# Patient Record
Sex: MALE | Race: BLACK OR AFRICAN AMERICAN | Employment: OTHER | URBAN - METROPOLITAN AREA
[De-identification: names, ages, dates, MRNs, and addresses within clinical notes are randomized per-mention and may not be internally consistent; named-entity substitution may affect disease eponyms.]

---

## 2017-02-08 ENCOUNTER — HOSPITAL ENCOUNTER (OUTPATIENT)
Dept: MRI IMAGING | Age: 72
Discharge: HOME | End: 2017-02-08
Attending: INTERNAL MEDICINE
Payer: MEDICAID

## 2017-02-08 DIAGNOSIS — M25.511 RIGHT SHOULDER PAIN: ICD-10-CM

## 2017-02-08 PROCEDURE — 73221 MRI JOINT UPR EXTREM W/O DYE: CPT

## 2017-02-16 ENCOUNTER — OFFICE VISIT (OUTPATIENT)
Dept: NEUROLOGY | Age: 72
End: 2017-02-16

## 2017-02-16 VITALS
RESPIRATION RATE: 14 BRPM | WEIGHT: 179 LBS | BODY MASS INDEX: 27.13 KG/M2 | TEMPERATURE: 98.5 F | DIASTOLIC BLOOD PRESSURE: 78 MMHG | OXYGEN SATURATION: 96 % | HEIGHT: 68 IN | SYSTOLIC BLOOD PRESSURE: 148 MMHG | HEART RATE: 78 BPM

## 2017-02-16 DIAGNOSIS — M54.16 RADICULOPATHY OF LUMBAR REGION: ICD-10-CM

## 2017-02-16 DIAGNOSIS — R26.81 GAIT INSTABILITY: Primary | ICD-10-CM

## 2017-02-16 RX ORDER — GABAPENTIN 300 MG/1
600 CAPSULE ORAL 4 TIMES DAILY
Qty: 240 CAP | Refills: 6 | Status: SHIPPED | OUTPATIENT
Start: 2017-02-16 | End: 2017-07-05

## 2017-02-16 NOTE — LETTER
2/16/2017 11:50 AM 
 
Patient:  Kari Jj YOB: 1945 Date of Visit: 2/16/2017 Dear Nell Brody MD 
[address] [facility] [address] VIA Facsimile: [phone] 
 : Thank you for referring Mr. Kari Jj to me for evaluation/treatment. Below are the relevant portions of my assessment and plan of care. Re:  Dao Endow up visit     2/16/2017 11:44 AM 
 
SSN: [id number] Subjective:   Kari Jj returns for follow up. He's gotten good relief of pain in the right leg with increased dosing of Neurontin. He has no side effects with the medication. He says he needs to get up slowly to avoid any major pain, but it's tolerable most of the time. His biggest complaint is the burning in the right leg from the hip down to the foot. His primary doctor has increased the Neurontin to 600 mg TID Medications:   
Current Outpatient Prescriptions Medication Sig Dispense Refill  gabapentin (NEURONTIN) 300 mg capsule Take 1 Cap by mouth three (3) times daily. Indications: NEUROPATHIC PAIN 90 Cap 6  
 atorvastatin (LIPITOR) 40 mg tablet Take  by mouth daily.  indapamide (LOZOL) 1.25 mg tablet Take  by mouth daily.  spironolactone (ALDACTONE) 25 mg tablet Take  by mouth daily.  tamsulosin (FLOMAX) 0.4 mg capsule Take 1 Cap by mouth daily. 30 Cap 0  
 pentoxifylline CR (TRENTAL) 400 mg CR tablet Take 400 mg by mouth two (2) times daily (with meals). Vital signs:   
Visit Vitals  /78 (BP 1 Location: Left arm, BP Patient Position: Sitting)  Pulse 78  Temp 98.5 °F (36.9 °C) (Oral)  Resp 14  
 Ht 5' 7.5\" (1.715 m)  Wt 81.2 kg (179 lb)  SpO2 96%  BMI 27.62 kg/m2 Review of Systems: As above otherwise 11 point review of systems negative including;  
Constitutional no fever or chills Skin denies rash or itching HEENT  Denies tinnitus, hearing lose Eyes denies diplopia vision lose Respiratory denies sortness of breath Cardiovascular denies chest pain, dyspnea on exertion Gastrointestinal denies nausea, vomiting, diarrhea, constipation Genitourinary denies incontinence Musculoskeletal denies joint pain or swelling Endocrine denies weight change Hematology denies easy bruising or bleeding Neurological as above in HPI Patient Active Problem List  
Diagnosis Code  Unsteady gait R26.81  
 Gait instability R26.81  Vertigo R42  Radiculopathy of lumbar region M54.16 Objective: The patient is awake, alert, and oriented x 4. Fund of knowledge is adequate. Speech is fluent and memory is intact. Cranial Nerves: II  Visual fields are full to confrontation. III, IV, VI  Extraocular movements are intact. There is no nystagmus. V  Facial sensation is intact to pinprick. VII  Face is symmetrical.  VIII - Hearing is present. IX, X, XII  Palate is symmetrical.   XI - Shoulder shrugging and head turning intact Motor: The patient moves all four limbs fairly well and symmetrically. Tone is normal. Reflexes are 2+ and symmetrical. Plantars are down going. Gait is mildly antalgic, limping off of the right foot slightly. CBC:  
Lab Results Component Value Date/Time WBC 4.9 07/26/2016 10:03 AM  
 RBC 4.51 07/26/2016 10:03 AM  
 HGB 13.9 07/26/2016 10:03 AM  
 HCT 41.1 07/26/2016 10:03 AM  
 PLATELET 589 29/64/9447 10:03 AM  
 
BMP:  
Lab Results Component Value Date/Time Glucose 108 07/26/2016 10:03 AM  
 Sodium 140 07/26/2016 10:03 AM  
 Potassium 4.0 07/26/2016 10:03 AM  
 Chloride 104 07/26/2016 10:03 AM  
 CO2 27 07/26/2016 10:03 AM  
 BUN 14 07/26/2016 10:03 AM  
 Creatinine 1.04 07/26/2016 10:03 AM  
 Calcium 9.3 07/26/2016 10:03 AM  
 
CMP:  
Lab Results Component Value Date/Time  Glucose 108 07/26/2016 10:03 AM  
 Sodium 140 07/26/2016 10:03 AM  
 Potassium 4.0 07/26/2016 10:03 AM  
 Chloride 104 07/26/2016 10:03 AM  
 CO2 27 07/26/2016 10:03 AM  
 BUN 14 07/26/2016 10:03 AM  
 Creatinine 1.04 07/26/2016 10:03 AM  
 Calcium 9.3 07/26/2016 10:03 AM  
 Anion gap 9 07/26/2016 10:03 AM  
 BUN/Creatinine ratio 13 07/26/2016 10:03 AM  
 Alk. phosphatase 76 07/26/2016 10:03 AM  
 Protein, total 7.7 07/26/2016 10:03 AM  
 Albumin 3.5 07/26/2016 10:03 AM  
 Globulin 4.2 07/26/2016 10:03 AM  
 A-G Ratio 0.8 07/26/2016 10:03 AM  
 
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 No evidence for motor dysfunction. Doing well on the increased dose of Neurontin, but still with some neuropathic pain in the right leg. Plan:  Increase the medication to 600 mg QID. Return here in 6 months. Sincerely, 
 
 
 
Julio Cesar Maldonado. Michael Valenzuela M.D. If you have questions, please do not hesitate to call me. I look forward to following Mr. Dayo Eli along with you.  
 
 
 
Sincerely, 
 
 
Selma Lo MD

## 2017-02-16 NOTE — PROGRESS NOTES
Re:  Elias Nunez up visit     2/16/2017 11:44 AM    SSN: [id number]    Subjective:   Carlo Arce returns for follow up. He's gotten good relief of pain in the right leg with increased dosing of Neurontin. He has no side effects with the medication. He says he needs to get up slowly to avoid any major pain, but it's tolerable most of the time. His biggest complaint is the burning in the right leg from the hip down to the foot. His primary doctor has increased the Neurontin to 600 mg TID    Medications:    Current Outpatient Prescriptions   Medication Sig Dispense Refill    gabapentin (NEURONTIN) 300 mg capsule Take 1 Cap by mouth three (3) times daily. Indications: NEUROPATHIC PAIN 90 Cap 6    atorvastatin (LIPITOR) 40 mg tablet Take  by mouth daily.  indapamide (LOZOL) 1.25 mg tablet Take  by mouth daily.  spironolactone (ALDACTONE) 25 mg tablet Take  by mouth daily.  tamsulosin (FLOMAX) 0.4 mg capsule Take 1 Cap by mouth daily. 30 Cap 0    pentoxifylline CR (TRENTAL) 400 mg CR tablet Take 400 mg by mouth two (2) times daily (with meals).          Vital signs:    Visit Vitals    /78 (BP 1 Location: Left arm, BP Patient Position: Sitting)    Pulse 78    Temp 98.5 °F (36.9 °C) (Oral)    Resp 14    Ht 5' 7.5\" (1.715 m)    Wt 81.2 kg (179 lb)    SpO2 96%    BMI 27.62 kg/m2       Review of Systems:   As above otherwise 11 point review of systems negative including;   Constitutional no fever or chills  Skin denies rash or itching  HEENT  Denies tinnitus, hearing lose  Eyes denies diplopia vision lose  Respiratory denies sortness of breath  Cardiovascular denies chest pain, dyspnea on exertion  Gastrointestinal denies nausea, vomiting, diarrhea, constipation  Genitourinary denies incontinence  Musculoskeletal denies joint pain or swelling  Endocrine denies weight change  Hematology denies easy bruising or bleeding   Neurological as above in HPI      Patient Active Problem List   Diagnosis Code    Unsteady gait R26.81    Gait instability R26.81    Vertigo R42    Radiculopathy of lumbar region M54.16         Objective: The patient is awake, alert, and oriented x 4. Fund of knowledge is adequate. Speech is fluent and memory is intact. Cranial Nerves: II  Visual fields are full to confrontation. III, IV, VI  Extraocular movements are intact. There is no nystagmus. V  Facial sensation is intact to pinprick. VII  Face is symmetrical.  VIII - Hearing is present. IX, X, XII  Palate is symmetrical.   XI - Shoulder shrugging and head turning intact  Motor: The patient moves all four limbs fairly well and symmetrically. Tone is normal. Reflexes are 2+ and symmetrical. Plantars are down going. Gait is mildly antalgic, limping off of the right foot slightly. CBC:   Lab Results   Component Value Date/Time    WBC 4.9 07/26/2016 10:03 AM    RBC 4.51 07/26/2016 10:03 AM    HGB 13.9 07/26/2016 10:03 AM    HCT 41.1 07/26/2016 10:03 AM    PLATELET 237 90/63/9260 10:03 AM     BMP:   Lab Results   Component Value Date/Time    Glucose 108 07/26/2016 10:03 AM    Sodium 140 07/26/2016 10:03 AM    Potassium 4.0 07/26/2016 10:03 AM    Chloride 104 07/26/2016 10:03 AM    CO2 27 07/26/2016 10:03 AM    BUN 14 07/26/2016 10:03 AM    Creatinine 1.04 07/26/2016 10:03 AM    Calcium 9.3 07/26/2016 10:03 AM     CMP:   Lab Results   Component Value Date/Time    Glucose 108 07/26/2016 10:03 AM    Sodium 140 07/26/2016 10:03 AM    Potassium 4.0 07/26/2016 10:03 AM    Chloride 104 07/26/2016 10:03 AM    CO2 27 07/26/2016 10:03 AM    BUN 14 07/26/2016 10:03 AM    Creatinine 1.04 07/26/2016 10:03 AM    Calcium 9.3 07/26/2016 10:03 AM    Anion gap 9 07/26/2016 10:03 AM    BUN/Creatinine ratio 13 07/26/2016 10:03 AM    Alk.  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 No evidence for motor dysfunction. Doing well on the increased dose of Neurontin, but still with some neuropathic pain in the right leg. Plan:  Increase the medication to 600 mg QID. Return here in 6 months. Sincerely,        Colt Chacon.  Margot Mckeon M.D.

## 2017-02-16 NOTE — MR AVS SNAPSHOT
Visit Information Date & Time Provider Department Dept. Phone Encounter #  
 2/16/2017 11:20 AM Margie Godinez [facility] [phone] [id number] Follow-up Instructions Return in about 6 months (around 8/16/2017). Follow-up and Disposition History Your Appointments 2/20/2017  1:45 PM  
ESTABLISHED PATIENT with Renita Reynoso MD  
[facility] ([address]) Appt Note: 6 month follow up Sreekanth Alejo [address] [place] [address]  
[address] [facility] [address] [place] [address] 8/16/2017  8:40 AM  
Follow Up with Margie Godinez MD  
[facility] [address]) Appt Note: 6mon f/u;  
 [address] [place] [address]  
[phone]  
  
   
 [facility] [address] Upcoming Health Maintenance Date Due Hepatitis C Screening 1945 FOBT Q 1 YEAR AGE 50-75 12/17/1996 ZOSTER VACCINE AGE 60> 12/17/2006 GLAUCOMA SCREENING Q2Y 12/17/2011 Pneumococcal 65+ Low/Medium Risk (1 of 2 - PCV13) 12/17/2011 MEDICARE YEARLY EXAM 12/17/2011 INFLUENZA AGE 9 TO ADULT 8/1/2016 DTaP/Tdap/Td series (2 - Td) 7/7/2023 Allergies as of 2/16/2017  Review Complete On: 2/16/2017 By: Andrew Blount LPN Severity Noted Reaction Type Reactions Ampicillin High 07/07/2013    Angioedema Asa-acetaminophen-caff-buffers High 05/06/2015    Rash Penicillins High 07/07/2013    Angioedema Current Immunizations  Reviewed on 4/12/2016 Name Date Tdap 7/7/2013 11:11 AM  
  
 Not reviewed this visit You Were Diagnosed With   
  
 Codes Comments Gait instability    -  Primary ICD-10-CM: R26.81 
ICD-9-CM: 781. 2 Radiculopathy of lumbar region     ICD-10-CM: M54.16 
ICD-9-CM: 724.4 Vitals BP Pulse Temp Resp Height(growth percentile) Weight(growth percentile) 148/78 (BP 1 Location: Left arm, BP Patient Position: Sitting) 78 98.5 °F (36.9 °C) (Oral) 14 5' 7.5\" (1.715 m) 179 lb (81.2 kg) SpO2 BMI Smoking Status 96% 27.62 kg/m2 Former Smoker Vitals History BMI and BSA Data Body Mass Index Body Surface Area  
 27.62 kg/m 2 1.97 m 2 Preferred Pharmacy Pharmacy Name Phone [facility] Alisha Sanchez, [address] [phone] Your Updated Medication List  
  
   
This list is accurate as of: 2/16/17 12:01 PM.  Always use your most recent med list.  
  
  
  
  
 atorvastatin 40 mg tablet Commonly known as:  LIPITOR Take  by mouth daily. gabapentin 300 mg capsule Commonly known as:  NEURONTIN Take 2 Caps by mouth four (4) times daily. Indications: NEUROPATHIC PAIN  
  
 indapamide 1.25 mg tablet Commonly known as:  Terrance Amabile Take  by mouth daily. pentoxifylline  mg CR tablet Commonly known as:  TRENTAL Take 400 mg by mouth two (2) times daily (with meals). spironolactone 25 mg tablet Commonly known as:  ALDACTONE Take  by mouth daily. tamsulosin 0.4 mg capsule Commonly known as:  FLOMAX Take 1 Cap by mouth daily. Prescriptions Sent to Pharmacy Refills  
 gabapentin (NEURONTIN) 300 mg capsule 6 Sig: Take 2 Caps by mouth four (4) times daily. Indications: NEUROPATHIC PAIN Class: Normal  
 Pharmacy: [facility] Jose Francisco Nasima Lisa, [address] [facility] Ph #: [phone] Route: Oral  
  
Follow-up Instructions Return in about 6 months (around 8/16/2017). Please provide this summary of care documentation to your next provider. Your primary care clinician is listed as Clarke Farrell. If you have any questions after today's visit, please call [phone].

## 2017-02-16 NOTE — COMMUNICATION BODY
Re:  Anillin Zelaya up visit     2/16/2017 11:44 AM    SSN: [id number]    Subjective:   Jamie Castellon returns for follow up. He's gotten good relief of pain in the right leg with increased dosing of Neurontin. He has no side effects with the medication. He says he needs to get up slowly to avoid any major pain, but it's tolerable most of the time. His biggest complaint is the burning in the right leg from the hip down to the foot. His primary doctor has increased the Neurontin to 600 mg TID    Medications:    Current Outpatient Prescriptions   Medication Sig Dispense Refill    gabapentin (NEURONTIN) 300 mg capsule Take 1 Cap by mouth three (3) times daily. Indications: NEUROPATHIC PAIN 90 Cap 6    atorvastatin (LIPITOR) 40 mg tablet Take  by mouth daily.  indapamide (LOZOL) 1.25 mg tablet Take  by mouth daily.  spironolactone (ALDACTONE) 25 mg tablet Take  by mouth daily.  tamsulosin (FLOMAX) 0.4 mg capsule Take 1 Cap by mouth daily. 30 Cap 0    pentoxifylline CR (TRENTAL) 400 mg CR tablet Take 400 mg by mouth two (2) times daily (with meals).          Vital signs:    Visit Vitals    /78 (BP 1 Location: Left arm, BP Patient Position: Sitting)    Pulse 78    Temp 98.5 °F (36.9 °C) (Oral)    Resp 14    Ht 5' 7.5\" (1.715 m)    Wt 81.2 kg (179 lb)    SpO2 96%    BMI 27.62 kg/m2       Review of Systems:   As above otherwise 11 point review of systems negative including;   Constitutional no fever or chills  Skin denies rash or itching  HEENT  Denies tinnitus, hearing lose  Eyes denies diplopia vision lose  Respiratory denies sortness of breath  Cardiovascular denies chest pain, dyspnea on exertion  Gastrointestinal denies nausea, vomiting, diarrhea, constipation  Genitourinary denies incontinence  Musculoskeletal denies joint pain or swelling  Endocrine denies weight change  Hematology denies easy bruising or bleeding   Neurological as above in HPI      Patient Active Problem List   Diagnosis Code    Unsteady gait R26.81    Gait instability R26.81    Vertigo R42    Radiculopathy of lumbar region M54.16         Objective: The patient is awake, alert, and oriented x 4. Fund of knowledge is adequate. Speech is fluent and memory is intact. Cranial Nerves: II  Visual fields are full to confrontation. III, IV, VI  Extraocular movements are intact. There is no nystagmus. V  Facial sensation is intact to pinprick. VII  Face is symmetrical.  VIII - Hearing is present. IX, X, XII  Palate is symmetrical.   XI - Shoulder shrugging and head turning intact  Motor: The patient moves all four limbs fairly well and symmetrically. Tone is normal. Reflexes are 2+ and symmetrical. Plantars are down going. Gait is mildly antalgic, limping off of the right foot slightly. CBC:   Lab Results   Component Value Date/Time    WBC 4.9 07/26/2016 10:03 AM    RBC 4.51 07/26/2016 10:03 AM    HGB 13.9 07/26/2016 10:03 AM    HCT 41.1 07/26/2016 10:03 AM    PLATELET 478 62/84/9356 10:03 AM     BMP:   Lab Results   Component Value Date/Time    Glucose 108 07/26/2016 10:03 AM    Sodium 140 07/26/2016 10:03 AM    Potassium 4.0 07/26/2016 10:03 AM    Chloride 104 07/26/2016 10:03 AM    CO2 27 07/26/2016 10:03 AM    BUN 14 07/26/2016 10:03 AM    Creatinine 1.04 07/26/2016 10:03 AM    Calcium 9.3 07/26/2016 10:03 AM     CMP:   Lab Results   Component Value Date/Time    Glucose 108 07/26/2016 10:03 AM    Sodium 140 07/26/2016 10:03 AM    Potassium 4.0 07/26/2016 10:03 AM    Chloride 104 07/26/2016 10:03 AM    CO2 27 07/26/2016 10:03 AM    BUN 14 07/26/2016 10:03 AM    Creatinine 1.04 07/26/2016 10:03 AM    Calcium 9.3 07/26/2016 10:03 AM    Anion gap 9 07/26/2016 10:03 AM    BUN/Creatinine ratio 13 07/26/2016 10:03 AM    Alk.  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 No evidence for motor dysfunction. Doing well on the increased dose of Neurontin, but still with some neuropathic pain in the right leg. Plan:  Increase the medication to 600 mg QID. Return here in 6 months. Sincerely,        Felton Luna.  Lauren Stewart M.D.

## 2017-06-21 PROBLEM — N52.9 ED (ERECTILE DYSFUNCTION) OF ORGANIC ORIGIN: Status: ACTIVE | Noted: 2017-06-21

## 2017-07-17 ENCOUNTER — HOSPITAL ENCOUNTER (OUTPATIENT)
Dept: ULTRASOUND IMAGING | Age: 72
Discharge: HOME | End: 2017-07-17
Attending: INTERNAL MEDICINE
Payer: MEDICAID

## 2017-07-17 DIAGNOSIS — R22.1 LUMP ON NECK: ICD-10-CM

## 2017-07-17 PROCEDURE — 76536 US EXAM OF HEAD AND NECK: CPT

## 2017-09-25 ENCOUNTER — APPOINTMENT (OUTPATIENT)
Dept: GENERAL RADIOLOGY | Age: 72
End: 2017-09-25
Attending: EMERGENCY MEDICINE
Payer: MEDICAID

## 2017-09-25 ENCOUNTER — HOSPITAL ENCOUNTER (EMERGENCY)
Age: 72
Discharge: HOME | End: 2017-09-25
Attending: EMERGENCY MEDICINE
Payer: MEDICAID

## 2017-09-25 VITALS
DIASTOLIC BLOOD PRESSURE: 81 MMHG | RESPIRATION RATE: 16 BRPM | TEMPERATURE: 97.9 F | SYSTOLIC BLOOD PRESSURE: 151 MMHG | HEART RATE: 75 BPM | OXYGEN SATURATION: 98 %

## 2017-09-25 DIAGNOSIS — R05.9 COUGH: ICD-10-CM

## 2017-09-25 DIAGNOSIS — J18.9 CAP (COMMUNITY ACQUIRED PNEUMONIA): Primary | ICD-10-CM

## 2017-09-25 DIAGNOSIS — I10 ELEVATED BLOOD PRESSURE READING WITH DIAGNOSIS OF HYPERTENSION: ICD-10-CM

## 2017-09-25 LAB
ANION GAP SERPL CALC-SCNC: 5 MMOL/L (ref 3–18)
ATRIAL RATE: 54 BPM
BASOPHILS # BLD: 0 K/UL (ref 0–0.06)
BASOPHILS NFR BLD: 0 % (ref 0–2)
BUN SERPL-MCNC: 12 MG/DL (ref 7–18)
BUN/CREAT SERPL: 14 (ref 12–20)
CALCIUM SERPL-MCNC: 9.6 MG/DL (ref 8.5–10.1)
CALCULATED P AXIS, ECG09: 46 DEGREES
CALCULATED R AXIS, ECG10: -5 DEGREES
CALCULATED T AXIS, ECG11: 47 DEGREES
CHLORIDE SERPL-SCNC: 106 MMOL/L (ref 100–108)
CK MB CFR SERPL CALC: 1.2 % (ref 0–4)
CK MB SERPL-MCNC: 3.2 NG/ML (ref 5–25)
CK SERPL-CCNC: 265 U/L (ref 39–308)
CO2 SERPL-SCNC: 27 MMOL/L (ref 21–32)
CREAT SERPL-MCNC: 0.87 MG/DL (ref 0.6–1.3)
DIAGNOSIS, 93000: NORMAL
DIFFERENTIAL METHOD BLD: ABNORMAL
EOSINOPHIL # BLD: 0.3 K/UL (ref 0–0.4)
EOSINOPHIL NFR BLD: 5 % (ref 0–5)
ERYTHROCYTE [DISTWIDTH] IN BLOOD BY AUTOMATED COUNT: 14.8 % (ref 11.6–14.5)
GLUCOSE SERPL-MCNC: 93 MG/DL (ref 74–99)
HCT VFR BLD AUTO: 39.7 % (ref 36–48)
HGB BLD-MCNC: 13.6 G/DL (ref 13–16)
LYMPHOCYTES # BLD: 2.3 K/UL (ref 0.9–3.6)
LYMPHOCYTES NFR BLD: 36 % (ref 21–52)
MCH RBC QN AUTO: 31 PG (ref 24–34)
MCHC RBC AUTO-ENTMCNC: 34.3 G/DL (ref 31–37)
MCV RBC AUTO: 90.4 FL (ref 74–97)
MONOCYTES # BLD: 0.6 K/UL (ref 0.05–1.2)
MONOCYTES NFR BLD: 9 % (ref 3–10)
NEUTS SEG # BLD: 3.2 K/UL (ref 1.8–8)
NEUTS SEG NFR BLD: 50 % (ref 40–73)
P-R INTERVAL, ECG05: 168 MS
PLATELET # BLD AUTO: 223 K/UL (ref 135–420)
PMV BLD AUTO: 9.5 FL (ref 9.2–11.8)
POTASSIUM SERPL-SCNC: 3.6 MMOL/L (ref 3.5–5.5)
Q-T INTERVAL, ECG07: 422 MS
QRS DURATION, ECG06: 80 MS
QTC CALCULATION (BEZET), ECG08: 400 MS
RBC # BLD AUTO: 4.39 M/UL (ref 4.7–5.5)
SODIUM SERPL-SCNC: 138 MMOL/L (ref 136–145)
TROPONIN I SERPL-MCNC: <0.02 NG/ML (ref 0–0.04)
VENTRICULAR RATE, ECG03: 54 BPM
WBC # BLD AUTO: 6.4 K/UL (ref 4.6–13.2)

## 2017-09-25 PROCEDURE — 99283 EMERGENCY DEPT VISIT LOW MDM: CPT

## 2017-09-25 PROCEDURE — 74011000250 HC RX REV CODE- 250: Performed by: EMERGENCY MEDICINE

## 2017-09-25 PROCEDURE — 94640 AIRWAY INHALATION TREATMENT: CPT

## 2017-09-25 PROCEDURE — 93005 ELECTROCARDIOGRAM TRACING: CPT

## 2017-09-25 PROCEDURE — 71020 XR CHEST PA LAT: CPT

## 2017-09-25 PROCEDURE — 80048 BASIC METABOLIC PNL TOTAL CA: CPT | Performed by: EMERGENCY MEDICINE

## 2017-09-25 PROCEDURE — 85025 COMPLETE CBC W/AUTO DIFF WBC: CPT | Performed by: EMERGENCY MEDICINE

## 2017-09-25 PROCEDURE — 74011250637 HC RX REV CODE- 250/637: Performed by: EMERGENCY MEDICINE

## 2017-09-25 PROCEDURE — 82550 ASSAY OF CK (CPK): CPT | Performed by: EMERGENCY MEDICINE

## 2017-09-25 PROCEDURE — 77030029684 HC NEB SM VOL KT MONA -A

## 2017-09-25 RX ORDER — BENZONATATE 100 MG/1
200 CAPSULE ORAL
Qty: 30 CAP | Refills: 0 | Status: SHIPPED | OUTPATIENT
Start: 2017-09-25 | End: 2017-09-25

## 2017-09-25 RX ORDER — ALBUTEROL SULFATE 0.83 MG/ML
2.5 SOLUTION RESPIRATORY (INHALATION)
Status: COMPLETED | OUTPATIENT
Start: 2017-09-25 | End: 2017-09-25

## 2017-09-25 RX ORDER — CODEINE PHOSPHATE AND GUAIFENESIN 10; 100 MG/5ML; MG/5ML
10 SOLUTION ORAL
Qty: 118 ML | Refills: 0 | Status: SHIPPED | OUTPATIENT
Start: 2017-09-25 | End: 2017-10-02

## 2017-09-25 RX ORDER — LEVOFLOXACIN 750 MG/1
750 TABLET ORAL
Status: COMPLETED | OUTPATIENT
Start: 2017-09-25 | End: 2017-09-25

## 2017-09-25 RX ORDER — LEVOFLOXACIN 750 MG/1
750 TABLET ORAL DAILY
Qty: 5 TAB | Refills: 0 | Status: SHIPPED | OUTPATIENT
Start: 2017-09-26 | End: 2017-10-01

## 2017-09-25 RX ORDER — ALBUTEROL SULFATE 90 UG/1
2 AEROSOL, METERED RESPIRATORY (INHALATION)
Qty: 1 INHALER | Refills: 0 | Status: SHIPPED | OUTPATIENT
Start: 2017-09-25 | End: 2020-02-21

## 2017-09-25 RX ADMIN — ALBUTEROL SULFATE 2.5 MG: 2.5 SOLUTION RESPIRATORY (INHALATION) at 14:28

## 2017-09-25 RX ADMIN — LEVOFLOXACIN 750 MG: 750 TABLET, FILM COATED ORAL at 14:34

## 2017-09-25 NOTE — ED PROVIDER NOTES
HPI Comments: Prateek Rangel is a 80 y.o. male with hx of HTN, hypercholesterolemia, spinal cord injury and peripheral vascular disease presenting to the ED with c/o constant left rib cage pain that began 1 day ago. Pt states rib cage pain worsens with deep breaths and is having difficulty breathing because of the pain. Pt is wearing a belt around ribs, states \"it helps with breathing, but not so much for the pain\". Denies any injury to the site. Pt denies nausea, vomiting, or hx of pulmonary disease. Notes cough that he has had for a while now. Pt is followed by Dr. Alfred Calderón. The history is provided by the patient. 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 Social History     Social History    Marital status:      Spouse name: N/A    Number of children: N/A    Years of education: N/A     Occupational History    Not on file. Social History Main Topics    Smoking status: Former Smoker     Quit date: 7/13/2015    Smokeless tobacco: Never Used    Alcohol use 0.0 oz/week     0 Standard drinks or equivalent per week      Comment: former stopped 2015    Drug use: No    Sexual activity: Yes     Other Topics Concern    Not on file     Social History Narrative         ALLERGIES: Ampicillin; Asa-acetaminophen-caff-buffers; and Penicillins    Review of Systems   Constitutional: Negative for fever. HENT: Negative for sore throat.     Eyes: Negative for redness and visual disturbance. Respiratory: Positive for cough. Negative for shortness of breath and wheezing. Difficulty breathing    Cardiovascular: Negative for chest pain. Gastrointestinal: Negative for abdominal pain, nausea and vomiting. Endocrine: Negative for polyuria. Genitourinary: Negative for dysuria. Musculoskeletal: Negative for arthralgias and neck stiffness. Left sided rib pain    Skin: Negative for rash. Neurological: Negative for headaches. All other systems reviewed and are negative. Vitals:    09/25/17 1141   BP: 151/81   Pulse: 75   Resp: 16   Temp: 97.9 °F (36.6 °C)   SpO2: 98%            Physical Exam   Constitutional: He is oriented to person, place, and time. He appears well-developed and well-nourished. No distress. HENT:   Head: Normocephalic and atraumatic. Mouth/Throat: Oropharynx is clear and moist.   Eyes: Conjunctivae are normal. Pupils are equal, round, and reactive to light. No scleral icterus. Neck: Normal range of motion. Neck supple. Cardiovascular: Normal rate and intact distal pulses. Capillary refill < 3 seconds   Pulmonary/Chest: Effort normal and breath sounds normal. No respiratory distress. He has no wheezes. He has no rales. Lungs are clear   Equal breath sounds   O2 98% on room air    Abdominal: Soft. Bowel sounds are normal. He exhibits no distension. There is no tenderness. Musculoskeletal: Normal range of motion. He exhibits no edema. No tenderness to palpation   Rib cage no crepitance   Pain exacerbated with deep inspiration    Lymphadenopathy:     He has no cervical adenopathy. Neurological: He is alert and oriented to person, place, and time. No cranial nerve deficit. Skin: Skin is warm and dry. He is not diaphoretic. Nursing note and vitals reviewed.        MDM  Number of Diagnoses or Management Options  CAP (community acquired pneumonia):   Cough:   Elevated blood pressure reading with diagnosis of hypertension:   Diagnosis management comments: ddx infectious, musculoskeletal, ptx, pleurisy, pe, cardiac, metabolic    Ekg, labs, cxr    Wbc wnl    Has pna on cxr    Gave dose levaquin, neb  Will have him call his pcp tomorrow for fu. I have reassessed the patient. I have discussed the workup, results and plan with the patient and patient is in agreement. Patient is feeling better. Patient will be prescribed levaquin, albuteral inhaler, robitussin ac. Patient was discharge in stable condition. Patient was given outpatient follow up. Patient is to return to emergency department if any new or worsening condition. Amount and/or Complexity of Data Reviewed  Clinical lab tests: ordered and reviewed  Tests in the radiology section of CPT®: ordered and reviewed  Tests in the medicine section of CPT®: ordered and reviewed  Review and summarize past medical records: yes  Independent visualization of images, tracings, or specimens: yes    Risk of Complications, Morbidity, and/or Mortality  Presenting problems: moderate  Diagnostic procedures: moderate  Management options: moderate    Patient Progress  Patient progress: improved    ED Course       Procedures      Medications Ordered:  Medications   levoFLOXacin (LEVAQUIN) tablet 750 mg (750 mg Oral Given 9/25/17 1434)   albuterol (PROVENTIL VENTOLIN) nebulizer solution 2.5 mg (2.5 mg Nebulization Given 9/25/17 1428)       Lab Findings:  No results found for this or any previous visit (from the past 12 hour(s)). EKG Interpretation by ED physician:    12:33 PM: Sinus bradycardia. Rate of 54 bpm. Normal QRS duration. Possible LVH. No ST elevations or depressions. X-ray, CT or radiology findings or impressions:  XR CHEST PA LAT   At left lung base there are new mild infiltrates, and/or atelectatic changes. No  definable left pleural effusion or obvious pleural thickening. Minimal streaky atelectasis at right lung base. Findings suggestive of mild COPD.  But currently lungs are mildly hypoventilated  on vertical dimension. Diagnosis:   1. CAP (community acquired pneumonia)    2. Cough    3. Elevated blood pressure reading with diagnosis of hypertension        Disposition: Discharge     Follow-up Information     Follow up With Details [address] [place] [address], [address] [place] [id number] [phone]             Discharge Medication List as of 9/25/2017  1:52 PM      START taking these medications    Details   levoFLOXacin (LEVAQUIN) 750 mg tablet Take 1 Tab by mouth daily for 5 days. Start this medication on Tuesday, 9/26/17. Indications: BACTERIAL PNEUMONIA, Print, Disp-5 Tab, R-0      albuterol (PROVENTIL HFA, VENTOLIN HFA, PROAIR HFA) 90 mcg/actuation inhaler Take 2 Puffs by inhalation every four (4) hours as needed for Wheezing or Shortness of Breath. Indications: BRONCHOSPASM PREVENTION, Print, Disp-1 Inhaler, R-0      guaiFENesin-codeine (ROBITUSSIN AC) 100-10 mg/5 mL solution Take 10 mL by mouth three (3) times daily as needed for Cough or Congestion for up to 7 days. Max Daily Amount: 30 mL., Print, Disp-118 mL, R-0         CONTINUE these medications which have NOT CHANGED    Details   tamsulosin (FLOMAX) 0.4 mg capsule Take 1 Cap by mouth nightly., Normal, Disp-30 Cap, R-5      finasteride (PROSCAR) 5 mg tablet Take 1 Tab by mouth daily. , Normal, Disp-30 Tab, R-5      gabapentin (NEURONTIN) 600 mg tablet Historical Med      amLODIPine (NORVASC) 5 mg tablet Take 5 mg by mouth daily. , Historical Med      atorvastatin (LIPITOR) 40 mg tablet Take  by mouth daily. , Historical Med      spironolactone (ALDACTONE) 25 mg tablet Take  by mouth daily. , Historical Med             Scribe Attestation      Trinity Abdul acting as a scribe for and in the presence of Donte , DO      September 25, 2017 at 12:35 PM       Provider Attestation:      I personally performed the services described in the documentation, reviewed the documentation, as recorded by the scribe in my presence, and it accurately and completely records my words and actions.  September 25, 2017 at 12:35 PM - Maris Beasley DO

## 2017-09-25 NOTE — DISCHARGE INSTRUCTIONS
High Blood Pressure: Care Instructions  Your Care Instructions  If your blood pressure is usually above 140/90, you have high blood pressure, or hypertension. That means the top number is 140 or higher or the bottom number is 90 or higher, or both. Despite what a lot of people think, high blood pressure usually doesn't cause headaches or make you feel dizzy or lightheaded. It usually has no symptoms. But it does increase your risk for heart attack, stroke, and kidney or eye damage. The higher your blood pressure, the more your risk increases. Your doctor will give you a goal for your blood pressure. Your goal will be based on your health and your age. An example of a goal is to keep your blood pressure below 140/90. Lifestyle changes, such as eating healthy and being active, are always important to help lower blood pressure. You might also take medicine to reach your blood pressure goal.  Follow-up care is a key part of your treatment and safety. Be sure to make and go to all appointments, and call your doctor if you are having problems. It's also a good idea to know your test results and keep a list of the medicines you take. How can you care for yourself at home? Medical treatment  · If you stop taking your medicine, your blood pressure will go back up. You may take one or more types of medicine to lower your blood pressure. Be safe with medicines. Take your medicine exactly as prescribed. Call your doctor if you think you are having a problem with your medicine. · Talk to your doctor before you start taking aspirin every day. Aspirin can help certain people lower their risk of a heart attack or stroke. But taking aspirin isn't right for everyone, because it can cause serious bleeding. · See your doctor regularly. You may need to see the doctor more often at first or until your blood pressure comes down.   · If you are taking blood pressure medicine, talk to your doctor before you take decongestants or anti-inflammatory medicine, such as ibuprofen. Some of these medicines can raise blood pressure. · Learn how to check your blood pressure at home. Lifestyle changes  · Stay at a healthy weight. This is especially important if you put on weight around the waist. Losing even 10 pounds can help you lower your blood pressure. · If your doctor recommends it, get more exercise. Walking is a good choice. Bit by bit, increase the amount you walk every day. Try for at least 30 minutes on most days of the week. You also may want to swim, bike, or do other activities. · Avoid or limit alcohol. Talk to your doctor about whether you can drink any alcohol. · Try to limit how much sodium you eat to less than 2,300 milligrams (mg) a day. Your doctor may ask you to try to eat less than 1,500 mg a day. · Eat plenty of fruits (such as bananas and oranges), vegetables, legumes, whole grains, and low-fat dairy products. · Lower the amount of saturated fat in your diet. Saturated fat is found in animal products such as milk, cheese, and meat. Limiting these foods may help you lose weight and also lower your risk for heart disease. · Do not smoke. Smoking increases your risk for heart attack and stroke. If you need help quitting, talk to your doctor about stop-smoking programs and medicines. These can increase your chances of quitting for good. When should you call for help? Call 911 anytime you think you may need emergency care. This may mean having symptoms that suggest that your blood pressure is causing a serious heart or blood vessel problem. Your blood pressure may be over 180/110. For example, call 911 if:  · You have symptoms of a heart attack. These may include:  ¨ Chest pain or pressure, or a strange feeling in the chest.  ¨ Sweating. ¨ Shortness of breath. ¨ Nausea or vomiting. ¨ Pain, pressure, or a strange feeling in the back, neck, jaw, or upper belly or in one or both shoulders or arms.   ¨ Lightheadedness or sudden weakness. ¨ A fast or irregular heartbeat. · You have symptoms of a stroke. These may include:  ¨ Sudden numbness, tingling, weakness, or loss of movement in your face, arm, or leg, especially on only one side of your body. ¨ Sudden vision changes. ¨ Sudden trouble speaking. ¨ Sudden confusion or trouble understanding simple statements. ¨ Sudden problems with walking or balance. ¨ A sudden, severe headache that is different from past headaches. · You have severe back or belly pain. Do not wait until your blood pressure comes down on its own. Get help right away. Call your doctor now or seek immediate care if:  · Your blood pressure is much higher than normal (such as 180/110 or higher), but you don't have symptoms. · You think high blood pressure is causing symptoms, such as:  ¨ Severe headache. ¨ Blurry vision. Watch closely for changes in your health, and be sure to contact your doctor if:  · Your blood pressure measures 140/90 or higher at least 2 times. That means the top number is 140 or higher or the bottom number is 90 or higher, or both. · You think you may be having side effects from your blood pressure medicine. · Your blood pressure is usually normal, but it goes above normal at least 2 times. Where can you learn more? Go to http://ovidio-eliz.info/. Enter S705 in the search box to learn more about \"High Blood Pressure: Care Instructions. \"  Current as of: August 8, 2016  Content Version: 11.3  © 5883-9651 [employer]. Care instructions adapted under license by [employer] (which disclaims liability or warranty for this information). If you have questions about a medical condition or this instruction, always ask your healthcare professional. [place] [address] any warranty or liability for your use of this information.        Cough: Care Instructions  Your Care Instructions  A cough is your body's response to something that bothers your throat or airways. Many things can cause a cough. You might cough because of a cold or the flu, bronchitis, or asthma. Smoking, postnasal drip, allergies, and stomach acid that backs up into your throat also can cause coughs. A cough is a symptom, not a disease. Most coughs stop when the cause, such as a cold, goes away. You can take a few steps at home to cough less and feel better. Follow-up care is a key part of your treatment and safety. Be sure to make and go to all appointments, and call your doctor if you are having problems. It's also a good idea to know your test results and keep a list of the medicines you take. How can you care for yourself at home? · Drink lots of water and other fluids. This helps thin the mucus and soothes a dry or sore throat. Honey or lemon juice in hot water or tea may ease a dry cough. · Take cough medicine as directed by your doctor. · Prop up your head on pillows to help you breathe and ease a dry cough. · Try cough drops to soothe a dry or sore throat. Cough drops don't stop a cough. Medicine-flavored cough drops are no better than candy-flavored drops or hard candy. · Do not smoke. Avoid secondhand smoke. If you need help quitting, talk to your doctor about stop-smoking programs and medicines. These can increase your chances of quitting for good. When should you call for help? Call 911 anytime you think you may need emergency care. For example, call if:  · You have severe trouble breathing. Call your doctor now or seek immediate medical care if:  · You cough up blood. · You have new or worse trouble breathing. · You have a new or higher fever. · You have a new rash. Watch closely for changes in your health, and be sure to contact your doctor if:  · You cough more deeply or more often, especially if you notice more mucus or a change in the color of your mucus. · You have new symptoms, such as a sore throat, an earache, or sinus pain.   · You do not get better as expected. Where can you learn more? Go to http://ovidio-eliz.info/. Enter D279 in the search box to learn more about \"Cough: Care Instructions. \"  Current as of: March 25, 2017  Content Version: 11.3  © 6861-3757 [employer]. Care instructions adapted under license by [employer] (which disclaims liability or warranty for this information). If you have questions about a medical condition or this instruction, always ask your healthcare professional. [place] [address] any warranty or liability for your use of this information. Pneumonia: Care Instructions  Your Care Instructions    Pneumonia is an infection of the lungs. Most cases are caused by infections from bacteria or viruses. Pneumonia may be mild or very severe. If it is caused by bacteria, you will be treated with antibiotics. It may take a few weeks to a few months to recover fully from pneumonia, depending on how sick you were and whether your overall health is good. Follow-up care is a key part of your treatment and safety. Be sure to make and go to all appointments, and call your doctor if you are having problems. Its also a good idea to know your test results and keep a list of the medicines you take. How can you care for yourself at home? · Take your antibiotics exactly as directed. Do not stop taking the medicine just because you are feeling better. You need to take the full course of antibiotics. · Take your medicines exactly as prescribed. Call your doctor if you think you are having a problem with your medicine. · Get plenty of rest and sleep. You may feel weak and tired for a while, but your energy level will improve with time. · To prevent dehydration, drink plenty of fluids, enough so that your urine is light yellow or clear like water. Choose water and other caffeine-free clear liquids until you feel better.  If you have kidney, heart, or liver disease and have to limit fluids, talk with your doctor before you increase the amount of fluids you drink. · Take care of your cough so you can rest. A cough that brings up mucus from your lungs is common with pneumonia. It is one way your body gets rid of the infection. But if coughing keeps you from resting or causes severe fatigue and chest-wall pain, talk to your doctor. He or she may suggest that you take a medicine to reduce the cough. · Use a vaporizer or humidifier to add moisture to your bedroom. Follow the directions for cleaning the machine. · Do not smoke or allow others to smoke around you. Smoke will make your cough last longer. If you need help quitting, talk to your doctor about stop-smoking programs and medicines. These can increase your chances of quitting for good. · Take an over-the-counter pain medicine, such as acetaminophen (Tylenol), ibuprofen (Advil, Motrin), or naproxen (Aleve). Read and follow all instructions on the label. · Do not take two or more pain medicines at the same time unless the doctor told you to. Many pain medicines have acetaminophen, which is Tylenol. Too much acetaminophen (Tylenol) can be harmful. · If you were given a spirometer to measure how well your lungs are working, use it as instructed. This can help your doctor tell how your recovery is going. · To prevent pneumonia in the future, talk to your doctor about getting a flu vaccine (once a year) and a pneumococcal vaccine (one time only for most people). When should you call for help? Call 911 anytime you think you may need emergency care. For example, call if:  · You have severe trouble breathing. Call your doctor now or seek immediate medical care if:  · You cough up dark brown or bloody mucus (sputum). · You have new or worse trouble breathing. · You are dizzy or lightheaded, or you feel like you may faint.   Watch closely for changes in your health, and be sure to contact your doctor if:  · You have a new or higher fever. · You are coughing more deeply or more often. · You are not getting better after 2 days (48 hours). · You do not get better as expected. Where can you learn more? Go to http://ovidio-eliz.info/. Enter [id number] in the search box to learn more about \"Pneumonia: Care Instructions. \"  Current as of: March 25, 2017  Content Version: 11.3  © 7996-3815 [employer]. Care instructions adapted under license by [employer] (which disclaims liability or warranty for this information). If you have questions about a medical condition or this instruction, always ask your healthcare professional. [address] any warranty or liability for your use of this information.

## 2017-09-25 NOTE — ED TRIAGE NOTES
Painful; left rib cage, presents with belt around chest, started yesterday, denies injury. Pt denies pain if not deep breathing.

## 2017-10-12 ENCOUNTER — HOSPITAL ENCOUNTER (OUTPATIENT)
Dept: GENERAL RADIOLOGY | Age: 72
Discharge: HOME | End: 2017-10-12
Payer: MEDICAID

## 2017-10-12 DIAGNOSIS — J18.9 CAP (COMMUNITY ACQUIRED PNEUMONIA): ICD-10-CM

## 2017-10-12 PROCEDURE — 71020 XR CHEST PA LAT: CPT

## 2018-03-28 ENCOUNTER — APPOINTMENT (OUTPATIENT)
Dept: GENERAL RADIOLOGY | Age: 73
End: 2018-03-28
Attending: EMERGENCY MEDICINE
Payer: MEDICAID

## 2018-03-28 ENCOUNTER — HOSPITAL ENCOUNTER (EMERGENCY)
Age: 73
Discharge: HOME | End: 2018-03-28
Attending: EMERGENCY MEDICINE
Payer: MEDICAID

## 2018-03-28 VITALS
TEMPERATURE: 98.2 F | BODY MASS INDEX: 27.78 KG/M2 | RESPIRATION RATE: 20 BRPM | WEIGHT: 180 LBS | HEART RATE: 76 BPM | DIASTOLIC BLOOD PRESSURE: 79 MMHG | SYSTOLIC BLOOD PRESSURE: 140 MMHG | OXYGEN SATURATION: 97 %

## 2018-03-28 DIAGNOSIS — M19.012 ARTHRITIS OF LEFT SHOULDER REGION: ICD-10-CM

## 2018-03-28 DIAGNOSIS — M19.022 ARTHRITIS OF LEFT ELBOW: ICD-10-CM

## 2018-03-28 DIAGNOSIS — M19.032 ARTHRITIS OF LEFT WRIST: ICD-10-CM
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
## 2018-03-28 DIAGNOSIS — S66.912A WRIST STRAIN, LEFT, INITIAL ENCOUNTER: Primary | ICD-10-CM

## 2018-03-28 DIAGNOSIS — S46.912A LEFT SHOULDER STRAIN, INITIAL ENCOUNTER: ICD-10-CM

## 2018-03-28 PROCEDURE — 73130 X-RAY EXAM OF HAND: CPT

## 2018-03-28 PROCEDURE — L3908 WHO COCK-UP NONMOLDE PRE OTS: HCPCS

## 2018-03-28 PROCEDURE — 99283 EMERGENCY DEPT VISIT LOW MDM: CPT

## 2018-03-28 PROCEDURE — 74011250637 HC RX REV CODE- 250/637: Performed by: EMERGENCY MEDICINE

## 2018-03-28 PROCEDURE — 73090 X-RAY EXAM OF FOREARM: CPT

## 2018-03-28 PROCEDURE — 73060 X-RAY EXAM OF HUMERUS: CPT

## 2018-03-28 RX ORDER — METHOCARBAMOL 750 MG/1
750 TABLET, FILM COATED ORAL
Qty: 16 TAB | Refills: 0 | Status: SHIPPED | OUTPATIENT
Start: 2018-03-28 | End: 2019-03-22

## 2018-03-28 RX ORDER — TRAMADOL HYDROCHLORIDE 50 MG/1
50 TABLET ORAL
Status: COMPLETED | OUTPATIENT
Start: 2018-03-28 | End: 2018-03-28

## 2018-03-28 RX ORDER — METHOCARBAMOL 500 MG/1
500 TABLET, FILM COATED ORAL
Status: COMPLETED | OUTPATIENT
Start: 2018-03-28 | End: 2018-03-28

## 2018-03-28 RX ADMIN — TRAMADOL HYDROCHLORIDE 50 MG: 50 TABLET, FILM COATED ORAL at 08:36

## 2018-03-28 RX ADMIN — METHOCARBAMOL 500 MG: 500 TABLET ORAL at 08:36

## 2018-03-28 NOTE — ED PROVIDER NOTES
EMERGENCY DEPARTMENT HISTORY AND PHYSICAL EXAM    7:19 AM      Date: 3/28/2018  Patient Name: Tomas Herzog    History of Presenting Illness     Chief Complaint   Patient presents with    Arm Injury    Fall         History Provided By: Patient    Chief Complaint: Wrist pain  Duration:  Last night  Timing:  Acute  Location: Left wrist  Quality: Sharp  Severity: Severe  Modifying Factors: Exacerbated with movement  Associated Symptoms: left shoulder pain, left hand pain      Additional History (Context):7:35 AM Tomas Herzog is a 71 y.o. male with h/o HTN and Hypercholesteremia, who presents to ED complaining of severe acute sharp left wrist pain associated with left shoulder pain and left hand pain that is exacerbated with movement onset last night. Patient states that he was painting in the house last night when he fell on an out stretched hand to break his fall. States that he fell on to his left hand and hit his shoulder and head on the wall. He denies Nausea, vomiting, and LOC. He denies taking any pain medication for the pain. States that he has had a bleeding ulcer in the past about 30 years ago but is currently following up with his doctor as he has had some burning abd pain. No other concerns or symptoms at this time. PCP: Leatha Vazquez MD      Past History     Past Medical History:  Past Medical History:   Diagnosis Date    Bladder outlet obstruction     Erectile disorder due to medical 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     Frequency of urination     H/O spinal cord injury 1974    lumbar spine injury secondary to fall    HTN (hypertension)     Hypercholesterolemia     Injury of lumbar spine ([address] 1974    Leg pain, right     Nocturia     Overactive bladder     Peripheral vascular disease ([facility] [address]        Past Surgical History:  Past Surgical History:   Procedure Laterality Date    HX ORTHOPAEDIC      finger amputation left hand    HX TONSILLECTOMY         Family History:  History reviewed.  No pertinent family history. Social History:  Social History   Substance Use Topics    Smoking status: Former Smoker     Quit date: 7/13/2015    Smokeless tobacco: Never Used    Alcohol use 0.0 oz/week     0 Standard drinks or equivalent per week      Comment: former stopped 2015       Allergies: Allergies   Allergen Reactions    Ampicillin Angioedema    Asa-Acetaminophen-Caff-Buffers Rash    Penicillins Angioedema         Review of Systems       Review of Systems   Constitutional: Negative for fever. HENT: Negative for sore throat. Eyes: Negative for redness and visual disturbance. Respiratory: Negative for shortness of breath and wheezing. Cardiovascular: Negative for chest pain. Gastrointestinal: Negative for abdominal pain, nausea and vomiting. Endocrine: Negative for polyuria. Genitourinary: Negative for dysuria. Musculoskeletal: Negative for arthralgias and neck stiffness. Left wrist pain  Left Shoulder pain  Left arm and hand pain     Skin: Negative for rash. Neurological: Negative for headaches. All other systems reviewed and are negative. Physical Exam     Visit Vitals    /79 (BP 1 Location: Right arm, BP Patient Position: Sitting)    Pulse 76    Temp 98.2 °F (36.8 °C)    Resp 20    Wt 81.6 kg (180 lb)    SpO2 97%    BMI 27.78 kg/m2         Physical Exam   Constitutional: He is oriented to person, place, and time. He appears well-developed and well-nourished. No distress. HENT:   Head: Normocephalic and atraumatic. Head is without raccoon's eyes, without Chong's sign and without abrasion. Mouth/Throat: Oropharynx is clear and moist.   No facial bruising  No raccoon eyes     Eyes: Conjunctivae and EOM are normal. Pupils are equal, round, and reactive to light. No scleral icterus. Neck: Normal range of motion. Neck supple. Cardiovascular: Intact distal pulses.     Capillary refill < 3 seconds   Pulmonary/Chest: Effort normal and breath sounds normal. No respiratory distress. He has no wheezes. Abdominal: Soft. Bowel sounds are normal. He exhibits no distension. There is no tenderness. Musculoskeletal: Normal range of motion. He exhibits tenderness. He exhibits no edema. Posterior left lateral shoulder tenderness. No clavicular tenderness  Full ROM in neck and head  Tenderness from mid forearm to wrist  Hand tenderness. Snuff box tenderness. No table to make a full fist due to pain. Cap refill less than 3 seconds. Sensation intact. Medial, ulnar, radial pulse intact. Left elbow tenderness. Decreased movement of left shoulder secondary to pain. Full AB/AD duction  No midline spinal tenderness. No hip tenderness. Pelvis stable  LE strength 5/5. Lymphadenopathy:     He has no cervical adenopathy. Neurological: He is alert and oriented to person, place, and time. No cranial nerve deficit. No slurred speech  No facial droop  AOx4   Skin: Skin is warm and dry. He is not diaphoretic. Nursing note and vitals reviewed. Diagnostic Study Results     Labs -  No results found for this or any previous visit (from the past 12 hour(s)). Radiologic Studies -   XR HUMERUS LT   Final Result   IMPRESSION  IMPRESSION:     No evidence of fracture in left humerus.     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     Mild osteoarthritis at the left elbow joint.     Dorsal olecranon spur noted.            XR FOREARM LT AP/LAT   Final Result   IMPRESSION  IMPRESSION:     No evidence of fracture in left radius and ulna.     Mild osteoarthritis at left wrist and elbow.            XR HAND LT MIN 3 V   Final Result   IMPRESSION  IMPRESSION:     No evidence of fracture or dislocation in left hand.     Osteoarthritis, as described. Medical Decision Making   I am the first provider for this patient.     I reviewed the vital signs, available nursing notes, past medical history, past surgical history, family history and social history. Vital Signs-Reviewed the patient's vital signs. Pulse Oximetry Analysis -  97% on room air     Records Reviewed: Nursing Notes and Old Medical Records (Time of Review: 7:19 AM)    Provider Notes (Medical Decision Making):  MDM  Number of Diagnoses or Management Options  Diagnosis management comments: DDX: Fracture, Strain, contusion, internal derangement. Get xray and give pain medication. Medications   methocarbamol (ROBAXIN) tablet 500 mg (500 mg Oral Given 3/28/18 0836)   traMADol (ULTRAM) tablet 50 mg (50 mg Oral Given 3/28/18 0836)       Procedures:   Splint, Other  Date/Time: 3/28/2018 9:35 AM  Performed by: Luci Nicolas  Authorized by: Luci Nicolas     Consent:     Consent obtained:  Verbal    Consent given by:  Patient    Risks discussed:  Discoloration, numbness, pain and swelling    Alternatives discussed:  Observation and referral  Universal protocol:     Procedure explained and questions answered to patient or proxy's satisfaction: yes      Relevant documents present and verified: yes      Imaging studies available: yes      Immediately prior to procedure a time out was called: yes      Patient identity confirmed:  Verbally with patient, arm band and hospital-assigned identification number  Pre-procedure details:     Sensation:  Normal    Skin color:  Brown, good perfusion  Procedure details:     Laterality:  Left    Location:  Arm    Splint type: velcro splint. Supplies:  Sling  Post-procedure details:     Pain:  Improved    Sensation:  Normal    Patient tolerance of procedure: Tolerated well, no immediate complications  Comments:      Pre and post splint and sling: nv intact      I have reassessed the patient. I have discussed the workup, results and plan with the patient and patient is in agreement. Patient is feeling better. Patient will be prescribed robaxin. Patient was discharge in stable condition. Patient was given outpatient follow up.   Patient is to return to emergency department if any new or worsening condition. Diagnosis     Clinical Impression:   1. Wrist strain, left, initial encounter    2. Fall, initial encounter    3. Arthritis of left wrist    4. Arthritis of left elbow    5. Arthritis of left shoulder region    6. Left shoulder strain, initial encounter        Disposition: DC    Follow-up Information     Follow up With Details Comments Contact Info    [facility] EMERGENCY DEPT Go to If symptoms worsen, As needed [address] MD Ghazal Schedule an appointment as soon as possible for a visit in 2 days  [phone]  Robert Lopez MD Schedule an appointment as soon as possible for a visit in 2 days  [facility]               Attestations:  Candace Morales acting as a scribe for and in the presence of Richard Ross DO      March 28, 2018 at 7:34 AM       Provider Attestation:      I personally performed the services described in the documentation, reviewed the documentation, as recorded by the scribe in my presence, and it accurately and completely records my words and actions.  March 28, 2018 at 7:34 AM - Richard Ross DO    _______________________________

## 2018-03-28 NOTE — DISCHARGE INSTRUCTIONS
Arthritis: Care Instructions  Your Care Instructions  Arthritis, also called osteoarthritis, is a breakdown of the cartilage that cushions your joints. When the cartilage wears down, your bones rub against each other. This causes pain and stiffness. Many people have some arthritis as they age. Arthritis most often affects the joints of the spine, hands, hips, knees, or feet. You can take simple measures to protect your joints, ease your pain, and help you stay active. Follow-up care is a key part of your treatment and safety. Be sure to make and go to all appointments, and call your doctor if you are having problems. It's also a good idea to know your test results and keep a list of the medicines you take. How can you care for yourself at home? · Stay at a healthy weight. Being overweight puts extra strain on your joints. · Talk to your doctor or physical therapist about exercises that will help ease joint pain. ¨ Stretch. You may enjoy gentle forms of yoga to help keep your joints and muscles flexible. ¨ Walk instead of jog. Other types of exercise that are less stressful on the joints include riding a bicycle, swimming, donna chi, or water exercise. ¨ Lift weights. Strong muscles help reduce stress on your joints. Stronger thigh muscles, for example, take some of the stress off of the knees and hips. Learn the right way to lift weights so you do not make joint pain worse. · Take your medicines exactly as prescribed. Call your doctor if you think you are having a problem with your medicine. · Take pain medicines exactly as directed. ¨ If the doctor gave you a prescription medicine for pain, take it as prescribed. ¨ If you are not taking a prescription pain medicine, ask your doctor if you can take an over-the-counter medicine. · Use a cane, crutch, walker, or another device if you need help to get around. These can help rest your joints.  You also can use other things to make life easier, such as a higher toilet seat and padded handles on kitchen utensils. · Do not sit in low chairs, which can make it hard to get up. · Put heat or cold on your sore joints as needed. Use whichever helps you most. You also can take turns with hot and cold packs. ¨ Apply heat 2 or 3 times a day for 20 to 30 minutes-using a heating pad, hot shower, or hot pack-to relieve pain and stiffness. ¨ Put ice or a cold pack on your sore joint for 10 to 20 minutes at a time. Put a thin cloth between the ice and your skin. When should you call for help? Call your doctor now or seek immediate medical care if:  ? · You have sudden swelling, warmth, or pain in any joint. ? · You have joint pain and a fever or rash. ? · You have such bad pain that you cannot use a joint. ? Watch closely for changes in your health, and be sure to contact your doctor if:  ? · You have mild joint symptoms that continue even with more than 6 weeks of care at home. ? · You have stomach pain or other problems with your medicine. Where can you learn more? Go to http://ovidio-eliz.info/. Enter O354 in the search box to learn more about \"Arthritis: Care Instructions. \"  Current as of: October 31, 2016  Content Version: 11.4  © 2445-0861 [employer]. Care instructions adapted under license by [employer] (which disclaims liability or warranty for this information). If you have questions about a medical condition or this instruction, always ask your healthcare professional. [place] [address] any warranty or liability for your use of this information. Preventing Falls: Care Instructions  Your Care Instructions    Getting around your home safely can be a challenge if you have injuries or health problems that make it easy for you to fall. Loose rugs and furniture in walkways are among the dangers for many older people who have problems walking or who have poor eyesight.  People who have conditions such as arthritis, osteoporosis, or dementia also have to be careful not to fall. You can make your home safer with a few simple measures. Follow-up care is a key part of your treatment and safety. Be sure to make and go to all appointments, and call your doctor if you are having problems. It's also a good idea to know your test results and keep a list of the medicines you take. How can you care for yourself at home? Taking care of yourself  · You may get dizzy if you do not drink enough water. To prevent dehydration, drink plenty of fluids, enough so that your urine is light yellow or clear like water. Choose water and other caffeine-free clear liquids. If you have kidney, heart, or liver disease and have to limit fluids, talk with your doctor before you increase the amount of fluids you drink. · Exercise regularly to improve your strength, muscle tone, and balance. Walk if you can. Swimming may be a good choice if you cannot walk easily. · Have your vision and hearing checked each year or any time you notice a change. If you have trouble seeing and hearing, you might not be able to avoid objects and could lose your balance. · Know the side effects of the medicines you take. Ask your doctor or pharmacist whether the medicines you take can affect your balance. Sleeping pills or sedatives can affect your balance. · Limit the amount of alcohol you drink. Alcohol can impair your balance and other senses. · Ask your doctor whether calluses or corns on your feet need to be removed. If you wear loose-fitting shoes because of calluses or corns, you can lose your balance and fall. · Talk to your doctor if you have numbness in your feet. Preventing falls at home  · Remove raised doorway thresholds, throw rugs, and clutter. Repair loose carpet or raised areas in the floor. · Move furniture and electrical cords to keep them out of walking paths.   · Use nonskid floor wax, and wipe up spills right away, especially on ceramic tile floors. · If you use a walker or cane, put rubber tips on it. If you use crutches, clean the bottoms of them regularly with an abrasive pad, such as steel wool. · Keep your house well lit, especially [facility], and outside walkways. Use night-lights in areas such as hallways and bathrooms. Add extra light switches or use remote switches (such as switches that go on or off when you clap your hands) to make it easier to turn lights on if you have to get up during the night. · Install sturdy handrails on stairways. · Move items in your cabinets so that the things you use a lot are on the lower shelves (about waist level). · Keep a cordless phone and a flashlight with new batteries by your bed. If possible, put a phone in each of the main rooms of your house, or carry a cell phone in case you fall and cannot reach a phone. Or, you can wear a device around your neck or wrist. You push a button that sends a signal for help. · Wear low-heeled shoes that fit well and give your feet good support. Use footwear with nonskid soles. Check the heels and soles of your shoes for wear. Repair or replace worn heels or soles. · Do not wear socks without shoes on wood floors. · Walk on the grass when the sidewalks are slippery. If you live in an area that gets snow and ice in the winter, sprinkle salt on slippery steps and sidewalks. Preventing falls in the bath  · Install grab bars and nonskid mats inside and outside your shower or tub and near the toilet and sinks. · Use shower chairs and bath benches. · Use a hand-held shower head that will allow you to sit while showering. · Get into a tub or shower by putting the weaker leg in first. Get out of a tub or shower with your strong side first.  · Repair loose toilet seats and consider installing a raised toilet seat to make getting on and off the toilet easier. · Keep your bathroom door unlocked while you are in the shower.   Where can you learn more? Go to http://ovidio-eliz.info/. Enter 0476 [id number] in the search box to learn more about \"Preventing Falls: Care Instructions. \"  Current as of: May 12, 2017  Content Version: 11.4  © 6932-8800 [employer]. Care instructions adapted under license by [employer] (which disclaims liability or warranty for this information). If you have questions about a medical condition or this instruction, always ask your healthcare professional. [address] any warranty or liability for your use of this information. Osteoarthritis: Care Instructions  Your Care Instructions    Arthritis is a common health problem in which the joints are inflamed. There are several kinds of arthritis. Osteoarthritis is caused by a breakdown of cartilage, the hard, thick tissue that cushions the joints. It causes pain, stiffness, and swelling, often in the spine, fingers, hips, and knees. Osteoarthritis can happen at any age, but it is most common in older people. Osteoarthritis never goes away completely, but it can be controlled. Medicine and home treatment can reduce the pain and prevent the arthritis from getting worse. Follow-up care is a key part of your treatment and safety. Be sure to make and go to all appointments, and call your doctor if you are having problems. It's also a good idea to know your test results and keep a list of the medicines you take. How can you care for yourself at home? · Take a warm shower or bath in the morning to relieve stiffness. Avoid sitting still afterwards. · If the joint is not swollen, use moist heat, like a warm, damp towel, for 20 to 30 minutes, 2 or 3 times a day. Do not use heat on a swollen joint. · If the joint is swollen, use ice or cold packs for 10 to 20 minutes, once an hour. Cold will help relieve pain and reduce inflammation. Put a thin cloth between the ice and your skin.   · To prevent stiffness, gently move the joint through its full range of motion several times a day. · If the joint hurts, avoid activities that put a strain on it for a few days. Take rest breaks throughout the day. · Get regular exercise. Walking, swimming, yoga, biking, donna chi, and water aerobics are good exercises that are gentle on the joints. · Reach and stay at a healthy weight. If you need to lose or maintain weight, regular exercise and a healthy diet will help. Extra weight can strain the joints, especially the knees and hips, and make the pain worse. Losing even a few pounds may help. · Take pain medicines exactly as directed. ¨ If the doctor gave you a prescription medicine for pain, take it as prescribed. ¨ If you are not taking a prescription pain medicine, ask your doctor if you can take an over-the-counter medicine. When should you call for help? Call your doctor now or seek immediate medical care if:  ? · The pain is so bad that you cannot use the joint. ? · You have sudden back pain with weakness in your legs or loss of bowel or bladder control. ? · Your stools are black and tarlike or have streaks of blood. ? · You have severe pain and swelling in more than one joint. ? Watch closely for changes in your health, and be sure to contact your doctor if:  ? · You have side effects from the medicines, like belly pain, ongoing heartburn, or nausea. ? · Joint pain continues for more than 6 weeks, and home treatment is not helping. Where can you learn more? Go to http://ovidio-eliz.info/. Enter U997 in the search box to learn more about \"Osteoarthritis: Care Instructions. \"  Current as of: October 31, 2016  Content Version: 11.4  © 7421-4382 [employer]. Care instructions adapted under license by [employer] (which disclaims liability or warranty for this information).  If you have questions about a medical condition or this instruction, always ask your healthcare professional. [employer] disclaims any warranty or liability for your use of this information.

## 2018-06-06 ENCOUNTER — OFFICE VISIT (OUTPATIENT)
Dept: ORTHOPEDIC SURGERY | Facility: CLINIC | Age: 73
End: 2018-06-06

## 2018-06-06 VITALS
HEIGHT: 68 IN | HEART RATE: 65 BPM | RESPIRATION RATE: 18 BRPM | WEIGHT: 182.2 LBS | DIASTOLIC BLOOD PRESSURE: 63 MMHG | OXYGEN SATURATION: 95 % | TEMPERATURE: 97.3 F | SYSTOLIC BLOOD PRESSURE: 140 MMHG | BODY MASS INDEX: 27.61 KG/M2

## 2018-06-06 DIAGNOSIS — M25.561 RIGHT KNEE PAIN, UNSPECIFIED CHRONICITY: Primary | ICD-10-CM

## 2018-06-06 DIAGNOSIS — M17.11 PRIMARY OSTEOARTHRITIS OF RIGHT KNEE: ICD-10-CM

## 2018-06-06 DIAGNOSIS — M48.062 SPINAL STENOSIS OF LUMBAR REGION WITH NEUROGENIC CLAUDICATION: ICD-10-CM

## 2018-06-06 RX ORDER — BETAMETHASONE SODIUM PHOSPHATE AND BETAMETHASONE ACETATE 3; 3 MG/ML; MG/ML
6 INJECTION, SUSPENSION INTRA-ARTICULAR; INTRALESIONAL; INTRAMUSCULAR; SOFT TISSUE ONCE
Qty: 1 ML | Refills: 0
Start: 2018-06-06 | End: 2018-06-06

## 2018-06-06 NOTE — PROGRESS NOTES
Patient: Cristina Sandoval                MRN: [id number]       SSN: [id number]  YOB: 1945        AGE: 71 y.o. SEX: male  Body mass index is 28.12 kg/(m^2). PCP: Lorelei Garcia MD  06/06/18    Dear Dr. Ana Laura Marie:    I had the pleasure of interviewing Mr. Michael Ribeiro today. I appreciate the opportunity to share in his care and provide my opinion in regards to his management. 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 He twisted his knee about six weeks ago and the knee swelled and it came down and he is presenting with some persistent medial pain. He has been able to go back to work part time, a little difficulty with stairs and kneeling and denies fevers or chills. No shortness of breath or chest pain. Also complaining of radiculopathy and numbness and tingling going all the way down the right leg with some back pain as well. Denies bowel or bladder problems and denies fevers, chills, night sweats or weight loss. PHYSICAL EXAMINATION:  Slim gentleman, is alert and oriented, remembers his birth date. No scleral icterus or JVD. The hips rotate nicely. 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 EHL 5/5, tib bands 5/5. Straight leg raise just equivocal and he gives further history if he stands at the kitchen sink for any period of time, radiculopathy becomes worse and he has to sit down. With regards to the right knee, the pain has improved, it is mild currently and the examination shows medial joint line tenderness, minimal swelling, good motion, quads are intact, calf non tender, mild varus malalignment, although fairly neutral alignment overall. Both feet warm and well perfused. Calf non tender. Homans sign negative. RADIOGRAPHS:  Review of the xrays confirm moderate arthritis involving the right knee. I don't see an obvious fracture.     IMPRESSION:  My overall impression is moderate arthritis, patellofemoral, versus meniscal tear. PLAN:  I have explained the treatment options, including MRI, injection and more medications. He'd like to try with an injection. I think that is a very reasonable approach and therefore under aseptic conditions and after informed, written consent with a time out, the right knee was injected with 1 cc of the Celestone preparation, i.e. 6 mg, which was well tolerated. Will obtain MRI of the lumbar spine. His low back was fairly tender about 4-5 and positive neurological exam as well. C:     Marcie Croft MD        REVIEW OF SYSTEMS:      CON: negative for weight loss, fever  EYE: negative for double vision  ENT: negative for hoarseness  RS:   negative for Tb  GI:    negative for blood in stool  :  negative for blood in urine  Other systems reviewed and noted below. Past Medical History:   Diagnosis Date    Bladder outlet obstruction     Erectile disorder due to medical 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     Frequency of urination     H/O spinal cord injury 1974    lumbar spine injury secondary to fall    HTN (hypertension)     Hypercholesterolemia     Injury of lumbar spine ([facility] [address] 1974    Leg pain, right     Nocturia     Overactive bladder     Peripheral vascular disease ([facility] [address]        History reviewed. No pertinent family history. Current Outpatient Prescriptions   Medication Sig Dispense Refill    albuterol (PROVENTIL HFA, VENTOLIN HFA, PROAIR HFA) 90 mcg/actuation inhaler Take 2 Puffs by inhalation every four (4) hours as needed for Wheezing or Shortness of Breath. Indications: BRONCHOSPASM PREVENTION 1 Inhaler 0    finasteride (PROSCAR) 5 mg tablet Take 1 Tab by mouth daily. 30 Tab 5    amLODIPine (NORVASC) 5 mg tablet Take 5 mg by mouth daily.  atorvastatin (LIPITOR) 40 mg tablet Take  by mouth daily.  spironolactone (ALDACTONE) 25 mg tablet Take  by mouth daily.       methocarbamol (ROBAXIN-750) 750 mg tablet Take 1 Tab by mouth three (3) times daily as needed. Indications: pain, muscle spasms 16 Tab 0    tamsulosin (FLOMAX) 0.4 mg capsule Take 1 Cap by mouth nightly. 30 Cap 5    gabapentin (NEURONTIN) 600 mg tablet          Allergies   Allergen Reactions    Ampicillin Angioedema    Asa-Acetaminophen-Caff-Buffers Rash    Penicillins Angioedema       Past Surgical History:   Procedure Laterality Date    HX ORTHOPAEDIC      finger amputation left hand    HX TONSILLECTOMY         Social History     Social History    Marital status: SINGLE     Spouse name: N/A    Number of children: N/A    Years of education: N/A     Occupational History    Not on file. Social History Main Topics    Smoking status: Former Smoker     Quit date: 7/13/2015    Smokeless tobacco: Never Used    Alcohol use 0.0 oz/week     0 Standard drinks or equivalent per week      Comment: former stopped 2015    Drug use: No    Sexual activity: Yes     Other Topics Concern    Not on file     Social History Narrative       Visit Vitals    /63    Pulse 65    Temp 97.3 °F (36.3 °C) (Oral)    Resp 18    Ht 5' 7.5\" (1.715 m)    Wt 182 lb 3.2 oz (82.6 kg)    SpO2 95%    BMI 28.12 kg/m2         PHYSICAL EXAMINATION:  GENERAL: Alert and oriented x3, in no acute distress, well-developed, well-nourished, afebrile. HEART: No JVD. EYES: No scleral icterus   NECK: No significant lymphadenopathy   LUNGS: No respiratory compromise or indrawing  ABDOMEN: Soft, non-tender, non-distended. Electronically signed by:  Alida Erickson MD

## 2018-07-11 ENCOUNTER — OFFICE VISIT (OUTPATIENT)
Dept: ORTHOPEDIC SURGERY | Facility: CLINIC | Age: 73
End: 2018-07-11

## 2018-07-11 VITALS
TEMPERATURE: 97 F | SYSTOLIC BLOOD PRESSURE: 135 MMHG | HEART RATE: 87 BPM | OXYGEN SATURATION: 95 % | WEIGHT: 177 LBS | DIASTOLIC BLOOD PRESSURE: 71 MMHG | HEIGHT: 68 IN | RESPIRATION RATE: 16 BRPM | BODY MASS INDEX: 26.83 KG/M2

## 2018-07-11 DIAGNOSIS — M54.41 CHRONIC BILATERAL LOW BACK PAIN WITH RIGHT-SIDED SCIATICA: ICD-10-CM

## 2018-07-11 DIAGNOSIS — M48.062 SPINAL STENOSIS OF LUMBAR REGION WITH NEUROGENIC CLAUDICATION: Primary | ICD-10-CM

## 2018-07-11 DIAGNOSIS — G89.29 CHRONIC PAIN OF RIGHT KNEE: ICD-10-CM

## 2018-07-11 DIAGNOSIS — G89.29 CHRONIC BILATERAL LOW BACK PAIN WITH RIGHT-SIDED SCIATICA: ICD-10-CM

## 2018-07-11 DIAGNOSIS — M17.11 PRIMARY OSTEOARTHRITIS OF RIGHT KNEE: ICD-10-CM

## 2018-07-11 DIAGNOSIS — M25.561 CHRONIC PAIN OF RIGHT KNEE: ICD-10-CM

## 2018-07-11 NOTE — PROGRESS NOTES
Patient: Eric Louise                MRN: [id number]       SSN: [id number] YOB: 1945        AGE: 71 y.o. SEX: male Body mass index is 27.31 kg/(m^2). PCP: German Galvin MD 
07/11/18 HISTORY: Mr. Dunia Smith is here today complaining of right knee pain and low back pain with radiculopathy. He had a fall off of a scaffold years ago and injured his spine. He is having some burning and radiculopathy going all the way down the right leg sometimes when he is standing and other times when he is sitting. He is also complaining of right knee pain. We gave him an injection at his last visit. He points to the medial aspect of the knee and states that it is still moderately painful all of the time. It is worse with walking and twisting. He does notice some clicking on the inside of his knee, and night pain is not much of a feature for the knee itself. He denies fevers or chills. He is otherwise feeling well. 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 He has some chronic changes anteriorly on the skin from this bilaterally. PHYSICAL EXAMINATION:  On examination today, he is a very nice gentleman. He moves the head and neck adequately. There is no respiratory compromise or indrawing. The hips rotate adequately. There is decreased sensation at L4-5 on the right side. Tib/ant is -5/5. EHL is 5/5. Straight leg raise is just equivocal.  With regards to the knee, there is just a minimal effusion. There is good motion. The quadriceps are intact. There is medial joint line tenderness and a tender Milady's test as well. There is a 1+ ACL. The calf is nontender. Shay's sign is negative. The low back is mildly tender around L4-5 and, again, there is decreased sensation as above-mentioned. RADIOGRAPHS:  Review of his x-rays reveals moderate patellofemoral arthritis.   
 
IMPRESSION:  My overall impression is likely a medial meniscus tear associated with mild to moderate arthritis involving the knee, as well as post-traumatic arthritis and also with radicular syndrome on the right side and documented numbness on the right at L4-5. PLAN:  At this point, I would recommend MRIs for both the right knee and the lumbar spine. There was, apparently, a communication problem, and he was not scheduled for the MRI of his spine at the last visit. We will get this arranged for him and see him back afterwards. REVIEW OF SYSTEMS:   
 
CON: negative for weight loss, fever EYE: negative for double vision ENT: negative for hoarseness RS:   negative for Tb 
GI:    negative for blood in stool :  negative for blood in urine Other systems reviewed and noted below. Past Medical History:  
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  
 lumbar spine injury secondary to fall  
 HTN (hypertension)  Hypercholesterolemia  Injury of lumbar spine ([facility] [address] 1974  Leg pain, right  Nocturia  Overactive bladder  Peripheral vascular disease ([facility] [address] No family history on file. Current Outpatient Prescriptions Medication Sig Dispense Refill  albuterol (PROVENTIL HFA, VENTOLIN HFA, PROAIR HFA) 90 mcg/actuation inhaler Take 2 Puffs by inhalation every four (4) hours as needed for Wheezing or Shortness of Breath. Indications: BRONCHOSPASM PREVENTION 1 Inhaler 0  
 finasteride (PROSCAR) 5 mg tablet Take 1 Tab by mouth daily. 30 Tab 5  
 amLODIPine (NORVASC) 5 mg tablet Take 5 mg by mouth daily.  atorvastatin (LIPITOR) 40 mg tablet Take  by mouth daily.  spironolactone (ALDACTONE) 25 mg tablet Take  by mouth daily.  methocarbamol (ROBAXIN-750) 750 mg tablet Take 1 Tab by mouth three (3) times daily as needed. Indications: pain, muscle spasms 16 Tab 0  tamsulosin (FLOMAX) 0.4 mg capsule Take 1 Cap by mouth nightly. 30 Cap 5  
 gabapentin (NEURONTIN) 600 mg tablet Allergies Allergen Reactions  Ampicillin Angioedema  Asa-Acetaminophen-Caff-Buffers Rash  Penicillins Angioedema Past Surgical History:  
Procedure Laterality Date  HX ORTHOPAEDIC    
 finger amputation left hand  HX TONSILLECTOMY Social History Social History  Marital status: SINGLE Spouse name: N/A  
 Number of children: N/A  
 Years of education: N/A Occupational History  Not on file. Social History Main Topics  Smoking status: Former Smoker Quit date: 7/13/2015  Smokeless tobacco: Never Used  Alcohol use 0.0 oz/week  
  0 Standard drinks or equivalent per week Comment: former stopped 2015  Drug use: No  
 Sexual activity: Yes Other Topics Concern  Not on file Social History Narrative Visit Vitals  /71  Pulse 87  Temp 97 °F (36.1 °C) (Oral)  Resp 16  
 Ht 5' 7.5\" (1.715 m)  Wt 177 lb (80.3 kg)  SpO2 95%  BMI 27.31 kg/m2 PHYSICAL EXAMINATION: 
GENERAL: Alert and oriented x3, in no acute distress, well-developed, well-nourished, afebrile. HEART: No JVD. EYES: No scleral icterus NECK: No significant lymphadenopathy LUNGS: No respiratory compromise or indrawing ABDOMEN: Soft, non-tender, non-distended. Electronically signed by:  Griselda Handing, MD

## 2018-07-11 NOTE — MR AVS SNAPSHOT
[address] [place] [address], [address] [facility] [address] 
[phone] Patient: Arthur Maurice MRN: [id number] :1945 Visit Information Date & Time Provider Department Dept. Phone Encounter #  
 2018  7:45 AM Cydney Brown MD [facility] [phone] Your Appointments 2018  9:40 AM  
Follow Up with Elva Price MD  
[facility] Appt Note: f/u; pain in right knees. Candance Huger .. Candance Huger ywp; pt r/s to this date & time. ..ywp  
 [address] [facility] [id number] [phone]  
  
   
 [facility] [id number] Upcoming Health Maintenance Date Due Hepatitis C Screening 1945 FOBT Q 1 YEAR AGE 50-75 1996 ZOSTER VACCINE AGE 60> 10/17/2006 GLAUCOMA SCREENING Q2Y 2011 Pneumococcal 65+ Low/Medium Risk (1 of 2 - PCV13) 2011 Influenza Age 5 to Adult 2018 DTaP/Tdap/Td series (2 - Td) 2023 Allergies as of 2018  Review Complete On: 2018 By: Cydney Brown MD  
  
 Severity Noted Reaction Type Reactions Ampicillin High 2013    Angioedema Asa-acetaminophen-caff-buffers High 2015    Rash Penicillins High 2013    Angioedema Current Immunizations  Reviewed on 2016 Name Date Tdap 2013 11:11 AM  
  
 Not reviewed this visit You Were Diagnosed With   
  
 Codes Comments Spinal stenosis of lumbar region with neurogenic claudication    -  Primary ICD-10-CM: F69.338 
ICD-9-CM: 724.03 Primary osteoarthritis of right knee     ICD-10-CM: M17.11 ICD-9-CM: 715.16 Chronic pain of right knee     ICD-10-CM: M25.561, G89.29 ICD-9-CM: 719.46, 338.29 Chronic bilateral low back pain with right-sided sciatica     ICD-10-CM: M54.41, G89.29 ICD-9-CM: 724.2, 724.3, 338.29 Vitals BP Pulse Temp Resp Height(growth percentile) Weight(growth percentile) 135/71 87 97 °F (36.1 °C) (Oral) 16 5' 7.5\" (1.715 m) 177 lb (80.3 kg) SpO2 BMI Smoking Status 95% 27.31 kg/m2 Former Smoker BMI and BSA Data Body Mass Index Body Surface Area  
 27.31 kg/m 2 1.96 m 2 Preferred Pharmacy Pharmacy Name Phone [facility]audio [address], [address] [phone] Your Updated Medication List  
  
   
This list is accurate as of 7/11/18  8:37 AM.  Always use your most recent med list.  
  
  
  
  
 albuterol 90 mcg/actuation inhaler Commonly known as:  PROVENTIL HFA, VENTOLIN HFA, PROAIR HFA Take 2 Puffs by inhalation every four (4) hours as needed for Wheezing or Shortness of Breath. Indications: BRONCHOSPASM PREVENTION  
  
 amLODIPine 5 mg tablet Commonly known as:  [place] Citron Take 5 mg by mouth daily. atorvastatin 40 mg tablet Commonly known as:  LIPITOR Take  by mouth daily. finasteride 5 mg tablet Commonly known as:  PROSCAR Take 1 Tab by mouth daily. gabapentin 600 mg tablet Commonly known as:  NEURONTIN  
  
 methocarbamol 750 mg tablet Commonly known as:  TASXNHK-460 Take 1 Tab by mouth three (3) times daily as needed. Indications: pain, muscle spasms  
  
 spironolactone 25 mg tablet Commonly known as:  ALDACTONE Take  by mouth daily. tamsulosin 0.4 mg capsule Commonly known as:  FLOMAX Take 1 Cap by mouth nightly. To-Do List   
 07/18/2018 Imaging:  MRI KNEE RT WO CONT   
  
 07/18/2018 Imaging:  MRI LUMB SPINE WO CONT Introducing [facility]! [employer] introduces [facility] patient portal. Now you can access parts of your medical record, email your doctor's office, and request medication refills online. 1. In your internet browser, go to [email or web address] 2. Click on the First Time User? Click Here link in the Sign In box. You will see the New Member Sign Up page. 3. Enter your [facility] Access Code exactly as it appears below. You will not need to use this code after youve completed the sign-up process. If you do not sign up before the expiration date, you must request a new code. · [facility] Access Code: [email or web address] Expires: 10/9/2018  8:37 AM 
 
4. Enter the last four digits of your Social Security Number (xxxx) and Date of Birth (mm/dd/yyyy) as indicated and click Submit. You will be taken to the next sign-up page. 5. Create a [facility] ID. This will be your [facility] login ID and cannot be changed, so think of one that is secure and easy to remember. 6. Create a [facility] password. You can change your password at any time. 7. Enter your Password Reset Question and Answer. This can be used at a later time if you forget your password. 8. Enter your e-mail address. You will receive e-mail notification when new information is available in [address] 9. Click Sign Up. You can now view and download portions of your medical record. 10. Click the Download Summary menu link to download a portable copy of your medical information. If you have questions, please visit the Frequently Asked Questions section of the [facility] website. Remember, [facility] is NOT to be used for urgent needs. For medical emergencies, dial 911. Now available from your iPhone and Android! Please provide this summary of care documentation to your next provider. Your primary care clinician is listed as Jan Areas. If you have any questions after today's visit, please call [phone].

## 2018-07-30 ENCOUNTER — HOSPITAL ENCOUNTER (OUTPATIENT)
Dept: MRI IMAGING | Age: 73
Discharge: HOME | End: 2018-07-30
Attending: ORTHOPAEDIC SURGERY
Payer: MEDICAID

## 2018-07-30 DIAGNOSIS — G89.29 CHRONIC PAIN OF RIGHT KNEE: ICD-10-CM

## 2018-07-30 DIAGNOSIS — M17.11 PRIMARY OSTEOARTHRITIS OF RIGHT KNEE: ICD-10-CM

## 2018-07-30 DIAGNOSIS — M25.561 CHRONIC PAIN OF RIGHT KNEE: ICD-10-CM

## 2018-07-30 DIAGNOSIS — G89.29 CHRONIC BILATERAL LOW BACK PAIN WITH RIGHT-SIDED SCIATICA: ICD-10-CM

## 2018-07-30 DIAGNOSIS — M54.41 CHRONIC BILATERAL LOW BACK PAIN WITH RIGHT-SIDED SCIATICA: ICD-10-CM

## 2018-07-30 DIAGNOSIS — M48.062 SPINAL STENOSIS OF LUMBAR REGION WITH NEUROGENIC CLAUDICATION: ICD-10-CM

## 2018-07-30 PROCEDURE — 73721 MRI JNT OF LWR EXTRE W/O DYE: CPT

## 2018-07-30 PROCEDURE — 72148 MRI LUMBAR SPINE W/O DYE: CPT

## 2018-08-20 ENCOUNTER — HOSPITAL ENCOUNTER (OUTPATIENT)
Dept: ULTRASOUND IMAGING | Age: 73
Discharge: HOME | End: 2018-08-20
Attending: NURSE PRACTITIONER
Payer: MEDICAID

## 2018-08-20 DIAGNOSIS — R10.9 ABDOMINAL CRAMPING: ICD-10-CM

## 2018-08-20 PROCEDURE — 76700 US EXAM ABDOM COMPLETE: CPT

## 2018-08-30 ENCOUNTER — OFFICE VISIT (OUTPATIENT)
Dept: ORTHOPEDIC SURGERY | Facility: CLINIC | Age: 73
End: 2018-08-30

## 2018-08-30 VITALS
WEIGHT: 176 LBS | HEIGHT: 68 IN | RESPIRATION RATE: 18 BRPM | DIASTOLIC BLOOD PRESSURE: 67 MMHG | TEMPERATURE: 97.1 F | SYSTOLIC BLOOD PRESSURE: 136 MMHG | BODY MASS INDEX: 26.67 KG/M2 | OXYGEN SATURATION: 97 % | HEART RATE: 64 BPM

## 2018-08-30 DIAGNOSIS — R93.7 BONE MARROW EDEMA: ICD-10-CM

## 2018-08-30 DIAGNOSIS — M54.16 LUMBAR RADICULOPATHY: ICD-10-CM

## 2018-08-30 DIAGNOSIS — M1A.9XX0 CHRONIC GOUT WITHOUT TOPHUS, UNSPECIFIED CAUSE, UNSPECIFIED SITE: Primary | ICD-10-CM

## 2018-08-30 DIAGNOSIS — M17.11 PRIMARY OSTEOARTHRITIS OF RIGHT KNEE: ICD-10-CM

## 2018-08-30 DIAGNOSIS — M48.061 SPINAL STENOSIS OF LUMBAR REGION, UNSPECIFIED WHETHER NEUROGENIC CLAUDICATION PRESENT: ICD-10-CM

## 2018-08-30 DIAGNOSIS — M25.561 CHRONIC PAIN OF RIGHT KNEE: ICD-10-CM

## 2018-08-30 DIAGNOSIS — G89.29 CHRONIC PAIN OF RIGHT KNEE: ICD-10-CM

## 2018-08-30 RX ORDER — GABAPENTIN 300 MG/1
300 CAPSULE ORAL DAILY
Qty: 14 CAP | Refills: 0 | Status: SHIPPED | OUTPATIENT
Start: 2018-08-30 | End: 2018-10-16 | Stop reason: ALTCHOICE

## 2018-08-30 RX ORDER — GABAPENTIN 100 MG/1
100 CAPSULE ORAL 2 TIMES DAILY
Qty: 60 CAP | Refills: 0 | Status: CANCELLED | OUTPATIENT
Start: 2018-08-30

## 2018-08-30 RX ORDER — BETAMETHASONE SODIUM PHOSPHATE AND BETAMETHASONE ACETATE 3; 3 MG/ML; MG/ML
6 INJECTION, SUSPENSION INTRA-ARTICULAR; INTRALESIONAL; INTRAMUSCULAR; SOFT TISSUE ONCE
Qty: 1 ML | Refills: 0
Start: 2018-08-30 | End: 2018-08-30

## 2018-08-30 NOTE — PROGRESS NOTES
Patient: Ray Wheeler                MRN: [id number]       SSN: [id number] YOB: 1945        AGE: 71 y.o. SEX: male Body mass index is 27.16 kg/(m^2). PCP: Jose Church MD 
08/30/18 Dear Dr. Kira Shin: 
 
I had the pleasure of viewing Sander Chavez today. He's complaining of some low back pain with radiculopathy all the way done the right leg. No fevers, chills, night sweats or weight loss. The back can be bothersome for him. He gets some weakness in the right leg. Also the right knee, some right knee pain, occasional catching, locking, giving way as well. No major swelling. He denies fevers or chills. He's actually gained a couple pounds. He remains a slim gentleman with BMI of 27. PHYSICAL EXAMINATION:  Moves his head and neck adequately. He has quite a bit of decreased sensation to L4-5 on the right side compared to the left. EHL is 5-/5. SLR is just equivocal.  The knee itself - good motion in the knees, slight effusion, calf non tender. Mild joint line tenderness in all three compartments. RADIOGRAPHS:  Review of his xrays reveal mild to moderate arthritis. The MRI of the knee confirms arthritis and a small degenerative tear, but nothing displaced. With regards to his lumbar spine, he has multiple level degenerative disc disease. 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 The radiologist on the MRI indicated some marrow changes most likely due to anemia, but did recommend a technetium three phase bone scan and also get a serum protein electrophoresis as well. PROCEDURE:  Under aseptic conditions and after informed, written consent with a time out, the right knee was injected with 1 cc of the Celestone preparation, i.e. 6 mg, which was well tolerated. PLAN:  I'll get an opinion from the [address] [place] [address]. Will start him on just a light dose of Neurontin with usual precautions. I'll see him back in about three weeks' time. Also refer to the [address] [place] [address] as well. Olivia Gomez MD 
 
 
 
REVIEW OF SYSTEMS:   
 
CON: negative for weight loss, fever EYE: negative for double vision ENT: negative for hoarseness RS:   negative for Tb 
GI:    negative for blood in stool :  negative for blood in urine Other systems reviewed and noted below. Past Medical History:  
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  
 lumbar spine injury secondary to fall  
 HTN (hypertension)  Hypercholesterolemia  Injury of lumbar spine ([facility] [address] 1974  Leg pain, right  Nocturia  Overactive bladder  Peripheral vascular disease ([facility] History reviewed. No pertinent family history. Current Outpatient Prescriptions Medication Sig Dispense Refill  albuterol (PROVENTIL HFA, VENTOLIN HFA, PROAIR HFA) 90 mcg/actuation inhaler Take 2 Puffs by inhalation every four (4) hours as needed for Wheezing or Shortness of Breath. Indications: BRONCHOSPASM PREVENTION 1 Inhaler 0  
 finasteride (PROSCAR) 5 mg tablet Take 1 Tab by mouth daily. 30 Tab 5  
 amLODIPine (NORVASC) 5 mg tablet Take 5 mg by mouth daily.  spironolactone (ALDACTONE) 25 mg tablet Take  by mouth daily.  methocarbamol (ROBAXIN-750) 750 mg tablet Take 1 Tab by mouth three (3) times daily as needed. Indications: pain, muscle spasms 16 Tab 0  
 tamsulosin (FLOMAX) 0.4 mg capsule Take 1 Cap by mouth nightly. 30 Cap 5  
 gabapentin (NEURONTIN) 600 mg tablet  atorvastatin (LIPITOR) 40 mg tablet Take  by mouth daily. Allergies Allergen Reactions  Ampicillin Angioedema  Asa-Acetaminophen-Caff-Buffers Rash  Penicillins Angioedema Past Surgical History:  
Procedure Laterality Date  HX ORTHOPAEDIC    
 finger amputation left hand  HX TONSILLECTOMY Social History Social History  Marital status: SINGLE Spouse name: N/A  
 Number of children: N/A  
 Years of education: N/A Occupational History  Not on file. Social History Main Topics  Smoking status: Former Smoker Quit date: 7/13/2015  Smokeless tobacco: Never Used  Alcohol use 0.0 oz/week  
  0 Standard drinks or equivalent per week Comment: former stopped 2015  Drug use: No  
 Sexual activity: Yes Other Topics Concern  Not on file Social History Narrative Visit Vitals  /67  Pulse 64  Temp 97.1 °F (36.2 °C) (Oral)  Resp 18  Ht 5' 7.5\" (1.715 m)  Wt 176 lb (79.8 kg)  SpO2 97%  BMI 27.16 kg/m2 PHYSICAL EXAMINATION: 
GENERAL: Alert and oriented x3, in no acute distress, well-developed, well-nourished, afebrile. HEART: No JVD. EYES: No scleral icterus NECK: No significant lymphadenopathy LUNGS: No respiratory compromise or indrawing ABDOMEN: Soft, non-tender, non-distended. Electronically signed by:  Margot Mcmillan MD

## 2018-09-07 ENCOUNTER — HOSPITAL ENCOUNTER (OUTPATIENT)
Dept: NUCLEAR MEDICINE | Age: 73
Discharge: HOME | End: 2018-09-07
Attending: ORTHOPAEDIC SURGERY
Payer: MEDICAID

## 2018-09-07 ENCOUNTER — HOSPITAL ENCOUNTER (OUTPATIENT)
Dept: LAB | Age: 73
Discharge: HOME | End: 2018-09-07
Attending: ORTHOPAEDIC SURGERY
Payer: MEDICAID

## 2018-09-07 ENCOUNTER — HOSPITAL ENCOUNTER (OUTPATIENT)
Dept: GENERAL RADIOLOGY | Age: 73
Discharge: HOME | End: 2018-09-07
Attending: ORTHOPAEDIC SURGERY
Payer: MEDICAID

## 2018-09-07 DIAGNOSIS — M1A.9XX0 CHRONIC GOUT WITHOUT TOPHUS, UNSPECIFIED CAUSE, UNSPECIFIED SITE: ICD-10-CM

## 2018-09-07 DIAGNOSIS — G89.29 CHRONIC PAIN OF RIGHT KNEE: ICD-10-CM

## 2018-09-07 DIAGNOSIS — R93.7 BONE MARROW EDEMA: ICD-10-CM

## 2018-09-07 DIAGNOSIS — M25.561 CHRONIC PAIN OF RIGHT KNEE: ICD-10-CM

## 2018-09-07 DIAGNOSIS — M17.11 PRIMARY OSTEOARTHRITIS OF RIGHT KNEE: ICD-10-CM

## 2018-09-07 LAB — URATE SERPL-MCNC: 7.6 MG/DL (ref 2.6–7.2)

## 2018-09-07 PROCEDURE — 36415 COLL VENOUS BLD VENIPUNCTURE: CPT | Performed by: ORTHOPAEDIC SURGERY

## 2018-09-07 PROCEDURE — 73564 X-RAY EXAM KNEE 4 OR MORE: CPT

## 2018-09-07 PROCEDURE — 82784 ASSAY IGA/IGD/IGG/IGM EACH: CPT | Performed by: ORTHOPAEDIC SURGERY

## 2018-09-07 PROCEDURE — 84550 ASSAY OF BLOOD/URIC ACID: CPT | Performed by: ORTHOPAEDIC SURGERY

## 2018-09-07 PROCEDURE — 78315 BONE IMAGING 3 PHASE: CPT

## 2018-09-12 LAB
ALBUMIN SERPL ELPH-MCNC: 3.7 G/DL (ref 2.9–4.4)
ALBUMIN/GLOB SERPL: 1.2 {RATIO} (ref 0.7–1.7)
ALPHA1 GLOB SERPL ELPH-MCNC: 0.2 G/DL (ref 0–0.4)
ALPHA2 GLOB SERPL ELPH-MCNC: 0.7 G/DL (ref 0.4–1)
B-GLOBULIN SERPL ELPH-MCNC: 1.3 G/DL (ref 0.7–1.3)
GAMMA GLOB SERPL ELPH-MCNC: 1.1 G/DL (ref 0.4–1.8)
GLOBULIN SER-MCNC: 3.3 G/DL (ref 2.2–3.9)
IGA SERPL-MCNC: 221 MG/DL (ref 61–437)
IGG SERPL-MCNC: 1188 MG/DL (ref 700–1600)
IGM SERPL-MCNC: 54 MG/DL (ref 15–143)
INTERPRETATION SERPL IEP-IMP: ABNORMAL
M PROTEIN SERPL ELPH-MCNC: 0.5 G/DL
PROT SERPL-MCNC: 7 G/DL (ref 6–8.5)

## 2018-09-20 ENCOUNTER — OFFICE VISIT (OUTPATIENT)
Dept: ORTHOPEDIC SURGERY | Facility: CLINIC | Age: 73
End: 2018-09-20

## 2018-09-20 VITALS
RESPIRATION RATE: 18 BRPM | WEIGHT: 175.4 LBS | BODY MASS INDEX: 26.58 KG/M2 | SYSTOLIC BLOOD PRESSURE: 134 MMHG | HEART RATE: 75 BPM | HEIGHT: 68 IN | DIASTOLIC BLOOD PRESSURE: 80 MMHG | TEMPERATURE: 96.8 F | OXYGEN SATURATION: 96 %

## 2018-09-20 DIAGNOSIS — M79.642 LEFT HAND PAIN: Primary | ICD-10-CM

## 2018-09-20 DIAGNOSIS — R89.9 ABNORMAL LABORATORY TEST RESULT: ICD-10-CM

## 2018-09-20 NOTE — PROGRESS NOTES
Patient: Ray Wheeler                MRN: [id number]       SSN: [id number] YOB: 1945        AGE: 71 y.o. SEX: male Body mass index is 27.07 kg/(m^2). PCP: Jose Church MD 
09/20/18 HISTORY: I saw Mr. Eboni Stafford in followup. Actually, we were planning on seeing him back after his three-phase bone scan, as well as after his serum protein electrophoresis, but he is also here today as he hit himself with a hammer. He was cleaning up a mortar joint line and hit himself over the second metacarpal.  He is also complaining of some numbness in that region as well. He denies fevers or chills. He has had a previous hand injury, including a broken thumb and broken foot. He is due to see The [address] [place] [address]. On the MRI, they were questioning some marrow changes. We did a three-phase, whole-body, technetium bone scan. It is negative for anything significant. 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 With regards to his M spike, I will have him see Hematology, Dr. Mat Griffin. PHYSICAL EXAMINATION:  The examination of the hand reveals his thumb is actually nontender. There is a little bit of stiffness at the base of the thumb as well. He is mostly tender over the proximal third of the second metacarpal.  The wrist is not particularly tender. The skin is normal.  There is, perhaps, just slight bruising in the area and slight swelling. He has a mild decrease of sensation to the dorsal cutaneous branch of the radial nerve and more proximally is intact. The elbow and shoulder are noncontributory. He is alert and oriented. He looks well. RADIOGRAPHS:  On x-rays I do not see an obvious fracture. He has had a previous thumb fracture. PLAN:  I placed him in a splint today. We will see him back in one week. We will re-x-ray the area.  I look forward to getting Dr. Adrian Valenzuela input on his protein serum electrophoresis. REVIEW OF SYSTEMS:   
 
CON: negative for weight loss, fever EYE: negative for double vision ENT: negative for hoarseness RS:   negative for Tb 
GI:    negative for blood in stool :  negative for blood in urine Other systems reviewed and noted below. Past Medical History:  
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  
 lumbar spine injury secondary to fall  
 HTN (hypertension)  Hypercholesterolemia  Injury of lumbar spine ([facility] [address] 1974  Leg pain, right  Nocturia  Overactive bladder  Peripheral vascular disease ([facility] History reviewed. No pertinent family history. Current Outpatient Prescriptions Medication Sig Dispense Refill  albuterol (PROVENTIL HFA, VENTOLIN HFA, PROAIR HFA) 90 mcg/actuation inhaler Take 2 Puffs by inhalation every four (4) hours as needed for Wheezing or Shortness of Breath. Indications: BRONCHOSPASM PREVENTION 1 Inhaler 0  
 finasteride (PROSCAR) 5 mg tablet Take 1 Tab by mouth daily. 30 Tab 5  
 amLODIPine (NORVASC) 5 mg tablet Take 5 mg by mouth daily.  spironolactone (ALDACTONE) 25 mg tablet Take  by mouth daily.  gabapentin (NEURONTIN) 300 mg capsule Take 1 Cap by mouth daily. 14 Cap 0  
 methocarbamol (ROBAXIN-750) 750 mg tablet Take 1 Tab by mouth three (3) times daily as needed. Indications: pain, muscle spasms 16 Tab 0  
 tamsulosin (FLOMAX) 0.4 mg capsule Take 1 Cap by mouth nightly. 30 Cap 5  
 gabapentin (NEURONTIN) 600 mg tablet  atorvastatin (LIPITOR) 40 mg tablet Take  by mouth daily. Allergies Allergen Reactions  Ampicillin Angioedema  Asa-Acetaminophen-Caff-Buffers Rash  Penicillins Angioedema Past Surgical History:  
Procedure Laterality Date  HX ORTHOPAEDIC    
 finger amputation left hand  HX TONSILLECTOMY Social History Social History  Marital status: SINGLE Spouse name: N/A  
 Number of children: N/A  
 Years of education: N/A Occupational History  Not on file. Social History Main Topics  Smoking status: Former Smoker Quit date: 7/13/2015  Smokeless tobacco: Never Used  Alcohol use 0.0 oz/week  
  0 Standard drinks or equivalent per week Comment: former stopped 2015  Drug use: No  
 Sexual activity: Yes Other Topics Concern  Not on file Social History Narrative Visit Vitals  /80  Pulse 75  Temp 96.8 °F (36 °C) (Oral)  Resp 18  Ht 5' 7.5\" (1.715 m)  Wt 175 lb 6.4 oz (79.6 kg)  SpO2 96%  BMI 27.07 kg/m2 PHYSICAL EXAMINATION: 
GENERAL: Alert and oriented x3, in no acute distress, well-developed, well-nourished, afebrile. HEART: No JVD. EYES: No scleral icterus NECK: No significant lymphadenopathy LUNGS: No respiratory compromise or indrawing ABDOMEN: Soft, non-tender, non-distended. Electronically signed by:  Kim Forrest MD

## 2018-10-01 ENCOUNTER — APPOINTMENT (OUTPATIENT)
Dept: GENERAL RADIOLOGY | Age: 73
End: 2018-10-01
Attending: STUDENT IN AN ORGANIZED HEALTH CARE EDUCATION/TRAINING PROGRAM
Payer: MEDICAID

## 2018-10-01 ENCOUNTER — HOSPITAL ENCOUNTER (EMERGENCY)
Age: 73
Discharge: HOME | End: 2018-10-01
Attending: EMERGENCY MEDICINE
Payer: MEDICAID

## 2018-10-01 VITALS
RESPIRATION RATE: 16 BRPM | DIASTOLIC BLOOD PRESSURE: 75 MMHG | HEART RATE: 61 BPM | TEMPERATURE: 98 F | OXYGEN SATURATION: 100 % | SYSTOLIC BLOOD PRESSURE: 137 MMHG

## 2018-10-01 DIAGNOSIS — R07.9 CHEST PAIN, UNSPECIFIED TYPE: Primary | ICD-10-CM

## 2018-10-01 LAB
ALBUMIN SERPL-MCNC: 3.3 G/DL (ref 3.4–5)
ALBUMIN/GLOB SERPL: 0.8 {RATIO} (ref 0.8–1.7)
ALP SERPL-CCNC: 54 U/L (ref 45–117)
ALT SERPL-CCNC: 23 U/L (ref 16–61)
ANION GAP SERPL CALC-SCNC: 7 MMOL/L (ref 3–18)
AST SERPL-CCNC: 22 U/L (ref 15–37)
BASOPHILS # BLD: 0 K/UL (ref 0–0.1)
BASOPHILS NFR BLD: 1 % (ref 0–2)
BILIRUB DIRECT SERPL-MCNC: <0.1 MG/DL (ref 0–0.2)
BILIRUB SERPL-MCNC: 0.2 MG/DL (ref 0.2–1)
BUN SERPL-MCNC: 16 MG/DL (ref 7–18)
BUN/CREAT SERPL: 16 (ref 12–20)
CALCIUM SERPL-MCNC: 8.7 MG/DL (ref 8.5–10.1)
CHLORIDE SERPL-SCNC: 106 MMOL/L (ref 100–108)
CK MB CFR SERPL CALC: 1.8 % (ref 0–4)
CK MB SERPL-MCNC: 6.4 NG/ML (ref 5–25)
CK SERPL-CCNC: 358 U/L (ref 39–308)
CO2 SERPL-SCNC: 24 MMOL/L (ref 21–32)
CREAT SERPL-MCNC: 1.03 MG/DL (ref 0.6–1.3)
DIFFERENTIAL METHOD BLD: ABNORMAL
EOSINOPHIL # BLD: 0.2 K/UL (ref 0–0.4)
EOSINOPHIL NFR BLD: 4 % (ref 0–5)
ERYTHROCYTE [DISTWIDTH] IN BLOOD BY AUTOMATED COUNT: 14.2 % (ref 11.6–14.5)
GLOBULIN SER CALC-MCNC: 4 G/DL (ref 2–4)
GLUCOSE SERPL-MCNC: 97 MG/DL (ref 74–99)
HCT VFR BLD AUTO: 38.7 % (ref 36–48)
HGB BLD-MCNC: 13.6 G/DL (ref 13–16)
INR PPP: 1 (ref 0.8–1.2)
LIPASE SERPL-CCNC: 195 U/L (ref 73–393)
LYMPHOCYTES # BLD: 2.5 K/UL (ref 0.9–3.6)
LYMPHOCYTES NFR BLD: 52 % (ref 21–52)
MCH RBC QN AUTO: 30.9 PG (ref 24–34)
MCHC RBC AUTO-ENTMCNC: 35.1 G/DL (ref 31–37)
MCV RBC AUTO: 88 FL (ref 74–97)
MONOCYTES # BLD: 0.4 K/UL (ref 0.05–1.2)
MONOCYTES NFR BLD: 8 % (ref 3–10)
NEUTS SEG # BLD: 1.7 K/UL (ref 1.8–8)
NEUTS SEG NFR BLD: 35 % (ref 40–73)
PLATELET # BLD AUTO: 231 K/UL (ref 135–420)
PMV BLD AUTO: 9.2 FL (ref 9.2–11.8)
POTASSIUM SERPL-SCNC: 4 MMOL/L (ref 3.5–5.5)
PROT SERPL-MCNC: 7.3 G/DL (ref 6.4–8.2)
PROTHROMBIN TIME: 13.3 SEC (ref 11.5–15.2)
RBC # BLD AUTO: 4.4 M/UL (ref 4.7–5.5)
SODIUM SERPL-SCNC: 137 MMOL/L (ref 136–145)
TROPONIN I SERPL-MCNC: <0.02 NG/ML (ref 0–0.04)
TROPONIN I SERPL-MCNC: <0.02 NG/ML (ref 0–0.04)
WBC # BLD AUTO: 4.7 K/UL (ref 4.6–13.2)

## 2018-10-01 PROCEDURE — 80048 BASIC METABOLIC PNL TOTAL CA: CPT

## 2018-10-01 PROCEDURE — 71045 X-RAY EXAM CHEST 1 VIEW: CPT

## 2018-10-01 PROCEDURE — 99285 EMERGENCY DEPT VISIT HI MDM: CPT

## 2018-10-01 PROCEDURE — 84484 ASSAY OF TROPONIN QUANT: CPT

## 2018-10-01 PROCEDURE — 85025 COMPLETE CBC W/AUTO DIFF WBC: CPT

## 2018-10-01 PROCEDURE — 93005 ELECTROCARDIOGRAM TRACING: CPT

## 2018-10-01 PROCEDURE — 83690 ASSAY OF LIPASE: CPT

## 2018-10-01 PROCEDURE — 85610 PROTHROMBIN TIME: CPT

## 2018-10-01 PROCEDURE — 80076 HEPATIC FUNCTION PANEL: CPT

## 2018-10-01 RX ORDER — LIDOCAINE HYDROCHLORIDE 20 MG/ML
10 SOLUTION OROPHARYNGEAL
Status: DISCONTINUED | OUTPATIENT
Start: 2018-10-01 | End: 2018-10-01

## 2018-10-01 NOTE — DISCHARGE INSTRUCTIONS
Return for worsening pain, fever, shortness of breath, or other medical concerns. Chest Pain: Care Instructions  Your Care Instructions    There are many things that can cause chest pain. Some are not serious and will get better on their own in a few days. But some kinds of chest pain need more testing and treatment. Your doctor may have recommended a follow-up visit in the next 8 to 12 hours. If you are not getting better, you may need more tests or treatment. Even though your doctor has released you, you still need to watch for any problems. The doctor carefully checked you, but sometimes problems can develop later. If you have new symptoms or if your symptoms do not get better, get medical care right away. If you have worse or different chest pain or pressure that lasts more than 5 minutes or you passed out (lost consciousness), call 911 or seek other emergency help right away. A medical visit is only one step in your treatment. Even if you feel better, you still need to do what your doctor recommends, such as going to all suggested follow-up appointments and taking medicines exactly as directed. This will help you recover and help prevent future problems. How can you care for yourself at home? · Rest until you feel better. · Take your medicine exactly as prescribed. Call your doctor if you think you are having a problem with your medicine. · Do not drive after taking a prescription pain medicine. When should you call for help? Call 911 if:    · You passed out (lost consciousness).     · You have severe difficulty breathing.     · You have symptoms of a heart attack. These may include:  ¨ Chest pain or pressure, or a strange feeling in your chest.  ¨ Sweating. ¨ Shortness of breath. ¨ Nausea or vomiting. ¨ Pain, pressure, or a strange feeling in your back, neck, jaw, or upper belly or in one or both shoulders or arms. ¨ Lightheadedness or sudden weakness. ¨ A fast or irregular heartbeat.   After you call 911, the  may tell you to chew 1 adult-strength or 2 to 4 low-dose aspirin. Wait for an ambulance. Do not try to drive yourself.    Call your doctor today if:    · You have any trouble breathing.     · Your chest pain gets worse.     · You are dizzy or lightheaded, or you feel like you may faint.     · You are not getting better as expected.     · You are having new or different chest pain. Where can you learn more? Go to http://ovidio-eliz.info/. Enter A120 in the search box to learn more about \"Chest Pain: Care Instructions. \"  Current as of: November 20, 2017  Content Version: 11.7  © 8033-9037 [employer]. Care instructions adapted under license by [employer] (which disclaims liability or warranty for this information). If you have questions about a medical condition or this instruction, always ask your healthcare professional. [address] any warranty or liability for your use of this information.

## 2018-10-01 NOTE — ED PROVIDER NOTES
EMERGENCY DEPARTMENT HISTORY AND PHYSICAL EXAM 
 
5:15 AM 
 
 
Date: 10/1/2018 Patient Name: Brennan Payan History of Presenting Illness Chief Complaint Patient presents with  Chest Pain 70 y/o M with left chest and epigastric pain radiating to upper back. Onset 2 hrs ago while watching football. Unchanged with position, breathing, or exertion. No SOB. No N/V/D, urinary symptoms, or fever. PCP: Margarito Kennedy MD 
 
Current Outpatient Prescriptions Medication Sig Dispense Refill  gabapentin (NEURONTIN) 300 mg capsule Take 1 Cap by mouth daily. 14 Cap 0  
 methocarbamol (ROBAXIN-750) 750 mg tablet Take 1 Tab by mouth three (3) times daily as needed. Indications: pain, muscle spasms 16 Tab 0  
 albuterol (PROVENTIL HFA, VENTOLIN HFA, PROAIR HFA) 90 mcg/actuation inhaler Take 2 Puffs by inhalation every four (4) hours as needed for Wheezing or Shortness of Breath. Indications: BRONCHOSPASM PREVENTION 1 Inhaler 0  
 tamsulosin (FLOMAX) 0.4 mg capsule Take 1 Cap by mouth nightly. 30 Cap 5  
 finasteride (PROSCAR) 5 mg tablet Take 1 Tab by mouth daily. 30 Tab 5  
 gabapentin (NEURONTIN) 600 mg tablet  amLODIPine (NORVASC) 5 mg tablet Take 5 mg by mouth daily.  atorvastatin (LIPITOR) 40 mg tablet Take  by mouth daily.  spironolactone (ALDACTONE) 25 mg tablet Take  by mouth daily. Past History Past Medical History: 
Past Medical History:  
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  
 lumbar spine injury secondary to fall  
 HTN (hypertension)  Hypercholesterolemia  Injury of lumbar spine ([facility] [address] 1974  Leg pain, right  Nocturia  Overactive bladder  Peripheral vascular disease ([facility] [address] Past Surgical History: 
Past Surgical History:  
Procedure Laterality Date  HX ORTHOPAEDIC    
 finger amputation left hand  HX TONSILLECTOMY Family History: 
History reviewed. No pertinent family history. Social History: 
Social History Substance Use Topics  Smoking status: Former Smoker Quit date: 7/13/2015  Smokeless tobacco: Never Used  Alcohol use 0.0 oz/week  
  0 Standard drinks or equivalent per week Comment: former stopped 2015 Allergies: Allergies Allergen Reactions  Ampicillin Angioedema  Asa-Acetaminophen-Caff-Buffers Rash  Penicillins Angioedema Review of Systems Review of Systems Constitutional: Negative for diaphoresis and fever. HENT: Negative for congestion. Respiratory: Negative for cough and shortness of breath. Cardiovascular: Positive for chest pain. Genitourinary: Negative for dysuria and flank pain. Physical Exam  
 
Visit Vitals  /79 (BP 1 Location: Right arm, BP Patient Position: At rest)  Pulse (!) 57  Temp 98 °F (36.7 °C)  Resp 13  SpO2 99% Physical Exam  
Constitutional: He is oriented to person, place, and time. He appears well-developed and well-nourished. No distress. HENT:  
Head: Normocephalic and atraumatic. Right Ear: External ear normal.  
Left Ear: External ear normal.  
Nose: Nose normal.  
Mouth/Throat: Oropharynx is clear and moist.  
Eyes: Conjunctivae and EOM are normal. Pupils are equal, round, and reactive to light. Neck: Normal range of motion. Neck supple. Cardiovascular: Normal rate, regular rhythm, normal heart sounds and intact distal pulses. No murmur heard. Pulmonary/Chest: Effort normal and breath sounds normal. No respiratory distress. He has no wheezes. Abdominal: Soft. He exhibits no distension. There is tenderness. Mild TTP in LUQ and epigastrum without guarding or rebound Musculoskeletal: Normal range of motion. He exhibits no edema. Neurological: He is alert and oriented to person, place, and time. Skin: Skin is warm and dry. He is not diaphoretic. Psychiatric: He has a normal mood and affect. Nursing note and vitals reviewed. Diagnostic Study Results Labs - Recent Results (from the past 12 hour(s)) CARDIAC PANEL,(CK, CKMB & TROPONIN) Collection Time: 10/01/18  3:00 AM  
Result Value Ref Range  (H) 39 - 308 U/L  
 CK - MB 6.4 (H) <3.6 ng/ml CK-MB Index 1.8 0.0 - 4.0 % Troponin-I, Qt. <0.02 0.0 - 1.891 NG/ML  
METABOLIC PANEL, BASIC Collection Time: 10/01/18  3:00 AM  
Result Value Ref Range Sodium 137 136 - 145 mmol/L Potassium 4.0 3.5 - 5.5 mmol/L Chloride 106 100 - 108 mmol/L  
 CO2 24 21 - 32 mmol/L Anion gap 7 3.0 - 18 mmol/L Glucose 97 74 - 99 mg/dL BUN 16 7.0 - 18 MG/DL Creatinine 1.03 0.6 - 1.3 MG/DL  
 BUN/Creatinine ratio 16 12 - 20 GFR est AA >60 >60 ml/min/1.73m2 GFR est non-AA >60 >60 ml/min/1.73m2 Calcium 8.7 8.5 - 10.1 MG/DL PROTHROMBIN TIME + INR Collection Time: 10/01/18  3:00 AM  
Result Value Ref Range Prothrombin time 13.3 11.5 - 15.2 sec INR 1.0 0.8 - 1.2    
CBC WITH AUTOMATED DIFF Collection Time: 10/01/18  3:00 AM  
Result Value Ref Range WBC 4.7 4.6 - 13.2 K/uL  
 RBC 4.40 (L) 4.70 - 5.50 M/uL  
 HGB 13.6 13.0 - 16.0 g/dL HCT 38.7 36.0 - 48.0 % MCV 88.0 74.0 - 97.0 FL  
 MCH 30.9 24.0 - 34.0 PG  
 MCHC 35.1 31.0 - 37.0 g/dL  
 RDW 14.2 11.6 - 14.5 % PLATELET 568 761 - 948 K/uL MPV 9.2 9.2 - 11.8 FL  
 NEUTROPHILS 35 (L) 40 - 73 % LYMPHOCYTES 52 21 - 52 % MONOCYTES 8 3 - 10 % EOSINOPHILS 4 0 - 5 % BASOPHILS 1 0 - 2 %  
 ABS. NEUTROPHILS 1.7 (L) 1.8 - 8.0 K/UL  
 ABS. LYMPHOCYTES 2.5 0.9 - 3.6 K/UL  
 ABS. MONOCYTES 0.4 0.05 - 1.2 K/UL  
 ABS. EOSINOPHILS 0.2 0.0 - 0.4 K/UL  
 ABS. BASOPHILS 0.0 0.0 - 0.1 K/UL  
 DF AUTOMATED HEPATIC FUNCTION PANEL Collection Time: 10/01/18  3:00 AM  
Result Value Ref Range Protein, total 7.3 6.4 - 8.2 g/dL Albumin 3.3 (L) 3.4 - 5.0 g/dL Globulin 4.0 2.0 - 4.0 g/dL A-G Ratio 0.8 0.8 - 1.7 Bilirubin, total 0.2 0.2 - 1.0 MG/DL Bilirubin, direct <0.1 0.0 - 0.2 MG/DL Alk. phosphatase 54 45 - 117 U/L  
 AST (SGOT) 22 15 - 37 U/L  
 ALT (SGPT) 23 16 - 61 U/L  
LIPASE Collection Time: 10/01/18  3:00 AM  
Result Value Ref Range Lipase 195 73 - 393 U/L Radiologic Studies -  
XR CHEST PORT    (Results Pending) Medical Decision Making I am the first provider for this patient. I reviewed the vital signs, available nursing notes, past medical history, past surgical history, family history and social history. ED Course: Progress Notes, Reevaluation, and Consults: 
 
 
Provider Notes (Medical Decision Making): 70 y/o M with chest pain. Patient well-appearing, afebrile, hemodynamically stable. Lungs CTAB, CXR unremarkable. Troponin negative x 2. Abdomen with mild epigastric TTP without peritoneal signs. CBC, CMP, lipase wnl. Low concern for acute infectious or surgical process. Patient's pain improved after sleeping. Will discharge with return precautions. Patient understands and agrees with plan. Diagnosis Clinical Impression: 1. Chest pain, unspecified type Disposition: Discharged Follow-up Information None Patient's Medications Start Taking No medications on file Continue Taking ALBUTEROL (PROVENTIL HFA, VENTOLIN HFA, PROAIR HFA) 90 MCG/ACTUATION INHALER    Take 2 Puffs by inhalation every four (4) hours as needed for Wheezing or Shortness of Breath. Indications: BRONCHOSPASM PREVENTION  
 AMLODIPINE (NORVASC) 5 MG TABLET    Take 5 mg by mouth daily. ATORVASTATIN (LIPITOR) 40 MG TABLET    Take  by mouth daily. FINASTERIDE (PROSCAR) 5 MG TABLET    Take 1 Tab by mouth daily. GABAPENTIN (NEURONTIN) 300 MG CAPSULE    Take 1 Cap by mouth daily. GABAPENTIN (NEURONTIN) 600 MG TABLET METHOCARBAMOL (ROBAXIN-750) 750 MG TABLET    Take 1 Tab by mouth three (3) times daily as needed. Indications: pain, muscle spasms SPIRONOLACTONE (ALDACTONE) 25 MG TABLET    Take  by mouth daily. TAMSULOSIN (FLOMAX) 0.4 MG CAPSULE    Take 1 Cap by mouth nightly. These Medications have changed No medications on file Stop Taking No medications on file

## 2018-10-02 LAB
ATRIAL RATE: 54 BPM
CALCULATED P AXIS, ECG09: 47 DEGREES
CALCULATED R AXIS, ECG10: -27 DEGREES
CALCULATED T AXIS, ECG11: 21 DEGREES
DIAGNOSIS, 93000: NORMAL
P-R INTERVAL, ECG05: 184 MS
Q-T INTERVAL, ECG07: 428 MS
QRS DURATION, ECG06: 86 MS
QTC CALCULATION (BEZET), ECG08: 405 MS
VENTRICULAR RATE, ECG03: 54 BPM

## 2018-10-04 ENCOUNTER — OFFICE VISIT (OUTPATIENT)
Dept: ORTHOPEDIC SURGERY | Facility: CLINIC | Age: 73
End: 2018-10-04

## 2018-10-04 VITALS
DIASTOLIC BLOOD PRESSURE: 71 MMHG | HEART RATE: 65 BPM | RESPIRATION RATE: 16 BRPM | SYSTOLIC BLOOD PRESSURE: 133 MMHG | TEMPERATURE: 97.9 F | WEIGHT: 178.8 LBS | OXYGEN SATURATION: 96 % | BODY MASS INDEX: 27.1 KG/M2 | HEIGHT: 68 IN

## 2018-10-04 DIAGNOSIS — M79.642 LEFT HAND PAIN: Primary | ICD-10-CM

## 2018-10-04 NOTE — PROGRESS NOTES
Patient: Marcellus Lee                MRN: [id number]       SSN: [id number] YOB: 1945        AGE: 71 y.o. SEX: male Body mass index is 27.59 kg/(m^2). PCP: Gabi Bain MD 
10/04/18 HISTORY: I had the pleasure of reviewing Jean Carlos Vizcarra. 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 He is going to [employer], and we worked him up with a technetium bone scan and serum protein electrophoresis with a small M spike and we would like him to see Dr. Neena Cantu for this for his opinion. He is also somewhat hyperuricemic. He had hit his hand with a hammer a couple of weeks ago and was complaining of pain at the base of the second metacarpal.  The pain is improved. It is still somewhat sore for him. It is mild to moderate, aching. He took his brace off and has been feeling a bit better. PHYSICAL EXAMINATION:  On examination today, he does walk with a mildly antalgic gait owing to the right side. Both hips actually rotate fairly well. He has a slight leg length discrepancy. The calf is nontender. Shay's sign is negative. With regards to the hand, he has some tenderness at the base of the thumb but chronically, and he has broken his distal phalanx of his thumb before on x-ray. Palpation over the digits reveals only mild tenderness over the metacarpal on the second base. He has a positive grind test for the thumb but not severely so. The wrist is moving well. There is no effusion infection or DVT. There is negligible effusion in the wrist and good capillary refill. There is mild evidence of neuropathy. RADIOGRAPHS:  On review of his x-rays today, I do not see an obvious fracture at the second metacarpal, although he has broken his thumb before. PLAN:  I am going to refer him to Dr. Neena Cantu for his M spike.   He is due to see The [id number] in the not too distant future, and we will see him back in a few weeks time. We will get an AP of the pelvis and AP of each femur as well. REVIEW OF SYSTEMS:   
 
CON: negative for weight loss, fever EYE: negative for double vision ENT: negative for hoarseness RS:   negative for Tb 
GI:    negative for blood in stool :  negative for blood in urine Other systems reviewed and noted below. Past Medical History:  
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  
 lumbar spine injury secondary to fall  
 HTN (hypertension)  Hypercholesterolemia  Injury of lumbar spine ([facility] [address] 1974  Leg pain, right  Nocturia  Overactive bladder  Peripheral vascular disease ([facility] [address] No family history on file. Current Outpatient Prescriptions Medication Sig Dispense Refill  gabapentin (NEURONTIN) 300 mg capsule Take 1 Cap by mouth daily. 14 Cap 0  
 albuterol (PROVENTIL HFA, VENTOLIN HFA, PROAIR HFA) 90 mcg/actuation inhaler Take 2 Puffs by inhalation every four (4) hours as needed for Wheezing or Shortness of Breath. Indications: BRONCHOSPASM PREVENTION 1 Inhaler 0  
 tamsulosin (FLOMAX) 0.4 mg capsule Take 1 Cap by mouth nightly. 30 Cap 5  
 finasteride (PROSCAR) 5 mg tablet Take 1 Tab by mouth daily. 30 Tab 5  
 amLODIPine (NORVASC) 5 mg tablet Take 5 mg by mouth daily.  atorvastatin (LIPITOR) 40 mg tablet Take  by mouth daily.  spironolactone (ALDACTONE) 25 mg tablet Take  by mouth daily.  methocarbamol (ROBAXIN-750) 750 mg tablet Take 1 Tab by mouth three (3) times daily as needed. Indications: pain, muscle spasms 16 Tab 0 Allergies Allergen Reactions  Ampicillin Angioedema  Asa-Acetaminophen-Caff-Buffers Rash  Penicillins Angioedema Past Surgical History:  
Procedure Laterality Date  HX ORTHOPAEDIC    
 finger amputation left hand  HX TONSILLECTOMY Social History Social History  Marital status: SINGLE Spouse name: N/A  
 Number of children: N/A  
 Years of education: N/A Occupational History  Not on file. Social History Main Topics  Smoking status: Former Smoker Quit date: 7/13/2015  Smokeless tobacco: Never Used  Alcohol use 0.0 oz/week  
  0 Standard drinks or equivalent per week Comment: former stopped 2015  Drug use: No  
 Sexual activity: Yes Other Topics Concern  Not on file Social History Narrative Visit Vitals  /71  Pulse 65  Temp 97.9 °F (36.6 °C) (Oral)  Resp 16  
 Ht 5' 7.5\" (1.715 m)  Wt 178 lb 12.8 oz (81.1 kg)  SpO2 96%  BMI 27.59 kg/m2 PHYSICAL EXAMINATION: 
GENERAL: Alert and oriented x3, in no acute distress, well-developed, well-nourished, afebrile. HEART: No JVD. EYES: No scleral icterus NECK: No significant lymphadenopathy LUNGS: No respiratory compromise or indrawing ABDOMEN: Soft, non-tender, non-distended. Electronically signed by:  Boone Holter, MD

## 2018-10-05 ENCOUNTER — OFFICE VISIT (OUTPATIENT)
Dept: ONCOLOGY | Age: 73
End: 2018-10-05

## 2018-10-05 ENCOUNTER — HOSPITAL ENCOUNTER (OUTPATIENT)
Dept: ONCOLOGY | Age: 73
Discharge: HOME | End: 2018-10-05

## 2018-10-05 ENCOUNTER — HOSPITAL ENCOUNTER (OUTPATIENT)
Dept: LAB | Age: 73
Discharge: HOME | End: 2018-10-05
Payer: MEDICAID

## 2018-10-05 VITALS
TEMPERATURE: 98 F | DIASTOLIC BLOOD PRESSURE: 60 MMHG | WEIGHT: 175.8 LBS | HEART RATE: 68 BPM | SYSTOLIC BLOOD PRESSURE: 125 MMHG | HEIGHT: 68 IN | BODY MASS INDEX: 26.64 KG/M2

## 2018-10-05 DIAGNOSIS — E88.09 PLASMA CELL DYSCRASIA: ICD-10-CM

## 2018-10-05 DIAGNOSIS — E88.09 PLASMA CELL DYSCRASIA: Primary | ICD-10-CM

## 2018-10-05 LAB
BASO+EOS+MONOS # BLD AUTO: 0.4 K/UL (ref 0–2.3)
BASO+EOS+MONOS # BLD AUTO: 8 % (ref 0.1–17)
DIFFERENTIAL METHOD BLD: ABNORMAL
ERYTHROCYTE [DISTWIDTH] IN BLOOD BY AUTOMATED COUNT: 13.8 % (ref 11.5–14.5)
HCT VFR BLD AUTO: 41.4 % (ref 36–48)
HGB BLD-MCNC: 13.5 G/DL (ref 12–16)
LYMPHOCYTES # BLD: 2.4 K/UL (ref 1.1–5.9)
LYMPHOCYTES NFR BLD: 49 % (ref 14–44)
MCH RBC QN AUTO: 29.7 PG (ref 25–35)
MCHC RBC AUTO-ENTMCNC: 32.6 G/DL (ref 31–37)
MCV RBC AUTO: 91 FL (ref 78–102)
NEUTS SEG # BLD: 2.1 K/UL (ref 1.8–9.5)
NEUTS SEG NFR BLD: 44 % (ref 40–70)
PLATELET # BLD AUTO: 243 K/UL (ref 140–440)
RBC # BLD AUTO: 4.55 M/UL (ref 4.1–5.1)
WBC # BLD AUTO: 4.9 K/UL (ref 4.5–13)

## 2018-10-05 PROCEDURE — 82232 ASSAY OF BETA-2 PROTEIN: CPT | Performed by: INTERNAL MEDICINE

## 2018-10-05 PROCEDURE — 82784 ASSAY IGA/IGD/IGG/IGM EACH: CPT | Performed by: INTERNAL MEDICINE

## 2018-10-05 PROCEDURE — 80053 COMPREHEN METABOLIC PANEL: CPT | Performed by: INTERNAL MEDICINE

## 2018-10-05 PROCEDURE — 36415 COLL VENOUS BLD VENIPUNCTURE: CPT | Performed by: INTERNAL MEDICINE

## 2018-10-05 PROCEDURE — 84165 PROTEIN E-PHORESIS SERUM: CPT | Performed by: INTERNAL MEDICINE

## 2018-10-05 NOTE — MR AVS SNAPSHOT
Darnell Kaiser 
 
 
 [facility] [address] [facility] [address] 
[phone] Patient: Jasmin Quijano MRN: [id number] :1945 Visit Information Date & Time Provider Department Dept. Phone Encounter #  
 10/5/2018  2:00 PM Juliette Rojas MD [address] [facility] [id number] Follow-up Instructions Return in about 2 weeks (around 10/19/2018). Your Appointments 10/16/2018 11:00 AM  
New Patient with Darlene Green MD  
[address] [facility], [address] and [facility] ([address] [place] [address]) Appt Note: Spinal stenosis of lumbar region/ ref by Carlo/ MRI in CC/ *Advised the patient to come 30 minutes prior to their appointment with their picture I.D, Insurance cards and a list of their medications & dosage to the [facility] location [address] [facility] [address]  
[phone]  
  
   
 [address]  
  
    
 10/18/2018  8:00 AM  
Follow Up with Khushi Kennedy MD  
[address] [facility], [address] and [facility] - Rajiv Barnes [address]) Appt Note: 2 wk f/u  
 [address] [facility], [address] [facility] [id number] [phone]  
  
   
 [address] [facility], [address] [address]  
  
    
 10/19/2018  2:30 PM  
Office Visit with Juliette Rojas MD  
[address] [facility]) Appt Note: [address] [facility] [address]  
[phone]  
  
   
 [facility] [address] Upcoming Health Maintenance Date Due Hepatitis C Screening 1945 Shingrix Vaccine Age 50> (1 of 2) 1996 FOBT Q 1 YEAR AGE 50-75 1996 GLAUCOMA SCREENING Q2Y 2011 Pneumococcal 65+ Low/Medium Risk (1 of 2 - PCV13) 2011 Influenza Age 5 to Adult 2018 DTaP/Tdap/Td series (2 - Td) 2023 Allergies as of 10/5/2018  Review Complete On: 10/4/2018 By: Blaire Jaffe MD  
  
 Severity Noted Reaction Type Reactions Ampicillin High 07/07/2013    Angioedema Asa-acetaminophen-caff-buffers High 05/06/2015    Rash Penicillins High 07/07/2013    Angioedema Current Immunizations  Reviewed on 4/12/2016 Name Date Tdap 7/7/2013 11:11 AM  
  
 Not reviewed this visit You Were Diagnosed With   
  
 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 
ICD-9-CM: 273.9 Vitals BP Pulse Temp Height(growth percentile) Weight(growth percentile) BMI  
 125/60 68 98 °F (36.7 °C) 5' 7.5\" (1.715 m) 175 lb 12.8 oz (79.7 kg) 27.13 kg/m2 Smoking Status Former Smoker BMI and BSA Data Body Mass Index Body Surface Area  
 27.13 kg/m 2 1.95 m 2 Preferred Pharmacy Pharmacy Name [facility] [address] [phone] Your Updated Medication List  
  
   
This list is accurate as of 10/5/18  3:06 PM.  Always use your most recent med list.  
  
  
  
  
 albuterol 90 mcg/actuation inhaler Commonly known as:  PROVENTIL HFA, VENTOLIN HFA, PROAIR HFA Take 2 Puffs by inhalation every four (4) hours as needed for Wheezing or Shortness of Breath. Indications: BRONCHOSPASM PREVENTION  
  
 amLODIPine 5 mg tablet Commonly known as:  Herschell Saras Take 5 mg by mouth daily. atorvastatin 40 mg tablet Commonly known as:  LIPITOR Take  by mouth daily. finasteride 5 mg tablet Commonly known as:  PROSCAR Take 1 Tab by mouth daily. gabapentin 300 mg capsule Commonly known as:  NEURONTIN Take 1 Cap by mouth daily. methocarbamol 750 mg tablet Commonly known as:  [id number] Take 1 Tab by mouth three (3) times daily as needed. Indications: pain, muscle spasms  
  
 spironolactone 25 mg tablet Commonly known as:  ALDACTONE Take  by mouth daily. tamsulosin 0.4 mg capsule Commonly known as:  FLOMAX Take 1 Cap by mouth nightly. We Performed the Following COMPLETE CBC & AUTO DIFF WBC [43029 CPT(R)] Follow-up Instructions Return in about 2 weeks (around 10/19/2018). To-Do List   
 10/05/2018 Lab:  CBC WITH 3 PART DIFF Please provide this summary of care documentation to your next provider. Your primary care clinician is listed as Lizett Rebolledo. If you have any questions after today's visit, please call [phone].

## 2018-10-05 NOTE — PROGRESS NOTES
Hematology/Oncology Consultation Note Name: Harpreet Hussein Date: 10/5/2018 : 1945 PCP: Kira Castrejon MD  
 
 
Mr. Rosamaria Fenton  is a 71 y.o. -American male who was referred for an evaluation to determine whether or not he has multiple myeloma. Subjective: Chief complaint: Possible plasma cell dyscrasia History of present illness: 
Mr. Rosamaria Fenton is a 80-year-old -American man who has problems with chronic low back pain, right knee pain, and right leg radiculopathy. He also has significant osteoarthritis of the right knee but the MRI showed that his bone marrow is abnormal particularly in the lumbar spine secondary to increased red marrow. Based on these findings the patient was referred here for an evaluation to determine whether or not he has an underlying plasma cell dyscrasia/multiple myeloma. He denies having any antecedent hematologic disorders. Past Medical History:  
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   
 lumbar spine injury secondary to fall  
 HTN (hypertension)  Hypercholesterolemia  Injury of lumbar spine ([address]   Leg pain, right  Nocturia  Overactive bladder  Peripheral vascular disease ([address] Allergies Allergen Reactions  Ampicillin Angioedema  Asa-Acetaminophen-Caff-Buffers Rash  Penicillins Angioedema Past Surgical History:  
Procedure Laterality Date  HX ORTHOPAEDIC    
 finger amputation left hand  HX TONSILLECTOMY Social History Social History  Marital status: SINGLE Spouse name: N/A  
 Number of children: N/A  
 Years of education: N/A Occupational History  Not on file. Social History Main Topics  Smoking status: Former Smoker Quit date: 2015  Smokeless tobacco: Never Used  Alcohol use 0.0 oz/week 0 Standard drinks or equivalent per week Comment: former stopped 2015  Drug use: No  
 Sexual activity: Yes Other Topics Concern  Not on file Social History Narrative Family History Problem Relation Age of Onset  Diabetes Mother  Hypertension Mother  Diabetes Maternal Grandmother  Hypertension Maternal Grandmother Current Outpatient Prescriptions Medication Sig Dispense Refill  gabapentin (NEURONTIN) 300 mg capsule Take 1 Cap by mouth daily. 14 Cap 0  
 methocarbamol (ROBAXIN-750) 750 mg tablet Take 1 Tab by mouth three (3) times daily as needed. Indications: pain, muscle spasms 16 Tab 0  
 albuterol (PROVENTIL HFA, VENTOLIN HFA, PROAIR HFA) 90 mcg/actuation inhaler Take 2 Puffs by inhalation every four (4) hours as needed for Wheezing or Shortness of Breath. Indications: BRONCHOSPASM PREVENTION 1 Inhaler 0  
 tamsulosin (FLOMAX) 0.4 mg capsule Take 1 Cap by mouth nightly. 30 Cap 5  
 finasteride (PROSCAR) 5 mg tablet Take 1 Tab by mouth daily. 30 Tab 5  
 amLODIPine (NORVASC) 5 mg tablet Take 5 mg by mouth daily.  atorvastatin (LIPITOR) 40 mg tablet Take  by mouth daily.  spironolactone (ALDACTONE) 25 mg tablet Take  by mouth daily. Review of Systems General ROS:The patient has no complaints and there is no physical distress evident. Psychological ROS: patient denies having any psychological symptoms such as hallucinations, depression or anxiety. Ophthalmic ROS:the patient denies having any visual impairment or eye discomfort. ENT ROS: there are no abnormalities reported. Allergy and Immunology ROS:the patient denies having any seasonal allergies or allergies to medications other than those already outlined above. Hematological and Lymphatic ROS: the patient denies having any bruising, bleeding or lymphadenopathy. Endocrine ROS: the patient denies having any heat or cold intolerance. There is no history of diabetes or thyroid disorders. Breast ROS: the patient denies having any history of breast mass, nipple discharge, or lumps. Respiratory ROS:the patient denies having any cough, shortness of breath, or dyspnea on exertion. Cardiovascular ROS: there are no complaints of chest pain, palpitations, chest pounding, or dyspnea on exertion. Gastrointestinal ROS: the patient denies having nausea, emesis, diarrhea, constipation, or blood in the stool. Genito-Urinary ROS: the patient denies having urinary urgency, frequency, or dysuria. Musculoskeletal ROS: Musculoskeletal pain with back pain, knee pain, and generalized bone pain. Neurological ROS: the patient denies having any numbness, tingling, or neurologic deficits. Dermatological ROS:patient denies having any unexplained rash, skin ulcerations, or hives. Objective:  
 
Visit Vitals  /60  Pulse 68  Temp 98 °F (36.7 °C)  Ht 5' 7.5\" (1.715 m)  Wt 79.7 kg (175 lb 12.8 oz)  BMI 27.13 kg/m2 ECOGPS=0; pain score =2/10 Physical Exam:  
Gen. Appearance: the patient is in no acute distress. Skin: There is no evidence of bruise or rash. HEENT: The head is normocephalic and atraumatic. The conjunctiva and sclera are clear. Pupils are equal, round, reactive to light, and accommodation. The extraocular movements are intact. ENT reveals no oral mucosal lesions or ulcerations. Neck: Supple without lymphadenopathy or thyromegaly. Lungs: Clear to auscultation and percussion; there are no wheezes or rhonchi. Heart: Regular rate and rhythm; there are no murmurs, gallops, or rubs. Abdomen: Bowel sounds are present and normal.  There is no guarding, tenderness, or hepatosplenomegaly. Extremities: There is no clubbing, cyanosis, or edema. Neurologic: There are no focal neurologic deficits. Lymphatics: There is no palpable peripheral lymphadenopathy. Lab data: Three-phase bone scan dated 9/7/2018 showed mildly increased activity in the medial compartment of the right femoral-tibial joint. Increase activity in the feet and toes are consistent with arthritis. MRI from 7/30/2018 showed abnormal marrow pattern particularly in the lumbar spine concerning for an infiltrative bone marrow process such as plasma cell dyscrasia. Assessment:  
Possible plasma cell dyscrasia: The patient had an MRI in July 2018 which raised some concerns that there may be an infiltrative malignant process involving the bone marrow. I have explained to the patient that we do not make diagnosis of multiple myeloma based on MRI criteria. We will need to do a generalized evaluation for plasma cell dyscrasia at this point. Plan:  
Possible plasma cell dyscrasia/rule out multiple myeloma: At this time we will obtain a comprehensive metabolic panel, CBC, SPEP, quantitative immunoglobulin microglobulin level, and immunoglobulins including IgG, IgA, and IgM. If the serum protein electrophoresis is abnormal then he will need to undergo a bone marrow biopsy for further assessment to determine whether or not he has MGUS versus myeloma. The patient will return in 2 weeks to review lab data and to discuss options of management. Follow-up in 2 weeks. Orders Placed This Encounter  METABOLIC PANEL, COMPREHENSIVE Standing Status:   Future Standing Expiration Date:   10/6/2019  IMMUNOGLOBULINS, G/A/M, QT. Standing Status:   Future Standing Expiration Date:   10/6/2019  BETA-2 MICROGLOBULIN Standing Status:   Future Standing Expiration Date:   10/6/2019  SPEP Standing Status:   Future Standing Expiration Date:   10/6/2019 Ariadna Henry MD 
10/5/2018 Please note: This document has been produced using voice recognition software. Unrecognized errors in transcription may be present.

## 2018-10-06 LAB
ALBUMIN SERPL-MCNC: 3.9 G/DL (ref 3.4–5)
ALBUMIN/GLOB SERPL: 1 {RATIO} (ref 0.8–1.7)
ALP SERPL-CCNC: 65 U/L (ref 45–117)
ALT SERPL-CCNC: 32 U/L (ref 16–61)
ANION GAP SERPL CALC-SCNC: 14 MMOL/L (ref 3–18)
AST SERPL-CCNC: 35 U/L (ref 15–37)
BILIRUB SERPL-MCNC: 0.5 MG/DL (ref 0.2–1)
BUN SERPL-MCNC: 11 MG/DL (ref 7–18)
BUN/CREAT SERPL: 10 (ref 12–20)
CALCIUM SERPL-MCNC: 9.1 MG/DL (ref 8.5–10.1)
CHLORIDE SERPL-SCNC: 108 MMOL/L (ref 100–108)
CO2 SERPL-SCNC: 21 MMOL/L (ref 21–32)
CREAT SERPL-MCNC: 1.14 MG/DL (ref 0.6–1.3)
GLOBULIN SER CALC-MCNC: 3.8 G/DL (ref 2–4)
GLUCOSE SERPL-MCNC: 78 MG/DL (ref 74–99)
IGA SERPL-MCNC: 214 MG/DL (ref 61–437)
IGG SERPL-MCNC: 1237 MG/DL (ref 700–1600)
IGM SERPL-MCNC: 52 MG/DL (ref 15–143)
POTASSIUM SERPL-SCNC: 4.5 MMOL/L (ref 3.5–5.5)
PROT SERPL-MCNC: 7.7 G/DL (ref 6.4–8.2)
SODIUM SERPL-SCNC: 143 MMOL/L (ref 136–145)

## 2018-10-08 LAB
ALBUMIN SERPL ELPH-MCNC: 3.9 G/DL (ref 2.9–4.4)
ALBUMIN/GLOB SERPL: 1.1 {RATIO} (ref 0.7–1.7)
ALPHA1 GLOB SERPL ELPH-MCNC: 0.2 G/DL (ref 0–0.4)
ALPHA2 GLOB SERPL ELPH-MCNC: 0.7 G/DL (ref 0.4–1)
B-GLOBULIN SERPL ELPH-MCNC: 1.3 G/DL (ref 0.7–1.3)
B2 MICROGLOB SERPL-MCNC: 1.8 MG/L (ref 0.6–2.4)
GAMMA GLOB SERPL ELPH-MCNC: 1.2 G/DL (ref 0.4–1.8)
GLOBULIN SER CALC-MCNC: 3.4 G/DL (ref 2.2–3.9)
M PROTEIN SERPL ELPH-MCNC: 0.7 G/DL
PROT SERPL-MCNC: 7.3 G/DL (ref 6–8.5)

## 2018-10-16 ENCOUNTER — OFFICE VISIT (OUTPATIENT)
Dept: ORTHOPEDIC SURGERY | Age: 73
End: 2018-10-16

## 2018-10-16 VITALS
HEART RATE: 73 BPM | HEIGHT: 68 IN | WEIGHT: 176.2 LBS | RESPIRATION RATE: 16 BRPM | DIASTOLIC BLOOD PRESSURE: 88 MMHG | OXYGEN SATURATION: 96 % | SYSTOLIC BLOOD PRESSURE: 142 MMHG | TEMPERATURE: 98.1 F | BODY MASS INDEX: 26.7 KG/M2

## 2018-10-16 DIAGNOSIS — M67.911 DISORDER OF ROTATOR CUFF OF BOTH SHOULDERS: ICD-10-CM

## 2018-10-16 DIAGNOSIS — G56.32: ICD-10-CM

## 2018-10-16 DIAGNOSIS — M67.912 DISORDER OF ROTATOR CUFF OF BOTH SHOULDERS: ICD-10-CM

## 2018-10-16 DIAGNOSIS — M79.18 MYOFASCIAL PAIN: ICD-10-CM

## 2018-10-16 DIAGNOSIS — M54.41 CHRONIC BILATERAL LOW BACK PAIN WITH RIGHT-SIDED SCIATICA: ICD-10-CM

## 2018-10-16 DIAGNOSIS — M54.2 NECK PAIN: ICD-10-CM

## 2018-10-16 DIAGNOSIS — G89.29 CHRONIC BILATERAL LOW BACK PAIN WITH RIGHT-SIDED SCIATICA: ICD-10-CM

## 2018-10-16 DIAGNOSIS — G62.9 POLYNEUROPATHY: Primary | ICD-10-CM

## 2018-10-16 DIAGNOSIS — M43.06 PARS DEFECT OF LUMBAR SPINE: ICD-10-CM

## 2018-10-16 DIAGNOSIS — M54.12 CERVICAL RADICULOPATHY: ICD-10-CM

## 2018-10-16 DIAGNOSIS — M79.604 RIGHT LEG PAIN: ICD-10-CM

## 2018-10-16 DIAGNOSIS — I73.9 VASCULAR CLAUDICATION (HCC): ICD-10-CM

## 2018-10-16 RX ORDER — GABAPENTIN 300 MG/1
300 CAPSULE ORAL 3 TIMES DAILY
Qty: 90 CAP | Refills: 2 | Status: SHIPPED | OUTPATIENT
Start: 2018-10-16 | End: 2019-05-06 | Stop reason: SDUPTHER

## 2018-10-16 NOTE — MR AVS SNAPSHOT
[address] [facility] [address] [facility] [address] 
[phone] Patient: Horace Galdamez MRN: [id number] :1945 Visit Information Date & Time Provider Department Dept. Phone Encounter #  
 10/16/2018 11:00 AM Oscar Guerin MD [facility] [phone] [id number] Follow-up Instructions Return in about 6 weeks (around 2018). Your Appointments 10/18/2018  8:00 AM  
Follow Up with Kaitlyn Park MD  
[facility] [place] [address] [facility] Appt Note: 2 wk f/u  
 [address] [facility], [address] [facility] [address]  
[phone]  
  
   
 [address] [facility], [address] [facility] [address]  
  
    
 10/19/2018  2:30 PM  
Office Visit with Reyna Yao MD  
[facility]  [facility]) Appt Note: [address] [facility] [address]  
[phone]  
  
   
 [facility] [address] Upcoming Health Maintenance Date Due Hepatitis C Screening 1945 Shingrix Vaccine Age 50> (1 of 2) 1996 FOBT Q 1 YEAR AGE 50-75 1996 GLAUCOMA SCREENING Q2Y 2011 Pneumococcal 65+ Low/Medium Risk (1 of 2 - PCV13) 2011 Influenza Age 5 to Adult 2018 DTaP/Tdap/Td series (2 - Td) 2023 Allergies as of 10/16/2018  Review Complete On: 10/16/2018 By: Harleen Franco Severity Noted Reaction Type Reactions Ampicillin High 2013    Angioedema Asa-acetaminophen-caff-buffers High 2015    Rash Penicillins High 2013    Angioedema Current Immunizations  Reviewed on 2016 Name Date Tdap 2013 11:11 AM  
  
 Not reviewed this visit You Were Diagnosed With   
  
 Codes Comments Polyneuropathy    -  Primary ICD-10-CM: G62.9 ICD-9-CM: 356.9 Vascular claudication ([facility] [address]     ICD-10-CM: I73.9 ICD-9-CM: 443.9 Right leg pain     ICD-10-CM: M79.604 ICD-9-CM: 729.5 Pars defect of lumbar spine     ICD-10-CM: M43.06 
ICD-9-CM: 738.4 Cervical radiculopathy     ICD-10-CM: M54.12 
ICD-9-CM: 723.4 Mononeuropathy of left radial nerve     ICD-10-CM: G56.32 
ICD-9-CM: 354. 3 Myofascial pain     ICD-10-CM: M79.18 ICD-9-CM: 729.1 Disorder of rotator cuff of both shoulders     ICD-10-CM: M67.911, Z78.223 ICD-9-CM: 726.10 Neck pain     ICD-10-CM: M54.2 ICD-9-CM: 723.1 Chronic bilateral low back pain with right-sided sciatica     ICD-10-CM: M54.41, G89.29 ICD-9-CM: 724.2, 724.3, 338.29 Vitals BP Pulse Temp Resp Height(growth percentile) Weight(growth percentile) 142/88 73 98.1 °F (36.7 °C) (Oral) 16 5' 7.5\" (1.715 m) 176 lb 3.2 oz (79.9 kg) SpO2 BMI Smoking Status 96% 27.19 kg/m2 Former Smoker BMI and BSA Data Body Mass Index Body Surface Area  
 27.19 kg/m 2 1.95 m 2 Preferred Pharmacy Pharmacy Name [facility] [address] [phone] Your Updated Medication List  
  
   
This list is accurate as of 10/16/18 12:17 PM.  Always use your most recent med list.  
  
  
  
  
 albuterol 90 mcg/actuation inhaler Commonly known as:  PROVENTIL HFA, VENTOLIN HFA, PROAIR HFA Take 2 Puffs by inhalation every four (4) hours as needed for Wheezing or Shortness of Breath. Indications: BRONCHOSPASM PREVENTION  
  
 amLODIPine 5 mg tablet Commonly known as:  Gage Rout Take 5 mg by mouth daily. atorvastatin 40 mg tablet Commonly known as:  LIPITOR Take  by mouth daily. finasteride 5 mg tablet Commonly known as:  PROSCAR Take 1 Tab by mouth daily. gabapentin 300 mg capsule Commonly known as:  NEURONTIN Take 1 Cap by mouth three (3) times daily. methocarbamol 750 mg tablet Commonly known as:  [id number] Take 1 Tab by mouth three (3) times daily as needed. Indications: pain, muscle spasms  
  
 spironolactone 25 mg tablet Commonly known as:  ALDACTONE Take  by mouth daily. tamsulosin 0.4 mg capsule Commonly known as:  FLOMAX Take 1 Cap by mouth nightly. Prescriptions Sent to Pharmacy Refills  
 gabapentin (NEURONTIN) 300 mg capsule 2 Sig: Take 1 Cap by mouth three (3) times daily. Class: Normal  
 Pharmacy: [facility] - [address] [facility], [address] [facility] #: [phone] Route: Oral  
  
We Performed the Following REFERRAL TO PHYSICAL THERAPY [IIJ62 Custom] Comments:  
 eval and treat Neck, bilateral shoulder, and low back pain Modalities as appropriate Dx: rotator cuff pathology, myofascial pain, cervical radiculopathy REFERRAL TO VASCULAR SURGERY [UMD660 Custom] Comments:  
 ascending circumferential pain in his RLE>LLE Follow-up Instructions Return in about 6 weeks (around 11/27/2018). Referral Information Referral ID Referred By Referred To  
  
 [id number] NITHYA Leiva Not Available Visits Status Start Date End Date 1 New Request 10/16/18 10/16/19 If your referral has a status of pending review or denied, additional information will be sent to support the outcome of this decision. Referral ID Referred By Referred To  
 [id number] [facility] [address] [facility]  
   [address] [facility]  
   [address] [place] [address],[address] Phone: [phone] Fax: [phone] Visits Status Start Date End Date 1 New Request 10/16/18 10/16/19 If your referral has a status of pending review or denied, additional information will be sent to support the outcome of this decision. Patient Instructions Gabapentin (Neurontin) 300 mg three times a day (TID) daily instructions: 
 
 
 Morning Afternoon Night Week 1 - - 1 pill Week 2 1 pill - 1 pill Week 3 and onwards 1 pill 1 pill 1 pill Week 1: Take one pill each night. Week 2: Take one pill in the morning and one pill at night. Week 3 and onwards: Take one pill in the morning, one pill in the afternoon, and one pill at night. Continue taking this dose each day. Please provide this summary of care documentation to your next provider. Your primary care clinician is listed as Marin Rodriguez. If you have any questions after today's visit, please call [phone].

## 2018-10-16 NOTE — PROGRESS NOTES
[address]  [address], [address]  [place], [address] [place] [address] Street  Phone: [phone]  Fax: [phone]        Heath Perez  : 1945  PCP: Willi Bazzi MD  10/16/2018    NEW PATIENT      ASSESSMENT AND PLAN     Evan Mckeon comes in to the office today c/o right-sided low back/buttock with a burning sensation radiating into his RLE circumferentially that begins from the bottom of his foot. He notes he has similar LLE symptoms as well. He also c/o bilateral parascapular pain. He has not found relief from taking Gabapentin 300m daily. He states he has two torn rotator cuffs. He states he hit his left hand with a hammer, and he has had numbness in that left hand since. He rates his pain as an 8/10 today. His lumbar MRI reveals bilateral pars defects at L5, possibly healed on the L, with moderate right foraminal narrowing. There is moderate spinal stenosis at L3/4 and mild at L2/3. There are multiple annular fissures. On examination, he has pain reproduced with transitional movements. He has numbness in a C7/radial distribution in his left hand and weakness with wrist extension, triceps, and wrist rotation. He had a negative Spurling's sign bilaterally. He maintains strength in his BLE, but has a stocking distribution numbness to light touch to his ankles bilaterally. He had tenderness to palpation of his QL on the R, and no directional preference. His left hand pain and numbness may be due to a chronic C7 radiculopathy vs traumatic radial mononeuropathy. His neck and low back pain are likely myofascial in nature given his history of trauma. His RLE pain may be due to peripheral polyneuropathy. There may be some vascular claudication given his lack of spinal stenosis on MRI. His bilateral shoulder pain is likely rotator cuff pathology.  While he does have potential pain generators of the lumbar spine, his history is not concordant with these findings. I increased his Gabapentin to 300mg TID. I referred to vascular surgery to r/o vascular claudication for his ascending circumferential pain in his RLE>LLE. I also referred to PT. I advised he f/u with orthopedics for his bilateral rotator cuff tears. We may consider an EMG for his left arm and legs or a cervical MRI in the future. Pt will f/u in 6 weeks or sooner if needed. Diagnoses and all orders for this visit:    1. Polyneuropathy  -     gabapentin (NEURONTIN) 300 mg capsule; Take 1 Cap by mouth three (3) times daily. 2. Vascular claudication (HCC)  -     REFERRAL TO VASCULAR SURGERY    3. Right leg pain  -     REFERRAL TO VASCULAR SURGERY  -     REFERRAL TO PHYSICAL THERAPY    4. Pars defect of lumbar spine    5. Cervical radiculopathy  -     gabapentin (NEURONTIN) 300 mg capsule; Take 1 Cap by mouth three (3) times daily.  -     REFERRAL TO PHYSICAL THERAPY    6. Mononeuropathy of left radial nerve  -     gabapentin (NEURONTIN) 300 mg capsule; Take 1 Cap by mouth three (3) times daily. 7. Myofascial pain  -     REFERRAL TO PHYSICAL THERAPY    8. Disorder of rotator cuff of both shoulders  -     REFERRAL TO PHYSICAL THERAPY    9. Neck pain  -     REFERRAL TO PHYSICAL THERAPY    10. Chronic bilateral low back pain with right-sided sciatica  -     REFERRAL TO PHYSICAL THERAPY      Follow-up Disposition: Not on File    CHIEF COMPLAINT  Morgan Matos is seen today in consultation at the request of Angel Singleton MD for complaints of low back pain radiating into his RLE. HISTORY OF PRESENT ILLNESS  Morgan Matos is a 71 y.o. male c/o right-sided low back and buttock pain with a burning sensation radiating into his RLE circumferentially beginning in the bottom of his foot. He reports that the burning sensation in his RLE will begin in the bottom of his foot and radiate up at varying distances. He reports occasional similar symptoms in his LLE.  His pain is worse when he walks, but he feels the pain constantly. He also c/o bilateral parascapular pain. He does not have a vascular doctor, but notes that he previously had a physician tell him he had poor circulation in his RLE. He states he previously had an EMG in the 70's, but he does not remember the results. He reports a back injury in 1974, for which he was hospitalized. He has a dx of pars defect. He has hx of multiple traumas - hitting his hand with a hammer, falling off a roof, falling off a ladder, falling off scaffolding, etc.     He is accompanied by his daughter. Pt denies any fevers, chills, nausea, vomiting. Pt denies any chest pain and shortness of breath. Pt denies any ear, nose, and throat problems. Pt denies any fecal or urinary incontinence. PAST MEDICAL HISTORY   Past Medical History:   Diagnosis Date    Bladder outlet obstruction     Erectile disorder due to medical 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     Frequency of urination     H/O spinal cord injury 1974    lumbar spine injury secondary to fall    HTN (hypertension)     Hypercholesterolemia     Injury of lumbar spine ([address] 1974    Leg pain, right     Nocturia     Overactive bladder     Peripheral vascular disease (HCC)        Past Surgical History:   Procedure Laterality Date    HX ORTHOPAEDIC      finger amputation left hand    HX TONSILLECTOMY         MEDICATIONS    Current Outpatient Prescriptions   Medication Sig Dispense Refill    gabapentin (NEURONTIN) 300 mg capsule Take 1 Cap by mouth daily. 14 Cap 0    methocarbamol (ROBAXIN-750) 750 mg tablet Take 1 Tab by mouth three (3) times daily as needed. Indications: pain, muscle spasms 16 Tab 0    albuterol (PROVENTIL HFA, VENTOLIN HFA, PROAIR HFA) 90 mcg/actuation inhaler Take 2 Puffs by inhalation every four (4) hours as needed for Wheezing or Shortness of Breath. Indications: BRONCHOSPASM PREVENTION 1 Inhaler 0    tamsulosin (FLOMAX) 0.4 mg capsule Take 1 Cap by mouth nightly.  27 Cap 5    finasteride (PROSCAR) 5 mg tablet Take 1 Tab by mouth daily. 30 Tab 5    amLODIPine (NORVASC) 5 mg tablet Take 5 mg by mouth daily.  atorvastatin (LIPITOR) 40 mg tablet Take  by mouth daily.  spironolactone (ALDACTONE) 25 mg tablet Take  by mouth daily. ALLERGIES  Allergies   Allergen Reactions    Ampicillin Angioedema    Asa-Acetaminophen-Caff-Buffers Rash    Penicillins Angioedema          SOCIAL HISTORY    Social History     Social History    Marital status: SINGLE     Spouse name: N/A    Number of children: N/A    Years of education: N/A     Social History Main Topics    Smoking status: Former Smoker     Quit date: 7/13/2015    Smokeless tobacco: Never Used    Alcohol use 0.0 oz/week     0 Standard drinks or equivalent per week      Comment: former stopped 2015    Drug use: No    Sexual activity: Yes     Other Topics Concern    Not on file     Social History Narrative       FAMILY HISTORY  Family History   Problem Relation Age of Onset    Diabetes Mother     Hypertension Mother     Diabetes Maternal Grandmother     Hypertension Maternal Grandmother          REVIEW OF SYSTEMS  Review of Systems   Musculoskeletal: Positive for back pain. RLE burning pain  episodic LLE burning pain         PHYSICAL EXAMINATION  There were no vitals taken for this visit. Pain Assessment  10/4/2018   Location of Pain Hand   Location Modifiers Left   Severity of Pain 0   Quality of Pain Other (Comment);Dull;Aching   Quality of Pain Comment TINGLING    Duration of Pain Persistent   Frequency of Pain Constant   Aggravating Factors Other (Comment)   Aggravating Factors Comment PAIN TO TOUCH   Limiting Behavior No   Relieving Factors Other (Comment)   Relieving Factors Comment GREEM ALCOHOL RUB   Result of Injury Yes   Work-Related Injury No   How long out of work?  -   Type of Injury Other (Comment)   Type of Injury Comment HIT HAND WITH HAMMER          Constitutional:  Well developed, well nourished, in no acute distress. Psychiatric: Affect and mood are appropriate. HEENT: Normocephalic, atraumatic. Extraocular movements intact. Integumentary: No rashes or abrasions noted on exposed areas. Cardiovascular: Regular rate and rhythm. Pulmonary: Clear to auscultation bilaterally. SPINE/MUSCULOSKELETAL EXAM    Cervical spine:  Neck is midline. Normal muscle tone. No focal atrophy is noted. ROM pain free. Shoulder ROM limited and painful bilaterally. No tenderness to palpation. Negative Spurling's sign. Negative Tinel's sign. Negative Stevens's sign. Sensation in the bilateral arms grossly intact to light touch. Lumbar spine:  No rash, ecchymosis, or gross obliquity. No fasciculations. No focal atrophy is noted. No pain with hip ROM. Full range of motion. Tenderness to palpation of QL on the R. No tenderness to palpation at the sciatic notch. SI joints non-tender. Trochanters non tender. Sensation in the bilateral legs grossly intact to light touch. MOTOR:      Biceps  Triceps Deltoids Wrist Ext Wrist Flex Hand Intrin   Right 5/5 5/5 5/5 5/5 5/5 5/5   Left 5/5 4/5 5/5 4/5 5/5 4/5             Hip Flex  Quads Hamstrings Ankle DF EHL Ankle PF   Right 5/5 5/5 5/5 5/5 5/5 5/5   Left 5/5 5/5 5/5 5/5 5/5 5/5     DTRs are 1+ biceps, triceps, brachioradialis, patella, and Achilles. Negative Straight Leg raise. Squat not tested. No difficulty with tandem gait. Ambulation without assistive device. FWB. RADIOGRAPHS  Lumbar MRI images taken on 7/30/18 personally reviewed with patient:  FINDINGS:   Bone marrow is unusually heterogeneous, most strikingly on T1 scans where there is significant partial replacement of fatty marrow (which is usually near  universal at this age) with decreased signal tissue. No discrete masses and the  bone marrow abnormality is not \"geographic\".  With T2 inversion recovery scan  there is no pathologic increased T2 signal of marrow (except for some benign  edema secondary to DDD). -This pattern of partial marrow replacement is not typical for myeloma or most  metastatic disease. Prostate cancer could have low signal on both T1 and T2  scans but generally is more geographic and masslike. Additionally, radiographs  of left upper extremity 3/28/2018 and of the chest 10/12/2017 do not demonstrate any blastic metastatic disease  -This pattern of marrow replacement is most likely secondary to abnormally  increased amounts of red marrow. Is the patient anemic? (Not anemic on blood  count 9/25/2017).    Minimal grade I retrolisthesis L2 on L3 and mild grade Ianterior listhesis L5 on  S1. Disc space height moderately to markedly decreased L5/S1, mildly to  moderately decreased L3/L4. There is mild anterior wedging of the T12 vertebra  which looks chronic, suggesting an old fracture. There is a small-to-moderate  amount of bone marrow edema adjoining L4/L5 and L5/S1 discs, a response to DDD. There is an edematous Schmorl's node inferior T11, indicating some degree of  acuity. There is mildly prominent facet joint fluid at several levels. Increased  T2 signal annular fissures at posterior disc margins L2/L3 through L5/S1. Contents of thecal sac normal     T11/T12: Prominent posterior epidural fat. No central spinal stenosis. Foramen  are patent. T12/L1: Canal and foramen patent. L1/L2: Disc bulge. Facet and ligament hypertrophy. Foramen patent. L2/L3. Mild central spinal stenosis. Foraminal disc material mildly impresses  left and right foraminal roots  L3/L4: Disc bulge. Facet and ligament hypertrophy and mildly prominent posterior  epidural fat. Moderate central spinal stenosis. Superior articular facet  hypertrophy and foraminal disc material mildly narrow left foramen; foraminal  disc material mildly narrows the right foramen  L4/L5: Facet and ligament hypertrophy and disc bulge.  Mildly prominent posterior  epidural fat. There is no central spinal stenosis left foramen patent. Right  foramen adequate. L5/S1: Likely chronic bilateral pars defects L5 although potentially healed on  the left. . Disc bulge mildly impresses ventral thecal sac and S1 root sleeves. Facet and ligament hypertrophy. There is no central spinal stenosis. Foraminal  disc material, listhesis, and decreased disc space height cause mild-to-moderate  narrowing of the left foramen with mild root deformation. The same factors cause  probably moderate narrowing of the right-sided foramen, with root deformation.     IMPRESSION  IMPRESSION:     1. Bilateral very probable pars defects L5, possibly healed on the left. Grade I  anterior listhesis L5 on S1. This is a level of active disease with moderate  bone marrow edema on either side of the significantly narrowed disc  -Canal nonstenotic here. -Mild/moderate left and moderate right foraminal narrowing with some root  deformation  -Foraminal compromise could worsen if there is dynamic listhesis on flexion  -Could investigate further with noncontrast CT scan (confirm pars defects) and  flexion-extension radiographs (check for dynamic listhesis)  2. Degenerative central spinal stenosis, moderate L3/L4, mild L2/L3  -Mild foraminal narrowing above L5/S1 level  3. Annular fissures at multiple posterior disc margins which can be a source of  back pain   4. Abnormal bone marrow; fat is significantly partially replaced. This is most  likely secondary to increased red marrow. Please check for anemia  -As discussed above, this is very unlikely bone marrow tumor. If there is strong  concern for metastatic prostate cancer, could check bone scan     reviewed    Mr. Kasey Cushing has a reminder for a \"due or due soon\" health maintenance. I have asked that he contact his primary care provider for follow-up on this health maintenance.      43 minutes of face-to-face contact were spent with the patient during today's visit extensively discussing symptoms and treatment plan. All questions were answered. More than half of this visit today was spent on counseling. Written by Marie Gruber, as dictated by Dr. Marah Riley. I, Dr. Marah Riley, confirm that all documentation is accurate.

## 2018-10-17 ENCOUNTER — TELEPHONE (OUTPATIENT)
Dept: ONCOLOGY | Age: 73
End: 2018-10-17

## 2018-10-17 NOTE — TELEPHONE ENCOUNTER
According to Dr. Papo Munoz note, the patient will come back to review lab test and if needed he will schedule a BMB at that time.

## 2018-10-17 NOTE — TELEPHONE ENCOUNTER
Pt said he needs to know when/where to get tests done, he said no one has called him to set an appointment for a bone marrow biopsy, and he wants to know where to go and what to do before he comes back to see Dr. Linh Lewis. His initial appt was 10/5/18 and he has a 2 week appointment 10/19/18 and he said he might have to change that so he can get all the tests done. Pt wants someone to call him back at [phone].

## 2018-10-18 ENCOUNTER — OFFICE VISIT (OUTPATIENT)
Dept: ORTHOPEDIC SURGERY | Facility: CLINIC | Age: 73
End: 2018-10-18

## 2018-10-18 VITALS
TEMPERATURE: 96.7 F | HEART RATE: 72 BPM | WEIGHT: 176 LBS | BODY MASS INDEX: 27.62 KG/M2 | SYSTOLIC BLOOD PRESSURE: 142 MMHG | DIASTOLIC BLOOD PRESSURE: 67 MMHG | HEIGHT: 67 IN | OXYGEN SATURATION: 96 % | RESPIRATION RATE: 16 BRPM

## 2018-10-18 DIAGNOSIS — M25.552 BILATERAL HIP PAIN: Primary | ICD-10-CM

## 2018-10-18 DIAGNOSIS — R29.898 WEAKNESS OF BOTH LOWER EXTREMITIES: ICD-10-CM

## 2018-10-18 DIAGNOSIS — R89.8 ABNORMAL BONE MARROW EXAMINATION: ICD-10-CM

## 2018-10-18 DIAGNOSIS — R20.0 LEFT ARM NUMBNESS: ICD-10-CM

## 2018-10-18 DIAGNOSIS — I73.9 VASCULAR CLAUDICATION (HCC): ICD-10-CM

## 2018-10-18 DIAGNOSIS — R20.0 RIGHT LEG NUMBNESS: ICD-10-CM

## 2018-10-18 DIAGNOSIS — M25.551 BILATERAL HIP PAIN: Primary | ICD-10-CM

## 2018-10-18 DIAGNOSIS — I73.9 PVD (PERIPHERAL VASCULAR DISEASE) (HCC): ICD-10-CM

## 2018-10-18 NOTE — PROGRESS NOTES
Patient: Harpreet Hussein                MRN: [id number]       SSN: [id number] YOB: 1945        AGE: 71 y.o. SEX: male Body mass index is 27.57 kg/m². PCP: Hernán Blunt MD 
10/18/18 HISTORY: Mr. Rosamaria Fenton is having radicular pain starting from his mid-femur going to his mid-tibia. He is having some back problems. He has recently seen Dr. Larance Fothergill and had an MRI confirming a pars intraarticular defect, arthritis, and some nerve impingement. He also describes diffuse, achy pain that is right in the distal thigh. It is worse on the left than on the right. If he stands for a period of time it bothers him as well. There is not too much in the way of groin discomfort. He has been referred to Vascular as well, which I would agree with. The pain can be moderate and aching and somewhat activity-limiting. PHYSICAL EXAMINATION:  On examination today, he stands somewhat kyphotic. His low back is mildly tender. The hips rotate adequately. I palpated his entire thigh and tibia, and I do not feel any masses. The calf is nontender. Shay's sign is negative. The foot is warm and well perfused, although I cannot feel the dorsalis pedis pulse. He does have some patchy changes regarding numbness in the lower extremities, decreased to L4 on the left and L5 on the right. There is no foot drop. However, tib/ant is -5/5. Straight leg raise is just equivocal.   
 
RADIOGRAPHS:  On review of his x-rays, I do not see anything acute on his femurs. PLAN:  I would recommend a technetium bone scan for him with his sort of achy, diffuse bone pain. I agree with Dr. Larance Fothergill, and I think an EMG nerve conduction study would be helpful. The referral to Vascular I agree with as well. We will see him back afterwards. I do suspect a lot of this is radicular and coming from his back but not presenting classically. REVIEW OF SYSTEMS:   
 
CON: negative for weight loss, fever EYE: negative for double vision ENT: negative for hoarseness RS:   negative for Tb 
GI:    negative for blood in stool :  negative for blood in urine Other systems reviewed and noted below. Past Medical History:  
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  
 lumbar spine injury secondary to fall  
 HTN (hypertension)  Hypercholesterolemia  Injury of lumbar spine ([facility] [address] 1974  Leg pain, right  Nocturia  Overactive bladder  Peripheral vascular disease ([facility] [address] Family History Problem Relation Age of Onset  Diabetes Mother  Hypertension Mother  Diabetes Maternal Grandmother  Hypertension Maternal Grandmother Current Outpatient Medications Medication Sig Dispense Refill  gabapentin (NEURONTIN) 300 mg capsule Take 1 Cap by mouth three (3) times daily. 90 Cap 2  
 albuterol (PROVENTIL HFA, VENTOLIN HFA, PROAIR HFA) 90 mcg/actuation inhaler Take 2 Puffs by inhalation every four (4) hours as needed for Wheezing or Shortness of Breath. Indications: BRONCHOSPASM PREVENTION 1 Inhaler 0  
 tamsulosin (FLOMAX) 0.4 mg capsule Take 1 Cap by mouth nightly. 30 Cap 5  
 finasteride (PROSCAR) 5 mg tablet Take 1 Tab by mouth daily. 30 Tab 5  
 amLODIPine (NORVASC) 5 mg tablet Take 5 mg by mouth daily.  spironolactone (ALDACTONE) 25 mg tablet Take  by mouth daily.  methocarbamol (ROBAXIN-750) 750 mg tablet Take 1 Tab by mouth three (3) times daily as needed. Indications: pain, muscle spasms 16 Tab 0 Allergies Allergen Reactions  Ampicillin Angioedema  Asa-Acetaminophen-Caff-Buffers Rash  Penicillins Angioedema  Atorvastatin Other (comments) Muscle cramps Past Surgical History:  
Procedure Laterality Date  HX ORTHOPAEDIC    
 finger amputation left hand  HX TONSILLECTOMY Social History Socioeconomic History  Marital status: SINGLE Spouse name: Not on file  Number of children: Not on file  Years of education: Not on file  Highest education level: Not on file Social Needs  Financial resource strain: Not on file  Food insecurity - worry: Not on file  Food insecurity - inability: Not on file  Transportation needs - medical: Not on file  Transportation needs - non-medical: Not on file Occupational History  Not on file Tobacco Use  Smoking status: Former Smoker Last attempt to quit: 7/13/2015 Years since quitting: 3.2  Smokeless tobacco: Never Used Substance and Sexual Activity  Alcohol use: Yes Alcohol/week: 0.0 oz  
  Comment: former stopped 2015  Drug use: No  
 Sexual activity: Yes Other Topics Concern  Not on file Social History Narrative  Not on file Visit Vitals /67 (BP 1 Location: Left arm, BP Patient Position: Sitting) Pulse 72 Temp 96.7 °F (35.9 °C) (Oral) Resp 16 Ht 5' 7\" (1.702 m) Wt 176 lb (79.8 kg) SpO2 96% BMI 27.57 kg/m² PHYSICAL EXAMINATION: 
GENERAL: Alert and oriented x3, in no acute distress, well-developed, well-nourished, afebrile. HEART: No JVD. EYES: No scleral icterus NECK: No significant lymphadenopathy LUNGS: No respiratory compromise or indrawing ABDOMEN: Soft, non-tender, non-distended. Electronically signed by:  Christen Charles MD

## 2018-10-22 ENCOUNTER — HOSPITAL ENCOUNTER (OUTPATIENT)
Dept: PHYSICAL THERAPY | Age: 73
Discharge: HOME | End: 2018-10-22
Payer: MEDICAID

## 2018-10-22 PROCEDURE — 97162 PT EVAL MOD COMPLEX 30 MIN: CPT

## 2018-10-22 PROCEDURE — 97110 THERAPEUTIC EXERCISES: CPT

## 2018-10-22 NOTE — PROGRESS NOTES
[facility]  [facility]  [address]  [place], [address] [phone] fax Plan of Care/ Statement of Necessity for Physical Therapy Services Patient name: Heath Sims Start of Care: 10/22/2018 Referral source: Vivienne Diamond MD : 1945 Medical Diagnosis: Pain in left leg [M79.605] Low back pain [M54.5] Pain in right shoulder [M25.511] Pain in left shoulder [M25.512] Onset Date:10/16/18 Treatment Diagnosis: neck , back , bilateral shoulder, right leg pain Prior Hospitalization: see medical history Provider#: [id number] Medications: Verified on Patient summary List  
 Comorbidities: OA, HTN, B RTC tear Prior Level of Function: retired. Lives independently in d [facility] living apartment with elevator access The Plan of Care and following information is based on the information from the initial evaluation. Assessment/ key information: Patient is a 71 y.o.male presenting with Pain in left leg [M79.605] Low back pain [M54.5] Pain in right shoulder [M25.511] Pain in left shoulder [M25.512]. Mr. Florina Meng arrives to initial PT evaluation with c/o worsening neck, back, B shoulder pain, and right leg pain. He reports chronic back, shoulder and leg pain since falling off a scaffold in , however reports his pain has worsened to the point he is limited with mobility. He has been referred to vascular & oncology to rule out other contributing factors. He arrives to visit today with generalized pain & weakness, referred pain into the right LE, and numbness in B hands/feet in stocking/glove pattern. Lumbar screen showed (-) SLR & (-) slump testing. Flexion bias present in lumbar spine. Spurlings (-) bilaterally. Shoulder ROM limited into ER & end range flexion/abduction. We will work to address functional limitations with progression of activity tolerance and pain control as able .  Patient will benefit from skilled PT services to address deficits and facilitate return to premorbid activity level and promote improved quality of life. Evaluation Complexity History HIGH Complexity :3+ comorbidities / personal factors will impact the outcome/ POC ; Examination MEDIUM Complexity : 3 Standardized tests and measures addressing body structure, function, activity limitation and / or participation in recreation  ;Presentation MEDIUM Complexity : Evolving with changing characteristics  ; Clinical Decision Making Other outcome measures pain on VAS: 710  HIGH Overall Complexity Rating: MEDIUM Problem List: pain affecting function, decrease ROM, decrease strength, edema affecting function, impaired gait/ balance, decrease ADL/ functional abilitiies, decrease activity tolerance, decrease flexibility/ joint mobility and decrease transfer abilities Treatment Plan may include any combination of the following: Therapeutic exercise, Therapeutic activities, Neuromuscular re-education, Physical agent/modality, Gait/balance training, Manual therapy, Aquatic therapy, Patient education, Self Care training, Functional mobility training, Home safety training and Stair training Patient / Family readiness to learn indicated by: asking questions, trying to perform skills and interest 
Persons(s) to be included in education: patient (P) Barriers to Learning/Limitations: None Patient Goal (s): help ease & learn to cope with pain.  Patient Self Reported Health Status: fair Rehabilitation Potential: fair Short Term Goals: To be accomplished in 1 weeks: 1. Establish HEP for ROM & Strengthening. Long Term Goals: To be accomplished in 4 weeks: 1. Patient will be independent with HEP for ROM & Strengthening. Eval Status: na 
2. Pt will increase B Shoulder AROM flexion to 140 degrees to increase ease with ADLs. Eval Status:right : 120 deg, left: 130 deg 3.  Pt will increase FOTO score to set d/c points to demonstrate improved functional lift & carry. Eval Status:FOTO: time constraint at eval, assess nv 4. Pt will increase B hip flexion strength to 5/5 strength with MMT to normalize gait pattern. Eval Status: right: 4+/5, left: 4/5 Frequency / Duration: Patient to be seen 2 times per week for 4 weeks. Patient/ Caregiver education and instruction: Diagnosis, prognosis, self care, activity modification and exercises 
 [x]  Plan of care has been reviewed with YURIY Miller PT 10/22/2018 7:55 AM 
 
________________________________________________________________________ I certify that the above Therapy Services are being furnished while the patient is under my care. I agree with the treatment plan and certify that this therapy is necessary. [de-identified] Signature:____________Date:_________TIME:________ 
 
[employer], Date and Time must be completed for valid certification ** Please sign and return to [facility]  [address]  [place], [address] 
[phone] fax

## 2018-10-22 NOTE — PROGRESS NOTES
PT DAILY TREATMENT NOTE - [facility]  Patient Name: Horace Galdamez Date:10/22/2018 : 1945 [x]  Patient  Verified Payor: [facility] MEDICAID / Plan: [place] 46 / Product Type: Managed Care Medicaid / In time:805  Out time:858 Total Treatment Time (min): 53 Total Timed Codes (min): 25 
1:1 Treatment Time ( only): 53 Visit #: 1 of 8 Treatment Area: Pain in left leg [M79.605] Low back pain [M54.5] Pain in right shoulder [M25.511] Pain in left shoulder [M25.512] SUBJECTIVE Pain Level (0-10 scale): 7 Any medication changes, allergies to medications, adverse drug reactions, diagnosis change, or new procedure performed?: [x] No    [] Yes (see summary sheet for update) Subjective functional status:   [x] See Eval form in paper chart OBJECTIVE 28 min [x]Eval                  []Re-Eval  
 
25 min Therapeutic Exercise:  [x] See flow sheet :  
Rationale: increase ROM, increase strength and improve coordination to improve the patients ability to perform ADLs. With 
 [] TE 
 [] TA 
 [] neuro 
 [] other: Patient Education: [x] Review HEP [] Progressed/Changed HEP based on:  
[] positioning   [] body mechanics   [] transfers   [] heat/ice application   
[] other:   
      
 
  
 
Pain Level (0-10 scale) post treatment: 7 ASSESSMENT:  
[x]  See Evaluation Goals: 
Short Term Goals: To be accomplished in 1 weeks: 1. Establish HEP for ROM & Strengthening. Long Term Goals: To be accomplished in 4 weeks: 1. Patient will be independent with HEP for ROM & Strengthening. Eval Status: na 
2. Pt will increase B Shoulder AROM flexion to 140 degrees to increase ease with ADLs. Eval Status:right : 120 deg, left: 130 deg 3. Pt will increase FOTO score to set d/c points to demonstrate improved functional lift & carry. Eval Status:FOTO: time constraint at eval, assess nv 4.  Pt will increase B hip flexion strength to 5/5 strength with MMT to normalize gait pattern. Eval Status: right: 4+/5, left: 4/5 PLAN     [x]  Continue plan of care 
 
[]  Other:_   
 
Rin Higgins, PT 10/22/2018  7:57 AM

## 2018-10-23 ENCOUNTER — HOSPITAL ENCOUNTER (OUTPATIENT)
Dept: PHYSICAL THERAPY | Age: 73
Discharge: HOME | End: 2018-10-23
Payer: MEDICAID

## 2018-10-23 ENCOUNTER — TELEPHONE (OUTPATIENT)
Dept: ORTHOPEDIC SURGERY | Facility: CLINIC | Age: 73
End: 2018-10-23

## 2018-10-23 PROCEDURE — 97110 THERAPEUTIC EXERCISES: CPT

## 2018-10-23 NOTE — TELEPHONE ENCOUNTER
Torri from [facility] called in requesting to know if  wanted the bone scan or if it could be cancelled as the patient just had one done on 09/07/18. Please advise her on her cell at [phone]. Torri is aware that Dr. Griselda Davidson is not in office today, pt scan is for tomorrow 10/24/18.

## 2018-10-23 NOTE — PROGRESS NOTES
PT DAILY TREATMENT NOTE 10-18 Patient Name: Kasey Gaspar Date:10/23/2018 : 1945 [x]  Patient  Verified Payor: [facility] MEDICAID / Plan: [place] 46 / Product Type: Managed Care Medicaid / In time:220  Out time:310 Total Treatment Time (min): 50 Visit #: 2 of 8 Medicare/BCBS Only Total Timed Codes (min):  9 1:1 Treatment Time:  5 Treatment Area: Pain in right leg [M79.604] Low back pain [M54.5] SUBJECTIVE Pain Level (0-10 scale): 9 Any medication changes, allergies to medications, adverse drug reactions, diagnosis change, or new procedure performed?: [x] No    [] Yes (see summary sheet for update) Subjective functional status/changes:   [] No changes reported \"I'm hurting like always. \" OBJECTIVE Modality rationale: decrease pain and increase tissue extensibility to improve the patients ability to improve ease with mobility. Min Type Additional Details  
 [] Estim:  []Unatt       []IFC  []Premod []Other:  []w/ice   []w/heat Position: Location:  
 [] Estim: []Att    []TENS instruct  []NMES []Other:  []w/US   []w/ice   []w/heat Position: Location:  
 []  Traction: [] Cervical       []Lumbar 
                     [] Prone          []Supine []Intermittent   []Continuous Lbs: 
[] before manual 
[] after manual  
 []  Ultrasound: []Continuous   [] Pulsed []1MHz   []3MHz W/cm2: 
Location:  
 []  Iontophoresis with dexamethasone Location: [] Take home patch  
[] In clinic  
10 []  Ice     [x]  heat 
[]  Ice massage 
[]  Laser  
[]  Anodyne Position:seated Location:low back  
 []  Laser with stim 
[]  Other:  Position: Location:  
 []  Vasopneumatic Device Pressure:       [] lo [] med [] hi  
Temperature: [] lo [] med [] hi  
[x] Skin assessment post-treatment:  [x]intact []redness- no adverse reaction 
  []redness  adverse reaction: 40 min Therapeutic Exercise:  [x] See flow sheet :  
Rationale: increase ROM, increase strength, improve coordination, improve balance and increase proprioception to improve the patients ability to perform ADLs. With 
 [] TE 
 [] TA 
 [] neuro 
 [] other: Patient Education: [x] Review HEP [] Progressed/Changed HEP based on:  
[] positioning   [] body mechanics   [] transfers   [] heat/ice application   
[] other:   
 
Other Objective/Functional Measures:   
 
Pain Level (0-10 scale) post treatment: 5 
 
ASSESSMENT/Changes in Function: Mr. Justin Hart did well with initiation of exercises today. Fatigues easily but reported decrease in pain with exercises and heat at end of session. Patient will continue to benefit from skilled PT services to modify and progress therapeutic interventions, address functional mobility deficits, address ROM deficits, address strength deficits, analyze and address soft tissue restrictions, analyze and cue movement patterns, analyze and modify body mechanics/ergonomics, assess and modify postural abnormalities, address imbalance/dizziness and instruct in home and community integration to attain remaining goals. []  See Plan of Care 
[]  See progress note/recertification 
[]  See Discharge Summary Progress towards goals / Updated goals: 
Short Term Goals: To be accomplished in 1 weeks: 1. Establish HEP for ROM & Strengthening.  
  
Long Term Goals: To be accomplished in 4 weeks: 1. Patient will be independent with HEP for ROM & Strengthening. Eval Status: na 
2. Pt will increase B Shoulder AROM flexion to 140 degrees to increase ease with ADLs. Eval Status:right : 120 deg, left: 130 deg 3. Pt will increase FOTO score to set d/c points to demonstrate improved functional lift & carry. Eval Status:FOTO: time constraint at eval, assess nv 4.  Pt will increase B hip flexion strength to 5/5 strength with MMT to normalize gait pattern. Eval Status: right: 4+/5, left: 4/5 
  
 
 
 
PLAN 
[]  Upgrade activities as tolerated     [x]  Continue plan of care 
[]  Update interventions per flow sheet      
[]  Discharge due to:_ 
[]  Other:_   
 
Nicholas Iraheta, PT 10/23/2018  4:26 PM 
 
Future Appointments Date Time Provider Raúl Clayton 10/24/2018 11:30 AM [facility] NM RM 1 [facility]  
10/24/2018  2:30 PM [facility] NM RM 2 [facility]  
10/25/2018  9:30 AM [address]  
10/26/2018 10:00 AM [facility] DX RM 2 [facility]  
10/29/2018  5:00 PM Daisy Vidal [facility]  
10/31/2018  3:30 PM [facility] PT [facility] [address] [facility]  
11/2/2018  2:45 PM Christine Lizarraga MD [id number]  
12/3/2018 12:50 PM Wood Sanchez  [address] [place] [address]

## 2018-10-25 ENCOUNTER — OFFICE VISIT (OUTPATIENT)
Dept: VASCULAR SURGERY | Age: 73
End: 2018-10-25

## 2018-10-25 VITALS
WEIGHT: 176 LBS | DIASTOLIC BLOOD PRESSURE: 70 MMHG | HEIGHT: 67 IN | BODY MASS INDEX: 27.62 KG/M2 | HEART RATE: 76 BPM | SYSTOLIC BLOOD PRESSURE: 130 MMHG | RESPIRATION RATE: 17 BRPM

## 2018-10-25 DIAGNOSIS — G62.9 POLYNEUROPATHY: ICD-10-CM

## 2018-10-25 DIAGNOSIS — G89.29 CHRONIC BILATERAL LOW BACK PAIN WITH RIGHT-SIDED SCIATICA: ICD-10-CM

## 2018-10-25 DIAGNOSIS — M54.41 CHRONIC BILATERAL LOW BACK PAIN WITH RIGHT-SIDED SCIATICA: ICD-10-CM

## 2018-10-25 DIAGNOSIS — M79.604 PAIN IN BOTH LOWER EXTREMITIES: Primary | ICD-10-CM

## 2018-10-25 DIAGNOSIS — M79.605 PAIN IN BOTH LOWER EXTREMITIES: Primary | ICD-10-CM

## 2018-10-25 DIAGNOSIS — S46.009S ROTATOR CUFF INJURY, UNSPECIFIED LATERALITY, SEQUELA: ICD-10-CM

## 2018-10-25 DIAGNOSIS — I73.9 PAD (PERIPHERAL ARTERY DISEASE) (HCC): ICD-10-CM

## 2018-10-25 NOTE — PROGRESS NOTES
1. Have you been to an emergency room or urgent care clinic since your last visit? [facility] for hand injury Hospitalized since your last visit? If yes, where, when, and reason for visit? no 
2. Have you seen or consulted any other health care providers outside of the [facility] since your last visit including any procedures, health maintenance items.  If yes, where, when and reason for visit? no

## 2018-10-25 NOTE — PROGRESS NOTES
Kasey Gaspar Chief Complaint Patient presents with  New Patient  Leg Pain HPI Kasey Gaspar is a 71 y.o. male who presents to the office today at the request of Dr Olga Lidia Hawkins for complaint of bilateral lower extremity pain. He states that for about the past 3-4 years he has been suffering from leg pain. He states that his right leg is much more severe than the left. He states that he has right leg pain which goes all the way from his hip down the leg. On the left he complains of lower leg and foot pain. He states that the pain is constant and he never gets relief. He does state that he is able to walk about 1 flight of stairs before he has to stop and rest.  He states that the pain is hot and burning pain that shoots down his legs. He does not describe any symptoms of classic claudication or true rest pain. He has no skin changes. He denies any history of nonhealing wounds. He states that he was told he was borderline diabetic but has never been started on any medications. He states that he quit smoking and drinking 3 years ago. He has multiple orthopedic issues and complaints including bilateral shoulder pain and chronic back pain. He does not complain of any leg swelling thankfully. He denies any fevers or chills. He was also recently seen by Oncology, Dr Jose Castillo, for possible multiple myeloma workup due to MRI from 7/30/2018 showed abnormal marrow pattern particularly in the lumbar spine concerning for an infiltrative bone marrow process such as plasma cell dyscrasia. Workup ongoing. Past Medical History:  
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  
 lumbar spine injury secondary to fall  
 HTN (hypertension)  Hypercholesterolemia  Injury of lumbar spine ([address] 1974  Leg pain, right  Nocturia  Overactive bladder  Peripheral vascular disease ([facility] Patient Active Problem List  
Diagnosis Code  Unsteady gait R26.81  
 Gait instability R26.81  Vertigo R42  Radiculopathy of lumbar region M54.16  
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  
 Nocturia R35.1  Leg pain, right M79.604  Hypercholesterolemia E78.00  
 HTN (hypertension) I10  
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  
Procedure Laterality Date  HX ORTHOPAEDIC    
 finger amputation left hand  HX TONSILLECTOMY Current Outpatient Medications Medication Sig Dispense Refill  gabapentin (NEURONTIN) 300 mg capsule Take 1 Cap by mouth three (3) times daily. 90 Cap 2  
 methocarbamol (ROBAXIN-750) 750 mg tablet Take 1 Tab by mouth three (3) times daily as needed. Indications: pain, muscle spasms 16 Tab 0  
 albuterol (PROVENTIL HFA, VENTOLIN HFA, PROAIR HFA) 90 mcg/actuation inhaler Take 2 Puffs by inhalation every four (4) hours as needed for Wheezing or Shortness of Breath. Indications: BRONCHOSPASM PREVENTION 1 Inhaler 0  
 tamsulosin (FLOMAX) 0.4 mg capsule Take 1 Cap by mouth nightly. 30 Cap 5  
 finasteride (PROSCAR) 5 mg tablet Take 1 Tab by mouth daily. 30 Tab 5  
 amLODIPine (NORVASC) 5 mg tablet Take 5 mg by mouth daily.  spironolactone (ALDACTONE) 25 mg tablet Take  by mouth daily. Allergies Allergen Reactions  Ampicillin Angioedema  Asa-Acetaminophen-Caff-Buffers Rash  Penicillins Angioedema  Atorvastatin Other (comments) Muscle cramps Social History Socioeconomic History  Marital status: SINGLE Spouse name: Not on file  Number of children: Not on file  Years of education: Not on file  Highest education level: Not on file Social Needs  Financial resource strain: Not on file  Food insecurity - worry: Not on file  Food insecurity - inability: Not on file  Transportation needs - medical: Not on file  Transportation needs - non-medical: Not on file Occupational History  Not on file Tobacco Use  Smoking status: Former Smoker Last attempt to quit: 7/13/2015 Years since quitting: 3.2  Smokeless tobacco: Never Used Substance and Sexual Activity  Alcohol use: Yes Alcohol/week: 0.0 oz  
  Comment: former stopped 2015  Drug use: No  
 Sexual activity: Yes Other Topics Concern  Not on file Social History Narrative  Not on file Family History Problem Relation Age of Onset  Diabetes Mother  Hypertension Mother  Diabetes Maternal Grandmother  Hypertension Maternal Grandmother Review of Systems Constitutional: negative HEENT: negative Respiratory: negative Cardiovascular: negative Gastrointestinal: negative Genitourinary:negative Hematologic/lymphatic: negative Musculoskeletal:positive for BLE pain, R>L, chronic back pain, bilateral rotator cuff injuries, left hand injury Neurological: negative Behavioral/Psych: negative Endocrine: negative Allergic/Immunologic: negative Physical Exam:   
Visit Vitals /70 (BP 1 Location: Left arm, BP Patient Position: Sitting) Pulse 76 Resp 17 Ht 5' 7\" (1.702 m) Wt 176 lb (79.8 kg) BMI 27.57 kg/m² General: Well-appearing elderly male in no acute distress HEENT: EOMI, no scleral icterus is noted. No carotid bruits are heard bilaterally Cardiovascular: Regular rhythm normal S1-S2 no rubs murmurs or gallops Pulmonary: No increased work or breathing is noted. Clear to auscultation bilaterally. No wheeze, rales or rhonchi. Abdomen: nondistended. Extremities: Warm and well perfused bilaterally. No BLE edema. Unable to palpate DP artery pulses. He has at least biphasic dopplers signals throughout with the exception of left DP artery which sounds more monophasic. No ulcers. Neuro: Cranial nerves II through XII are grossly intact Integument: No ulcerations are identified visibly Impression and Plan: 
Kasey Gaspar is a 71 y.o. male with bilateral lower extremity pain, R>>L. He has multiple orthopedic issues including chronic back pain. He does follow with Dr. Olga Lidia Hawkins and Dr. Augustin Chowdhury for these issues. We did perform arterial studies in our office today which showed no evidence of significant peripheral arterial disease at rest in the right leg. Isolated moderate dorsalis pedis artery disease by waveforms. Mild to moderate peripheral arterial disease at rest in the left leg by waveform analysis with infrapopliteal disease. The right KAVITA is 1.15 and the left KAVITA is 1.07. Discussed with him that I do not feel his symptoms are secondary to arterial insufficiency and are much more consistent with neuropathic type pain. I did recommend that he follow up with his spine/ orthopedic doctors and Oncologist as scheduled. We can follow him yearly for routine surveillance as far as his PAD is concerned. Plan was discussed. Patient expresses understanding and agrees. [address] [place] [address] 
 
 
 
PLEASE NOTE: 
This document has been produced using voice recognition software. Unrecognized errors in transcription may be present.

## 2018-10-29 ENCOUNTER — HOSPITAL ENCOUNTER (OUTPATIENT)
Dept: PHYSICAL THERAPY | Age: 73
Discharge: HOME | End: 2018-10-29
Payer: MEDICAID

## 2018-10-29 ENCOUNTER — HOSPITAL ENCOUNTER (OUTPATIENT)
Dept: GENERAL RADIOLOGY | Age: 73
Discharge: HOME | End: 2018-10-29
Attending: INTERNAL MEDICINE
Payer: MEDICAID

## 2018-10-29 DIAGNOSIS — E88.09 PLASMA CELL DYSCRASIA: ICD-10-CM

## 2018-10-29 PROCEDURE — 77075 RADEX OSSEOUS SURVEY COMPL: CPT

## 2018-10-29 PROCEDURE — 97110 THERAPEUTIC EXERCISES: CPT

## 2018-10-29 NOTE — PROGRESS NOTES
PT DAILY TREATMENT NOTE 10-18 Patient Name: Princess James Date:10/29/2018 : 1945 [x]  Patient  Verified Payor: [facility] MEDICAID / Plan: Marely 46 / Product Type: Managed Care Medicaid / In time:417  Out time:506 Total Treatment Time (min): 49 Visit #: 3 of 8 Treatment Area: Pain in right leg [M79.604] Low back pain [M54.5] SUBJECTIVE Pain Level (0-10 scale): 9 Any medication changes, allergies to medications, adverse drug reactions, diagnosis change, or new procedure performed?: [x] No    [] Yes (see summary sheet for update) Subjective functional status/changes:   [] No changes reported \"I'm hurting all over. \" OBJECTIVE Modality rationale: decrease pain to improve the patients ability to improve mobility and positional tolerance Min Type Additional Details  
 [] Estim:  []Unatt       []IFC  []Premod []Other:  []w/ice   []w/heat Position: Location:  
 [] Estim: []Att    []TENS instruct  []NMES []Other:  []w/US   []w/ice   []w/heat Position: Location:  
 []  Traction: [] Cervical       []Lumbar 
                     [] Prone          []Supine []Intermittent   []Continuous Lbs: 
[] before manual 
[] after manual  
 []  Ultrasound: []Continuous   [] Pulsed []1MHz   []3MHz W/cm2: 
Location:  
 []  Iontophoresis with dexamethasone Location: [] Take home patch  
[] In clinic  
10 []  Ice     [x]  heat 
[]  Ice massage 
[]  Laser  
[]  Anodyne Position: semi-reclined Location: lower back   
 []  Laser with stim 
[]  Other:  Position: Location:  
 []  Vasopneumatic Device Pressure:       [] lo [] med [] hi  
Temperature: [] lo [] med [] hi  
[x] Skin assessment post-treatment:  [x]intact []redness- no adverse reaction 
  []redness  adverse reaction:  
 
39 min Therapeutic Exercise:  [x] See flow sheet :  
 Rationale: increase ROM and increase strength to improve the patients ability to perform ADLs With 
 [x] TE 
 [] TA [x] neuro 
 [] other: Patient Education: [x] Review HEP [] Progressed/Changed HEP based on:  
[x] positioning   [x] body mechanics   [] transfers   [] heat/ice application   
[] other:   
 
Other Objective/Functional Measures:   
 
Pain Level (0-10 scale) post treatment: 5 
 
ASSESSMENT/Changes in Function: Pt responds well to treatments, but has poor carryover between appointments. He was able to complete all exercises without a rest breaks. Poor scapular mechanics with right shoulder. Patient will continue to benefit from skilled PT services to modify and progress therapeutic interventions, address functional mobility deficits, address ROM deficits, address strength deficits, analyze and address soft tissue restrictions, analyze and cue movement patterns, analyze and modify body mechanics/ergonomics, assess and modify postural abnormalities, address imbalance/dizziness and instruct in home and community integration to attain remaining goals. [x]  See Plan of Care 
[]  See progress note/recertification 
[]  See Discharge Summary Progress towards goals / Updated goals: 
Short Term Goals: To be accomplished in 1 weeks: 1. Establish HEP for ROM & Strengthening.  
  MET Long Term Goals: To be accomplished in 4 weeks: 1. Patient will be independent with HEP for ROM & Strengthening. 
            Eval Status: na 
 Reports compliance 2.   Pt will increase B Shoulder AROM flexion to 140 degrees to increase ease with ADLs.             Eval Status:right : 120 deg, left: 130 deg Measure at later visit 3. Pt will increase FOTO score to set d/c points to demonstrate improved functional lift & carry.  
            Eval Status:FOTO: time constraint at eval, assess nv Assess NV 4. Pt will increase B hip flexion strength to 5/5 strength with MMT to normalize gait pattern.             Eval Status: right: 4+/5, left: 4/5 Making progress PLAN 
[]  Upgrade activities as tolerated     [x]  Continue plan of care 
[]  Update interventions per flow sheet      
[]  Discharge due to:_ 
[]  Other:_   
 
Katerina Sutton PTA, [facility] 10/29/2018  5:12 PM 
 
Future Appointments Date Time Provider Raúl Matilde 10/29/2018  4:30 PM Nelida Das PTA [facility]  
10/31/2018  3:30 PM [facility] PT [address] [facility]  
11/2/2018  2:45 PM Christiano Ying MD [address] [facility]  
12/3/2018 12:50 PM Mayo Gonzalez  [address]   
10/30/2019 10:00 AM [facility]  
[facility]2019  [facility]

## 2018-10-31 ENCOUNTER — HOSPITAL ENCOUNTER (OUTPATIENT)
Dept: PHYSICAL THERAPY | Age: 73
Discharge: HOME | End: 2018-10-31
Payer: MEDICAID

## 2018-10-31 PROCEDURE — 97110 THERAPEUTIC EXERCISES: CPT

## 2018-10-31 NOTE — PROGRESS NOTES
PT DAILY TREATMENT NOTE 10-18 Patient Name: Micky Luu Date:10/31/2018 : 1945 [x]  Patient  Verified Payor: [facility] MEDICAID / Plan: Marely Coles / Product Type: Managed Care Medicaid / In time:331  Out time:420 Total Treatment Time (min):49 Visit #: 4 of 8 Treatment Area: Pain in right leg [M79.604] Low back pain [M54.5] SUBJECTIVE Pain Level (0-10 scale): 7 Any medication changes, allergies to medications, adverse drug reactions, diagnosis change, or new procedure performed?: [x] No    [] Yes (see summary sheet for update) Subjective functional status/changes:   [] No changes reported \"not as bad but still hurting. \" OBJECTIVE Modality rationale: decrease pain and increase tissue extensibility to improve the patients ability to perform ADLs. Min Type Additional Details  
 [] Estim:  []Unatt       []IFC  []Premod []Other:  []w/ice   []w/heat Position: Location:  
 [] Estim: []Att    []TENS instruct  []NMES []Other:  []w/US   []w/ice   []w/heat Position: Location:  
 []  Traction: [] Cervical       []Lumbar 
                     [] Prone          []Supine []Intermittent   []Continuous Lbs: 
[] before manual 
[] after manual  
 []  Ultrasound: []Continuous   [] Pulsed []1MHz   []3MHz W/cm2: 
Location:  
 []  Iontophoresis with dexamethasone Location: [] Take home patch  
[] In clinic  
10 []  Ice     [x]  heat 
[]  Ice massage 
[]  Laser  
[]  Anodyne Position:semi-reclined on table Location:low back  
 []  Laser with stim 
[]  Other:  Position: Location:  
 []  Vasopneumatic Device Pressure:       [] lo [] med [] hi  
Temperature: [] lo [] med [] hi  
[x] Skin assessment post-treatment:  [x]intact []redness- no adverse reaction 
  []redness  adverse reaction:  
 
 
39 min Therapeutic Exercise:  [x] See flow sheet :  
 Rationale: increase ROM, increase strength, improve coordination, improve balance and increase proprioception to improve the patients ability to perform ADLs. With 
 [] TE 
 [] TA 
 [] neuro 
 [] other: Patient Education: [x] Review HEP [] Progressed/Changed HEP based on:  
[] positioning   [] body mechanics   [] transfers   [] heat/ice application   
[] other:   
 
Other Objective/Functional Measures:   
 
Pain Level (0-10 scale) post treatment: 5 
 
ASSESSMENT/Changes in Function: Mr. Xuan Zambrano remains motivated but fatigues with standing activities. Saw better pelvic tilt and TA activation today. Patient will continue to benefit from skilled PT services to modify and progress therapeutic interventions, address functional mobility deficits, address ROM deficits, address strength deficits, analyze and address soft tissue restrictions, analyze and cue movement patterns, analyze and modify body mechanics/ergonomics, assess and modify postural abnormalities, address imbalance/dizziness and instruct in home and community integration to attain remaining goals. []  See Plan of Care 
[]  See progress note/recertification 
[]  See Discharge Summary Progress towards goals / Updated goals: 
Short Term Goals: To be accomplished in 1 weeks: 1. Establish HEP for ROM & Strengthening.  
            MET Long Term Goals: To be accomplished in 4 weeks: 1. Patient will be independent with HEP for ROM & Strengthening. 
            Eval Status: na 
            Reports compliance 2.   Pt will increase B Shoulder AROM flexion to 140 degrees to increase ease with ADLs.             Eval Status:right : 120 deg, left: 130 deg Measure at later visit 3. Pt will increase FOTO score to set d/c points to demonstrate improved functional lift & carry.  
            Eval Status:FOTO: time constraint at eval, assess nv             Assess NV 
 4. Pt will increase B hip flexion strength to 5/5 strength with MMT to normalize gait pattern.  
            Eval Status: right: 4+/5, left: 4/5 Making progress PLAN 
[]  Upgrade activities as tolerated     [x]  Continue plan of care 
[]  Update interventions per flow sheet      
[]  Discharge due to:_ 
[]  Other:_   
 
Moo Sanabria, PT 10/31/2018  3:49 PM 
 
Future Appointments Date Time Provider [facility] 11/2/2018  2:45 PM Marie Cleaning MD [address] [facility]  
12/3/2018 12:50 PM Olivia Chris  [address]   
10/30/2019 10:00 AM [facility] SCHED  
10/30/2019  1:15 PM [facility]

## 2018-11-02 ENCOUNTER — OFFICE VISIT (OUTPATIENT)
Dept: ONCOLOGY | Age: 73
End: 2018-11-02

## 2018-11-02 VITALS
WEIGHT: 176 LBS | TEMPERATURE: 98.2 F | DIASTOLIC BLOOD PRESSURE: 69 MMHG | HEIGHT: 67 IN | BODY MASS INDEX: 27.62 KG/M2 | HEART RATE: 80 BPM | RESPIRATION RATE: 16 BRPM | SYSTOLIC BLOOD PRESSURE: 123 MMHG | OXYGEN SATURATION: 99 %

## 2018-11-02 DIAGNOSIS — D47.2 MONOCLONAL GAMMOPATHY OF UNDETERMINED SIGNIFICANCE: Primary | ICD-10-CM

## 2018-11-02 NOTE — PROGRESS NOTES
Hematology/medical oncology progress note 11/2/2018 Edgard Laurent YOB: 1945 PCP: Regi Gomez MD 
 
Diagnosis: Monoclonal gammopathy of undetermined significance. Mr. Niels Oleary is a 81-year-old male has been diagnosed with monoclonal gammopathy. I informed the patient that his SPEP from 10/18/2018 showed an abnormal monoclonal paraprotein level of 0.7 g/dL. The patient had a normal beta-2 microglobulin level of 1.8 mg/L. His IgG level was normal at 1237 mg/dL, IgA was normal at 214 mg/dL, and IgM was normal at 52 mg/dL. The comprehensive metabolic panel showed normal renal and liver functions. A bone survey showed multiple areas of degenerative changes in his thoracic and lumbar spine but no evidence of lytic lesions in the skeleton was identified. He had a normal calcium level of 9.1 mg/dL. I have explained to the patient that these findings are supportive of a monoclonal gammopathy of undetermined significance. No additional procedures are warranted at this time. We will monitor him at 3-month intervals. A 24-hour urine specimen for urine protein electrophoresis and urine immunofixation did confirm an IgG G kappa light chain measuring 0.5 g/dL in the urine as well. I will have the patient return in 3 months for further assessment. The patient had his questions answered to his satisfaction. Total time 30 minutes, greater than 50% of the time was in counseling and coordination of care. Lloyd A. Lonny Phalen, MD, [address]

## 2018-11-02 NOTE — PATIENT INSTRUCTIONS
Learning About Monoclonal Gammopathy of Unknown Significance (MGUS) What is MGUS? Monoclonal gammopathy of unknown significance (MGUS) is a blood condition. The blood is made of many kinds of cells, including red blood cells, platelets, and white blood cells. With MGUS, a type of white blood cell called a plasma cell makes too much of the \"M\" (for \"monoclonal\") protein in the blood. Most people with MGUS are fine for many years. MGUS seldom causes symptoms or major health problems. In rare cases, MGUS may turn into multiple myeloma. This is a cancer of plasma cells in the blood that can cause bone weakness. Some people with MGUS may also have a higher risk for osteoporosis, in which bones become thin and weak. MGUS is sometimes seen in younger people, but is more common in people as they get older. It's seen most often in those over the age of 79. How is MGUS diagnosed? MGUS is often found by chance when blood tests are done for other reasons. If you have high levels of a certain protein in your blood, your doctor may order more tests. Blood tests can help identify the protein. The protein is sometimes found in the urine, so you may get a urine test too. Other tests may be done if your doctor thinks you might have a medical problem. In some cases, a bone marrow biopsy may be done to rule out a problem like multiple myeloma. How is it treated? Most people with MGUS don't need any treatment. But you may need regular physical exams, blood tests, and urine tests to make sure that MGUS isn't progressing to a medical problem. Follow-up care is a key part of your treatment and safety. Be sure to make and go to all appointments, and call your doctor if you are having problems. It's also a good idea to know your test results and keep a list of the medicines you take. Where can you learn more? Go to http://ovidio-eliz.info/. Enter A917 in the search box to learn more about \"Learning About Monoclonal Gammopathy of Unknown Significance (MGUS). \" 
Current as of: May 7, 2018 Content Version: 11.8 © 4786-0655 [employer]. Care instructions adapted under license by [employer] (which disclaims liability or warranty for this information). If you have questions about a medical condition or this instruction, always ask your healthcare professional. [place] [address] any warranty or liability for your use of this information.

## 2018-11-07 ENCOUNTER — HOSPITAL ENCOUNTER (OUTPATIENT)
Dept: PHYSICAL THERAPY | Age: 73
Discharge: HOME | End: 2018-11-07
Payer: MEDICAID

## 2018-11-07 PROCEDURE — 97110 THERAPEUTIC EXERCISES: CPT

## 2018-11-07 NOTE — PROGRESS NOTES
PT DAILY TREATMENT NOTE 10-18 Patient Name: Geovanna Silva Date:2018 : 1945 [x]  Patient  Verified Payor: [facility] MEDICAID / Plan: Marely Coles / Product Type: Managed Care Medicaid / In time:730  Out time:825 Total Treatment Time (min): 55 Visit #: 5 of 8 Treatment Area: Low back pain [M54.5] SUBJECTIVE Pain Level (0-10 scale): 6-7 Any medication changes, allergies to medications, adverse drug reactions, diagnosis change, or new procedure performed?: [x] No    [] Yes (see summary sheet for update) Subjective functional status/changes:   [] No changes reported \"Its always hurting. Since I hurt my self 30 years ago. \" OBJECTIVE Modality rationale: decrease pain and increase tissue extensibility to improve the patients ability to perform ADLs. Min Type Additional Details   
  [] Estim:  []Unatt       []IFC  []Premod []Other:  []w/ice   []w/heat Position: Location:   
  [] Estim: []Att    []TENS instruct  []NMES []Other:  []w/US   []w/ice   []w/heat Position: Location:   
  []  Traction: [] Cervical       []Lumbar 
                     [] Prone          []Supine []Intermittent   []Continuous Lbs: 
[] before manual 
[] after manual   
  []  Ultrasound: []Continuous   [] Pulsed []1MHz   []3MHz W/cm2: 
Location:   
  []  Iontophoresis with dexamethasone Location: [] Take home patch  
[] In clinic   
10 []  Ice     [x]  heat 
[]  Ice massage 
[]  Laser  
[]  Anodyne Position:semi-reclined on table Location:low back   
  []  Laser with stim 
[]  Other:  Position: Location:   
  []  Vasopneumatic Device Pressure:       [] lo [] med [] hi  
Temperature: [] lo [] med [] hi   
[x] Skin assessment post-treatment:  [x]intact []redness- no adverse reaction 
  []redness  adverse reaction:  
  
  
45 min Therapeutic Exercise:  [x] See flow sheet :  
 Rationale: increase ROM, increase strength, improve coordination, improve balance and increase proprioception to improve the patients ability to perform ADLs. With 
 [] TE 
 [] TA 
 [] neuro 
 [] other: Patient Education: [x] Review HEP [] Progressed/Changed HEP based on:  
[] positioning   [] body mechanics   [] transfers   [] heat/ice application   
[] other:   
 
Other Objective/Functional Measures:  153 deg right shoulder flexion. 143 deg left shoulder flexion Pain Level (0-10 scale) post treatment: 5 
 
ASSESSMENT/Changes in Function: Mr. Teetee Mcclure was better able to perform pelvic mobility exercises today. Progressed to partial range bridges without increase in pain. Patient will continue to benefit from skilled PT services to modify and progress therapeutic interventions, address functional mobility deficits, address ROM deficits, address strength deficits, analyze and address soft tissue restrictions, analyze and cue movement patterns, analyze and modify body mechanics/ergonomics, assess and modify postural abnormalities, address imbalance/dizziness and instruct in home and community integration to attain remaining goals. []  See Plan of Care 
[]  See progress note/recertification 
[]  See Discharge Summary Progress towards goals / Updated goals: 
Short Term Goals: To be accomplished in 1 weeks: 1. Establish HEP for ROM & Strengthening.  
            MET Long Term Goals: To be accomplished in 4 weeks: 1. Patient will be independent with HEP for ROM & Strengthening. 
            Eval Status: na 
            Reports compliance 2.   Pt will increase B Shoulder AROM flexion to 140 degrees to increase ease with ADLs.             Eval Status:right : 120 deg, left: 130 deg 
            MET: 153 deg right shoulder flexion. 143 deg left shoulder flexion 3. Pt will increase FOTO score to set d/c points to demonstrate improved functional lift & carry.             Eval Status:FOTO: time constraint at eval, assess nv 4. Pt will increase B hip flexion strength to 5/5 strength with MMT to normalize gait pattern.  
            Eval Status: right: 4+/5, left: 4/5 
            Making progress PLAN 
[]  Upgrade activities as tolerated     [x]  Continue plan of care 
[]  Update interventions per flow sheet      
[]  Discharge due to:_ 
[]  Other:_   
 
Ravin Melvin, PT 11/7/2018  7:58 AM 
 
Future Appointments Date Time Provider Raúl Clayton 11/9/2018  7:30 AM Griffin Minaya PTA [facility]  
11/14/2018  7:30 AM Griffin Minaya PTA [facility]  
11/16/2018  7:30 AM Griffin Minaya PTA [facility]  
12/3/2018 12:50 PM Barbara Carlisle  [address]  
2/5/2019  1:00 PM Armen Lazo MD [address] [facility]  
10/30/2019 10:00 AM [facility] SCHED  
10/30/2019  1:15 PM Kevin Sevilla

## 2018-11-09 ENCOUNTER — HOSPITAL ENCOUNTER (OUTPATIENT)
Dept: PHYSICAL THERAPY | Age: 73
Discharge: HOME | End: 2018-11-09
Payer: MEDICAID

## 2018-11-09 PROCEDURE — 97110 THERAPEUTIC EXERCISES: CPT

## 2018-11-09 PROCEDURE — 97112 NEUROMUSCULAR REEDUCATION: CPT

## 2018-11-09 NOTE — PROGRESS NOTES
PT DAILY TREATMENT NOTE 10-18 Patient Name: Kasey Gaspar Date:2018 : 1945 [x]  Patient  Verified Payor: [facility] MEDICAID / Plan: Marely 46 / Product Type: Managed Care Medicaid / In time:730  Out time:827 Total Treatment Time (min): 57 Visit #: 6 of 8 Treatment Area: Low back pain [M54.5] SUBJECTIVE Pain Level (0-10 scale): 5 Any medication changes, allergies to medications, adverse drug reactions, diagnosis change, or new procedure performed?: [x] No    [] Yes (see summary sheet for update) Subjective functional status/changes:   [] No changes reported \"I always have some pain. \" OBJECTIVE Modality rationale: decrease pain to improve the patients ability to improve mobility and positional tolerance Min Type Additional Details  
 [] Estim:  []Unatt       []IFC  []Premod []Other:  []w/ice   []w/heat Position: Location:  
 [] Estim: []Att    []TENS instruct  []NMES []Other:  []w/US   []w/ice   []w/heat Position: Location:  
 []  Traction: [] Cervical       []Lumbar 
                     [] Prone          []Supine []Intermittent   []Continuous Lbs: 
[] before manual 
[] after manual  
 []  Ultrasound: []Continuous   [] Pulsed []1MHz   []3MHz W/cm2: 
Location:  
 []  Iontophoresis with dexamethasone Location: [] Take home patch  
[] In clinic  
10 []  Ice     [x]  heat 
[]  Ice massage 
[]  Laser  
[]  Anodyne Position: supine with wedge Location: lower back  
 []  Laser with stim 
[]  Other:  Position: Location:  
 []  Vasopneumatic Device Pressure:       [] lo [] med [] hi  
Temperature: [] lo [] med [] hi  
[x] Skin assessment post-treatment:  [x]intact []redness- no adverse reaction 
  []redness  adverse reaction:  
 
23 min Therapeutic Exercise:  [x] See flow sheet :  
Rationale: increase ROM and increase strength to improve the patients ability to perform ADLs 24 min Neuromuscular Re-education:  [x]  See flow sheet : balance training Rationale: increase ROM, increase strength, improve coordination, improve balance and increase proprioception  to improve the patients ability to improve mobility and decrease fall risk With 
 [x] TE 
 [] TA [x] neuro 
 [] other: Patient Education: [x] Review HEP [] Progressed/Changed HEP based on:  
[x] positioning   [x] body mechanics   [] transfers   [] heat/ice application   
[] other:   
 
Other Objective/Functional Measures:   
 
Pain Level (0-10 scale) post treatment: 6 
 
ASSESSMENT/Changes in Function: Pt did well with balance exercises. Needs cuing for excessive UE reliance during TRX Squats. Pt has a hard time coordinating extending at the knees and hips at the same time coming out of the squat. Patient will continue to benefit from skilled PT services to modify and progress therapeutic interventions, address functional mobility deficits, address ROM deficits, address strength deficits, analyze and address soft tissue restrictions, analyze and cue movement patterns, analyze and modify body mechanics/ergonomics, assess and modify postural abnormalities, address imbalance/dizziness and instruct in home and community integration to attain remaining goals. [x]  See Plan of Care 
[]  See progress note/recertification 
[]  See Discharge Summary Progress towards goals / Updated goals: 
Short Term Goals: To be accomplished in 1 weeks: 1. Establish HEP for ROM & Strengthening.  
            MET Long Term Goals: To be accomplished in 4 weeks: 1. Patient will be independent with HEP for ROM & Strengthening. 
            Eval Status: na 
            Reports continued compliance 2.   Pt will increase B Shoulder AROM flexion to 140 degrees to increase ease with ADLs.             Eval Status:right : 120 deg, left: 130 deg             MET: 153 deg right shoulder flexion. 143 deg left shoulder flexion 
  
3. Pt will increase FOTO score to set d/c points to demonstrate improved functional lift & carry.  
            Eval Status:FOTO: time constraint at eval, assess nv NOT ATTEMPTED 4. Pt will increase B hip flexion strength to 5/5 strength with MMT to normalize gait pattern.  
            Eval Status: right: 4+/5, left: 4/5 
            Making progress PLAN 
[]  Upgrade activities as tolerated     [x]  Continue plan of care 
[]  Update interventions per flow sheet      
[]  Discharge due to:_ 
[]  Other:_   
 
Jazmine Gongora PTA, [facility] 11/9/2018  8:28 AM 
 
Future Appointments Date Time Provider Raúl Matilde 11/14/2018  7:30 AM Eli Byrd PTA [facility]  
11/16/2018  7:30 AM Eli Byrd PTA [facility]  
12/3/2018 12:50 PM Nanci Jauregui  [address]  
2/5/2019  1:00 PM Franklin Cruz MD [address] [facility]  
10/30/2019 10:00 AM [facility] SCHED  
[facility]2019  [facility]

## 2018-11-14 ENCOUNTER — HOSPITAL ENCOUNTER (OUTPATIENT)
Dept: PHYSICAL THERAPY | Age: 73
Discharge: HOME | End: 2018-11-14
Payer: MEDICAID

## 2018-11-14 PROCEDURE — 97112 NEUROMUSCULAR REEDUCATION: CPT

## 2018-11-14 PROCEDURE — 97110 THERAPEUTIC EXERCISES: CPT

## 2018-11-14 NOTE — PROGRESS NOTES
PT DAILY TREATMENT NOTE 10-18 Patient Name: Princess James Date:2018 : 1945 [x]  Patient  Verified Payor: [facility] MEDICAID / Plan: Marely Coles / Product Type: Managed Care Medicaid / In time:740  Out time:837 Total Treatment Time (min): 57 Visit #: 7 of 8 Treatment Area: Low back pain [M54.5] SUBJECTIVE Pain Level (0-10 scale): 5 Any medication changes, allergies to medications, adverse drug reactions, diagnosis change, or new procedure performed?: [x] No    [] Yes (see summary sheet for update) Subjective functional status/changes:   [] No changes reported \"I have some pain across my lower back. That never really goes away. \" OBJECTIVE Modality rationale: decrease pain to improve the patients ability to improve mobility and positional tolerance Min Type Additional Details  
 [] Estim:  []Unatt       []IFC  []Premod []Other:  []w/ice   []w/heat Position: Location:  
 [] Estim: []Att    []TENS instruct  []NMES []Other:  []w/US   []w/ice   []w/heat Position: Location:  
 []  Traction: [] Cervical       []Lumbar 
                     [] Prone          []Supine []Intermittent   []Continuous Lbs: 
[] before manual 
[] after manual  
 []  Ultrasound: []Continuous   [] Pulsed []1MHz   []3MHz W/cm2: 
Location:  
 []  Iontophoresis with dexamethasone Location: [] Take home patch  
[] In clinic  
10 []  Ice     [x]  heat 
[]  Ice massage 
[]  Laser  
[]  Anodyne Position: semi-reclined Location: lower back  
 []  Laser with stim 
[]  Other:  Position: Location:  
 []  Vasopneumatic Device Pressure:       [] lo [] med [] hi  
Temperature: [] lo [] med [] hi  
[x] Skin assessment post-treatment:  [x]intact []redness- no adverse reaction 
  []redness  adverse reaction:  
 
23 min Therapeutic Exercise:  [x] See flow sheet :  
 Rationale: increase ROM and increase strength to improve the patients ability to perform ADLs 24 min Neuromuscular Re-education:  [x]  See flow sheet : balance training Rationale: increase ROM, increase strength, improve coordination, improve balance and increase proprioception  to improve the patients ability to improve mobility and decrease fall risk With 
 [x] TE 
 [] TA [x] neuro 
 [] other: Patient Education: [x] Review HEP [] Progressed/Changed HEP based on:  
[x] positioning   [x] body mechanics   [] transfers   [] heat/ice application   
[] other:   
 
Other Objective/Functional Measures:   
 
Pain Level (0-10 scale) post treatment: 5 
 
ASSESSMENT/Changes in Function: Pt is making good progress with his balance. Much better balance on the foam today. Needs tactile cuing to improve scapular mechanics with overhead reaching. Pt to be reassessed at his NV. Patient will continue to benefit from skilled PT services to modify and progress therapeutic interventions, address functional mobility deficits, address ROM deficits, address strength deficits, analyze and address soft tissue restrictions, analyze and cue movement patterns, analyze and modify body mechanics/ergonomics, assess and modify postural abnormalities, address imbalance/dizziness and instruct in home and community integration to attain remaining goals. [x]  See Plan of Care 
[]  See progress note/recertification 
[]  See Discharge Summary Progress towards goals / Updated goals: 
Short Term Goals: To be accomplished in 1 weeks: 1. Establish HEP for ROM & Strengthening.  
            MET Long Term Goals: To be accomplished in 4 weeks: 1. Patient will be independent with HEP for ROM & Strengthening. 
            Eval Status: na 
            Reports continued compliance 2.   Pt will increase B Shoulder AROM flexion to 140 degrees to increase ease with ADLs.             Eval Status:right : 120 deg, left: 130 deg             MET: 153 deg right shoulder flexion. 143 deg left shoulder flexion 
  
3. Pt will increase FOTO score to set d/c points to demonstrate improved functional lift & carry.  
            Eval Status:FOTO: time constraint at eval, assess nv NOT ATTEMPTED 4. Pt will increase B hip flexion strength to 5/5 strength with MMT to normalize gait pattern.  
            Eval Status: right: 4+/5, left: 4/5 
            Making progress, assess at [address] 
[]  Upgrade activities as tolerated     [x]  Continue plan of care 
[]  Update interventions per flow sheet      
[]  Discharge due to:_ 
[]  Other:_   
 
Katerina Sutton PTA, CSCS 11/14/2018  8:39 AM 
 
Future Appointments Date Time Provider Raúl Matilde 11/16/2018  7:30 AM [facility]  
12/3/2018 12:50 PM Mayo Gonzalez  [address]   
2/5/2019  1:00 PM Christiano Ying MD [address] [facility]  
10/30/2019 10:00 AM [facility] [address]  
10/30/2019  1:15 PM [facility]

## 2018-11-16 ENCOUNTER — HOSPITAL ENCOUNTER (OUTPATIENT)
Dept: PHYSICAL THERAPY | Age: 73
Discharge: HOME | End: 2018-11-16
Payer: MEDICAID

## 2018-11-16 PROCEDURE — 97110 THERAPEUTIC EXERCISES: CPT

## 2018-11-16 PROCEDURE — 97112 NEUROMUSCULAR REEDUCATION: CPT

## 2018-11-20 NOTE — PROGRESS NOTES
[facility]  [facility]  [address] [place], [address] 
[phone] fax Discharge Summary Patient name: Siobhan Looney Start of Care: 10/22/2018 Referral source: Dean Lyn MD : 1945 Medical Diagnosis: Pain in left leg [M79.605] Low back pain [M54.5] Pain in right shoulder [M25.511] Pain in left shoulder [M25.512] 
  Onset Date:10/16/18 Treatment Diagnosis: neck , back , bilateral shoulder, right leg pain Prior Hospitalization: see medical history Provider#: [id number] Medications: Verified on Patient summary List  
 Comorbidities: OA, HTN, B RTC tear Prior Level of Function: retired. Lives independently in [address] [place] [address] apartment with elevator access Visits from Start of Care: 8    Missed Visits: 0 Reporting Period : 10/22/18 to 18 Short Term Goals: To be accomplished in 1 weeks: 1. Establish HEP for ROM & Strengthening.  
            MET Long Term Goals: To be accomplished in 4 weeks: 1. Patient will be independent with HEP for ROM & Strengthening. 
            Eval Status: na 
            MET 2.   Pt will increase B Shoulder AROM flexion to 140 degrees to increase ease with ADLs.             Eval Status:right : 120 deg, left: 130 deg 
            MET: 145 deg right shoulder flexion. 147 deg left shoulder flexion 3. Pt will increase FOTO score to set d/c points to demonstrate improved functional lift & carry.  
            Eval Status:FOTO: time constraint at eval, assess nv 
            NA 4. Pt will increase B hip flexion strength to 5/5 strength with MMT to normalize gait pattern.  
            Eval Status: right: 4+/5, left: 4/5 
            MET; 5/5 
  
Functional Gains: reaching, pain Functional Deficits: walking/standing tolerance, lifting heavier objects 
% improvement: 45% Pain   Average: 5-6/10 Best: 3/10 Worst: 8/10 Assessment/Summary of care: Mr. Bree Coleman has been a pleasure to treat and reports 45% improvement since beginning therapy. Pt reports and demonstrates improvements with reaching and pain relief. He reports still having difficulty with walking/standing long durations and lifting heavier objects. We are discharging to an updated HEP at this time as patient feels he can manage pain with his HEP. RECOMMENDATIONS: 
[x]Discontinue therapy: [x]Patient has reached or is progressing toward set goals []Patient is non-compliant or has abdicated 
    []Due to lack of appreciable progress towards set goals Moo Sanabria, PT 11/20/2018 1:21 PM

## 2019-03-22 ENCOUNTER — OFFICE VISIT (OUTPATIENT)
Dept: CARDIOLOGY CLINIC | Age: 74
End: 2019-03-22

## 2019-03-22 VITALS
BODY MASS INDEX: 29.13 KG/M2 | HEIGHT: 67 IN | OXYGEN SATURATION: 95 % | WEIGHT: 185.6 LBS | DIASTOLIC BLOOD PRESSURE: 62 MMHG | SYSTOLIC BLOOD PRESSURE: 119 MMHG | HEART RATE: 66 BPM

## 2019-03-22 DIAGNOSIS — I73.9 PERIPHERAL VASCULAR DISEASE (HCC): ICD-10-CM

## 2019-03-22 DIAGNOSIS — Z86.79 HISTORY OF RHEUMATIC FEVER: ICD-10-CM

## 2019-03-22 DIAGNOSIS — R07.9 CHEST PAIN, UNSPECIFIED TYPE: Primary | ICD-10-CM

## 2019-03-22 DIAGNOSIS — I10 ESSENTIAL HYPERTENSION: ICD-10-CM

## 2019-03-22 DIAGNOSIS — R06.02 SHORTNESS OF BREATH: ICD-10-CM

## 2019-03-22 DIAGNOSIS — E78.00 HYPERCHOLESTEROLEMIA: ICD-10-CM

## 2019-03-22 RX ORDER — PREDNISOLONE ACETATE 10 MG/ML
1 SUSPENSION/ DROPS OPHTHALMIC 4 TIMES DAILY
COMMUNITY
End: 2019-10-03

## 2019-03-22 NOTE — PROGRESS NOTES
HISTORY OF PRESENT ILLNESS  Domenica Argueta is a 68 y.o. male. New Patient   The history is provided by the patient. This is a new problem. Associated symptoms include shortness of breath. Pertinent negatives include no chest pain, no abdominal pain and no headaches. Chest Pain (Angina)    The history is provided by the patient. This is a new problem. The current episode started more than 1 week ago. The problem has been gradually worsening. The problem occurs daily. The pain is associated with exertion and rest. The pain is present in the substernal region, right side and left side. The pain is mild. The quality of the pain is described as pressure-like and heavy. The pain does not radiate. Associated symptoms include shortness of breath. Pertinent negatives include no abdominal pain, no claudication, no cough, no dizziness, no fever, no headaches, no hemoptysis, no nausea, no orthopnea, no palpitations, no PND, no sputum production, no vomiting and no weakness. Shortness of Breath   The history is provided by the patient. This is a new problem. The problem occurs frequently. The current episode started more than 1 week ago. The problem has been gradually worsening. Pertinent negatives include no fever, no headaches, no cough, no sputum production, no hemoptysis, no wheezing, no PND, no orthopnea, no chest pain, no vomiting, no abdominal pain, no rash, no leg swelling and no claudication. Precipitated by: walking,exertion. Review of Systems   Constitutional: Negative for chills and fever. HENT: Negative for nosebleeds. Eyes: Negative for blurred vision and double vision. Respiratory: Positive for shortness of breath. Negative for cough, hemoptysis, sputum production and wheezing. Cardiovascular: Negative for chest pain, palpitations, orthopnea, claudication, leg swelling and PND. Gastrointestinal: Negative for abdominal pain, heartburn, nausea and vomiting.    Musculoskeletal: Negative for myalgias. Skin: Negative for rash. Neurological: Negative for dizziness, weakness and headaches. Endo/Heme/Allergies: Does not bruise/bleed easily. Family History   Problem Relation Age of Onset    Diabetes Mother     Hypertension Mother     Diabetes Maternal Grandmother     Hypertension Maternal Grandmother        Past Medical History:   Diagnosis Date    Bladder outlet obstruction     Erectile disorder due to medical 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     Frequency of urination     H/O spinal cord injury 1974    lumbar spine injury secondary to fall    HTN (hypertension)     Hypercholesterolemia     Injury of lumbar spine ([address] 1974    Leg pain, right     Nocturia     Overactive bladder     Peripheral vascular disease (HCC)        Past Surgical History:   Procedure Laterality Date    HX ORTHOPAEDIC      finger amputation left hand    HX TONSILLECTOMY         Social History     Tobacco Use    Smoking status: Former Smoker     Last attempt to quit: 7/13/2015     Years since quitting: 3.6    Smokeless tobacco: Never Used   Substance Use Topics    Alcohol use: Not Currently     Alcohol/week: 0.0 oz     Comment: former stopped 2015       Allergies   Allergen Reactions    Ampicillin Angioedema    Asa-Acetaminophen-Caff-Buffers Rash    Penicillins Angioedema    Atorvastatin Other (comments)     Muscle cramps       Prior to Admission medications    Medication Sig Start Date End Date Taking? Authorizing Provider   prednisoLONE acetate (PRED FORTE) 1 % ophthalmic suspension Administer 1 Drop to both eyes four (4) times daily. Yes Provider, Historical   gabapentin (NEURONTIN) 300 mg capsule Take 1 Cap by mouth three (3) times daily. 10/16/18  Yes Juan Murillo MD   albuterol (PROVENTIL HFA, VENTOLIN HFA, PROAIR HFA) 90 mcg/actuation inhaler Take 2 Puffs by inhalation every four (4) hours as needed for Wheezing or Shortness of Breath.  Indications: BRONCHOSPASM PREVENTION 9/25/17  Yes Bishop Feliciano Foster DO   finasteride (PROSCAR) 5 mg tablet Take 1 Tab by mouth daily. 7/5/17  Yes Gavino Robison MD   amLODIPine (NORVASC) 5 mg tablet Take 5 mg by mouth daily. Yes Provider, Historical   spironolactone (ALDACTONE) 25 mg tablet Take  by mouth daily. Yes Provider, Historical         Visit Vitals  /62   Pulse 66   Ht 5' 7\" (1.702 m)   Wt 84.2 kg (185 lb 9.6 oz)   SpO2 95%   BMI 29.07 kg/m²       Physical Exam   Constitutional: He is oriented to person, place, and time. He appears well-developed and well-nourished. HENT:   Head: Normocephalic and atraumatic. Eyes: Conjunctivae are normal.   Neck: Neck supple. No JVD present. No tracheal deviation present. No thyromegaly present. Cardiovascular: Normal rate, regular rhythm and normal heart sounds. Exam reveals no gallop and no friction rub. No murmur heard. Pulmonary/Chest: Breath sounds normal. No respiratory distress. He has no wheezes. He has no rales. He exhibits no tenderness. Abdominal: Soft. There is no tenderness. Musculoskeletal: He exhibits no edema. Neurological: He is alert and oriented to person, place, and time. Skin: Skin is warm and dry. Psychiatric: He has a normal mood and affect. Interpretation Summary 10/2018    Normal right lower extremity arterial findings. Moderate PAD left lower extremity. Disease noted at tibial level. 10/2018 EKG  DiagnosisFinal Sinus bradycardia   Moderate voltage criteria for LVH, may be normal variant   Possible Acute pericarditis   Abnormal ECG  2015-stress echo  Impressions: Normal study after maximal exercise without reproduction of  symptoms   ECG conclusions: The stress ECG was normal. Based on [employer]  Scoring, this patient was at low risk for cardiac events. Mr. Joanna Juarez has a reminder for a \"due or due soon\" health maintenance. I have asked that he contact his primary care provider for follow-up on this health maintenance.     I have personally reviewed patient's records available from hospital and other providers and incorporated findings in patient care. I have personally reviewed patients ekg done at other facility. I Have personally reviewed recent relevant labs available and discussed with patient      Assessment         ICD-10-CM ICD-9-CM    1. Chest pain, unspecified type R07.9 786.50 AMB POC EKG ROUTINE W/ 12 LEADS, INTER & REP      ECHO ADULT COMPLETE      NUCLEAR CARDIAC STRESS TEST      LIPID PANEL      HEPATIC FUNCTION PANEL   2. Essential hypertension I10 401.9     Stable continue treatment   3. Hypercholesterolemia E78.00 272.0 ECHO ADULT COMPLETE      NUCLEAR CARDIAC STRESS TEST      LIPID PANEL      HEPATIC FUNCTION PANEL    Patient was on atorvastatin and had muscle spasm. Decrease in muscle spasm after stopping that. 4. Peripheral vascular disease (HCC) I73.9 443.9     Moderate PAD left leg   5. Shortness of breath R06.02 786.05 ECHO ADULT COMPLETE      NUCLEAR CARDIAC STRESS TEST      LIPID PANEL      HEPATIC FUNCTION PANEL    Possible CHF, hypertensive heart disease, LVH, ischemia   6. History of rheumatic fever Z86.79 V12.09     At age 4. Rule out valvular heart disease     3/2019  New patient with increased chest pain and shortness of breath. Possible angina. Rule out CHF cardiomyopathy. Patient was intolerant to atorvastatin in past due to muscle spasm. Recheck lipids and decide on alternate statin. Patient had peptic ulcer many years ago and was told not to take aspirin as it upsets his stomach.   Consider Plavix if needed  Medications Discontinued During This Encounter   Medication Reason    methocarbamol (ROBAXIN-750) 750 mg tablet Not A Current Medication    tamsulosin (FLOMAX) 0.4 mg capsule Not A Current Medication       Orders Placed This Encounter    LIPID PANEL     Standing Status:   Future     Standing Expiration Date:   9/20/2019    HEPATIC FUNCTION PANEL     Standing Status:   Future     Standing Expiration Date:   9/20/2019  AMB POC EKG ROUTINE W/ 12 LEADS, INTER & REP     Order Specific Question:   Reason for Exam:     Answer:   Chest Pain       Follow-up and Dispositions    · Return for F/u after tests.

## 2019-03-26 ENCOUNTER — HOSPITAL ENCOUNTER (OUTPATIENT)
Age: 74
Setting detail: OBSERVATION
Discharge: HOME | DRG: 192 | End: 2019-03-28
Attending: EMERGENCY MEDICINE | Admitting: HOSPITALIST
Payer: MEDICAID

## 2019-03-26 ENCOUNTER — APPOINTMENT (OUTPATIENT)
Dept: GENERAL RADIOLOGY | Age: 74
DRG: 192 | End: 2019-03-26
Attending: STUDENT IN AN ORGANIZED HEALTH CARE EDUCATION/TRAINING PROGRAM
Payer: MEDICAID

## 2019-03-26 DIAGNOSIS — R07.9 CHEST PAIN, UNSPECIFIED TYPE: Primary | ICD-10-CM

## 2019-03-26 DIAGNOSIS — Z98.890 S/P CARDIAC CATH: ICD-10-CM

## 2019-03-26 LAB
ALBUMIN SERPL-MCNC: 3.6 G/DL (ref 3.4–5)
ALBUMIN/GLOB SERPL: 1 {RATIO} (ref 0.8–1.7)
ALP SERPL-CCNC: 57 U/L (ref 45–117)
ALT SERPL-CCNC: 23 U/L (ref 16–61)
ANION GAP SERPL CALC-SCNC: 7 MMOL/L (ref 3–18)
AST SERPL-CCNC: 18 U/L (ref 15–37)
BASOPHILS # BLD: 0 K/UL (ref 0–0.1)
BASOPHILS NFR BLD: 1 % (ref 0–2)
BILIRUB SERPL-MCNC: 0.2 MG/DL (ref 0.2–1)
BNP SERPL-MCNC: 15 PG/ML (ref 0–900)
BUN SERPL-MCNC: 20 MG/DL (ref 7–18)
BUN/CREAT SERPL: 18 (ref 12–20)
CALCIUM SERPL-MCNC: 9.2 MG/DL (ref 8.5–10.1)
CHLORIDE SERPL-SCNC: 108 MMOL/L (ref 100–108)
CK MB CFR SERPL CALC: 1.1 % (ref 0–4)
CK MB SERPL-MCNC: 2.6 NG/ML (ref 5–25)
CK SERPL-CCNC: 242 U/L (ref 39–308)
CO2 SERPL-SCNC: 24 MMOL/L (ref 21–32)
CREAT SERPL-MCNC: 1.14 MG/DL (ref 0.6–1.3)
DIFFERENTIAL METHOD BLD: ABNORMAL
EOSINOPHIL # BLD: 0.2 K/UL (ref 0–0.4)
EOSINOPHIL NFR BLD: 4 % (ref 0–5)
ERYTHROCYTE [DISTWIDTH] IN BLOOD BY AUTOMATED COUNT: 14.4 % (ref 11.6–14.5)
GLOBULIN SER CALC-MCNC: 3.7 G/DL (ref 2–4)
GLUCOSE SERPL-MCNC: 89 MG/DL (ref 74–99)
HCT VFR BLD AUTO: 39.9 % (ref 36–48)
HGB BLD-MCNC: 13.7 G/DL (ref 13–16)
LYMPHOCYTES # BLD: 2.5 K/UL (ref 0.9–3.6)
LYMPHOCYTES NFR BLD: 43 % (ref 21–52)
MCH RBC QN AUTO: 30.7 PG (ref 24–34)
MCHC RBC AUTO-ENTMCNC: 34.3 G/DL (ref 31–37)
MCV RBC AUTO: 89.5 FL (ref 74–97)
MONOCYTES # BLD: 0.6 K/UL (ref 0.05–1.2)
MONOCYTES NFR BLD: 9 % (ref 3–10)
NEUTS SEG # BLD: 2.5 K/UL (ref 1.8–8)
NEUTS SEG NFR BLD: 43 % (ref 40–73)
PLATELET # BLD AUTO: 229 K/UL (ref 135–420)
PMV BLD AUTO: 9.2 FL (ref 9.2–11.8)
POTASSIUM SERPL-SCNC: 3.9 MMOL/L (ref 3.5–5.5)
PROT SERPL-MCNC: 7.3 G/DL (ref 6.4–8.2)
RBC # BLD AUTO: 4.46 M/UL (ref 4.7–5.5)
SODIUM SERPL-SCNC: 139 MMOL/L (ref 136–145)
TROPONIN I SERPL-MCNC: <0.02 NG/ML (ref 0–0.04)
WBC # BLD AUTO: 5.9 K/UL (ref 4.6–13.2)

## 2019-03-26 PROCEDURE — 80053 COMPREHEN METABOLIC PANEL: CPT

## 2019-03-26 PROCEDURE — 83880 ASSAY OF NATRIURETIC PEPTIDE: CPT

## 2019-03-26 PROCEDURE — 99285 EMERGENCY DEPT VISIT HI MDM: CPT

## 2019-03-26 PROCEDURE — 93005 ELECTROCARDIOGRAM TRACING: CPT

## 2019-03-26 PROCEDURE — 85730 THROMBOPLASTIN TIME PARTIAL: CPT

## 2019-03-26 PROCEDURE — 74011250636 HC RX REV CODE- 250/636: Performed by: EMERGENCY MEDICINE

## 2019-03-26 PROCEDURE — 96375 TX/PRO/DX INJ NEW DRUG ADDON: CPT

## 2019-03-26 PROCEDURE — 74011250637 HC RX REV CODE- 250/637: Performed by: STUDENT IN AN ORGANIZED HEALTH CARE EDUCATION/TRAINING PROGRAM

## 2019-03-26 PROCEDURE — 85025 COMPLETE CBC W/AUTO DIFF WBC: CPT

## 2019-03-26 PROCEDURE — 71046 X-RAY EXAM CHEST 2 VIEWS: CPT

## 2019-03-26 PROCEDURE — 82550 ASSAY OF CK (CPK): CPT

## 2019-03-26 RX ORDER — MORPHINE SULFATE 2 MG/ML
2 INJECTION, SOLUTION INTRAMUSCULAR; INTRAVENOUS
Status: COMPLETED | OUTPATIENT
Start: 2019-03-26 | End: 2019-03-26

## 2019-03-26 RX ORDER — GUAIFENESIN 100 MG/5ML
324 LIQUID (ML) ORAL
Status: COMPLETED | OUTPATIENT
Start: 2019-03-26 | End: 2019-03-26

## 2019-03-26 RX ADMIN — ASPIRIN 81 MG 324 MG: 81 TABLET ORAL at 22:15

## 2019-03-26 RX ADMIN — MORPHINE SULFATE 2 MG: 2 INJECTION, SOLUTION INTRAMUSCULAR; INTRAVENOUS at 23:58

## 2019-03-26 NOTE — Clinical Note
TRANSFER - IN REPORT:  
 
Verbal report received from: [address] Report consisted of patient's Situation, Background, Assessment and  
Recommendations(SBAR). Opportunity for questions and clarification was provided. Assessment completed upon patient's arrival to unit and care assumed. Patient transported with a Registered Nurse.

## 2019-03-26 NOTE — Clinical Note
TRANSFER - OUT REPORT:  
 
Verbal report given to: Teresa Patricia RN [facility]. Report consisted of patient's Situation, Background, Assessment and  
Recommendations(SBAR). Opportunity for questions and clarification was provided. Patient transported with a Registered Nurse. Patient transported to: [address]

## 2019-03-26 NOTE — Clinical Note
Contrast Dose Calculator:  
Patient's age: 68.  
Patient's sex: Male. Patient weight (kg) = 82.2. Creatinine level (mg/dL) = 1.19. Creatinine clearance (mL/min): 65.  
Contrast concentration (mg/mL) = 300. Max Contrast dose per Creatinine Cl calculator = 146.25 mL.

## 2019-03-27 ENCOUNTER — APPOINTMENT (OUTPATIENT)
Dept: NON INVASIVE DIAGNOSTICS | Age: 74
DRG: 192 | End: 2019-03-27
Attending: HOSPITALIST
Payer: MEDICAID

## 2019-03-27 PROBLEM — R07.9 CHEST PAIN: Status: ACTIVE | Noted: 2019-03-27

## 2019-03-27 PROBLEM — R07.9 CHEST PAIN, MODERATE CORONARY ARTERY RISK: Status: ACTIVE | Noted: 2019-03-27

## 2019-03-27 LAB
APTT PPP: 28.2 SEC (ref 23–36.4)
APTT PPP: 96.4 SEC (ref 23–36.4)
ATRIAL RATE: 62 BPM
CALCULATED P AXIS, ECG09: 46 DEGREES
CALCULATED R AXIS, ECG10: -28 DEGREES
CALCULATED T AXIS, ECG11: 23 DEGREES
DIAGNOSIS, 93000: NORMAL
P-R INTERVAL, ECG05: 198 MS
Q-T INTERVAL, ECG07: 406 MS
QRS DURATION, ECG06: 82 MS
QTC CALCULATION (BEZET), ECG08: 412 MS
STRESS BASELINE HR: 59 BPM
STRESS BASELINE SYS BP: NORMAL MMHG
STRESS ESTIMATED WORKLOAD: 1 METS
STRESS EXERCISE DUR MIN: NORMAL
STRESS PEAK DIAS BP: 77 MMHG
STRESS PEAK SYS BP: 173 MMHG
STRESS PERCENT HR ACHIEVED: 90 %
STRESS POST PEAK HR: 133 BPM
STRESS RATE PRESSURE PRODUCT: NORMAL BPM*MMHG
STRESS ST DEPRESSION: 0 MM
STRESS ST ELEVATION: 0 MM
STRESS TARGET HR: 148 BPM
TROPONIN I SERPL-MCNC: <0.02 NG/ML (ref 0–0.04)
VENTRICULAR RATE, ECG03: 62 BPM

## 2019-03-27 PROCEDURE — 36415 COLL VENOUS BLD VENIPUNCTURE: CPT

## 2019-03-27 PROCEDURE — 74011250636 HC RX REV CODE- 250/636: Performed by: EMERGENCY MEDICINE

## 2019-03-27 PROCEDURE — 4A023N7 MEASUREMENT OF CARDIAC SAMPLING AND PRESSURE, LEFT HEART, PERCUTANEOUS APPROACH: ICD-10-PCS | Performed by: INTERNAL MEDICINE

## 2019-03-27 PROCEDURE — 96376 TX/PRO/DX INJ SAME DRUG ADON: CPT

## 2019-03-27 PROCEDURE — 84484 ASSAY OF TROPONIN QUANT: CPT

## 2019-03-27 PROCEDURE — B2111ZZ FLUOROSCOPY OF MULTIPLE CORONARY ARTERIES USING LOW OSMOLAR CONTRAST: ICD-10-PCS | Performed by: INTERNAL MEDICINE

## 2019-03-27 PROCEDURE — 94761 N-INVAS EAR/PLS OXIMETRY MLT: CPT

## 2019-03-27 PROCEDURE — 85730 THROMBOPLASTIN TIME PARTIAL: CPT

## 2019-03-27 PROCEDURE — 99218 HC RM OBSERVATION: CPT

## 2019-03-27 PROCEDURE — 74011250636 HC RX REV CODE- 250/636: Performed by: INTERNAL MEDICINE

## 2019-03-27 PROCEDURE — 74011250636 HC RX REV CODE- 250/636: Performed by: HOSPITALIST

## 2019-03-27 PROCEDURE — 94640 AIRWAY INHALATION TREATMENT: CPT

## 2019-03-27 PROCEDURE — 74011250637 HC RX REV CODE- 250/637: Performed by: HOSPITALIST

## 2019-03-27 PROCEDURE — 96365 THER/PROPH/DIAG IV INF INIT: CPT

## 2019-03-27 PROCEDURE — 74011000250 HC RX REV CODE- 250: Performed by: HOSPITALIST

## 2019-03-27 PROCEDURE — A9500 TC99M SESTAMIBI: HCPCS

## 2019-03-27 PROCEDURE — 96366 THER/PROPH/DIAG IV INF ADDON: CPT

## 2019-03-27 PROCEDURE — B2151ZZ FLUOROSCOPY OF LEFT HEART USING LOW OSMOLAR CONTRAST: ICD-10-PCS | Performed by: INTERNAL MEDICINE

## 2019-03-27 RX ORDER — SIMETHICONE 80 MG
80 TABLET,CHEWABLE ORAL
Status: DISCONTINUED | OUTPATIENT
Start: 2019-03-27 | End: 2019-03-28 | Stop reason: HOSPADM

## 2019-03-27 RX ORDER — SPIRONOLACTONE 25 MG/1
25 TABLET ORAL DAILY
Status: DISCONTINUED | OUTPATIENT
Start: 2019-03-27 | End: 2019-03-28 | Stop reason: HOSPADM

## 2019-03-27 RX ORDER — SODIUM CHLORIDE 9 MG/ML
250 INJECTION, SOLUTION INTRAVENOUS ONCE
Status: COMPLETED | OUTPATIENT
Start: 2019-03-27 | End: 2019-03-27

## 2019-03-27 RX ORDER — GABAPENTIN 300 MG/1
300 CAPSULE ORAL 3 TIMES DAILY
Status: DISCONTINUED | OUTPATIENT
Start: 2019-03-27 | End: 2019-03-28 | Stop reason: HOSPADM

## 2019-03-27 RX ORDER — IPRATROPIUM BROMIDE AND ALBUTEROL SULFATE 2.5; .5 MG/3ML; MG/3ML
3 SOLUTION RESPIRATORY (INHALATION)
Status: DISCONTINUED | OUTPATIENT
Start: 2019-03-27 | End: 2019-03-28 | Stop reason: HOSPADM

## 2019-03-27 RX ORDER — ALBUTEROL SULFATE 90 UG/1
2 AEROSOL, METERED RESPIRATORY (INHALATION)
Status: DISCONTINUED | OUTPATIENT
Start: 2019-03-27 | End: 2019-03-27 | Stop reason: CLARIF

## 2019-03-27 RX ORDER — MORPHINE SULFATE 2 MG/ML
1 INJECTION, SOLUTION INTRAMUSCULAR; INTRAVENOUS
Status: DISCONTINUED | OUTPATIENT
Start: 2019-03-27 | End: 2019-03-28 | Stop reason: HOSPADM

## 2019-03-27 RX ORDER — HEPARIN SODIUM 1000 [USP'U]/ML
4000 INJECTION, SOLUTION INTRAVENOUS; SUBCUTANEOUS ONCE
Status: COMPLETED | OUTPATIENT
Start: 2019-03-27 | End: 2019-03-27

## 2019-03-27 RX ORDER — TEMAZEPAM 15 MG/1
15 CAPSULE ORAL
Status: DISCONTINUED | OUTPATIENT
Start: 2019-03-27 | End: 2019-03-28 | Stop reason: HOSPADM

## 2019-03-27 RX ORDER — FINASTERIDE 5 MG/1
5 TABLET, FILM COATED ORAL DAILY
Status: DISCONTINUED | OUTPATIENT
Start: 2019-03-27 | End: 2019-03-28 | Stop reason: HOSPADM

## 2019-03-27 RX ORDER — AMLODIPINE BESYLATE 5 MG/1
5 TABLET ORAL DAILY
Status: DISCONTINUED | OUTPATIENT
Start: 2019-03-27 | End: 2019-03-28 | Stop reason: HOSPADM

## 2019-03-27 RX ORDER — ALBUTEROL SULFATE 0.83 MG/ML
2.5 SOLUTION RESPIRATORY (INHALATION)
Status: DISCONTINUED | OUTPATIENT
Start: 2019-03-27 | End: 2019-03-28 | Stop reason: HOSPADM

## 2019-03-27 RX ORDER — ENOXAPARIN SODIUM 100 MG/ML
40 INJECTION SUBCUTANEOUS EVERY 24 HOURS
Status: DISCONTINUED | OUTPATIENT
Start: 2019-03-27 | End: 2019-03-27

## 2019-03-27 RX ORDER — HEPARIN SODIUM 10000 [USP'U]/100ML
11-25 INJECTION, SOLUTION INTRAVENOUS
Status: DISCONTINUED | OUTPATIENT
Start: 2019-03-27 | End: 2019-03-28

## 2019-03-27 RX ADMIN — IPRATROPIUM BROMIDE AND ALBUTEROL SULFATE 3 ML: .5; 3 SOLUTION RESPIRATORY (INHALATION) at 15:27

## 2019-03-27 RX ADMIN — IPRATROPIUM BROMIDE AND ALBUTEROL SULFATE 3 ML: .5; 3 SOLUTION RESPIRATORY (INHALATION) at 20:42

## 2019-03-27 RX ADMIN — MORPHINE SULFATE 1 MG: 2 INJECTION, SOLUTION INTRAMUSCULAR; INTRAVENOUS at 05:26

## 2019-03-27 RX ADMIN — FINASTERIDE 5 MG: 5 TABLET, FILM COATED ORAL at 17:44

## 2019-03-27 RX ADMIN — NITROGLYCERIN 0.5 INCH: 20 OINTMENT TOPICAL at 05:25

## 2019-03-27 RX ADMIN — HEPARIN SODIUM 4000 UNITS: 1000 INJECTION INTRAVENOUS; SUBCUTANEOUS at 02:04

## 2019-03-27 RX ADMIN — SIMETHICONE CHEW TAB 80 MG 80 MG: 80 TABLET ORAL at 17:16

## 2019-03-27 RX ADMIN — HEPARIN SODIUM AND DEXTROSE 11 UNITS/KG/HR: 10000; 5 INJECTION INTRAVENOUS at 02:15

## 2019-03-27 RX ADMIN — IPRATROPIUM BROMIDE AND ALBUTEROL SULFATE 3 ML: .5; 3 SOLUTION RESPIRATORY (INHALATION) at 07:20

## 2019-03-27 RX ADMIN — NITROGLYCERIN 0.5 INCH: 20 OINTMENT TOPICAL at 11:14

## 2019-03-27 RX ADMIN — NITROGLYCERIN 0.5 INCH: 20 OINTMENT TOPICAL at 21:46

## 2019-03-27 RX ADMIN — MORPHINE SULFATE 1 MG: 2 INJECTION, SOLUTION INTRAMUSCULAR; INTRAVENOUS at 19:58

## 2019-03-27 RX ADMIN — NITROGLYCERIN 0.5 INCH: 20 OINTMENT TOPICAL at 17:17

## 2019-03-27 RX ADMIN — REGADENOSON 0.4 MG: 0.08 INJECTION, SOLUTION INTRAVENOUS at 10:00

## 2019-03-27 RX ADMIN — SODIUM CHLORIDE 250 ML: 900 INJECTION, SOLUTION INTRAVENOUS at 10:00

## 2019-03-27 RX ADMIN — SPIRONOLACTONE 25 MG: 25 TABLET ORAL at 12:04

## 2019-03-27 RX ADMIN — AMLODIPINE BESYLATE 5 MG: 5 TABLET ORAL at 12:03

## 2019-03-27 NOTE — CONSULTS
[facility] - Consult Note    Date of  Admission: 3/26/2019  9:40 PM     Primary Care Physician:  Jenn Escobar MD     Plan:       1. Chest pain with typical and atypical features-  Patient had peptic ulcer many years ago and was told not to take aspirin as it upsets his stomach. Will consider Plavix if needed. Patient cardiac enzymes are negative we do not suspect and ACS. 2. Hypertension- stable on Norvasc and aldactone . will monitor   3. Shortness of breat-Possible CHF, hypertensive heart disease, LVH, ischemia. follow up NUC stress test and Echo   4. Hypercholesterolemia- Patient was on atorvastatin. Patient was intolerant n past due to muscle spasm. 5. Hx of Peripheral vascular disease-Moderate PAD left leg  6. History of rheumatic fever- At age 4. Rule out valvular heart disease follow up to assess lvf or any VHD   7. Hx of tobacco and alcohol abuse- per patient quit 4 years ago. Chest pain has mixed connectors. MI is ruled out and will review the stress test when done. Considering epigastric tenderness and history of ulcer, patient may need GI workup. Will follow-up. Thank you for the kind referral.      Assessment:     Hospital Problems  Date Reviewed: 3/22/2019          Codes Class Noted POA    Chest pain, moderate coronary artery risk ICD-10-CM: R07.9  ICD-9-CM: 786.50  3/27/2019 Unknown        Chest pain ICD-10-CM: R07.9  ICD-9-CM: 786.50  3/27/2019 Unknown                   History of Present Illness: This is a 68 y.o. male admitted for Chest pain, moderate coronary artery risk [R07. 9]Chest pain [R07.9]. Patient with PMHx of hypertension, hyperlipidemia and PAD. The patient presented to the ED c/o substernal chest pain. Describes as sharp pain level 6/10 radiates to left shoulder and hand. He denies any diaphoresis or nausea associates with his chest pain. The patient was seen by Dr. Jimmy Hurtado in 3/22/19  and schedule for NUC stress and echo in the office.  The patient quit smoking  and alcohol 4 years ago. He  Refused ASA by EMS. Reports Hx of peptic ulcer . Denies chest pain  now. No pressure. No palpitations. No dizziness or lightheadedness. No fever or chills. No diaphoresis. No leg swelling. No recent syncopal . No recent CVA. Past Medical History:     Past Medical History:   Diagnosis Date    Bladder outlet obstruction     Erectile disorder due to medical 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     Frequency of urination     H/O spinal cord injury 1974    lumbar spine injury secondary to fall    HTN (hypertension)     Hypercholesterolemia     Injury of lumbar spine ([address] 1974    Leg pain, right     Nocturia     Overactive bladder     Peripheral vascular disease (HCC)          Social History:     Social History     Socioeconomic History    Marital status: SINGLE     Spouse name: Not on file    Number of children: Not on file    Years of education: Not on file    Highest education level: Not on file   Tobacco Use    Smoking status: Former Smoker     Last attempt to quit: 7/13/2015     Years since quitting: 3.7    Smokeless tobacco: Never Used   Substance and Sexual Activity    Alcohol use: Not Currently     Alcohol/week: 0.0 oz     Comment: former stopped 2015    Drug use: No    Sexual activity: Yes        Family History:     Family History   Problem Relation Age of Onset    Diabetes Mother     Hypertension Mother     Diabetes Maternal Grandmother     Hypertension Maternal Grandmother         Medications:      Allergies   Allergen Reactions    Ampicillin Angioedema    Asa-Acetaminophen-Caff-Buffers Rash    Penicillins Angioedema    Atorvastatin Other (comments)     Muscle cramps        Current Facility-Administered Medications   Medication Dose Route Frequency    heparin 25,000 units in D5W 250 ml infusion  11-25 Units/kg/hr IntraVENous TITRATE    amLODIPine (NORVASC) tablet 5 mg  5 mg Oral DAILY    finasteride (PROSCAR) tablet 5 mg  5 mg Oral DAILY    gabapentin (NEURONTIN) capsule 300 mg  300 mg Oral TID    spironolactone (ALDACTONE) tablet 25 mg  25 mg Oral DAILY    albuterol-ipratropium (DUO-NEB) 2.5 MG-0.5 MG/3 ML  3 mL Nebulization Q4H RT    morphine injection 1 mg  1 mg IntraVENous Q4H PRN    nitroglycerin (NITROBID) 2 % ointment 0.5 Inch  0.5 Inch Topical TID    albuterol (PROVENTIL VENTOLIN) nebulizer solution 2.5 mg  2.5 mg Nebulization Q4H PRN    regadenoson (LEXISCAN) injection 0.4 mg  0.4 mg IntraVENous CARD ONCE    0.9% sodium chloride infusion 250 mL  250 mL IntraVENous ONCE    technetium sestamibi (CARDIOLITE) injection 40.59 millicurie  09.39 millicurie IntraVENous ONCE    technetium sestamibi (CARDIOLITE) injection 33 millicurie  33 millicurie IntraVENous ONCE        Review Of Systems:         Constitutional: No fever, no chills, no weight loss, no night sweats   HEENT: No epistaxis, no nasal drainage, no difficulty in swallowing, no redness in eyes  Respiratory:  dyspnea on exertion  Cardiovascular:  chest pain,  chest pressure  Gastrointestinal: no abd pain, no vomiting, no diarrhea, no bleeding symptoms  Genitourinary: No urinary symptoms or hematuria  Integument/breast: No ulcers or rashes  Musculoskeletal: no muscle pain, no weakness  Neurological: No focal weakness, no seizures, no headaches  Behvioral/Psych: No anxiety, no depression       Physical Exam:     Visit Vitals  /66 (BP 1 Location: Left arm, BP Patient Position: At rest)   Pulse 61   Temp 97.6 °F (36.4 °C)   Resp 20   Ht 5' 7\" (1.702 m)   Wt 83.3 kg (183 lb 9.6 oz)   SpO2 98%   BMI 28.76 kg/m²     BP Readings from Last 3 Encounters:   03/27/19 125/66   03/22/19 119/62   11/02/18 123/69     Pulse Readings from Last 3 Encounters:   03/27/19 61   03/22/19 66   11/02/18 80     Wt Readings from Last 3 Encounters:   03/27/19 83.3 kg (183 lb 9.6 oz)   03/22/19 84.2 kg (185 lb 9.6 oz)   11/02/18 79.8 kg (176 lb)       General:  alert, cooperative, no distress, appears stated age  Skin: Warm and dry, acyanotic, normal color. Head: Normocephalic, atraumatic. Eyes: Sclerae anicteric, conjunctivae without injection. Neck:  nontender, no nuchal rigidity, no masses, no stridor, no carotid bruit, no JVD  Lungs: [place] crackles at the bases of  both the left and right lungs. diminished breath sounds b/l. Heart:  regular rate and rhythm, S1, S2 normal, no S3 or S4, no click, no rub  Abdomen:  abdomen is soft with mild epigastric tenderness (not reproducible), Lower abd quadrant no masses, organomegaly or guarding  Extremities:  extremities normal, atraumatic, no cyanosis or edema. Peripheral pulses + b/l. Neurological: grossly intact. No focal abnormalities, moves all extremities well. Psychiatric Affect: The patient is awake, alert and oriented x3. Lisa Coffee is interactive and appropriate. Data Review:     Recent Results (from the past 48 hour(s))   CBC WITH AUTOMATED DIFF    Collection Time: 03/26/19  9:50 PM   Result Value Ref Range    WBC 5.9 4.6 - 13.2 K/uL    RBC 4.46 (L) 4.70 - 5.50 M/uL    HGB 13.7 13.0 - 16.0 g/dL    HCT 39.9 36.0 - 48.0 %    MCV 89.5 74.0 - 97.0 FL    MCH 30.7 24.0 - 34.0 PG    MCHC 34.3 31.0 - 37.0 g/dL    RDW 14.4 11.6 - 14.5 %    PLATELET 921 338 - 206 K/uL    MPV 9.2 9.2 - 11.8 FL    NEUTROPHILS 43 40 - 73 %    LYMPHOCYTES 43 21 - 52 %    MONOCYTES 9 3 - 10 %    EOSINOPHILS 4 0 - 5 %    BASOPHILS 1 0 - 2 %    ABS. NEUTROPHILS 2.5 1.8 - 8.0 K/UL    ABS. LYMPHOCYTES 2.5 0.9 - 3.6 K/UL    ABS. MONOCYTES 0.6 0.05 - 1.2 K/UL    ABS. EOSINOPHILS 0.2 0.0 - 0.4 K/UL    ABS.  BASOPHILS 0.0 0.0 - 0.1 K/UL    DF AUTOMATED     METABOLIC PANEL, COMPREHENSIVE    Collection Time: 03/26/19  9:50 PM   Result Value Ref Range    Sodium 139 136 - 145 mmol/L    Potassium 3.9 3.5 - 5.5 mmol/L    Chloride 108 100 - 108 mmol/L    CO2 24 21 - 32 mmol/L    Anion gap 7 3.0 - 18 mmol/L    Glucose 89 74 - 99 mg/dL    BUN 20 (H) 7.0 - 18 MG/DL    Creatinine 1.14 0.6 - 1.3 MG/DL    BUN/Creatinine ratio 18 12 - 20      GFR est AA >60 >60 ml/min/1.73m2    GFR est non-AA >60 >60 ml/min/1.73m2    Calcium 9.2 8.5 - 10.1 MG/DL    Bilirubin, total 0.2 0.2 - 1.0 MG/DL    ALT (SGPT) 23 16 - 61 U/L    AST (SGOT) 18 15 - 37 U/L    Alk.  phosphatase 57 45 - 117 U/L    Protein, total 7.3 6.4 - 8.2 g/dL    Albumin 3.6 3.4 - 5.0 g/dL    Globulin 3.7 2.0 - 4.0 g/dL    A-G Ratio 1.0 0.8 - 1.7     CARDIAC PANEL,(CK, CKMB & TROPONIN)    Collection Time: 03/26/19  9:50 PM   Result Value Ref Range     39 - 308 U/L    CK - MB 2.6 <3.6 ng/ml    CK-MB Index 1.1 0.0 - 4.0 %    Troponin-I, QT <0.02 0.0 - 0.045 NG/ML   NT-PRO BNP    Collection Time: 03/26/19  9:50 PM   Result Value Ref Range    NT pro-BNP 15 0 - 900 PG/ML   PTT    Collection Time: 03/26/19  9:50 PM   Result Value Ref Range    aPTT 28.2 23.0 - 36.4 SEC   EKG, 12 LEAD, INITIAL    Collection Time: 03/26/19  9:59 PM   Result Value Ref Range    Ventricular Rate 62 BPM    Atrial Rate 62 BPM    P-R Interval 198 ms    QRS Duration 82 ms    Q-T Interval 406 ms    QTC Calculation (Bezet) 412 ms    Calculated P Axis 46 degrees    Calculated R Axis -28 degrees    Calculated T Axis 23 degrees    Diagnosis       Normal sinus rhythm  Moderate voltage criteria for LVH, may be normal variant  Borderline ECG  When compared with ECG of 01-OCT-2018 02:52,  No significant change was found     TROPONIN I    Collection Time: 03/27/19 12:39 AM   Result Value Ref Range    Troponin-I, QT <0.02 0.0 - 0.045 NG/ML   NUCLEAR CARDIAC STRESS TEST    Collection Time: 03/27/19  8:24 AM   Result Value Ref Range    Target  bpm         Intake/Output Summary (Last 24 hours) at 3/27/2019 7935  Last data filed at 3/27/2019 0630  Gross per 24 hour   Intake 39.1 ml   Output 200 ml   Net -160.9 ml       Cardiographics:     ECG: NSR with LVH, early repolarization in precordial leads      Signed By: Giselle GUNDERSON Phone [phone]    March 27, 2019      I have independently evaluated and examined the patient. All relevant labs and testing data's are reviewed. Care plan discussed and updated after review.   Carly Mendiola MD

## 2019-03-27 NOTE — ED TRIAGE NOTES
Patient presents to the ED via medic for evaluation of chest pain. Patient states, \"I was watching the basketball game when the center of my chest started hurting. The pain went to my left shoulder and ran down my arm to my hand. \"

## 2019-03-27 NOTE — ED PROVIDER NOTES
EMERGENCY DEPARTMENT HISTORY AND PHYSICAL EXAM 
 
10:07 PM 
Date: 3/26/2019 Patient Name: Boaz Argueta History of Presenting Illness Chief Complaint Patient presents with  Chest Pain History Provided By: Patient and EMS 
 
HPI: Boaz Argueta is a 68 y.o. male with HTN, DM, HLD, MGUS presenting with chest pain. Started at 8 PM. Was laying in bed and had sudden onset substernal chest pain radiation to left shoulder/arm with arm paraesthesias . No nausea, diaphoresis or dyspnea. Pain lasted <1 min then spontaneously resolved. Occurred 1-2 more times. Refused ASA by EMS. Denies chest pain in the ED. PCP: Nishi Pang MD 
No current facility-administered medications on file prior to encounter. Current Outpatient Medications on File Prior to Encounter Medication Sig Dispense Refill  prednisoLONE acetate (PRED FORTE) 1 % ophthalmic suspension Administer 1 Drop to both eyes four (4) times daily.  gabapentin (NEURONTIN) 300 mg capsule Take 1 Cap by mouth three (3) times daily. 90 Cap 2  
 albuterol (PROVENTIL HFA, VENTOLIN HFA, PROAIR HFA) 90 mcg/actuation inhaler Take 2 Puffs by inhalation every four (4) hours as needed for Wheezing or Shortness of Breath. Indications: BRONCHOSPASM PREVENTION 1 Inhaler 0  
 finasteride (PROSCAR) 5 mg tablet Take 1 Tab by mouth daily. 30 Tab 5  
 amLODIPine (NORVASC) 5 mg tablet Take 5 mg by mouth daily.  spironolactone (ALDACTONE) 25 mg tablet Take  by mouth daily. Past History Past Medical History: 
Past Medical History:  
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  
 lumbar spine injury secondary to fall  
 HTN (hypertension)  Hypercholesterolemia  Injury of lumbar spine ([facility] [address] 1974  Leg pain, right  Nocturia  Overactive bladder  Peripheral vascular disease ([facility] [address] Past Surgical History: 
Past Surgical History:  
Procedure Laterality Date  HX ORTHOPAEDIC    
 finger amputation left hand  HX TONSILLECTOMY Family History: 
Family History Problem Relation Age of Onset  Diabetes Mother  Hypertension Mother  Diabetes Maternal Grandmother  Hypertension Maternal Grandmother Social History: 
Social History Tobacco Use  Smoking status: Former Smoker Last attempt to quit: 7/13/2015 Years since quitting: 3.7  Smokeless tobacco: Never Used Substance Use Topics  Alcohol use: Not Currently Alcohol/week: 0.0 oz  
  Comment: former stopped 2015  Drug use: No  
 
 
Allergies: Allergies Allergen Reactions  Ampicillin Angioedema  Asa-Acetaminophen-Caff-Buffers Rash  Penicillins Angioedema  Atorvastatin Other (comments) Muscle cramps Review of Systems Review of Systems Constitutional: Negative for chills, diaphoresis and fever. HENT: Negative for congestion and sore throat. Eyes: Negative for pain and redness. Respiratory: Negative for chest tightness, shortness of breath and wheezing. Cardiovascular: Positive for chest pain. Negative for palpitations and leg swelling. Gastrointestinal: Negative for abdominal pain, nausea and vomiting. Musculoskeletal: Positive for back pain (chronic). Negative for neck pain and neck stiffness. Skin: Negative for rash. Neurological: Negative for syncope, light-headedness and headaches. Physical Exam  
 
Patient Vitals for the past 12 hrs: 
 Temp Pulse Resp BP SpO2  
03/27/19 0030  (!) 57 19 138/58 93 % 03/27/19 0015  61 16 (!) 119/95 93 % 03/26/19 2215  60 18 136/54   
03/26/19 2200  62 14 141/66   
03/26/19 2156 98.5 °F (36.9 °C) 62 14 133/68 100 % 03/26/19 2145  61 21 133/68  Physical Exam  
Constitutional: He is oriented to person, place, and time. He appears well-developed and well-nourished. No distress.   
HENT:  
 Head: Normocephalic and atraumatic. Mouth/Throat: Oropharynx is clear and moist.  
Eyes: Pupils are equal, round, and reactive to light. Conjunctivae and EOM are normal.  
Neck: Normal range of motion. No JVD present. Cardiovascular: Normal rate, regular rhythm, normal heart sounds and intact distal pulses. No murmur heard. Pulmonary/Chest: Effort normal and breath sounds normal. No respiratory distress. He has no wheezes. He has no rales. He exhibits no tenderness. Abdominal: Soft. Bowel sounds are normal. He exhibits no distension. There is no tenderness. There is no rebound. Musculoskeletal: Normal range of motion. He exhibits no edema or tenderness. Neurological: He is alert and oriented to person, place, and time. No cranial nerve deficit. He exhibits normal muscle tone. Coordination normal.  
Skin: Skin is warm and dry. He is not diaphoretic. Psychiatric: He has a normal mood and affect. Nursing note and vitals reviewed. Diagnostic Study Results Labs - Recent Results (from the past 12 hour(s)) CBC WITH AUTOMATED DIFF Collection Time: 03/26/19  9:50 PM  
Result Value Ref Range WBC 5.9 4.6 - 13.2 K/uL  
 RBC 4.46 (L) 4.70 - 5.50 M/uL  
 HGB 13.7 13.0 - 16.0 g/dL HCT 39.9 36.0 - 48.0 % MCV 89.5 74.0 - 97.0 FL  
 MCH 30.7 24.0 - 34.0 PG  
 MCHC 34.3 31.0 - 37.0 g/dL  
 RDW 14.4 11.6 - 14.5 % PLATELET 218 732 - 370 K/uL MPV 9.2 9.2 - 11.8 FL  
 NEUTROPHILS 43 40 - 73 % LYMPHOCYTES 43 21 - 52 % MONOCYTES 9 3 - 10 % EOSINOPHILS 4 0 - 5 % BASOPHILS 1 0 - 2 %  
 ABS. NEUTROPHILS 2.5 1.8 - 8.0 K/UL  
 ABS. LYMPHOCYTES 2.5 0.9 - 3.6 K/UL  
 ABS. MONOCYTES 0.6 0.05 - 1.2 K/UL  
 ABS. EOSINOPHILS 0.2 0.0 - 0.4 K/UL  
 ABS. BASOPHILS 0.0 0.0 - 0.1 K/UL  
 DF AUTOMATED METABOLIC PANEL, COMPREHENSIVE Collection Time: 03/26/19  9:50 PM  
Result Value Ref Range Sodium 139 136 - 145 mmol/L Potassium 3.9 3.5 - 5.5 mmol/L  Chloride 108 100 - 108 mmol/L  
 CO2 24 21 - 32 mmol/L Anion gap 7 3.0 - 18 mmol/L Glucose 89 74 - 99 mg/dL BUN 20 (H) 7.0 - 18 MG/DL Creatinine 1.14 0.6 - 1.3 MG/DL  
 BUN/Creatinine ratio 18 12 - 20 GFR est AA >60 >60 ml/min/1.73m2 GFR est non-AA >60 >60 ml/min/1.73m2 Calcium 9.2 8.5 - 10.1 MG/DL Bilirubin, total 0.2 0.2 - 1.0 MG/DL  
 ALT (SGPT) 23 16 - 61 U/L  
 AST (SGOT) 18 15 - 37 U/L Alk. phosphatase 57 45 - 117 U/L Protein, total 7.3 6.4 - 8.2 g/dL Albumin 3.6 3.4 - 5.0 g/dL Globulin 3.7 2.0 - 4.0 g/dL A-G Ratio 1.0 0.8 - 1.7 CARDIAC PANEL,(CK, CKMB & TROPONIN) Collection Time: 03/26/19  9:50 PM  
Result Value Ref Range  39 - 308 U/L  
 CK - MB 2.6 <3.6 ng/ml CK-MB Index 1.1 0.0 - 4.0 % Troponin-I, QT <0.02 0.0 - 0.045 NG/ML  
NT-PRO BNP Collection Time: 03/26/19  9:50 PM  
Result Value Ref Range NT pro-BNP 15 0 - 900 PG/ML  
EKG, 12 LEAD, INITIAL Collection Time: 03/26/19  9:59 PM  
Result Value Ref Range Ventricular Rate 62 BPM  
 Atrial Rate 62 BPM  
 P-R Interval 198 ms QRS Duration 82 ms Q-T Interval 406 ms QTC Calculation (Bezet) 412 ms Calculated P Axis 46 degrees Calculated R Axis -28 degrees Calculated T Axis 23 degrees Diagnosis Normal sinus rhythm Moderate voltage criteria for LVH, may be normal variant Borderline ECG When compared with ECG of 01-OCT-2018 02:52, No significant change was found TROPONIN I Collection Time: 03/27/19 12:39 AM  
Result Value Ref Range Troponin-I, QT <0.02 0.0 - 0.045 NG/ML Radiologic Studies -  
XR CHEST PA LAT    (Results Pending) CT Results  (Last 48 hours) None CXR Results  (Last 48 hours) None Medical Decision Making ED Course: Progress Notes, Reevaluation, and Consults:  
The patient presents with chest pain with a differential diagnosis of  ACS, acute MI, pulmonary edema/CHF, angina, aortic dissection, bronchitis, costochondritis, GERD, pericarditis, pnuemothorax, COPD exacerbation and pnuemonia. Provider Notes (Medical Decision Making):  
Mandy Dailey is a 68 y.o. male with HTN, DM, HLD, MGUS presenting with chest pain. Pt was seen by cards 3 days ago with chest pain concerning for angina. Is currently scheduled for ECHO and stress test, but has not yet had these completed. Denies CP in ED. ASA given. EKG nonischemic. Initial trop neg. BNP not elevated, unlikely undiagnosed CHF. Lungs clear, no wheezing. Doubt PE, [place] LR (new malginancy). CXR no acute process and mediastinum not widened on my personal read. Final dispo per Dr. Mi Saba. Anticipate delta trop and admit for stress test given pt's age and risk factors. Procedures: N/a Critical Care Time: N/a Current Facility-Administered Medications Medication Dose Route Frequency Provider Last Rate Last Dose  heparin (porcine) 1,000 unit/mL injection 4,000 Units  4,000 Units IntraVENous ONCE Janie Arce MD      
 heparin 25,000 units in D5W 250 ml infusion  12-25 Units/kg/hr IntraVENous TITRATE Janie Arce MD      
 
Current Outpatient Medications Medication Sig Dispense Refill  prednisoLONE acetate (PRED FORTE) 1 % ophthalmic suspension Administer 1 Drop to both eyes four (4) times daily.  gabapentin (NEURONTIN) 300 mg capsule Take 1 Cap by mouth three (3) times daily. 90 Cap 2  
 albuterol (PROVENTIL HFA, VENTOLIN HFA, PROAIR HFA) 90 mcg/actuation inhaler Take 2 Puffs by inhalation every four (4) hours as needed for Wheezing or Shortness of Breath. Indications: BRONCHOSPASM PREVENTION 1 Inhaler 0  
 finasteride (PROSCAR) 5 mg tablet Take 1 Tab by mouth daily. 30 Tab 5  
 amLODIPine (NORVASC) 5 mg tablet Take 5 mg by mouth daily.  spironolactone (ALDACTONE) 25 mg tablet Take  by mouth daily. Vital Signs-Reviewed the patient's vital signs. Pulse Oximetry Analysis -  95% on RA 
 
EKG: Interpreted by the EP. Time Interpreted: 2159 Rate: 62 Rhythm: Normal Sinus Rhythm Interpretation: LAD, LVH, diffuse <1mm ST elevation in V2-V6 seen on previous EKG Comparison: [id number] Records Reviewed: Nursing Notes, Old Medical Records, Previous electrocardiograms, Previous Radiology Studies and Previous Laboratory Studies (Time of Review: 10:07 PM) 
-I am the first provider for this patient. 
-I reviewed the vital signs, available nursing notes, past medical history, past surgical history, family history and social history. Diagnosis Clinical Impression: 1. Chest pain, unspecified type Disposition: Pending 1:15 AM 
I personally saw and examined the patient. I have reviewed and agree with the residents findings, including all diagnostic interpretations, and plans as written. I was present during the key portions of separately billed procedures. I assume full care of the patient at the end of the resident physicians sift. Initial workup was unremarkable, though he continued to have pain. He received a dose of IV morphine, and is now pain-free. Given the continuation of his pain, heparin was started and the patient will be admitted for further workup. Discussed with hospitalist for admission. Eileen Verdugo MD 
 
 
 
Current Discharge Medication List  
  
 
Patient's Medications Start Taking No medications on file Continue Taking ALBUTEROL (PROVENTIL HFA, VENTOLIN HFA, PROAIR HFA) 90 MCG/ACTUATION INHALER    Take 2 Puffs by inhalation every four (4) hours as needed for Wheezing or Shortness of Breath. Indications: BRONCHOSPASM PREVENTION  
 AMLODIPINE (NORVASC) 5 MG TABLET    Take 5 mg by mouth daily. FINASTERIDE (PROSCAR) 5 MG TABLET    Take 1 Tab by mouth daily. GABAPENTIN (NEURONTIN) 300 MG CAPSULE    Take 1 Cap by mouth three (3) times daily. PREDNISOLONE ACETATE (PRED FORTE) 1 % OPHTHALMIC SUSPENSION    Administer 1 Drop to both eyes four (4) times daily. SPIRONOLACTONE (ALDACTONE) 25 MG TABLET    Take  by mouth daily. These Medications have changed No medications on file Stop Taking No medications on file 2. Follow-up Information None 3. Follow-up Information None 
  
  
_______________________________

## 2019-03-27 NOTE — PROGRESS NOTES
Pt admitted earlier this morning for atypical CP  
H&P reviewed Seen by cardiology - Echo & Nuc stress today Will follow post test  
 
Update - Per Cardiology Abnormal Stress test , will proceed with cath in AM  
Will follow Continue current medical management

## 2019-03-27 NOTE — PROGRESS NOTES
Problem: Unstable angina/NSTEMI: Day of Admission/Day 1 Goal: Off Pathway (Use only if patient is Off Pathway) Outcome: Progressing Towards Goal 
Goal: Activity/Safety Outcome: Progressing Towards Goal 
Goal: Consults, if ordered Outcome: Progressing Towards Goal 
Goal: Diagnostic Test/Procedures Outcome: Progressing Towards Goal 
Goal: Nutrition/Diet Outcome: Progressing Towards Goal 
Goal: Discharge Planning Outcome: Progressing Towards Goal 
Goal: Medications Outcome: Progressing Towards Goal 
Goal: Respiratory Outcome: Progressing Towards Goal 
Goal: Treatments/Interventions/Procedures Outcome: Progressing Towards Goal 
Goal: Psychosocial 
Outcome: Progressing Towards Goal 
Goal: *Hemodynamically stable Outcome: Progressing Towards Goal 
Goal: *Optimal pain control at patient's stated goal 
Outcome: Progressing Towards Goal 
Goal: *Lungs clear or at baseline Outcome: Progressing Towards Goal 
  
Problem: Pain Goal: *Control of Pain Outcome: Progressing Towards Goal 
  
Problem: Injury - Risk of, Adverse Drug Event Goal: *Absence of adverse drug events Outcome: Progressing Towards Goal 
Goal: *Absence of medication errors Outcome: Progressing Towards Goal 
Goal: *Knowledge of prescribed medications Outcome: Progressing Towards Goal

## 2019-03-27 NOTE — PROGRESS NOTES
Patient was given 10.49 milliCuries of 99mTc-Sestamibi for the resting images. Patient was also given 33.0 milliCuries of 99mTc-Sestamibi for the stress images. Injected with 0.4mg Lexiscan. Patient's armband was left on and sent back to room.

## 2019-03-27 NOTE — PROGRESS NOTES
TRANSFER - IN REPORT: 
 
Verbal report received from Sergey(name) on Davida Wood  being received from ED(unit) for routine progression of care Report consisted of patients Situation, Background, Assessment and  
Recommendations(SBAR). Information from the following report(s) SBAR, Intake/Output, MAR and Recent Results was reviewed with the receiving nurse. Opportunity for questions and clarification was provided. Assessment completed upon patients arrival to unit and care assumed.

## 2019-03-27 NOTE — ROUTINE PROCESS
TRANSFER - OUT REPORT: 
 
Verbal report given to Ravinder Ricketts RN on Vicenta Market  being transferred to [facility] for routine progression of care Report consisted of patients Situation, Background, Assessment and  
Recommendations(SBAR). Information from the following report(s) SBAR, ED Summary, Intake/Output, MAR and Cardiac Rhythm sinus kody was reviewed with the receiving nurse. Lines:  
Peripheral IV 03/26/19 Right Hand (Active) Site Assessment Clean, dry, & intact 3/26/2019  9:50 PM  
Phlebitis Assessment 0 3/26/2019  9:50 PM  
Infiltration Assessment 0 3/26/2019  9:50 PM  
Dressing Status Clean, dry, & intact 3/26/2019  9:50 PM  
Dressing Type 4 X 4;Tape;Transparent 3/26/2019  9:50 PM  
Hub Color/Line Status Pink;Flushed;Patent 3/26/2019  9:50 PM  
Action Taken Blood drawn 3/26/2019  9:50 PM  
Alcohol Cap Used No 3/26/2019  9:50 PM  
  
 
Opportunity for questions and clarification was provided. Patient transported with: 
 [facility]

## 2019-03-27 NOTE — PROGRESS NOTES
conducted an initial consultation and Spiritual Assessment for Arnulfo Mccullough, who is a 68 y. o.,male. Patients Primary Language is: [place]. According to the patients EMR Restorationism Affiliation is: [place]. The reason the Patient came to the hospital is:  
Patient Active Problem List  
 Diagnosis Date Noted  Chest pain, moderate coronary artery risk 03/27/2019  Chest pain 03/27/2019  
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   
 HTN (hypertension)  Erectile disorder due to medical 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  Bladder outlet obstruction  Frequency of urination  ED (erectile dysfunction) of organic origin 06/21/2017  Vertigo 05/17/2016  Radiculopathy of lumbar region 05/17/2016  Unsteady gait 04/11/2016  Gait instability 04/11/2016  H/O spinal cord injury 01/01/1974  Injury of lumbar spine ([facility] 01/01/1974 The  provided the following Interventions: 
Initiated a relationship of care and support. Provided information about Spiritual Care Services. Chart reviewed. The following outcomes where achieved: 
Patient expressed gratitude for 's visit. Assessment: 
Patient does not have any Sikh/cultural needs that will affect patients preferences in health care. There are no spiritual or Sikh issues which require intervention at this time. Plan: 
Chaplains will continue to follow and will provide pastoral care on an as needed/requested basis.  recommends bedside caregivers page  on duty if patient shows signs of acute spiritual or emotional distress. Donis Pham [facility] 
([phone])

## 2019-03-27 NOTE — H&P
PCP 
Nataliya Aj MD 
 
DOA: 3/27/2019 DOS: 3/27/2019 Chief complaint(s): Chest pain HPI: 
68 y.o. AA male with HTN, HLD, MGUS presenting with chest pain. Started at 8 PM on 03/26/219. Was laying in bed and had sudden onset substernal chest pain radiation to left shoulder/arm with arm paraesthesias . No nausea, diaphoresis or dyspnea. Pain lasted <1 min then spontaneously resolved. Occurred 1-2 more times. Refused ASA by EMS. Denies chest pain now. According to the patient he was supposed to have stress test on 04/05/19 but the chest pain made him come to the ED. No dizziness. No SOB. Has not seen any cardiology before. No recent history of travel or surgery. Allergies Allergen Reactions  Ampicillin Angioedema  Asa-Acetaminophen-Caff-Buffers Rash  Penicillins Angioedema  Atorvastatin Other (comments) Muscle cramps Past Medical History:  
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  
 lumbar spine injury secondary to fall  
 HTN (hypertension)  Hypercholesterolemia  Injury of lumbar spine ([address] 1974  Leg pain, right  Nocturia  Overactive bladder  Peripheral vascular disease ([facility] [address] Past Surgical History:  
Procedure Laterality Date  HX ORTHOPAEDIC    
 finger amputation left hand  HX TONSILLECTOMY Family History Problem Relation Age of Onset  Diabetes Mother  Hypertension Mother  Diabetes Maternal Grandmother  Hypertension Maternal Grandmother Social History Socioeconomic History  Marital status: SINGLE Spouse name: Not on file  Number of children: Not on file  Years of education: Not on file  Highest education level: Not on file Occupational History  Not on file Social Needs  Financial resource strain: Not on file  Food insecurity: Worry: Not on file Inability: Not on file  Transportation needs:  
  Medical: Not on file Non-medical: Not on file Tobacco Use  Smoking status: Former Smoker Last attempt to quit: 7/13/2015 Years since quitting: 3.7  Smokeless tobacco: Never Used Substance and Sexual Activity  Alcohol use: Not Currently Alcohol/week: 0.0 oz  
  Comment: former stopped 2015  Drug use: No  
 Sexual activity: Yes Lifestyle  Physical activity:  
  Days per week: Not on file Minutes per session: Not on file  Stress: Not on file Relationships  Social connections:  
  Talks on phone: Not on file Gets together: Not on file Attends Muslim service: Not on file Active member of club or organization: Not on file Attends meetings of clubs or organizations: Not on file Relationship status: Not on file  Intimate partner violence:  
  Fear of current or ex partner: Not on file Emotionally abused: Not on file Physically abused: Not on file Forced sexual activity: Not on file Other Topics Concern  Not on file Social History Narrative  Not on file Review of Systems:   
Refer to HPI for positive findings. All other systems reviewed and are negative. Physical Exam:  
 
Visit Vitals /74 (BP 1 Location: Left arm, BP Patient Position: At rest) Pulse 62 Temp 97.5 °F (36.4 °C) Resp 19 Ht 5' 7\" (1.702 m) Wt 83.3 kg (183 lb 9.6 oz) SpO2 96% BMI 28.76 kg/m² General Appearance:  Alert, cooperative, no distress, appears stated age Head:  Normocephalic, without obvious abnormality, atraumatic Eyes:  PERRL, conjunctiva/corneas clear, EOM's intact, anicteric sclerae Ears:  Normal  external ear canals, both ears Throat: Lips, mucosa, and tongue normal; teeth and gums normal  
Neck:  No carotid bruit or JVD, no thyromegaly Lungs:   Clear to auscultation bilaterally,symmetrical expansion Heart:  Regular rate and rhythm, S1 and S2 normal, no murmur, rub, or gallop, POMI nondisplaced Abdomen:   Soft, non-tender non-distended, bowel sounds active all four quadrants,  no masses, no organomegaly Extremities: Extremities normal, atraumatic, no cyanosis, clubbing or edema Pulses: 2+ and symmetric Skin: Skin color, texture, turgor normal, no rashes or lesions Neurologic: Normal sensation, cranial nerves grossly intact Labs Reviewed CBC WITH AUTOMATED DIFF - Abnormal; Notable for the following components:  
    Result Value RBC 4.46 (*) All other components within normal limits METABOLIC PANEL, COMPREHENSIVE - Abnormal; Notable for the following components: BUN 20 (*) All other components within normal limits CARDIAC PANEL,(CK, CKMB & TROPONIN) NT-PRO BNP  
TROPONIN I  
PTT  
PTT I have reviewed labs and imaging studies  and discussed pertinent findings  with patient. Assessment/Plan : 
 
 
--- Chest pain with multiple risk factors: 
Place patient on observation. Monitor cardiac enzymes. Cardiology consult. Nuclear stress test today. --- Essential HTN: 
Continue his home medications ( Amlodipine and Aldactone) --- Hyperlipidemia: 
Continue Statin.  
 
---Neuropathy: 
Continue Neurontin. --- BPH: 
Continue finasteride. The rest of the treatment will be per am Hospitalist team, Consultants recommendation, test results and Patient's Clinical Course. Plan of the treatment was discussed with the patient and he is in   agreement. PATIENT IS FULL CODE. Bibi Townsend MD 
 
03/27/19

## 2019-03-27 NOTE — PROGRESS NOTES
Bedside shift report given to Grace(oncoming nurse)  including SBAR, MAR, and Recent Results, and plan for day. Heparin gtt verified. Opportunity for questions provided. Patient comfortable in bed, denies pain, call light in reach, side rails up x2, denies any needs at this time.

## 2019-03-27 NOTE — PROGRESS NOTES
Reason for Admission:  Chest pain, moderate coronary artery risk [R07.9] Chest pain [R07.9] RRAT Score:   7 Plan for utilizing home health:   Patient has no home health orders in place. He stated that he feels like he may need it. This writer will continue to closely monitor for potential home health orders and needs. Likelihood of Readmission:   LOW Transition of Care Plan:   Patient will return home with help from his family. Family will transport home at the time of discharge. Initial assessment completed with patient. Cognitive status of patient: Alert and oriented. Face sheet information confirmed:  yes. The patient designates his daughters (Gloria Walker) to participate in his discharge plan and to receive any needed information. This patient lives alone in his apartment. Patient is able to navigate steps as needed. Prior to hospitalization, patient was considered to be independent with ADLs/IADLS : yes . Patient has a current ACP document on file: no  
 
Patient's friend Orysia Smoker) or one of his daughters will be available to transport patient home upon discharge. The patient has no medical equipment available in the home. Patient is not currently active with home health. Patient has not stayed in a skilled nursing facility or rehab. Currently, the discharge plan is to return home with help from her family. Family will transport home at the time of discharge. Patient's daughter is (Court Taylor, #[phone]). Patient's other daughter is (Woordow Althea, #[phone]). Patient's PCP is Dr. Edwina Carrizales. Patient is insured through [address] Medicaid. The patient states that he can obtain his medications from the pharmacy, and take his medications as directed. This writer will continue to closely monitor for discharge planning to ensure a safe discharge home from [place]. Care Management Interventions PCP Verified by CM: Olivia Dhillon) Mode of Transport at Discharge: Other (see comment)(Family will transport) Transition of Care Consult (CM Consult): Discharge Planning Current Support Network: Lives Alone, Family [place] Confirm Follow Up Transport: Family Plan discussed with Pt/Family/Caregiver: Yes Discharge Location Discharge Placement: Home with family assistance Stanislav Kim. MSW Care Manager Pager#: [phone]

## 2019-03-28 ENCOUNTER — HOSPITAL ENCOUNTER (EMERGENCY)
Age: 74
Discharge: HOME | End: 2019-03-29
Attending: EMERGENCY MEDICINE
Payer: MEDICAID

## 2019-03-28 VITALS
WEIGHT: 181.2 LBS | HEIGHT: 67 IN | DIASTOLIC BLOOD PRESSURE: 68 MMHG | OXYGEN SATURATION: 93 % | HEART RATE: 91 BPM | TEMPERATURE: 98.3 F | SYSTOLIC BLOOD PRESSURE: 117 MMHG | BODY MASS INDEX: 28.44 KG/M2 | RESPIRATION RATE: 18 BRPM

## 2019-03-28 VITALS
TEMPERATURE: 98.6 F | DIASTOLIC BLOOD PRESSURE: 75 MMHG | RESPIRATION RATE: 16 BRPM | SYSTOLIC BLOOD PRESSURE: 124 MMHG | BODY MASS INDEX: 28.41 KG/M2 | WEIGHT: 181 LBS | HEART RATE: 89 BPM | HEIGHT: 67 IN | OXYGEN SATURATION: 96 %

## 2019-03-28 DIAGNOSIS — M79.89 SWELLING OF RIGHT HAND: Primary | ICD-10-CM

## 2019-03-28 PROBLEM — I25.10 CAD (CORONARY ARTERY DISEASE): Status: ACTIVE | Noted: 2019-03-26

## 2019-03-28 LAB
ANION GAP SERPL CALC-SCNC: 5 MMOL/L (ref 3–18)
APTT PPP: 82.1 SEC (ref 23–36.4)
BUN SERPL-MCNC: 15 MG/DL (ref 7–18)
BUN/CREAT SERPL: 13 (ref 12–20)
CALCIUM SERPL-MCNC: 9.1 MG/DL (ref 8.5–10.1)
CHLORIDE SERPL-SCNC: 104 MMOL/L (ref 100–108)
CO2 SERPL-SCNC: 26 MMOL/L (ref 21–32)
CREAT SERPL-MCNC: 1.19 MG/DL (ref 0.6–1.3)
END DIASTOLIC PRESSURE: 8
ERYTHROCYTE [DISTWIDTH] IN BLOOD BY AUTOMATED COUNT: 14.4 % (ref 11.6–14.5)
GLUCOSE SERPL-MCNC: 95 MG/DL (ref 74–99)
HCT VFR BLD AUTO: 43.7 % (ref 36–48)
HGB BLD-MCNC: 14.4 G/DL (ref 13–16)
MCH RBC QN AUTO: 29.8 PG (ref 24–34)
MCHC RBC AUTO-ENTMCNC: 33 G/DL (ref 31–37)
MCV RBC AUTO: 90.5 FL (ref 74–97)
PLATELET # BLD AUTO: 245 K/UL (ref 135–420)
PMV BLD AUTO: 9.2 FL (ref 9.2–11.8)
POTASSIUM SERPL-SCNC: 3.9 MMOL/L (ref 3.5–5.5)
RBC # BLD AUTO: 4.83 M/UL (ref 4.7–5.5)
SODIUM SERPL-SCNC: 135 MMOL/L (ref 136–145)
WBC # BLD AUTO: 6 K/UL (ref 4.6–13.2)

## 2019-03-28 PROCEDURE — 93458 L HRT ARTERY/VENTRICLE ANGIO: CPT | Performed by: INTERNAL MEDICINE

## 2019-03-28 PROCEDURE — 99218 HC RM OBSERVATION: CPT

## 2019-03-28 PROCEDURE — 74011250636 HC RX REV CODE- 250/636: Performed by: INTERNAL MEDICINE

## 2019-03-28 PROCEDURE — 94640 AIRWAY INHALATION TREATMENT: CPT

## 2019-03-28 PROCEDURE — C1894 INTRO/SHEATH, NON-LASER: HCPCS | Performed by: INTERNAL MEDICINE

## 2019-03-28 PROCEDURE — 99152 MOD SED SAME PHYS/QHP 5/>YRS: CPT | Performed by: INTERNAL MEDICINE

## 2019-03-28 PROCEDURE — 74011000250 HC RX REV CODE- 250: Performed by: INTERNAL MEDICINE

## 2019-03-28 PROCEDURE — 36415 COLL VENOUS BLD VENIPUNCTURE: CPT

## 2019-03-28 PROCEDURE — 74011000250 HC RX REV CODE- 250: Performed by: HOSPITALIST

## 2019-03-28 PROCEDURE — 74011250636 HC RX REV CODE- 250/636

## 2019-03-28 PROCEDURE — 74011250636 HC RX REV CODE- 250/636: Performed by: HOSPITALIST

## 2019-03-28 PROCEDURE — 74011636320 HC RX REV CODE- 636/320: Performed by: INTERNAL MEDICINE

## 2019-03-28 PROCEDURE — 94761 N-INVAS EAR/PLS OXIMETRY MLT: CPT

## 2019-03-28 PROCEDURE — 96366 THER/PROPH/DIAG IV INF ADDON: CPT

## 2019-03-28 PROCEDURE — 65660000000 HC RM CCU STEPDOWN

## 2019-03-28 PROCEDURE — 85027 COMPLETE CBC AUTOMATED: CPT

## 2019-03-28 PROCEDURE — 99282 EMERGENCY DEPT VISIT SF MDM: CPT

## 2019-03-28 PROCEDURE — 80048 BASIC METABOLIC PNL TOTAL CA: CPT

## 2019-03-28 PROCEDURE — 77030029997 HC DEV COM RDL R BND TELE -B: Performed by: INTERNAL MEDICINE

## 2019-03-28 PROCEDURE — 85730 THROMBOPLASTIN TIME PARTIAL: CPT

## 2019-03-28 PROCEDURE — 74011250637 HC RX REV CODE- 250/637: Performed by: HOSPITALIST

## 2019-03-28 PROCEDURE — 77030015766: Performed by: INTERNAL MEDICINE

## 2019-03-28 RX ORDER — PANTOPRAZOLE SODIUM 40 MG/1
40 TABLET, DELAYED RELEASE ORAL DAILY
Qty: 30 TAB | Refills: 0 | Status: SHIPPED | OUTPATIENT
Start: 2019-03-28

## 2019-03-28 RX ORDER — MIDAZOLAM HYDROCHLORIDE 1 MG/ML
INJECTION, SOLUTION INTRAMUSCULAR; INTRAVENOUS AS NEEDED
Status: DISCONTINUED | OUTPATIENT
Start: 2019-03-28 | End: 2019-03-28 | Stop reason: HOSPADM

## 2019-03-28 RX ORDER — AMLODIPINE BESYLATE 10 MG/1
10 TABLET ORAL DAILY
Qty: 30 TAB | Refills: 0 | Status: SHIPPED | OUTPATIENT
Start: 2019-03-28

## 2019-03-28 RX ORDER — POLYETHYLENE GLYCOL 3350 17 G/17G
17 POWDER, FOR SOLUTION ORAL DAILY
Qty: 30 PACKET | Refills: 0 | Status: SHIPPED | OUTPATIENT
Start: 2019-03-28 | End: 2021-06-05

## 2019-03-28 RX ORDER — SODIUM CHLORIDE 9 MG/ML
INJECTION, SOLUTION INTRAVENOUS
Status: COMPLETED | OUTPATIENT
Start: 2019-03-28 | End: 2019-03-28

## 2019-03-28 RX ORDER — AMLODIPINE BESYLATE 5 MG/1
10 TABLET ORAL DAILY
Qty: 30 TAB | Refills: 0 | Status: SHIPPED | OUTPATIENT
Start: 2019-03-28 | End: 2019-03-28

## 2019-03-28 RX ORDER — LIDOCAINE HYDROCHLORIDE 10 MG/ML
INJECTION, SOLUTION EPIDURAL; INFILTRATION; INTRACAUDAL; PERINEURAL AS NEEDED
Status: DISCONTINUED | OUTPATIENT
Start: 2019-03-28 | End: 2019-03-28 | Stop reason: HOSPADM

## 2019-03-28 RX ORDER — VERAPAMIL HYDROCHLORIDE 2.5 MG/ML
INJECTION, SOLUTION INTRAVENOUS AS NEEDED
Status: DISCONTINUED | OUTPATIENT
Start: 2019-03-28 | End: 2019-03-28 | Stop reason: HOSPADM

## 2019-03-28 RX ORDER — FENTANYL CITRATE 50 UG/ML
INJECTION, SOLUTION INTRAMUSCULAR; INTRAVENOUS AS NEEDED
Status: DISCONTINUED | OUTPATIENT
Start: 2019-03-28 | End: 2019-03-28 | Stop reason: HOSPADM

## 2019-03-28 RX ADMIN — HEPARIN SODIUM AND DEXTROSE 920 UNITS/HR: 10000; 5 INJECTION INTRAVENOUS at 05:29

## 2019-03-28 RX ADMIN — IPRATROPIUM BROMIDE AND ALBUTEROL SULFATE 3 ML: .5; 3 SOLUTION RESPIRATORY (INHALATION) at 00:28

## 2019-03-28 RX ADMIN — SPIRONOLACTONE 25 MG: 25 TABLET ORAL at 13:38

## 2019-03-28 RX ADMIN — MORPHINE SULFATE 1 MG: 2 INJECTION, SOLUTION INTRAMUSCULAR; INTRAVENOUS at 05:44

## 2019-03-28 RX ADMIN — AMLODIPINE BESYLATE 5 MG: 5 TABLET ORAL at 13:38

## 2019-03-28 RX ADMIN — IPRATROPIUM BROMIDE AND ALBUTEROL SULFATE 3 ML: .5; 3 SOLUTION RESPIRATORY (INHALATION) at 08:48

## 2019-03-28 RX ADMIN — NITROGLYCERIN 0.5 INCH: 20 OINTMENT TOPICAL at 09:41

## 2019-03-28 NOTE — PROGRESS NOTES
[facility] 
 
 
CARDIOLOGY PROGRESS NOTE 
RECS: 
1. Chest pain with typical and atypical features-  stress test revealed moderate reversible inferior wall ischemia. Has multiple cardiac risk factors 2. Hypertension- stable on Norvasc and aldactone . will monitor 3. Shortness of breat-Possible CHF, hypertensive heart disease, LVH, ischemia. follow up NUC stress test and Echo 4. Hypercholesterolemia- Patient was on atorvastatin. Patient was intolerant n past due to muscle spasm. 5. Hx of Peripheral vascular disease-Moderate PAD left leg 6. History of rheumatic fever- At age 4. Rule out valvular heart disease follow up to assess lvf or any VHD 7. Hx of tobacco and alcohol abuse- per patient quit 4 years ago. Discussed with patient-- continues to have intermittent chest pain while on ntg paste- concerning for unstable angina. Will proceed with [facility]. ASSESSMENT: 
Hospital Problems  Date Reviewed: 3/22/2019 Codes Class Noted POA Chest pain, moderate coronary artery risk ICD-10-CM: R07.9 ICD-9-CM: 786.50  3/27/2019 Unknown Chest pain ICD-10-CM: R07.9 ICD-9-CM: 786.50  3/27/2019 Unknown * (Principal) CAD (coronary artery disease) ICD-10-CM: I25.10 ICD-9-CM: 414.00  3/26/2019 Overview Signed 3/28/2019  8:17 AM by Volodymyr Layton RN Added automatically from request for surgery [id number] SUBJECTIVE: 
No CP or SOB OBJECTIVE: 
 
VS:  
Visit Vitals /75 (BP 1 Location: Left arm, BP Patient Position: At rest) Pulse 63 Temp 97.8 °F (36.6 °C) Resp 18 Ht 5' 7\" (1.702 m) Wt 82.2 kg (181 lb 3.2 oz) SpO2 93% BMI 28.38 kg/m² Intake/Output Summary (Last 24 hours) at 3/28/2019 1049 Last data filed at 3/28/2019 9079 Gross per 24 hour Intake 830.4 ml Output 1000 ml Net -169.6 ml  
 
TELE: normal sinus rhythm General: in no apparent distress HENT: Normocephalic, atraumatic. Normal external eye. Neck :  negative Cardiac:  regular rate and rhythm, S1, S2 normal, no murmur, click, rub or gallop Lungs: clear to auscultation bilaterally Abdomen: Soft, nontender, no masses Extremities:  No edema Labs: Results:  
   
Chemistry Recent Labs  
  03/28/19 
5299 03/26/19 2150 GLU 95 89 * 139  
K 3.9 3.9  108 CO2 26 24 BUN 15 20* CREA 1.19 1.14  
CA 9.1 9.2 AGAP 5 7 BUCR 13 18 AP  --  57  
TP  --  7.3 ALB  --  3.6 GLOB  --  3.7 AGRAT  --  1.0  
  
CBC w/Diff Recent Labs  
  03/28/19 
0516 03/26/19 2150 WBC 6.0 5.9  
RBC 4.83 4.46* HGB 14.4 13.7 HCT 43.7 39.9  229 GRANS  --  43  
LYMPH  --  43 EOS  --  4 Cardiac Enzymes Recent Labs  
  03/26/19 2150  CKND1 1.1 Coagulation Recent Labs  
  03/28/19 
0516 03/27/19 
9245 APTT 82.1* 96.4* Lipid Panel No results found for: CHOL, CHOLPOCT, CHOLX, CHLST, CHOLV, 689745, HDL, LDL, LDLC, DLDLP, 882020, VLDLC, VLDL, TGLX, TRIGL, TRIGP, TGLPOCT, CHHD, CHHDX  
BNP No results for input(s): BNPP in the last 72 hours. Liver Enzymes Recent Labs  
  03/26/19 
2150 TP 7.3 ALB 3.6 AP 57 SGOT 18 Thyroid Studies Lab Results Component Value Date/Time  TSH 0.65 04/12/2016 01:47 AM  
    
 
 
 
Chinedu Winters MD

## 2019-03-28 NOTE — PROGRESS NOTES
Bedside and Verbal shift change report given to this RN (oncoming nurse) by Errol Hagan RN (offgoing nurse). Report included the following information SBAR, Kardex and Cardiac Rhythm NSR  
 
PT went down to cath holding after 10am.  Report given. Received report from cath holding-Courtney at [phone] and pt had no stents placed and no blockages found. Pt to be back on his diet. .  
 
Once pt back on unit, retrieved vitals, and assessed entry site which was intact, no bleeding. Reviewed need to refrain from lifting heavy objects etc. 
 
Reviewed discharge paperwork with patient. Answered all questions. Pt stable and in no distress. Removed all lines and armbands. 1600- Pt accompanied downstairs for discharge by staff.

## 2019-03-28 NOTE — PROGRESS NOTES
TRANSFER - OUT REPORT: 
 
Verbal report given to  on Jarrett Plant  being transferred to [address] [place] [address] for routine progression of care Report consisted of patients Situation, Background, Assessment and  
Recommendations(SBAR). Information from the following report(s) SBAR, Procedure Summary and MAR was reviewed with the receiving nurse. Lines:  
Peripheral IV 03/26/19 Right Hand (Active) Site Assessment Clean, dry, & intact 3/27/2019  7:11 PM  
Phlebitis Assessment 0 3/27/2019  7:11 PM  
Infiltration Assessment 0 3/27/2019  7:11 PM  
Dressing Status Clean, dry, & intact 3/27/2019  7:11 PM  
Dressing Type Transparent 3/27/2019  7:11 PM  
Hub Color/Line Status Pink 3/27/2019  7:11 PM  
Action Taken Blood drawn 3/26/2019  9:50 PM  
Alcohol Cap Used No 3/26/2019  9:50 PM  
  
 
Opportunity for questions and clarification was provided. Patient transported with: 
 Registered Nurse

## 2019-03-28 NOTE — PHYSICIAN ADVISORY
Letter of Admission Status Determination: Upgrade to Inpatient Rah Tavera was originally hospitalized as Outpatient Observation status on 3/26/2019. Ongoing hospitalization is warranted for this patient with anginal pain and abnormal stress test requiring anticoagulation, cardiac cath and close clinical monitoring. Based on the documented clinical condition and care plan, we recommend upgrading patient's hospitalization status to INPATIENT.  
  
The final decision regarding the patient's hospitalization status depends on the attending physician's judgment. Stephanie Larson MD, ALISA, [facility], [facility] Physician Advisor [address] [place] [address],[facility] Floor [phone]

## 2019-03-28 NOTE — PROGRESS NOTES
Discharge: 
 
Patient is going to discharge home today (3/28/2019). Patient has no home health orders in place. Patient's family will transport home at the time of discharge. There are no other care manager concerns regarding this discharge. This writer will continue to closely monitor for discharge planning to ensure a safe discharge home from [place]. Stanislav Laguna [place]. MS Care Manager Pager#: [phone]

## 2019-03-28 NOTE — DISCHARGE INSTRUCTIONS
Patient Education        Angina: Care Instructions  Your Care Instructions    You have a problem called angina. Angina happens when there is not enough blood flow to your heart muscle. Angina is a sign of coronary artery disease (CAD). CAD occurs when blood vessels that supply the heart become narrowed. Having CAD increases your risk of a heart attack. Chest pain or pressure is the most common symptom of angina. But some people have other symptoms, like:  · Pain, pressure, or a strange feeling in the back, neck, jaw, or upper belly, or in one or both shoulders or arms. · Shortness of breath. · Nausea or vomiting. · Lightheadedness or sudden weakness. · Fast or irregular heartbeat. Women are somewhat more likely than men to have angina symptoms like shortness of breath, nausea, and back or jaw pain. Angina can be dangerous. That's why it is important to pay attention to your symptoms. Know what is typical for you, learn how to control your symptoms, and understand when you need to get treatment. A change in your usual pattern of symptoms is an emergency. It may mean that you are having a heart attack. The doctor has checked you carefully, but problems can develop later. If you notice any problems or new symptoms, get medical treatment right away. Follow-up care is a key part of your treatment and safety. Be sure to make and go to all appointments, and call your doctor if you are having problems. It's also a good idea to know your test results and keep a list of the medicines you take. How can you care for yourself at home? Medicines    · If your doctor has given you nitroglycerin for angina symptoms, keep it with you at all times. If you have symptoms, sit down and rest, and take the first dose of nitroglycerin as directed. If your symptoms get worse or are not getting better within 5 minutes, call 911 right away. Stay on the phone.  The emergency  will give you further instructions.     · If your doctor advises it, take 1 low-dose aspirin a day to prevent heart attack.     · Be safe with medicines. Take your medicines exactly as prescribed. Call your doctor if you think you are having a problem with your medicine. You will get more details on the specific medicines your doctor prescribes.    Lifestyle changes    · Do not smoke. If you need help quitting, talk to your doctor about stop-smoking programs and medicines. These can increase your chances of quitting for good.     · Eat a heart-healthy diet that is low in saturated fat and salt, and is high in fiber. Talk to your doctor or a dietitian about healthy eating.     · Stay at a healthy weight. Or lose weight if you need to. Activity    · Talk to your doctor about a level of activity that is safe for you.     · If an activity causes angina symptoms, stop and rest.   When should you call for help? Call 911 anytime you think you may need emergency care. For example, call if:    · You passed out (lost consciousness).     · You have symptoms of a heart attack. These may include:  ? Chest pain or pressure, or a strange feeling in the chest.  ? Sweating. ? Shortness of breath. ? Nausea or vomiting. ? Pain, pressure, or a strange feeling in the back, neck, jaw, or upper belly or in one or both shoulders or arms. ? Lightheadedness or sudden weakness. ? A fast or irregular heartbeat. After you call 911, the  may tell you to chew 1 adult-strength or 2 to 4 low-dose aspirin. Wait for an ambulance.  Do not try to drive yourself.     · You have angina symptoms that do not go away with rest or are not getting better within 5 minutes after you take a dose of nitroglycerin.    Call your doctor now or seek immediate medical care if:    · You are having angina symptoms more often than usual, or they are different or worse than usual.     · You feel dizzy or lightheaded, or you feel like you may faint.    Watch closely for changes in your health, and be sure to contact your doctor if you have any problems. Where can you learn more? Go to http://ovidio-eliz.info/. Enter H129 in the search box to learn more about \"Angina: Care Instructions. \"  Current as of: July 22, 2018  Content Version: 11.9  © 2867-1128 [employer]. Care instructions adapted under license by [employer] (which disclaims liability or warranty for this information). If you have questions about a medical condition or this instruction, always ask your healthcare professional. [place] [address] any warranty or liability for your use of this information. Patient Education        Chest Pain: Care Instructions  Your Care Instructions    There are many things that can cause chest pain. Some are not serious and will get better on their own in a few days. But some kinds of chest pain need more testing and treatment. Your doctor may have recommended a follow-up visit in the next 8 to 12 hours. If you are not getting better, you may need more tests or treatment. Even though your doctor has released you, you still need to watch for any problems. The doctor carefully checked you, but sometimes problems can develop later. If you have new symptoms or if your symptoms do not get better, get medical care right away. If you have worse or different chest pain or pressure that lasts more than 5 minutes or you passed out (lost consciousness), call 911 or seek other emergency help right away. A medical visit is only one step in your treatment. Even if you feel better, you still need to do what your doctor recommends, such as going to all suggested follow-up appointments and taking medicines exactly as directed. This will help you recover and help prevent future problems. How can you care for yourself at home? · Rest until you feel better. · Take your medicine exactly as prescribed.  Call your doctor if you think you are having a problem with your medicine. · Do not drive after taking a prescription pain medicine. When should you call for help? Call 911 if:    · You passed out (lost consciousness).     · You have severe difficulty breathing.     · You have symptoms of a heart attack. These may include:  ? Chest pain or pressure, or a strange feeling in your chest.  ? Sweating. ? Shortness of breath. ? Nausea or vomiting. ? Pain, pressure, or a strange feeling in your back, neck, jaw, or upper belly or in one or both shoulders or arms. ? Lightheadedness or sudden weakness. ? A fast or irregular heartbeat. After you call 911, the  may tell you to chew 1 adult-strength or 2 to 4 low-dose aspirin. Wait for an ambulance. Do not try to drive yourself.    Call your doctor today if:    · You have any trouble breathing.     · Your chest pain gets worse.     · You are dizzy or lightheaded, or you feel like you may faint.     · You are not getting better as expected.     · You are having new or different chest pain. Where can you learn more? Go to http://ovidio-eliz.info/. Enter A120 in the search box to learn more about \"Chest Pain: Care Instructions. \"  Current as of: September 23, 2018  Content Version: 11.9  © 8313-6648 [employer]. Care instructions adapted under license by [employer] (which disclaims liability or warranty for this information). If you have questions about a medical condition or this instruction, always ask your healthcare professional. [place] [address] any warranty or liability for your use of this information. Patient Education        Coronary Artery Disease: Care Instructions  Your Care Instructions    The heart is a muscle, and like any muscle, it needs blood to work well. Coronary artery disease occurs when the arteries that bring oxygen-rich blood to your heart have a buildup of plaque--deposits of fats and other substances.  Plaque can reduce blood flow to the heart muscle. This can cause angina symptoms such as chest pain or pressure. A heart attack can happen if blood flow is completely blocked. You can do a lot to improve your health and prevent a heart attack. Eating healthy food, not smoking, getting regular exercise, and taking your medicine are the main things you can do every day to stay healthy. Follow-up care is a key part of your treatment and safety. Be sure to make and go to all appointments, and call your doctor if you are having problems. It's also a good idea to know your test results and keep a list of the medicines you take. How can you care for yourself at home? Medicines    · Be safe with medicines. Take your medicines exactly as prescribed. Call your doctor if you think you are having a problem with your medicine. You will get more details on the specific medicines your doctor prescribes. You may need several medicines. ? Angiotensin-converting enzyme (ACE) inhibitors, angiotensin II receptor blockers (ARBs), beta-blockers, and statins can help prevent a heart attack. ACE inhibitors, ARBs, and beta-blockers help lower your blood pressure. Statins help lower cholesterol, which is a type of fat that can clog your arteries. ? Nitrates can help make chest pain happen less often. ? Aspirin and other blood thinners help prevent heart attacks and strokes.     · If your doctor has given you nitroglycerin for angina symptoms (such as chest pain or pressure) keep it with you at all times. If you have symptoms, sit down and rest, and take the first dose of nitroglycerin as directed. If your symptoms get worse or are not getting better within 5 minutes, call 911 right away. Stay on the phone.  The emergency  will give you further instructions.     · Be sure to tell your doctor about any angina symptoms that you have had, even if they went away.     · Do not take any over-the-counter medicines, vitamins, or herbal products without talking to your doctor first.   [facility] your doctor if a cardiac rehab program is right for you. Cardiac rehab can help you make lifestyle changes. In cardiac rehab, a team of health professionals provides education and support to help you make new, healthy habits.    · Do not smoke. Avoid secondhand smoke too. Smoking can increase your risk of a heart attack or stroke. If you need help quitting, talk to your doctor about stop-smoking programs and medicines. These can increase your chances of quitting for good.     · Eat a heart-healthy diet that is high in fiber and low in saturated fat and sodium. ? Learn what a serving is. A \"serving\" and a \"portion\" are not always the same thing. Make sure that you are not eating larger portions than recommended. For example, a serving of pasta is ½ cup. A serving size of meat is 2 to 3 ounces; a 3-ounce serving is about the size of a deck of cards. ? Eat a variety of grain products every day. Include whole-grain foods such as oats, whole wheat bread, and brown rice. ? Eat fish, skinless poultry, lean meats, and soy products such as tofu instead of high-fat meats. Cut out all visible fat when you prepare meat. Eat at least 2 servings of fish a week. ? Eat a variety of fruit and vegetables every day. They have lots of nutrients that help protect against heart disease, and they have little--if any--fat. Keep carrots, celery, and other veggies handy for snacks. Buy fruit that is in season and store it where you can see it so that you will be tempted to eat it. Cook dishes that have a lot of veggies in them, such as stir-fried dishes and soups. ? Read food labels and try to avoid saturated fat and trans fat. They increase your risk of heart disease. Bake, broil, grill, or steam foods instead of frying them. Use olive or canola oil when you cook. Try cholesterol-lowering spreads, such as Benecol or Take Control. ? Limit sodium.  Your doctor may recommend that you limit sodium to less than 1,500 mg a day. ? Limit processed foods, including cookies and crackers. ? Limit drinks and foods with added sugar. ? Choose low-fat or fat-free milk and dairy products. ? Limit alcohol to 2 drinks a day for men and 1 drink a day for women. Too much alcohol can cause health problems.     · If your doctor recommends it, get more exercise. Walking is a good choice. Bit by bit, increase the amount you walk every day. Try for at least 30 minutes on most days of the week. You also may want to swim, bike, or do other activities.     · Stay at a healthy weight. Lose weight if you need to.     · Talk to your family, friends, or a therapist about your feelings. It is normal to feel upset about having this disease and to feel afraid of having a heart attack. Talking openly about your feelings can help you cope. If you think you have symptoms of depression, talk to your doctor.     · Avoid colds and flu. Get a pneumococcal vaccine shot. If you have had one before, ask your doctor whether you need another dose. Get a flu vaccine every year. If you must be around people with colds or flu, wash your hands often. When should you call for help? Call 911 anytime you think you may need emergency care. For example, call if:    · You have symptoms of a heart attack. These may include:  ? Chest pain or pressure, or a strange feeling in the chest.  ? Sweating. ? Shortness of breath. ? Nausea or vomiting. ? Pain, pressure, or a strange feeling in the back, neck, jaw, or upper belly or in one or both shoulders or arms. ? Lightheadedness or sudden weakness. ? A fast or irregular heartbeat. After you call 911, the  may tell you to chew 1 adult-strength or 2 to 4 low-dose aspirin. Wait for an ambulance.  Do not try to drive yourself.     · You have angina symptoms (such as chest pain or pressure) that do not go away with rest or are not getting better within 5 minutes after you take a dose of nitroglycerin.     · You passed out (lost consciousness).    Call your doctor now or seek immediate medical care if:    · You are having angina symptoms, such as chest pain or pressure, more often than usual, or they are different or worse than usual.     · You have new or increased shortness of breath.     · You are dizzy or lightheaded, or you feel like you may faint.    Watch closely for changes in your health, and be sure to contact your doctor if you have any problems. Where can you learn more? Go to http://ovidio-eliz.info/. Enter Z682 in the search box to learn more about \"Coronary Artery Disease: Care Instructions. \"  Current as of: July 22, 2018  Content Version: 11.9  © 9333-5215 [employer] Care instructions adapted under license by [employer] (which disclaims liability or warranty for this information). If you have questions about a medical condition or this instruction, always ask your healthcare professional. [place] [address] any warranty or liability for your use of this information. Patient Education        Percutaneous Coronary Intervention: What to Expect at Home  Your Recovery    Percutaneous coronary intervention (PCI) is the name for procedures that are used to open a narrowed or blocked coronary artery. The two most common PCI procedures are coronary angioplasty and coronary stent placement. Your groin or arm may have a bruise and feel sore for a day or two after a percutaneous coronary intervention (PCI). You can do light activities around the house, but nothing strenuous for several days. This care sheet gives you a general idea about how long it will take for you to recover. But each person recovers at a different pace. Follow the steps below to get better as quickly as possible. How can you care for yourself at home? Activity    · If the doctor gave you a sedative:  ?  For 24 hours, don't do anything that requires attention to detail. It takes time for the medicine's effects to completely wear off.  ? For your safety, do not drive or operate any machinery that could be dangerous. Wait until the medicine wears off and you can think clearly and react easily.     · Do not do strenuous exercise and do not lift, pull, or push anything heavy until your doctor says it is okay. This may be for a day or two. You can walk around the house and do light activity, such as cooking.     · If the catheter was placed in your groin, try not to walk up stairs for the first couple of days.     · If the catheter was placed in your arm near your wrist, do not bend your wrist deeply for the first couple of days. Be careful using your hand to get into and out of a chair or bed.     · Carry your stent identification card with you at all times.     · If your doctor recommends it, get more exercise. Walking is a good choice. Bit by bit, increase the amount you walk every day. Try for at least 30 minutes on most days of the week. Diet    · Drink plenty of fluids to help your body flush out the dye. If you have kidney, heart, or liver disease and have to limit fluids, talk with your doctor before you increase the amount of fluids you drink.     · Keep eating a heart-healthy diet that has lots of fruits, vegetables, and whole grains. If you have not been eating this way, talk to your doctor. You also may want to talk to a dietitian. This expert can help you to learn about healthy foods and plan meals. Medicines    · Your doctor will tell you if and when you can restart your medicines. He or she will also give you instructions about taking any new medicines.     · If you take blood thinners, such as warfarin (Coumadin), clopidogrel (Plavix), or aspirin, be sure to talk to your doctor. He or she will tell you if and when to start taking those medicines again.  Make sure that you understand exactly what your doctor wants you to do.     · Your doctor will prescribe blood-thinning medicines. You will likely take aspirin plus another antiplatelet, such as clopidogrel (Plavix). It is very important that you take these medicines exactly as directed. These medicines help keep the coronary artery open and reduce your risk of a heart attack.     · Call your doctor if you think you are having a problem with your medicine.    Care of the catheter site    · For 1 or 2 days, keep a bandage over the spot where the catheter was inserted. The bandage probably will fall off in this time.     · Put ice or a cold pack on the area for 10 to 20 minutes at a time to help with soreness or swelling. Put a thin cloth between the ice and your skin.     · You may shower 24 to 48 hours after the procedure, if your doctor okays it. Pat the incision dry.     · Do not soak the catheter site until it is healed. Don't take a bath for 1 week, or until your doctor tells you it is okay.     · If you are bleeding, lie down and press on the area for 15 minutes to try to make it stop. If the bleeding does not stop, call your doctor or seek immediate medical care. Follow-up care is a key part of your treatment and safety. Be sure to make and go to all appointments, and call your doctor if you are having problems. It's also a good idea to know your test results and keep a list of the medicines you take. When should you call for help? Call 911 anytime you think you may need emergency care. For example, call if:    · You passed out (lost consciousness).     · You have severe trouble breathing.     · You have sudden chest pain and shortness of breath, or you cough up blood.     · You have symptoms of a heart attack, such as:  ? Chest pain or pressure. ? Sweating. ? Shortness of breath. ? Nausea or vomiting. ? Pain that spreads from the chest to the neck, jaw, or one or both shoulders or arms. ? Dizziness or lightheadedness. ? A fast or uneven pulse. After calling 911, chew 1 adult-strength aspirin.  Wait for an ambulance. Do not try to drive yourself.     · You have been diagnosed with angina, and you have angina symptoms that do not go away with rest or are not getting better within 5 minutes after you take one dose of nitroglycerin.    Call your doctor now or seek immediate medical care if:    · You are bleeding from the area where the catheter was put in your artery.     · You have a fast-growing, painful lump at the catheter site.     · You have signs of infection, such as:  ? Increased pain, swelling, warmth, or redness. ? Red streaks leading from the catheter site. ? Pus draining from the catheter site. ? A fever.     · Your leg or arm looks blue or feels cold, numb, or tingly.    Watch closely for changes in your health, and be sure to contact your doctor if you have any problems. Where can you learn more? Go to http://ovidio-eliz.info/. Enter U292 in the search box to learn more about \"Percutaneous Coronary Intervention: What to Expect at Home. \"  Current as of: July 22, 2018  Content Version: 11.9  © 0170-0561 [employer]. Care instructions adapted under license by [employer] (which disclaims liability or warranty for this information). If you have questions about a medical condition or this instruction, always ask your healthcare professional. [address] any warranty or liability for your use of this information. Patient armband removed and shredded  MyChart Activation    Thank you for requesting access to [facility]. Please follow the instructions below to securely access and download your online medical record. [facility] allows you to send messages to your doctor, view your test results, renew your prescriptions, schedule appointments, and more. How Do I Sign Up? 1. In your internet browser, go to [email or web address]  2. Click on the First Time User? Click Here link in the Sign In box.  You will be redirect to the New Member Sign Up page.  3. Enter your [facility] Access Code exactly as it appears below. You will not need to use this code after youve completed the sign-up process. If you do not sign up before the expiration date, you must request a new code. MyChart Access Code: Activation code not generated  Current [facility] Status: Patient Declined (This is the date your MyChart access code will )    4. Enter the last four digits of your Social Security Number (xxxx) and Date of Birth (mm/dd/yyyy) as indicated and click Submit. You will be taken to the next sign-up page. 5. Create a [facility] Mediat ID. This will be your [facility] login ID and cannot be changed, so think of one that is secure and easy to remember. 6. Create a [employer] password. You can change your password at any time. 7. Enter your Password Reset Question and Answer. This can be used at a later time if you forget your password. 8. Enter your e-mail address. You will receive e-mail notification when new information is available in [address]. 9. Click Sign Up. You can now view and download portions of your medical record. 10. Click the Download Summary menu link to download a portable copy of your medical information. Additional Information    If you have questions, please visit the Frequently Asked Questions section of the Z[facility] website at [email or web address] Remember, [facility] is NOT to be used for urgent needs. For medical emergencies, dial 911. DISCHARGE SUMMARY from Nurse    PATIENT INSTRUCTIONS:    After general anesthesia or intravenous sedation, for 24 hours or while taking prescription Narcotics:  · Limit your activities  · Do not drive and operate hazardous machinery  · Do not make important personal or business decisions  · Do  not drink alcoholic beverages  · If you have not urinated within 8 hours after discharge, please contact your surgeon on call.     Report the following to your surgeon:  · Excessive pain, swelling, redness or odor of or around the surgical area  · Temperature over 100.5  · Nausea and vomiting lasting longer than 4 hours or if unable to take medications  · Any signs of decreased circulation or nerve impairment to extremity: change in color, persistent  numbness, tingling, coldness or increase pain  · Any questions    What to do at Home:  Recommended activity: Activity as tolerated,     If you experience any of the following symptoms shortness of breath, chest pain, unresolved pain and/or bleeding, and fever unresolved above 100.6, please follow up with nearest ER and/or PCP. *  Please give a list of your current medications to your Primary Care Provider. *  Please update this list whenever your medications are discontinued, doses are      changed, or new medications (including over-the-counter products) are added. *  Please carry medication information at all times in case of emergency situations. These are general instructions for a healthy lifestyle:    No smoking/ No tobacco products/ Avoid exposure to second hand smoke  Surgeon General's Warning:  Quitting smoking now greatly reduces serious risk to your health. Obesity, smoking, and sedentary lifestyle greatly increases your risk for illness    A healthy diet, regular physical exercise & weight monitoring are important for maintaining a healthy lifestyle    You may be retaining fluid if you have a history of heart failure or if you experience any of the following symptoms:  Weight gain of 3 pounds or more overnight or 5 pounds in a week, increased swelling in our hands or feet or shortness of breath while lying flat in bed. Please call your doctor as soon as you notice any of these symptoms; do not wait until your next office visit.     Recognize signs and symptoms of STROKE:    F-face looks uneven    A-arms unable to move or move unevenly    S-speech slurred or non-existent    T-time-call 911 as soon as signs and symptoms begin-DO NOT go       Back to bed or wait to see if you get better-TIME IS BRAIN. Warning Signs of HEART ATTACK     Call 911 if you have these symptoms:   Chest discomfort. Most heart attacks involve discomfort in the center of the chest that lasts more than a few minutes, or that goes away and comes back. It can feel like uncomfortable pressure, squeezing, fullness, or pain.  Discomfort in other areas of the upper body. Symptoms can include pain or discomfort in one or both arms, the back, neck, jaw, or stomach.  Shortness of breath with or without chest discomfort.  Other signs may include breaking out in a cold sweat, nausea, or lightheadedness. Don't wait more than five minutes to call 911 - MINUTES MATTER! Fast action can save your life. Calling 911 is almost always the fastest way to get lifesaving treatment. Emergency Medical Services staff can begin treatment when they arrive -- up to an hour sooner than if someone gets to the hospital by car. The discharge information has been reviewed with the patient. The patient verbalized understanding. Discharge medications reviewed with the patient and appropriate educational materials and side effects teaching were provided.   ___________________________________________________________________________________________________________________________________

## 2019-03-28 NOTE — PROGRESS NOTES
Problem: Falls - Risk of 
Goal: *Absence of Falls Description Document Tony Fells Fall Risk and appropriate interventions in the flowsheet.  
Outcome: Progressing Towards Goal

## 2019-03-28 NOTE — ROUTINE PROCESS
Received bedside report from [address], patient was resting in bed, watching television, HOB elevated, bed in lowest position and oriented to call button. Gave bedside report to [address], using SBAR, MAR, and Kardex.

## 2019-03-28 NOTE — PROGRESS NOTES
TRANSFER - IN REPORT: 
 
Verbal report received from [address] [facility] on [address]  being received from [facility] for ordered procedure Report consisted of patients Situation, Background, Assessment and  
Recommendations(SBAR). Information from the following report(s) SBAR, Procedure Summary and MAR was reviewed with the receiving nurse. Opportunity for questions and clarification was provided. Assessment completed upon patients arrival to unit and care assumed.

## 2019-03-28 NOTE — DISCHARGE SUMMARY
Hospitalist Discharge Summary    Patient: Rosey Duarte MRN: [id number]  CSN: [id number]    YOB: 1945  Age: 68 y.o. Sex: male    DOA: 3/26/2019 LOS:  LOS: 0 days   Discharge Date:      Admission Diagnoses: Chest pain, moderate coronary artery risk [R07.9]  Chest pain [R07.9]  Chest pain [R07.9]    Discharge Diagnoses:    1. Atypical chest pain   2. H/o HTN   3. H/o GERD   4. H/o Neuropathy   5. H/o HLPD - says he doesn't tolerate statins   6. H/o MGUS     Discharge Condition: Fair    Discharge To: Home    Hospital Course:   68 y.o. AA male with HTN, HLD, MGUS presenting with chest pain. Started at 8 PM on 03/26/219. Was laying in bed and had sudden onset substernal chest pain radiation to left shoulder/arm with arm paraesthesias . No nausea, diaphoresis or dyspnea. Pain lasted <1 min then spontaneously resolved. Occurred 1-2 more times. Refused ASA by EMS. Denies chest pain now. According to the patient he was supposed to have stress test on 04/05/19 but the chest pain made him come to the ED. No dizziness. No SOB. Has not seen any cardiology before. No recent history of travel or surgery. Pt admitted to the hosp for atypical chest pain with negative cardiac enyzmes X 2   Pt seen by cardiology & underwent a Nuc stress test which was abnormal -- pt was referred for a LHC   Pt underwent a LHC which did not show any evidence of CAD. Per Cardiology pt can be discharged home with meds  Discussed with pt & family at bedside , they are ok with him going home. Consults: Cardiology    Significant Diagnostic Studies:   Nuc Stress Test     Abnormal myocardial perfusion imaging. Reversible defect consistent with myocardial ischemia. Myocardial perfusion imaging supports an intermediate risk stress test.   There is no prior study available for comparison. Aicha Melendez   Discharge Medications:     Current Discharge Medication List      START taking these medications    Details   pantoprazole (PROTONIX) 40 mg tablet Take 1 Tab by mouth daily. Qty: 30 Tab, Refills: 0      polyethylene glycol (MIRALAX) 17 gram packet Take 1 Packet by mouth daily. Qty: 30 Packet, Refills: 0         CONTINUE these medications which have CHANGED    Details   amLODIPine (NORVASC) 5 mg tablet Take 2 Tabs by mouth daily. Qty: 30 Tab, Refills: 0         CONTINUE these medications which have NOT CHANGED    Details   prednisoLONE acetate (PRED FORTE) 1 % ophthalmic suspension Administer 1 Drop to both eyes four (4) times daily. gabapentin (NEURONTIN) 300 mg capsule Take 1 Cap by mouth three (3) times daily. Qty: 90 Cap, Refills: 2    Associated Diagnoses: Polyneuropathy; Cervical radiculopathy; Mononeuropathy of left radial nerve      albuterol (PROVENTIL HFA, VENTOLIN HFA, PROAIR HFA) 90 mcg/actuation inhaler Take 2 Puffs by inhalation every four (4) hours as needed for Wheezing or Shortness of Breath. Indications: BRONCHOSPASM PREVENTION  Qty: 1 Inhaler, Refills: 0      finasteride (PROSCAR) 5 mg tablet Take 1 Tab by mouth daily. Qty: 30 Tab, Refills: 5      spironolactone (ALDACTONE) 25 mg tablet Take  by mouth daily.              Activity: Activity as tolerated    Diet: Cardiac Diet    Wound Care: None needed    Follow-up: with PCP & GI  in 2 weeks     Fay Solomon MD  3/28/2019, 2:04 PM

## 2019-03-29 NOTE — ED TRIAGE NOTES
Pt arrived to ED for c/o right hand swelling. Pt states that he was discharged today after having a cardiac catheterization, where they got access through his right radial artery. Pt states that his right hand is now swollen.

## 2019-03-29 NOTE — ADT AUTH CERT NOTES
Utilization Reviews  
 
   
LOC:Acute Adult-Acute Coronary Syndrome (ACS) by Saran Hummel  
 
   
Review Status Review Entered In Primary 3/28/2019 12:52  
   
Criteria Review REVIEW SUMMARY 
  
Patient: Leila Queen Review Number: [id number] Review Status: In Primary 
  
Condition Specific: Yes 
  
  
OUTCOMES Outcome Type: Primary 
  
  
  
REVIEW DETAILS 
  
Product: Clay Ohm Adult Subset: Acute Coronary Syndrome (ACS) (Symptom or finding within 24h) 
  (Excludes PO medications unless noted) [X] Select Day, One: 
            [X] Episode Day 1, One: 
                [X] INTERMEDIATE, One: 
                ~--Admin, IQ Admin Admin on 03- 12:52 PM--~ 
                CARDIOLOGY PROGRESS NOTE Discussed with patient-- continues to have intermittent chest pain while on ntg paste- concerning for unstable angina. Will proceed with [facility]. T 97.8, /75, P 63, R 18 PTT 82.1, , RECS: 
                1.       Chest pain with typical and atypical features-  stress test revealed moderate reversible inferior wall ischemia. Has multiple cardiac risk factors 2.       Hypertension- stable on Norvasc and aldactone . will monitor  
                3.       Shortness of breat-Possible CHF, hypertensive heart disease, LVH, ischemia. follow up NUC stress test and Echo  
                4.       Hypercholesterolemia- Patient was on atorvastatin. Patient was intolerant n past due to muscle spasm. 5.       Hx of Peripheral vascular disease-Moderate PAD left leg 6.       History of rheumatic fever- At age 4.  Rule out valvular heart disease follow up to assess lvf or any VHD                 7.       Hx of tobacco and alcohol abuse- per patient quit 4 years ago.  
                  
 NPO EXCEPT MEDS AFTER MN., DUO NEBS Q 4 HRS, NORVASC 5 MG PO QD, HEPARIN DRIP TITRATED, MORPHINE 1 MG IV Q 4 HRS PRN X1, NITROBID 2% 1/2\" TOP TID, ALDACTONE 25 MG PO QD, [X] Positive diagnostic testing, All: 
                        [X] Imaging, >= One: 
                            [X] Ischemia on stress test 
                        [X] Post Observation level of care, Both: 
                            [X] Post Observation level of care [X] Finding, >= One: 
                                [X] Chest pain 
                        [X] Medication, All: 
                            [X] Beta blocker, One: 
                                [X] Beta blocker contraindicated [X] Aspirin, One: 
                                [X] Aspirin contraindicated [X] Antiplatelet, One: 
                                [X] Antiplatelet contraindicated [X] Anticoagulant, One: 
                                [X] Anticoagulant administered ~--Admin, IQ Admin Admin on 03- 12:44 PM--~ 
                                heparin drip titrated [X] Angiotensin medication, One: 
                                [X] Angiotensin-converting enzyme inhibitor (ACE), One: 
                                    [X] Angiotensin-converting enzyme (ACE) inhibitor contraindicated [X] Oxygenation, One: 
                                [X] Not requiring oxygen, One: 
                                    [X] O2 sat >= 90%(0.90) 
                                    ~--Admin, IQ Admin Admin on 03- 12:46 PM--~ 
                                    02 sat 97% ra [X] Continuous cardiac monitoring (excludes Holter) 
                            ~--Admin, IQ Admin Admin on 03- 12:46 PM--~ 
                            TELE 
                             
                             
                             
                    [ ] Unstable angina (UA), All: 
                        [X] Pain, One: 
                            [X] Pain controlled with medication 
  
Version: [employer]® 2018.1 [place]  © 2018 [employer] and/or one of its [place]. All Rights Reserved. CPT only © 2017 American Medical Association. All Rights Reserved.

## 2019-03-29 NOTE — ED PROVIDER NOTES
EMERGENCY DEPARTMENT HISTORY AND PHYSICAL EXAM 
 
6:26 AM 
 
 
Date: 3/28/2019 Patient Name: Kamilah Mendoza History of Presenting Illness Chief Complaint Patient presents with  
 Hand Swelling History Provided By: Patient Additional History (Context): Kamilah Mendoza is a 68 y.o. male with hypertension and hyperlipidemia and cardiac cath in the right arm on yesterday morning by Dr. Christine Navarro who presents with complaint of some mild swelling of the right hand since the procedure. Denies any pain, fever, shortness of breath or chest pain. He states that he is not sure when he is to remove the pressure dressing on his right wrist.  No other complaints PCP: Luis Bernal MD 
 
 
 
Past History Past Medical History: 
Past Medical History:  
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  
 lumbar spine injury secondary to fall  
 HTN (hypertension)  Hypercholesterolemia  Injury of lumbar spine ([facility] [address] 1974  Leg pain, right  Nocturia  Overactive bladder  Peripheral vascular disease ([facility] [address] Past Surgical History: 
Past Surgical History:  
Procedure Laterality Date  HX ORTHOPAEDIC    
 finger amputation left hand  HX TONSILLECTOMY Family History: 
Family History Problem Relation Age of Onset  Diabetes Mother  Hypertension Mother  Diabetes Maternal Grandmother  Hypertension Maternal Grandmother Social History: 
Social History Tobacco Use  Smoking status: Former Smoker Last attempt to quit: 7/13/2015 Years since quitting: 3.7  Smokeless tobacco: Never Used Substance Use Topics  Alcohol use: Not Currently Alcohol/week: 0.0 oz  
  Comment: former stopped 2015  Drug use: No  
 
 
Allergies: Allergies Allergen Reactions  Ampicillin Angioedema  Asa-Acetaminophen-Caff-Buffers Rash  Penicillins Angioedema  Atorvastatin Other (comments) Muscle cramps Review of Systems Review of Systems Constitutional: Negative for chills and fever. HENT: Negative for congestion, rhinorrhea, sore throat and trouble swallowing. Eyes: Negative for visual disturbance. Respiratory: Negative for cough, shortness of breath and wheezing. Cardiovascular: Negative for chest pain and leg swelling. Gastrointestinal: Negative for abdominal pain, nausea and vomiting. Endocrine: Negative for polyuria. Genitourinary: Negative for difficulty urinating and dysuria. Musculoskeletal: Negative for arthralgias and neck stiffness. Skin: Negative for rash and wound. Right hand swelling Neurological: Negative for dizziness, weakness, numbness and headaches. Hematological: Does not bruise/bleed easily. Psychiatric/Behavioral: Negative for confusion and dysphoric mood. All other systems reviewed and are negative. Physical Exam  
 
Visit Vitals /75 (BP 1 Location: Left arm, BP Patient Position: At rest;Sitting) Pulse 89 Temp 98.6 °F (37 °C) Resp 16 Ht 5' 7.01\" (1.702 m) Wt 82.1 kg (181 lb) SpO2 96% BMI 28.34 kg/m² Physical Exam  
Constitutional: He is oriented to person, place, and time. He appears well-developed and well-nourished. No distress. HENT:  
Head: Normocephalic and atraumatic. Eyes: Conjunctivae are normal. No scleral icterus. Neck: Normal range of motion. Neck supple. Cardiovascular: Intact distal pulses. Pulmonary/Chest: Effort normal and breath sounds normal. No respiratory distress. Abdominal: Soft. Bowel sounds are normal. He exhibits no distension. There is no tenderness. Musculoskeletal: Normal range of motion. He exhibits no edema. No right hand tenderness no wrist tenderness Neurological: He is alert and oriented to person, place, and time.  No cranial nerve deficit. He exhibits normal muscle tone. Coordination normal.  
Right hand: 
Sensation intact Median ulnar radial nerves intact right hand Hand strength 5 out of 5 Skin: Skin is warm and dry. No rash noted. He is not diaphoretic. Minimal right hand swelling. No erythema no tenderness of the right hand. Has on Kerlix dressing over the right wrist at the site of the recent cardiac cath. Good radial pulse. Cap refill less than 2 seconds in all of his fingers on right hand I removed wrap and dressing from right wrist removed gauze on side of the catheterization, no bleeding. Pinpoint area of blood on the gauze which has been on the site for about 20 hours hours according to patient Psychiatric: He has a normal mood and affect. His behavior is normal.  
Nursing note and vitals reviewed. Diagnostic Study Results Labs - No results found for this or any previous visit (from the past 12 hour(s)). Radiologic Studies - No orders to display Medical Decision Making I am the first provider for this patient. I reviewed the vital signs, available nursing notes, past medical history, past surgical history, family history and social history. Vital Signs-Reviewed the patient's vital signs. Records Reviewed: Nursing Notes and Old Medical Records (Time of Review: 6:26 AM) Provider Notes (Medical Decision Making): MDM Medications - No data to display ED Course: Progress Notes, Reevaluation, and Consults: 
Very minimal swelling of the right hand, no signs of infection no redness no tenderness. After removing the wrap pressure gauze no swelling of the arm. Minimal swelling likely associated from the gauze wrapping in addition to recent procedure. No signs or symptoms of any thrombosis I have reassessed the patient. I have discussed the workup, results and plan with the patient and patient is in agreement.   Patient is feeling better after the wrap was removed. Patient was discharge in stable condition. Patient was given outpatient follow up. Patient is to return to emergency department if any new or worsening condition. Diagnosis Clinical Impression: 1. Swelling of right hand Disposition: Discharged Follow-up Information Follow up With Specialties Details Why Contact Info Edel Nj MD Cardiology, Internal Medicine Schedule an appointment as soon as possible for a visit in 3 days  [address] [place] [address] [facility] [address] 
[phone] Darien Hernández MD Family Practice Schedule an appointment as soon as possible for a visit  [address] [id number] 
[phone] [facility] EMERGENCY DEPT Emergency Medicine  As needed, If symptoms worsen [address] [id number] 
[phone] Discharge Medication List as of 3/29/2019  7:31 AM  
  
CONTINUE these medications which have NOT CHANGED Details  
pantoprazole (PROTONIX) 40 mg tablet Take 1 Tab by mouth daily. , Print, Disp-30 Tab, R-0  
  
polyethylene glycol (MIRALAX) 17 gram packet Take 1 Packet by mouth daily. , Print, Disp-30 Packet, R-0  
  
amLODIPine (NORVASC) 10 mg tablet Take 1 Tab by mouth daily. , Print, Disp-30 Tab, R-0  
  
prednisoLONE acetate (PRED FORTE) 1 % ophthalmic suspension Administer 1 Drop to both eyes four (4) times daily. , Historical Med  
  
gabapentin (NEURONTIN) 300 mg capsule Take 1 Cap by mouth three (3) times daily. , Normal, Disp-90 Cap, R-2  
  
albuterol (PROVENTIL HFA, VENTOLIN HFA, PROAIR HFA) 90 mcg/actuation inhaler Take 2 Puffs by inhalation every four (4) hours as needed for Wheezing or Shortness of Breath. Indications: BRONCHOSPASM PREVENTION, Print, Disp-1 Inhaler, R-0  
  
finasteride (PROSCAR) 5 mg tablet Take 1 Tab by mouth daily. , Normal, Disp-30 Tab, R-5  
  
spironolactone (ALDACTONE) 25 mg tablet Take  by mouth daily. , Historical Med  
  
  
 DO Ponce Purvis medical dictation software was used for portions of this report. Unintended transcription errors may occur. My signature above authenticates this document and my orders, the final   
diagnosis (es), discharge prescription (s), and instructions in the Epic   
record. If you have any questions please contact [phone].

## 2019-04-05 ENCOUNTER — OFFICE VISIT (OUTPATIENT)
Dept: CARDIOLOGY CLINIC | Age: 74
End: 2019-04-05

## 2019-04-05 VITALS
HEIGHT: 67 IN | SYSTOLIC BLOOD PRESSURE: 111 MMHG | WEIGHT: 183.4 LBS | DIASTOLIC BLOOD PRESSURE: 57 MMHG | BODY MASS INDEX: 28.79 KG/M2 | HEART RATE: 79 BPM

## 2019-04-05 DIAGNOSIS — E78.00 HYPERCHOLESTEROLEMIA: ICD-10-CM

## 2019-04-05 DIAGNOSIS — I10 ESSENTIAL HYPERTENSION: ICD-10-CM

## 2019-04-05 DIAGNOSIS — R07.9 CHEST PAIN, UNSPECIFIED TYPE: Primary | ICD-10-CM

## 2019-04-05 NOTE — PROGRESS NOTES
1. Have you been to the ER, urgent care clinic since your last visit? Hospitalized since your last visit? Yes When: 03/2019 Where:  ED Reason for visit: Hand Swelling    2. Have you seen or consulted any other health care providers outside of the [address] [place] [address] since your last visit? Include any pap smears or colon screening.  Yes Where: PCP Reason for visit: Follow Up Visit

## 2019-04-05 NOTE — PROGRESS NOTES
HISTORY OF PRESENT ILLNESS  Tatiana Escobar is a 68 y.o. male. Patient is here for follow up of diagnostic tests. Results will be discussed. Chest Pain (Angina)    The history is provided by the patient. This is a new problem. The current episode started more than 1 week ago. The problem has been gradually worsening. The problem occurs daily. The pain is associated with exertion and rest. The pain is present in the substernal region, right side and left side. The pain is mild. The quality of the pain is described as pressure-like and heavy. The pain does not radiate. Associated symptoms include abdominal pain. Pertinent negatives include no claudication, no cough, no dizziness, no fever, no headaches, no hemoptysis, no nausea, no orthopnea, no palpitations, no PND, no shortness of breath, no sputum production, no vomiting and no weakness. Shortness of Breath   The history is provided by the patient. This is a new problem. The problem occurs frequently. The current episode started more than 1 week ago. The problem has been gradually worsening. Associated symptoms include abdominal pain. Pertinent negatives include no fever, no headaches, no cough, no sputum production, no hemoptysis, no wheezing, no PND, no orthopnea, no chest pain, no vomiting, no rash, no leg swelling and no claudication. Precipitated by: walking,exertion. Review of Systems   Constitutional: Negative for chills and fever. HENT: Negative for nosebleeds. Eyes: Negative for blurred vision and double vision. Respiratory: Negative for cough, hemoptysis, sputum production, shortness of breath and wheezing. Cardiovascular: Negative for chest pain, palpitations, orthopnea, claudication, leg swelling and PND. Gastrointestinal: Positive for abdominal pain. Negative for heartburn, nausea and vomiting. Musculoskeletal: Negative for myalgias. Skin: Negative for rash. Neurological: Negative for dizziness, weakness and headaches. Endo/Heme/Allergies: Does not bruise/bleed easily. Family History   Problem Relation Age of Onset    Diabetes Mother     Hypertension Mother     Diabetes Maternal Grandmother     Hypertension Maternal Grandmother        Past Medical History:   Diagnosis Date    Bladder outlet obstruction     Erectile disorder due to medical 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     Frequency of urination     H/O spinal cord injury 1974    lumbar spine injury secondary to fall    HTN (hypertension)     Hypercholesterolemia     Injury of lumbar spine ([address] 1974    Leg pain, right     Nocturia     Overactive bladder     Peripheral vascular disease (HCC)        Past Surgical History:   Procedure Laterality Date    HX ORTHOPAEDIC      finger amputation left hand    HX TONSILLECTOMY         Social History     Tobacco Use    Smoking status: Former Smoker     Last attempt to quit: 7/13/2015     Years since quitting: 3.7    Smokeless tobacco: Never Used   Substance Use Topics    Alcohol use: Not Currently     Alcohol/week: 0.0 oz     Comment: former stopped 2015       Allergies   Allergen Reactions    Ampicillin Angioedema    Asa-Acetaminophen-Caff-Buffers Rash    Penicillins Angioedema    Atorvastatin Other (comments)     Muscle cramps       Prior to Admission medications    Medication Sig Start Date End Date Taking? Authorizing Provider   pantoprazole (PROTONIX) 40 mg tablet Take 1 Tab by mouth daily. 3/28/19  Yes Cyndi Cano MD   polyethylene glycol [facility]) 17 gram packet Take 1 Packet by mouth daily. 3/28/19  Yes Cyndi Cano MD   amLODIPine (NORVASC) 10 mg tablet Take 1 Tab by mouth daily. 3/28/19  Yes Cyndi Cano MD   prednisoLONE acetate (PRED FORTE) 1 % ophthalmic suspension Administer 1 Drop to both eyes four (4) times daily. Yes Provider, Historical   gabapentin (NEURONTIN) 300 mg capsule Take 1 Cap by mouth three (3) times daily.  10/16/18  Yes Renea Dexter MD   albuterol (PROVENTIL HFA, VENTOLIN HFA, PROAIR HFA) 90 mcg/actuation inhaler Take 2 Puffs by inhalation every four (4) hours as needed for Wheezing or Shortness of Breath. Indications: BRONCHOSPASM PREVENTION 9/25/17  Yes Sammie Alas,    finasteride (PROSCAR) 5 mg tablet Take 1 Tab by mouth daily. 7/5/17  Yes Angeline Medrano MD   spironolactone (ALDACTONE) 25 mg tablet Take  by mouth daily. Yes Provider, Historical         Visit Vitals  /57   Pulse 79   Ht 5' 7\" (1.702 m)   Wt 83.2 kg (183 lb 6.4 oz)   BMI 28.72 kg/m²       Physical Exam   Constitutional: He is oriented to person, place, and time. He appears well-developed and well-nourished. HENT:   Head: Normocephalic and atraumatic. Eyes: Conjunctivae are normal.   Neck: Neck supple. No JVD present. No tracheal deviation present. No thyromegaly present. Cardiovascular: Normal rate, regular rhythm and normal heart sounds. Exam reveals no gallop and no friction rub. No murmur heard. Pulmonary/Chest: Breath sounds normal. No respiratory distress. He has no wheezes. He has no rales. He exhibits no tenderness. Abdominal: Soft. There is no tenderness. Musculoskeletal: He exhibits no edema. Neurological: He is alert and oriented to person, place, and time. Skin: Skin is warm and dry. Psychiatric: He has a normal mood and affect. Interpretation Summary 10/2018    Normal right lower extremity arterial findings. Moderate PAD left lower extremity. Disease noted at tibial level. 10/2018 EKG  DiagnosisFinal Sinus bradycardia   Moderate voltage criteria for LVH, may be normal variant   Possible Acute pericarditis   Abnormal ECG  2015-stress echo  Impressions: Normal study after maximal exercise without reproduction of  symptoms   ECG conclusions: The stress ECG was normal. Based on [employer]  Scoring, this patient was at low risk for cardiac events. Mr. Olena Danielson has a reminder for a \"due or due soon\" health maintenance.  I have asked that he contact his primary care provider for follow-up on this health maintenance. I have personally reviewed patient's records available from hospital and other providers and incorporated findings in patient care. I have personally reviewed patients ekg done at other facility. I Have personally reviewed recent relevant labs available and discussed with patient    Conclusion cardiac cath 3/2019    Non obstructive epicardial arteries with moderate mid LAD myocardial bridging noted. Will optimize CCB dose. Continue risk factor modification. Complications                      Coronary Findings     Diagnostic   Dominance: Right   Left Main   The vessel was visualized by angiography. The vessel is angiographically normal.   Left Anterior Descending   The vessel was visualized by angiography. The vessel is angiographically normal. Moderate myocardial bridging noted in mid LAD   Left Circumflex   The vessel was visualized by angiography. The vessel is angiographically normal.   Right Coronary Artery   The vessel was visualized by angiography. The vessel is angiographically normal.   Intervention     No interventions have been documented. Procedure Conclusion     Nuclear Stress Test 3/2019    Abnormal myocardial perfusion imaging. Reversible defect consistent with myocardial ischemia. Myocardial perfusion imaging supports an intermediate risk stress test.   There is no prior study available for comparison. .   Interpretation Summary     · Baseline ECG: Sinus bradycardia, ST elevation, consider early repolarization, pericarditis or injury Minimal voltage criteria for LVH maybe normal variant. · Gated SPECT: Left ventricular function post-stress was normal. Calculated ejection fraction is 71%. There is no evidence of transient ischemic dilation (TID). The TID ratio is 0.95.   · Negative stress test.  · Left ventricular perfusion is probably abnormal.  · Myocardial perfusion imaging defect 1: There is a defect that is small to moderate in size with a mild reduction in uptake present in the mid to basal inferior location(s) that is partially reversible. There is normal wall motion in the defect area. Viability in the area is good. The possibility of ischemia cannot be excluded. Perfusion defect was visually present without quantitative evidence. · Abnormal myocardial perfusion imaging. Reversible defect consistent with myocardial ischemia. Myocardial perfusion imaging supports an intermediate risk stress test.          Assessment         ICD-10-CM ICD-9-CM    1. Chest pain, unspecified type R07.9 786.50     Atypical stable noncardiac. No significant CAD continue medical management. Possible GI etiology   2. Essential hypertension I10 401.9     Stable   3. Hypercholesterolemia E78.00 272.0     Continue treatment lab with PCP     3/2019  New patient with increased chest pain and shortness of breath. Possible angina. Rule out CHF cardiomyopathy. Patient was intolerant to atorvastatin in past due to muscle spasm. Recheck lipids and decide on alternate statin. Patient had peptic ulcer many years ago and was told not to take aspirin as it upsets his stomach. Consider Plavix if needed    4/2019  Continues to have abdominal pain atypical chest pain. No significant CAD. Currently on Protonix. Will use Mylanta plus for gas. He has GI appointment next week. Cardiac status stable  There are no discontinued medications. No orders of the defined types were placed in this encounter. Follow-up and Dispositions    · Return in about 6 months (around 10/5/2019).

## 2019-04-30 ENCOUNTER — HOSPITAL ENCOUNTER (OUTPATIENT)
Dept: ONCOLOGY | Age: 74
Discharge: HOME | End: 2019-04-30

## 2019-04-30 ENCOUNTER — HOSPITAL ENCOUNTER (OUTPATIENT)
Dept: LAB | Age: 74
Discharge: HOME | End: 2019-04-30
Payer: MEDICAID

## 2019-04-30 ENCOUNTER — OFFICE VISIT (OUTPATIENT)
Dept: ONCOLOGY | Age: 74
End: 2019-04-30

## 2019-04-30 VITALS
TEMPERATURE: 98.4 F | OXYGEN SATURATION: 97 % | WEIGHT: 183.4 LBS | HEIGHT: 67 IN | HEART RATE: 83 BPM | SYSTOLIC BLOOD PRESSURE: 121 MMHG | BODY MASS INDEX: 28.79 KG/M2 | DIASTOLIC BLOOD PRESSURE: 71 MMHG

## 2019-04-30 DIAGNOSIS — D47.2 MONOCLONAL GAMMOPATHY OF UNDETERMINED SIGNIFICANCE: ICD-10-CM

## 2019-04-30 DIAGNOSIS — D47.2 MONOCLONAL GAMMOPATHY OF UNDETERMINED SIGNIFICANCE: Primary | ICD-10-CM

## 2019-04-30 DIAGNOSIS — G89.29 CHRONIC MIDLINE LOW BACK PAIN WITHOUT SCIATICA: ICD-10-CM

## 2019-04-30 DIAGNOSIS — M54.50 CHRONIC MIDLINE LOW BACK PAIN WITHOUT SCIATICA: ICD-10-CM

## 2019-04-30 LAB
ALBUMIN SERPL-MCNC: 3.5 G/DL (ref 3.4–5)
ALBUMIN/GLOB SERPL: 1 {RATIO} (ref 0.8–1.7)
ALP SERPL-CCNC: 70 U/L (ref 45–117)
ALT SERPL-CCNC: 20 U/L (ref 16–61)
ANION GAP SERPL CALC-SCNC: 7 MMOL/L (ref 3–18)
AST SERPL-CCNC: 18 U/L (ref 15–37)
BASO+EOS+MONOS # BLD AUTO: 0.5 K/UL (ref 0–2.3)
BASO+EOS+MONOS NFR BLD AUTO: 11 % (ref 0.1–17)
BILIRUB SERPL-MCNC: 0.2 MG/DL (ref 0.2–1)
BUN SERPL-MCNC: 14 MG/DL (ref 7–18)
BUN/CREAT SERPL: 11 (ref 12–20)
CALCIUM SERPL-MCNC: 9.2 MG/DL (ref 8.5–10.1)
CHLORIDE SERPL-SCNC: 105 MMOL/L (ref 100–108)
CO2 SERPL-SCNC: 26 MMOL/L (ref 21–32)
CREAT SERPL-MCNC: 1.29 MG/DL (ref 0.6–1.3)
DIFFERENTIAL METHOD BLD: NORMAL
ERYTHROCYTE [DISTWIDTH] IN BLOOD BY AUTOMATED COUNT: 14 % (ref 11.5–14.5)
GLOBULIN SER CALC-MCNC: 3.4 G/DL (ref 2–4)
GLUCOSE SERPL-MCNC: 108 MG/DL (ref 74–99)
HCT VFR BLD AUTO: 39.5 % (ref 36–48)
HGB BLD-MCNC: 12.9 G/DL (ref 12–16)
LYMPHOCYTES # BLD: 1.9 K/UL (ref 1.1–5.9)
LYMPHOCYTES NFR BLD: 39 % (ref 14–44)
MCH RBC QN AUTO: 30.3 PG (ref 25–35)
MCHC RBC AUTO-ENTMCNC: 32.7 G/DL (ref 31–37)
MCV RBC AUTO: 92.7 FL (ref 78–102)
NEUTS SEG # BLD: 2.6 K/UL (ref 1.8–9.5)
NEUTS SEG NFR BLD: 51 % (ref 40–70)
PLATELET # BLD AUTO: 259 K/UL (ref 140–440)
POTASSIUM SERPL-SCNC: 4.1 MMOL/L (ref 3.5–5.5)
PROT SERPL-MCNC: 6.9 G/DL (ref 6.4–8.2)
RBC # BLD AUTO: 4.26 M/UL (ref 4.1–5.1)
SODIUM SERPL-SCNC: 138 MMOL/L (ref 136–145)
WBC # BLD AUTO: 5 K/UL (ref 4.5–13)

## 2019-04-30 PROCEDURE — 84165 PROTEIN E-PHORESIS SERUM: CPT

## 2019-04-30 PROCEDURE — 80053 COMPREHEN METABOLIC PANEL: CPT

## 2019-04-30 PROCEDURE — 36415 COLL VENOUS BLD VENIPUNCTURE: CPT

## 2019-04-30 NOTE — PROGRESS NOTES
Hematology/Oncology  Progress Note Name: Kari Jj Date: 2019 : 1945 Monica Moe MD  
 
Mr. Karley Nair is a 68y.o. year old male who was seen for Monoclonal gammopathy of undetermined significance. Current Therapy- Active Surveillance Subjective:  
 
 Mr. Karley Nair is a 71-year-old male with a past medical history of CAD, HTN, PVD, radiculopathy of the lumbar region, chronic low back pain, osteoarthritis, and newly diagnosed with monoclonal gammopathy of unknown significance. The SPEP from 10/18/2018 showed an abnormal monoclonal paraprotein level of 0.7 g/dL, a normal beta-2 microglobulin level of 1.8 mg/L. His IgG level was normal at 1237 mg/dL, IgA was normal at 214 mg/dL, and IgM was normal at 52 mg/dL. The bone survey on 10/18/2018 showed no evidence of lytic lesions in the skeleton. Other than arthritic pain,  the patient reports that he is doing well. He currently uses OTC Tylenol and lidocain patch for pain control. Past medical history, family history, and social history: these were reviewed and remains unchanged. Past Medical History:  
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   
 lumbar spine injury secondary to fall  
 HTN (hypertension)  Hypercholesterolemia  Injury of lumbar spine ([facility] [address]   Leg pain, right  Nocturia  Overactive bladder  Peripheral vascular disease ([facility] [address] Past Surgical History:  
Procedure Laterality Date  HX ORTHOPAEDIC    
 finger amputation left hand  HX TONSILLECTOMY Social History Socioeconomic History  Marital status: SINGLE Spouse name: Not on file  Number of children: Not on file  Years of education: Not on file  Highest education level: Not on file Occupational History  Not on file Social Needs  Financial resource strain: Not on file  Food insecurity:  
  Worry: Not on file Inability: Not on file  Transportation needs:  
  Medical: Not on file Non-medical: Not on file Tobacco Use  Smoking status: Former Smoker Last attempt to quit: 7/13/2015 Years since quitting: 3.8  Smokeless tobacco: Never Used Substance and Sexual Activity  Alcohol use: Not Currently Alcohol/week: 0.0 oz  
  Comment: former stopped 2015  Drug use: No  
 Sexual activity: Yes Lifestyle  Physical activity:  
  Days per week: Not on file Minutes per session: Not on file  Stress: Not on file Relationships  Social connections:  
  Talks on phone: Not on file Gets together: Not on file Attends [employer] service: Not on file Active member of club or organization: Not on file Attends meetings of clubs or organizations: Not on file Relationship status: Not on file  Intimate partner violence:  
  Fear of current or ex partner: Not on file Emotionally abused: Not on file Physically abused: Not on file Forced sexual activity: Not on file Other Topics Concern  Not on file Social History Narrative  Not on file Family History Problem Relation Age of Onset  Diabetes Mother  Hypertension Mother  Diabetes Maternal Grandmother  Hypertension Maternal Grandmother Current Outpatient Medications Medication Sig Dispense Refill  pantoprazole (PROTONIX) 40 mg tablet Take 1 Tab by mouth daily. 30 Tab 0  
 polyethylene glycol (MIRALAX) 17 gram packet Take 1 Packet by mouth daily. 30 Packet 0  
 amLODIPine (NORVASC) 10 mg tablet Take 1 Tab by mouth daily. 30 Tab 0  prednisoLONE acetate (PRED FORTE) 1 % ophthalmic suspension Administer 1 Drop to both eyes four (4) times daily.  gabapentin (NEURONTIN) 300 mg capsule Take 1 Cap by mouth three (3) times daily.  90 Cap 2  
 albuterol (PROVENTIL HFA, VENTOLIN HFA, PROAIR HFA) 90 mcg/actuation inhaler Take 2 Puffs by inhalation every four (4) hours as needed for Wheezing or Shortness of Breath. Indications: BRONCHOSPASM PREVENTION 1 Inhaler 0  
 finasteride (PROSCAR) 5 mg tablet Take 1 Tab by mouth daily. 30 Tab 5  
 spironolactone (ALDACTONE) 25 mg tablet Take  by mouth daily. Review of Systems Constitutional: The patient has no acute distress or discomfort. HEENT: The patient denies recent head trauma, eye pain, blurred vision,  hearing deficit, oropharyngeal mucosal pain or lesions, and the patient denies throat pain or discomfort. Lymphatics: The patient denies palpable peripheral lymphadenopathy. Hematologic: The patient denies having bruising, bleeding, or progressive fatigue. Respiratory: Patient denies having shortness of breath, cough, sputum production, fever, or dyspnea on exertion. Cardiovascular: The patient denies having leg pain, leg swelling, heart palpitations, chest permit, chest pain, or lightheadedness. The patient denies having dyspnea on exertion. Gastrointestinal: The patient denies having nausea, emesis, or diarrhea. The patient denies having any hematemesis or blood in the stool. Genitourinary: Patient denies having urinary urgency, frequency, or dysuria. The patient denies having blood in the urine. Psychological: The patient denies having symptoms of nervousness, anxiety, depression, or thoughts of harming self. Skin: Patient denies having skin rashes, skin, ulcerations, or unexplained itching or pruritus. Musculoskeletal: The patient denies having pain in the joints or bones. Objective:  
 
Visit Vitals Blood Pressure 121/71 Pulse 83 Temperature 98.4 °F (36.9 °C) Height 5' 7\" (1.702 m) Weight 83.2 kg (183 lb 6.4 oz) Oxygen Saturation 97% Body Mass Index 28.72 kg/m² ECOG PS0 pain 4/10 Physical Exam:  
Gen. Appearance: The patient is in no acute distress.   Skin: There is no bruise or rash. HEENT: The exam is unremarkable. Neck: Supple without lymphadenopathy or thyromegaly. Lungs: Clear to auscultation and percussion; there are no wheezes or rhonchi. Heart: Regular rate and rhythm; there are no murmurs, gallops, or rubs. Abdomen: Bowel sounds are present and normal.  There is no guarding, tenderness, or hepatosplenomegaly. Extremities: There is no clubbing, cyanosis, or edema. Neurologic: There are no focal neurologic deficits. Lymphatics: There is no palpable peripheral lymphadenopathy. Musculoskeletal: The patient has full range of motion at all joints. There is no evidence of joint deformity or effusions. There is no focal joint tenderness. Psychological/psychiatric: There is no clinical evidence of anxiety, depression, or melancholy. Lab data: 
   
Results for orders placed or performed during the hospital encounter of 04/30/19 CBC WITH 3 PART DIFF     Status: None Result Value Ref Range Status WBC 5.0 4.5 - 13.0 K/uL Final  
 RBC 4.26 4.10 - 5.10 M/uL Final  
 HGB 12.9 12.0 - 16.0 g/dL Final  
 HCT 39.5 36 - 48 % Final  
 MCV 92.7 78 - 102 FL Final  
 MCH 30.3 25.0 - 35.0 PG Final  
 MCHC 32.7 31 - 37 g/dL Final  
 RDW 14.0 11.5 - 14.5 % Final  
 PLATELET 144 559 - 545 K/uL Final  
 NEUTROPHILS 51 40 - 70 % Final  
 MIXED CELLS 11 0.1 - 17 % Final  
 LYMPHOCYTES 39 14 - 44 % Final  
 ABS. NEUTROPHILS 2.6 1.8 - 9.5 K/UL Final  
 ABS. MIXED CELLS 0.5 0.0 - 2.3 K/uL Final  
 ABS. LYMPHOCYTES 1.9 1.1 - 5.9 K/UL Final  
  Comment: Test performed at [address] [place] [address] or [facility] Location. Reviewed by Medical Director. DF AUTOMATED   Final  
 
 
   
Assessment:  
 
1. Monoclonal gammopathy of undetermined significance 2. Chronic midline low back pain Plan:  
I have explained to the patient that the recent M-spike on 10/5/2018 was 0.7.  At this time I will recheck the SPEP, quantitative immunoglobulins, and beta-2 microglobulin levels. We will continue to monitor this every 3 months. Chronic low back pain:  Patient will continue using Lidoacain patch as prescribed by PCP and Tylenol arthritis. Orders Placed This Encounter  COMPLETE CBC & AUTO DIFF WBC  InHouse CBC ([employer]) Standing Status:   Future Number of Occurrences:   1 Standing Expiration Date:   5/7/2019  METABOLIC PANEL, COMPREHENSIVE Standing Status:   Future Standing Expiration Date:   4/30/2020  
 PROTEIN ELECTROPHORESIS Standing Status:   Future Standing Expiration Date:   4/30/2020 [place] Smoker, NP 
4/30/2019 Please note: This document has been produced using voice recognition software. Unrecognized errors in transcription may be present.

## 2019-05-01 LAB
ALBUMIN SERPL ELPH-MCNC: 3.5 G/DL (ref 2.9–4.4)
ALBUMIN/GLOB SERPL: 1.1 {RATIO} (ref 0.7–1.7)
ALPHA1 GLOB SERPL ELPH-MCNC: 0.2 G/DL (ref 0–0.4)
ALPHA2 GLOB SERPL ELPH-MCNC: 0.7 G/DL (ref 0.4–1)
B-GLOBULIN SERPL ELPH-MCNC: 1.1 G/DL (ref 0.7–1.3)
GAMMA GLOB SERPL ELPH-MCNC: 1 G/DL (ref 0.4–1.8)
GLOBULIN SER CALC-MCNC: 3.1 G/DL (ref 2.2–3.9)
M PROTEIN SERPL ELPH-MCNC: 0.6 G/DL
PROT SERPL-MCNC: 6.6 G/DL (ref 6–8.5)

## 2019-05-06 ENCOUNTER — OFFICE VISIT (OUTPATIENT)
Dept: NEUROLOGY | Age: 74
End: 2019-05-06

## 2019-05-06 VITALS
WEIGHT: 180 LBS | BODY MASS INDEX: 28.25 KG/M2 | HEIGHT: 67 IN | DIASTOLIC BLOOD PRESSURE: 70 MMHG | RESPIRATION RATE: 16 BRPM | HEART RATE: 94 BPM | TEMPERATURE: 98 F | OXYGEN SATURATION: 94 % | SYSTOLIC BLOOD PRESSURE: 120 MMHG

## 2019-05-06 DIAGNOSIS — G56.32: ICD-10-CM

## 2019-05-06 DIAGNOSIS — G62.9 POLYNEUROPATHY: ICD-10-CM

## 2019-05-06 DIAGNOSIS — M54.12 CERVICAL RADICULOPATHY: ICD-10-CM

## 2019-05-06 RX ORDER — ALFUZOSIN HYDROCHLORIDE 10 MG/1
TABLET, EXTENDED RELEASE ORAL
Refills: 1 | COMMUNITY
Start: 2019-05-02 | End: 2019-10-03

## 2019-05-06 RX ORDER — GABAPENTIN 300 MG/1
600 CAPSULE ORAL 4 TIMES DAILY
Qty: 240 CAP | Refills: 4 | Status: SHIPPED | OUTPATIENT
Start: 2019-05-06 | End: 2020-03-09 | Stop reason: SDUPTHER

## 2019-05-06 NOTE — PROGRESS NOTES
Re:  [place] up visit     5/6/2019 2:59 PM    SSN: [id number]    Subjective:   Thereasa Leisure returns for follow up. He's gotten good relief of pain in the right leg with increased dosing of Neurontin. He has no side effects with the medication. He says he needs to get up slowly to avoid any major pain, but it's tolerable most of the time. His biggest complaint is the burning in the right leg from the hip down to the foot. His primary doctor has increased the Neurontin to 600 mg TID. He's not taking the gabapentin but is having trouble because he's been having trouble taking large pills. He's not tried to open the capsules up and using apple sauce, which I suggest using. Medications:    Current Outpatient Medications   Medication Sig Dispense Refill    alfuzosin SR (UROXATRAL) 10 mg SR tablet   1    Lactobacillus acidophilus (ACIDOPHILUS) cap Take 2 Caps by mouth two (2) times a day.  pantoprazole (PROTONIX) 40 mg tablet Take 1 Tab by mouth daily. 30 Tab 0    amLODIPine (NORVASC) 10 mg tablet Take 1 Tab by mouth daily. 30 Tab 0    gabapentin (NEURONTIN) 300 mg capsule Take 1 Cap by mouth three (3) times daily. 90 Cap 2    albuterol (PROVENTIL HFA, VENTOLIN HFA, PROAIR HFA) 90 mcg/actuation inhaler Take 2 Puffs by inhalation every four (4) hours as needed for Wheezing or Shortness of Breath. Indications: BRONCHOSPASM PREVENTION 1 Inhaler 0    finasteride (PROSCAR) 5 mg tablet Take 1 Tab by mouth daily. 30 Tab 5    spironolactone (ALDACTONE) 25 mg tablet Take  by mouth daily.  polyethylene glycol (MIRALAX) 17 gram packet Take 1 Packet by mouth daily. 30 Packet 0    prednisoLONE acetate (PRED FORTE) 1 % ophthalmic suspension Administer 1 Drop to both eyes four (4) times daily.          Vital signs:    Visit Vitals  /70 (BP 1 Location: Left arm, BP Patient Position: Sitting)   Pulse 94   Temp 98 °F (36.7 °C) (Oral)   Resp 16   Ht 5' 7\" (1.702 m)   Wt 81.6 kg (180 lb)   SpO2 94%   BMI 28.19 kg/m²       Review of Systems:   As above otherwise 11 point review of systems negative including;   Constitutional no fever or chills  Skin denies rash or itching  HEENT  Denies tinnitus, hearing lose  Eyes denies diplopia vision lose  Respiratory denies sortness of breath  Cardiovascular denies chest pain, dyspnea on exertion  Gastrointestinal denies nausea, vomiting, diarrhea, constipation  Genitourinary denies incontinence  Musculoskeletal denies joint pain or swelling  Endocrine denies weight change  Hematology denies easy bruising or bleeding   Neurological as above in HPI      Patient Active Problem List   Diagnosis Code    Unsteady gait R26.81    Gait instability R26.81    Vertigo R42    Radiculopathy of lumbar region M54.16    ED (erectile dysfunction) of organic origin N52.9    Peripheral vascular disease (HCC) I73.9    Overactive bladder N32.81    Nocturia R35.1    Leg pain, right M79.604    Hypercholesterolemia E78.00    HTN (hypertension) I10    H/O spinal cord injury Z87.828    Erectile disorder due to medical 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 N52.1    Bladder outlet obstruction N32.0    Injury of lumbar spine ([address] S34.109A    Frequency of urination R35.0    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 Fund of knowledge is adequate. Speech is fluent and memory is intact. Cranial Nerves: II - Visual fields are full to confrontation. III, IV, VI - Extraocular movements are intact. There is no nystagmus. V - Facial sensation is intact to pinprick. VII - Face is symmetrical.  VIII - Hearing is present. IX, X, XII - Palate is symmetrical.   XI - Shoulder shrugging and head turning intact  Motor:   The patient moves all four limbs fairly well and symmetrically. Tone is normal. Reflexes are 2+ and symmetrical. Plantars are down going. Gait is mildly antalgic, limping off of the right foot slightly. CBC:   Lab Results   Component Value Date/Time    WBC 5.0 04/30/2019 02:01 PM    RBC 4.26 04/30/2019 02:01 PM    HGB 12.9 04/30/2019 02:01 PM    HCT 39.5 04/30/2019 02:01 PM    PLATELET 940 65/39/7625 02:01 PM     BMP:   Lab Results   Component Value Date/Time    Glucose 108 (H) 04/30/2019 02:01 PM    Sodium 138 04/30/2019 02:01 PM    Potassium 4.1 04/30/2019 02:01 PM    Chloride 105 04/30/2019 02:01 PM    CO2 26 04/30/2019 02:01 PM    BUN 14 04/30/2019 02:01 PM    Creatinine 1.29 04/30/2019 02:01 PM    Calcium 9.2 04/30/2019 02:01 PM     CMP:   Lab Results   Component Value Date/Time    Glucose 108 (H) 04/30/2019 02:01 PM    Sodium 138 04/30/2019 02:01 PM    Potassium 4.1 04/30/2019 02:01 PM    Chloride 105 04/30/2019 02:01 PM    CO2 26 04/30/2019 02:01 PM    BUN 14 04/30/2019 02:01 PM    Creatinine 1.29 04/30/2019 02:01 PM    Calcium 9.2 04/30/2019 02:01 PM    Anion gap 7 04/30/2019 02:01 PM    BUN/Creatinine ratio 11 (L) 04/30/2019 02:01 PM    Alk. phosphatase 70 04/30/2019 02:01 PM    Protein, total 6.9 04/30/2019 02:01 PM    Protein, total 6.6 04/30/2019 02:01 PM    Albumin 3.5 04/30/2019 02:01 PM    Globulin 3.4 04/30/2019 02:01 PM    A-G Ratio 1.0 04/30/2019 02:01 PM     Coagulation:   Lab Results   Component Value Date/Time    Prothrombin time 13.3 10/01/2018 03:00 AM    INR 1.0 10/01/2018 03:00 AM    aPTT 82.1 (H) 03/28/2019 05:16 AM       Assessment:  Severe long lived neuropathic pain. No evidence for motor dysfunction. Doing well on the increased dose of Neurontin, but having trouble taking the capsules because of dysphagia. .    Plan:  Lets get him back on 2 of the 300 mg gabapentin capsules 4 times a day. RTC 2 weeks. .        Sincerely,        Annika Felt.  Shae Fraire M.D.

## 2019-05-06 NOTE — LETTER
5/6/19 Patient: Rita Washington YOB: 1945 Date of Visit: 5/6/2019 Dorian Nath MD 
[facility] [id number] VIA Facsimile: [phone] Dear Dorian Nath MD, Thank you for referring Mr. Rita Washington to [address] [facility] for evaluation. My notes for this consultation are attached. If you have questions, please do not hesitate to call me. I look forward to following your patient along with you.  
 
 
Sincerely, 
 
Beatrice Alex MD

## 2019-05-06 NOTE — PROGRESS NOTES
ROOM # 1    Tatiana Escobar presents today for   Chief Complaint   Patient presents with    Follow-up    Leg Pain       Tatiana Escobar preferred language for health care discussion is english/other. Depression Screening:  3 most recent [facility] Screens 4/30/2019 11/2/2018 10/25/2018 10/18/2018 10/5/2018 10/4/2018 9/20/2018   PHQ Not Done - - - - - - Patient Decline   Little interest or pleasure in doing things Not at all Not at all Not at all Not at all Not at all Not at all -   Feeling down, depressed, irritable, or hopeless Not at all Not at all Not at all Not at all Not at all Not at all -   Total Score PHQ 2 0 0 0 0 0 0 -       Learning Assessment:  Learning Assessment 4/30/2019 10/25/2018   PRIMARY LEARNER Patient Patient   PRIMARY LANGUAGE ENGLISH ENGLISH   LEARNER PREFERENCE PRIMARY DEMONSTRATION LISTENING     VIDEOS -   ANSWERED BY patient patient   RELATIONSHIP SELF SELF       Abuse Screening:  Abuse Screening Questionnaire 4/30/2019 11/2/2018   Do you ever feel afraid of your partner? N N   Are you in a relationship with someone who physically or mentally threatens you? N N   Is it safe for you to go home? Y Y       Fall Risk  Fall Risk Assessment, last 12 mths 4/30/2019 11/2/2018 10/25/2018 10/18/2018 10/5/2018 10/4/2018 9/20/2018   Able to walk? Yes Yes Yes Yes Yes Yes Yes   Fall in past 12 months? No No Yes Yes No Yes Yes   Fall with injury? - - No No - No No   Number of falls in past 12 months - - 3 4 - 3 1   Fall Risk Score - - 3 4 - 3 1       Visit Vitals  /70 (BP 1 Location: Left arm, BP Patient Position: Sitting)   Pulse 94   Temp 98 °F (36.7 °C) (Oral)   Resp 16   Ht 5' 7\" (1.702 m)   Wt 180 lb (81.6 kg)   SpO2 94%   BMI 28.19 kg/m²       Health Maintenance reviewed and discussed per provider.  Yes    Tatiana Escobar is due for   Health Maintenance Due   Topic Date Due    Hepatitis C Screening  1945    Shingrix Vaccine Age 50> (1 of 2) 12/17/1996    FOBT Q 1 YEAR AGE 50-75  12/17/1996  GLAUCOMA SCREENING Q2Y  12/17/2011    Pneumococcal 65+ years (1 of 2 - PCV13) 12/17/2011     Please order/place referral if appropriate. Advance Directive:  1. Do you have an advance directive in place? Patient Reply: No    2. If not, would you like material regarding how to put one in place? Patient Reply: No    Coordination of Care:  1. Have you been to the ER, urgent care clinic since your last visit? Hospitalized since your last visit?  No

## 2019-08-06 ENCOUNTER — HOSPITAL ENCOUNTER (OUTPATIENT)
Dept: LAB | Age: 74
Discharge: HOME | End: 2019-08-06
Payer: MEDICAID

## 2019-08-06 ENCOUNTER — HOSPITAL ENCOUNTER (OUTPATIENT)
Dept: ONCOLOGY | Age: 74
Discharge: HOME | End: 2019-08-06

## 2019-08-06 ENCOUNTER — OFFICE VISIT (OUTPATIENT)
Dept: ONCOLOGY | Age: 74
End: 2019-08-06

## 2019-08-06 VITALS
HEART RATE: 64 BPM | TEMPERATURE: 98 F | RESPIRATION RATE: 18 BRPM | OXYGEN SATURATION: 98 % | HEIGHT: 67 IN | WEIGHT: 170 LBS | DIASTOLIC BLOOD PRESSURE: 70 MMHG | BODY MASS INDEX: 26.68 KG/M2 | SYSTOLIC BLOOD PRESSURE: 115 MMHG

## 2019-08-06 DIAGNOSIS — D47.2 MONOCLONAL GAMMOPATHY OF UNDETERMINED SIGNIFICANCE: ICD-10-CM

## 2019-08-06 DIAGNOSIS — E88.09 PLASMA CELL DYSCRASIA: ICD-10-CM

## 2019-08-06 DIAGNOSIS — M54.50 CHRONIC MIDLINE LOW BACK PAIN WITHOUT SCIATICA: ICD-10-CM

## 2019-08-06 DIAGNOSIS — D47.2 MONOCLONAL GAMMOPATHY OF UNDETERMINED SIGNIFICANCE: Primary | ICD-10-CM

## 2019-08-06 DIAGNOSIS — G89.29 CHRONIC MIDLINE LOW BACK PAIN WITHOUT SCIATICA: ICD-10-CM

## 2019-08-06 LAB
ALBUMIN SERPL-MCNC: 3.8 G/DL (ref 3.4–5)
ALBUMIN/GLOB SERPL: 1.1 {RATIO} (ref 0.8–1.7)
ALP SERPL-CCNC: 74 U/L (ref 45–117)
ALT SERPL-CCNC: 19 U/L (ref 16–61)
ANION GAP SERPL CALC-SCNC: 6 MMOL/L (ref 3–18)
AST SERPL-CCNC: 19 U/L (ref 10–38)
BASO+EOS+MONOS # BLD AUTO: 0.6 K/UL (ref 0–2.3)
BASO+EOS+MONOS NFR BLD AUTO: 12 % (ref 0.1–17)
BILIRUB SERPL-MCNC: 0.4 MG/DL (ref 0.2–1)
BUN SERPL-MCNC: 17 MG/DL (ref 7–18)
BUN/CREAT SERPL: 14 (ref 12–20)
CALCIUM SERPL-MCNC: 9.2 MG/DL (ref 8.5–10.1)
CHLORIDE SERPL-SCNC: 104 MMOL/L (ref 100–111)
CO2 SERPL-SCNC: 28 MMOL/L (ref 21–32)
CREAT SERPL-MCNC: 1.23 MG/DL (ref 0.6–1.3)
DIFFERENTIAL METHOD BLD: NORMAL
ERYTHROCYTE [DISTWIDTH] IN BLOOD BY AUTOMATED COUNT: 13.7 % (ref 11.5–14.5)
GLOBULIN SER CALC-MCNC: 3.4 G/DL (ref 2–4)
GLUCOSE SERPL-MCNC: 81 MG/DL (ref 74–99)
HCT VFR BLD AUTO: 42.1 % (ref 36–48)
HGB BLD-MCNC: 13.9 G/DL (ref 12–16)
LYMPHOCYTES # BLD: 2 K/UL (ref 1.1–5.9)
LYMPHOCYTES NFR BLD: 38 % (ref 14–44)
MCH RBC QN AUTO: 30.5 PG (ref 25–35)
MCHC RBC AUTO-ENTMCNC: 33 G/DL (ref 31–37)
MCV RBC AUTO: 92.3 FL (ref 78–102)
NEUTS SEG # BLD: 2.8 K/UL (ref 1.8–9.5)
NEUTS SEG NFR BLD: 51 % (ref 40–70)
PLATELET # BLD AUTO: 264 K/UL (ref 140–440)
POTASSIUM SERPL-SCNC: 4.6 MMOL/L (ref 3.5–5.5)
PROT SERPL-MCNC: 7.2 G/DL (ref 6.4–8.2)
RBC # BLD AUTO: 4.56 M/UL (ref 4.1–5.1)
SODIUM SERPL-SCNC: 138 MMOL/L (ref 136–145)
WBC # BLD AUTO: 5.4 K/UL (ref 4.5–13)

## 2019-08-06 PROCEDURE — 36415 COLL VENOUS BLD VENIPUNCTURE: CPT

## 2019-08-06 PROCEDURE — 84165 PROTEIN E-PHORESIS SERUM: CPT

## 2019-08-06 PROCEDURE — 80053 COMPREHEN METABOLIC PANEL: CPT

## 2019-08-06 NOTE — PROGRESS NOTES
Hematology/Oncology  Progress Note    Name: Shira Cole  Date: 2019  : 1945    Manan Gutierrez MD     Mr. Tony Angeles is a 68y.o. year old male who was seen for Monoclonal gammopathy of undetermined significance. Current Therapy- Active Surveillance       Subjective:      Mr. Tony Angeles is a 71-year-old male with a past medical history of CAD, HTN, PVD, radiculopathy of the lumbar region, chronic low back pain, osteoarthritis, and newly diagnosed with monoclonal gammopathy of unknown significance. The SPEP from 10/18/2018 showed an abnormal monoclonal paraprotein level of 0.7 g/dL, a normal beta-2 microglobulin level of 1.8 mg/L. His IgG level was normal at 1237 mg/dL, IgA was normal at 214 mg/dL, and IgM was normal at 52 mg/dL. The bone survey on 10/18/2018 showed no evidence of lytic lesions in the skeleton. Other than arthritic pain,  the patient reports that he is doing well. He currently uses OTC Tylenol and lidocain patch for pain control. Past medical history, family history, and social history: these were reviewed and remains unchanged.     Past Medical History:   Diagnosis Date    Bladder outlet obstruction     Erectile disorder due to medical 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     Frequency of urination     H/O spinal cord injury     lumbar spine injury secondary to fall    HTN (hypertension)     Hypercholesterolemia     Injury of lumbar spine ([address]     Leg pain, right     Nocturia     Overactive bladder     Peripheral vascular disease (HCC)      Past Surgical History:   Procedure Laterality Date    HX ORTHOPAEDIC      finger amputation left hand    HX TONSILLECTOMY       Social History     Socioeconomic History    Marital status: SINGLE     Spouse name: Not on file    Number of children: Not on file    Years of education: Not on file    Highest education level: Not on file   Occupational History    Not on file   Social Needs    Financial resource strain: Not on file  Food insecurity:     Worry: Not on file     Inability: Not on file    Transportation needs:     Medical: Not on file     Non-medical: Not on file   Tobacco Use    Smoking status: Former Smoker     Last attempt to quit: 2015     Years since quittin.0    Smokeless tobacco: Never Used   Substance and Sexual Activity    Alcohol use: Not Currently     Alcohol/week: 0.0 standard drinks     Comment: former stopped     Drug use: No    Sexual activity: Yes   Lifestyle    Physical activity:     Days per week: Not on file     Minutes per session: Not on file    Stress: Not on file   Relationships    Social connections:     Talks on phone: Not on file     Gets together: Not on file     Attends Congregation service: Not on file     Active member of club or organization: Not on file     Attends meetings of clubs or organizations: Not on file     Relationship status: Not on file    Intimate partner violence:     Fear of current or ex partner: Not on file     Emotionally abused: Not on file     Physically abused: Not on file     Forced sexual activity: Not on file   Other Topics Concern    Not on file   Social History Narrative    Not on file     Family History   Problem Relation Age of Onset    Diabetes Mother     Hypertension Mother     Diabetes Maternal Grandmother     Hypertension Maternal Grandmother      Current Outpatient Medications   Medication Sig Dispense Refill    alfuzosin SR (UROXATRAL) 10 mg SR tablet   1    Lactobacillus acidophilus (ACIDOPHILUS) cap Take 2 Caps by mouth two (2) times a day.  gabapentin (NEURONTIN) 300 mg capsule Take 2 Caps by mouth four (4) times daily. 240 Cap 4    pantoprazole (PROTONIX) 40 mg tablet Take 1 Tab by mouth daily. 30 Tab 0    polyethylene glycol (MIRALAX) 17 gram packet Take 1 Packet by mouth daily. 30 Packet 0    amLODIPine (NORVASC) 10 mg tablet Take 1 Tab by mouth daily.  30 Tab 0    prednisoLONE acetate (PRED FORTE) 1 % ophthalmic suspension Administer 1 Drop to both eyes four (4) times daily.  albuterol (PROVENTIL HFA, VENTOLIN HFA, PROAIR HFA) 90 mcg/actuation inhaler Take 2 Puffs by inhalation every four (4) hours as needed for Wheezing or Shortness of Breath. Indications: BRONCHOSPASM PREVENTION 1 Inhaler 0    finasteride (PROSCAR) 5 mg tablet Take 1 Tab by mouth daily. 30 Tab 5    spironolactone (ALDACTONE) 25 mg tablet Take  by mouth daily. Review of Systems  Constitutional: The patient has no acute distress or discomfort. HEENT: The patient denies recent head trauma, eye pain, blurred vision,  hearing deficit, oropharyngeal mucosal pain or lesions, and the patient denies throat pain or discomfort. Lymphatics: The patient denies palpable peripheral lymphadenopathy. Hematologic: The patient denies having bruising, bleeding, or progressive fatigue. Respiratory: Patient denies having shortness of breath, cough, sputum production, fever, or dyspnea on exertion. Cardiovascular: The patient denies having leg pain, leg swelling, heart palpitations, chest permit, chest pain, or lightheadedness. The patient denies having dyspnea on exertion. Gastrointestinal: The patient denies having nausea, emesis, or diarrhea. The patient denies having any hematemesis or blood in the stool. Genitourinary: Patient denies having urinary urgency, frequency, or dysuria. The patient denies having blood in the urine. Psychological: The patient denies having symptoms of nervousness, anxiety, depression, or thoughts of harming self. Skin: Patient denies having skin rashes, skin, ulcerations, or unexplained itching or pruritus. Musculoskeletal: The patient denies having pain in the joints or bones. Objective:     Visit Vitals  /70   Pulse 64   Temp 98 °F (36.7 °C) (Oral)   Resp 18   Ht 5' 7\" (1.702 m)   Wt 77.1 kg (170 lb)   SpO2 98%   BMI 26.63 kg/m²     ECOG PS0 pain 4/10  Physical Exam:   Gen. Appearance:  The patient is in no acute distress. Skin: There is no bruise or rash. HEENT: The exam is unremarkable. Neck: Supple without lymphadenopathy or thyromegaly. Lungs: Clear to auscultation and percussion; there are no wheezes or rhonchi. Heart: Regular rate and rhythm; there are no murmurs, gallops, or rubs. Abdomen: Bowel sounds are present and normal.  There is no guarding, tenderness, or hepatosplenomegaly. Extremities: There is no clubbing, cyanosis, or edema. Neurologic: There are no focal neurologic deficits. Lymphatics: There is no palpable peripheral lymphadenopathy. Musculoskeletal: The patient has full range of motion at all joints. There is no evidence of joint deformity or effusions. There is no focal joint tenderness. Psychological/psychiatric: There is no clinical evidence of anxiety, depression, or melancholy. Lab data:      Results for orders placed or performed during the hospital encounter of 08/06/19   CBC WITH 3 PART DIFF     Status: None   Result Value Ref Range Status    WBC 5.4 4.5 - 13.0 K/uL Final    RBC 4.56 4.10 - 5.10 M/uL Final    HGB 13.9 12.0 - 16.0 g/dL Final    HCT 42.1 36 - 48 % Final    MCV 92.3 78 - 102 FL Final    MCH 30.5 25.0 - 35.0 PG Final    MCHC 33.0 31 - 37 g/dL Final    RDW 13.7 11.5 - 14.5 % Final    PLATELET 176 371 - 177 K/uL Final    NEUTROPHILS 51 40 - 70 % Final    MIXED CELLS 12 0.1 - 17 % Final    LYMPHOCYTES 38 14 - 44 % Final    ABS. NEUTROPHILS 2.8 1.8 - 9.5 K/UL Final    ABS. MIXED CELLS 0.6 0.0 - 2.3 K/uL Final    ABS. LYMPHOCYTES 2.0 1.1 - 5.9 K/UL Final     Comment: Test performed at [address] [place] [address] or Outpatient Infusion Center Location. Reviewed by Medical Director. DF AUTOMATED   Final           Assessment:     1. Monoclonal gammopathy of undetermined significance    2. Plasma cell dyscrasia    3.  Chronic midline low back pain without sciatica          Plan:   Monoclonal gammopathy of undetermined significance I have explained to the patient that the recent M-spike on 10/5/2018 was 0.7. The most recent M spike from 5/1/2019 had declined to 0.6 g/dL. I will recheck his SPEP at this time. No therapeutic intervention is warranted. Chronic low back pain:  Patient will continue using Lidoacain patch as prescribed by PCP and Tylenol arthritis. Orders Placed This Encounter    COMPLETE CBC & AUTO DIFF WBC    InHouse CBC ([employer])     Standing Status:   Future     Number of Occurrences:   1     Standing Expiration Date:   2/78/6848    METABOLIC PANEL, COMPREHENSIVE     Standing Status:   Future     Standing Expiration Date:   8/6/2020    PROTEIN ELECTROPHORESIS     Standing Status:   Future     Standing Expiration Date:   8/6/2020       Alexandre Rider MD  8/6/2019      Please note: This document has been produced using voice recognition software. Unrecognized errors in transcription may be present.

## 2019-08-06 NOTE — PATIENT INSTRUCTIONS
Learning About Monoclonal Gammopathy of Unknown Significance (MGUS)  What is MGUS? Monoclonal gammopathy of unknown significance (MGUS) is a blood condition. The blood is made of many kinds of cells, including red blood cells, platelets, and white blood cells. With MGUS, a type of white blood cell called a plasma cell makes too much of the \"M\" (for \"monoclonal\") protein in the blood. Most people with MGUS are fine for many years. MGUS seldom causes symptoms or major health problems. In rare cases, MGUS may turn into multiple myeloma. This is a cancer of plasma cells in the blood that can cause bone weakness. Some people with MGUS may also have a higher risk for osteoporosis, in which bones become thin and weak. MGUS is sometimes seen in younger people, but is more common in people as they get older. It's seen most often in those over the age of 79. How is MGUS diagnosed? MGUS is often found by chance when blood tests are done for other reasons. If you have high levels of a certain protein in your blood, your doctor may order more tests. Blood tests can help identify the protein. The protein is sometimes found in the urine, so you may get a urine test too. Other tests may be done if your doctor thinks you might have a medical problem. In some cases, a bone marrow biopsy may be done to rule out a problem like multiple myeloma. How is it treated? Most people with MGUS don't need any treatment. But you may need regular physical exams, blood tests, and urine tests to make sure that MGUS isn't progressing to a medical problem. Follow-up care is a key part of your treatment and safety. Be sure to make and go to all appointments, and call your doctor if you are having problems. It's also a good idea to know your test results and keep a list of the medicines you take. Where can you learn more? Go to http://ovidio-eliz.info/.   Enter A917 in the search box to learn more about \"Learning About Monoclonal Gammopathy of Unknown Significance (MGUS). \"  Current as of: March 28, 2019  Content Version: 12.1  © 4142-1570 [employer]. Care instructions adapted under license by [employer] (which disclaims liability or warranty for this information). If you have questions about a medical condition or this instruction, always ask your healthcare professional. [address] any warranty or liability for your use of this information.

## 2019-08-07 LAB
ALBUMIN SERPL ELPH-MCNC: 3.4 G/DL (ref 2.9–4.4)
ALBUMIN/GLOB SERPL: 1 {RATIO} (ref 0.7–1.7)
ALPHA1 GLOB SERPL ELPH-MCNC: 0.2 G/DL (ref 0–0.4)
ALPHA2 GLOB SERPL ELPH-MCNC: 0.7 G/DL (ref 0.4–1)
B-GLOBULIN SERPL ELPH-MCNC: 1.2 G/DL (ref 0.7–1.3)
GAMMA GLOB SERPL ELPH-MCNC: 1.3 G/DL (ref 0.4–1.8)
GLOBULIN SER CALC-MCNC: 3.4 G/DL (ref 2.2–3.9)
M PROTEIN SERPL ELPH-MCNC: 0.6 G/DL
PROT SERPL-MCNC: 6.8 G/DL (ref 6–8.5)

## 2019-08-13 ENCOUNTER — HOSPITAL ENCOUNTER (EMERGENCY)
Age: 74
Discharge: HOME | End: 2019-08-13
Attending: EMERGENCY MEDICINE
Payer: MEDICAID

## 2019-08-13 ENCOUNTER — APPOINTMENT (OUTPATIENT)
Dept: GENERAL RADIOLOGY | Age: 74
End: 2019-08-13
Attending: PHYSICIAN ASSISTANT
Payer: MEDICAID

## 2019-08-13 VITALS
SYSTOLIC BLOOD PRESSURE: 126 MMHG | WEIGHT: 170 LBS | OXYGEN SATURATION: 95 % | RESPIRATION RATE: 14 BRPM | TEMPERATURE: 97.5 F | HEART RATE: 78 BPM | BODY MASS INDEX: 26.68 KG/M2 | HEIGHT: 67 IN | DIASTOLIC BLOOD PRESSURE: 65 MMHG

## 2019-08-13 DIAGNOSIS — R07.89 BURNING CHEST PAIN: Primary | ICD-10-CM

## 2019-08-13 LAB
ALBUMIN SERPL-MCNC: 3.6 G/DL (ref 3.4–5)
ALBUMIN/GLOB SERPL: 0.9 {RATIO} (ref 0.8–1.7)
ALP SERPL-CCNC: 79 U/L (ref 45–117)
ALT SERPL-CCNC: 19 U/L (ref 16–61)
ANION GAP SERPL CALC-SCNC: 4 MMOL/L (ref 3–18)
AST SERPL-CCNC: 18 U/L (ref 10–38)
ATRIAL RATE: 63 BPM
BASOPHILS # BLD: 0 K/UL (ref 0–0.1)
BASOPHILS NFR BLD: 0 % (ref 0–2)
BILIRUB SERPL-MCNC: 0.2 MG/DL (ref 0.2–1)
BNP SERPL-MCNC: 32 PG/ML (ref 0–900)
BUN SERPL-MCNC: 15 MG/DL (ref 7–18)
BUN/CREAT SERPL: 15 (ref 12–20)
CALCIUM SERPL-MCNC: 9.3 MG/DL (ref 8.5–10.1)
CALCULATED P AXIS, ECG09: 57 DEGREES
CALCULATED R AXIS, ECG10: 4 DEGREES
CALCULATED T AXIS, ECG11: 54 DEGREES
CHLORIDE SERPL-SCNC: 106 MMOL/L (ref 100–111)
CK MB CFR SERPL CALC: 1.4 % (ref 0–4)
CK MB SERPL-MCNC: 3.2 NG/ML (ref 5–25)
CK SERPL-CCNC: 235 U/L (ref 39–308)
CO2 SERPL-SCNC: 27 MMOL/L (ref 21–32)
CREAT SERPL-MCNC: 0.98 MG/DL (ref 0.6–1.3)
DIAGNOSIS, 93000: NORMAL
DIFFERENTIAL METHOD BLD: ABNORMAL
EOSINOPHIL # BLD: 0.2 K/UL (ref 0–0.4)
EOSINOPHIL NFR BLD: 4 % (ref 0–5)
ERYTHROCYTE [DISTWIDTH] IN BLOOD BY AUTOMATED COUNT: 14.3 % (ref 11.6–14.5)
GLOBULIN SER CALC-MCNC: 4.1 G/DL (ref 2–4)
GLUCOSE SERPL-MCNC: 84 MG/DL (ref 74–99)
HCT VFR BLD AUTO: 40.9 % (ref 36–48)
HGB BLD-MCNC: 13.8 G/DL (ref 13–16)
LYMPHOCYTES # BLD: 2 K/UL (ref 0.9–3.6)
LYMPHOCYTES NFR BLD: 41 % (ref 21–52)
MAGNESIUM SERPL-MCNC: 2.1 MG/DL (ref 1.6–2.6)
MCH RBC QN AUTO: 30 PG (ref 24–34)
MCHC RBC AUTO-ENTMCNC: 33.7 G/DL (ref 31–37)
MCV RBC AUTO: 88.9 FL (ref 74–97)
MONOCYTES # BLD: 0.4 K/UL (ref 0.05–1.2)
MONOCYTES NFR BLD: 8 % (ref 3–10)
NEUTS SEG # BLD: 2.3 K/UL (ref 1.8–8)
NEUTS SEG NFR BLD: 47 % (ref 40–73)
P-R INTERVAL, ECG05: 170 MS
PLATELET # BLD AUTO: 249 K/UL (ref 135–420)
PMV BLD AUTO: 9 FL (ref 9.2–11.8)
POTASSIUM SERPL-SCNC: 3.9 MMOL/L (ref 3.5–5.5)
PROT SERPL-MCNC: 7.7 G/DL (ref 6.4–8.2)
Q-T INTERVAL, ECG07: 406 MS
QRS DURATION, ECG06: 90 MS
QTC CALCULATION (BEZET), ECG08: 415 MS
RBC # BLD AUTO: 4.6 M/UL (ref 4.7–5.5)
SODIUM SERPL-SCNC: 137 MMOL/L (ref 136–145)
TROPONIN I SERPL-MCNC: <0.02 NG/ML (ref 0–0.04)
TROPONIN I SERPL-MCNC: <0.02 NG/ML (ref 0–0.04)
VENTRICULAR RATE, ECG03: 63 BPM
WBC # BLD AUTO: 5 K/UL (ref 4.6–13.2)

## 2019-08-13 PROCEDURE — C9113 INJ PANTOPRAZOLE SODIUM, VIA: HCPCS | Performed by: EMERGENCY MEDICINE

## 2019-08-13 PROCEDURE — 83735 ASSAY OF MAGNESIUM: CPT

## 2019-08-13 PROCEDURE — 99284 EMERGENCY DEPT VISIT MOD MDM: CPT

## 2019-08-13 PROCEDURE — 74011250637 HC RX REV CODE- 250/637: Performed by: EMERGENCY MEDICINE

## 2019-08-13 PROCEDURE — 80053 COMPREHEN METABOLIC PANEL: CPT

## 2019-08-13 PROCEDURE — 96374 THER/PROPH/DIAG INJ IV PUSH: CPT

## 2019-08-13 PROCEDURE — 74011250636 HC RX REV CODE- 250/636: Performed by: EMERGENCY MEDICINE

## 2019-08-13 PROCEDURE — 71045 X-RAY EXAM CHEST 1 VIEW: CPT

## 2019-08-13 PROCEDURE — 82550 ASSAY OF CK (CPK): CPT

## 2019-08-13 PROCEDURE — 93005 ELECTROCARDIOGRAM TRACING: CPT

## 2019-08-13 PROCEDURE — 84484 ASSAY OF TROPONIN QUANT: CPT

## 2019-08-13 PROCEDURE — 83880 ASSAY OF NATRIURETIC PEPTIDE: CPT

## 2019-08-13 PROCEDURE — 85025 COMPLETE CBC W/AUTO DIFF WBC: CPT

## 2019-08-13 RX ORDER — OMEPRAZOLE 10 MG/1
10 CAPSULE, DELAYED RELEASE ORAL DAILY
Qty: 20 CAP | Refills: 0 | Status: SHIPPED | OUTPATIENT
Start: 2019-08-13 | End: 2019-10-03

## 2019-08-13 RX ORDER — PANTOPRAZOLE SODIUM 40 MG/10ML
40 INJECTION, POWDER, LYOPHILIZED, FOR SOLUTION INTRAVENOUS
Status: COMPLETED | OUTPATIENT
Start: 2019-08-13 | End: 2019-08-13

## 2019-08-13 RX ORDER — IBUPROFEN 400 MG/1
400 TABLET ORAL
Status: DISCONTINUED | OUTPATIENT
Start: 2019-08-13 | End: 2019-08-13 | Stop reason: HOSPADM

## 2019-08-13 RX ADMIN — PANTOPRAZOLE SODIUM 40 MG: 40 INJECTION, POWDER, FOR SOLUTION INTRAVENOUS at 18:24

## 2019-08-13 NOTE — DISCHARGE INSTRUCTIONS

## 2019-08-13 NOTE — ED NOTES
Patient has had CP at rest for the past 2 hours. He has an ASA allergy so it was not ordered on the CP order set. Hx of Cardiac Cath in March of this year. I performed a brief evaluation, including history and physical, of the patient here in triage and I have determined that pt will need further treatment and evaluation from the main side ER physician. I have placed initial orders to help in expediting patients care.      August 13, 2019 at 12:33 PM - Yamila Logan PA-C        Visit Vitals  /65 (BP 1 Location: Left arm, BP Patient Position: At rest)   Pulse 78   Temp 97.5 °F (36.4 °C)   Resp 14   Ht 5' 7\" (1.702 m)   Wt 77.1 kg (170 lb)   SpO2 95%   BMI 26.63 kg/m²

## 2019-08-13 NOTE — ED PROVIDER NOTES
EMERGENCY DEPARTMENT HISTORY AND PHYSICAL EXAM  This was created with voice recognition software and transcription errors may be present. 1:28 PM  Date: 2019  Patient Name: Alex Tejada    History of Presenting Illness     Chief Complaint:    History Provided By:     HPI: Alex Tejada is a 68 y.o. male with a past medical history of bladder outlet obstruction, erectile dysfunction, spinal cord injury, hypertension, high cholesterol peripheral vascular disease who presents with chest pain patient notes a right-sided chest burning this been going on for about 2 hours. Nonexertional no radiation. No diaphoresis nausea or vomiting. No history of similar. No relationship to eating. PCP: Julia Doll MD      Past History     Past Medical History:  Past Medical History:   Diagnosis Date    Bladder outlet obstruction     Erectile disorder due to medical 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     Frequency of urination     H/O spinal cord injury     lumbar spine injury secondary to fall    HTN (hypertension)     Hypercholesterolemia     Injury of lumbar spine ([address] 1974    Leg pain, right     Nocturia     Overactive bladder     Peripheral vascular disease (HCC)        Past Surgical History:  Past Surgical History:   Procedure Laterality Date    HX ORTHOPAEDIC      finger amputation left hand    HX TONSILLECTOMY         Family History:  Family History   Problem Relation Age of Onset    Diabetes Mother     Hypertension Mother     Diabetes Maternal Grandmother     Hypertension Maternal Grandmother        Social History:  Social History     Tobacco Use    Smoking status: Former Smoker     Last attempt to quit: 2015     Years since quittin.0    Smokeless tobacco: Never Used   Substance Use Topics    Alcohol use: Not Currently     Alcohol/week: 0.0 standard drinks     Comment: former stopped     Drug use: No       Allergies:   Allergies   Allergen Reactions    Ampicillin Angioedema  Asa-Acetaminophen-Caff-Buffers Rash    Penicillins Angioedema    Atorvastatin Other (comments)     Muscle cramps       Review of Systems     Review of Systems   Constitutional: Negative for chills. Respiratory: Negative for shortness of breath. Cardiovascular: Positive for chest pain. All other systems reviewed and are negative. 10 point review of systems otherwise negative unless noted in HPI. Physical Exam       Physical Exam   Constitutional: He is oriented to person, place, and time. He appears well-developed. HENT:   Head: Normocephalic and atraumatic. Eyes: Pupils are equal, round, and reactive to light. EOM are normal.   Neck: Normal range of motion. Neck supple. Cardiovascular: Normal rate, regular rhythm and normal heart sounds. Exam reveals no friction rub. No murmur heard. Pulmonary/Chest: Effort normal and breath sounds normal. No respiratory distress. He has no wheezes. Abdominal: Soft. He exhibits no distension. There is no tenderness. There is no rebound and no guarding. Musculoskeletal: Normal range of motion. Neurological: He is alert and oriented to person, place, and time. Skin: Skin is warm and dry. Psychiatric: He has a normal mood and affect. His behavior is normal. Thought content normal.       Diagnostic Study Results     Vital Signs  EKG: EKG shows sinus at 63 with a normal axis and normal intervals there is no ST elevation or depression no hypertrophy  Labs: trop neg x2   Imaging: IMPRESSION:     No acute finding. Medical Decision Making     ED Course: Progress Notes, Reevaluation, and Consults:      Provider Notes (Medical Decision Making): This is a 78-year-old gentleman presents with epigastric burning. No history of similar. No radiation no nausea no diaphoresis nonexertional low suspicion of ACS.      ekg unremarkable. cxr neg  Trop neg x 2; burnign sensation to chest.  ? Reflux given protonix.        Pt feelign better, will d/c    Diagnosis Clinical Impression: No diagnosis found. Disposition:    Patient's Medications   Start Taking    No medications on file   Continue Taking    ALBUTEROL (PROVENTIL HFA, VENTOLIN HFA, PROAIR HFA) 90 MCG/ACTUATION INHALER    Take 2 Puffs by inhalation every four (4) hours as needed for Wheezing or Shortness of Breath. Indications: BRONCHOSPASM PREVENTION    ALFUZOSIN SR (UROXATRAL) 10 MG SR TABLET        AMLODIPINE (NORVASC) 10 MG TABLET    Take 1 Tab by mouth daily. FINASTERIDE (PROSCAR) 5 MG TABLET    Take 1 Tab by mouth daily. GABAPENTIN (NEURONTIN) 300 MG CAPSULE    Take 2 Caps by mouth four (4) times daily. LACTOBACILLUS ACIDOPHILUS (ACIDOPHILUS) CAP    Take 2 Caps by mouth two (2) times a day. PANTOPRAZOLE (PROTONIX) 40 MG TABLET    Take 1 Tab by mouth daily. POLYETHYLENE GLYCOL (MIRALAX) 17 GRAM PACKET    Take 1 Packet by mouth daily. PREDNISOLONE ACETATE (PRED FORTE) 1 % OPHTHALMIC SUSPENSION    Administer 1 Drop to both eyes four (4) times daily. SPIRONOLACTONE (ALDACTONE) 25 MG TABLET    Take  by mouth daily.    These Medications have changed    No medications on file   Stop Taking    No medications on file

## 2019-08-13 NOTE — ED NOTES
While discharging the patient, he advised this nurse that his chest pain had not subsided and that it was also in his back.  This nurse notified MD.

## 2019-08-22 ENCOUNTER — APPOINTMENT (OUTPATIENT)
Dept: GENERAL RADIOLOGY | Age: 74
End: 2019-08-22
Attending: PHYSICIAN ASSISTANT
Payer: MEDICAID

## 2019-08-22 ENCOUNTER — HOSPITAL ENCOUNTER (EMERGENCY)
Age: 74
Discharge: HOME | End: 2019-08-22
Attending: EMERGENCY MEDICINE
Payer: MEDICAID

## 2019-08-22 VITALS
TEMPERATURE: 97.8 F | WEIGHT: 170 LBS | OXYGEN SATURATION: 97 % | SYSTOLIC BLOOD PRESSURE: 132 MMHG | HEART RATE: 83 BPM | BODY MASS INDEX: 26.63 KG/M2 | DIASTOLIC BLOOD PRESSURE: 74 MMHG | RESPIRATION RATE: 18 BRPM

## 2019-08-22 DIAGNOSIS — M79.641 PAIN OF RIGHT HAND: Primary | ICD-10-CM

## 2019-08-22 PROCEDURE — 99283 EMERGENCY DEPT VISIT LOW MDM: CPT

## 2019-08-22 PROCEDURE — 73130 X-RAY EXAM OF HAND: CPT

## 2019-08-22 NOTE — DISCHARGE INSTRUCTIONS
Patient Education        Hand Pain: Care Instructions  Your Care Instructions    Common causes of hand pain are overuse and injuries, such as might happen during sports or home repair projects. Everyday wear and tear, especially as you get older, also can cause hand pain. Most minor hand injuries will heal on their own, and home treatment is usually all you need to do. If you have sudden and severe pain, you may need tests and treatment. Follow-up care is a key part of your treatment and safety. Be sure to make and go to all appointments, and call your doctor if you are having problems. It's also a good idea to know your test results and keep a list of the medicines you take. How can you care for yourself at home? · Take pain medicines exactly as directed. ? If the doctor gave you a prescription medicine for pain, take it as prescribed. ? If you are not taking a prescription pain medicine, ask your doctor if you can take an over-the-counter medicine. · Rest and protect your hand. Take a break from any activity that may cause pain. · Put ice or a cold pack on your hand for 10 to 20 minutes at a time. Put a thin cloth between the ice and your skin. · Prop up the sore hand on a pillow when you ice it or anytime you sit or lie down during the next 3 days. Try to keep it above the level of your heart. This will help reduce swelling. · If your doctor recommends a sling, splint, or elastic bandage to support your hand, wear it as directed. When should you call for help? Call 911 anytime you think you may need emergency care. For example, call if:    · Your hand turns cool or pale or changes color.    Call your doctor now or seek immediate medical care if:    · You cannot move your hand.     · Your hand pops, moves out of its normal position, and then returns to its normal position.     · You have signs of infection, such as:  ? Increased pain, swelling, warmth, or redness.   ? Red streaks leading from the sore area.  ? Pus draining from a place on your hand. ? A fever.     · Your hand feels numb or tingly.    Watch closely for changes in your health, and be sure to contact your doctor if:    · Your hand feels unstable when you try to use it.     · You do not get better as expected.     · You have any new symptoms, such as swelling.     · Bruises from an injury to your hand last longer than 2 weeks. Where can you learn more? Go to http://ovidio-eliz.info/. Enter R273 in the search box to learn more about \"Hand Pain: Care Instructions. \"  Current as of: September 23, 2018  Content Version: 12.1  © 5420-9006 [employer]. Care instructions adapted under license by [employer] (which disclaims liability or warranty for this information). If you have questions about a medical condition or this instruction, always ask your healthcare professional. [address] any warranty or liability for your use of this information.

## 2019-08-23 NOTE — ED PROVIDER NOTES
EMERGENCY DEPARTMENT HISTORY AND PHYSICAL EXAM    Date: 8/22/2019  Patient Name: Arnulfo Mccullough    History of Presenting Illness     Chief Complaint   Patient presents with    Hand Swelling     right         History Provided By: patient      Additional History (Context): Arnulfo Mccullough is a 31-year-old male presenting to the emergency department with right hand pain. Patient states he was did not injure this hand and denies trauma but states he has had swelling and pain with range of motion for the past few days. States has had this pain in the past and was given anti-inflammatory medication which helped. Denies numbness or tingling, swelling, shortness of breath or any other complaints at this time. PCP: Arabella Evans MD    Current Outpatient Medications   Medication Sig Dispense Refill    omeprazole (PRILOSEC) 10 mg capsule Take 1 Cap by mouth daily. 20 Cap 0    alfuzosin SR (UROXATRAL) 10 mg SR tablet   1    Lactobacillus acidophilus (ACIDOPHILUS) cap Take 2 Caps by mouth two (2) times a day.  gabapentin (NEURONTIN) 300 mg capsule Take 2 Caps by mouth four (4) times daily. 240 Cap 4    pantoprazole (PROTONIX) 40 mg tablet Take 1 Tab by mouth daily. 30 Tab 0    polyethylene glycol (MIRALAX) 17 gram packet Take 1 Packet by mouth daily. 30 Packet 0    amLODIPine (NORVASC) 10 mg tablet Take 1 Tab by mouth daily. 30 Tab 0    prednisoLONE acetate (PRED FORTE) 1 % ophthalmic suspension Administer 1 Drop to both eyes four (4) times daily.  albuterol (PROVENTIL HFA, VENTOLIN HFA, PROAIR HFA) 90 mcg/actuation inhaler Take 2 Puffs by inhalation every four (4) hours as needed for Wheezing or Shortness of Breath. Indications: BRONCHOSPASM PREVENTION 1 Inhaler 0    finasteride (PROSCAR) 5 mg tablet Take 1 Tab by mouth daily. 30 Tab 5    spironolactone (ALDACTONE) 25 mg tablet Take  by mouth daily.          Past History     Past Medical History:  Past Medical History:   Diagnosis Date    Bladder outlet obstruction     Erectile disorder due to medical 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     Frequency of urination     H/O spinal cord injury     lumbar spine injury secondary to fall    HTN (hypertension)     Hypercholesterolemia     Injury of lumbar spine ([address]     Leg pain, right     Nocturia     Overactive bladder     Peripheral vascular disease (HCC)        Past Surgical History:  Past Surgical History:   Procedure Laterality Date    HX ORTHOPAEDIC      finger amputation left hand    HX TONSILLECTOMY         Family History:  Family History   Problem Relation Age of Onset    Diabetes Mother     Hypertension Mother     Diabetes Maternal Grandmother     Hypertension Maternal Grandmother        Social History:  Social History     Tobacco Use    Smoking status: Former Smoker     Last attempt to quit: 2015     Years since quittin.1    Smokeless tobacco: Never Used   Substance Use Topics    Alcohol use: Not Currently     Alcohol/week: 0.0 standard drinks     Comment: former stopped     Drug use: No       Allergies: Allergies   Allergen Reactions    Ampicillin Angioedema    Asa-Acetaminophen-Caff-Buffers Rash    Penicillins Angioedema    Atorvastatin Other (comments)     Muscle cramps         Review of Systems       Review of Systems   Constitutional: Negative for chills and fever. HENT: Negative for nasal congestion, sore throat, rhinorrhea  Eyes: Negative. Respiratory: pos cough and negative for shortness of breath. Cardiovascular: Negative for chest pain and palpitations. Gastrointestinal: Negative for abdominal pain, constipation, diarrhea, nausea and vomiting. Genitourinary: Negative. Negative for difficulty urinating and flank pain. Musculoskeletal: Negative for back pain. Negative for gait problem and neck pain. Pos for wrist pain  Skin: Negative. Allergic/Immunologic: Negative. Neurological: Negative for dizziness, weakness, numbness and headaches. Psychiatric/Behavioral: Negative. All other systems reviewed and are negative. All Other Systems Negative  Physical Exam     Vitals:    08/22/19 1355   BP: 132/74   Pulse: 83   Resp: 18   Temp: 97.8 °F (36.6 °C)   SpO2: 97%   Weight: 77.1 kg (170 lb)     Physical Exam   Constitutional: He is oriented to person, place, and time. He appears well-developed and well-nourished. No distress. HENT:   Head: Normocephalic and atraumatic. Nose: Nose normal.   Eyes: Pupils are equal, round, and reactive to light. Conjunctivae and EOM are normal.   Neck: Normal range of motion. Neck supple. Cardiovascular: Normal rate and regular rhythm. Pulmonary/Chest: Effort normal and breath sounds normal. No respiratory distress. Abdominal: Soft. Musculoskeletal:        Left hand: He exhibits decreased range of motion, tenderness and bony tenderness. He exhibits normal two-point discrimination, normal capillary refill, no deformity, no laceration and no swelling. Normal sensation noted. Normal strength noted. Neurological: He is alert and oriented to person, place, and time. No cranial nerve deficit. Coordination normal.   Skin: Skin is warm. No rash noted. He is not diaphoretic. Psychiatric: He has a normal mood and affect. His behavior is normal.   Nursing note and vitals reviewed. Diagnostic Study Results     Labs -   No results found for this or any previous visit (from the past 12 hour(s)). Radiologic Studies -   XR HAND RT MIN 3 V   Final Result   IMPRESSION:      No bony abnormality seen. CT Results  (Last 48 hours)    None        CXR Results  (Last 48 hours)    None            Medical Decision Making   I am the first provider for this patient. I reviewed the vital signs, available nursing notes, past medical history, past surgical history, family history and social history. Vital Signs-Reviewed the patient's vital signs.         Records Reviewed: Nursing notes, old medical records and any previous labs, imaging, visits, consultations pertinent to patient care    Procedures:  Procedures    Provider Notes (Medical Decision Making):     Xray negative for acute process. Appropriate for out pt management. Will discuss supportive treatment, NSAIDS, RICE and ortho f/u. Discussed proper way to take medications. Discussed treatment plan, return precautions, symptomatic relief, and expected time to improvement. All questions answered. Patient is stable for discharge and outpatient management. MED RECONCILIATION:  No current facility-administered medications for this encounter. Current Outpatient Medications   Medication Sig    omeprazole (PRILOSEC) 10 mg capsule Take 1 Cap by mouth daily.  alfuzosin SR (UROXATRAL) 10 mg SR tablet     Lactobacillus acidophilus (ACIDOPHILUS) cap Take 2 Caps by mouth two (2) times a day.  gabapentin (NEURONTIN) 300 mg capsule Take 2 Caps by mouth four (4) times daily.  pantoprazole (PROTONIX) 40 mg tablet Take 1 Tab by mouth daily.  polyethylene glycol (MIRALAX) 17 gram packet Take 1 Packet by mouth daily.  amLODIPine (NORVASC) 10 mg tablet Take 1 Tab by mouth daily.  prednisoLONE acetate (PRED FORTE) 1 % ophthalmic suspension Administer 1 Drop to both eyes four (4) times daily.  albuterol (PROVENTIL HFA, VENTOLIN HFA, PROAIR HFA) 90 mcg/actuation inhaler Take 2 Puffs by inhalation every four (4) hours as needed for Wheezing or Shortness of Breath. Indications: BRONCHOSPASM PREVENTION    finasteride (PROSCAR) 5 mg tablet Take 1 Tab by mouth daily.  spironolactone (ALDACTONE) 25 mg tablet Take  by mouth daily. Disposition:  home    DISCHARGE NOTE:     Pt has been reexamined. Patient has no new complaints, changes, or physical findings. Care plan outlined and precautions discussed. Discussed proper way to take medications. Discussed treatment plan, return precautions, symptomatic relief, and expected time to improvement.  All questions answered. Patient is stable for discharge and outpatient management. Patient is ready to go home. Follow-up Information     Follow up With Specialties Details Why Contact Info    [facility] EMERGENCY DEPT Emergency Medicine   [id number] [facility] [address]  [address] [place] [id number]  [phone]            Discharge Medication List as of 8/22/2019  3:28 PM                Diagnosis     Clinical Impression:   1. Pain of right hand          Dictation disclaimer:  Please note that this dictation was completed with [employer], the computer voice recognition software. Quite often unanticipated grammatical, syntax, homophones, and other interpretive errors are inadvertently transcribed by the computer software. Please disregard these errors. Please excuse any errors that have escaped final proofreading.

## 2019-09-03 ENCOUNTER — OFFICE VISIT (OUTPATIENT)
Dept: SURGERY | Age: 74
End: 2019-09-03

## 2019-09-03 VITALS
DIASTOLIC BLOOD PRESSURE: 74 MMHG | TEMPERATURE: 97.4 F | BODY MASS INDEX: 27 KG/M2 | HEART RATE: 60 BPM | WEIGHT: 172 LBS | OXYGEN SATURATION: 96 % | HEIGHT: 67 IN | SYSTOLIC BLOOD PRESSURE: 135 MMHG | RESPIRATION RATE: 16 BRPM

## 2019-09-03 DIAGNOSIS — Z12.11 COLON CANCER SCREENING: Primary | ICD-10-CM

## 2019-09-03 NOTE — PROGRESS NOTES
Review of Systems   Constitutional: Positive for chills and weight loss. Negative for diaphoresis, fever and malaise/fatigue. HENT: Positive for hearing loss and nosebleeds. Negative for congestion, ear discharge, ear pain, sinus pain, sore throat and tinnitus. Left ear    Eyes: Positive for blurred vision. Negative for double vision, photophobia, pain, discharge and redness. Respiratory: Positive for shortness of breath. Negative for cough, hemoptysis, sputum production, wheezing and stridor. Cardiovascular: Positive for leg swelling. Negative for chest pain, palpitations, orthopnea, claudication and PND. Gastrointestinal: Positive for abdominal pain, constipation, heartburn and vomiting. Negative for blood in stool, diarrhea, melena and nausea. Genitourinary: Positive for dysuria and frequency. Negative for flank pain, hematuria and urgency. Musculoskeletal: Positive for back pain, joint pain and neck pain. Negative for falls and myalgias. Skin: Negative. Neurological: Positive for tingling. Negative for dizziness, tremors, sensory change, speech change, focal weakness, seizures, loss of consciousness, weakness and headaches. Both arms   Endo/Heme/Allergies: Negative for environmental allergies and polydipsia. Bruises/bleeds easily. Psychiatric/Behavioral: Positive for memory loss. Negative for depression, hallucinations, substance abuse and suicidal ideas. The patient is not nervous/anxious and does not have insomnia. Colon Screen    Patient: Jose Nelson MRN: [id number]  SSN: [id number]    YOB: 1945  Age: 68 y.o. Sex: male        Subjective:   Jose Nelson was referred by his PCP, Claire Carrera MD.  Patient referred for colonoscopy for   Screening colonoscopy. Patient denies rectal pain or bleeding. Abdominal surgeries as described below, specifically none. Family history as described below, specifically none.  Patient has never had a colonoscopy. Patient also states he needs an upper endoscopy (trouble swallowing) done and has asked his PCP multiple times for this. I explained we only do colonoscopies here and not upper endoscopies. He understands and will mention this to his pcp at his next appt. Allergies   Allergen Reactions    Ampicillin Angioedema    Asa-Acetaminophen-Caff-Buffers Rash    Penicillins Angioedema    Aspirin Other (comments)     Stomach upset    Atorvastatin Other (comments)     Muscle cramps       Past Medical History:   Diagnosis Date    Bladder outlet obstruction     Erectile disorder due to medical 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     Frequency of urination     H/O spinal cord injury     lumbar spine injury secondary to fall    HTN (hypertension)     Hypercholesterolemia     Illiterate     Injury of lumbar spine ([address]     Leg pain, right     Nocturia     Overactive bladder     Peripheral vascular disease (HCC)      Past Surgical History:   Procedure Laterality Date    HX HEENT Left     lens implant    HX ORTHOPAEDIC      finger amputation left hand    HX TONSILLECTOMY        Family History   Problem Relation Age of Onset    Diabetes Mother     Hypertension Mother     Diabetes Maternal Grandmother     Hypertension Maternal Grandmother     Cancer Sister         brain    Cancer Sister         brain     Social History     Tobacco Use    Smoking status: Former Smoker     Last attempt to quit: 2015     Years since quittin.1    Smokeless tobacco: Never Used   Substance Use Topics    Alcohol use: Not Currently     Alcohol/week: 0.0 standard drinks     Comment: former stopped       Prior to Admission medications    Medication Sig Start Date End Date Taking? Authorizing Provider   gabapentin (NEURONTIN) 300 mg capsule Take 2 Caps by mouth four (4) times daily. 19  Yes Roger Pinto MD   pantoprazole (PROTONIX) 40 mg tablet Take 1 Tab by mouth daily.  3/28/19  Yes Carlita Vázquez MD Issa   amLODIPine (NORVASC) 10 mg tablet Take 1 Tab by mouth daily. 3/28/19  Yes Magdi Quiros MD   albuterol (PROVENTIL HFA, VENTOLIN HFA, PROAIR HFA) 90 mcg/actuation inhaler Take 2 Puffs by inhalation every four (4) hours as needed for Wheezing or Shortness of Breath. Indications: BRONCHOSPASM PREVENTION 9/25/17  Yes Carlos Alas DO   finasteride (PROSCAR) 5 mg tablet Take 1 Tab by mouth daily. 7/5/17  Yes Miguel A Street MD   spironolactone (ALDACTONE) 25 mg tablet Take  by mouth daily. Yes Provider, Historical   omeprazole (PRILOSEC) 10 mg capsule Take 1 Cap by mouth daily. 8/13/19   Sinai Porter MD   alfuzosin SR (UROXATRAL) 10 mg SR tablet  5/2/19   Provider, Historical   Lactobacillus acidophilus (ACIDOPHILUS) cap Take 2 Caps by mouth two (2) times a day. Provider, Historical   polyethylene glycol (MIRALAX) 17 gram packet Take 1 Packet by mouth daily. 3/28/19   Magdi Quiros MD   prednisoLONE acetate (PRED FORTE) 1 % ophthalmic suspension Administer 1 Drop to both eyes four (4) times daily. Provider, Historical          Review of Systems:      Risks colonoscopy described- colon injury, missed lesion, anesthesia problems, bleeding       Yessenia Segura, LUDMILAN  September 3, 1905  9:10 AM

## 2019-09-04 ENCOUNTER — HOSPITAL ENCOUNTER (OUTPATIENT)
Dept: MRI IMAGING | Age: 74
Discharge: HOME | End: 2019-09-04
Attending: FAMILY MEDICINE
Payer: MEDICAID

## 2019-09-04 DIAGNOSIS — R51.9 LEFT-SIDED HEADACHE: ICD-10-CM

## 2019-09-04 PROCEDURE — 70551 MRI BRAIN STEM W/O DYE: CPT

## 2019-09-26 ENCOUNTER — OFFICE VISIT (OUTPATIENT)
Dept: ORTHOPEDIC SURGERY | Facility: CLINIC | Age: 74
End: 2019-09-26

## 2019-09-26 VITALS
OXYGEN SATURATION: 93 % | WEIGHT: 174 LBS | HEIGHT: 67 IN | SYSTOLIC BLOOD PRESSURE: 130 MMHG | RESPIRATION RATE: 16 BRPM | TEMPERATURE: 97.1 F | BODY MASS INDEX: 27.31 KG/M2 | DIASTOLIC BLOOD PRESSURE: 62 MMHG | HEART RATE: 46 BPM

## 2019-09-26 DIAGNOSIS — R20.0 NUMBNESS AND TINGLING IN LEFT HAND: ICD-10-CM

## 2019-09-26 DIAGNOSIS — M79.642 LEFT HAND PAIN: ICD-10-CM

## 2019-09-26 DIAGNOSIS — M79.672 LEFT FOOT PAIN: Primary | ICD-10-CM

## 2019-09-26 DIAGNOSIS — R20.2 NUMBNESS AND TINGLING IN LEFT HAND: ICD-10-CM

## 2019-09-26 NOTE — PROGRESS NOTES
1. Have you been to the ER, urgent care clinic since your last visit? Hospitalized since your last visit? Yes, [facility]    2. Have you seen or consulted any other health care providers outside of the [address] [place] [address] since your last visit? Include any pap smears or colon screening.  NO

## 2019-09-26 NOTE — PROGRESS NOTES
Patient: Filomena Steele                MRN: [id number]       SSN: [id number]  YOB: 1945        AGE: 68 y.o. SEX: male  Body mass index is 27.25 kg/m². PCP: Jeff Ramos MD  09/26/19    Mr. Gloria Melara is seen with complaints of left foot and left hand pain. He twisted his foot about 5 days ago. He has been having a lot of pain. He has been walking with an antalgic gait. He complains of pain in the fifth metatarsal.  Also he tends to sleep a fair bit in his recliner chair and he notices his baby finger will go numb but he has also been complaining of some numbness in the index finger as well. No wrist injury per se. The knees are doing okay at this point. He is a gentleman who does not like the operating room too much, especially with regards to his knees in the past.    REVIEW OF SYSTEMS:  The 12-point review of systems; he denies heart attack. He denies diabetes. No recent chest pain. In fact, 12-point review of systems performed today, pertinent positives noted. All other systems were reviewed and are negative. PHYSICAL EXAMINATION:  Mildly antalgic gait. He is quite tender over the base of the fifth metatarsal.  A little bit of tenderness at the midfoot but not particularly bad. The toes are noncontributory. Both feet are warm although there is evidence of neuropathy. The hand itself, he has weakness of his abductor brevis and also opponens of the thumb with some mild thenar wasting, mild decrease in sensation but he also has that on the ulnar side as well with a little bit of numbness involving the ulnar nerve distribution. I am going to try him with a cockup splint. I will get a nerve conduction study for him. 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 RADIOGRAPHS:  On the x-ray of the foot on the one oblique view there appears to be a nondisplaced fracture but I am not sure if it is a nutrient vessel versus fracture; therefore, MRI is indicated. I will put him in a boot for the time being. We will see him back to review the results of the nerve conduction study and also of the MRI for the fifth metatarsal.    cc:           REVIEW OF SYSTEMS:      CON: negative for weight loss, fever  EYE: negative for double vision  ENT: negative for hoarseness  RS:   negative for Tb  GI:    negative for blood in stool  :  negative for blood in urine  Other systems reviewed and noted below. Past Medical History:   Diagnosis Date    Bladder outlet obstruction     Erectile disorder due to medical 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     Frequency of urination     H/O spinal cord injury 1974    lumbar spine injury secondary to fall    HTN (hypertension)     Hypercholesterolemia     Illiterate     Injury of lumbar spine ([facility] [facility]    Leg pain, right     Nocturia     Overactive bladder     Peripheral vascular disease ([facility]        Family History   Problem Relation Age of Onset    Diabetes Mother     Hypertension Mother     Diabetes Maternal Grandmother     Hypertension Maternal Grandmother     Cancer Sister         brain    Cancer Sister         brain       Current Outpatient Medications   Medication Sig Dispense Refill    gabapentin (NEURONTIN) 300 mg capsule Take 2 Caps by mouth four (4) times daily. 240 Cap 4    pantoprazole (PROTONIX) 40 mg tablet Take 1 Tab by mouth daily. 30 Tab 0    polyethylene glycol (MIRALAX) 17 gram packet Take 1 Packet by mouth daily. 30 Packet 0    amLODIPine (NORVASC) 10 mg tablet Take 1 Tab by mouth daily. 30 Tab 0    finasteride (PROSCAR) 5 mg tablet Take 1 Tab by mouth daily. 30 Tab 5    spironolactone (ALDACTONE) 25 mg tablet Take  by mouth daily.  omeprazole (PRILOSEC) 10 mg capsule Take 1 Cap by mouth daily. 20 Cap 0    alfuzosin SR (UROXATRAL) 10 mg SR tablet   1    Lactobacillus acidophilus (ACIDOPHILUS) cap Take 2 Caps by mouth two (2) times a day.       prednisoLONE acetate (PRED FORTE) 1 % ophthalmic suspension Administer 1 Drop to both eyes four (4) times daily.  albuterol (PROVENTIL HFA, VENTOLIN HFA, PROAIR HFA) 90 mcg/actuation inhaler Take 2 Puffs by inhalation every four (4) hours as needed for Wheezing or Shortness of Breath.  Indications: BRONCHOSPASM PREVENTION 1 Inhaler 0       Allergies   Allergen Reactions    Ampicillin Angioedema    Asa-Acetaminophen-Caff-Buffers Rash    Penicillins Angioedema    Aspirin Other (comments)     Stomach upset    Atorvastatin Other (comments)     Muscle cramps       Past Surgical History:   Procedure Laterality Date    HX HEENT Left     lens implant    HX ORTHOPAEDIC      finger amputation left hand    HX TONSILLECTOMY         Social History     Socioeconomic History    Marital status: SINGLE     Spouse name: Not on file    Number of children: Not on file    Years of education: Not on file    Highest education level: Not on file   Occupational History    Not on file   Social Needs    Financial resource strain: Not on file    Food insecurity:     Worry: Not on file     Inability: Not on file    Transportation needs:     Medical: Not on file     Non-medical: Not on file   Tobacco Use    Smoking status: Former Smoker     Last attempt to quit: 2015     Years since quittin.2    Smokeless tobacco: Never Used   Substance and Sexual Activity    Alcohol use: Not Currently     Alcohol/week: 0.0 standard drinks     Comment: former stopped     Drug use: No    Sexual activity: Yes   Lifestyle    Physical activity:     Days per week: Not on file     Minutes per session: Not on file    Stress: Not on file   Relationships    Social connections:     Talks on phone: Not on file     Gets together: Not on file     Attends [employer] service: Not on file     Active member of club or organization: Not on file     Attends meetings of clubs or organizations: Not on file     Relationship status: Not on file    Intimate partner violence:     Fear of current or ex partner: Not on file     Emotionally abused: Not on file     Physically abused: Not on file     Forced sexual activity: Not on file   Other Topics Concern    Not on file   Social History Narrative    Not on file       Visit Vitals  /62 (BP 1 Location: Left arm, BP Patient Position: Sitting)   Pulse (!) 46   Temp 97.1 °F (36.2 °C) (Oral)   Resp 16   Ht 5' 7\" (1.702 m)   Wt 174 lb (78.9 kg)   SpO2 93%   BMI 27.25 kg/m²         PHYSICAL EXAMINATION:  GENERAL: Alert and oriented x3, in no acute distress, well-developed, well-nourished, afebrile. HEART: No JVD. EYES: No scleral icterus   NECK: No significant lymphadenopathy   LUNGS: No respiratory compromise or indrawing  ABDOMEN: Soft, non-tender, non-distended. Electronically signed by:  Rosey Arita MD

## 2019-10-03 ENCOUNTER — OFFICE VISIT (OUTPATIENT)
Dept: CARDIOLOGY CLINIC | Age: 74
End: 2019-10-03

## 2019-10-03 VITALS
SYSTOLIC BLOOD PRESSURE: 122 MMHG | HEART RATE: 81 BPM | HEIGHT: 67 IN | DIASTOLIC BLOOD PRESSURE: 59 MMHG | WEIGHT: 172.6 LBS | BODY MASS INDEX: 27.09 KG/M2

## 2019-10-03 DIAGNOSIS — Q24.5 CORONARY-MYOCARDIAL BRIDGE: Primary | ICD-10-CM

## 2019-10-03 DIAGNOSIS — I10 ESSENTIAL HYPERTENSION: ICD-10-CM

## 2019-10-03 DIAGNOSIS — E78.00 HYPERCHOLESTEROLEMIA: ICD-10-CM

## 2019-10-03 NOTE — PROGRESS NOTES
1. Have you been to the ER, urgent care clinic since your last visit? Hospitalized since your last visit? Yes When: 08/24/19 Where: [facility] Reason for visit: chest pain    2. Have you seen or consulted any other health care providers outside of the [address] [place] [address] since your last visit? Include any pap smears or colon screening.       No

## 2019-10-03 NOTE — PROGRESS NOTES
HISTORY OF PRESENT ILLNESS  Raphael Calderon is a 68 y.o. male. Patient is here for follow up of diagnostic tests. Results will be discussed. Chest Pain (Angina)    The history is provided by the patient. This is a new problem. The current episode started more than 1 week ago. The problem has been gradually worsening. The problem occurs daily. The pain is associated with exertion and rest. The pain is present in the substernal region, right side and left side. The pain is mild. The quality of the pain is described as pressure-like and heavy. The pain does not radiate. Pertinent negatives include no abdominal pain, no claudication, no cough, no dizziness, no fever, no headaches, no hemoptysis, no nausea, no orthopnea, no palpitations, no PND, no shortness of breath, no sputum production, no vomiting and no weakness. Shortness of Breath   The history is provided by the patient. This is a new problem. The problem occurs frequently. The current episode started more than 1 week ago. The problem has been gradually worsening. Pertinent negatives include no fever, no headaches, no cough, no sputum production, no hemoptysis, no wheezing, no PND, no orthopnea, no chest pain, no vomiting, no abdominal pain, no rash, no leg swelling and no claudication. Precipitated by: walking,exertion. Review of Systems   Constitutional: Negative for chills and fever. HENT: Negative for nosebleeds. Eyes: Negative for blurred vision and double vision. Respiratory: Negative for cough, hemoptysis, sputum production, shortness of breath and wheezing. Cardiovascular: Negative for chest pain, palpitations, orthopnea, claudication, leg swelling and PND. Gastrointestinal: Negative for abdominal pain, heartburn, nausea and vomiting. Musculoskeletal: Negative for myalgias. Skin: Negative for rash. Neurological: Negative for dizziness, weakness and headaches. Endo/Heme/Allergies: Does not bruise/bleed easily.      Family History   Problem Relation Age of Onset    Diabetes Mother     Hypertension Mother     Diabetes Maternal Grandmother     Hypertension Maternal Grandmother     Cancer Sister         brain    Cancer Sister         brain       Past Medical History:   Diagnosis Date    Bladder outlet obstruction     Erectile disorder due to medical 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     Frequency of urination     H/O spinal cord injury     lumbar spine injury secondary to fall    HTN (hypertension)     Hypercholesterolemia     Illiterate     Injury of lumbar spine ([facility] [address]    Leg pain, right     Nocturia     Overactive bladder     Peripheral vascular disease ([facility] [address]        Past Surgical History:   Procedure Laterality Date    HX HEENT Left     lens implant    HX ORTHOPAEDIC      finger amputation left hand    HX TONSILLECTOMY         Social History     Tobacco Use    Smoking status: Former Smoker     Last attempt to quit: 2015     Years since quittin.2    Smokeless tobacco: Never Used   Substance Use Topics    Alcohol use: Not Currently     Alcohol/week: 0.0 standard drinks     Comment: former stopped        Allergies   Allergen Reactions    Ampicillin Angioedema    Asa-Acetaminophen-Caff-Buffers Rash    Penicillins Angioedema    Aspirin Other (comments)     Stomach upset    Atorvastatin Other (comments)     Muscle cramps       Prior to Admission medications    Medication Sig Start Date End Date Taking? Authorizing Provider   gabapentin (NEURONTIN) 300 mg capsule Take 2 Caps by mouth four (4) times daily. 19  Yes Hayden Farnsworth MD   pantoprazole (PROTONIX) 40 mg tablet Take 1 Tab by mouth daily. 3/28/19  Yes Abeba Epstein MD   polyethylene glycol [facility]) 17 gram packet Take 1 Packet by mouth daily. 3/28/19  Yes Abeba Epstein MD   amLODIPine (NORVASC) 10 mg tablet Take 1 Tab by mouth daily.  3/28/19  Yes Abeba Epstein MD   albuterol (PROVENTIL HFA, VENTOLIN HFA, PROAIR HFA) 90 mcg/actuation inhaler Take 2 Puffs by inhalation every four (4) hours as needed for Wheezing or Shortness of Breath. Indications: BRONCHOSPASM PREVENTION 9/25/17  Yes Vickie Alas,    finasteride (PROSCAR) 5 mg tablet Take 1 Tab by mouth daily. 7/5/17  Yes Bayron Moody MD   spironolactone (ALDACTONE) 25 mg tablet Take  by mouth daily. Yes Provider, Historical         Visit Vitals  /59   Pulse 81   Ht 5' 7\" (1.702 m)   Wt 78.3 kg (172 lb 9.6 oz)   BMI 27.03 kg/m²       Physical Exam   Constitutional: He is oriented to person, place, and time. He appears well-developed and well-nourished. HENT:   Head: Normocephalic and atraumatic. Eyes: Conjunctivae are normal.   Neck: Neck supple. No JVD present. No tracheal deviation present. No thyromegaly present. Cardiovascular: Normal rate, regular rhythm and normal heart sounds. Exam reveals no gallop and no friction rub. No murmur heard. Pulmonary/Chest: Breath sounds normal. No respiratory distress. He has no wheezes. He has no rales. He exhibits no tenderness. Abdominal: Soft. There is no tenderness. Musculoskeletal: He exhibits no edema. Neurological: He is alert and oriented to person, place, and time. Skin: Skin is warm and dry. Psychiatric: He has a normal mood and affect. Interpretation Summary 10/2018    Normal right lower extremity arterial findings. Moderate PAD left lower extremity. Disease noted at tibial level. 10/2018 EKG  DiagnosisFinal Sinus bradycardia   Moderate voltage criteria for LVH, may be normal variant   Possible Acute pericarditis   Abnormal ECG  2015-stress echo  Impressions: Normal study after maximal exercise without reproduction of  symptoms   ECG conclusions: The stress ECG was normal. Based on [employer]  Scoring, this patient was at low risk for cardiac events. Mr. Eric Winters has a reminder for a \"due or due soon\" health maintenance.  I have asked that he contact his primary care provider for follow-up on this health maintenance. I have personally reviewed patient's records available from hospital and other providers and incorporated findings in patient care. I have personally reviewed patients ekg done at other facility. I Have personally reviewed recent relevant labs available and discussed with patient    Conclusion cardiac cath 3/2019    Non obstructive epicardial arteries with moderate mid LAD myocardial bridging noted. Will optimize CCB dose. Continue risk factor modification. Complications                      Coronary Findings     Diagnostic   Dominance: Right   Left Main   The vessel was visualized by angiography. The vessel is angiographically normal.   Left Anterior Descending   The vessel was visualized by angiography. The vessel is angiographically normal. Moderate myocardial bridging noted in mid LAD   Left Circumflex   The vessel was visualized by angiography. The vessel is angiographically normal.   Right Coronary Artery   The vessel was visualized by angiography. The vessel is angiographically normal.   Intervention     No interventions have been documented. Procedure Conclusion     Nuclear Stress Test 3/2019    Abnormal myocardial perfusion imaging. Reversible defect consistent with myocardial ischemia. Myocardial perfusion imaging supports an intermediate risk stress test.   There is no prior study available for comparison. .   Interpretation Summary     · Baseline ECG: Sinus bradycardia, ST elevation, consider early repolarization, pericarditis or injury Minimal voltage criteria for LVH maybe normal variant. · Gated SPECT: Left ventricular function post-stress was normal. Calculated ejection fraction is 71%. There is no evidence of transient ischemic dilation (TID). The TID ratio is 0.95.   · Negative stress test.  · Left ventricular perfusion is probably abnormal.  · Myocardial perfusion imaging defect 1: There is a defect that is small to moderate in size with a mild reduction in uptake present in the mid to basal inferior location(s) that is partially reversible. There is normal wall motion in the defect area. Viability in the area is good. The possibility of ischemia cannot be excluded. Perfusion defect was visually present without quantitative evidence. · Abnormal myocardial perfusion imaging. Reversible defect consistent with myocardial ischemia. Myocardial perfusion imaging supports an intermediate risk stress test.          Assessment         ICD-10-CM ICD-9-CM    1. 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 Monitor. Occasional chest pains   2. Essential hypertension I10 401.9     Stable continue current therapy   3. Hypercholesterolemia E78.00 272.0     Continue diet. Unable to tolerate statin. 3/2019  New patient with increased chest pain and shortness of breath. Possible angina. Rule out CHF cardiomyopathy. Patient was intolerant to atorvastatin in past due to muscle spasm. Recheck lipids and decide on alternate statin. Patient had peptic ulcer many years ago and was told not to take aspirin as it upsets his stomach. Consider Plavix if needed    4/2019  Continues to have abdominal pain atypical chest pain. No significant CAD. Currently on Protonix. Will use Mylanta plus for gas. He has GI appointment next week. Cardiac status stable  Medications Discontinued During This Encounter   Medication Reason    omeprazole (PRILOSEC) 10 mg capsule Not A Current Medication    alfuzosin SR (UROXATRAL) 10 mg SR tablet Not A Current Medication    Lactobacillus acidophilus (ACIDOPHILUS) cap Not A Current Medication    prednisoLONE acetate (PRED FORTE) 1 % ophthalmic suspension Not A Current Medication       No orders of the defined types were placed in this encounter. Follow-up and Dispositions    · Return in about 6 months (around 4/3/2020).

## 2019-10-30 DIAGNOSIS — I73.9 PAD (PERIPHERAL ARTERY DISEASE) (HCC): Primary | ICD-10-CM

## 2019-10-30 NOTE — PROGRESS NOTES
Patient has requested yearly arterial ultrasound of bilateral lower extremities be completed at the  Hospital vs in clinic so order was change for this per verbal order [place], [place].

## 2019-11-05 ENCOUNTER — HOSPITAL ENCOUNTER (OUTPATIENT)
Dept: MRI IMAGING | Age: 74
Discharge: HOME | End: 2019-11-05
Attending: ORTHOPAEDIC SURGERY
Payer: MEDICAID

## 2019-11-05 DIAGNOSIS — M79.672 LEFT FOOT PAIN: ICD-10-CM

## 2019-11-05 PROCEDURE — 73718 MRI LOWER EXTREMITY W/O DYE: CPT

## 2019-11-08 ENCOUNTER — HOSPITAL ENCOUNTER (OUTPATIENT)
Dept: VASCULAR SURGERY | Age: 74
Discharge: HOME | End: 2019-11-08
Attending: PHYSICIAN ASSISTANT
Payer: MEDICAID

## 2019-11-08 ENCOUNTER — TELEPHONE (OUTPATIENT)
Dept: VASCULAR SURGERY | Age: 74
End: 2019-11-08

## 2019-11-08 DIAGNOSIS — I73.9 PAD (PERIPHERAL ARTERY DISEASE) (HCC): ICD-10-CM

## 2019-11-08 LAB
LEFT ABI: 1.03
LEFT ANTERIOR TIBIAL: 132 MMHG
LEFT ARM BP: 124 MMHG
LEFT CALF PRESSURE: 138 MMHG
LEFT POSTERIOR TIBIAL: 122 MMHG
RIGHT ABI: 0.99
RIGHT ANTERIOR TIBIAL: 119 MMHG
RIGHT ARM BP: 128 MMHG
RIGHT CALF PRESSURE: 140 MMHG
RIGHT POSTERIOR TIBIAL: 127 MMHG

## 2019-11-08 PROCEDURE — 93923 UPR/LXTR ART STDY 3+ LVLS: CPT

## 2019-11-08 NOTE — TELEPHONE ENCOUNTER
Patient called and stated that he was at [address] [facility] for his studies at 10am. He thought he had a follow up at 115pm at [address] [facility] to see the provider. Informed patient that the providers office is at [facility]. He stated that he is not able to come to [place] for his appointment due to no transportation and no family member to take him. He wanted to know what he can do.

## 2019-11-11 ENCOUNTER — HOSPITAL ENCOUNTER (OUTPATIENT)
Dept: LAB | Age: 74
Discharge: HOME | End: 2019-11-11
Payer: MEDICAID

## 2019-11-11 LAB — BNP SERPL-MCNC: 27 PG/ML (ref 0–900)

## 2019-11-11 PROCEDURE — 83880 ASSAY OF NATRIURETIC PEPTIDE: CPT

## 2019-11-11 PROCEDURE — 36415 COLL VENOUS BLD VENIPUNCTURE: CPT

## 2019-11-12 ENCOUNTER — HOSPITAL ENCOUNTER (OUTPATIENT)
Dept: LAB | Age: 74
Discharge: HOME | End: 2019-11-12
Payer: MEDICAID

## 2019-11-12 ENCOUNTER — OFFICE VISIT (OUTPATIENT)
Dept: ONCOLOGY | Age: 74
End: 2019-11-12

## 2019-11-12 ENCOUNTER — HOSPITAL ENCOUNTER (OUTPATIENT)
Dept: ONCOLOGY | Age: 74
Discharge: HOME | End: 2019-11-12

## 2019-11-12 VITALS
TEMPERATURE: 98.5 F | SYSTOLIC BLOOD PRESSURE: 133 MMHG | OXYGEN SATURATION: 99 % | RESPIRATION RATE: 16 BRPM | WEIGHT: 174 LBS | HEIGHT: 68 IN | HEART RATE: 71 BPM | BODY MASS INDEX: 26.37 KG/M2 | DIASTOLIC BLOOD PRESSURE: 64 MMHG

## 2019-11-12 DIAGNOSIS — D50.8 IRON DEFICIENCY ANEMIA SECONDARY TO INADEQUATE DIETARY IRON INTAKE: ICD-10-CM

## 2019-11-12 DIAGNOSIS — G89.29 CHRONIC LOW BACK PAIN, UNSPECIFIED BACK PAIN LATERALITY, UNSPECIFIED WHETHER SCIATICA PRESENT: ICD-10-CM

## 2019-11-12 DIAGNOSIS — D47.2 MGUS (MONOCLONAL GAMMOPATHY OF UNKNOWN SIGNIFICANCE): ICD-10-CM

## 2019-11-12 DIAGNOSIS — D64.9 CHRONIC ANEMIA: ICD-10-CM

## 2019-11-12 DIAGNOSIS — M54.50 CHRONIC LOW BACK PAIN, UNSPECIFIED BACK PAIN LATERALITY, UNSPECIFIED WHETHER SCIATICA PRESENT: ICD-10-CM

## 2019-11-12 DIAGNOSIS — D47.2 MGUS (MONOCLONAL GAMMOPATHY OF UNKNOWN SIGNIFICANCE): Primary | ICD-10-CM

## 2019-11-12 LAB
ALBUMIN SERPL-MCNC: 3.6 G/DL (ref 3.4–5)
ALBUMIN/GLOB SERPL: 1 {RATIO} (ref 0.8–1.7)
ALP SERPL-CCNC: 69 U/L (ref 45–117)
ALT SERPL-CCNC: 22 U/L (ref 16–61)
ANION GAP SERPL CALC-SCNC: 5 MMOL/L (ref 3–18)
AST SERPL-CCNC: 15 U/L (ref 10–38)
BASO+EOS+MONOS # BLD AUTO: 0.6 K/UL (ref 0–2.3)
BASO+EOS+MONOS NFR BLD AUTO: 14 % (ref 0.1–17)
BILIRUB SERPL-MCNC: 0.3 MG/DL (ref 0.2–1)
BUN SERPL-MCNC: 13 MG/DL (ref 7–18)
BUN/CREAT SERPL: 10 (ref 12–20)
CALCIUM SERPL-MCNC: 9.4 MG/DL (ref 8.5–10.1)
CHLORIDE SERPL-SCNC: 108 MMOL/L (ref 100–111)
CO2 SERPL-SCNC: 26 MMOL/L (ref 21–32)
CREAT SERPL-MCNC: 1.3 MG/DL (ref 0.6–1.3)
DIFFERENTIAL METHOD BLD: ABNORMAL
ERYTHROCYTE [DISTWIDTH] IN BLOOD BY AUTOMATED COUNT: 13.8 % (ref 11.5–14.5)
GLOBULIN SER CALC-MCNC: 3.5 G/DL (ref 2–4)
GLUCOSE SERPL-MCNC: 92 MG/DL (ref 74–99)
HCT VFR BLD AUTO: 41.7 % (ref 36–48)
HGB BLD-MCNC: 13.3 G/DL (ref 12–16)
LYMPHOCYTES # BLD: 1.8 K/UL (ref 1.1–5.9)
LYMPHOCYTES NFR BLD: 40 % (ref 14–44)
MCH RBC QN AUTO: 29.5 PG (ref 25–35)
MCHC RBC AUTO-ENTMCNC: 31.9 G/DL (ref 31–37)
MCV RBC AUTO: 92.5 FL (ref 78–102)
NEUTS SEG # BLD: 2 K/UL (ref 1.8–9.5)
NEUTS SEG NFR BLD: 46 % (ref 40–70)
PLATELET # BLD AUTO: 268 K/UL (ref 140–440)
POTASSIUM SERPL-SCNC: 4.1 MMOL/L (ref 3.5–5.5)
PROT SERPL-MCNC: 7.1 G/DL (ref 6.4–8.2)
RBC # BLD AUTO: 4.51 M/UL (ref 4.1–5.1)
SODIUM SERPL-SCNC: 139 MMOL/L (ref 136–145)
WBC # BLD AUTO: 4.4 K/UL (ref 4.5–13)

## 2019-11-12 PROCEDURE — 80053 COMPREHEN METABOLIC PANEL: CPT

## 2019-11-12 PROCEDURE — 84165 PROTEIN E-PHORESIS SERUM: CPT

## 2019-11-12 PROCEDURE — 36415 COLL VENOUS BLD VENIPUNCTURE: CPT

## 2019-11-12 NOTE — PROGRESS NOTES
Hematology/Oncology  Progress Note    Name: Jassi Galvez  Date: 2019  : 1945    Mason Kessler MD     Mr. Bev Patterson is a 68y.o. year old male who was seen for Monoclonal gammopathy of undetermined significance. Current Therapy- Active Surveillance       Subjective:      Mr. Bev Patterson is a 71-year-old male with a past medical history of CAD, HTN, PVD, radiculopathy of the lumbar region, chronic low back pain, osteoarthritis, and newly diagnosed with monoclonal gammopathy of unknown significance. He was diagnosed on 2018. The bone survey on 10/18/2018 showed no evidence of lytic lesions in the skeleton. Other than arthritic pain,  the patient reports that he is doing well. He currently uses OTC Tylenol and lidocaine patch for pain control. Past medical history, family history, and social history: these were reviewed and remains unchanged.     Past Medical History:   Diagnosis Date    Bladder outlet obstruction     Erectile disorder due to medical 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     Frequency of urination     H/O spinal cord injury     lumbar spine injury secondary to fall    HTN (hypertension)     Hypercholesterolemia     Illiterate     Injury of lumbar spine ([address]     Leg pain, right     Nocturia     Overactive bladder     Peripheral vascular disease (HCC)      Past Surgical History:   Procedure Laterality Date    HX HEENT Left     lens implant    HX ORTHOPAEDIC      finger amputation left hand    HX TONSILLECTOMY       Social History     Socioeconomic History    Marital status: SINGLE     Spouse name: Not on file    Number of children: Not on file    Years of education: Not on file    Highest education level: Not on file   Occupational History    Not on file   Social Needs    Financial resource strain: Not on file    Food insecurity:     Worry: Not on file     Inability: Not on file    Transportation needs:     Medical: Not on file     Non-medical: Not on file Tobacco Use    Smoking status: Former Smoker     Last attempt to quit: 2015     Years since quittin.3    Smokeless tobacco: Never Used   Substance and Sexual Activity    Alcohol use: Not Currently     Alcohol/week: 0.0 standard drinks     Comment: former stopped     Drug use: No    Sexual activity: Yes   Lifestyle    Physical activity:     Days per week: Not on file     Minutes per session: Not on file    Stress: Not on file   Relationships    Social connections:     Talks on phone: Not on file     Gets together: Not on file     Attends Mandaeism service: Not on file     Active member of club or organization: Not on file     Attends meetings of clubs or organizations: Not on file     Relationship status: Not on file    Intimate partner violence:     Fear of current or ex partner: Not on file     Emotionally abused: Not on file     Physically abused: Not on file     Forced sexual activity: Not on file   Other Topics Concern    Not on file   Social History Narrative    Not on file     Family History   Problem Relation Age of Onset    Diabetes Mother     Hypertension Mother     Diabetes Maternal Grandmother     Hypertension Maternal Grandmother     Cancer Sister         brain    Cancer Sister         brain     Current Outpatient Medications   Medication Sig Dispense Refill    gabapentin (NEURONTIN) 300 mg capsule Take 2 Caps by mouth four (4) times daily. 240 Cap 4    pantoprazole (PROTONIX) 40 mg tablet Take 1 Tab by mouth daily. 30 Tab 0    polyethylene glycol (MIRALAX) 17 gram packet Take 1 Packet by mouth daily. 30 Packet 0    amLODIPine (NORVASC) 10 mg tablet Take 1 Tab by mouth daily. 30 Tab 0    albuterol (PROVENTIL HFA, VENTOLIN HFA, PROAIR HFA) 90 mcg/actuation inhaler Take 2 Puffs by inhalation every four (4) hours as needed for Wheezing or Shortness of Breath. Indications: BRONCHOSPASM PREVENTION 1 Inhaler 0    finasteride (PROSCAR) 5 mg tablet Take 1 Tab by mouth daily. 30 Tab 5    spironolactone (ALDACTONE) 25 mg tablet Take  by mouth daily. Review of Systems  Constitutional: The patient has no acute distress or discomfort. HEENT: The patient denies recent head trauma, eye pain, blurred vision,  hearing deficit, oropharyngeal mucosal pain or lesions, and the patient denies throat pain or discomfort. Lymphatics: The patient denies palpable peripheral lymphadenopathy. Hematologic: The patient denies having bruising, bleeding, or progressive fatigue. Respiratory: Patient denies having shortness of breath, cough, sputum production, fever, or dyspnea on exertion. Cardiovascular: The patient denies having leg pain, leg swelling, heart palpitations, chest permit, chest pain, or lightheadedness. The patient denies having dyspnea on exertion. Gastrointestinal: The patient denies having nausea, emesis, or diarrhea. The patient denies having any hematemesis or blood in the stool. Genitourinary: Patient denies having urinary urgency, frequency, or dysuria. The patient denies having blood in the urine. Psychological: The patient denies having symptoms of nervousness, anxiety, depression, or thoughts of harming self. Skin: Patient denies having skin rashes, skin, ulcerations, or unexplained itching or pruritus. Musculoskeletal: The patient denies having pain in the joints or bones. Objective:     Visit Vitals  /64   Pulse 71   Temp 98.5 °F (36.9 °C) (Oral)   Resp 16   Ht 5' 7.5\" (1.715 m)   Wt 78.9 kg (174 lb)   SpO2 99%   BMI 26.85 kg/m²     ECOG PS0 pain 4/10  Physical Exam:   Gen. Appearance: The patient is in no acute distress. Skin: There is no bruise or rash. HEENT: The exam is unremarkable. Neck: Supple without lymphadenopathy or thyromegaly. Lungs: Clear to auscultation and percussion; there are no wheezes or rhonchi. Heart: Regular rate and rhythm; there are no murmurs, gallops, or rubs. Abdomen:  Bowel sounds are present and normal.  There is no guarding, tenderness, or hepatosplenomegaly. Extremities: There is no clubbing, cyanosis, or edema. Neurologic: There are no focal neurologic deficits. Lymphatics: There is no palpable peripheral lymphadenopathy. Musculoskeletal: The patient has full range of motion at all joints. There is no evidence of joint deformity or effusions. There is no focal joint tenderness. Psychological/psychiatric: There is no clinical evidence of anxiety, depression, or melancholy. Lab data:      Results for orders placed or performed during the hospital encounter of 11/12/19   CBC WITH 3 PART DIFF     Status: Abnormal   Result Value Ref Range Status    WBC 4.4 (L) 4.5 - 13.0 K/uL Final    RBC 4.51 4.10 - 5.10 M/uL Final    HGB 13.3 12.0 - 16.0 g/dL Final    HCT 41.7 36 - 48 % Final    MCV 92.5 78 - 102 FL Final    MCH 29.5 25.0 - 35.0 PG Final    MCHC 31.9 31 - 37 g/dL Final    RDW 13.8 11.5 - 14.5 % Final    PLATELET 277 936 - 774 K/uL Final    NEUTROPHILS 46 40 - 70 % Final    MIXED CELLS 14 0.1 - 17 % Final    LYMPHOCYTES 40 14 - 44 % Final    ABS. NEUTROPHILS 2.0 1.8 - 9.5 K/UL Final    ABS. MIXED CELLS 0.6 0.0 - 2.3 K/uL Final    ABS. LYMPHOCYTES 1.8 1.1 - 5.9 K/UL Final     Comment: Test performed at [address] [place] [address] or Outpatient Infusion Center Location. Reviewed by Medical Director. DF AUTOMATED   Final           Assessment:     1. MGUS (monoclonal gammopathy of unknown significance)    2. Chronic low back pain, unspecified back pain laterality, unspecified whether sciatica present        Plan:   Monoclonal gammopathy of undetermined significance I have explained to the patient that the recent M-spike in Aug 2019 was 0.6. I will recheck his SPEP at this time. No therapeutic intervention is warranted. Chronic low back pain:  Patient will continue using Lidoacaine patch and Tylenol arthritis as prescribed by his PCP.     Follow up in 4 months or sooner if indicated. Orders Placed This Encounter    COMPLETE CBC & AUTO DIFF WBC    InHouse CBC ([facility])     Standing Status:   Future     Number of Occurrences:   1     Standing Expiration Date:   11/19/2019    SPEP     Standing Status:   Future     Standing Expiration Date:   36/70/7924    METABOLIC PANEL, COMPREHENSIVE     Standing Status:   Future     Standing Expiration Date:   11/12/2020       Deirdre Ponce NP  11/12/2019     I have assessed the patient independently and  agree with the full assessment as outlined.   Gabriela Estrella MD, [address]

## 2019-11-13 ENCOUNTER — OFFICE VISIT (OUTPATIENT)
Dept: NEUROLOGY | Age: 74
End: 2019-11-13

## 2019-11-13 VITALS
HEART RATE: 65 BPM | BODY MASS INDEX: 26.83 KG/M2 | SYSTOLIC BLOOD PRESSURE: 136 MMHG | TEMPERATURE: 98.5 F | WEIGHT: 177 LBS | DIASTOLIC BLOOD PRESSURE: 72 MMHG | HEIGHT: 68 IN | OXYGEN SATURATION: 95 % | RESPIRATION RATE: 20 BRPM

## 2019-11-13 DIAGNOSIS — M54.12 CERVICAL RADICULOPATHY: ICD-10-CM

## 2019-11-13 DIAGNOSIS — G62.9 POLYNEUROPATHY: Primary | ICD-10-CM

## 2019-11-13 DIAGNOSIS — G56.32: ICD-10-CM

## 2019-11-13 LAB
ALBUMIN SERPL ELPH-MCNC: 3.6 G/DL (ref 2.9–4.4)
ALBUMIN/GLOB SERPL: 1.2 {RATIO} (ref 0.7–1.7)
ALPHA1 GLOB SERPL ELPH-MCNC: 0.2 G/DL (ref 0–0.4)
ALPHA2 GLOB SERPL ELPH-MCNC: 0.7 G/DL (ref 0.4–1)
B-GLOBULIN SERPL ELPH-MCNC: 1.1 G/DL (ref 0.7–1.3)
GAMMA GLOB SERPL ELPH-MCNC: 1.2 G/DL (ref 0.4–1.8)
GLOBULIN SER CALC-MCNC: 3.1 G/DL (ref 2.2–3.9)
M PROTEIN SERPL ELPH-MCNC: 0.5 G/DL
PROT SERPL-MCNC: 6.7 G/DL (ref 6–8.5)

## 2019-11-13 RX ORDER — DULOXETIN HYDROCHLORIDE 30 MG/1
30 CAPSULE, DELAYED RELEASE ORAL DAILY
Qty: 30 CAP | Refills: 5 | Status: SHIPPED | OUTPATIENT
Start: 2019-11-13 | End: 2019-12-11

## 2019-11-13 RX ORDER — FUROSEMIDE 20 MG/1
20 TABLET ORAL DAILY
Refills: 1 | COMMUNITY
Start: 2019-11-11 | End: 2020-09-17

## 2019-11-13 NOTE — PROGRESS NOTES
Re:  Wilbur Enrique up visit     11/13/2019 9:28 AM    SSN: [id number]    Subjective:   Lakeisha Falcon returns for follow up. He's gotten good relief of pain in the right leg with increased dosing of Neurontin. He has no side effects with the medication. He says he needs to get up slowly to avoid any major pain, but it's tolerable most of the time. His biggest complaint is the burning in the right leg from the hip down to the foot. His primary doctor has increased the Neurontin to 600 mg TID. He's not taking the gabapentin but is having trouble because he's been having trouble taking large pills. He's not tried to open the capsules up and using apple sauce, which I suggest using. Medications:    Current Outpatient Medications   Medication Sig Dispense Refill    furosemide (LASIX) 20 mg tablet   1    gabapentin (NEURONTIN) 300 mg capsule Take 2 Caps by mouth four (4) times daily. 240 Cap 4    pantoprazole (PROTONIX) 40 mg tablet Take 1 Tab by mouth daily. 30 Tab 0    amLODIPine (NORVASC) 10 mg tablet Take 1 Tab by mouth daily. 30 Tab 0    albuterol (PROVENTIL HFA, VENTOLIN HFA, PROAIR HFA) 90 mcg/actuation inhaler Take 2 Puffs by inhalation every four (4) hours as needed for Wheezing or Shortness of Breath. Indications: BRONCHOSPASM PREVENTION 1 Inhaler 0    finasteride (PROSCAR) 5 mg tablet Take 1 Tab by mouth daily. 30 Tab 5    spironolactone (ALDACTONE) 25 mg tablet Take  by mouth daily.  polyethylene glycol (MIRALAX) 17 gram packet Take 1 Packet by mouth daily. 30 Packet 0       Vital signs:    Visit Vitals  /72 (BP 1 Location: Left arm, BP Patient Position: Sitting)   Pulse 65   Temp 98.5 °F (36.9 °C) (Oral)   Resp 20   Ht 5' 7.5\" (1.715 m)   Wt 80.3 kg (177 lb)   SpO2 95%   BMI 27.31 kg/m²       Review of Systems: he complains of pain in all his joints. He has significant tingling of digits 4 and 5 on the left.   As above otherwise 11 point review of systems negative including;   Constitutional no fever or chills  Skin denies rash or itching  HEENT  Denies tinnitus, hearing lose  Eyes denies diplopia vision lose  Respiratory denies sortness of breath  Cardiovascular denies chest pain, dyspnea on exertion  Gastrointestinal denies nausea, vomiting, diarrhea, constipation  Genitourinary denies incontinence  Musculoskeletal denies joint pain or swelling  Endocrine denies weight change  Hematology denies easy bruising or bleeding   Neurological as above in HPI      Patient Active Problem List   Diagnosis Code    Unsteady gait R26.81    Gait instability R26.81    Vertigo R42    Radiculopathy of lumbar region M54.16    ED (erectile dysfunction) of organic origin N52.9    Peripheral vascular disease (HCC) I73.9    Overactive bladder N32.81    Nocturia R35.1    Leg pain, right M79.604    Hypercholesterolemia E78.00    HTN (hypertension) I10    H/O spinal cord injury Z87.828    Erectile disorder due to medical 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 N52.1    Bladder outlet obstruction N32.0    Injury of lumbar spine ([address] S34.109A    Frequency of urination R35.0    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 Fund of knowledge is adequate. Speech is fluent and memory is intact. Cranial Nerves: II - Visual fields are full to confrontation. III, IV, VI - Extraocular movements are intact. There is no nystagmus. V - Facial sensation is intact to pinprick. VII - Face is symmetrical.  VIII - Hearing is present. IX, X, XII - Palate is symmetrical.   XI - Shoulder shrugging and head turning intact  Motor: The patient moves all four limbs fairly well and symmetrically.  Tone is normal. Reflexes are 2+ and symmetrical. Plantars are down going. Gait is mildly antalgic, limping off of the right foot slightly. CBC:   Lab Results   Component Value Date/Time    WBC 4.4 (L) 11/12/2019 01:55 PM    RBC 4.51 11/12/2019 01:55 PM    HGB 13.3 11/12/2019 01:55 PM    HCT 41.7 11/12/2019 01:55 PM    PLATELET 853 96/33/0459 01:55 PM     BMP:   Lab Results   Component Value Date/Time    Glucose 92 11/12/2019 01:55 PM    Sodium 139 11/12/2019 01:55 PM    Potassium 4.1 11/12/2019 01:55 PM    Chloride 108 11/12/2019 01:55 PM    CO2 26 11/12/2019 01:55 PM    BUN 13 11/12/2019 01:55 PM    Creatinine 1.30 11/12/2019 01:55 PM    Calcium 9.4 11/12/2019 01:55 PM     CMP:   Lab Results   Component Value Date/Time    Glucose 92 11/12/2019 01:55 PM    Sodium 139 11/12/2019 01:55 PM    Potassium 4.1 11/12/2019 01:55 PM    Chloride 108 11/12/2019 01:55 PM    CO2 26 11/12/2019 01:55 PM    BUN 13 11/12/2019 01:55 PM    Creatinine 1.30 11/12/2019 01:55 PM    Calcium 9.4 11/12/2019 01:55 PM    Anion gap 5 11/12/2019 01:55 PM    BUN/Creatinine ratio 10 (L) 11/12/2019 01:55 PM    Alk. phosphatase 69 11/12/2019 01:55 PM    Protein, total 7.1 11/12/2019 01:55 PM    Albumin 3.6 11/12/2019 01:55 PM    Globulin 3.5 11/12/2019 01:55 PM    A-G Ratio 1.0 11/12/2019 01:55 PM     Coagulation:   Lab Results   Component Value Date/Time    Prothrombin time 13.3 10/01/2018 03:00 AM    INR 1.0 10/01/2018 03:00 AM    aPTT 82.1 (H) 03/28/2019 05:16 AM       Assessment:  Severe long lived neuropathic pain. No evidence for motor dysfunction. Doing well OK on the  dose of Neurontin, but having trouble taking the capsules because of dysphagia. Plan:  Lets continue him back on 2 of the 300 mg gabapentin capsules 4 times a day. Will try low dose Cymbalta 30 mg. RTC 4 weeks. Sincerely,        Manju Abbott.  Thanh Torres M.D.

## 2019-11-21 ENCOUNTER — HOSPITAL ENCOUNTER (OUTPATIENT)
Dept: NON INVASIVE DIAGNOSTICS | Age: 74
Discharge: HOME | End: 2019-11-21
Attending: FAMILY MEDICINE
Payer: MEDICAID

## 2019-11-21 VITALS
WEIGHT: 177 LBS | BODY MASS INDEX: 27.78 KG/M2 | DIASTOLIC BLOOD PRESSURE: 72 MMHG | SYSTOLIC BLOOD PRESSURE: 136 MMHG | HEIGHT: 67 IN

## 2019-11-21 DIAGNOSIS — I10 HYPERTENSION: ICD-10-CM

## 2019-11-21 DIAGNOSIS — R63.5 WEIGHT GAIN: ICD-10-CM

## 2019-11-21 LAB
ECHO AO ROOT DIAM: 3.36 CM
ECHO LA AREA 4C: 14.7 CM2
ECHO LA VOL 2C: 59.03 ML (ref 18–58)
ECHO LA VOL 4C: 35.84 ML (ref 18–58)
ECHO LA VOL BP: 53.87 ML (ref 18–58)
ECHO LA VOL/BSA BIPLANE: 28.06 ML/M2 (ref 16–28)
ECHO LA VOLUME INDEX A2C: 30.75 ML/M2 (ref 16–28)
ECHO LA VOLUME INDEX A4C: 18.67 ML/M2 (ref 16–28)
ECHO LV EDV TEICHHOLZ: 0.39 ML
ECHO LV ESV TEICHHOLZ: 0.1 ML
ECHO LV INTERNAL DIMENSION DIASTOLIC: 3.91 CM (ref 4.2–5.9)
ECHO LV INTERNAL DIMENSION SYSTOLIC: 2.22 CM
ECHO LV IVSD: 1.22 CM (ref 0.6–1)
ECHO LV MASS 2D: 191.2 G (ref 88–224)
ECHO LV MASS INDEX 2D: 99.6 G/M2 (ref 49–115)
ECHO LV POSTERIOR WALL DIASTOLIC: 1.23 CM (ref 0.6–1)
ECHO LVOT DIAM: 2.06 CM
ECHO LVOT PEAK GRADIENT: 4 MMHG
ECHO LVOT PEAK VELOCITY: 100.56 CM/S
ECHO LVOT VTI: 21.01 CM
ECHO MV A VELOCITY: 103.59 CM/S
ECHO MV E DECELERATION TIME (DT): 293.1 MS
ECHO MV E VELOCITY: 77.71 CM/S
ECHO MV E/A RATIO: 0.75
ECHO PVEIN A DURATION: 108.4 MS
ECHO PVEIN A VELOCITY: 20.02 CM/S
ECHO RV INTERNAL DIMENSION: 3.7 CM
LVFS 2D: 43.08 %
LVOT MG: 2.38 MMHG
LVOT MV: 0.73 CM/S
LVSV (TEICH): 25.41 ML
MV DEC SLOPE: 2.65

## 2019-11-21 PROCEDURE — 93306 TTE W/DOPPLER COMPLETE: CPT

## 2019-12-03 ENCOUNTER — ANESTHESIA EVENT (OUTPATIENT)
Dept: ENDOSCOPY | Age: 74
End: 2019-12-03
Payer: MEDICAID

## 2019-12-04 ENCOUNTER — ANESTHESIA (OUTPATIENT)
Dept: ENDOSCOPY | Age: 74
End: 2019-12-04
Payer: MEDICAID

## 2019-12-04 ENCOUNTER — HOSPITAL ENCOUNTER (OUTPATIENT)
Age: 74
Setting detail: OUTPATIENT SURGERY
Discharge: HOME | End: 2019-12-04
Attending: COLON & RECTAL SURGERY | Admitting: COLON & RECTAL SURGERY
Payer: MEDICAID

## 2019-12-04 VITALS
BODY MASS INDEX: 26.37 KG/M2 | HEIGHT: 68 IN | OXYGEN SATURATION: 100 % | DIASTOLIC BLOOD PRESSURE: 83 MMHG | RESPIRATION RATE: 18 BRPM | SYSTOLIC BLOOD PRESSURE: 128 MMHG | HEART RATE: 70 BPM | TEMPERATURE: 98.3 F | WEIGHT: 174 LBS

## 2019-12-04 PROCEDURE — 74011250636 HC RX REV CODE- 250/636: Performed by: ANESTHESIOLOGY

## 2019-12-04 PROCEDURE — 76040000019: Performed by: COLON & RECTAL SURGERY

## 2019-12-04 PROCEDURE — 76060000032 HC ANESTHESIA 0.5 TO 1 HR: Performed by: COLON & RECTAL SURGERY

## 2019-12-04 PROCEDURE — 74011250636 HC RX REV CODE- 250/636: Performed by: NURSE ANESTHETIST, CERTIFIED REGISTERED

## 2019-12-04 PROCEDURE — 74011000250 HC RX REV CODE- 250: Performed by: ANESTHESIOLOGY

## 2019-12-04 RX ORDER — SODIUM CHLORIDE 0.9 % (FLUSH) 0.9 %
5-40 SYRINGE (ML) INJECTION EVERY 8 HOURS
Status: DISCONTINUED | OUTPATIENT
Start: 2019-12-04 | End: 2019-12-04 | Stop reason: HOSPADM

## 2019-12-04 RX ORDER — SODIUM CHLORIDE 0.9 % (FLUSH) 0.9 %
5-40 SYRINGE (ML) INJECTION AS NEEDED
Status: DISCONTINUED | OUTPATIENT
Start: 2019-12-04 | End: 2019-12-04 | Stop reason: HOSPADM

## 2019-12-04 RX ORDER — SODIUM CHLORIDE, SODIUM LACTATE, POTASSIUM CHLORIDE, CALCIUM CHLORIDE 600; 310; 30; 20 MG/100ML; MG/100ML; MG/100ML; MG/100ML
75 INJECTION, SOLUTION INTRAVENOUS CONTINUOUS
Status: DISCONTINUED | OUTPATIENT
Start: 2019-12-04 | End: 2019-12-04 | Stop reason: HOSPADM

## 2019-12-04 RX ORDER — LIDOCAINE HYDROCHLORIDE 10 MG/ML
0.1 INJECTION, SOLUTION EPIDURAL; INFILTRATION; INTRACAUDAL; PERINEURAL AS NEEDED
Status: DISCONTINUED | OUTPATIENT
Start: 2019-12-04 | End: 2019-12-04 | Stop reason: HOSPADM

## 2019-12-04 RX ORDER — INSULIN LISPRO 100 [IU]/ML
INJECTION, SOLUTION INTRAVENOUS; SUBCUTANEOUS ONCE
Status: DISCONTINUED | OUTPATIENT
Start: 2019-12-04 | End: 2019-12-04 | Stop reason: HOSPADM

## 2019-12-04 RX ORDER — LIDOCAINE HYDROCHLORIDE 20 MG/ML
INJECTION, SOLUTION EPIDURAL; INFILTRATION; INTRACAUDAL; PERINEURAL AS NEEDED
Status: DISCONTINUED | OUTPATIENT
Start: 2019-12-04 | End: 2019-12-04 | Stop reason: HOSPADM

## 2019-12-04 RX ORDER — PROPOFOL 10 MG/ML
INJECTION, EMULSION INTRAVENOUS AS NEEDED
Status: DISCONTINUED | OUTPATIENT
Start: 2019-12-04 | End: 2019-12-04 | Stop reason: HOSPADM

## 2019-12-04 RX ADMIN — LIDOCAINE HYDROCHLORIDE 80 MG: 20 INJECTION, SOLUTION EPIDURAL; INFILTRATION; INTRACAUDAL; PERINEURAL at 11:53

## 2019-12-04 RX ADMIN — PROPOFOL 30 MG: 10 INJECTION, EMULSION INTRAVENOUS at 11:54

## 2019-12-04 RX ADMIN — PROPOFOL 30 MG: 10 INJECTION, EMULSION INTRAVENOUS at 12:02

## 2019-12-04 RX ADMIN — FAMOTIDINE 20 MG: 10 INJECTION INTRAVENOUS at 11:40

## 2019-12-04 RX ADMIN — PROPOFOL 40 MG: 10 INJECTION, EMULSION INTRAVENOUS at 11:55

## 2019-12-04 RX ADMIN — SODIUM CHLORIDE, SODIUM LACTATE, POTASSIUM CHLORIDE, AND CALCIUM CHLORIDE 75 ML/HR: 600; 310; 30; 20 INJECTION, SOLUTION INTRAVENOUS at 11:40

## 2019-12-04 RX ADMIN — SODIUM CHLORIDE, SODIUM LACTATE, POTASSIUM CHLORIDE, AND CALCIUM CHLORIDE: 600; 310; 30; 20 INJECTION, SOLUTION INTRAVENOUS at 11:51

## 2019-12-04 RX ADMIN — PROPOFOL 30 MG: 10 INJECTION, EMULSION INTRAVENOUS at 11:58

## 2019-12-04 NOTE — H&P
HPI: Oneil Chauhan is a 68 y.o. male presenting with chief complain of need for crc screening    Past Medical History:   Diagnosis Date    Bladder outlet obstruction     Erectile disorder due to medical 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     Frequency of urination     H/O spinal cord injury     lumbar spine injury secondary to fall    HTN (hypertension)     Hypercholesterolemia     Illiterate     Injury [facility] lumbar spine [facility]     Leg pain, right     Nocturia     Overactive bladder     Peripheral vascular disease ([facility]        Past Surgical History:   Procedure Laterality Date    HX HEENT Left     lens implant    HX ORTHOPAEDIC      finger amputation left hand    HX TONSILLECTOMY         Family History   Problem Relation Age of Onset    Diabetes Mother     Hypertension Mother     Diabetes Maternal Grandmother     Hypertension Maternal Grandmother     Cancer Sister         brain    Cancer Sister         brain       Social History     Socioeconomic History    Marital status:      Spouse name: Not on file    Number of children: Not on file    Years of education: Not on file    Highest education level: Not on file   Tobacco Use    Smoking status: Former Smoker     Last attempt to quit: 2015     Years since quittin.3    Smokeless tobacco: Never Used   Substance and Sexual Activity    Alcohol use: Not Currently     Alcohol/week: 0.0 standard drinks     Comment: former stopped     Drug use: No    Sexual activity: Yes       Review of Systems - neg    Outpatient Medications Marked as Taking for the 19 encounter [facility] Encounter)   Medication Sig Dispense Refill    furosemide (LASIX) 20 mg tablet   1    gabapentin (NEURONTIN) 300 mg capsule Take 2 Caps by mouth four (4) times daily. 240 Cap 4    pantoprazole (PROTONIX) 40 mg tablet Take 1 Tab by mouth daily. 30 Tab 0    polyethylene glycol (MIRALAX) 17 gram packet Take 1 Packet by mouth daily.  [facility] amLODIPine (NORVASC) 10 mg tablet Take 1 Tab by mouth daily. 30 Tab 0    albuterol (PROVENTIL HFA, VENTOLIN HFA, PROAIR HFA) 90 mcg/actuation inhaler Take 2 Puffs by inhalation every four (4) hours as needed for Wheezing or Shortness of Breath. Indications: BRONCHOSPASM PREVENTION 1 Inhaler 0    finasteride (PROSCAR) 5 mg tablet Take 1 Tab by mouth daily. 30 Tab 5    spironolactone (ALDACTONE) 25 mg tablet Take  by mouth daily. Allergies   Allergen Reactions    Ampicillin Angioedema    Asa-Acetaminophen-Caff-Buffers Rash    Penicillins Angioedema    Aspirin Other (comments)     Stomach upset    Atorvastatin Other (comments)     Muscle cramps       Vitals:    11/04/19 1352 12/04/19 1132   BP:  136/75   Pulse:  88   Resp:  15   Temp:  98.3 °F (36.8 °C)   SpO2:  95%   Weight: 78.9 kg (174 lb)    Height: 5' 7.5\" (1.715 m)        Physical Exam  Constitutional:       Appearance: He is well-developed. HENT:      Head: Normocephalic and atraumatic. Eyes:      Conjunctiva/sclera: Conjunctivae normal.   Abdominal:      General: There is no distension. Palpations: Abdomen is soft. There is no mass. Tenderness: There is no tenderness. Musculoskeletal: Normal range of motion. Lymphadenopathy:      Cervical: No cervical adenopathy. Skin:     General: Skin is warm and dry. Neurological:      Sensory: No sensory deficit. Psychiatric:         Speech: Speech normal.         Assessment / Plan    colonoscopy    The diagnoses and plan were discussed with the patient. All questions answered. Plan of care agreed to by all concerned.

## 2019-12-04 NOTE — PROCEDURES
[facility]  [address], [address]   [place], [address]  [phone]                    Colonoscopy Procedure Note      Jono Silva  1945  [id number]                Date of Procedure: 12/4/2019    Preoperative diagnosis: Z12.11,   Colon cancer Screening    Postoperative diagnosis:  Normal    :  Madelaine Hunter MD    Assistant(s): Endoscopy RN-1: Cesilia Abebe RN  Endoscopy RN-2: Lashaun Zhou RN    Sedation: MAC    Complications: None    Implants: None    Procedure Details:  Prior to the procedure, a history and physical were performed. The patients medications, allergies and sensitivities were reviewed and all questions were answered. After informed consent was obtained for the procedure, with all risks and benefits of procedure explained. The patient was taken to the endoscopy suite and placed in the left lateral decubitus position. Patient identification and proposed procedure were verified prior to the procedure by the nurse and I. After sequential anesthesia administered by anesthesiologist, a digital rectal exam was performed and was normal.  The Olympus video colonoscope was introduced through the anus and advanced to cecum, which was identified by the ileocecal valve and appendiceal orifice. The quality of preparation was good. The colonoscope was slowly withdrawn and the mucosa examined for any abnormalities. Cecal withdrawal time was greater than 6 minutes. The patient tolerated the procedure well. There were no complications. Findings/Interventions:   Polyps - none    EBL: none    Recommendations: -For colon cancer screening in this average-risk patient, colonoscopy may be repeated in 10 years. Resume normal medication(s).      Discharge Disposition:  Lexx Burrows MD  12/4/2019  12:13 PM

## 2019-12-04 NOTE — DISCHARGE INSTRUCTIONS
From Dr. Evans Quivers: FOLLOW UP VISIT Appointment in: Other (Specify) Repeat colonoscopy in 10 years. Colonoscopy: What to Expect at [address] [facility]  After you have a colonoscopy, you will stay at the clinic for 1 to 2 hours until the medicines wear off. Then you can go home. But you will need to arrange for a ride. Your doctor will tell you when you can eat and do your other usual activities. Your doctor will talk to you about when you will need your next colonoscopy. Your doctor can help you decide how often you need to be checked. This will depend on the results of your test and your risk for colorectal cancer. After the test, you may be bloated or have gas pains. You may need to pass gas. If a biopsy was done or a polyp was removed, you may have streaks of blood in your stool (feces) for a few days. This care sheet gives you a general idea about how long it will take for you to recover. But each person recovers at a different pace. Follow the steps below to get better as quickly as possible. How can you care for yourself at home? Activity  · Rest when you feel tired. · You can do your normal activities when it feels okay to do so. Diet  · Follow your doctor's directions for eating. · Unless your doctor has told you not to, drink plenty of fluids. This helps to replace the fluids that were lost during the colon prep. · Do not drink alcohol. Medicines  · If polyps were removed or a biopsy was done during the test, your doctor may tell you not to take aspirin or other anti-inflammatory medicines for a few days. These include ibuprofen (Advil, Motrin) and naproxen (Aleve). Other instructions  · For your safety, do not drive or operate machinery until the medicine wears off and you can think clearly. Your doctor may tell you not to drive or operate machinery until the day after your test.  · Do not sign legal documents or make major decisions until the medicine wears off and you can think clearly.  The anesthesia can make it hard for you to fully understand what you are agreeing to. Follow-up care is a key part of your treatment and safety. Be sure to make and go to all appointments, and call your doctor if you are having problems. It's also a good idea to know your test results and keep a list of the medicines you take. When should you call for help? Call 911 anytime you think you may need emergency care. For example, call if:  · You passed out (lost consciousness). · You pass maroon or bloody stools. · You have severe belly pain. Call your doctor now or seek immediate medical care if:  · Your stools are black and tarlike. · Your stools have streaks of blood, but you did not have a biopsy or any polyps removed. · You have belly pain, or your belly is swollen and firm. · You vomit. · You have a fever. · You are very dizzy. Watch closely for changes in your health, and be sure to contact your doctor if you have any problems. Where can you learn more? Go to [email or web address]  Enter E264 in the search box to learn more about \"Colonoscopy: What to Expect at Home. \"   © 0038-6096 Healthwise, Incorporated. Care instructions adapted under license by [facility] (which disclaims liability or warranty for this information). This care instruction is for use with your licensed healthcare professional. If you have questions about a medical condition or this instruction, always ask your healthcare professional. [place] [address] any warranty or liability for your use of this information. Content Version: 49.4.992480; Current as of: November 14, 2014      DISCHARGE SUMMARY from Nurse     POST-PROCEDURE INSTRUCTIONS:    Call your Physician if you:  ? Observe any excess bleeding. ? Develop a temperature over 100.5o F.  ? Experience abdominal, shoulder or chest pain. ?  Notice any signs of decreased circulation or nerve impairment to an extremity such as a change in color, persistent numbness, tingling, coldness or increase in pain. ? Vomit blood or you have nausea and vomiting lasting longer than 4 hours. ? Are unable to take medications. ? Are unable to urinate within 8 hours after discharge following general anesthesia or intravenous sedation. For the next 24 hours after receiving general anesthesia or intravenous sedation, or while taking prescription Narcotics, limit your activities:  ? Do NOT drive a motor vehicle, operate hazard machinery or power tools, or perform tasks that require coordination. The medication you received during your procedure may have some effect on your mental awareness. ? Do NOT make important personal or business decisions. The medication you received during your procedure may have some effect on your mental awareness. ? Do NOT drink alcoholic beverages. These drinks do not mix well with the medications that have been given to you. ? Upon discharge from the hospital, you must be accompanied by a responsible adult. ? Resume your diet as directed by your physician. ? Resume medications as your physician has prescribed. ? Please give a list of your current medications to your Primary Care Provider. ? Please update this list whenever your medications are discontinued, doses are changed, or new medications (including over-the-counter products) are added. ? Please carry medication information at all times in case of emergency situations. These are general instructions for a healthy lifestyle:    No smoking/ No tobacco products/ Avoid exposure to second hand smoke.  Surgeon General's Warning:  Quitting smoking now greatly reduces serious risk to your health. Obesity, smoking, and a sedentary lifestyle greatly increase your risk for illness.    A healthy diet, regular physical exercise & weight monitoring are important for maintaining a healthy lifestyle   You may be retaining fluid if you have a history of heart failure or if you experience any of the following symptoms:  Weight gain of 3 pounds or more overnight or 5 pounds in a week, increased swelling in our hands or feet or shortness of breath while lying flat in bed. Please call your doctor as soon as you notice any of these symptoms; do not wait until your next office visit. Recognize signs and symptoms of STROKE:  F  -  Face looks uneven  A  -  Arms unable to move or move unevenly  S  -  Speech slurred or non-existent  T  -  Time to call 911 - as soon as signs and symptoms begin - DO NOT go back to bed or wait to see If you get better - TIME IS BRAIN. Colorectal Screening   Colorectal cancer almost always develops from precancerous polyps (abnormal growths) in the colon or rectum. Screening tests can find precancerous polyps, so that they can be removed before they turn into cancer. Screening tests can also find colorectal cancer early, when treatment works best.  Vance Mooney Speak with your physician about when you should begin screening and how often you should be tested. Additional Information    If you have questions, please call [phone]. Remember, [employer] is NOT to be used for urgent needs. For medical emergencies, dial 911. Educational references and/or instructions provided during this visit included:    See attached. APPOINTMENTS:    Please make a follow-up appointment with your physician. Discharge information has been reviewed with the patient. The patient verbalized understanding.

## 2019-12-04 NOTE — ANESTHESIA POSTPROCEDURE EVALUATION
Procedure(s):  COLONOSCOPY. MAC    Anesthesia Post Evaluation      Multimodal analgesia: multimodal analgesia used between 6 hours prior to anesthesia start to PACU discharge  Patient location during evaluation: bedside  Patient participation: complete - patient participated  Level of consciousness: awake and alert  Pain score: 0  Airway patency: patent  Anesthetic complications: no  Cardiovascular status: acceptable  Respiratory status: acceptable  Hydration status: acceptable  Post anesthesia nausea and vomiting:  none      Vitals Value Taken Time   /51 12/4/2019 12:20 PM   Temp     Pulse 81 12/4/2019 12:29 PM   Resp 17 12/4/2019 12:29 PM   SpO2 100 % 12/4/2019 12:29 PM   Vitals shown include unvalidated device data.

## 2019-12-04 NOTE — ANESTHESIA PREPROCEDURE EVALUATION
Relevant Problems   No relevant active problems       Anesthetic History   No history of anesthetic complications            Review of Systems / Medical History  Patient summary reviewed, nursing notes reviewed and pertinent labs reviewed    Pulmonary  Within defined limits                 Neuro/Psych   Within defined limits           Cardiovascular    Hypertension: well controlled          CAD    Exercise tolerance: >4 METS     GI/Hepatic/Renal  Within defined limits              Endo/Other  Within defined limits           Other Findings              Physical Exam    Airway  Mallampati: II  TM Distance: 4 - 6 cm  Neck ROM: normal range of motion   Mouth opening: Normal     Cardiovascular  Regular rate and rhythm,  S1 and S2 normal,  no murmur, click, rub, or gallop  Rhythm: regular  Rate: normal         Dental    Dentition: Edentulous     Pulmonary  Breath sounds clear to auscultation               Abdominal  GI exam deferred       Other Findings            Anesthetic Plan    ASA: 3  Anesthesia type: MAC    Monitoring Plan: TRISTEN      Induction: Intravenous  Anesthetic plan and risks discussed with: Patient

## 2019-12-11 ENCOUNTER — OFFICE VISIT (OUTPATIENT)
Dept: NEUROLOGY | Age: 74
End: 2019-12-11

## 2019-12-11 VITALS
WEIGHT: 169.4 LBS | SYSTOLIC BLOOD PRESSURE: 138 MMHG | DIASTOLIC BLOOD PRESSURE: 74 MMHG | BODY MASS INDEX: 26.59 KG/M2 | TEMPERATURE: 97.9 F | HEIGHT: 67 IN | HEART RATE: 75 BPM | OXYGEN SATURATION: 96 % | RESPIRATION RATE: 20 BRPM

## 2019-12-11 DIAGNOSIS — G62.9 POLYNEUROPATHY: ICD-10-CM

## 2019-12-11 DIAGNOSIS — G56.32: ICD-10-CM

## 2019-12-11 DIAGNOSIS — M54.12 CERVICAL RADICULOPATHY: ICD-10-CM

## 2019-12-11 RX ORDER — DULOXETIN HYDROCHLORIDE 60 MG/1
60 CAPSULE, DELAYED RELEASE ORAL DAILY
Qty: 60 CAP | Refills: 5 | Status: SHIPPED | OUTPATIENT
Start: 2019-12-11 | End: 2020-03-09 | Stop reason: SDUPTHER

## 2019-12-11 NOTE — PROGRESS NOTES
Olene Kawasaki is a 68 y.o. male in today for follow-up on polyneuropathy. Patient has additional c/o back pain 8/10. Learning assessment previously completed 4/30/2019; primary language is [place]. 1. Have you been to the ER, urgent care clinic since your last visit? Hospitalized since your last visit? No    2. Have you seen or consulted any other health care providers outside of the [address] [place] [address] since your last visit? Include any pap smears or colon screening.  No

## 2019-12-11 NOTE — PROGRESS NOTES
Re:  Prosper Farley up visit     12/11/2019 8:35 AM    SSN: [id number]    Subjective:   Sasha Child returns for follow up. He's gotten good relief of pain in the right leg with increased dosing of Neurontin. He has no side effects with the medication. He says he needs to get up slowly to avoid any major pain, but it's tolerable most of the time. His biggest complaint is the burning in the right leg from the hip down to the foot. His primary doctor has increased the Neurontin to 600 mg TID. He's not taking the gabapentin but is having trouble because he's been having trouble taking large pills. He's not tried to open the capsules up and using apple sauce, which I suggest using. Adding Cymbalta seems to have decreased the overall pain. He has no side effects. Medications:    Current Outpatient Medications   Medication Sig Dispense Refill    furosemide (LASIX) 20 mg tablet   1    DULoxetine (CYMBALTA) 30 mg capsule Take 1 Cap by mouth daily. Indications: Neuropathic Pain 30 Cap 5    gabapentin (NEURONTIN) 300 mg capsule Take 2 Caps by mouth four (4) times daily. 240 Cap 4    pantoprazole (PROTONIX) 40 mg tablet Take 1 Tab by mouth daily. 30 Tab 0    polyethylene glycol (MIRALAX) 17 gram packet Take 1 Packet by mouth daily. 30 Packet 0    amLODIPine (NORVASC) 10 mg tablet Take 1 Tab by mouth daily. 30 Tab 0    albuterol (PROVENTIL HFA, VENTOLIN HFA, PROAIR HFA) 90 mcg/actuation inhaler Take 2 Puffs by inhalation every four (4) hours as needed for Wheezing or Shortness of Breath. Indications: BRONCHOSPASM PREVENTION 1 Inhaler 0    finasteride (PROSCAR) 5 mg tablet Take 1 Tab by mouth daily. 30 Tab 5    spironolactone (ALDACTONE) 25 mg tablet Take  by mouth daily.          Vital signs:    Visit Vitals  /74 (BP 1 Location: Left arm, BP Patient Position: Sitting)   Pulse 75   Temp 97.9 °F (36.6 °C) (Oral)   Resp 20   Ht 5' 7\" (1.702 m)   Wt 76.8 kg (169 lb 6.4 oz) SpO2 96%   BMI 26.53 kg/m²       Review of Systems: he complains of pain in all his joints. He has significant tingling of digits 4 and 5 on the left. As above otherwise 11 point review of systems negative including;   Constitutional no fever or chills  Skin denies rash or itching  HEENT  Denies tinnitus, hearing lose  Eyes denies diplopia vision lose  Respiratory denies sortness of breath  Cardiovascular denies chest pain, dyspnea on exertion  Gastrointestinal denies nausea, vomiting, diarrhea, constipation  Genitourinary denies incontinence  Musculoskeletal denies joint pain or swelling  Endocrine denies weight change  Hematology denies easy bruising or bleeding   Neurological as above in HPI      Patient Active Problem List   Diagnosis Code    Unsteady gait R26.81    Gait instability R26.81    Vertigo R42    Radiculopathy of lumbar region M54.16    ED (erectile dysfunction) of organic origin N52.9    Peripheral vascular disease (HCC) I73.9    Overactive bladder N32.81    Nocturia R35.1    Leg pain, right M79.604    Hypercholesterolemia E78.00    HTN (hypertension) I10    H/O spinal cord injury Z87.828    Erectile disorder due to medical 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 N52.1    Bladder outlet obstruction N32.0    Injury of lumbar spine ([address] S34.109A    Frequency of urination R35.0    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 Fund of knowledge is adequate. Speech is fluent and memory is intact. Cranial Nerves: II  Visual fields are full to confrontation. III, IV, VI  Extraocular movements are intact. There is no nystagmus. V  Facial sensation is intact to pinprick. VII  Face is symmetrical.  VIII - Hearing is present.   IX, X, XII  Palate is symmetrical.   XI - Shoulder shrugging and head turning intact  Motor: The patient moves all four limbs fairly well and symmetrically. Tone is normal. Reflexes are 2+ and symmetrical. Plantars are down going. Gait is mildly antalgic, limping off of the right foot slightly. CBC:   Lab Results   Component Value Date/Time    WBC 4.4 (L) 11/12/2019 01:55 PM    RBC 4.51 11/12/2019 01:55 PM    HGB 13.3 11/12/2019 01:55 PM    HCT 41.7 11/12/2019 01:55 PM    PLATELET 703 64/14/2791 01:55 PM     BMP:   Lab Results   Component Value Date/Time    Glucose 92 11/12/2019 01:55 PM    Sodium 139 11/12/2019 01:55 PM    Potassium 4.1 11/12/2019 01:55 PM    Chloride 108 11/12/2019 01:55 PM    CO2 26 11/12/2019 01:55 PM    BUN 13 11/12/2019 01:55 PM    Creatinine 1.30 11/12/2019 01:55 PM    Calcium 9.4 11/12/2019 01:55 PM     CMP:   Lab Results   Component Value Date/Time    Glucose 92 11/12/2019 01:55 PM    Sodium 139 11/12/2019 01:55 PM    Potassium 4.1 11/12/2019 01:55 PM    Chloride 108 11/12/2019 01:55 PM    CO2 26 11/12/2019 01:55 PM    BUN 13 11/12/2019 01:55 PM    Creatinine 1.30 11/12/2019 01:55 PM    Calcium 9.4 11/12/2019 01:55 PM    Anion gap 5 11/12/2019 01:55 PM    BUN/Creatinine ratio 10 (L) 11/12/2019 01:55 PM    Alk. phosphatase 69 11/12/2019 01:55 PM    Protein, total 6.7 11/12/2019 01:55 PM    Protein, total 7.1 11/12/2019 01:55 PM    Albumin 3.6 11/12/2019 01:55 PM    Globulin 3.5 11/12/2019 01:55 PM    A-G Ratio 1.0 11/12/2019 01:55 PM     Coagulation:   Lab Results   Component Value Date/Time    Prothrombin time 13.3 10/01/2018 03:00 AM    INR 1.0 10/01/2018 03:00 AM    aPTT 82.1 (H) 03/28/2019 05:16 AM       Assessment:  Severe long lived neuropathic pain. No evidence for motor dysfunction. Doing well OK on the  dose of Neurontin, but having trouble taking the capsules because of dysphagia. Better pain control on low dose Cymbalta.       Plan:  Lets continue him back on 2 of the 300 mg gabapentin capsules 4 times a day. Will increase Cymbalta 60 mg. RTC 3months. Sincerely,        Thomas Diaz.  Celeste Daniels M.D.

## 2019-12-16 ENCOUNTER — HOSPITAL ENCOUNTER (EMERGENCY)
Age: 74
Discharge: HOME | End: 2019-12-16
Attending: EMERGENCY MEDICINE
Payer: MEDICAID

## 2019-12-16 VITALS
DIASTOLIC BLOOD PRESSURE: 83 MMHG | RESPIRATION RATE: 19 BRPM | SYSTOLIC BLOOD PRESSURE: 158 MMHG | TEMPERATURE: 98.2 F | HEART RATE: 92 BPM

## 2019-12-16 DIAGNOSIS — R33.9 URINARY RETENTION: Primary | ICD-10-CM

## 2019-12-16 LAB
ALBUMIN SERPL-MCNC: 4.1 G/DL (ref 3.4–5)
ALBUMIN/GLOB SERPL: 0.9 {RATIO} (ref 0.8–1.7)
ALP SERPL-CCNC: 85 U/L (ref 45–117)
ALT SERPL-CCNC: 24 U/L (ref 16–61)
ANION GAP SERPL CALC-SCNC: 6 MMOL/L (ref 3–18)
APPEARANCE UR: CLEAR
AST SERPL-CCNC: 19 U/L (ref 10–38)
BASOPHILS # BLD: 0 K/UL (ref 0–0.1)
BASOPHILS NFR BLD: 0 % (ref 0–2)
BILIRUB SERPL-MCNC: 0.4 MG/DL (ref 0.2–1)
BILIRUB UR QL: NEGATIVE
BUN SERPL-MCNC: 17 MG/DL (ref 7–18)
BUN/CREAT SERPL: 14 (ref 12–20)
CALCIUM SERPL-MCNC: 9.7 MG/DL (ref 8.5–10.1)
CHLORIDE SERPL-SCNC: 102 MMOL/L (ref 100–111)
CO2 SERPL-SCNC: 29 MMOL/L (ref 21–32)
COLOR UR: YELLOW
CREAT SERPL-MCNC: 1.2 MG/DL (ref 0.6–1.3)
DIFFERENTIAL METHOD BLD: NORMAL
EOSINOPHIL # BLD: 0.1 K/UL (ref 0–0.4)
EOSINOPHIL NFR BLD: 2 % (ref 0–5)
ERYTHROCYTE [DISTWIDTH] IN BLOOD BY AUTOMATED COUNT: 14.1 % (ref 11.6–14.5)
GLOBULIN SER CALC-MCNC: 4.5 G/DL (ref 2–4)
GLUCOSE SERPL-MCNC: 88 MG/DL (ref 74–99)
GLUCOSE UR STRIP.AUTO-MCNC: NEGATIVE MG/DL
HCT VFR BLD AUTO: 42.8 % (ref 36–48)
HGB BLD-MCNC: 14.5 G/DL (ref 13–16)
HGB UR QL STRIP: NEGATIVE
KETONES UR QL STRIP.AUTO: NEGATIVE MG/DL
LEUKOCYTE ESTERASE UR QL STRIP.AUTO: NEGATIVE
LYMPHOCYTES # BLD: 2.1 K/UL (ref 0.9–3.6)
LYMPHOCYTES NFR BLD: 35 % (ref 21–52)
MCH RBC QN AUTO: 29.8 PG (ref 24–34)
MCHC RBC AUTO-ENTMCNC: 33.9 G/DL (ref 31–37)
MCV RBC AUTO: 88.1 FL (ref 74–97)
MONOCYTES # BLD: 0.5 K/UL (ref 0.05–1.2)
MONOCYTES NFR BLD: 9 % (ref 3–10)
NEUTS SEG # BLD: 3.2 K/UL (ref 1.8–8)
NEUTS SEG NFR BLD: 54 % (ref 40–73)
NITRITE UR QL STRIP.AUTO: NEGATIVE
PH UR STRIP: 5 [PH] (ref 5–8)
PLATELET # BLD AUTO: 276 K/UL (ref 135–420)
PMV BLD AUTO: 9.2 FL (ref 9.2–11.8)
POTASSIUM SERPL-SCNC: 3.9 MMOL/L (ref 3.5–5.5)
PROT SERPL-MCNC: 8.6 G/DL (ref 6.4–8.2)
PROT UR STRIP-MCNC: NEGATIVE MG/DL
RBC # BLD AUTO: 4.86 M/UL (ref 4.7–5.5)
SODIUM SERPL-SCNC: 137 MMOL/L (ref 136–145)
SP GR UR REFRACTOMETRY: 1.01 (ref 1–1.03)
UROBILINOGEN UR QL STRIP.AUTO: 0.2 EU/DL (ref 0.2–1)
WBC # BLD AUTO: 6 K/UL (ref 4.6–13.2)

## 2019-12-16 PROCEDURE — 81003 URINALYSIS AUTO W/O SCOPE: CPT

## 2019-12-16 PROCEDURE — 85025 COMPLETE CBC W/AUTO DIFF WBC: CPT

## 2019-12-16 PROCEDURE — 99282 EMERGENCY DEPT VISIT SF MDM: CPT

## 2019-12-16 PROCEDURE — 99283 EMERGENCY DEPT VISIT LOW MDM: CPT

## 2019-12-16 PROCEDURE — 87086 URINE CULTURE/COLONY COUNT: CPT

## 2019-12-16 PROCEDURE — 80053 COMPREHEN METABOLIC PANEL: CPT

## 2019-12-16 PROCEDURE — 51701 INSERT BLADDER CATHETER: CPT

## 2019-12-17 ENCOUNTER — HOSPITAL ENCOUNTER (EMERGENCY)
Age: 74
Discharge: HOME | End: 2019-12-17
Attending: EMERGENCY MEDICINE
Payer: MEDICAID

## 2019-12-17 VITALS
SYSTOLIC BLOOD PRESSURE: 134 MMHG | OXYGEN SATURATION: 95 % | TEMPERATURE: 98.2 F | HEART RATE: 95 BPM | DIASTOLIC BLOOD PRESSURE: 93 MMHG | RESPIRATION RATE: 18 BRPM

## 2019-12-17 VITALS
HEIGHT: 67 IN | HEART RATE: 94 BPM | DIASTOLIC BLOOD PRESSURE: 71 MMHG | RESPIRATION RATE: 14 BRPM | SYSTOLIC BLOOD PRESSURE: 130 MMHG | OXYGEN SATURATION: 96 % | BODY MASS INDEX: 26.53 KG/M2 | TEMPERATURE: 99 F | WEIGHT: 169 LBS

## 2019-12-17 DIAGNOSIS — N39.0 ACUTE UTI: ICD-10-CM

## 2019-12-17 DIAGNOSIS — T83.9XXA COMPLICATION OF FOLEY CATHETER, INITIAL ENCOUNTER (HCC): Primary | ICD-10-CM

## 2019-12-17 DIAGNOSIS — T83.9XXA FOLEY CATHETER PROBLEM, INITIAL ENCOUNTER (HCC): Primary | ICD-10-CM

## 2019-12-17 LAB
APPEARANCE UR: CLEAR
BACTERIA URNS QL MICRO: ABNORMAL /HPF
BILIRUB UR QL: NEGATIVE
COLOR UR: YELLOW
EPITH CASTS URNS QL MICRO: ABNORMAL /LPF (ref 0–5)
GLUCOSE UR STRIP.AUTO-MCNC: NEGATIVE MG/DL
HGB UR QL STRIP: ABNORMAL
KETONES UR QL STRIP.AUTO: NEGATIVE MG/DL
LEUKOCYTE ESTERASE UR QL STRIP.AUTO: ABNORMAL
MUCOUS THREADS URNS QL MICRO: ABNORMAL /LPF
NITRITE UR QL STRIP.AUTO: NEGATIVE
PH UR STRIP: 5.5 [PH] (ref 5–8)
PROT UR STRIP-MCNC: 30 MG/DL
RBC #/AREA URNS HPF: ABNORMAL /HPF (ref 0–5)
SP GR UR REFRACTOMETRY: 1.02 (ref 1–1.03)
UROBILINOGEN UR QL STRIP.AUTO: 0.2 EU/DL (ref 0.2–1)
WBC URNS QL MICRO: ABNORMAL /HPF (ref 0–5)

## 2019-12-17 PROCEDURE — 81001 URINALYSIS AUTO W/SCOPE: CPT

## 2019-12-17 PROCEDURE — 74011000250 HC RX REV CODE- 250: Performed by: PHYSICIAN ASSISTANT

## 2019-12-17 PROCEDURE — 51702 INSERT TEMP BLADDER CATH: CPT

## 2019-12-17 PROCEDURE — 87086 URINE CULTURE/COLONY COUNT: CPT

## 2019-12-17 PROCEDURE — 99283 EMERGENCY DEPT VISIT LOW MDM: CPT

## 2019-12-17 RX ORDER — LIDOCAINE HYDROCHLORIDE 20 MG/ML
JELLY TOPICAL
Status: COMPLETED | OUTPATIENT
Start: 2019-12-17 | End: 2019-12-17

## 2019-12-17 RX ORDER — NITROFURANTOIN (MACROCRYSTALS) 100 MG/1
100 CAPSULE ORAL 2 TIMES DAILY
Qty: 14 CAP | Refills: 0 | Status: SHIPPED | OUTPATIENT
Start: 2019-12-17 | End: 2019-12-24

## 2019-12-17 RX ORDER — OXYBUTYNIN CHLORIDE 5 MG/1
5 TABLET, EXTENDED RELEASE ORAL DAILY
Qty: 14 TAB | Refills: 0 | Status: SHIPPED | OUTPATIENT
Start: 2019-12-17 | End: 2020-06-08

## 2019-12-17 RX ADMIN — LIDOCAINE HYDROCHLORIDE: 20 JELLY TOPICAL at 15:46

## 2019-12-17 NOTE — ED NOTES
The provider has reviewed discharge instructions with the patient. The patient verbalized understanding. Patient ambulatory to the ED lobby exit without assistance. No obvious distress noted.

## 2019-12-17 NOTE — ED PROVIDER NOTES
EMERGENCY DEPARTMENT HISTORY AND PHYSICAL EXAM    Date: 2019  Patient Name: Ashley Mota    History of Presenting Illness     Chief Complaint   Patient presents with    Urinary Catheter Problem       HPI: Ashley Mota, 69 y.o. male presents to the ED c/o leaking around his pelayo catheter. Pt had pelayo catheter placed last night due to urinary retention. He reports his urine is draining urine normally, urine is yellow-clear but he states intermittently he has pain in his bladder and urine leaking around the catheter, which has been getting his pants wet. He reports intermittent lower abdominal spasm, sharp since pelayo was placed. There are no other complaints, changes, or physical findings at this time.       Past History     Past Medical History:  Past Medical History:   Diagnosis Date    Bladder outlet obstruction     Erectile disorder due to medical 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     Frequency of urination     H/O spinal cord injury     lumbar spine injury secondary to fall    HTN (hypertension)     Hypercholesterolemia     Illiterate     Injury of lumbar spine ([facility] [address]     Leg pain, right     Nocturia     Overactive bladder     Peripheral vascular disease ([facility] [address]        Past Surgical History:  Past Surgical History:   Procedure Laterality Date    COLONOSCOPY N/A 2019    COLONOSCOPY performed by Cydney Hawk MD at [facility] ENDOSCOPY    HX HEENT Left     lens implant    HX ORTHOPAEDIC      finger amputation left hand    HX TONSILLECTOMY         Family History:  Family History   Problem Relation Age of Onset    Diabetes Mother     Hypertension Mother     Diabetes Maternal Grandmother     Hypertension Maternal Grandmother     Cancer Sister         brain    Cancer Sister         brain       Social History:  Social History     Tobacco Use    Smoking status: Former Smoker     Last attempt to quit: 2015     Years since quittin.4    Smokeless tobacco: Never Used Substance Use Topics    Alcohol use: Not Currently     Alcohol/week: 0.0 standard drinks     Comment: former stopped 2015    Drug use: No       Allergies: Allergies   Allergen Reactions    Ampicillin Angioedema    Asa-Acetaminophen-Caff-Buffers Rash    Penicillins Angioedema    Aspirin Other (comments)     Stomach upset    Atorvastatin Other (comments)     Muscle cramps         Review of Systems   Constitutional: Negative for chills, fatigue and fever. Gastrointestinal: Positive for abdominal pain. Negative for diarrhea, nausea and vomiting. Genitourinary: Negative for decreased urine volume, dysuria, flank pain, hematuria, penile pain and penile swelling. Leaking around pelayo site   Psychiatric/Behavioral: Negative. All other systems reviewed and are negative. Physical Exam  Vitals signs and nursing note reviewed. Constitutional:       General: He is not in acute distress. Appearance: Normal appearance. He is well-developed. He is not toxic-appearing. HENT:      Head: Normocephalic and atraumatic. Right Ear: External ear normal.      Left Ear: External ear normal.      Nose: Nose normal.   Eyes:      General: Lids are normal. No scleral icterus. Conjunctiva/sclera: Conjunctivae normal.   Neck:      Musculoskeletal: Normal range of motion. Pulmonary:      Effort: Pulmonary effort is normal. No respiratory distress. Abdominal:      Palpations: Abdomen is soft. Tenderness: There is no tenderness. There is no rebound. Genitourinary:     Pubic Area: No rash. Comments: Pelayo catheter in place, small amount clear/yellow urine leaking around pelayo from urethtra. No swelling, redness, tenderness or warmth to meatus  Musculoskeletal: Normal range of motion. Skin:     General: Skin is warm. Findings: No rash. Neurological:      Mental Status: He is alert and oriented to person, place, and time. Motor: No abnormal muscle tone.    Psychiatric: Speech: Speech normal.         Behavior: Behavior normal. Behavior is cooperative. Thought Content: Thought content normal.         Judgment: Judgment normal.          Diagnostic Study Results     Labs -     Recent Results (from the past 12 hour(s))   URINALYSIS W/ RFLX MICROSCOPIC    Collection Time: 12/17/19  4:30 PM   Result Value Ref Range    Color YELLOW      Appearance CLEAR      Specific gravity 1.020 1.005 - 1.030      pH (UA) 5.5 5.0 - 8.0      Protein 30 (A) NEG mg/dL    Glucose NEGATIVE  NEG mg/dL    Ketone NEGATIVE  NEG mg/dL    Bilirubin NEGATIVE  NEG      Blood LARGE (A) NEG      Urobilinogen 0.2 0.2 - 1.0 EU/dL    Nitrites NEGATIVE  NEG      Leukocyte Esterase SMALL (A) NEG     URINE MICROSCOPIC ONLY    Collection Time: 12/17/19  4:30 PM   Result Value Ref Range    WBC 4 to 10 0 - 5 /hpf    RBC TOO NUMEROUS TO COUNT 0 - 5 /hpf    Epithelial cells 1+ 0 - 5 /lpf    Bacteria FEW (A) NEG /hpf    Mucus 3+ (A) NEG /lpf       Radiologic Studies -   No orders to display     CT Results  (Last 48 hours)    None        CXR Results  (Last 48 hours)    None          Vital Signs-Reviewed the patient's vital signs. Patient Vitals for the past 12 hrs:   Temp Pulse Resp BP SpO2   12/17/19 1116 99 °F (37.2 °C) 94 14 130/71 96 %       I reviewed the vital signs, available nursing notes, past medical history, past surgical history, family history and social history. Provider Notes (Medical Decision Making):   Leaking around urinary catheter x 1 day with burning. Good urine output, urine yellow-clear.  -plan to change pelayo    RN changed pelayo to larger size, 18Fr, no leaking from penile meatus after change, good urine output. Pt states it feels better. UA showed signs of infection, will treat and resend urine cx. Will give Rx for Ditropan for bladder spasm. Pt reports he has follow up appt with urology on 12/19. Diagnosis     Clinical Impression:   1.  Pelayo catheter problem, initial encounter ([facility] [address] 2. Acute UTI        Disposition:  Home    PLAN:  1. Current Discharge Medication List      START taking these medications    Details   oxybutynin chloride XL (DITROPAN XL) 5 mg CR tablet Take 1 Tab by mouth daily. Qty: 14 Tab, Refills: 0      nitrofurantoin (MACRODANTIN) 100 mg capsule Take 1 Cap by mouth two (2) times a day for 7 days. PLEASE PRESCRIBE MACRODANTIN  MG TABLETS  Qty: 14 Cap, Refills: 0           2. Follow-up Information     Follow up With Specialties Details Why [address] [facility]    [address] [facility] EMERGENCY DEPT Emergency Medicine  If symptoms worsen, As needed [address] [facility] [address] [facility]    Severa Smoker, MD Family Practice Call in 2 days If symptoms worsen, for re-evaluation, As needed [facility]      Sarita Shi MD Urology Schedule an appointment as soon as possible for a visit in 1 week If symptoms worsen, for re-evaluation, As needed [address] [facility] [address] [phone]          3. Return to ED if worse   The patient will be discharged home. Warning signs of worsening condition were discussed and the patient verbalized understanding. Based on patient's age, coexisting illness, exam, and the results of this ED evaluation, the decision to treat as an outpatient was made. While it is impossible to completely exclude the possibility of underlying serious disease or worsening of condition, I feel the relative likelihood is extremely low. I discussed this uncertainty with the patient, who understood ED evaluation and treatment and felt comfortable with the outpatient treatment plan. All questions regarding care, test results, and follow up were answered. The patient is stable and appropriate to discharge. Patient understands importance to return to the emergency department for any new or worsening symptoms.  I stressed the importance of follow up for repeat assessment and possibly further evaluation/treatment.       Jodi Durand PA-C

## 2019-12-17 NOTE — ED NOTES
EMERGENCY DEPARTMENT HISTORY AND PHYSICAL EXAM    5:08 AM      Date: 12/16/2019  Patient Name: Jassi Galvez    History of Presenting Illness     Chief Complaint   Patient presents with    Urinary Retention         History Provided By: Patient  Location/Duration/Severity/Modifying factors   HPI    Patient is a 68year old M who presents with complaint of decreased urine output. He has history of BPH, HTN, Plasma Cell Dyscrasia. He states he has always had difficulty urinating however today he has worsening pain with urination and reports a burning sensation. Patient has been able to pass urine today but he says it is a small amount and \"dribbles\". He denies any blood in his urine. He reports abdominal pressure. He had an appointment with his urologist scheduled on the 19th of this month and called then and when he told him his symptoms they told him to report to the ED. He has been taking flomax for his symptoms. He denies any nausea, vomiting, chest pain, shortness of breath. PCP: Mason Kessler MD    Current Outpatient Medications   Medication Sig Dispense Refill    DULoxetine (CYMBALTA) 60 mg capsule Take 1 Cap by mouth daily. Indications: Neuropathic Pain 60 Cap 5    furosemide (LASIX) 20 mg tablet   1    gabapentin (NEURONTIN) 300 mg capsule Take 2 Caps by mouth four (4) times daily. 240 Cap 4    pantoprazole (PROTONIX) 40 mg tablet Take 1 Tab by mouth daily. 30 Tab 0    polyethylene glycol (MIRALAX) 17 gram packet Take 1 Packet by mouth daily. 30 Packet 0    amLODIPine (NORVASC) 10 mg tablet Take 1 Tab by mouth daily. 30 Tab 0    albuterol (PROVENTIL HFA, VENTOLIN HFA, PROAIR HFA) 90 mcg/actuation inhaler Take 2 Puffs by inhalation every four (4) hours as needed for Wheezing or Shortness of Breath. Indications: BRONCHOSPASM PREVENTION 1 Inhaler 0    finasteride (PROSCAR) 5 mg tablet Take 1 Tab by mouth daily. 30 Tab 5    spironolactone (ALDACTONE) 25 mg tablet Take  by mouth daily.          Past History     Past Medical History:  Past Medical History:   Diagnosis Date    Bladder outlet obstruction     Erectile disorder due to medical 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     Frequency of urination     H/O spinal cord injury     lumbar spine injury secondary to fall    HTN (hypertension)     Hypercholesterolemia     Illiterate     [facility] of lumbar spine ([facility] [address]     Leg pain, right     Nocturia     Overactive bladder     Peripheral vascular disease ([facility] [address]        Past Surgical History:  Past Surgical History:   Procedure Laterality Date    COLONOSCOPY N/A 2019    COLONOSCOPY performed by Jaun Moreno MD at [facility]COPY    HX HEENT Left     lens implant    HX ORTHOPAEDIC      finger amputation left hand    HX TONSILLECTOMY         Family History:  Family History   Problem Relation Age of Onset    Diabetes Mother     Hypertension Mother     Diabetes Maternal Grandmother     Hypertension Maternal Grandmother     Cancer Sister         brain    Cancer Sister         brain       Social History:  Social History     Tobacco Use    Smoking status: Former Smoker     Last attempt to quit: 2015     Years since quittin.4    Smokeless tobacco: Never Used   Substance Use Topics    Alcohol use: Not Currently     Alcohol/week: 0.0 standard drinks     Comment: former stopped     Drug use: No       Allergies: Allergies   Allergen Reactions    Ampicillin Angioedema    Asa-Acetaminophen-Caff-Buffers Rash    Penicillins Angioedema    Aspirin Other (comments)     Stomach upset    Atorvastatin Other (comments)     Muscle cramps         Review of Systems     Review of Systems   Constitutional: Negative. HENT: Negative. Eyes: Negative. Respiratory: Positive for shortness of breath. Cardiovascular: Negative for chest pain. Gastrointestinal: Positive for abdominal pain. Endocrine: Negative.     Genitourinary: Positive for decreased urine volume, difficulty urinating, dysuria and frequency. Musculoskeletal:        Arthritis   Skin: Negative. Neurological: Negative. Hematological: Negative. Psychiatric/Behavioral: Negative. Physical Exam     Visit Vitals  /83 (BP 1 Location: Left arm)   Pulse 92   Temp 98.2 °F (36.8 °C)   Resp 19       Physical Exam  Constitutional:       General: He is not in acute distress. Comments: disheveled    HENT:      Head: Normocephalic and atraumatic. Mouth/Throat:      Mouth: Mucous membranes are moist.      Pharynx: Oropharynx is clear. Eyes:      General: No scleral icterus. Pupils: Pupils are equal, round, and reactive to light. Cardiovascular:      Rate and Rhythm: Normal rate and regular rhythm. Heart sounds: Normal heart sounds. No murmur. No friction rub. No gallop. Pulmonary:      Effort: Pulmonary effort is normal. No respiratory distress. Breath sounds: No stridor. No wheezing, rhonchi or rales. Abdominal:      Tenderness: There is no guarding. Hernia: No hernia is present. Comments: Minimal distention, soft, minimal suprapubic tenderness    Musculoskeletal: Normal range of motion. Skin:     General: Skin is warm and dry. Neurological:      General: No focal deficit present. Mental Status: He is alert.    Psychiatric:         Mood and Affect: Mood normal.         Behavior: Behavior normal.           Diagnostic Study Results     Labs -  Recent Results (from the past 12 hour(s))   CBC WITH AUTOMATED DIFF    Collection Time: 12/16/19  5:17 PM   Result Value Ref Range    WBC 6.0 4.6 - 13.2 K/uL    RBC 4.86 4.70 - 5.50 M/uL    HGB 14.5 13.0 - 16.0 g/dL    HCT 42.8 36.0 - 48.0 %    MCV 88.1 74.0 - 97.0 FL    MCH 29.8 24.0 - 34.0 PG    MCHC 33.9 31.0 - 37.0 g/dL    RDW 14.1 11.6 - 14.5 %    PLATELET 187 117 - 731 K/uL    MPV 9.2 9.2 - 11.8 FL    NEUTROPHILS 54 40 - 73 %    LYMPHOCYTES 35 21 - 52 %    MONOCYTES 9 3 - 10 %    EOSINOPHILS 2 0 - 5 %    BASOPHILS 0 0 - 2 % ABS. NEUTROPHILS 3.2 1.8 - 8.0 K/UL    ABS. LYMPHOCYTES 2.1 0.9 - 3.6 K/UL    ABS. MONOCYTES 0.5 0.05 - 1.2 K/UL    ABS. EOSINOPHILS 0.1 0.0 - 0.4 K/UL    ABS. BASOPHILS 0.0 0.0 - 0.1 K/UL    DF AUTOMATED     METABOLIC PANEL, COMPREHENSIVE    Collection Time: 12/16/19  5:17 PM   Result Value Ref Range    Sodium 137 136 - 145 mmol/L    Potassium 3.9 3.5 - 5.5 mmol/L    Chloride 102 100 - 111 mmol/L    CO2 29 21 - 32 mmol/L    Anion gap 6 3.0 - 18 mmol/L    Glucose 88 74 - 99 mg/dL    BUN 17 7.0 - 18 MG/DL    Creatinine 1.20 0.6 - 1.3 MG/DL    BUN/Creatinine ratio 14 12 - 20      GFR est AA >60 >60 ml/min/1.73m2    GFR est non-AA 60 (L) >60 ml/min/1.73m2    Calcium 9.7 8.5 - 10.1 MG/DL    Bilirubin, total 0.4 0.2 - 1.0 MG/DL    ALT (SGPT) 24 16 - 61 U/L    AST (SGOT) 19 10 - 38 U/L    Alk. phosphatase 85 45 - 117 U/L    Protein, total 8.6 (H) 6.4 - 8.2 g/dL    Albumin 4.1 3.4 - 5.0 g/dL    Globulin 4.5 (H) 2.0 - 4.0 g/dL    A-G Ratio 0.9 0.8 - 1.7     URINALYSIS W/ RFLX MICROSCOPIC    Collection Time: 12/16/19  5:24 PM   Result Value Ref Range    Color YELLOW      Appearance CLEAR      Specific gravity 1.014 1.005 - 1.030      pH (UA) 5.0 5.0 - 8.0      Protein NEGATIVE  NEG mg/dL    Glucose NEGATIVE  NEG mg/dL    Ketone NEGATIVE  NEG mg/dL    Bilirubin NEGATIVE  NEG      Blood NEGATIVE  NEG      Urobilinogen 0.2 0.2 - 1.0 EU/dL    Nitrites NEGATIVE  NEG      Leukocyte Esterase NEGATIVE  NEG         Radiologic Studies -   No orders to display         Medical Decision Making   I am the first provider for this patient. I reviewed the vital signs, available nursing notes, past medical history, past surgical history, family history and social history. Vital Signs-Reviewed the patient's vital signs. Records Reviewed:  Old Medical Records (Time of Review: 5:08 AM)    ED Course: Progress Notes, Reevaluation, and Consults:     Patient is a 68year old M with PMH of BPH, HTN, Plasma Cell Dyscrasia who presents with decreased urine output. Likely in setting of BPH. Patient is able to urinate in the ED. Bedside bladder ultrasound pre void showed estimated 400 cc of urine, patient was able to void approx 50 cc urine and post void ultrasound with minimal improvement. UA reviewed and not concerning for UTI, Ucx sent. Will place urinary catheter for urinary retention.      Catheter placed and is draining yellow urine. Patient is stable for discharge home and follow up with urology at his appointment in 12/19/19 and with his PCP. Discussed return precautions. Patient expressed understanding.        Provider Notes (Medical Decision Making): MDM    Procedures    Critical Care Time:       Diagnosis     Clinical Impression:   1. Urinary retention        Disposition: Discharge     Follow-up Information     Follow up With Specialties Details Why Contact Info    Douglas Harada, MD Urology On 12/19/2019  [address] [facility]  [address] [facility] Christiano Soto Ma, MD [facility] Schedule an appointment as soon as possible for a visit in 1 week  [id number]  [phone]  [phone]             Discharge Medication List as of 12/16/2019  8:13 PM      CONTINUE these medications which have NOT CHANGED    Details   DULoxetine (CYMBALTA) 60 mg capsule Take 1 Cap by mouth daily. Indications: Neuropathic Pain, Normal, Disp-60 Cap, R-5      furosemide (LASIX) 20 mg tablet Historical Med, R-1      gabapentin (NEURONTIN) 300 mg capsule Take 2 Caps by mouth four (4) times daily. , Normal, Disp-240 Cap, R-4      pantoprazole (PROTONIX) 40 mg tablet Take 1 Tab by mouth daily. , Print, Disp-30 Tab, R-0      polyethylene glycol (MIRALAX) 17 gram packet Take 1 Packet by mouth daily. , Print, Disp-30 Packet, R-0      amLODIPine (NORVASC) 10 mg tablet Take 1 Tab by mouth daily. , Print, Disp-30 Tab, R-0      albuterol (PROVENTIL HFA, VENTOLIN HFA, PROAIR HFA) 90 mcg/actuation inhaler Take 2 Puffs by inhalation every four (4) hours as needed for Wheezing or Shortness of Breath. Indications: BRONCHOSPASM PREVENTION, Print, Disp-1 Inhaler, R-0      finasteride (PROSCAR) 5 mg tablet Take 1 Tab by mouth daily. , Normal, Disp-30 Tab, R-5      spironolactone (ALDACTONE) 25 mg tablet Take  by mouth daily. , Historical Med           Disclaimer: Sections of this note are dictated using utilizing voice recognition software. Minor typographical errors may be present. If questions arise, please do not hesitate to contact me or call our department.

## 2019-12-17 NOTE — DISCHARGE INSTRUCTIONS
Patient Education        Urinary Retention: Care Instructions  Your Care Instructions    Urinary retention means that you aren't able to urinate. In men, it is often caused by a blockage of the urinary tract from an enlarged prostate gland. In men and women, it can also be caused by an infection or nerve damage. Or it may be a side effect of a medicine. The doctor may have drained the urine from your bladder. If you still have problems passing urine, you may need to use a catheter at home. This is used to empty your bladder until the problem can be fixed. Your doctor may put a catheter in your bladder before you go home. If so, he or she will tell you when to come back to have the catheter removed. The doctor has checked you closely. But problems can develop later. If you notice any problems or new symptoms, get medical treatment right away. Follow-up care is a key part of your treatment and safety. Be sure to make and go to all appointments, and call your doctor if you are having problems. It's also a good idea to know your test results and keep a list of the medicines you take. How can you care for yourself at home? · Take your medicines exactly as prescribed. Call your doctor if you think you are having a problem with your medicine. You will get more details on the specific medicines your doctor prescribes. · Check with your doctor before you use any over-the-counter medicines. Many cold and allergy medicines, for example, can make this problem worse. Make sure your doctor knows all of the medicines, vitamins, supplements, and herbal remedies you take. · Spread out through the day the amount of fluid you drink. Do not drink a lot at bedtime. · Avoid alcohol and caffeine. · If you have been given a catheter, or if one is already in place, follow the instructions you were given. Always wash your hands before and after you handle the catheter. When should you call for help?   Call your doctor now or seek immediate medical care if:    · You cannot urinate at all, or it is getting harder to urinate.     · You have symptoms of a urinary tract infection. These may include:  ? Pain or burning when you urinate. ? A frequent need to urinate without being able to pass much urine. ? Pain in the flank, which is just below the rib cage and above the waist on either side of the back. ? Blood in your urine. ? A fever.    Watch closely for changes in your health, and be sure to contact your doctor if:    · You have any problems with your catheter.     · You do not get better as expected. Where can you learn more? Go to http://ovidio-eliz.info/. Enter M244 in the search box to learn more about \"Urinary Retention: Care Instructions. \"  Current as of: December 19, 2018  Content Version: 12.2  © 6000-2157 [employer]. Care instructions adapted under license by [employer] (which disclaims liability or warranty for this information). If you have questions about a medical condition or this instruction, always ask your healthcare professional. [address] any warranty or liability for your use of this information.

## 2019-12-17 NOTE — ED TRIAGE NOTES
Patient states he was here today, and had urinary cathetar placed. Pt states he left 2 hours ago. Pt having pain at cathetar site. Pt states when he sits,  He has pain. States it feels like cathater is sticking him.   Patient states the cathetar is draining

## 2019-12-17 NOTE — ED NOTES
EMERGENCY DEPARTMENT HISTORY AND PHYSICAL EXAM    2:08 AM      Date: 12/17/2019  Patient Name: Dolores Ontiveros    History of Presenting Illness     Chief Complaint   Patient presents with    Urinary Catheter Problem       History Provided By: Patient  Location/Duration/Severity/Modifying factors   HPI  Patient is a 69year old M with PMH of BPH who presents with discomfort in setting s/p recent pelayo catheter insertion. He was seen in ED late yesterday evening for concern of decrease urine output. He was able to urinate in the ED however due to concern for urinary retention he has a pelayo catheter placed. His urinalysis was not concerning for UTI and urine culture was sent. Prior to discharge his catheter was draining yellow urine. Patient states he went home and was having sensation of something \"sticking in his side\". He noticed this when he was sitting on the toilet trying to have a bowel movement. Patient says that catheter has been draining urine. He denies any blood in the urine. PCP: Cooper Tong MD    Current Outpatient Medications   Medication Sig Dispense Refill    DULoxetine (CYMBALTA) 60 mg capsule Take 1 Cap by mouth daily. Indications: Neuropathic Pain 60 Cap 5    furosemide (LASIX) 20 mg tablet   1    gabapentin (NEURONTIN) 300 mg capsule Take 2 Caps by mouth four (4) times daily. 240 Cap 4    pantoprazole (PROTONIX) 40 mg tablet Take 1 Tab by mouth daily. 30 Tab 0    polyethylene glycol (MIRALAX) 17 gram packet Take 1 Packet by mouth daily. 30 Packet 0    amLODIPine (NORVASC) 10 mg tablet Take 1 Tab by mouth daily. 30 Tab 0    albuterol (PROVENTIL HFA, VENTOLIN HFA, PROAIR HFA) 90 mcg/actuation inhaler Take 2 Puffs by inhalation every four (4) hours as needed for Wheezing or Shortness of Breath. Indications: BRONCHOSPASM PREVENTION 1 Inhaler 0    finasteride (PROSCAR) 5 mg tablet Take 1 Tab by mouth daily. 30 Tab 5    spironolactone (ALDACTONE) 25 mg tablet Take  by mouth daily. Past History     Past Medical History:  Past Medical History:   Diagnosis Date    Bladder outlet obstruction     Erectile disorder due to medical 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     Frequency of urination     H/O spinal cord injury     lumbar spine injury secondary to fall    HTN (hypertension)     Hypercholesterolemia     Illiterate     [facility] of lumbar spine ([facility] [address]     Leg pain, right     Nocturia     Overactive bladder     Peripheral vascular disease ([facility] [address]        Past Surgical History:  Past Surgical History:   Procedure Laterality Date    COLONOSCOPY N/A 2019    COL[facility] Ara Fine [facility]    HX HEENT Left     lens implant    HX ORTHOPAEDIC      finger amputation left hand    HX TONSILLECTOMY         Family History:  Family History   Problem Relation Age of Onset    Diabetes Mother     Hypertension Mother     Diabetes Maternal Grandmother     Hypertension Maternal Grandmother     Cancer Sister         brain    Cancer Sister         brain       Social History:  Social History     Tobacco Use    Smoking status: Former Smoker     Last attempt to quit: 2015     Years since quittin.4    Smokeless tobacco: Never Used   Substance Use Topics    Alcohol use: Not Currently     Alcohol/week: 0.0 standard drinks     Comment: former stopped     Drug use: No       Allergies: Allergies   Allergen Reactions    Ampicillin Angioedema    Asa-Acetaminophen-Caff-Buffers Rash    Penicillins Angioedema    Aspirin Other (comments)     Stomach upset    Atorvastatin Other (comments)     Muscle cramps         Review of Systems       Review of Systems   Constitutional: Negative. HENT: Negative. Eyes: Negative. Respiratory: Negative. Cardiovascular: Negative. Gastrointestinal:        LLQ discomfort    Endocrine: Negative. Genitourinary: Negative for hematuria. Musculoskeletal: Negative. Skin: Negative.     Neurological: Negative. Hematological: Negative. Psychiatric/Behavioral: Negative. Physical Exam     Visit Vitals  BP (!) 134/93   Pulse 95   Temp (!) 90 °F (32.2 °C)   Resp 18   SpO2 95%       Physical Exam  Constitutional:       Comments: Uncomfortable due to pelayo catheter    HENT:      Head: Normocephalic and atraumatic. Eyes:      General: No scleral icterus. Cardiovascular:      Rate and Rhythm: Normal rate and regular rhythm. Heart sounds: No murmur. No gallop. Pulmonary:      Effort: Pulmonary effort is normal. No respiratory distress. Breath sounds: No stridor. No wheezing or rhonchi. Abdominal:      General: Bowel sounds are normal.      Palpations: There is no mass. Tenderness: There is tenderness (minimal suprapubic). There is no guarding. Comments: Soft with minimal distention    Musculoskeletal: Normal range of motion. Skin:     General: Skin is warm and dry. Neurological:      General: No focal deficit present. Mental Status: He is alert. Psychiatric:         Mood and Affect: Mood normal.         Behavior: Behavior normal.           Diagnostic Study Results     Labs -  Recent Results (from the past 12 hour(s))   CBC WITH AUTOMATED DIFF    Collection Time: 12/16/19  5:17 PM   Result Value Ref Range    WBC 6.0 4.6 - 13.2 K/uL    RBC 4.86 4.70 - 5.50 M/uL    HGB 14.5 13.0 - 16.0 g/dL    HCT 42.8 36.0 - 48.0 %    MCV 88.1 74.0 - 97.0 FL    MCH 29.8 24.0 - 34.0 PG    MCHC 33.9 31.0 - 37.0 g/dL    RDW 14.1 11.6 - 14.5 %    PLATELET 062 752 - 339 K/uL    MPV 9.2 9.2 - 11.8 FL    NEUTROPHILS 54 40 - 73 %    LYMPHOCYTES 35 21 - 52 %    MONOCYTES 9 3 - 10 %    EOSINOPHILS 2 0 - 5 %    BASOPHILS 0 0 - 2 %    ABS. NEUTROPHILS 3.2 1.8 - 8.0 K/UL    ABS. LYMPHOCYTES 2.1 0.9 - 3.6 K/UL    ABS. MONOCYTES 0.5 0.05 - 1.2 K/UL    ABS. EOSINOPHILS 0.1 0.0 - 0.4 K/UL    ABS.  BASOPHILS 0.0 0.0 - 0.1 K/UL    DF AUTOMATED     METABOLIC PANEL, COMPREHENSIVE    Collection Time: 12/16/19 5:17 PM   Result Value Ref Range    Sodium 137 136 - 145 mmol/L    Potassium 3.9 3.5 - 5.5 mmol/L    Chloride 102 100 - 111 mmol/L    CO2 29 21 - 32 mmol/L    Anion gap 6 3.0 - 18 mmol/L    Glucose 88 74 - 99 mg/dL    BUN 17 7.0 - 18 MG/DL    Creatinine 1.20 0.6 - 1.3 MG/DL    BUN/Creatinine ratio 14 12 - 20      GFR est AA >60 >60 ml/min/1.73m2    GFR est non-AA 60 (L) >60 ml/min/1.73m2    Calcium 9.7 8.5 - 10.1 MG/DL    Bilirubin, total 0.4 0.2 - 1.0 MG/DL    ALT (SGPT) 24 16 - 61 U/L    AST (SGOT) 19 10 - 38 U/L    Alk. phosphatase 85 45 - 117 U/L    Protein, total 8.6 (H) 6.4 - 8.2 g/dL    Albumin 4.1 3.4 - 5.0 g/dL    Globulin 4.5 (H) 2.0 - 4.0 g/dL    A-G Ratio 0.9 0.8 - 1.7     URINALYSIS W/ RFLX MICROSCOPIC    Collection Time: 12/16/19  5:24 PM   Result Value Ref Range    Color YELLOW      Appearance CLEAR      Specific gravity 1.014 1.005 - 1.030      pH (UA) 5.0 5.0 - 8.0      Protein NEGATIVE  NEG mg/dL    Glucose NEGATIVE  NEG mg/dL    Ketone NEGATIVE  NEG mg/dL    Bilirubin NEGATIVE  NEG      Blood NEGATIVE  NEG      Urobilinogen 0.2 0.2 - 1.0 EU/dL    Nitrites NEGATIVE  NEG      Leukocyte Esterase NEGATIVE  NEG         Radiologic Studies -   No orders to display         Medical Decision Making   I am the first provider for this patient. I reviewed the vital signs, available nursing notes, past medical history, past surgical history, family history and social history. Vital Signs-Reviewed the patient's vital signs. Records Reviewed: Old Medical Records (Time of Review: 2:08 AM)    ED Course: Progress Notes, Reevaluation, and Consults:     Patient is a 69year old M with PMH of BPH who presents with discomfort due to pelayo catheter. Patient was seen in [facility] ED earlier this evening for decreased urine output. Upon return to ED patient expressing discomfort likely related to his pelayo catheter. His catheter is intact and is draining yellow urine.  Catheter was flushed at bedside with 60 cc of sterile water and his leg bag was replaced. Discussed with patient that his discomfort is likely related to a normally functioning pelayo catheter. Patient is stable for discharge home with follow up with urology on 12/19/19. Gave return precautions if he should have worsening abdominal distention and abdominal pain. Patient expresses understanding and questions answered prior to discharge. Provider Notes (Medical Decision Making): MDM    Procedures    Critical Care Time:       Diagnosis     Clinical Impression: No diagnosis found. Disposition: Discharge     Follow-up Information    None          Patient's Medications   Start Taking    No medications on file   Continue Taking    ALBUTEROL (PROVENTIL HFA, VENTOLIN HFA, PROAIR HFA) 90 MCG/ACTUATION INHALER    Take 2 Puffs by inhalation every four (4) hours as needed for Wheezing or Shortness of Breath. Indications: BRONCHOSPASM PREVENTION    AMLODIPINE (NORVASC) 10 MG TABLET    Take 1 Tab by mouth daily. DULOXETINE (CYMBALTA) 60 MG CAPSULE    Take 1 Cap by mouth daily. Indications: Neuropathic Pain    FINASTERIDE (PROSCAR) 5 MG TABLET    Take 1 Tab by mouth daily. FUROSEMIDE (LASIX) 20 MG TABLET        GABAPENTIN (NEURONTIN) 300 MG CAPSULE    Take 2 Caps by mouth four (4) times daily. PANTOPRAZOLE (PROTONIX) 40 MG TABLET    Take 1 Tab by mouth daily. POLYETHYLENE GLYCOL (MIRALAX) 17 GRAM PACKET    Take 1 Packet by mouth daily. SPIRONOLACTONE (ALDACTONE) 25 MG TABLET    Take  by mouth daily. These Medications have changed    No medications on file   Stop Taking    No medications on file     Disclaimer: Sections of this note are dictated using utilizing voice recognition software. Minor typographical errors may be present. If questions arise, please do not hesitate to contact me or call our department.

## 2019-12-17 NOTE — DISCHARGE INSTRUCTIONS
Patient Education   Take the medication as prescribed. Follow up with the urologist for your scheduled appointment. Keep the catheter site at penis clean. Return as needed or if symptoms worsen. Urinary Tract Infections in Men: Care Instructions  Your Care Instructions    A urinary tract infection, or UTI, is a general term for an infection anywhere between the kidneys and the tip of the penis. UTIs can also be a result of a prostate problem. Most cause pain or burning when you urinate. Most UTIs are caused by bacteria and can be cured with antibiotics. It is important to complete your treatment so that the infection does not get worse. Follow-up care is a key part of your treatment and safety. Be sure to make and go to all appointments, and call your doctor if you are having problems. It's also a good idea to know your test results and keep a list of the medicines you take. How can you care for yourself at home? · Take your antibiotics as prescribed. Do not stop taking them just because you feel better. You need to take the full course of antibiotics. · Take your medicines exactly as prescribed. Your doctor may have prescribed a medicine, such as phenazopyridine (Pyridium), to help relieve pain when you urinate. This turns your urine orange. You may stop taking it when your symptoms get better. But be sure to take all of your antibiotics, which treat the infection. · Drink extra water for the next day or two. This will help make the urine less concentrated and help wash out the bacteria causing the infection. (If you have kidney, heart, or liver disease and have to limit your fluids, talk with your doctor before you increase your fluid intake.)  · Avoid drinks that are carbonated or have caffeine. They can irritate the bladder. · Urinate often. Try to empty your bladder each time. · To relieve pain, take a hot bath or lay a heating pad (set on low) over your lower belly or genital area.  Never go to sleep with a heating pad in place. To help prevent UTIs  · Drink plenty of fluids, enough so that your urine is light yellow or clear like water. If you have kidney, heart, or liver disease and have to limit fluids, talk with your doctor before you increase the amount of fluids you drink. · Urinate when you have the urge. Do not hold your urine for a long time. Urinate before you go to sleep. · Keep your penis clean. Catheter care  If you have a drainage tube (catheter) in place, the following steps will help you care for it. · Always wash your hands before and after touching your catheter. · Check the area around the urethra for inflammation or signs of infection. Signs of infection include irritated, swollen, red, or tender skin, or pus around the catheter. · Clean the area around the catheter with soap and water two times a day. Dry with a clean towel afterward. · Do not apply powder or lotion to the skin around the catheter. To empty the urine collection bag  · Wash your hands with soap and water. · Without touching the drain spout, remove the spout from its sleeve at the bottom of the collection bag. Open the valve on the spout. · Let the urine flow out of the bag and into the toilet or a container. Do not let the tubing or drain spout touch anything. · After you empty the bag, clean the end of the drain spout with tissue and water. Close the valve and put the drain spout back into its sleeve at the bottom of the collection bag. · Wash your hands with soap and water. When should you call for help? Call your doctor now or seek immediate medical care if:    · Symptoms such as a fever, chills, nausea, or vomiting get worse or happen for the first time.     · You have new pain in your back just below your rib cage.  This is called flank pain.     · There is new blood or pus in your urine.     · You are not able to take or keep down your antibiotics.    Watch closely for changes in your health, and be sure to contact your doctor if:    · You are not getting better after taking an antibiotic for 2 days.     · Your symptoms go away but then come back. Where can you learn more? Go to http://ovidio-eliz.info/. Enter S833 in the search box to learn more about \"Urinary Tract Infections in Men: Care Instructions. \"  Current as of: December 19, 2018  Content Version: 12.2  © 9064-7958 [employer]. Care instructions adapted under license by [employer] (which disclaims liability or warranty for this information). If you have questions about a medical condition or this instruction, always ask your healthcare professional. [address] any warranty or liability for your use of this information.

## 2019-12-18 LAB
BACTERIA SPEC CULT: NORMAL
SERVICE CMNT-IMP: NORMAL

## 2019-12-19 LAB
BACTERIA SPEC CULT: NORMAL
SERVICE CMNT-IMP: NORMAL

## 2020-02-06 ENCOUNTER — OFFICE VISIT (OUTPATIENT)
Dept: ORTHOPEDIC SURGERY | Facility: CLINIC | Age: 75
End: 2020-02-06

## 2020-02-06 VITALS
TEMPERATURE: 97.2 F | DIASTOLIC BLOOD PRESSURE: 70 MMHG | BODY MASS INDEX: 28 KG/M2 | SYSTOLIC BLOOD PRESSURE: 137 MMHG | OXYGEN SATURATION: 98 % | HEART RATE: 72 BPM | RESPIRATION RATE: 18 BRPM | WEIGHT: 174.2 LBS | HEIGHT: 66 IN

## 2020-02-06 DIAGNOSIS — M79.672 LEFT FOOT PAIN: Primary | ICD-10-CM

## 2020-02-06 DIAGNOSIS — N32.81 OVERACTIVE BLADDER: ICD-10-CM

## 2020-02-06 NOTE — PROGRESS NOTES
Patient: Robert Lazo                MRN: [id number]       SSN: [id number]  YOB: 1945        AGE: 69 y.o. SEX: male  Body mass index is 28.12 kg/m². PCP: Sharon Knapp MD  02/06/20    HISTORY OF PRESENT ILLNESS:  I had the pleasure of reviewing Mr. Yennifer Diane. He is still having some urinary retention issues and apparently there was some issues with the urologist.  We will make a referral to another urologist.  He also complains of having a metal fragment in his right ring finger, which is visible but not infected. I will have my partner Dr. Hank Kim have a look at him, and he can electively fish this out so to speak. He is still having ongoing left foot pain, which is moderate, usually nonradicular. He does have known chronic neuropathy and _______________ see him for this. The foot pain is moderate and aching. He points to the talonavicular joint. He also points to the sesamoid and also the metatarsals as being most painful. We had him in a boot for a while and he was lost to followup for a couple months, but he presents again today. REVIEW OF SYSTEMS:  Review of systems again performed. Pertinent neuropathy is significant urinary retention. He is on some urinary meds that my resident notes as well. PHYSICAL EXAMINATION:  On examination of his foot, he has a fair bit of midfoot arthritis, especially talonavicular, sesmoid laterally are a touch tender. He does have a bit of a corn on the lateral aspect of his metatarsophalangeal joint. Lisfranc joint seem to be stable. Calf is nontender. Shay sign is negative. His foot is warm and well perfused although I could not feel the dorsalis pedis pulse. He has evidence of neuropathy although no footdrop. RADIOGRAPHS:  Review of the MRI confirms metatarsalgia and also midfoot arthritis, sesamoiditis to a degree.       IMPRESSION:  He did have a Lisfranc sprain back at that time, but we had him in a boot, and he is no longer tender in that region. At this point I am going to get Dr. Ishaan Burks to see him. I think he might be a good candidate for an orthotic. I would like for him to take the liberty of referring him to Urology as he had to leave the emergency department last time with a Ayala in place. We will get this arranged for him. CC:  Kristen Falcon M.D. REVIEW OF SYSTEMS:      CON: negative for weight loss, fever  EYE: negative for double vision  ENT: negative for hoarseness  RS:   negative for Tb  GI:    negative for blood in stool  :  negative for blood in urine  Other systems reviewed and noted below. Past Medical History:   Diagnosis Date    Bladder outlet obstruction     Erectile disorder due to medical 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     Frequency of urination     H/O spinal cord injury 1974    lumbar spine injury secondary to fall    HTN (hypertension)     Hypercholesterolemia     Illiterate     Injury of lumbar spine ([facility] [address] 1974    Leg pain, right     Nocturia     Overactive bladder     Peripheral vascular disease ([facility] [address]        Family History   Problem Relation Age of Onset    Diabetes Mother     Hypertension Mother     Diabetes Maternal Grandmother     Hypertension Maternal Grandmother     Cancer Sister         brain    Cancer Sister         brain       Current Outpatient Medications   Medication Sig Dispense Refill    tamsulosin (FLOMAX) 0.4 mg capsule Take 1 Cap by mouth daily (after dinner). 90 Cap 3    furosemide (LASIX) 20 mg tablet   1    pantoprazole (PROTONIX) 40 mg tablet Take 1 Tab by mouth daily. 30 Tab 0    amLODIPine (NORVASC) 10 mg tablet Take 1 Tab by mouth daily. 30 Tab 0    albuterol (PROVENTIL HFA, VENTOLIN HFA, PROAIR HFA) 90 mcg/actuation inhaler Take 2 Puffs by inhalation every four (4) hours as needed for Wheezing or Shortness of Breath.  Indications: BRONCHOSPASM PREVENTION 1 Inhaler 0    finasteride (PROSCAR) 5 mg tablet Take 1 Tab by mouth daily. 30 Tab 5    spironolactone (ALDACTONE) 25 mg tablet Take  by mouth daily.  ibuprofen (MOTRIN) 800 mg tablet 1 tablet as needed      simethicone 125 mg chewable tablet 1 tablet after meals and at bedtime as needed      oxybutynin chloride XL (DITROPAN XL) 5 mg CR tablet Take 1 Tab by mouth daily. 14 Tab 0    DULoxetine (CYMBALTA) 60 mg capsule Take 1 Cap by mouth daily. Indications: Neuropathic Pain 60 Cap 5    gabapentin (NEURONTIN) 300 mg capsule Take 2 Caps by mouth four (4) times daily. 240 Cap 4    polyethylene glycol (MIRALAX) 17 gram packet Take 1 Packet by mouth daily.  30 Packet 0       Allergies   Allergen Reactions    Ampicillin Angioedema    Asa-Acetaminophen-Caff-Buffers Rash    Penicillins Angioedema    Aspirin Other (comments)     Stomach upset    Atorvastatin Other (comments)     Muscle cramps       Past Surgical History:   Procedure Laterality Date    COLONOSCOPY N/A 2019    COL[facility]on [facility] at [facility]    HX HEENT Left     lens implant    HX ORTHOPAEDIC      finger amputation left hand    HX TONSILLECTOMY         Social History     Socioeconomic History    Marital status: SINGLE     Spouse name: Not on file    Number of children: Not on file    Years of education: Not on file    Highest education level: Not on file   Occupational History    Not on file   Social Needs    Financial resource strain: Not on file    Food insecurity:     Worry: Not on file     Inability: Not on file    Transportation needs:     Medical: Not on file     Non-medical: Not on file   Tobacco Use    Smoking status: Former Smoker     Last attempt to quit: 2015     Years since quittin.5    Smokeless tobacco: Never Used   Substance and Sexual Activity    Alcohol use: Not Currently     Alcohol/week: 0.0 standard drinks     Comment: former stopped     Drug use: No    Sexual activity: Yes   Lifestyle    Physical activity:     Days per week: Not on file     Minutes per session: Not on file    Stress: Not on file   Relationships    Social connections:     Talks on phone: Not on file     Gets together: Not on file     Attends [employer] service: Not on file     Active member of club or organization: Not on file     Attends meetings of clubs or organizations: Not on file     Relationship status: Not on file    Intimate partner violence:     Fear of current or ex partner: Not on file     Emotionally abused: Not on file     Physically abused: Not on file     Forced sexual activity: Not on file   Other Topics Concern    Not on file   Social History Narrative    Not on file       Visit Vitals  /70   Pulse 72   Temp 97.2 °F (36.2 °C) (Oral)   Resp 18   Ht 5' 6\" (1.676 m)   Wt 174 lb 3.2 oz (79 kg)   SpO2 98%   BMI 28.12 kg/m²         PHYSICAL EXAMINATION:  GENERAL: Alert and oriented x3, in no acute distress, well-developed, well-nourished, afebrile. HEART: No JVD. EYES: No scleral icterus   NECK: No significant lymphadenopathy   LUNGS: No respiratory compromise or indrawing  ABDOMEN: Soft, non-tender, non-distended. Electronically signed by:  Richrd Schirmer, MD

## 2020-02-10 ENCOUNTER — HOSPITAL ENCOUNTER (EMERGENCY)
Age: 75
Discharge: HOME | End: 2020-02-11
Attending: EMERGENCY MEDICINE
Payer: MEDICAID

## 2020-02-10 VITALS
WEIGHT: 174 LBS | TEMPERATURE: 98.4 F | HEIGHT: 66 IN | SYSTOLIC BLOOD PRESSURE: 147 MMHG | DIASTOLIC BLOOD PRESSURE: 81 MMHG | BODY MASS INDEX: 27.97 KG/M2 | HEART RATE: 95 BPM | OXYGEN SATURATION: 96 % | RESPIRATION RATE: 12 BRPM

## 2020-02-10 DIAGNOSIS — S61.431A PUNCTURE WOUND OF RIGHT HAND, FOREIGN BODY PRESENCE UNSPECIFIED, INITIAL ENCOUNTER: Primary | ICD-10-CM

## 2020-02-10 PROCEDURE — 90471 IMMUNIZATION ADMIN: CPT

## 2020-02-10 PROCEDURE — 90715 TDAP VACCINE 7 YRS/> IM: CPT | Performed by: PHYSICIAN ASSISTANT

## 2020-02-10 PROCEDURE — 99281 EMR DPT VST MAYX REQ PHY/QHP: CPT

## 2020-02-10 PROCEDURE — 74011250636 HC RX REV CODE- 250/636: Performed by: PHYSICIAN ASSISTANT

## 2020-02-10 RX ADMIN — TETANUS TOXOID, REDUCED DIPHTHERIA TOXOID AND ACELLULAR PERTUSSIS VACCINE, ADSORBED 0.5 ML: 5; 2.5; 8; 8; 2.5 SUSPENSION INTRAMUSCULAR at 20:07

## 2020-02-10 NOTE — ED PROVIDER NOTES
EMERGENCY DEPARTMENT HISTORY AND PHYSICAL EXAM    6:21 PM      Date: 2/10/2020  Patient Name: Devin Nunes    History of Presenting Illness     Chief Complaint   Patient presents with    Hand Injury       History Provided By: Patient    Chief Complaint: left hand punctured by staple gun  Duration:  Minutes  Timing:  Acute  Location:   Quality: Aching  Severity: 8/10  Modifying Factors:   Associated Symptoms: denies any other associated signs or symptoms      Additional History (Context):Deejay Eric is a 69 y.o. male with a pertinent history of bladder outlet obstruction, illiteracy, hypertension, hyperlipidemia, peripheral vascular disease, spinal cord injury who presents to the emergency department for evaluation of puncture wound to left hand from staple gun which occurred just prior to arrival.  Patient states he is unsure of his last tetanus. He removed the staple on his own. Bleeding is controlled. Patient is not a diabetic. No other concerns this time no recent illnesses. No other injuries. PCP:  Rudy Christiansen MD        Current Outpatient Medications   Medication Sig Dispense Refill    ibuprofen (MOTRIN) 800 mg tablet 1 tablet as needed      simethicone 125 mg chewable tablet 1 tablet after meals and at bedtime as needed      tamsulosin (FLOMAX) 0.4 mg capsule Take 1 Cap by mouth daily (after dinner). 90 Cap 3    oxybutynin chloride XL (DITROPAN XL) 5 mg CR tablet Take 1 Tab by mouth daily. 14 Tab 0    DULoxetine (CYMBALTA) 60 mg capsule Take 1 Cap by mouth daily. Indications: Neuropathic Pain 60 Cap 5    furosemide (LASIX) 20 mg tablet   1    gabapentin (NEURONTIN) 300 mg capsule Take 2 Caps by mouth four (4) times daily. 240 Cap 4    pantoprazole (PROTONIX) 40 mg tablet Take 1 Tab by mouth daily. 30 Tab 0    polyethylene glycol (MIRALAX) 17 gram packet Take 1 Packet by mouth daily. 30 Packet 0    amLODIPine (NORVASC) 10 mg tablet Take 1 Tab by mouth daily.  30 Tab 0    albuterol (PROVENTIL HFA, VENTOLIN HFA, PROAIR HFA) 90 mcg/actuation inhaler Take 2 Puffs by inhalation every four (4) hours as needed for Wheezing or Shortness of Breath. Indications: BRONCHOSPASM PREVENTION 1 Inhaler 0    finasteride (PROSCAR) 5 mg tablet Take 1 Tab by mouth daily. 30 Tab 5    spironolactone (ALDACTONE) 25 mg tablet Take  by mouth daily. Past History     Past Medical History:  Past Medical History:   Diagnosis Date    Bladder outlet obstruction     Erectile disorder due to medical 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     Frequency of urination     H/O spinal cord injury     lumbar spine injury secondary to fall    HTN (hypertension)     Hypercholesterolemia     Illiterate     [facility] ([facility] [address]     Leg pain, right     Nocturia     Overactive bladder     Peripheral vascular disease ([facility] [address]        Past Surgical History:  Past Surgical History:   Procedure Laterality Date    COLONOSCOPY N/A 2019    COLON[facility]COPY performed [facility] Mo [facility] at [facility]    HX HEENT Left     lens implant    HX ORTHOPAEDIC      finger amputation left hand    HX TONSILLECTOMY         Family History:  Family History   Problem Relation Age of Onset    Diabetes Mother     Hypertension Mother     Diabetes Maternal Grandmother     Hypertension Maternal Grandmother     Cancer Sister         brain    Cancer Sister         brain       Social History:  Social History     Tobacco Use    Smoking status: Former Smoker     Last attempt to quit: 2015     Years since quittin.5    Smokeless tobacco: Never Used   Substance Use Topics    Alcohol use: Not Currently     Alcohol/week: 0.0 standard drinks     Comment: former stopped     Drug use: No       Allergies:   Allergies   Allergen Reactions    Ampicillin Angioedema    Asa-Acetaminophen-Caff-Buffers Rash    Penicillins Angioedema    Aspirin Other (comments)     Stomach upset    Atorvastatin Other (comments)     Muscle cramps Review of Systems     Review of Systems   Constitutional: Negative for chills and fever. HENT: Negative for congestion, rhinorrhea and sore throat. Respiratory: Negative for cough and shortness of breath. Cardiovascular: Negative for chest pain. Gastrointestinal: Negative for abdominal pain, blood in stool, constipation, diarrhea, nausea and vomiting. Genitourinary: Negative for dysuria, frequency and hematuria. Musculoskeletal: Negative for back pain and myalgias. Skin: Positive for wound. Negative for rash. Neurological: Negative for dizziness and headaches. All other systems reviewed and are negative. Physical Exam     Visit Vitals  /81 (BP 1 Location: Left arm, BP Patient Position: At rest)   Pulse 95   Temp 98.4 °F (36.9 °C)   Resp 12   Ht 5' 6\" (1.676 m)   Wt 78.9 kg (174 lb)   SpO2 96%   BMI 28.08 kg/m²       Physical Exam  Vitals signs and nursing note reviewed. Constitutional:       General: He is not in acute distress. Appearance: He is well-developed. He is not diaphoretic. HENT:      Head: Normocephalic and atraumatic. Eyes:      Conjunctiva/sclera: Conjunctivae normal.   Neck:      Musculoskeletal: Normal range of motion and neck supple. Cardiovascular:      Rate and Rhythm: Normal rate and regular rhythm. Heart sounds: Normal heart sounds. Pulmonary:      Effort: Pulmonary effort is normal. No respiratory distress. Breath sounds: Normal breath sounds. Chest:      Chest wall: No tenderness. Musculoskeletal: Normal range of motion. General: Tenderness present. No deformity. Comments: 2 tiny puncture wounds noted to the palm of the left hand. Full strength and flexion noted to all affected digits. Intact distal sensation. Cap refill less than 2 seconds. Skin:     General: Skin is warm and dry. Neurological:      Mental Status: He is alert and oriented to person, place, and time.        Diagnostic Study Results     Labs -  No results found for this or any previous visit (from the past 12 hour(s)). Radiologic Studies -   No results found. Medical Decision Making   I am the first provider for this patient. I reviewed the vital signs, available nursing notes, past medical history, past surgical history, family history and social history. Vital Signs-Reviewed the patient's vital signs. Pulse Oximetry Analysis -  96% on room air (Interpretation)    Records Reviewed: Nursing Notes and Old Medical Records (Time of Review: 6:21 PM)    ED Course: Progress Notes, Reevaluation, and Consults:    Provider Notes (Medical Decision Making):   differential diagnosis: Puncture wound, abrasion, contusion, sprain    Plan: Patient presents ambulatory in no significant distress with normal vitals. Tetanus updated. Wound cleaned and dressed with a Band-Aid. Will discharge home at this time. At this time, patient is stable and appropriate for discharge home. Patient demonstrates understanding of current diagnoses and is in agreement with the treatment plan. They are advised that while the likelihood of serious underlying condition is low at this point given the evaluation performed today, we cannot fully rule it out. They are advised to immediately return with any new symptoms or worsening of current condition. All questions have been answered. Patient is given educational material regarding their diagnoses, including danger symptoms and when to return to the ED. Diagnosis     Clinical Impression:   1.  Puncture wound of right hand, foreign body presence unspecified, initial encounter        Disposition: DC home    Follow-up Information     Follow up With Specialties Details Why Contact Info    Megan Haley MD [facility] Practice Call in 2 days  [address] [place] [facility] EMERGENCY DEPT Emergency Medicine Go to As needed, If symptoms worsen [address] [address]  [phone]           Patient's Medications   Start Taking    No medications on file   Continue Taking    ALBUTEROL (PROVENTIL HFA, VENTOLIN HFA, PROAIR HFA) 90 MCG/ACTUATION INHALER    Take 2 Puffs by inhalation every four (4) hours as needed for Wheezing or Shortness of Breath. Indications: BRONCHOSPASM PREVENTION    AMLODIPINE (NORVASC) 10 MG TABLET    Take 1 Tab by mouth daily. DULOXETINE (CYMBALTA) 60 MG CAPSULE    Take 1 Cap by mouth daily. Indications: Neuropathic Pain    FINASTERIDE (PROSCAR) 5 MG TABLET    Take 1 Tab by mouth daily. FUROSEMIDE (LASIX) 20 MG TABLET        GABAPENTIN (NEURONTIN) 300 MG CAPSULE    Take 2 Caps by mouth four (4) times daily. IBUPROFEN (MOTRIN) 800 MG TABLET    1 tablet as needed    OXYBUTYNIN CHLORIDE XL (DITROPAN XL) 5 MG CR TABLET    Take 1 Tab by mouth daily. PANTOPRAZOLE (PROTONIX) 40 MG TABLET    Take 1 Tab by mouth daily. POLYETHYLENE GLYCOL (MIRALAX) 17 GRAM PACKET    Take 1 Packet by mouth daily. SIMETHICONE 125 MG CHEWABLE TABLET    1 tablet after meals and at bedtime as needed    SPIRONOLACTONE (ALDACTONE) 25 MG TABLET    Take  by mouth daily. TAMSULOSIN (FLOMAX) 0.4 MG CAPSULE    Take 1 Cap by mouth daily (after dinner). These Medications have changed    No medications on file   Stop Taking    No medications on file     _______________________________    This note was dictated utilizing voice recognition software which may lead to typographical errors. I apologize in advance if the situation occurs. If questions arise please do not hesitate to contact me or call our department.   Elba Moreno PA-C

## 2020-02-10 NOTE — ED TRIAGE NOTES
\"I shot a staple from a staple gun in my hand. \"  C/O left hand pain. Staple has been removed from his hand. Last Td \"many years ago. \"

## 2020-02-11 NOTE — DISCHARGE INSTRUCTIONS
Patient Education     Please return immediately to the Emergency Room for re-evaluation if you are not improving, develop any new symptoms, or develop worsening of current symptoms! If you have been prescribed a medication and are unable to take this medication for any reason, please return to the Emergency Department for further evaluation! If you have been referred for follow-up to a specialist, but are unable to follow-up and your symptoms are either not improving or are worsening, please return to the Emergency Department for further evaluation! Puncture Wounds: Care Instructions  Your Care Instructions    A puncture wound can happen anywhere on your body. These wounds tend to be narrower and deeper than cuts. A puncture wound is usually left open instead of being closed. This is because a puncture wound can be easily infected, and closing it can make infection even more likely. You will probably have a bandage over the wound. The doctor has checked you carefully, but problems can develop later. If you notice any problems or new symptoms, get medical treatment right away. Follow-up care is a key part of your treatment and safety. Be sure to make and go to all appointments, and call your doctor if you are having problems. It's also a good idea to know your test results and keep a list of the medicines you take. How can you care for yourself at home? · Keep the wound dry for the first 24 to 48 hours. After this, you can shower if your doctor okays it. Pat the wound dry. · Don't soak the wound, such as in a bathtub. Your doctor will tell you when it's safe to get the wound wet. · If your doctor told you how to care for your wound, follow your doctor's instructions. If you did not get instructions, follow this general advice:  ? After the first 24 to 48 hours, wash the wound with clean water 2 times a day. Don't use hydrogen peroxide or alcohol, which can slow healing.   ? You may cover the wound with a thin layer of petroleum jelly, such as Vaseline, and a nonstick bandage. ? Apply more petroleum jelly and replace the bandage as needed. · Prop up the sore area on pillows anytime you sit or lie down during the next 3 days. Try to keep it above the level of your heart. This helps reduce swelling. · Avoid any activity that could cause your wound to get worse. · Be safe with medicines. Read and follow all instructions on the label. ? If the doctor gave you a prescription medicine for pain, take it as prescribed. ? If you are not taking a prescription pain medicine, ask your doctor if you can take an over-the-counter medicine. · If your doctor prescribed antibiotics, take them as directed. Do not stop taking them just because you feel better. You need to take the full course of antibiotics. When should you call for help? Call your doctor now or seek immediate medical care if:    · You have new pain, or your pain gets worse.     · The wound starts to bleed, and blood soaks through the bandage. Oozing small amounts of blood is normal.     · The skin near the wound is cold or pale or changes color.     · You have tingling, weakness, or numbness near the wound.     · You have trouble moving the area near the wound.     · You have symptoms of infection, such as:  ? Increased pain, swelling, warmth, or redness around the wound. ? Red streaks leading from the wound. ? Pus draining from the wound. ? A fever.    Watch closely for changes in your health, and be sure to contact your doctor if:    · The wound is not closing (getting smaller).     · You do not get better as expected. Where can you learn more? Go to http://ovidio-eliz.info/. Enter M990 in the search box to learn more about \"Puncture Wounds: Care Instructions. \"  Current as of: June 26, 2019  Content Version: 12.2  © 0147-6792 [employer].  Care instructions adapted under license by [employer] (which disclaims liability or warranty for this information). If you have questions about a medical condition or this instruction, always ask your healthcare professional. [address] any warranty or liability for your use of this information.

## 2020-02-17 ENCOUNTER — OFFICE VISIT (OUTPATIENT)
Dept: ORTHOPEDIC SURGERY | Age: 75
End: 2020-02-17

## 2020-02-17 VITALS
OXYGEN SATURATION: 94 % | HEIGHT: 68 IN | HEART RATE: 97 BPM | TEMPERATURE: 97 F | DIASTOLIC BLOOD PRESSURE: 62 MMHG | BODY MASS INDEX: 26.67 KG/M2 | WEIGHT: 176 LBS | SYSTOLIC BLOOD PRESSURE: 131 MMHG

## 2020-02-17 DIAGNOSIS — M19.072 PRIMARY OSTEOARTHRITIS OF LEFT FOOT: Primary | ICD-10-CM

## 2020-02-17 NOTE — PROGRESS NOTES
AMBULATORY PROGRESS NOTE      Patient: Ray Wheeler             MRN: [id number]     SSN: [id number] Body mass index is 27.16 kg/m². YOB: 1945     AGE: 69 y.o. SEX: male    PCP: Al Moore MD     IMPRESSION/DIAGNOSIS AND TREATMENT PLAN     DIAGNOSES  1. Primary osteoarthritis of left foot        Orders Placed This Encounter    Generic Supply Order    Generic Supply Order    ammonium lactate (LAC-HYDRIN FIVE) 5 % lotion      Ray Wheeler understands his diagnoses and the proposed plan. Plan:    1) DME order: [employer] firm medial arch support (LMS)  2) DME order: medially posted firm UVCL orthotic with goal to offload midfoot ([employer])  3) Lac-Hydrin 12% cream: BID; 1 bottle, 0 refills. RTO - 1 month     HPI AND EXAMINATION     Ray Wheeler IS A 69 y.o. male who presents to my outpatient office complaining of left foot pain. He is referred by Dr. Yo Call. I personally reviewed the x-rays with the patient and it demonstrated some midfoot arthritis and arthritis in the great toe. The pt reports having significant start up pain in the morning and admits to having some difficulty with WB. He notes feeling immediate when he starts walk that is constant throughout the day. The pt admits to being borderline diabetic but is not for certain; he can't recall the last time his A1C was checked. Lab Results   Component Value Date/Time    Hemoglobin A1c 5.8 (A) 10/09/2019        Visit Vitals  /62   Pulse 97   Temp 97 °F (36.1 °C) (Oral)   Ht 5' 7.5\" (1.715 m)   Wt 176 lb (79.8 kg)   SpO2 94%   BMI 27.16 kg/m²       Appearance: Alert, well appearing and pleasant patient who is in no distress, oriented to person, place/time, and who follows commands. This patient is accompanied in the examination room by his self. Dementia: no dementia  Psychiatric: Affect and mood are appropriate. Patient arrives to office via: without assistive device:   HEENT: Head normocephalic & atraumatic.  Both pupils are round, non icteric sclera   Eye: EOM are intact    Neck: ROM WNL      Hearings Intact   Respiratory: Breathing is unlabored without accessory chest muscle use  Cardiovascular/Peripheral Vascular: Normal Pulses to each foot    ANKLE/FOOT left    Gait: Normal  Tenderness: No tenderness to first MTP upon palpation. Cutaneous:. Swelling to dorsal midfoot and #2 TMT    Thickening of skin and 5th met base  Joint Motion: Limited flexion    Functional ankle and hindfoot motion  Joint / Tendon Stability: No Ankle or Subtalar instability or joint laxity. No peroneal sublux ability or dislocation  Alignment: Forefoot, Midfoot, Hindfoot WNL. Neuro Motor/Sensory: NL/NL. Vascular: NL foot/ankle pulses. Lymphatics: No extremity lymphedema, No calf swelling, no tenderness to calf muscles. CHART REVIEW     Past Medical History:   Diagnosis Date    Bladder outlet obstruction     Erectile disorder due to medical 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     Frequency of urination     H/O spinal cord injury 1974    lumbar spine injury secondary to fall    HTN (hypertension)     Hypercholesterolemia     Illiterate     Injury of lumbar spine ([facility] [address] 1974    Leg pain, right     Nocturia     Overactive bladder     Peripheral vascular disease (HCC)      Current Outpatient Medications   Medication Sig    ammonium lactate (LAC-HYDRIN FIVE) 5 % lotion Apply  to affected area two (2) times a day.  ibuprofen (MOTRIN) 800 mg tablet 1 tablet as needed    simethicone 125 mg chewable tablet 1 tablet after meals and at bedtime as needed    tamsulosin (FLOMAX) 0.4 mg capsule Take 1 Cap by mouth daily (after dinner).  oxybutynin chloride XL (DITROPAN XL) 5 mg CR tablet Take 1 Tab by mouth daily.  DULoxetine (CYMBALTA) 60 mg capsule Take 1 Cap by mouth daily.  Indications: Neuropathic Pain    furosemide (LASIX) 20 mg tablet     gabapentin (NEURONTIN) 300 mg capsule Take 2 Caps by mouth four (4) times daily.  pantoprazole (PROTONIX) 40 mg tablet Take 1 Tab by mouth daily.  polyethylene glycol (MIRALAX) 17 gram packet Take 1 Packet by mouth daily.  amLODIPine (NORVASC) 10 mg tablet Take 1 Tab by mouth daily.  albuterol (PROVENTIL HFA, VENTOLIN HFA, PROAIR HFA) 90 mcg/actuation inhaler Take 2 Puffs by inhalation every four (4) hours as needed for Wheezing or Shortness of Breath. Indications: BRONCHOSPASM PREVENTION    finasteride (PROSCAR) 5 mg tablet Take 1 Tab by mouth daily.  spironolactone (ALDACTONE) 25 mg tablet Take  by mouth daily. No current facility-administered medications for this visit. Allergies   Allergen Reactions    Ampicillin Angioedema    Asa-Acetaminophen-Caff-Buffers Rash    Penicillins Angioedema    Aspirin Other (comments)     Stomach upset    Atorvastatin Other (comments)     Muscle cramps     Past Surgical History:   Procedure Laterality Date    COLONOSCOPY N/A 2019    COL[facility]ker Anton [facility] at [facility] ENDOSCOPY    HX HEENT Left     lens implant    HX ORTHOPAEDIC      finger amputation left hand    HX TONSILLECTOMY       Social History     Occupational History    Not on file   Tobacco Use    Smoking status: Former Smoker     Last attempt to quit: 2015     Years since quittin.6    Smokeless tobacco: Never Used   Substance and Sexual Activity    Alcohol use: Not Currently     Alcohol/week: 0.0 standard drinks     Comment: former stopped     Drug use: No    Sexual activity: Yes     Family History   Problem Relation Age of Onset    Diabetes Mother     Hypertension Mother     Diabetes Maternal Grandmother     Hypertension Maternal Grandmother     Cancer Sister         brain    Cancer Sister         brain        REVIEW OF SYSTEMS : 2020  ALL BELOW ARE Negative except : SEE HPI     Constitutional: Negative for fever, chills and weight loss.  Neg Weight Loss  Cardiovascular: Negative for chest pain, claudication and leg swelling. SOB, DESOUZA   Gastrointestinal/Urological: Negative for  pain, N/V/D/C, Blood in stool or urine,dysuria                         Hematuria, Incontinence, pelvic pain  Musculoskeletal: see HPI. Neurological: Negative for dizziness and weakness, headaches,Visual Changes             Confusion,  Or Seizures,   Psychiatric/Behavioral: Negative for depression, memory loss and substance abuse. Extremities:  Negative for hair changes, rash or skin lesion changes. Hematologic: Negative for Bleeding problems, bruising, pallor or swollen lymph nodes. Peripheral Vascular: No calf pain, vascular vein tenderness to calf pain              No calf throbbing, posterior knee throbbing pain     DIAGNOSTIC IMAGING       MRI Results (most recent):  Results from Hospital Encounter encounter on 11/05/19   MRI FOOT LT WO CONT    Narrative Multisequence multiplanar MR images of the left foot were obtained. HISTORY: Pain in the fifth metatarsal    No fracture in the toes. Specifically, no fracture in the fifth metatarsal.  Peroneus brevis attachment remains intact. Mild edema in the intact Lisfranc  ligament. Mild subchondral cystic changes at the middle cuneiform and second  metatarsal articulation. Advanced degenerative joint disease at first MTP joint with bilateral facet  hypertrophy. Osteophytes. Edema in the sesamoids. No joint effusion or erosion. Tendons remain intact. Collateral ligaments are intact. No significant hallux  valgus  deformity. No significant soft tissue inflammation or edema. No plantar mass. Impression IMPRESSION:     No foot fracture. Advanced degenerative first MTP joint. Sesamoiditis? FOOT X RAYS 3 VIEWS Left   9/28/2019  NON WEIGHT BEARING    X RAYS AT [address] [place] [address]  2/18/2020      Bones: No fractures or dislocations.  No focal osteolytic or osteoblastic process     Bone Spurs: No significant bone spurs// OA at 1st MTP joint/valgus alignment  Foot Alignment: Mild Pes Planus  Joint Condition: No Significant OA  Soft Tissues: Normal, No radiopaque foreign body and No abnormal calcific densities to soft tissues   No ankle joint effusion in lateral projection. Mineralization: Suggests  no Osteopenia    I have personally reviewed the results of the above study. The interpretation of this study is my professional opinion   Please see above section of this report. I have personally reviewed the results of the above study. The interpretation of this study is my professional opinion. Written by Liliana Shin, as dictated by Dr. Barbara Joseph. I, Dr. Barbara Joseph, confirm that all documentation is accurate.

## 2020-02-17 NOTE — PATIENT INSTRUCTIONS
You have been provided with an order for durable medical equipment that you may  at an outside facility as our office does not carry the equipment you need. You may pick it up at any medical supply company you like. Listed below are a few different locations for your convenience: [facility] [address] [facility], [address] [place] [address] Street Phone: [phone] [employer] medial arch support [facility] [place], [address] [address] Phone: [phone] 
 
medially posted right firm UVCL orthotic with goal to offload midfoot

## 2020-02-18 ENCOUNTER — APPOINTMENT (OUTPATIENT)
Dept: GENERAL RADIOLOGY | Age: 75
End: 2020-02-18
Attending: PHYSICIAN ASSISTANT
Payer: MEDICAID

## 2020-02-18 ENCOUNTER — HOSPITAL ENCOUNTER (EMERGENCY)
Age: 75
Discharge: HOME | End: 2020-02-18
Attending: EMERGENCY MEDICINE
Payer: MEDICAID

## 2020-02-18 VITALS
DIASTOLIC BLOOD PRESSURE: 74 MMHG | HEART RATE: 77 BPM | OXYGEN SATURATION: 96 % | TEMPERATURE: 98.3 F | RESPIRATION RATE: 16 BRPM | SYSTOLIC BLOOD PRESSURE: 125 MMHG

## 2020-02-18 DIAGNOSIS — R07.9 CHEST PAIN, UNSPECIFIED TYPE: Primary | ICD-10-CM

## 2020-02-18 DIAGNOSIS — S29.011A CHEST WALL MUSCLE STRAIN, INITIAL ENCOUNTER: ICD-10-CM

## 2020-02-18 DIAGNOSIS — R91.1 PULMONARY NODULE: ICD-10-CM

## 2020-02-18 LAB
ALBUMIN SERPL-MCNC: 3.8 G/DL (ref 3.4–5)
ALBUMIN/GLOB SERPL: 0.8 {RATIO} (ref 0.8–1.7)
ALP SERPL-CCNC: 80 U/L (ref 45–117)
ALT SERPL-CCNC: 24 U/L (ref 16–61)
ANION GAP SERPL CALC-SCNC: 5 MMOL/L (ref 3–18)
AST SERPL-CCNC: 22 U/L (ref 10–38)
ATRIAL RATE: 80 BPM
BASOPHILS # BLD: 0 K/UL (ref 0–0.1)
BASOPHILS NFR BLD: 1 % (ref 0–2)
BILIRUB SERPL-MCNC: 0.3 MG/DL (ref 0.2–1)
BNP SERPL-MCNC: 21 PG/ML (ref 0–900)
BUN SERPL-MCNC: 10 MG/DL (ref 7–18)
BUN/CREAT SERPL: 10 (ref 12–20)
CALCIUM SERPL-MCNC: 9.4 MG/DL (ref 8.5–10.1)
CALCULATED P AXIS, ECG09: 37 DEGREES
CALCULATED R AXIS, ECG10: -15 DEGREES
CALCULATED T AXIS, ECG11: 54 DEGREES
CHLORIDE SERPL-SCNC: 106 MMOL/L (ref 100–111)
CO2 SERPL-SCNC: 27 MMOL/L (ref 21–32)
CREAT SERPL-MCNC: 0.96 MG/DL (ref 0.6–1.3)
DIAGNOSIS, 93000: NORMAL
DIFFERENTIAL METHOD BLD: ABNORMAL
EOSINOPHIL # BLD: 0.2 K/UL (ref 0–0.4)
EOSINOPHIL NFR BLD: 4 % (ref 0–5)
ERYTHROCYTE [DISTWIDTH] IN BLOOD BY AUTOMATED COUNT: 14.7 % (ref 11.6–14.5)
GLOBULIN SER CALC-MCNC: 4.5 G/DL (ref 2–4)
GLUCOSE SERPL-MCNC: 104 MG/DL (ref 74–99)
HCT VFR BLD AUTO: 40.8 % (ref 36–48)
HGB BLD-MCNC: 13.7 G/DL (ref 13–16)
LYMPHOCYTES # BLD: 1.8 K/UL (ref 0.9–3.6)
LYMPHOCYTES NFR BLD: 41 % (ref 21–52)
MAGNESIUM SERPL-MCNC: 2.2 MG/DL (ref 1.6–2.6)
MCH RBC QN AUTO: 29.7 PG (ref 24–34)
MCHC RBC AUTO-ENTMCNC: 33.6 G/DL (ref 31–37)
MCV RBC AUTO: 88.3 FL (ref 74–97)
MONOCYTES # BLD: 0.4 K/UL (ref 0.05–1.2)
MONOCYTES NFR BLD: 9 % (ref 3–10)
NEUTS SEG # BLD: 2 K/UL (ref 1.8–8)
NEUTS SEG NFR BLD: 45 % (ref 40–73)
P-R INTERVAL, ECG05: 162 MS
PLATELET # BLD AUTO: 265 K/UL (ref 135–420)
PMV BLD AUTO: 9 FL (ref 9.2–11.8)
POTASSIUM SERPL-SCNC: 3.7 MMOL/L (ref 3.5–5.5)
PROT SERPL-MCNC: 8.3 G/DL (ref 6.4–8.2)
Q-T INTERVAL, ECG07: 386 MS
QRS DURATION, ECG06: 78 MS
QTC CALCULATION (BEZET), ECG08: 445 MS
RBC # BLD AUTO: 4.62 M/UL (ref 4.7–5.5)
SODIUM SERPL-SCNC: 138 MMOL/L (ref 136–145)
TROPONIN I SERPL-MCNC: <0.02 NG/ML (ref 0–0.04)
TROPONIN I SERPL-MCNC: <0.02 NG/ML (ref 0–0.04)
VENTRICULAR RATE, ECG03: 80 BPM
WBC # BLD AUTO: 4.3 K/UL (ref 4.6–13.2)

## 2020-02-18 PROCEDURE — 93005 ELECTROCARDIOGRAM TRACING: CPT

## 2020-02-18 PROCEDURE — 80053 COMPREHEN METABOLIC PANEL: CPT

## 2020-02-18 PROCEDURE — 71046 X-RAY EXAM CHEST 2 VIEWS: CPT

## 2020-02-18 PROCEDURE — 84484 ASSAY OF TROPONIN QUANT: CPT

## 2020-02-18 PROCEDURE — 83880 ASSAY OF NATRIURETIC PEPTIDE: CPT

## 2020-02-18 PROCEDURE — 85025 COMPLETE CBC W/AUTO DIFF WBC: CPT

## 2020-02-18 PROCEDURE — 99283 EMERGENCY DEPT VISIT LOW MDM: CPT

## 2020-02-18 PROCEDURE — 83735 ASSAY OF MAGNESIUM: CPT

## 2020-02-18 NOTE — ED NOTES
I performed a brief evaluation, including history and physical, of the patient here in triage and I have determined that the patient will need further treatment and evaluation from the main side ER provider. I have placed initial orders to help in expediting patients care.      February 18, 2020 at 11:42 AM - CHARO Andino        Visit Vitals  /74 (BP 1 Location: Left arm, BP Patient Position: Sitting)   Pulse 77   Temp 98.3 °F (36.8 °C)   Resp 16   SpO2 96%

## 2020-02-18 NOTE — DISCHARGE INSTRUCTIONS

## 2020-02-19 ENCOUNTER — OFFICE VISIT (OUTPATIENT)
Dept: ORTHOPEDIC SURGERY | Facility: CLINIC | Age: 75
End: 2020-02-19

## 2020-02-19 VITALS
HEART RATE: 77 BPM | BODY MASS INDEX: 26.01 KG/M2 | SYSTOLIC BLOOD PRESSURE: 129 MMHG | OXYGEN SATURATION: 99 % | WEIGHT: 171.6 LBS | HEIGHT: 68 IN | RESPIRATION RATE: 18 BRPM | DIASTOLIC BLOOD PRESSURE: 72 MMHG | TEMPERATURE: 97.2 F

## 2020-02-19 DIAGNOSIS — R22.32 SUBCUTANEOUS MASS OF LEFT THUMB: ICD-10-CM

## 2020-02-19 DIAGNOSIS — R22.31 MASS OF FINGER, RIGHT: Primary | ICD-10-CM

## 2020-02-19 NOTE — PROGRESS NOTES
Kari Jj is a 69 y.o. male right handed retiree. Worker's Compensation and legal considerations: none filed. Vitals:    02/19/20 1038   BP: 129/72   Pulse: 77   Resp: 18   Temp: 97.2 °F (36.2 °C)   TempSrc: Oral   SpO2: 99%   Weight: 171 lb 9.6 oz (77.8 kg)   Height: 5' 7.5\" (1.715 m)   PainSc:   4   PainLoc: Hand           Chief Complaint   Patient presents with    Hand Pain     Right 3rd Finger         HPI: Patient comes in today regarding concerns of having a metal foreign body in his on something metallic right middle finger tip in his left thumb tip. He says that he scratched both areas last year and has since had issues with it. He thinks he may have gotten something metallic out of the right middle finger and then it bled after that. He reports some continued tenderness to palpation.     Date of onset:  9/2019    Injury: Yes: Comment: cut left thumb and right middle finger    Prior Treatment:  No    Numbness/ Tingling: No    ROS: Review of Systems - General ROS: negative  Respiratory ROS: no cough, shortness of breath, or wheezing  Cardiovascular ROS: no chest pain or dyspnea on exertion  Musculoskeletal ROS: positive for - pain in finger - right and thumb - left  Neurological ROS: negative  Dermatological ROS: negative    Past Medical History:   Diagnosis Date    Bladder outlet obstruction     Erectile disorder due to medical 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     Frequency of urination     H/O spinal cord injury 1974    lumbar spine injury secondary to fall    HTN (hypertension)     Hypercholesterolemia     Illiterate     Injury of lumbar spine ([facility] [address] 1974    Leg pain, right     Nocturia     Overactive bladder     Peripheral vascular disease ([facility] [address]        Past Surgical History:   Procedure Laterality Date    COLONOSCOPY N/A 12/4/2019    COLON[facility] performed [facility] Dave [facility] MD at [facility] ENDOSCOPY    HX HEENT Left     lens implant    HX ORTHOPAEDIC      finger amputation left hand    HX TONSILLECTOMY         Current Outpatient Medications   Medication Sig Dispense Refill    ammonium lactate (LAC-HYDRIN FIVE) 5 % lotion Apply  to affected area two (2) times a day. 1 Bottle 0    tamsulosin (FLOMAX) 0.4 mg capsule Take 1 Cap by mouth daily (after dinner). 90 Cap 3    furosemide (LASIX) 20 mg tablet   1    amLODIPine (NORVASC) 10 mg tablet Take 1 Tab by mouth daily. 30 Tab 0    albuterol (PROVENTIL HFA, VENTOLIN HFA, PROAIR HFA) 90 mcg/actuation inhaler Take 2 Puffs by inhalation every four (4) hours as needed for Wheezing or Shortness of Breath. Indications: BRONCHOSPASM PREVENTION 1 Inhaler 0    finasteride (PROSCAR) 5 mg tablet Take 1 Tab by mouth daily. 30 Tab 5    spironolactone (ALDACTONE) 25 mg tablet Take  by mouth daily.  ibuprofen (MOTRIN) 800 mg tablet 1 tablet as needed      simethicone 125 mg chewable tablet 1 tablet after meals and at bedtime as needed      oxybutynin chloride XL (DITROPAN XL) 5 mg CR tablet Take 1 Tab by mouth daily. 14 Tab 0    DULoxetine (CYMBALTA) 60 mg capsule Take 1 Cap by mouth daily. Indications: Neuropathic Pain 60 Cap 5    gabapentin (NEURONTIN) 300 mg capsule Take 2 Caps by mouth four (4) times daily. 240 Cap 4    pantoprazole (PROTONIX) 40 mg tablet Take 1 Tab by mouth daily. 30 Tab 0    polyethylene glycol (MIRALAX) 17 gram packet Take 1 Packet by mouth daily. 30 Packet 0       Allergies   Allergen Reactions    Ampicillin Angioedema    Asa-Acetaminophen-Caff-Buffers Rash    Penicillins Angioedema    Aspirin Other (comments)     Stomach upset    Atorvastatin Other (comments)     Muscle cramps         PE:     Right Middle Finger: There is an area of healing scab over the tip however there is no evidence of infection erythema or drainage. Left Thumb: There is a small scabbed area over the pulp of the thumb however this is minimally tender and there is no evidence of infection.     Imagin2020 plain films of the left thumb as well as the right middle finger does not show any evidence of radiopaque foreign body any fracture dislocation or other osseous abnormality. Of note on the left thumb view a index finger metallic foreign body is noted on the radial aspect of the P1. ICD-10-CM ICD-9-CM    1. Mass of finger, right R22.31 782.2 AMB POC XRAY, FINGER(S), 2+ VIEWS      AMB POC XRAY, FINGER(S), 2+ VIEWS   2. Subcutaneous mass of left thumb R22.32 782.2        Plan: At this point I had a discussion with the patient that there is likely no radiopaque foreign bodies or metallic foreign bodies in either the left thumb or the right middle finger. I told him that if it is bothering him enough in the future he would like me to explore we can do this. However I told him that it would be likely something that would resolve on its own and he should stop picking at his CMP indicated he has been doing.     Follow-up PRN    Plan was reviewed with patient, who verbalized agreement and understanding of the plan

## 2020-02-20 ENCOUNTER — TELEPHONE (OUTPATIENT)
Dept: ORTHOPEDIC SURGERY | Age: 75
End: 2020-02-20

## 2020-02-20 RX ORDER — AMMONIUM LACTATE 12 G/100G
CREAM TOPICAL 2 TIMES DAILY
Qty: 280 G | Refills: 0 | Status: SHIPPED | OUTPATIENT
Start: 2020-02-20 | End: 2021-07-22

## 2020-02-20 NOTE — TELEPHONE ENCOUNTER
Prescription for the following medication e-prescribed to the patients pharmacy:    Orders Placed This Encounter    ammonium lactate (AMLACTIN) 12 % topical cream     Sig: Apply  to affected area two (2) times a day.  rub in to affected area well     Dispense:  280 g     Refill:  0           Adrian Benoit PA-C  2/20/2020  3:15 PM

## 2020-02-20 NOTE — TELEPHONE ENCOUNTER
Lac-Hydrin 5% lotion is unavailable. Pharmacy is requesting an alternative. Please advise and pend new Rx if appropriate.

## 2020-02-21 ENCOUNTER — OFFICE VISIT (OUTPATIENT)
Dept: CARDIOLOGY CLINIC | Age: 75
End: 2020-02-21

## 2020-02-21 VITALS
SYSTOLIC BLOOD PRESSURE: 132 MMHG | TEMPERATURE: 96.6 F | WEIGHT: 180.4 LBS | DIASTOLIC BLOOD PRESSURE: 68 MMHG | HEART RATE: 82 BPM | HEIGHT: 68 IN | OXYGEN SATURATION: 97 % | BODY MASS INDEX: 27.34 KG/M2

## 2020-02-21 DIAGNOSIS — E78.00 HYPERCHOLESTEROLEMIA: ICD-10-CM

## 2020-02-21 DIAGNOSIS — I10 ESSENTIAL HYPERTENSION: ICD-10-CM

## 2020-02-21 DIAGNOSIS — R07.9 CHEST PAIN, UNSPECIFIED TYPE: ICD-10-CM

## 2020-02-21 DIAGNOSIS — Q24.5 CORONARY-MYOCARDIAL BRIDGE: Primary | ICD-10-CM

## 2020-02-21 NOTE — PROGRESS NOTES
HISTORY OF PRESENT ILLNESS  Ramon Weaevr is a 69 y.o. male. 2//2020  Patient is here for follow-up after recent evaluation in emergency room for chest pain. Has he was painting all day and subsequently started having pain under her armpit on both side. Pain worse on movement. Worse on sitting and moving around radiates to the back. He has short of breath on exertion which does not appear changed. Chest Pain (Angina)    The history is provided by the patient. This is a new problem. The current episode started more than 1 week ago. The problem has been gradually worsening. The problem occurs daily. The pain is associated with exertion and rest. The pain is present in the substernal region, right side and left side. The pain is mild. The quality of the pain is described as pressure-like and heavy. The pain does not radiate. Pertinent negatives include no abdominal pain, no claudication, no cough, no dizziness, no fever, no headaches, no hemoptysis, no nausea, no orthopnea, no palpitations, no PND, no shortness of breath, no sputum production, no vomiting and no weakness. Shortness of Breath   The history is provided by the patient. This is a new problem. The problem occurs frequently. The current episode started more than 1 week ago. The problem has been gradually worsening. Pertinent negatives include no fever, no headaches, no cough, no sputum production, no hemoptysis, no wheezing, no PND, no orthopnea, no chest pain, no vomiting, no abdominal pain, no rash, no leg swelling and no claudication. Precipitated by: walking,exertion. Review of Systems   Constitutional: Negative for chills and fever. HENT: Negative for nosebleeds. Eyes: Negative for blurred vision and double vision. Respiratory: Negative for cough, hemoptysis, sputum production, shortness of breath and wheezing. Cardiovascular: Negative for chest pain, palpitations, orthopnea, claudication, leg swelling and PND. Gastrointestinal: Negative for abdominal pain, heartburn, nausea and vomiting. Musculoskeletal: Negative for myalgias. Skin: Negative for rash. Neurological: Negative for dizziness, weakness and headaches. Endo/Heme/Allergies: Does not bruise/bleed easily. Family History   Problem Relation Age of Onset    Diabetes Mother     Hypertension Mother     Diabetes Maternal Grandmother     Hypertension Maternal Grandmother     Cancer Sister         brain    Cancer Sister         brain       Past Medical History:   Diagnosis Date    Bladder outlet obstruction     Erectile disorder due to medical 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     Frequency of urination     H/O spinal cord injury     lumbar spine injury secondary to fall    HTN (hypertension)     Hypercholesterolemia     Illiterate     Injury of lumbar spine ([facility] [address]     Leg pain, right     Nocturia     Overactive bladder     Peripheral vascular disease ([facility] [address]        Past Surgical History:   Procedure Laterality Date    COLONOSCOPY N/A 2019    COLONOSCOPY performed by Marietta To MD at [facility]    HX HEENT Left     lens implant    HX ORTHOPAEDIC      finger amputation left hand    HX TONSILLECTOMY         Social History     Tobacco Use    Smoking status: Former Smoker     Last attempt to quit: 2015     Years since quittin.6    Smokeless tobacco: Never Used   Substance Use Topics    Alcohol use: Not Currently     Alcohol/week: 0.0 standard drinks     Comment: former stopped        Allergies   Allergen Reactions    Ampicillin Angioedema    Asa-Acetaminophen-Caff-Buffers Rash    Penicillins Angioedema    Aspirin Other (comments)     Stomach upset    Atorvastatin Other (comments)     Muscle cramps       Prior to Admission medications    Medication Sig Start Date End Date Taking? Authorizing Provider   ammonium lactate (AMLACTIN) 12 % topical cream Apply  to affected area two (2) times a day.  rub in to affected area well 2/20/20  Yes Janette Dang PA-C   tamsulosin (FLOMAX) 0.4 mg capsule Take 1 Cap by mouth daily (after dinner). 12/19/19  Yes Angelito Sims MD   DULoxetine (CYMBALTA) 60 mg capsule Take 1 Cap by mouth daily. Indications: Neuropathic Pain 12/11/19  Yes Stephan Vidal MD   furosemide (LASIX) 20 mg tablet  11/11/19  Yes Provider, Historical   gabapentin (NEURONTIN) 300 mg capsule Take 2 Caps by mouth four (4) times daily. 5/6/19  Yes Stephan Vidal MD   pantoprazole (PROTONIX) 40 mg tablet Take 1 Tab by mouth daily. 3/28/19  Yes Sara Montiel MD   polyethylene glycol [facility]) 17 gram packet Take 1 Packet by mouth daily. 3/28/19  Yes Sara Montiel MD   amLODIPine (NORVASC) 10 mg tablet Take 1 Tab by mouth daily. 3/28/19  Yes Sara Montiel MD   spironolactone (ALDACTONE) 25 mg tablet Take  by mouth daily. Yes Provider, Historical   oxybutynin chloride XL (DITROPAN XL) 5 mg CR tablet Take 1 Tab by mouth daily. 12/17/19   Jodi Desai PA-C         Visit Vitals  /68 (BP 1 Location: Left arm, BP Patient Position: Sitting)   Pulse 82   Temp 96.6 °F (35.9 °C) (Oral)   Ht 5' 7.5\" (1.715 m)   Wt 81.8 kg (180 lb 6.4 oz)   SpO2 97%   BMI 27.84 kg/m²     Physical Exam  Constitutional:       Appearance: He is well-developed and well-nourished. HENT:      Head: Normocephalic and atraumatic. Eyes:      Conjunctiva/sclera: Conjunctivae normal.   Neck:      Musculoskeletal: Neck supple. Thyroid: No thyromegaly. Vascular: No JVD. Trachea: No tracheal deviation. Cardiovascular:      Rate and Rhythm: Normal rate and regular rhythm. Heart sounds: Normal heart sounds. No murmur. No friction rub. No gallop. Pulmonary:      Effort: No respiratory distress. Breath sounds: Normal breath sounds. No wheezing or rales. Chest:      Chest wall: No tenderness. Abdominal:      Palpations: Abdomen is soft. Tenderness:  There is no abdominal tenderness. Musculoskeletal:         General: No edema. Skin:     General: Skin is warm and dry. Neurological:      Mental Status: He is alert and oriented to person, place, and time. Psychiatric:         Mood and Affect: Mood and affect normal.   Interpretation Summary 10/2018    Normal right lower extremity arterial findings. Moderate PAD left lower extremity. Disease noted at tibial level. 10/2018 EKG  DiagnosisFinal Sinus bradycardia   Moderate voltage criteria for LVH, may be normal variant   Possible Acute pericarditis   Abnormal ECG  2015-stress echo  Impressions: Normal study after maximal exercise without reproduction of  symptoms   ECG conclusions: The stress ECG was normal. Based on [employer]  Scoring, this patient was at low risk for cardiac events. Mr. Winford Phoenix has a reminder for a \"due or due soon\" health maintenance. I have asked that he contact his primary care provider for follow-up on this health maintenance. I have personally reviewed patient's records available from hospital and other providers and incorporated findings in patient care. I have personally reviewed patients ekg done at other facility. I Have personally reviewed recent relevant labs available and discussed with patient    Conclusion cardiac cath 3/2019    Non obstructive epicardial arteries with moderate mid LAD myocardial bridging noted. Will optimize CCB dose. Continue risk factor modification. Complications                      Coronary Findings     Diagnostic   Dominance: Right   Left Main   The vessel was visualized by angiography. The vessel is angiographically normal.   Left Anterior Descending   The vessel was visualized by angiography. The vessel is angiographically normal. Moderate myocardial bridging noted in mid LAD   Left Circumflex   The vessel was visualized by angiography. The vessel is angiographically normal.   Right Coronary Artery   The vessel was visualized by angiography.  The vessel is angiographically normal.   Intervention     No interventions have been documented. Procedure Conclusion     Nuclear Stress Test 3/2019    Abnormal myocardial perfusion imaging. Reversible defect consistent with myocardial ischemia. Myocardial perfusion imaging supports an intermediate risk stress test.   There is no prior study available for comparison. .   Interpretation Summary     · Baseline ECG: Sinus bradycardia, ST elevation, consider early repolarization, pericarditis or injury Minimal voltage criteria for LVH maybe normal variant. · Gated SPECT: Left ventricular function post-stress was normal. Calculated ejection fraction is 71%. There is no evidence of transient ischemic dilation (TID). The TID ratio is 0.95. · Negative stress test.  · Left ventricular perfusion is probably abnormal.  · Myocardial perfusion imaging defect 1: There is a defect that is small to moderate in size with a mild reduction in uptake present in the mid to basal inferior location(s) that is partially reversible. There is normal wall motion in the defect area. Viability in the area is good. The possibility of ischemia cannot be excluded. Perfusion defect was visually present without quantitative evidence. · Abnormal myocardial perfusion imaging. Reversible defect consistent with myocardial ischemia. Myocardial perfusion imaging supports an intermediate risk stress test.        Interpretation Summary 11/2019    · Left Ventricle: Normal cavity size, systolic function (ejection fraction normal) and diastolic function. Mildly increased wall thickness. Estimated left ventricular ejection fraction is 56 - 60%. Visually measured ejection fraction. No regional wall motion abnormality noted. Assessment         ICD-10-CM ICD-9-CM    1. 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 No anginal quality chest pain monitor   2. Essential hypertension I10 401.9     Stable continue treatment   3.  Hypercholesterolemia E78.00 272.0 Continue treatment lab with PCP   4. Chest pain, unspecified type R07.9 786.50     Clinically musculoskeletal.  Use nonsteroidal.     3/2019  New patient with increased chest pain and shortness of breath. Possible angina. Rule out CHF cardiomyopathy. Patient was intolerant to atorvastatin in past due to muscle spasm. Recheck lipids and decide on alternate statin. Patient had peptic ulcer many years ago and was told not to take aspirin as it upsets his stomach. Consider Plavix if needed    4/2019  Continues to have pain atypical chest pain. No significant CAD. Currently on Protonix. Will use Mylanta plus for gas. He has GI appointment next week. Cardiac status stable  2/2020  Seen for atypical chest pain clinically musculoskeletal on 2 sides and back. Started after painting. Use Aleve 1 tablet twice a day for 1 week          Medications Discontinued During This Encounter   Medication Reason    simethicone 125 mg chewable tablet Not A Current Medication    albuterol (PROVENTIL HFA, VENTOLIN HFA, PROAIR HFA) 90 mcg/actuation inhaler Not A Current Medication    finasteride (PROSCAR) 5 mg tablet Not A Current Medication    ibuprofen (MOTRIN) 800 mg tablet Not A Current Medication       No orders of the defined types were placed in this encounter. Follow-up and Dispositions    · Return in about 6 weeks (around 4/3/2020).

## 2020-02-21 NOTE — PROGRESS NOTES
1. Have you been to the ER, urgent care clinic since your last visit? Hospitalized since your last visit? Yes When: 2/18 Where:  ER Reason for visit: CP    2. Have you seen or consulted any other health care providers outside of the [address] [place] [address] since your last visit? Include any pap smears or colon screening.       No

## 2020-03-09 ENCOUNTER — OFFICE VISIT (OUTPATIENT)
Dept: NEUROLOGY | Age: 75
End: 2020-03-09

## 2020-03-09 VITALS
BODY MASS INDEX: 26.76 KG/M2 | TEMPERATURE: 98.2 F | DIASTOLIC BLOOD PRESSURE: 64 MMHG | HEART RATE: 83 BPM | SYSTOLIC BLOOD PRESSURE: 118 MMHG | RESPIRATION RATE: 20 BRPM | HEIGHT: 68 IN | OXYGEN SATURATION: 97 % | WEIGHT: 176.6 LBS

## 2020-03-09 DIAGNOSIS — G62.9 POLYNEUROPATHY: ICD-10-CM

## 2020-03-09 DIAGNOSIS — M54.12 CERVICAL RADICULOPATHY: ICD-10-CM

## 2020-03-09 DIAGNOSIS — G56.32: ICD-10-CM

## 2020-03-09 RX ORDER — DULOXETIN HYDROCHLORIDE 60 MG/1
60 CAPSULE, DELAYED RELEASE ORAL DAILY
Qty: 60 CAP | Refills: 5 | Status: SHIPPED | OUTPATIENT
Start: 2020-03-09 | End: 2020-09-11 | Stop reason: SDUPTHER

## 2020-03-09 RX ORDER — GABAPENTIN 300 MG/1
600 CAPSULE ORAL 4 TIMES DAILY
Qty: 240 CAP | Refills: 4 | Status: SHIPPED | OUTPATIENT
Start: 2020-03-09 | End: 2021-01-21

## 2020-03-09 NOTE — PROGRESS NOTES
Re:  Venu Felton up visit     3/9/2020 8:35 AM    SSN: [id number]    Subjective:   Cinthya Diaz returns for follow up. He's gotten good relief of pain in the right leg with increased dosing of Neurontin. He has no side effects with the medication. He says he needs to get up slowly to avoid any major pain, but it's tolerable most of the time. His biggest complaint is the burning in the right leg from the hip down to the foot. His primary doctor has increased the Neurontin to 600 mg TID. He's not taking the gabapentin but is having trouble because he's been having trouble taking large pills. He's not tried to open the capsules up and using apple sauce, which I suggest using. Adding Cymbalta seems to have decreased the overall pain. He has no side effects. Medications:    Current Outpatient Medications   Medication Sig Dispense Refill    ammonium lactate (AMLACTIN) 12 % topical cream Apply  to affected area two (2) times a day. rub in to affected area well 280 g 0    tamsulosin (FLOMAX) 0.4 mg capsule Take 1 Cap by mouth daily (after dinner). 90 Cap 3    oxybutynin chloride XL (DITROPAN XL) 5 mg CR tablet Take 1 Tab by mouth daily. 14 Tab 0    DULoxetine (CYMBALTA) 60 mg capsule Take 1 Cap by mouth daily. Indications: Neuropathic Pain 60 Cap 5    furosemide (LASIX) 20 mg tablet   1    gabapentin (NEURONTIN) 300 mg capsule Take 2 Caps by mouth four (4) times daily. 240 Cap 4    pantoprazole (PROTONIX) 40 mg tablet Take 1 Tab by mouth daily. 30 Tab 0    polyethylene glycol (MIRALAX) 17 gram packet Take 1 Packet by mouth daily. 30 Packet 0    amLODIPine (NORVASC) 10 mg tablet Take 1 Tab by mouth daily. 30 Tab 0    spironolactone (ALDACTONE) 25 mg tablet Take  by mouth daily.          Vital signs:    Visit Vitals  /64 (BP 1 Location: Left arm, BP Patient Position: Sitting)   Pulse 83   Temp 98.2 °F (36.8 °C) (Oral)   Resp 20   Ht 5' 7.5\" (1.715 m)   Wt 80.1 kg (176 lb 9.6 oz)   SpO2 97%   BMI 27.25 kg/m²       Review of Systems: he complains of pain in all his joints. He has significant tingling of digits 4 and 5 on the left. As above otherwise 11 point review of systems negative including;   Constitutional no fever or chills  Skin denies rash or itching  HEENT  Denies tinnitus, hearing lose  Eyes denies diplopia vision lose  Respiratory denies sortness of breath  Cardiovascular denies chest pain, dyspnea on exertion  Gastrointestinal denies nausea, vomiting, diarrhea, constipation  Genitourinary denies incontinence  Musculoskeletal denies joint pain or swelling  Endocrine denies weight change  Hematology denies easy bruising or bleeding   Neurological as above in HPI      Patient Active Problem List   Diagnosis Code    Unsteady gait R26.81    Gait instability R26.81    Vertigo R42    Radiculopathy of lumbar region M54.16    ED (erectile dysfunction) of organic origin N52.9    Peripheral vascular disease (HCC) I73.9    Overactive bladder N32.81    Nocturia R35.1    Leg pain, right M79.604    Hypercholesterolemia E78.00    HTN (hypertension) I10    H/O spinal cord injury Z87.828    Erectile disorder due to medical 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 N52.1    Bladder outlet obstruction N32.0    Injury of lumbar spine ([facility] [address] S34.109A    Frequency of urination R35.0    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 Fund of knowledge is adequate. Speech is fluent and memory is intact. Cranial Nerves: II  Visual fields are full to confrontation. III, IV, VI  Extraocular movements are intact. There is no nystagmus. V  Facial sensation is intact to pinprick. VII  Face is symmetrical.  VIII - Hearing is present.   IX, X, XII  Palate is symmetrical.   XI - Shoulder shrugging and head turning intact  Motor: The patient moves all four limbs fairly well and symmetrically. Tone is normal. Reflexes are 2+ and symmetrical. Plantars are down going. Gait is mildly antalgic, limping off of the right foot slightly. CBC:   Lab Results   Component Value Date/Time    WBC 4.3 (L) 02/18/2020 11:49 AM    RBC 4.62 (L) 02/18/2020 11:49 AM    HGB 13.7 02/18/2020 11:49 AM    HCT 40.8 02/18/2020 11:49 AM    PLATELET 467 55/23/3586 11:49 AM     BMP:   Lab Results   Component Value Date/Time    Glucose 104 (H) 02/18/2020 11:49 AM    Sodium 138 02/18/2020 11:49 AM    Potassium 3.7 02/18/2020 11:49 AM    Chloride 106 02/18/2020 11:49 AM    CO2 27 02/18/2020 11:49 AM    BUN 10 02/18/2020 11:49 AM    Creatinine 0.96 02/18/2020 11:49 AM    Calcium 9.4 02/18/2020 11:49 AM     CMP:   Lab Results   Component Value Date/Time    Glucose 104 (H) 02/18/2020 11:49 AM    Sodium 138 02/18/2020 11:49 AM    Potassium 3.7 02/18/2020 11:49 AM    Chloride 106 02/18/2020 11:49 AM    CO2 27 02/18/2020 11:49 AM    BUN 10 02/18/2020 11:49 AM    Creatinine 0.96 02/18/2020 11:49 AM    Calcium 9.4 02/18/2020 11:49 AM    Anion gap 5 02/18/2020 11:49 AM    BUN/Creatinine ratio 10 (L) 02/18/2020 11:49 AM    Alk. phosphatase 80 02/18/2020 11:49 AM    Protein, total 8.3 (H) 02/18/2020 11:49 AM    Albumin 3.8 02/18/2020 11:49 AM    Globulin 4.5 (H) 02/18/2020 11:49 AM    A-G Ratio 0.8 02/18/2020 11:49 AM     Coagulation:   Lab Results   Component Value Date/Time    Prothrombin time 13.3 10/01/2018 03:00 AM    INR 1.0 10/01/2018 03:00 AM    aPTT 82.1 (H) 03/28/2019 05:16 AM       Assessment:  Severe long lived neuropathic pain. No evidence for motor dysfunction. Doing well OK on the  dose of Neurontin, but having trouble taking the capsules because of dysphagia. Better pain control on low dose Cymbalta.       Plan:  Lets continue him back on 2 of the 300 mg gabapentin capsules 4 times a day.   Will increase Cymbalta 60 mg. RTC 6 months. Sincerely,        Brian Walker.  Bronwyn Richardosn M.D.

## 2020-03-10 ENCOUNTER — TELEPHONE (OUTPATIENT)
Dept: NEUROLOGY | Age: 75
End: 2020-03-10

## 2020-03-10 ENCOUNTER — HOSPITAL ENCOUNTER (OUTPATIENT)
Dept: CT IMAGING | Age: 75
Discharge: HOME | End: 2020-03-10
Payer: MEDICAID

## 2020-03-10 DIAGNOSIS — R91.1 PULMONARY NODULE: ICD-10-CM

## 2020-03-10 LAB — CREAT UR-MCNC: 1 MG/DL (ref 0.6–1.3)

## 2020-03-10 PROCEDURE — 82565 ASSAY OF CREATININE: CPT

## 2020-03-10 PROCEDURE — 71260 CT THORAX DX C+: CPT

## 2020-03-10 PROCEDURE — 74011636320 HC RX REV CODE- 636/320

## 2020-03-10 RX ADMIN — IOPAMIDOL 75 ML: 612 INJECTION, SOLUTION INTRAVENOUS at 10:00

## 2020-03-10 NOTE — TELEPHONE ENCOUNTER
Request for prescription change or information rec'd from [facility] and placed in provider's folder @ [facility] for review.

## 2020-03-13 ENCOUNTER — DOCUMENTATION ONLY (OUTPATIENT)
Dept: NEUROLOGY | Age: 75
End: 2020-03-13

## 2020-03-13 NOTE — PROGRESS NOTES
Request for prescription change or information from [facility] sent to [facility] [employer].

## 2020-03-16 ENCOUNTER — OFFICE VISIT (OUTPATIENT)
Dept: ORTHOPEDIC SURGERY | Age: 75
End: 2020-03-16

## 2020-03-16 VITALS
WEIGHT: 176 LBS | SYSTOLIC BLOOD PRESSURE: 149 MMHG | BODY MASS INDEX: 26.67 KG/M2 | HEART RATE: 67 BPM | DIASTOLIC BLOOD PRESSURE: 68 MMHG | OXYGEN SATURATION: 97 % | TEMPERATURE: 96.8 F | HEIGHT: 68 IN

## 2020-03-16 DIAGNOSIS — M19.071 PRIMARY OSTEOARTHRITIS OF BOTH FEET: ICD-10-CM

## 2020-03-16 DIAGNOSIS — M19.072 OSTEOARTHRITIS OF LEFT MIDFOOT: Primary | ICD-10-CM

## 2020-03-16 DIAGNOSIS — M19.072 PRIMARY OSTEOARTHRITIS OF BOTH FEET: ICD-10-CM

## 2020-03-16 RX ORDER — DICLOFENAC SODIUM 10 MG/G
4 GEL TOPICAL 2 TIMES DAILY
Qty: 100 G | Refills: 0 | Status: SHIPPED | OUTPATIENT
Start: 2020-03-16 | End: 2021-05-25

## 2020-03-16 NOTE — PATIENT INSTRUCTIONS
[facility] Address: [address], [place], [address] Phone: [phone] Look into [employer] [address] [place] [address]

## 2020-03-16 NOTE — PROGRESS NOTES
AMBULATORY PROGRESS NOTE      Patient: Felice Lobato             MRN: [id number]     SSN: [id number] Body mass index is 27.16 kg/m². YOB: 1945     AGE: 69 y.o. SEX: male    PCP: Rizwana Girard MD     IMPRESSION/DIAGNOSIS AND TREATMENT PLAN     DIAGNOSES  1. Osteoarthritis of left midfoot    2. Primary osteoarthritis of both feet        Orders Placed This Encounter    diclofenac (Voltaren) 1 % gel      Felice Lobato understands his diagnoses and the proposed plan. Plan:    1) Voltaren 1% gel: 4 g BID; 100 g, 1 refill. 2) Encourage pt to wear tennis shoes with the orthotics  3) Look into New Balance 928 shoes ([place])    RTO - 4 weeks     HPI AND EXAMINATION     Felice Lobato IS A 69 y.o. male who presents to my outpatient office for follow up of left foot pain. At the last visit, I provided an order for [employer]en[employer] firm medial arch supports, medially posted form UVCL orthotic with goal to offload midfoot, and Lac-hydrin 12% cream.     Since the last OV, Felice Lobato states that he is still experiencing sharp pains and constant throbbing in his left foot, which he describes as \"toothache\". He reports that the orthotic has not helped to alleviate his pain, citing that they have been hurting his feet. The pt admits that he has not worn tennis shoes with the orthotic, only dress shoes which he wore to the office today. Lab Results   Component Value Date/Time    Hemoglobin A1c 5.8 (A) 10/09/2019        Visit Vitals  /68   Pulse 67   Temp 96.8 °F (36 °C) (Oral)   Ht 5' 7.5\" (1.715 m)   Wt 176 lb (79.8 kg)   SpO2 97%   BMI 27.16 kg/m²       Appearance: Alert, well appearing and pleasant patient who is in no distress, oriented to person, place/time, and who follows commands. This patient is accompanied in the examination room by his self. Dementia: no dementia  Psychiatric: Affect and mood are appropriate.  Patient arrives to office via: without assistive device:   HEENT: Head normocephalic & atraumatic. Both pupils are round, non icteric sclera   Eye: EOM are intact    Neck: ROM WNL      Hearings Intact   Respiratory: Breathing is unlabored without accessory chest muscle use  Cardiovascular/Peripheral Vascular: Normal Pulses to each foot    ANKLE/FOOT left    Gait: Normal  Tenderness: No tenderness to first MTP upon palpation. Cutaneous:. Swelling to dorsal midfoot and #2 TMT    Thickening of skin and 5th met base  Joint Motion: Limited flexion    Functional ankle and hindfoot motion  Joint / Tendon Stability: No Ankle or Subtalar instability or joint laxity. No peroneal sublux ability or dislocation  Alignment: Forefoot, Midfoot, Hindfoot WNL. Neuro Motor/Sensory: NL/NL. Vascular: NL foot/ankle pulses. Lymphatics: No extremity lymphedema, No calf swelling, no tenderness to calf muscles. CHART REVIEW     Past Medical History:   Diagnosis Date    Bladder outlet obstruction     Erectile disorder due to medical 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     Frequency of urination     H/O spinal cord injury 1974    lumbar spine injury secondary to fall    HTN (hypertension)     Hypercholesterolemia     Illiterate     Injury of lumbar spine ([address] 1974    Leg pain, right     Nocturia     Overactive bladder     Peripheral vascular disease (HCC)      Current Outpatient Medications   Medication Sig    diclofenac (Voltaren) 1 % gel Apply 4 g to affected area two (2) times a day.  gabapentin (NEURONTIN) 300 mg capsule Take 2 Caps by mouth four (4) times daily.  DULoxetine (CYMBALTA) 60 mg capsule Take 1 Cap by mouth daily. Indications: neuropathic pain    ammonium lactate (AMLACTIN) 12 % topical cream Apply  to affected area two (2) times a day. rub in to affected area well    tamsulosin (FLOMAX) 0.4 mg capsule Take 1 Cap by mouth daily (after dinner).  oxybutynin chloride XL (DITROPAN XL) 5 mg CR tablet Take 1 Tab by mouth daily.     furosemide (LASIX) 20 mg tablet     pantoprazole (PROTONIX) 40 mg tablet Take 1 Tab by mouth daily.  polyethylene glycol (MIRALAX) 17 gram packet Take 1 Packet by mouth daily.  amLODIPine (NORVASC) 10 mg tablet Take 1 Tab by mouth daily.  spironolactone (ALDACTONE) 25 mg tablet Take  by mouth daily. No current facility-administered medications for this visit. Allergies   Allergen Reactions    Ampicillin Angioedema    Asa-Acetaminophen-Caff-Buffers Rash    Penicillins Angioedema    Aspirin Other (comments)     Stomach upset    Atorvastatin Other (comments)     Muscle cramps     Past Surgical History:   Procedure Laterality Date    COLONOSCOPY N/A 2019    COLONOSCOPY performed by Carol Ingram MD at [facility] ENDOSCOPY    HX HEENT Left     lens implant    HX ORTHOPAEDIC      finger amputation left hand    HX TONSILLECTOMY       Social History     Occupational History    Not on file   Tobacco Use    Smoking status: Former Smoker     Last attempt to quit: 2015     Years since quittin.6    Smokeless tobacco: Never Used   Substance and Sexual Activity    Alcohol use: Not Currently     Alcohol/week: 0.0 standard drinks     Comment: former stopped     Drug use: No    Sexual activity: Yes     Family History   Problem Relation Age of Onset    Diabetes Mother     Hypertension Mother     Diabetes Maternal Grandmother     Hypertension Maternal Grandmother     Cancer Sister         brain    Cancer Sister         brain        REVIEW OF SYSTEMS : 3/16/2020  ALL BELOW ARE Negative except : SEE HPI     Constitutional: Negative for fever, chills and weight loss. Neg Weight Loss  Cardiovascular: Negative for chest pain, claudication and leg swelling. SOB, DESOUZA   Gastrointestinal/Urological: Negative for  pain, N/V/D/C, Blood in stool or urine,dysuria                         Hematuria, Incontinence, pelvic pain  Musculoskeletal: see HPI.   Neurological: Negative for dizziness and weakness, headaches,Visual Changes             Confusion,  Or Seizures,   Psychiatric/Behavioral: Negative for depression, memory loss and substance abuse. Extremities:  Negative for hair changes, rash or skin lesion changes. Hematologic: Negative for Bleeding problems, bruising, pallor or swollen lymph nodes. Peripheral Vascular: No calf pain, vascular vein tenderness to calf pain              No calf throbbing, posterior knee throbbing pain     DIAGNOSTIC IMAGING      No notes on file    Please see above section of this report. I have personally reviewed the results of the above study. The interpretation of this study is my professional opinion. Written by Samuel Bejarano, as dictated by Dr. Leigh Ann Quinones. I, Dr. Leigh Ann Quinones, confirm that all documentation is accurate.

## 2020-03-17 ENCOUNTER — HOSPITAL ENCOUNTER (OUTPATIENT)
Dept: LAB | Age: 75
Discharge: HOME | End: 2020-03-17
Payer: MEDICAID

## 2020-03-17 ENCOUNTER — OFFICE VISIT (OUTPATIENT)
Dept: ONCOLOGY | Age: 75
End: 2020-03-17

## 2020-03-17 VITALS
RESPIRATION RATE: 16 BRPM | OXYGEN SATURATION: 96 % | TEMPERATURE: 98.8 F | HEIGHT: 68 IN | HEART RATE: 88 BPM | BODY MASS INDEX: 25.31 KG/M2 | WEIGHT: 167 LBS | SYSTOLIC BLOOD PRESSURE: 123 MMHG | DIASTOLIC BLOOD PRESSURE: 73 MMHG

## 2020-03-17 DIAGNOSIS — M54.50 CHRONIC LOW BACK PAIN, UNSPECIFIED BACK PAIN LATERALITY, UNSPECIFIED WHETHER SCIATICA PRESENT: ICD-10-CM

## 2020-03-17 DIAGNOSIS — D47.2 MGUS (MONOCLONAL GAMMOPATHY OF UNKNOWN SIGNIFICANCE): ICD-10-CM

## 2020-03-17 DIAGNOSIS — G89.29 CHRONIC LOW BACK PAIN, UNSPECIFIED BACK PAIN LATERALITY, UNSPECIFIED WHETHER SCIATICA PRESENT: ICD-10-CM

## 2020-03-17 DIAGNOSIS — D47.2 MGUS (MONOCLONAL GAMMOPATHY OF UNKNOWN SIGNIFICANCE): Primary | ICD-10-CM

## 2020-03-17 LAB
ALBUMIN SERPL-MCNC: 3.7 G/DL (ref 3.4–5)
ALBUMIN/GLOB SERPL: 1 {RATIO} (ref 0.8–1.7)
ALP SERPL-CCNC: 77 U/L (ref 45–117)
ALT SERPL-CCNC: 23 U/L (ref 16–61)
ANION GAP SERPL CALC-SCNC: 1 MMOL/L (ref 3–18)
AST SERPL-CCNC: 24 U/L (ref 10–38)
BASOPHILS # BLD: 0 K/UL (ref 0–0.1)
BASOPHILS NFR BLD: 0 % (ref 0–2)
BILIRUB SERPL-MCNC: 0.3 MG/DL (ref 0.2–1)
BUN SERPL-MCNC: 15 MG/DL (ref 7–18)
BUN/CREAT SERPL: 15 (ref 12–20)
CALCIUM SERPL-MCNC: 9 MG/DL (ref 8.5–10.1)
CHLORIDE SERPL-SCNC: 109 MMOL/L (ref 100–111)
CO2 SERPL-SCNC: 26 MMOL/L (ref 21–32)
CREAT SERPL-MCNC: 1.01 MG/DL (ref 0.6–1.3)
DIFFERENTIAL METHOD BLD: ABNORMAL
EOSINOPHIL # BLD: 0.1 K/UL (ref 0–0.4)
EOSINOPHIL NFR BLD: 2 % (ref 0–5)
ERYTHROCYTE [DISTWIDTH] IN BLOOD BY AUTOMATED COUNT: 14.6 % (ref 11.6–14.5)
GLOBULIN SER CALC-MCNC: 3.8 G/DL (ref 2–4)
GLUCOSE SERPL-MCNC: 94 MG/DL (ref 74–99)
HCT VFR BLD AUTO: 40.9 % (ref 36–48)
HGB BLD-MCNC: 13.5 G/DL (ref 13–16)
LYMPHOCYTES # BLD: 1.9 K/UL (ref 0.9–3.6)
LYMPHOCYTES NFR BLD: 44 % (ref 21–52)
MCH RBC QN AUTO: 29.7 PG (ref 24–34)
MCHC RBC AUTO-ENTMCNC: 33 G/DL (ref 31–37)
MCV RBC AUTO: 89.9 FL (ref 74–97)
MONOCYTES # BLD: 0.3 K/UL (ref 0.05–1.2)
MONOCYTES NFR BLD: 6 % (ref 3–10)
NEUTS SEG # BLD: 2 K/UL (ref 1.8–8)
NEUTS SEG NFR BLD: 48 % (ref 40–73)
PLATELET # BLD AUTO: 299 K/UL (ref 135–420)
PMV BLD AUTO: 9.6 FL (ref 9.2–11.8)
POTASSIUM SERPL-SCNC: 4.2 MMOL/L (ref 3.5–5.5)
PROT SERPL-MCNC: 7.5 G/DL (ref 6.4–8.2)
RBC # BLD AUTO: 4.55 M/UL (ref 4.7–5.5)
SODIUM SERPL-SCNC: 136 MMOL/L (ref 136–145)
WBC # BLD AUTO: 4.3 K/UL (ref 4.6–13.2)

## 2020-03-17 PROCEDURE — 84165 PROTEIN E-PHORESIS SERUM: CPT

## 2020-03-17 PROCEDURE — 36415 COLL VENOUS BLD VENIPUNCTURE: CPT

## 2020-03-17 PROCEDURE — 80053 COMPREHEN METABOLIC PANEL: CPT

## 2020-03-17 PROCEDURE — 85025 COMPLETE CBC W/AUTO DIFF WBC: CPT

## 2020-03-17 NOTE — PROGRESS NOTES
Identified pt with two pt identifiers(name and ). Reviewed record in preparation for visit and have obtained necessary documentation. Chief Complaint   Patient presents with    Follow-up     4 month        Health Maintenance Due   Topic    Hepatitis C Screening     Lipid Screen     Shingrix Vaccine Age 50> (1 of 2)    FOBT Q1Y Age 54-65     GLAUCOMA SCREENING Q2Y     Pneumococcal 65+ years (1 of 1 - PPSV23)    Influenza Age 5 to Adult        Coordination of Care Questionnaire:  :   1) Have you been to an emergency room, urgent care, or hospitalized since your last visit? If yes, where when, and reason for visit? no       2. Have seen or consulted any other health care provider since your last visit? If yes, where when, and reason for visit? NO      3) Do you have an Advanced Directive/ Living Will in place? NO  If yes, do we have a copy on file NO  If no, would you like information NO      Learning Assessment 2019   PRIMARY LEARNER Patient   PRIMARY LANGUAGE ENGLISH   LEARNER PREFERENCE PRIMARY DEMONSTRATION     VIDEOS   ANSWERED BY patient   RELATIONSHIP SELF        3 most recent PHQ Screens 3/17/2020   PHQ Not Done -   Little interest or pleasure in doing things Not at all   Feeling down, depressed, irritable, or hopeless Not at all   Total Score PHQ 2 0        Abuse Screening Questionnaire 2019   Do you ever feel afraid of your partner? N   Are you in a relationship with someone who physically or mentally threatens you? N   Is it safe for you to go home? Y        Fall Risk Assessment, last 12 mths 3/17/2020   Able to walk? Yes   Fall in past 12 months?  No   Fall with injury? -   Number of falls in past 12 months -   Fall Risk Score -

## 2020-03-17 NOTE — PROGRESS NOTES
Hematology/Oncology  Progress Note    Name: Bethany Reas  Date: 3/17/2020  : 1945    Jimbo Ambrose MD     Mr. Samuel Martinez is a 69y.o. year old male who was seen for Monoclonal gammopathy of undetermined significance. Current Therapy- Active Surveillance       Subjective:      Mr. Samuel Martinez is a 78-year-old male with a past medical history of CAD, HTN, PVD, radiculopathy of the lumbar region, chronic low back pain, osteoarthritis, and newly diagnosed with monoclonal gammopathy of unknown significance. He was diagnosed on 2018. The bone survey on 10/18/2018 showed no evidence of lytic lesions in the skeleton. Other than arthritic pain, the patient reports that he is doing well. He currently uses OTC Tylenol and lidocaine patch for pain control. He denies shortness of breath, dizziness, and chest pain. He denies fevers or recent infections. He does not have any concerns or complaints to report at this time. Past medical history, family history, and social history: these were reviewed and remains unchanged.     Past Medical History:   Diagnosis Date    Bladder outlet obstruction     Erectile disorder due to medical 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     Frequency of urination     H/O spinal cord injury     lumbar spine injury secondary to fall    HTN (hypertension)     Hypercholesterolemia     Illiterate     Injury of lumbar spine ([address]     Leg pain, right     Nocturia     Overactive bladder     Peripheral vascular disease [address] [address] [facility]      Past Surgical History:   Procedure Laterality Date    COLONOSCOPY N/A 2019    COL[facility]ld[facility] at [facility]COPY    HX HEENT Left     lens implant    HX ORTHOPAEDIC      finger amputation left hand    HX TONSILLECTOMY       Social History     Socioeconomic History    Marital status: SINGLE     Spouse name: Not on file    Number of children: Not on file    Years of education: Not on file    Highest education level: Not on file   Occupational History    Not on file   Social Needs    Financial resource strain: Not on file    Food insecurity     Worry: Not on file     Inability: Not on file    Transportation needs     Medical: Not on file     Non-medical: Not on file   Tobacco Use    Smoking status: Former Smoker     Last attempt to quit: 2015     Years since quittin.6    Smokeless tobacco: Never Used   Substance and Sexual Activity    Alcohol use: Not Currently     Alcohol/week: 0.0 standard drinks     Comment: former stopped     Drug use: No    Sexual activity: Yes   Lifestyle    Physical activity     Days per week: Not on file     Minutes per session: Not on file    Stress: Not on file   Relationships    Social connections     Talks on phone: Not on file     Gets together: Not on file     Attends [employer]'s witness service: Not on file     Active member of club or organization: Not on file     Attends meetings of clubs or organizations: Not on file     Relationship status: Not on file    Intimate partner violence     Fear of current or ex partner: Not on file     Emotionally abused: Not on file     Physically abused: Not on file     Forced sexual activity: Not on file   Other Topics Concern    Not on file   Social History Narrative    Not on file     Family History   Problem Relation Age of Onset    Diabetes Mother     Hypertension Mother     Diabetes Maternal Grandmother     Hypertension Maternal Grandmother     Cancer Sister         brain    Cancer Sister         brain     Current Outpatient Medications   Medication Sig Dispense Refill    diclofenac (Voltaren) 1 % gel Apply 4 g to affected area two (2) times a day. 100 g 0    gabapentin (NEURONTIN) 300 mg capsule Take 2 Caps by mouth four (4) times daily. 240 Cap 4    DULoxetine (CYMBALTA) 60 mg capsule Take 1 Cap by mouth daily.  Indications: neuropathic pain 60 Cap 5    ammonium lactate (AMLACTIN) 12 % topical cream Apply  to affected area two (2) times a day. rub in to affected area well 280 g 0    tamsulosin (FLOMAX) 0.4 mg capsule Take 1 Cap by mouth daily (after dinner). 90 Cap 3    oxybutynin chloride XL (DITROPAN XL) 5 mg CR tablet Take 1 Tab by mouth daily. 14 Tab 0    furosemide (LASIX) 20 mg tablet   1    pantoprazole (PROTONIX) 40 mg tablet Take 1 Tab by mouth daily. 30 Tab 0    polyethylene glycol (MIRALAX) 17 gram packet Take 1 Packet by mouth daily. 30 Packet 0    amLODIPine (NORVASC) 10 mg tablet Take 1 Tab by mouth daily. 30 Tab 0    spironolactone (ALDACTONE) 25 mg tablet Take  by mouth daily. Review of Systems  Constitutional: The patient has no acute distress or discomfort. HEENT: The patient denies recent head trauma, eye pain, blurred vision,  hearing deficit, oropharyngeal mucosal pain or lesions, and the patient denies throat pain or discomfort. Lymphatics: The patient denies palpable peripheral lymphadenopathy. Hematologic: The patient denies having bruising, bleeding, or progressive fatigue. Respiratory: Patient denies having shortness of breath, cough, sputum production, fever, or dyspnea on exertion. Cardiovascular: The patient denies having leg pain, leg swelling, heart palpitations, chest permit, chest pain, or lightheadedness. The patient denies having dyspnea on exertion. Gastrointestinal: The patient denies having nausea, emesis, or diarrhea. The patient denies having any hematemesis or blood in the stool. Genitourinary: Patient denies having urinary urgency, frequency, or dysuria. The patient denies having blood in the urine. Psychological: The patient denies having symptoms of nervousness, anxiety, depression, or thoughts of harming self. Skin: Patient denies having skin rashes, skin, ulcerations, or unexplained itching or pruritus. Musculoskeletal: The patient denies having pain in the joints or bones.       Objective:     Visit Vitals  Resp 16   Ht 5' 7.5\" (1.715 m)   Wt 75.8 kg (167 lb)   BMI 25.77 kg/m²     ECOG PS0 pain 4/10  Physical Exam:   Gen. Appearance: The patient is in no acute distress. Skin: There is no bruise or rash. HEENT: The exam is unremarkable. Neck: Supple without lymphadenopathy or thyromegaly. Lungs: Clear to auscultation and percussion; there are no wheezes or rhonchi. Heart: Regular rate and rhythm; there are no murmurs, gallops, or rubs. Abdomen: Bowel sounds are present and normal.  There is no guarding, tenderness, or hepatosplenomegaly. Extremities: There is no clubbing, cyanosis, or edema. Neurologic: There are no focal neurologic deficits. Lymphatics: There is no palpable peripheral lymphadenopathy. Musculoskeletal: The patient has full range of motion at all joints. There is no evidence of joint deformity or effusions. There is no focal joint tenderness. Psychological/psychiatric: There is no clinical evidence of anxiety, depression, or melancholy. Lab data:      Results for orders placed or performed during the hospital encounter of 11/12/19   CBC WITH 3 PART DIFF     Status: Abnormal   Result Value Ref Range Status    WBC 4.4 (L) 4.5 - 13.0 K/uL Final    RBC 4.51 4.10 - 5.10 M/uL Final    HGB 13.3 12.0 - 16.0 g/dL Final    HCT 41.7 36 - 48 % Final    MCV 92.5 78 - 102 FL Final    MCH 29.5 25.0 - 35.0 PG Final    MCHC 31.9 31 - 37 g/dL Final    RDW 13.8 11.5 - 14.5 % Final    PLATELET 234 640 - 684 K/uL Final    NEUTROPHILS 46 40 - 70 % Final    MIXED CELLS 14 0.1 - 17 % Final    LYMPHOCYTES 40 14 - 44 % Final    ABS. NEUTROPHILS 2.0 1.8 - 9.5 K/UL Final    ABS. MIXED CELLS 0.6 0.0 - 2.3 K/uL Final    ABS. LYMPHOCYTES 1.8 1.1 - 5.9 K/UL Final     Comment: Test performed at [address] [place] [address] or Outpatient Infusion Center Location. Reviewed by Medical Director. DF AUTOMATED   Final         Assessment:     1. MGUS (monoclonal gammopathy of unknown significance)    2.  Chronic low back pain, unspecified back pain laterality, unspecified whether sciatica present        Plan:   Monoclonal gammopathy of undetermined significance: I have explained to the patient that his most recent M-spike in November 2019 has decreased to 0.5g/dL from 0.6g/dL in 08/2019. I will recheck his SPEP at this time. No therapeutic intervention is warranted. Chronic low back pain:  Patient will continue to use his Lidoacaine patch and Tylenol arthritis as prescribed by his PCP. Follow up in 4 months or sooner if indicated. Orders Placed This Encounter    CBC WITH AUTOMATED DIFF     Standing Status:   Future     Standing Expiration Date:   3/18/2021    SPEP     Standing Status:   Future     Standing Expiration Date:   6/73/0462    METABOLIC PANEL, COMPREHENSIVE     Standing Status:   Future     Standing Expiration Date:   3/18/2021         [facility]  3/17/2020     I have assessed the patient independently and  agree with the full assessment as outlined.   Michelle Gar MD, [place]

## 2020-03-17 NOTE — PATIENT INSTRUCTIONS
Complete Blood Count (CBC): About This Test 
What is it? A complete blood count (CBC) is a blood test that gives important information about your blood cells, especially red blood cells, white blood cells, and platelets. Why is this test done? A CBC may be done as part of a regular physical exam. There are many other reasons that a doctor may want this blood test, including to: · Find the cause of symptoms such as fatigue, weakness, fever, bruising, or weight loss. · Find anemia or an infection. · See how much blood has been lost if there is bleeding. · Diagnose diseases of the blood, such as leukemia or polycythemia. How can you prepare for the test? 
You do not need to do anything before having this test. 
What happens during the test? 
The health professional taking a sample of your blood will: · Wrap an elastic band around your upper arm. This makes the veins below the band larger so it is easier to put a needle into the vein. · Clean the needle site with alcohol. · Put the needle into the vein. · Attach a tube to the needle to fill it with blood. · Remove the band from your arm when enough blood is collected. · Put a gauze pad or cotton ball over the needle site as the needle is removed. · Put pressure on the site and then put on a bandage. If this blood test is done on a baby, a heel stick may be done instead of a blood draw from a vein. What happens after the test? 
· You will probably be able to go home right away. · You can go back to your usual activities right away. Follow-up care is a key part of your treatment and safety. Be sure to make and go to all appointments, and call your doctor if you are having problems. It's also a good idea to keep a list of the medicines you take. Ask your doctor when you can expect to have your test results. Where can you learn more? Go to http://ovidio-eliz.info/ Enter R300 in the search box to learn more about \"Complete Blood Count (CBC): About This Test.\" Current as of: December 8, 2019Content Version: 12.4 © 4950-9128 [employer]. Care instructions adapted under license by [employer] (which disclaims liability or warranty for this information). If you have questions about a medical condition or this instruction, always ask your healthcare professional. [place] [address] any warranty or liability for your use of this information.

## 2020-03-19 LAB
ALBUMIN SERPL ELPH-MCNC: 3.7 G/DL (ref 2.9–4.4)
ALBUMIN/GLOB SERPL: 1.1 {RATIO} (ref 0.7–1.7)
ALPHA1 GLOB SERPL ELPH-MCNC: 0.2 G/DL (ref 0–0.4)
ALPHA2 GLOB SERPL ELPH-MCNC: 0.7 G/DL (ref 0.4–1)
B-GLOBULIN SERPL ELPH-MCNC: 1.2 G/DL (ref 0.7–1.3)
GAMMA GLOB SERPL ELPH-MCNC: 1.2 G/DL (ref 0.4–1.8)
GLOBULIN SER CALC-MCNC: 3.4 G/DL (ref 2.2–3.9)
M PROTEIN SERPL ELPH-MCNC: 0.6 G/DL
PROT SERPL-MCNC: 7.1 G/DL (ref 6–8.5)

## 2020-04-13 ENCOUNTER — VIRTUAL VISIT (OUTPATIENT)
Dept: ORTHOPEDIC SURGERY | Age: 75
End: 2020-04-13

## 2020-04-13 DIAGNOSIS — M19.071 PRIMARY OSTEOARTHRITIS OF BOTH FEET: Primary | ICD-10-CM

## 2020-04-13 DIAGNOSIS — M19.072 PRIMARY OSTEOARTHRITIS OF BOTH FEET: Primary | ICD-10-CM

## 2020-04-13 DIAGNOSIS — M19.072 OSTEOARTHRITIS OF LEFT MIDFOOT: ICD-10-CM

## 2020-04-13 NOTE — PROGRESS NOTES
Bradley Celaya is a 69 y.o. male evaluated via telephone on 4/13/2020. Consent:  He and/or health care decision maker is aware that that he may receive a bill for this telephone service, depending on his insurance coverage, and has provided verbal consent to proceed: Yes      Documentation:  I communicated with the patient and/or health care decision maker about left foot. Details of this discussion including any medical advice provided:       I affirm this is a Patient Initiated Episode with an Established Patient who has not had a related appointment within my department in the past 7 days or scheduled within the next 24 hours. Note: not billable if this call serves to triage the patient into an appointment for the relevant concern    Since your last office visit have you had any    1. New medical diagnoses No  2. Changes in your medications  No  3. Surgical procedures No  4. Changes in your Allergies to medication No  5.  What is your pain level today 0/10     Bruce Marie

## 2020-04-13 NOTE — PROGRESS NOTES
Patient: Brandon Gunderson             MRN: [id number]     SSN: [id number] There is no height or weight on file to calculate BMI. YOB: 1945     AGE: 69 y.o. EX: male    PCP: Marcelo Paez MD          Brandon Gunderson is a 69 y.o. male who was seen by VIRTUAL synchronous (real-time) audio-video technology on 4/13/2020. (DOXI.ME). The location of this virtual encounter was performed at : [address] [place] [address],[address]. [facility] OFFICE, Patient Location is at their home    CPT Codes 26269-80482 for Established Patients may apply to this Telehealth Visit  Time-based coding, delete if not needed: I spent at least 10 minutes with this established patient, and >50% of the time was spent counseling and/or coordinating care regarding      Virtual time was[de-identified]  8:20AM to 8:30 AM.            ASSESSMENT AND PLAN       ICD-10-CM ICD-9-CM    1. Primary osteoarthritis of both feet M19.071 715.17     M19.072     2. Osteoarthritis of left midfoot M19.072 715.97         Orders placed this encounter: No orders of the defined types were placed in this encounter. Plan    1. Continue activities as tolerated        SUBJECTIVE      Brandon Gunderson was seen for Foot Pain (left foot pain)       Since my last OV with Brandno Gunderson, he states that he is doing better overall, is less pain/discomfort. Brandon Gunderson has been seen for : above Dx is listed above    He still having some discomfort in his feet. He was unable to get the extra-depth new balance shoes at [facility]. He has been smart and limiting the time duration of the standing. At times he does use alcohol rub to his feet, this is soothing for him. He is allergic to aspirin can take Aspercreme. Not taking any medications at this current time for his osteoarthritis of his bilateral feet. Otherwise no new changes with him which is maintaining, he is maintaining, and he is conducting activities as tolerated.   We will see him in 2020, for any worsening with baseline per his request we will see him sooner. Lilliam Mota is not taking medications for pain relief[de-identified]    None at this time    Denies: CP, SOB, ABD PAIN, Calf pain       CHART REVIEW        Current Outpatient Medications   Medication Sig    diclofenac (Voltaren) 1 % gel Apply 4 g to affected area two (2) times a day.  gabapentin (NEURONTIN) 300 mg capsule Take 2 Caps by mouth four (4) times daily.  DULoxetine (CYMBALTA) 60 mg capsule Take 1 Cap by mouth daily. Indications: neuropathic pain    ammonium lactate (AMLACTIN) 12 % topical cream Apply  to affected area two (2) times a day. rub in to affected area well    tamsulosin (FLOMAX) 0.4 mg capsule Take 1 Cap by mouth daily (after dinner).  oxybutynin chloride XL (DITROPAN XL) 5 mg CR tablet Take 1 Tab by mouth daily.  furosemide (LASIX) 20 mg tablet     pantoprazole (PROTONIX) 40 mg tablet Take 1 Tab by mouth daily.  polyethylene glycol (MIRALAX) 17 gram packet Take 1 Packet by mouth daily.  amLODIPine (NORVASC) 10 mg tablet Take 1 Tab by mouth daily.  spironolactone (ALDACTONE) 25 mg tablet Take  by mouth daily. No current facility-administered medications for this visit. THE  FOR Omar Reid MD 2020 . Social History     Occupational History    Not on file   Tobacco Use    Smoking status: Former Smoker     Last attempt to quit: 2015     Years since quittin.7    Smokeless tobacco: Never Used   Substance and Sexual Activity    Alcohol use: Not Currently     Alcohol/week: 0.0 standard drinks     Comment: former stopped     Drug use: No    Sexual activity: Yes        Prior to Admission medications    Medication Sig Start Date End Date Taking? Authorizing Provider   diclofenac (Voltaren) 1 % gel Apply 4 g to affected area two (2) times a day.  3/16/20  Yes Zachary Ortiz MD   gabapentin (NEURONTIN) 300 mg capsule Take 2 Caps by mouth four (4) times daily. 3/9/20  Yes Staci Segura MD   DULoxetine (CYMBALTA) 60 mg capsule Take 1 Cap by mouth daily. Indications: neuropathic pain 3/9/20  Yes Staci Segura MD   ammonium lactate (AMLACTIN) 12 % topical cream Apply  to affected area two (2) times a day. rub in to affected area well 2/20/20  Yes Chip Fabian PA-C   tamsulosin (FLOMAX) 0.4 mg capsule Take 1 Cap by mouth daily (after dinner). 12/19/19  Yes Angelito Sims MD   oxybutynin chloride XL (DITROPAN XL) 5 mg CR tablet Take 1 Tab by mouth daily. 12/17/19  Yes Jodi Desai PA-C   furosemide (LASIX) 20 mg tablet  11/11/19  Yes Provider, Historical   pantoprazole (PROTONIX) 40 mg tablet Take 1 Tab by mouth daily. 3/28/19  Yes Sofia Hdz MD   polyethylene glycol [facility]) 17 gram packet Take 1 Packet by mouth daily. 3/28/19  Yes Sofia Hdz MD   amLODIPine (NORVASC) 10 mg tablet Take 1 Tab by mouth daily. 3/28/19  Yes Sofia Hdz MD   spironolactone (ALDACTONE) 25 mg tablet Take  by mouth daily.    Yes Provider, Historical     Allergies   Allergen Reactions    Ampicillin Angioedema    Asa-Acetaminophen-Caff-Buffers Rash    Penicillins Angioedema    Aspirin Other (comments)     Stomach upset    Atorvastatin Other (comments)     Muscle cramps        ROS    Past Medical History:   Diagnosis Date    Bladder outlet obstruction     Erectile disorder due to medical 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     Frequency of urination     H/O spinal cord injury 1974    lumbar spine injury secondary to fall    HTN (hypertension)     Hypercholesterolemia     Illiterate     Injury of lumbar spine ([address] 1974    Leg pain, right     Nocturia     Overactive bladder     Peripheral vascular disease ([facility] [address]      Past Surgical History:   Procedure Laterality Date    COLONOSCOPY N/A 12/4/2019    COL[facility]COPY performed by [facility]rey [facility] at [facility]OSCOPY    HX HEENT Left     lens implant    HX ORTHOPAEDIC      finger amputation left hand    HX TONSILLECTOMY       Social History     Socioeconomic History    Marital status: SINGLE     Spouse name: Not on file    Number of children: Not on file    Years of education: Not on file    Highest education level: Not on file   Occupational History    Not on file   Social Needs    Financial resource strain: Not on file    Food insecurity     Worry: Not on file     Inability: Not on file    Transportation needs     Medical: Not on file     Non-medical: Not on file   Tobacco Use    Smoking status: Former Smoker     Last attempt to quit: 2015     Years since quittin.7    Smokeless tobacco: Never Used   Substance and Sexual Activity    Alcohol use: Not Currently     Alcohol/week: 0.0 standard drinks     Comment: former stopped     Drug use: No    Sexual activity: Yes   Lifestyle    Physical activity     Days per week: Not on file     Minutes per session: Not on file    Stress: Not on file   Relationships    Social connections     Talks on phone: Not on file     Gets together: Not on file     Attends Protestant service: Not on file     Active member of club or organization: Not on file     Attends meetings of clubs or organizations: Not on file     Relationship status: Not on file    Intimate partner violence     Fear of current or ex partner: Not on file     Emotionally abused: Not on file     Physically abused: Not on file     Forced sexual activity: Not on file   Other Topics Concern    Not on file   Social History Narrative    Not on file     Family History   Problem Relation Age of Onset    Diabetes Mother     Hypertension Mother     Diabetes Maternal Grandmother     Hypertension Maternal Grandmother     Cancer Sister         brain    Cancer Sister         brain            OBJECTIVE     General: alert, cooperative, no distress   Mental  status: mental status: alert, oriented to person, place, and time, normal mood, behavior, speech, dress, motor activity, and thought processes   Resp: resp: no respiratory distress   Neuro: neuro: no gross deficits   Skin: skin:  WNL   Live video viewing was used and showed:       Due to this being a TeleHealth evaluation, many elements of the physical examination are unable to be assessed. We discussed the expected course, resolution and complications of the diagnosis(es) in detail. Medication risks, benefits, costs, interactions, and alternatives were discussed as indicated. I advised him to contact the office if his condition worsens, changes or fails to improve as anticipated. He expressed understanding with the diagnosis(es) and plan. Pursuant to the emergency declaration under the [facility], [facility] waiver authority and the [employer] Act, this Virtual  Visit was conducted, with patient's consent, to reduce the patient's risk of exposure to COVID-19 and provide continuity of care for an established patient. Services were provided through a video synchronous discussion virtually to substitute for in-person clinic visit.                Sunita Roque MD  4/13/2020  8:20 AM

## 2020-06-08 ENCOUNTER — VIRTUAL VISIT (OUTPATIENT)
Dept: CARDIOLOGY CLINIC | Age: 75
End: 2020-06-08

## 2020-06-08 DIAGNOSIS — R07.9 CHEST PAIN, UNSPECIFIED TYPE: ICD-10-CM

## 2020-06-08 DIAGNOSIS — Q24.5 CORONARY-MYOCARDIAL BRIDGE: Primary | ICD-10-CM

## 2020-06-08 DIAGNOSIS — R06.02 SHORTNESS OF BREATH: ICD-10-CM

## 2020-06-08 DIAGNOSIS — I10 ESSENTIAL HYPERTENSION: ICD-10-CM

## 2020-06-08 DIAGNOSIS — E78.00 HYPERCHOLESTEROLEMIA: ICD-10-CM

## 2020-06-08 RX ORDER — FINASTERIDE 5 MG/1
5 TABLET, FILM COATED ORAL DAILY
COMMUNITY
End: 2021-06-17 | Stop reason: SDUPTHER

## 2020-06-08 NOTE — PROGRESS NOTES
Chest pain how often does not happen consent: See Le, who was seen by synchronous (real-time) audio-video technology, and/or his healthcare decision maker, is aware that this patient-initiated, Telehealth encounter on 6/8/2020 is a billable service, with coverage as determined by his insurance carrier. He is aware that he may receive a bill and has provided verbal consent to proceed: Yes. Subjective:   See Le is a 69 y.o. male who was seen for Coronary Artery Disease (6 month follow up )    Patient seen today for virtual visit. Patient has been complaining of chest tightness on exertion particularly climbing stairs these are getting worse. He is getting short of breath also on exertion. Denies orthopnea PND denies any peripheral edema. Family History   Problem Relation Age of Onset    Diabetes Mother     Hypertension Mother     Diabetes Maternal Grandmother     Hypertension Maternal Grandmother     Cancer Sister         brain    Cancer Sister         brain     Past Surgical History:   Procedure Laterality Date    COLONOSCOPY N/A 12/4/2019    COLON[facility] by Des Mar[facility]cal [facility] at [facility]OSCOPY    HX HEENT Left     lens implant    HX ORTHOPAEDIC      finger amputation left hand    HX TONSILLECTOMY       Allergies   Allergen Reactions    Ampicillin Angioedema    Asa-Acetaminophen-Caff-Buffers Rash    Penicillins Angioedema    Aspirin Other (comments)     Stomach upset    Atorvastatin Other (comments)     Muscle cramps       Current Outpatient Medications:     finasteride (PROSCAR) 5 mg tablet, Take 5 mg by mouth daily. , Disp: , Rfl:     albuterol sulfate 90 mcg/actuation aebs, Take  by inhalation. , Disp: , Rfl:     diclofenac (Voltaren) 1 % gel, Apply 4 g to affected area two (2) times a day., Disp: 100 g, Rfl: 0    gabapentin (NEURONTIN) 300 mg capsule, Take 2 Caps by mouth four (4) times daily. , Disp: 240 Cap, Rfl: 4    DULoxetine (CYMBALTA) 60 mg capsule, Take 1 Cap by mouth daily. Indications: neuropathic pain, Disp: 60 Cap, Rfl: 5    ammonium lactate (AMLACTIN) 12 % topical cream, Apply  to affected area two (2) times a day. rub in to affected area well, Disp: 280 g, Rfl: 0    tamsulosin (FLOMAX) 0.4 mg capsule, Take 1 Cap by mouth daily (after dinner). , Disp: 90 Cap, Rfl: 3    furosemide (LASIX) 20 mg tablet, Take 20 mg by mouth daily. , Disp: , Rfl: 1    pantoprazole (PROTONIX) 40 mg tablet, Take 1 Tab by mouth daily. , Disp: 30 Tab, Rfl: 0    polyethylene glycol (MIRALAX) 17 gram packet, Take 1 Packet by mouth daily. , Disp: 30 Packet, Rfl: 0    amLODIPine (NORVASC) 10 mg tablet, Take 1 Tab by mouth daily. , Disp: 30 Tab, Rfl: 0    spironolactone (ALDACTONE) 25 mg tablet, Take  by mouth daily. , Disp: , Rfl:   Allergies   Allergen Reactions    Ampicillin Angioedema    Asa-Acetaminophen-Caff-Buffers Rash    Penicillins Angioedema    Aspirin Other (comments)     Stomach upset    Atorvastatin Other (comments)     Muscle cramps         Review of Systems   Review of Systems   Constitutional: Negative for chills and fever. HENT: Negative for nosebleeds. Eyes: Negative for blurred vision and double vision. Respiratory: See HPI   Cardiovascular: See HPI  Gastrointestinal: Negative for abdominal pain, heartburn, nausea and vomiting. Musculoskeletal: Negative for myalgias. Skin: Negative for rash. Neurological: Negative for dizziness, weakness and headaches. Endo/Heme/Allergies: Does not bruise/bleed easily. Objective: There were no vitals taken for this visit.    General: alert, cooperative, no distress   Mental  status: normal mood, behavior, speech, dress, motor activity, and thought processes, able to follow commands   HENT: NCAT   Neck: no visualized mass,No JVD   Resp: no respiratory distress   Neuro: no gross deficits   Skin: no discoloration or Bruise on visible areas   Psychiatric: normal affect, consistent with stated mood, no evidence of hallucinations     Additional exam findings:     Extremities:No edema     Pertinent LAB and reports  I have reviewed available  pertinent notes, labs and reports and included in current evaluation and management of this patient. Assessment & Plan:   Diagnoses and all orders for this visit:    1. Coronary-myocardial bridge  Comments:  Recent increasing chest pain possible angina    2. Essential hypertension  Comments:  Continue treatment monitor    3. Hypercholesterolemia  Comments:  Currently not on medication monitor follow-up with PCP    4. Chest pain, unspecified type  Comments:  Possible angina. Chest wall. GERD  Orders:  -     ECHO ADULT COMPLETE; Future  -     NUCLEAR CARDIAC STRESS TEST; Future    5. Shortness of breath  Comments:  Exertional getting worse rule out CHF, valvular disease, ischemia  Orders:  -     ECHO ADULT COMPLETE; Future  -     NUCLEAR CARDIAC STRESS TEST; Future                712  We discussed the expected course, resolution and complications of the diagnosis(es) in detail. Medication risks, benefits, costs, interactions, and alternatives were discussed as indicated. I advised him to contact the office if his condition worsens, changes or fails to improve as anticipated. He expressed understanding with the diagnosis(es) and plan. Kareem Bhatti is a 69 y.o. male being evaluated by a video visit encounter for concerns as above. A caregiver was present when appropriate. Due to this being a TeleHealth encounter (During VPCVX-78 public health emergency), evaluation of the following organ systems was limited: Vitals/Constitutional/EENT/Resp/CV/GI//MS/Neuro/Skin/Heme-Lymph-Imm.   Pursuant to the emergency declaration under the [facility] waiver authority and the [employer] Act, this Virtual  Visit was conducted, with patient's (and/or legal guardian's) consent, to reduce the patient's risk of exposure to COVID-19 and provide necessary medical care. Services were provided through a video synchronous discussion virtually to substitute for in-person clinic visit. I was in the office while conducting this encounter.         Sandra Woods MD

## 2020-06-08 NOTE — PROGRESS NOTES
1. Have you been to the ER, urgent care clinic since your last visit? Hospitalized since your last visit? No    2. Have you seen or consulted any other health care providers outside of the [address] [place] [address] since your last visit? Include any pap smears or colon screening.  Yes Where: PCP abdominal pain

## 2020-06-15 ENCOUNTER — VIRTUAL VISIT (OUTPATIENT)
Dept: ORTHOPEDIC SURGERY | Age: 75
End: 2020-06-15

## 2020-06-15 DIAGNOSIS — M25.512 ACUTE PAIN OF LEFT SHOULDER: ICD-10-CM

## 2020-06-15 DIAGNOSIS — M19.072 PRIMARY OSTEOARTHRITIS OF LEFT FOOT: Primary | ICD-10-CM

## 2020-06-15 NOTE — PROGRESS NOTES
Patient: Chary Brown             MRN: [id number]     SSN: [id number] There is no height or weight on file to calculate BMI. YOB: 1945     AGE: 69 y.o. EX: male PCP: Adam Braden MD 
  
 
  
Chary Brown is a 69 y.o. male who was seen by VIRTUAL synchronous (real-time) audio-video technology on 6/15/2020. ([email or web address]). The location of this virtual encounter was performed at : [address] [place] [address],[address]. [facility] OFFICE, Patient Location is at their home CPT Codes 13130-10650 for Established Patients may apply to this Telehealth Visit Time-based coding, delete if not needed: I spent at least 10 minutes with this established patient, and >50% of the time was spent counseling and/or coordinating care regarding Virtual time was[de-identified]  8:20AM to 8:30 AM. ASSESSMENT AND PLAN  
 
  ICD-10-CM ICD-9-CM 1. Primary osteoarthritis of left foot M19.072 715.17 REFERRAL FOR ORTHOTICS Orders placed this encounter:  
Orders Placed This Encounter  REFERRAL FOR ORTHOTICS Referral Priority:   Routine Referral Type:   Consultation Referral Reason:   Specialty Services Required Requested Specialty:   Orthotics Number of Visits Requested:   1 Plan 1. Continue activities as tolerated Dee Hood MD has ordered a custom brace or DME product for Chary Brown . This will be customized and made for you by an outside facility. I am requesting that you contact the Orthotist company provided below in order to have the prescription made. [facility] [employer] [facility] Chary Brown is in need of CUSTOM   Left 1. Unilateral,  : SMO Left 2. GOAL OF TREATMENT TO: to provide M/L stability, optimal arch support, and limit ML/AP motion.  
  
Chary Brown needs tri planar control (Medial / Lateral stability, optimal arch support, and limited Medial/Lateral // Anterior/Posterior Motion) of the foot and ankle in order to improve anatomical alignment, protect/control motion, and to relieve pain. 3. Susana Argueta has tried other orthopaedic devices (you have already tried a cam boot, you have already tried another trilaminar type insert) and each of these has been either ill fitting, poorly tolerated, provided incomplete support, provided insufficient  pain relief. Please look favorably upon Susana Argueta and please assist in covering the financial expense of the custom DME. John Curiel MD 
6/15/2020 
10:33 AM 
   
  
 
 SUBJECTIVE Susana Argueta was seen for No chief complaint on file. Since my last OV with Susana Argueta, he states that he is doing better overall, is less pain/discomfort. Susana Argueta has been seen for : above Dx is listed above He still having some discomfort in his feet. He was unable to get the extra-depth new balance shoes at [facility]. He has been smart and limiting the time duration of the standing. At times he does use alcohol rub to his feet, this is soothing for him. He is allergic to aspirin can take Aspercreme. Not taking any medications at this current time for his osteoarthritis of his bilateral feet. Otherwise no new changes with him which is maintaining, he is maintaining, and he is conducting activities as tolerated. We will see him in June 2020, for any worsening with baseline per his request we will see him sooner. Susana Argueta is not taking medications for pain relief[de-identified]    None at this time Denies: CP, SOB, ABD PAIN, Calf pain CHART REVIEW Current Outpatient Medications Medication Sig  
 finasteride (PROSCAR) 5 mg tablet Take 5 mg by mouth daily.  albuterol sulfate 90 mcg/actuation aebs Take  by inhalation.  diclofenac (Voltaren) 1 % gel Apply 4 g to affected area two (2) times a day.   
 gabapentin (NEURONTIN) 300 mg capsule Take 2 Caps by mouth four (4) times daily.  DULoxetine (CYMBALTA) 60 mg capsule Take 1 Cap by mouth daily. Indications: neuropathic pain  ammonium lactate (AMLACTIN) 12 % topical cream Apply  to affected area two (2) times a day. rub in to affected area well  tamsulosin (FLOMAX) 0.4 mg capsule Take 1 Cap by mouth daily (after dinner).  furosemide (LASIX) 20 mg tablet Take 20 mg by mouth daily.  pantoprazole (PROTONIX) 40 mg tablet Take 1 Tab by mouth daily.  polyethylene glycol (MIRALAX) 17 gram packet Take 1 Packet by mouth daily.  amLODIPine (NORVASC) 10 mg tablet Take 1 Tab by mouth daily.  spironolactone (ALDACTONE) 25 mg tablet Take  by mouth daily. No current facility-administered medications for this visit. THE  FOR Marylene Aldo, MD 6/15/2020 . Social History Occupational History  Not on file Tobacco Use  Smoking status: Former Smoker Last attempt to quit: 2015 Years since quittin.9  Smokeless tobacco: Never Used Substance and Sexual Activity  Alcohol use: Not Currently Alcohol/week: 0.0 standard drinks Comment: former stopped   Drug use: No  
 Sexual activity: Yes Prior to Admission medications Medication Sig Start Date End Date Taking? Authorizing Provider  
finasteride (PROSCAR) 5 mg tablet Take 5 mg by mouth daily. Provider, Historical  
albuterol sulfate 90 mcg/actuation aebs Take  by inhalation. Provider, Historical  
diclofenac (Voltaren) 1 % gel Apply 4 g to affected area two (2) times a day. 3/16/20   Carlos Duday, MD  
gabapentin (NEURONTIN) 300 mg capsule Take 2 Caps by mouth four (4) times daily. 3/9/20   Lauren Madrid MD  
DULoxetine (CYMBALTA) 60 mg capsule Take 1 Cap by mouth daily.  Indications: neuropathic pain 3/9/20   Lauren Madrid MD  
ammonium lactate (AMLACTIN) 12 % topical cream Apply  to affected area two (2) times a day. rub in to affected area well 2/20/20   Clemente Andre PA-C  
tamsulosin (FLOMAX) 0.4 mg capsule Take 1 Cap by mouth daily (after dinner). 12/19/19   Marissa Sims MD  
furosemide (LASIX) 20 mg tablet Take 20 mg by mouth daily. 11/11/19   Provider, Historical  
pantoprazole (PROTONIX) 40 mg tablet Take 1 Tab by mouth daily. 3/28/19   Tabatha Juarez MD  
polyethylene glycol [facility]) 17 gram packet Take 1 Packet by mouth daily. 3/28/19   Tabatha Juarez MD  
amLODIPine (NORVASC) 10 mg tablet Take 1 Tab by mouth daily. 3/28/19   Tabatha Juarez MD  
spironolactone (ALDACTONE) 25 mg tablet Take  by mouth daily. Provider, Historical  
 
Allergies Allergen Reactions  Ampicillin Angioedema  Asa-Acetaminophen-Caff-Buffers Rash  Penicillins Angioedema  Aspirin Other (comments) Stomach upset  Atorvastatin Other (comments) Muscle cramps ROS Past Medical History:  
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  
 lumbar spine injury secondary to fall  
 HTN (hypertension)  Hypercholesterolemia  Illiterate  Injury of lumbar spine [address] [address] 1974  Leg pain, right  Nocturia  Overactive bladder  Peripheral vascular disease ([facility] [address] Past Surgical History:  
Procedure Laterality Date  COLONOSCOPY N/A 12/4/2019 COLONOSCOPY performed by Lencho Dorado MD at [facility] ENDOSCOPY  
 HX HEENT Left   
 lens implant  HX ORTHOPAEDIC    
 finger amputation left hand  HX TONSILLECTOMY Social History Socioeconomic History  Marital status: SINGLE Spouse name: Not on file  Number of children: Not on file  Years of education: Not on file  Highest education level: Not on file Occupational History  Not on file Social Needs  Financial resource strain: Not on file  Food insecurity Worry: Not on file Inability: Not on file  Transportation needs Medical: Not on file Non-medical: Not on file Tobacco Use  Smoking status: Former Smoker Last attempt to quit: 2015 Years since quittin.9  Smokeless tobacco: Never Used Substance and Sexual Activity  Alcohol use: Not Currently Alcohol/week: 0.0 standard drinks Comment: former stopped   Drug use: No  
 Sexual activity: Yes Lifestyle  Physical activity Days per week: Not on file Minutes per session: Not on file  Stress: Not on file Relationships  Social connections Talks on phone: Not on file Gets together: Not on file Attends [employer] service: Not on file Active member of club or organization: Not on file Attends meetings of clubs or organizations: Not on file Relationship status: Not on file  Intimate partner violence Fear of current or ex partner: Not on file Emotionally abused: Not on file Physically abused: Not on file Forced sexual activity: Not on file Other Topics Concern  Not on file Social History Narrative  Not on file Family History Problem Relation Age of Onset  Diabetes Mother  Hypertension Mother  Diabetes Maternal Grandmother  Hypertension Maternal Grandmother  Cancer Sister   
     brain  Cancer Sister   
     brain OBJECTIVE General: alert, cooperative, no distress Mental  status: mental status: alert, oriented to person, place, and time, normal mood, behavior, speech, dress, motor activity, and thought processes Resp: resp: no respiratory distress Neuro: neuro: no gross deficits Skin: skin:  WNL Live video viewing was used and showed:    
 
Due to this being a TeleHealth evaluation, many elements of the physical examination are unable to be assessed.   
 
We discussed the expected course, resolution and complications of the diagnosis(es) in detail. Medication risks, benefits, costs, interactions, and alternatives were discussed as indicated. I advised him to contact the office if his condition worsens, changes or fails to improve as anticipated. He expressed understanding with the diagnosis(es) and plan. Pursuant to the emergency declaration under the [address] [place] [address], [facility] waiver authority and the [employer] and [employer] Act, this Virtual  Visit was conducted, with patient's consent, to reduce the patient's risk of exposure to COVID-19 and provide continuity of care for an established patient. Services were provided through a video synchronous discussion virtually to substitute for in-person clinic visit.  
 
 
    
   
Susana Falcon MD 
6/15/2020 
8:20 AM

## 2020-06-15 NOTE — PROGRESS NOTES
Patient: Martin Jarquin             MRN: [id number]     SSN: [id number] There is no height or weight on file to calculate BMI. YOB: 1945     AGE: 69 y.o. EX: male    PCP: Lindy Mariscal MD     VIRTUAL VISIT      Martin Jarquin is a 69 y.o. male who was seen by virtual synchronous (real-time) audio-video technology on 6/15/2020. via  [employer]. This virtual Telehealth visit was performed while I was located at : [facility], and Martin Jarquin was located at his home at 12:15 PM, 6/15/2020. Services were provided as a substitute for in-person clinic visit. Virtual time  OV/Chart Review was[de-identified] 9:30 AM to 9:45 AM of 6/15/2020     Consent:@ and/or the patient's healthcare decision maker is aware that this patient-initiated Telehealth encounter is a billable service, with coverage as determined by their insurance carrier. The patient is aware that they may receive a bill and has provided verbal consent to proceed. Disclaimer: Sections of this note are dictated using utilizing voice recognition software, which may have resulted in some phonetic based errors in grammar and contents. Even though attempts were made to correct all the mistakes, some may have been missed, and remained in the body of the document. If questions arise, please contact our department. ASSESSMENT        ICD-10-CM ICD-9-CM    1. Primary osteoarthritis of left foot M19.072 715.17 AMB SUPPLY ORDER      CANCELED: REFERRAL FOR ORTHOTICS   2.  Acute pain of left shoulder M25.512 719.41 REFERRAL TO ORTHOPEDICS        Orders Placed This Encounter    Generic Supply Order     Custom Left SMO brace    REFERRAL TO ORTHOPEDICS     Referral Type:   Consultation     Referral Reason:   Specialty Services Required     Referred to Provider:   Aubrie Buitrago MD     Requested Specialty:   Orthopedic Surgery     Number of Visits Requested:   1         PLAN     Continue conservative care for his left foot. Modified orthotics. Refer to  [address] and spine for : shoulder experts, to the patient's bilateral shoulder complaints that he mentioned to me the first time today. CPT Codes 14182-45236 for Established Patients may apply to this Telehealth Visit  Time-based coding, delete if not needed: I spent at least 15 minutes with this established patient, and >50% of the time was spent counseling and/or coordinating care regarding      Due to this being a TeleHealth evaluation, many elements of the physical examination are unable to be assessed. We discussed the expected course, resolution and complications of the diagnosis(es) in detail. Medication risks, benefits, costs, interactions, and alternatives were discussed as indicated. I advised him to contact the office if his condition worsens, changes or fails to improve as anticipated. He expressed understanding with the diagnosis(es) and plan. Pursuant to the emergency declaration under the [address] [place] [address] waiver authority and the [employer] and [employer] Act, this Virtual  Visit was conducted, with patient's consent, to reduce the patient's risk of exposure to COVID-19 and provide continuity of care for an established patient. SUBJECTIVE      Susana Argueta was seen for No chief complaint on file. Since my last OV with Susana Argueta, he states that he is doing about the same overall, is same pain/discomfort, standing/walking short time duration or distances, standing/walking less time duration or distances. Susana Argueta has been seen for : above Dx is listed above    Susana Argueta is not taking medications for pain relief[de-identified]    None at this time. Susana Argueta Denies: CP, SOB, ABD PAIN, Calf pain. PORFIRIO TRIAGE QUESTIONNAIRE    Since your last office visit, have you had any:    1. New medical diagnoses No  2.  Changes in your medications No  3. Surgical procedures No  4. Changes in your Allergies to medication No  5. What is your pain level today 3/10  6. Changes in: PMH, SH, ROS: No          CHART REVIEW     Current Outpatient Medications   Medication Sig    finasteride (PROSCAR) 5 mg tablet Take 5 mg by mouth daily.  albuterol sulfate 90 mcg/actuation aebs Take  by inhalation.  diclofenac (Voltaren) 1 % gel Apply 4 g to affected area two (2) times a day.  gabapentin (NEURONTIN) 300 mg capsule Take 2 Caps by mouth four (4) times daily.  DULoxetine (CYMBALTA) 60 mg capsule Take 1 Cap by mouth daily. Indications: neuropathic pain    ammonium lactate (AMLACTIN) 12 % topical cream Apply  to affected area two (2) times a day. rub in to affected area well    tamsulosin (FLOMAX) 0.4 mg capsule Take 1 Cap by mouth daily (after dinner).  furosemide (LASIX) 20 mg tablet Take 20 mg by mouth daily.  pantoprazole (PROTONIX) 40 mg tablet Take 1 Tab by mouth daily.  polyethylene glycol (MIRALAX) 17 gram packet Take 1 Packet by mouth daily.  amLODIPine (NORVASC) 10 mg tablet Take 1 Tab by mouth daily.  spironolactone (ALDACTONE) 25 mg tablet Take  by mouth daily. No current facility-administered medications for this visit. THE  FOR Derik Resendiz MD 6/15/2020 . Social History     Occupational History    Not on file   Tobacco Use    Smoking status: Former Smoker     Last attempt to quit: 2015     Years since quittin.9    Smokeless tobacco: Never Used   Substance and Sexual Activity    Alcohol use: Not Currently     Alcohol/week: 0.0 standard drinks     Comment: former stopped     Drug use: No    Sexual activity: Yes        Prior to Admission medications    Medication Sig Start Date End Date Taking? Authorizing Provider   finasteride (PROSCAR) 5 mg tablet Take 5 mg by mouth daily.     Provider, Historical   albuterol sulfate 90 mcg/actuation aebs Take  by inhalation. Provider, Historical   diclofenac (Voltaren) 1 % gel Apply 4 g to affected area two (2) times a day. 3/16/20   Robert Marin MD   gabapentin (NEURONTIN) 300 mg capsule Take 2 Caps by mouth four (4) times daily. 3/9/20   Treva Gaytan MD   DULoxetine (CYMBALTA) 60 mg capsule Take 1 Cap by mouth daily. Indications: neuropathic pain 3/9/20   Treva Gaytan MD   ammonium lactate (AMLACTIN) 12 % topical cream Apply  to affected area two (2) times a day. rub in to affected area well 2/20/20   Clearrobyn Nelson PA-C   tamsulosin (FLOMAX) 0.4 mg capsule Take 1 Cap by mouth daily (after dinner). 12/19/19   Alfonso Sims MD   furosemide (LASIX) 20 mg tablet Take 20 mg by mouth daily. 11/11/19   Provider, Historical   pantoprazole (PROTONIX) 40 mg tablet Take 1 Tab by mouth daily. 3/28/19   Del Desouza MD   polyethylene glycol [facility]) 17 gram packet Take 1 Packet by mouth daily. 3/28/19   Del Desouza MD   amLODIPine (NORVASC) 10 mg tablet Take 1 Tab by mouth daily. 3/28/19   Del Desouza MD   spironolactone (ALDACTONE) 25 mg tablet Take  by mouth daily.     Provider, Historical     Allergies   Allergen Reactions    Ampicillin Angioedema    Asa-Acetaminophen-Caff-Buffers Rash    Penicillins Angioedema    Aspirin Other (comments)     Stomach upset    Atorvastatin Other (comments)     Muscle cramps        ROS    Past Medical History:   Diagnosis Date    Bladder outlet obstruction     Erectile disorder due to medical 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     Frequency of urination     H/O spinal cord injury 1974    lumbar spine injury secondary to fall    HTN (hypertension)     Hypercholesterolemia     Illiterate     Injury of lumbar spine ([address] 1974    Leg pain, right     Nocturia     Overactive bladder     Peripheral vascular disease ([address]      Past Surgical History:   Procedure Laterality Date    COLONOSCOPY N/A 12/4/2019    COLON[facility]COPY performed by El[facility] Gregg MD at [facility]COPY    HX HEENT Left     lens implant    HX ORTHOPAEDIC      finger amputation left hand    HX TONSILLECTOMY       Social History     Socioeconomic History    Marital status: SINGLE     Spouse name: Not on file    Number of children: Not on file    Years of education: Not on file    Highest education level: Not on file   Occupational History    Not on file   Social Needs    Financial resource strain: Not on file    Food insecurity     Worry: Not on file     Inability: Not on file    Transportation needs     Medical: Not on file     Non-medical: Not on file   Tobacco Use    Smoking status: Former Smoker     Last attempt to quit: 2015     Years since quittin.9    Smokeless tobacco: Never Used   Substance and Sexual Activity    Alcohol use: Not Currently     Alcohol/week: 0.0 standard drinks     Comment: former stopped     Drug use: No    Sexual activity: Yes   Lifestyle    Physical activity     Days per week: Not on file     Minutes per session: Not on file    Stress: Not on file   Relationships    Social connections     Talks on phone: Not on file     Gets together: Not on file     Attends [employer] service: Not on file     Active member of club or organization: Not on file     Attends meetings of clubs or organizations: Not on file     Relationship status: Not on file    Intimate partner violence     Fear of current or ex partner: Not on file     Emotionally abused: Not on file     Physically abused: Not on file     Forced sexual activity: Not on file   Other Topics Concern    Not on file   Social History Narrative    Not on file     Family History   Problem Relation Age of Onset    Diabetes Mother     Hypertension Mother     Diabetes Maternal Grandmother     Hypertension Maternal Grandmother     Cancer Sister         brain    Cancer Sister         brain          OBJECTIVE     General: alert, cooperative, no distress   Mental  status: mental status: alert, oriented to person, place, and time, normal mood, behavior, speech, dress, motor activity, and thought processes   Resp:  no respiratory distress   Neuro: Neuro: see above   Skin: skin: Not assessed today, his ankle.   Live video viewing was used and showed:  Not assessed today              Teodora Aguilera MD  6/15/2020  12:15 PM

## 2020-06-16 ENCOUNTER — VIRTUAL VISIT (OUTPATIENT)
Dept: ONCOLOGY | Age: 75
End: 2020-06-16

## 2020-06-16 DIAGNOSIS — D47.2 MGUS (MONOCLONAL GAMMOPATHY OF UNKNOWN SIGNIFICANCE): Primary | ICD-10-CM

## 2020-06-16 NOTE — PROGRESS NOTES
Garcia Hawley is a 69 y.o. male evaluated via audio only technology on 6/16/2020. Consent: He and/or his health care decision maker is aware that he may receive a bill for this audio only encounter, depending on his insurance coverage, and has provided verbal consent to proceed: Yes    Attending: Dr. Gisela Adhikari:   1. MGUS (monoclonal gammopathy of unknown significance)  *03/17/2020 his CBC showed a WBC count of 4.3K/uL, hemoglobin was 13.5g/dL, hematocrit 40.9%, and the platelet count was 184,379. *Kidney function showed a BUN of 15mg/dL and a creatinine of 1.01mg/dL. *SPEP showed an M-Guilherme of 0.6g/dL. *Will continue to monitor    Subjective:   Garcia Hawley is a 69 y.o. male who was evaluated via audio only technology. I communicated with the patient and/or health care decision maker about the ongoing management of his MGUS. He states he has been doing well since he was last seen int he office. I informed the patient that at his last clinic visit on 03/17/2020 his CBC showed a WBC count of 4.3K/uL, hemoglobin was 13.5g/dL, hematocrit 40.9%, and the platelet count was 309,213. His kidney function showed a BUN of 15mg/dL and a creatinine of 1.01mg/dL. His SPEP showed an M-Guilherme of 0.6g/dL. He denies shortness of breath, weakness, and fatigue. He denies fevers, infections, and weight loss. He denies pain or any discomfort. He has no new complaints or concerns to report. We will schedule his lab tests 1 week prior to his next follow up appointment. The patient had his questions answered to his satisfaction. Follow up in 4 months or sooner if indicated. Prior to Admission medications    Medication Sig Start Date End Date Taking? Authorizing Provider   finasteride (PROSCAR) 5 mg tablet Take 5 mg by mouth daily. Provider, Historical   albuterol sulfate 90 mcg/actuation aebs Take  by inhalation.     Provider, Historical   diclofenac (Voltaren) 1 % gel Apply 4 g to affected area two (2) times a day. 3/16/20   Marichuy Velarde MD   gabapentin (NEURONTIN) 300 mg capsule Take 2 Caps by mouth four (4) times daily. 3/9/20   Debby Verduzco MD   DULoxetine (CYMBALTA) 60 mg capsule Take 1 Cap by mouth daily. Indications: neuropathic pain 3/9/20   Debby Verduzco MD   ammonium lactate (AMLACTIN) 12 % topical cream Apply  to affected area two (2) times a day. rub in to affected area well 2/20/20   Duane Rutledge PA-C   tamsulosin (FLOMAX) 0.4 mg capsule Take 1 Cap by mouth daily (after dinner). 12/19/19   Radha Sims MD   furosemide (LASIX) 20 mg tablet Take 20 mg by mouth daily. 11/11/19   Provider, Historical   pantoprazole (PROTONIX) 40 mg tablet Take 1 Tab by mouth daily. 3/28/19   Ellena Paget, MD   polyethylene glycol [facility]) 17 gram packet Take 1 Packet by mouth daily. 3/28/19   Ellena Paget, MD   amLODIPine (NORVASC) 10 mg tablet Take 1 Tab by mouth daily. 3/28/19   Ellena Paget, MD   spironolactone (ALDACTONE) 25 mg tablet Take  by mouth daily.     Provider, Historical     Allergies   Allergen Reactions    Ampicillin Angioedema    Asa-Acetaminophen-Caff-Buffers Rash    Penicillins Angioedema    Aspirin Other (comments)     Stomach upset    Atorvastatin Other (comments)     Muscle cramps       Lab Results   Component Value Date/Time    WBC 4.3 (L) 03/17/2020 11:30 AM    HGB 13.5 03/17/2020 11:30 AM    HCT 40.9 03/17/2020 11:30 AM    PLATELET 942 45/37/3922 11:30 AM    MCV 89.9 03/17/2020 11:30 AM     Lab Results   Component Value Date/Time    Sodium 136 03/17/2020 11:30 AM    Potassium 4.2 03/17/2020 11:30 AM    Chloride 109 03/17/2020 11:30 AM    CO2 26 03/17/2020 11:30 AM    Anion gap 1 (L) 03/17/2020 11:30 AM    Glucose 94 03/17/2020 11:30 AM    BUN 15 03/17/2020 11:30 AM    Creatinine 1.01 03/17/2020 11:30 AM    BUN/Creatinine ratio 15 03/17/2020 11:30 AM    GFR est AA >60 03/17/2020 11:30 AM    GFR est non-AA >60 03/17/2020 11:30 AM    Calcium 9.0 03/17/2020 11:30 AM    Bilirubin, total 0.3 03/17/2020 11:30 AM    Alk. phosphatase 77 03/17/2020 11:30 AM    Protein, total 7.1 03/17/2020 11:30 AM    Protein, total 7.5 03/17/2020 11:30 AM    Albumin 3.7 03/17/2020 11:30 AM    Globulin 3.8 03/17/2020 11:30 AM    A-G Ratio 1.0 03/17/2020 11:30 AM    ALT (SGPT) 23 03/17/2020 11:30 AM       I affirm this is a Patient-Initiated Episode with a Patient who has not had a related appointment within my department in the past 7 days or scheduled within the next 24 hours.     Total Time: minutes: 11-20 minutes   Follow up with Dr. Rosanna oHward in 4 months or sooner if indicated      Orders Placed This Encounter    CBC WITH AUTOMATED DIFF     Standing Status:   Future     Standing Expiration Date:   2/19/7247    METABOLIC PANEL, COMPREHENSIVE     Standing Status:   Future     Standing Expiration Date:   6/17/2021    SPEP     Standing Status:   Future     Standing Expiration Date:   6/17/2021       Andres Kawasaki, [facility]  06/16/2020

## 2020-06-18 ENCOUNTER — TELEPHONE (OUTPATIENT)
Dept: CARDIOLOGY CLINIC | Age: 75
End: 2020-06-18

## 2020-06-18 NOTE — TELEPHONE ENCOUNTER
Unable to get a cardiologist on [place]. Not sure if I will get approval for this procedure.   Tell patient that if he continues to have chest pain he should go to emergency room for further evaluation

## 2020-06-18 NOTE — TELEPHONE ENCOUNTER
Please call Bev @ [phone] option [phone] for authorization for Echocardiogram and Nuclear Stress test

## 2020-06-18 NOTE — TELEPHONE ENCOUNTER
Called and spoke with daughter, advised of insurance denial, advised of patient continues to have CP go to ER and get work up done there. Daughter states understanding.

## 2020-07-07 ENCOUNTER — HOSPITAL ENCOUNTER (OUTPATIENT)
Dept: LAB | Age: 75
Discharge: HOME | End: 2020-07-07
Payer: MEDICAID

## 2020-07-07 DIAGNOSIS — E78.2 MIXED HYPERLIPIDEMIA: ICD-10-CM

## 2020-07-07 LAB
CHOLEST SERPL-MCNC: 230 MG/DL
HDLC SERPL-MCNC: 48 MG/DL (ref 40–60)
HDLC SERPL: 4.8 {RATIO} (ref 0–5)
LDLC SERPL CALC-MCNC: 158.8 MG/DL (ref 0–100)
LIPID PROFILE,FLP: ABNORMAL
TRIGL SERPL-MCNC: 116 MG/DL (ref ?–150)
VLDLC SERPL CALC-MCNC: 23.2 MG/DL

## 2020-07-07 PROCEDURE — 80061 LIPID PANEL: CPT

## 2020-07-07 PROCEDURE — 36415 COLL VENOUS BLD VENIPUNCTURE: CPT

## 2020-07-14 ENCOUNTER — OFFICE VISIT (OUTPATIENT)
Dept: ORTHOPEDIC SURGERY | Facility: CLINIC | Age: 75
End: 2020-07-14

## 2020-07-14 VITALS
HEART RATE: 97 BPM | TEMPERATURE: 99.8 F | BODY MASS INDEX: 26.01 KG/M2 | RESPIRATION RATE: 15 BRPM | DIASTOLIC BLOOD PRESSURE: 70 MMHG | HEIGHT: 68 IN | WEIGHT: 171.6 LBS | SYSTOLIC BLOOD PRESSURE: 119 MMHG

## 2020-07-14 DIAGNOSIS — G89.29 CHRONIC PAIN OF BOTH SHOULDERS: ICD-10-CM

## 2020-07-14 DIAGNOSIS — M12.811 ROTATOR CUFF ARTHROPATHY OF BOTH SHOULDERS: Primary | ICD-10-CM

## 2020-07-14 DIAGNOSIS — M25.512 CHRONIC PAIN OF BOTH SHOULDERS: ICD-10-CM

## 2020-07-14 DIAGNOSIS — M12.812 ROTATOR CUFF ARTHROPATHY OF BOTH SHOULDERS: Primary | ICD-10-CM

## 2020-07-14 DIAGNOSIS — M25.511 CHRONIC PAIN OF BOTH SHOULDERS: ICD-10-CM

## 2020-07-14 RX ORDER — TRIAMCINOLONE ACETONIDE 40 MG/ML
40 INJECTION, SUSPENSION INTRA-ARTICULAR; INTRAMUSCULAR ONCE
Qty: 1 VIAL | Refills: 0
Start: 2020-07-14 | End: 2020-07-14

## 2020-07-14 NOTE — PROGRESS NOTES
Patient: Raphael Calderon                MRN: [id number]       SSN: [id number]  YOB: 1945        AGE: 69 y.o. SEX: male  Body mass index is 26.48 kg/m². PCP: Leandro Crane MD  07/14/20    CHIEF COMPLAINT: Bilateral shoulder pain    HPI: Raphael Calderon is a 69 y.o. male patient who presents today with several years of worsening bilateral shoulder pain. The pain began a number of years ago when he fell off a stepladder and injured both shoulders. He said he felt immense pain at that time. He continues to have pain especially with overhead activities and trying to lift objects over his head. No complaints of numbness from the neck. Also complains of left arm ulnar nerve symptoms. Past Medical History:   Diagnosis Date    Bladder outlet obstruction     Erectile disorder due to medical 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     Frequency of urination     H/O spinal cord injury 1974    lumbar spine injury secondary to fall    HTN (hypertension)     Hypercholesterolemia     Illiterate     Injury of lumbar spine ([facility] [address] 1974    Leg pain, right     Nocturia     Overactive bladder     Peripheral vascular disease ([facility] [address]        Family History   Problem Relation Age of Onset    Diabetes Mother     Hypertension Mother     Diabetes Maternal Grandmother     Hypertension Maternal Grandmother     Cancer Sister         brain    Cancer Sister         brain       Current Outpatient Medications   Medication Sig Dispense Refill    triamcinolone acetonide (KENALOG-40) 40 mg/mL injection 1 mL by Intra artICUlar route once for 1 dose. 1 Vial 0    finasteride (PROSCAR) 5 mg tablet Take 5 mg by mouth daily.  albuterol sulfate 90 mcg/actuation aebs Take  by inhalation.  diclofenac (Voltaren) 1 % gel Apply 4 g to affected area two (2) times a day. 100 g 0    gabapentin (NEURONTIN) 300 mg capsule Take 2 Caps by mouth four (4) times daily.  240 Cap 4    DULoxetine (CYMBALTA) 60 mg capsule Take 1 Cap by mouth daily. Indications: neuropathic pain 60 Cap 5    ammonium lactate (AMLACTIN) 12 % topical cream Apply  to affected area two (2) times a day. rub in to affected area well 280 g 0    tamsulosin (FLOMAX) 0.4 mg capsule Take 1 Cap by mouth daily (after dinner). 90 Cap 3    furosemide (LASIX) 20 mg tablet Take 20 mg by mouth daily. 1    pantoprazole (PROTONIX) 40 mg tablet Take 1 Tab by mouth daily. 30 Tab 0    polyethylene glycol (MIRALAX) 17 gram packet Take 1 Packet by mouth daily. 30 Packet 0    amLODIPine (NORVASC) 10 mg tablet Take 1 Tab by mouth daily. 30 Tab 0    spironolactone (ALDACTONE) 25 mg tablet Take  by mouth daily.          Allergies   Allergen Reactions    Ampicillin Angioedema    Asa-Acetaminophen-Caff-Buffers Rash    Penicillins Angioedema    Aspirin Other (comments)     Stomach upset    Atorvastatin Other (comments)     Muscle cramps       Past Surgical History:   Procedure Laterality Date    COLONOSCOPY N/A 2019    COLONOSCOPY performed by Barbara Mcnally MD at [facility] ENDOSCOPY    HX HEENT Left     lens implant    HX ORTHOPAEDIC      finger amputation left hand    HX TONSILLECTOMY         Social History     Socioeconomic History    Marital status: SINGLE     Spouse name: Not on file    Number of children: Not on file    Years of education: Not on file    Highest education level: Not on file   Occupational History    Not on file   Social Needs    Financial resource strain: Not on file    Food insecurity     Worry: Not on file     Inability: Not on file    Transportation needs     Medical: Not on file     Non-medical: Not on file   Tobacco Use    Smoking status: Former Smoker     Last attempt to quit: 2015     Years since quittin.0    Smokeless tobacco: Never Used   Substance and Sexual Activity    Alcohol use: Not Currently     Alcohol/week: 0.0 standard drinks     Comment: former stopped     Drug use: No    Sexual activity: Yes Lifestyle    Physical activity     Days per week: Not on file     Minutes per session: Not on file    Stress: Not on file   Relationships    Social connections     Talks on phone: Not on file     Gets together: Not on file     Attends Latter-day service: Not on file     Active member of club or organization: Not on file     Attends meetings of clubs or organizations: Not on file     Relationship status: Not on file    Intimate partner violence     Fear of current or ex partner: Not on file     Emotionally abused: Not on file     Physically abused: Not on file     Forced sexual activity: Not on file   Other Topics Concern    Not on file   Social History Narrative    Not on file       REVIEW OF SYSTEMS:      CON: negative for recent weight loss/gain, fever, or chills  EYE: negative for double or blurry vision  ENT: negative for hoarseness  RS:   negative for cough, URI, SOB  CV:  negative for chest pain, palpitations  GI:    negative for blood in stool, nausea/vomiting  :  negative for blood in urine  MS: As per HPI  Other systems reviewed and noted below. PHYSICAL EXAMINATION:  Visit Vitals  /70   Pulse 97   Temp 99.8 °F (37.7 °C)   Resp 15   Ht 5' 7.5\" (1.715 m)   Wt 171 lb 9.6 oz (77.8 kg)   BMI 26.48 kg/m²     Body mass index is 26.48 kg/m². GENERAL: Alert and oriented x3, in no acute distress, well-developed, well-nourished. HEENT: Normocephalic, atraumatic. RESP: Non labored breathing with equal chest rise on inspiration. CV: Well perfused extremities. No cyanosis or clubbing noted. ABDOMEN: Soft, non-tender, non-distended.    Shoulder Examination     R   L  ROM   FF  Full   Full  ER  Full   Full   IR  Full   Full  Rotator Cuff Pain   Supra  +   +   Infra  +   +   Subscap +   +  Crepitus  +   +  Effusion  -   -  Warmth  -   -   Erythema  -   -  Instability  -   -  AC Joint TTP  -   -  Clavicle   Deformity -   -   TTP  -   -  Proximal Humerus   Deformity -   -   TTP  -   -  Deltoid Strength 5   5  Biceps Strength 5   5  Biceps Deformity -   -  Biceps Groove Pain -   -  Impingement Sign -   -     Elbow Exam   R   L  ROM Active      Flexion   Full   Full   Extension  Full   Full   Pronation  Full   Full   Supination  Full   Full  ROM  Passive   Flexion   Full   Full   Extension  Full   Full   Pronation  Full   Full   Supination  Full   Full  Tenderness to palpation   Medial Epicondyle -   -   Lateral Epicondyle -   -  Tinel Sign Cubital Tunnel -   +  Ulnar Nerve Subluxation -   -  Distal Biceps Abnormality -   -  Triceps Abnormality  -   -  Other tenderness  -   -  Other Deformity  -   -  Olecranon Bursitis  -   -  Warmth   -   -  Erythema   -   -  Additional Findings: None      IMAGING:  X rays of both shoulders taken today show rotator cuff arthropathy    ASSESSMENT & PLAN  Diagnosis: Bilateral rotator cuff arthropathy    I had a lengthy discussion today with Lisa Proctor regarding his shoulders. We did discuss possible surgery which would be a reverse shoulder replacement. His range of motion is not bad, but his strength and pain are his main issues. We will try a steroid shot to see if that helps his pain. Follow up care discussed.     [facility]  OFFICE PROCEDURE PROGRESS NOTE        Chart reviewed for the following:   I, Ok Santos MD, have reviewed the History, Physical and updated the Allergic reactions for [address] [place] [address] 34 performed immediately prior to start of procedure:   Franklin Matias MD, have performed the following reviews on Reather Mellow prior to the start of the procedure:            * Patient was identified by name and date of birth   * Agreement on procedure being performed was verified  * Risks and Benefits explained to the patient  * Procedure site verified and marked as necessary  * Patient was positioned for comfort  * Consent was signed and verified    Time: 2:31 PM    Location: Bilateral shoulders    Kenalog 40mg & 3cc Lidocaine    Date of procedure: 7/14/2020    Procedure performed by:  Lynsey Samuel MD    Provider assisted by: Melani Estrada LPN    Patient assisted by: self    How tolerated by patient: tolerated the procedure well with no complications    Post Procedural Pain Scale: 0 - No Hurt    Comments: none                  Electronically signed by: Lynsey Samuel MD

## 2020-09-11 ENCOUNTER — OFFICE VISIT (OUTPATIENT)
Dept: NEUROLOGY | Age: 75
End: 2020-09-11

## 2020-09-11 VITALS
HEIGHT: 67 IN | OXYGEN SATURATION: 97 % | HEART RATE: 67 BPM | BODY MASS INDEX: 26.3 KG/M2 | TEMPERATURE: 97.8 F | WEIGHT: 167.6 LBS | SYSTOLIC BLOOD PRESSURE: 110 MMHG | DIASTOLIC BLOOD PRESSURE: 70 MMHG | RESPIRATION RATE: 16 BRPM

## 2020-09-11 DIAGNOSIS — G89.29 CHRONIC MIDLINE LOW BACK PAIN, UNSPECIFIED WHETHER SCIATICA PRESENT: ICD-10-CM

## 2020-09-11 DIAGNOSIS — M54.50 CHRONIC MIDLINE LOW BACK PAIN, UNSPECIFIED WHETHER SCIATICA PRESENT: ICD-10-CM

## 2020-09-11 DIAGNOSIS — M54.16 RIGHT LUMBAR RADICULOPATHY: ICD-10-CM

## 2020-09-11 DIAGNOSIS — M79.604 RIGHT LEG PAIN: Primary | ICD-10-CM

## 2020-09-11 RX ORDER — DULOXETIN HYDROCHLORIDE 60 MG/1
60 CAPSULE, DELAYED RELEASE ORAL DAILY
Qty: 60 CAP | Refills: 5 | Status: SHIPPED | OUTPATIENT
Start: 2020-09-11

## 2020-09-11 NOTE — PROGRESS NOTES
Ashley Mota is a 69 y.o. male . presents for Follow-up      A 69years old male patient here for follow-up of left lower extremity pain of more than 40 years duration. Used to follow with Dr. Ab Long. Last seen March 2020. He had an order for gabapentin and Loxitane. He is not currently taking both medications continuously. He takes the gabapentin on as needed basis for severe pain. Still has intermittent burning sensation over the right leg: Starts from the foot and will progressively go up. Sometimes is a throbbing pain. Denied having any numbness. Pain does not wake him up from sleep. Also low back pain which radiates to the left lower extremity. No current spine follow-up. Had a fall about 2 weeks ago: He tripped when he was going up stairs. He claims that he could not see well on the left side after his eye surgery 1 and half years ago. Also has left hand burning sensation in the middle fingers and medial forearm. Review of Systems   Constitutional: Positive for chills and weight loss. Negative for fever. HENT: Positive for hearing loss (mostly on the left) and tinnitus (left side). Eyes: Positive for blurred vision (left side blurry). Negative for double vision. Respiratory: Positive for shortness of breath (when walking). Negative for cough. Cardiovascular: Positive for chest pain (bilateral lower chest) and leg swelling (feet sometimes). Gastrointestinal: Negative for nausea and vomiting. Genitourinary: Positive for dysuria, frequency (used to follow with urology) and urgency. Musculoskeletal: Positive for back pain and joint pain. Skin: Negative for itching and rash. Neurological: Positive for dizziness (sometimes feels lightheaded when lying down and come up too fast), tingling, sensory change and focal weakness (hands). Negative for headaches. Endo/Heme/Allergies: Bruises/bleeds easily.        Past Medical History:   Diagnosis Date    Bladder outlet obstruction  Erectile disorder due to medical 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     Frequency of urination     H/O spinal cord injury     lumbar spine injury secondary to fall    HTN (hypertension)     Hypercholesterolemia     Illiterate     Injury of lumbar spine ([address]     Leg pain, right     Nocturia     Overactive bladder     Peripheral vascular disease (H[facility])        Past Surgical History:   Procedure Laterality Date    COLONOSCOPY N/A 2019    COL[facility] El[facility]ell [facility] at [facility] ENDOSCOPY    HX HEENT Left     lens implant    HX ORTHOPAEDIC      finger amputation left hand    HX TONSILLECTOMY          Family History   Problem Relation Age of Onset    Diabetes Mother     Hypertension Mother     Diabetes Maternal Grandmother     Hypertension Maternal Grandmother     Cancer Sister         brain    Cancer Sister         brain        Social History     Socioeconomic History    Marital status: SINGLE     Spouse name: Not on file    Number of children: Not on file    Years of education: Not on file    Highest education level: Not on file   Occupational History    Not on file   Social Needs    Financial resource strain: Not on file    Food insecurity     Worry: Not on file     Inability: Not on file    Transportation needs     Medical: Not on file     Non-medical: Not on file   Tobacco Use    Smoking status: Former Smoker     Last attempt to quit: 2015     Years since quittin.1    Smokeless tobacco: Never Used   Substance and Sexual Activity    Alcohol use: Not Currently     Alcohol/week: 0.0 standard drinks     Comment: former stopped     Drug use: No    Sexual activity: Yes   Lifestyle    Physical activity     Days per week: Not on file     Minutes per session: Not on file    Stress: Not on file   Relationships    Social connections     Talks on phone: Not on file     Gets together: Not on file     Attends Jehovah's witness service: Not on file     Active member of club or organization: Not on file     Attends meetings of clubs or organizations: Not on file     Relationship status: Not on file    Intimate partner violence     Fear of current or ex partner: Not on file     Emotionally abused: Not on file     Physically abused: Not on file     Forced sexual activity: Not on file   Other Topics Concern    Not on file   Social History Narrative    Not on file        Allergies   Allergen Reactions    Ampicillin Angioedema    Asa-Acetaminophen-Caff-Buffers Rash    Penicillins Angioedema    Aspirin Other (comments)     Stomach upset    Atorvastatin Other (comments)     Muscle cramps         Current Outpatient Medications   Medication Sig Dispense Refill    DULoxetine (CYMBALTA) 60 mg capsule Take 1 Cap by mouth daily. Indications: neuropathic pain 60 Cap 5    finasteride (PROSCAR) 5 mg tablet Take 5 mg by mouth daily.  albuterol sulfate 90 mcg/actuation aebs Take  by inhalation.  diclofenac (Voltaren) 1 % gel Apply 4 g to affected area two (2) times a day. 100 g 0    gabapentin (NEURONTIN) 300 mg capsule Take 2 Caps by mouth four (4) times daily. 240 Cap 4    tamsulosin (FLOMAX) 0.4 mg capsule Take 1 Cap by mouth daily (after dinner). 90 Cap 3    pantoprazole (PROTONIX) 40 mg tablet Take 1 Tab by mouth daily. 30 Tab 0    polyethylene glycol (MIRALAX) 17 gram packet Take 1 Packet by mouth daily. 30 Packet 0    amLODIPine (NORVASC) 10 mg tablet Take 1 Tab by mouth daily. 30 Tab 0    spironolactone (ALDACTONE) 25 mg tablet Take  by mouth daily.  ammonium lactate (AMLACTIN) 12 % topical cream Apply  to affected area two (2) times a day. rub in to affected area well 280 g 0    furosemide (LASIX) 20 mg tablet Take 20 mg by mouth daily. 1         Physical Exam  Constitutional:       Appearance: Normal appearance. HENT:      Head: Normocephalic and atraumatic. Mouth/Throat:      Mouth: Mucous membranes are moist.      Pharynx: Oropharynx is clear.  No oropharyngeal exudate. Eyes:      Extraocular Movements: Extraocular movements intact. Pupils: Pupils are equal, round, and reactive to light. Neck:      Musculoskeletal: Normal range of motion. Pulmonary:      Effort: Pulmonary effort is normal. No respiratory distress. Neurological:      Mental Status: He is alert. Comments: Mental status: Awake, alert, oriented x3, follows simple and complex commands. Speech and languge: fluent, coherent,  and comprehension intact  CN: VFF, EOMI, PERRLA, face sensation intact , no facial asymmetry noted, palate elevation symmetric bilat, SS+SCM 5/5 bilat, tongue midline  Motor: no pronator drift, tone normal throughout, strength 5/5 throughout  Sensory: Decreased vibration over the left big toe; otherwise intact light touch and pin sensation bilaterally. Intact position sense. Coordination: FNF, HS accurate w/o dysmetria  DTR: 2+ throughout  Gait: Antalgic mainly from low back pain. Hospital Outpatient Visit on 07/07/2020   Component Date Value Ref Range Status    LIPID PROFILE 07/07/2020        Final    Cholesterol, total 07/07/2020 230* <200 MG/DL Final    Triglyceride 07/07/2020 116  <150 MG/DL Final    Comment: The drugs N-acetylcysteine (NAC) and  Metamiszole have been found to cause falsely  low results in this chemical assay. Please  be sure to submit blood samples obtained  BEFORE administration of either of these  drugs to assure correct results.  HDL Cholesterol 07/07/2020 48  40 - 60 MG/DL Final    LDL, calculated 07/07/2020 158.8* 0 - 100 MG/DL Final    VLDL, calculated 07/07/2020 23.2  MG/DL Final    CHOL/HDL Ratio 07/07/2020 4.8  0 - 5.0   Final             ICD-10-CM ICD-9-CM    1. Right leg pain  M79.604 729.5     Neuropathic     2. Chronic midline low back pain, unspecified whether sciatica present  M54.5 724.2 DULoxetine (CYMBALTA) 60 mg capsule    G89.29 338.29    3.  Right lumbar radiculopathy  M54.16 724.4        A 74 years old male patient here for follow-up of longstanding right lower extremity pain: Burning and tingling sensation. Currently on gabapentin but is not taking it as prescribed [takes it as needed]. Previous MRI from 2018 has shown multilevel degenerative changes with mild to moderate spinal canal stenosis and neural foraminal narrowing. Patient used to follow with spine surgery but no recent follow-up. Advised him to contact his spine surgeons. We will continue with the gabapentin: Advised him to continue taking it as scheduled: 300 mg p.o. 4 times daily. Refilled his duloxetine 60 mg p.o. per day. We will see him in 6 months time.

## 2020-09-11 NOTE — PROGRESS NOTES
Alexis Mendoza is a 69 y.o. male who is in the office today as a follow up. Last fall/ trip was 2 weeks ago. Patient did not go ER or urgent care      1. Have you been to the ER, urgent care clinic since your last visit? Hospitalized since your last visit? No    2. Have you seen or consulted any other health care providers outside of the [address] [place] [address] since your last visit? Include any pap smears or colon screening.  No

## 2020-09-17 ENCOUNTER — VIRTUAL VISIT (OUTPATIENT)
Dept: CARDIOLOGY CLINIC | Age: 75
End: 2020-09-17

## 2020-09-17 DIAGNOSIS — Z01.810 PREOP CARDIOVASCULAR EXAM: ICD-10-CM

## 2020-09-17 DIAGNOSIS — R07.9 CHEST PAIN, UNSPECIFIED TYPE: ICD-10-CM

## 2020-09-17 DIAGNOSIS — Q24.5 CORONARY-MYOCARDIAL BRIDGE: Primary | ICD-10-CM

## 2020-09-17 DIAGNOSIS — I10 ESSENTIAL HYPERTENSION: ICD-10-CM

## 2020-09-17 DIAGNOSIS — R06.02 SHORTNESS OF BREATH: ICD-10-CM

## 2020-09-17 NOTE — PROGRESS NOTES
Consent: Lakeisha Falcon, who was seen by synchronous (real-time) audio-video technology, and/or his healthcare decision maker, is aware that this patient-initiated, Telehealth encounter on 9/17/2020 is a billable service, with coverage as determined by his insurance carrier. He is aware that he may receive a bill and has provided verbal consent to proceed: Yes. Subjective:   Lakeisha Falcon is a 69 y.o. male who was seen for Hypertension (3 Month Follow Up/ Nuc and Echo not done )    9/2020  Patient seen for virtual visit. Patient is on and off chest pain and shortness of breath since last evaluation. He is echo and nuclear scan was denied by insurance. His symptoms have remained stable. Family History   Problem Relation Age of Onset    Diabetes Mother     Hypertension Mother     Diabetes Maternal Grandmother     Hypertension Maternal Grandmother     Cancer Sister         brain   [address] [place] [address] Cancer Sister         brain     Past Surgical History:   Procedure Laterality Date    COLONOSCOPY N/A 12/4/2019    COLONOSCOPY performed by Lexis Barcenas [facility] at [facility]COPY    HX HEENT Left     lens implant    HX ORTHOPAEDIC      finger amputation left hand    HX TONSILLECTOMY       Allergies   Allergen Reactions    Ampicillin Angioedema    Asa-Acetaminophen-Caff-Buffers Rash    Penicillins Angioedema    Aspirin Other (comments)     Stomach upset    Atorvastatin Other (comments)     Muscle cramps       Current Outpatient Medications:     DULoxetine (CYMBALTA) 60 mg capsule, Take 1 Cap by mouth daily. Indications: neuropathic pain, Disp: 60 Cap, Rfl: 5    finasteride (PROSCAR) 5 mg tablet, Take 5 mg by mouth daily. , Disp: , Rfl:     albuterol sulfate 90 mcg/actuation aebs, Take  by inhalation. , Disp: , Rfl:     diclofenac (Voltaren) 1 % gel, Apply 4 g to affected area two (2) times a day., Disp: 100 g, Rfl: 0    gabapentin (NEURONTIN) 300 mg capsule, Take 2 Caps by mouth four (4) times daily. , Disp: 240 Cap, Rfl: 4    ammonium lactate (AMLACTIN) 12 % topical cream, Apply  to affected area two (2) times a day. rub in to affected area well, Disp: 280 g, Rfl: 0    tamsulosin (FLOMAX) 0.4 mg capsule, Take 1 Cap by mouth daily (after dinner). , Disp: 90 Cap, Rfl: 3    pantoprazole (PROTONIX) 40 mg tablet, Take 1 Tab by mouth daily. , Disp: 30 Tab, Rfl: 0    polyethylene glycol (MIRALAX) 17 gram packet, Take 1 Packet by mouth daily. , Disp: 30 Packet, Rfl: 0    amLODIPine (NORVASC) 10 mg tablet, Take 1 Tab by mouth daily. , Disp: 30 Tab, Rfl: 0    spironolactone (ALDACTONE) 25 mg tablet, Take  by mouth daily. , Disp: , Rfl:   Allergies   Allergen Reactions    Ampicillin Angioedema    Asa-Acetaminophen-Caff-Buffers Rash    Penicillins Angioedema    Aspirin Other (comments)     Stomach upset    Atorvastatin Other (comments)     Muscle cramps         Review of Systems   Review of Systems   Constitutional: Negative for chills and fever. HENT: Negative for nosebleeds. Eyes: Negative for blurred vision and double vision. Respiratory: See HPI   Cardiovascular: See HPI  Gastrointestinal: Negative for abdominal pain, heartburn, nausea and vomiting. Musculoskeletal: Negative for myalgias. Skin: Negative for rash. Neurological: Negative for dizziness, weakness and headaches. Endo/Heme/Allergies: Does not bruise/bleed easily. Objective: There were no vitals taken for this visit.    General: alert, cooperative, no distress   Mental  status: normal mood, behavior, speech, dress, motor activity, and thought processes, able to follow commands   HENT: NCAT   Neck: no visualized mass,No JVD   Resp: no respiratory distress   Neuro: no gross deficits   Skin: no discoloration or Bruise on visible areas   Psychiatric: normal affect, consistent with stated mood, no evidence of hallucinations     Additional exam findings:     Extremities:No edema     Pertinent LAB and reports  I have reviewed available pertinent notes, labs and reports and included in current evaluation and management of this patient. Assessment & Plan:   Diagnoses and all orders for this visit:    1. Coronary-myocardial bridge  Comments:  Stable continue monitoring    2. Chest pain, unspecified type  Comments:  Possible angina we will try and adjust medication    3. Shortness of breath  Comments:  Continue current treatment and exercise    4. Essential hypertension  Comments:  Continue treatment monitor    5. Preop cardiovascular exam  Comments:  Stable cardiac status okay to proceed with GI work-up as needed      9/2020  On and off episode of chest tightness. We will continue medical management. Emergency room if symptoms are severe. GI work-up is in progress and okay to do work-up as needed    Follow-up and Dispositions    · Return in about 6 months (around 3/17/2021). 712  We discussed the expected course, resolution and complications of the diagnosis(es) in detail. Medication risks, benefits, costs, interactions, and alternatives were discussed as indicated. I advised him to contact the office if his condition worsens, changes or fails to improve as anticipated. He expressed understanding with the diagnosis(es) and plan. Kimberly Alicea is a 69 y.o. male being evaluated by a video visit encounter for concerns as above. A caregiver was present when appropriate. Due to this being a TeleHealth encounter (During [facility]- public health emergency), evaluation of the following organ systems was limited: Vitals/Constitutional/EENT/Resp/CV/GI//MS/Neuro/Skin/Heme-Lymph-Imm.   Pursuant to the emergency declaration under the [facility] waiver authority and the [employer] Act, this Virtual  Visit was conducted, with patient's (and/or legal guardian's) consent, to reduce the patient's risk of exposure to COVID-19 and provide necessary medical care. Services were provided through a video synchronous discussion virtually to substitute for in-person clinic visit. I was in the office while conducting this encounter.         Leopold Bile, MD

## 2020-09-17 NOTE — PROGRESS NOTES
Vital Signs:    BP: 117/48  HR: 79  TEMP: Unable to obtain  HT: 5'7  WT: 171.7 lb      1. Have you been to the ER, urgent care clinic since your last visit? Hospitalized since your last visit? No    2. Have you seen or consulted any other health care providers outside of the [address] [place] [address] since your last visit? Include any pap smears or colon screening.  No

## 2020-10-09 ENCOUNTER — HOSPITAL ENCOUNTER (OUTPATIENT)
Dept: LAB | Age: 75
Discharge: HOME | End: 2020-10-09
Payer: COMMERCIAL

## 2020-10-09 DIAGNOSIS — D47.2 MGUS (MONOCLONAL GAMMOPATHY OF UNKNOWN SIGNIFICANCE): ICD-10-CM

## 2020-10-09 LAB
ALBUMIN SERPL-MCNC: 3.5 G/DL (ref 3.4–5)
ALBUMIN/GLOB SERPL: 1.1 {RATIO} (ref 0.8–1.7)
ALP SERPL-CCNC: 67 U/L (ref 45–117)
ALT SERPL-CCNC: 16 U/L (ref 16–61)
ANION GAP SERPL CALC-SCNC: 5 MMOL/L (ref 3–18)
AST SERPL-CCNC: 21 U/L (ref 10–38)
BASOPHILS # BLD: 0 K/UL (ref 0–0.1)
BASOPHILS NFR BLD: 1 % (ref 0–2)
BILIRUB SERPL-MCNC: 0.3 MG/DL (ref 0.2–1)
BUN SERPL-MCNC: 18 MG/DL (ref 7–18)
BUN/CREAT SERPL: 14 (ref 12–20)
CALCIUM SERPL-MCNC: 9.7 MG/DL (ref 8.5–10.1)
CHLORIDE SERPL-SCNC: 106 MMOL/L (ref 100–111)
CO2 SERPL-SCNC: 29 MMOL/L (ref 21–32)
CREAT SERPL-MCNC: 1.26 MG/DL (ref 0.6–1.3)
DIFFERENTIAL METHOD BLD: ABNORMAL
EOSINOPHIL # BLD: 0.1 K/UL (ref 0–0.4)
EOSINOPHIL NFR BLD: 3 % (ref 0–5)
ERYTHROCYTE [DISTWIDTH] IN BLOOD BY AUTOMATED COUNT: 14.3 % (ref 11.6–14.5)
GLOBULIN SER CALC-MCNC: 3.3 G/DL (ref 2–4)
GLUCOSE SERPL-MCNC: 81 MG/DL (ref 74–99)
HCT VFR BLD AUTO: 40.7 % (ref 36–48)
HGB BLD-MCNC: 13.5 G/DL (ref 13–16)
LYMPHOCYTES # BLD: 1.4 K/UL (ref 0.9–3.6)
LYMPHOCYTES NFR BLD: 34 % (ref 21–52)
MCH RBC QN AUTO: 30.6 PG (ref 24–34)
MCHC RBC AUTO-ENTMCNC: 33.2 G/DL (ref 31–37)
MCV RBC AUTO: 92.3 FL (ref 74–97)
MONOCYTES # BLD: 0.4 K/UL (ref 0.05–1.2)
MONOCYTES NFR BLD: 10 % (ref 3–10)
NEUTS SEG # BLD: 2.2 K/UL (ref 1.8–8)
NEUTS SEG NFR BLD: 52 % (ref 40–73)
PLATELET # BLD AUTO: 256 K/UL (ref 135–420)
PMV BLD AUTO: 9.4 FL (ref 9.2–11.8)
POTASSIUM SERPL-SCNC: 4.8 MMOL/L (ref 3.5–5.5)
PROT SERPL-MCNC: 6.8 G/DL (ref 6.4–8.2)
RBC # BLD AUTO: 4.41 M/UL (ref 4.7–5.5)
SODIUM SERPL-SCNC: 140 MMOL/L (ref 136–145)
WBC # BLD AUTO: 4.2 K/UL (ref 4.6–13.2)

## 2020-10-09 PROCEDURE — 80053 COMPREHEN METABOLIC PANEL: CPT

## 2020-10-09 PROCEDURE — 85025 COMPLETE CBC W/AUTO DIFF WBC: CPT

## 2020-10-09 PROCEDURE — 84165 PROTEIN E-PHORESIS SERUM: CPT

## 2020-10-09 PROCEDURE — 36415 COLL VENOUS BLD VENIPUNCTURE: CPT

## 2020-10-12 LAB
ALBUMIN SERPL ELPH-MCNC: 3.7 G/DL (ref 2.9–4.4)
ALBUMIN/GLOB SERPL: 1.2 {RATIO} (ref 0.7–1.7)
ALPHA1 GLOB SERPL ELPH-MCNC: 0.2 G/DL (ref 0–0.4)
ALPHA2 GLOB SERPL ELPH-MCNC: 0.7 G/DL (ref 0.4–1)
B-GLOBULIN SERPL ELPH-MCNC: 1.2 G/DL (ref 0.7–1.3)
GAMMA GLOB SERPL ELPH-MCNC: 1.1 G/DL (ref 0.4–1.8)
GLOBULIN SER CALC-MCNC: 3.1 G/DL (ref 2.2–3.9)
M PROTEIN SERPL ELPH-MCNC: 0.5 G/DL
PROT SERPL-MCNC: 6.8 G/DL (ref 6–8.5)

## 2020-10-16 ENCOUNTER — OFFICE VISIT (OUTPATIENT)
Dept: ONCOLOGY | Age: 75
End: 2020-10-16
Payer: COMMERCIAL

## 2020-10-16 VITALS
RESPIRATION RATE: 16 BRPM | TEMPERATURE: 98.9 F | OXYGEN SATURATION: 94 % | DIASTOLIC BLOOD PRESSURE: 70 MMHG | SYSTOLIC BLOOD PRESSURE: 124 MMHG | WEIGHT: 169 LBS | HEART RATE: 67 BPM | BODY MASS INDEX: 26.47 KG/M2

## 2020-10-16 DIAGNOSIS — G89.29 CHRONIC LOW BACK PAIN, UNSPECIFIED BACK PAIN LATERALITY, UNSPECIFIED WHETHER SCIATICA PRESENT: ICD-10-CM

## 2020-10-16 DIAGNOSIS — D64.9 CHRONIC ANEMIA: ICD-10-CM

## 2020-10-16 DIAGNOSIS — D47.2 MGUS (MONOCLONAL GAMMOPATHY OF UNKNOWN SIGNIFICANCE): Primary | ICD-10-CM

## 2020-10-16 DIAGNOSIS — M54.50 CHRONIC LOW BACK PAIN, UNSPECIFIED BACK PAIN LATERALITY, UNSPECIFIED WHETHER SCIATICA PRESENT: ICD-10-CM

## 2020-10-16 PROCEDURE — 99214 OFFICE O/P EST MOD 30 MIN: CPT | Performed by: INTERNAL MEDICINE

## 2020-10-16 NOTE — PROGRESS NOTES
Hematology/Oncology  Progress Note    Name: Pam Mendenhall  Date: 10/16/2020  : 1945    Bruce Galindo MD     Mr. Kenyon Pulido is a 69y.o. year old male who was seen for Monoclonal gammopathy of undetermined significance. Current Therapy- Active Surveillance       Subjective:      Mr. Kenyon Pulido is a 67-year-old male with a past medical history of CAD, HTN, PVD, radiculopathy of the lumbar region, chronic low back pain, osteoarthritis, and newly diagnosed with monoclonal gammopathy of unknown significance. He was diagnosed on 2018. The bone survey on 10/18/2018 showed no evidence of lytic lesions in the skeleton. Other than chronic pain related to his arthritis, the patient reports that he is doing well. He currently uses OTC Tylenol and lidocaine patch for pain control. He denies shortness of breath, dizziness, and chest pain. The patient otherwise has no other complaints. Denied fever, chills, night sweat, unintentional weight loss, skin lumps or bumps, acute bleeding or bruising issues. No acute bleeding, blood in stool, dark stool, melena, hematochezia, hemoptysis, dark urine, or easily bruising. Denied headache, acute vision change, dizziness, chest pain, worsen shortness of breath, palpitation, productive cough, nausea, vomiting, abdominal pain, altered bowel habits, dysuria, new bone pain or back pain, worsening focal numbness or weakness. Past medical history, family history, and social history: these were reviewed and remains unchanged.     Past Medical History:   Diagnosis Date    Bladder outlet obstruction     Erectile disorder due to medical 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     Frequency of urination     H/O spinal cord injury     lumbar spine injury secondary to fall    HTN (hypertension)     Hypercholesterolemia     Illiterate     Injury of lumbar spine ([facility] [address]     Leg pain, right     Nocturia     Overactive bladder     Peripheral vascular disease (HCC)      Past Surgical History:   Procedure Laterality Date    COLONOSCOPY N/A 2019    COL[facility] Delia Martinez MD at [facility]COPY    HX HEENT Left     lens implant    HX ORTHOPAEDIC      finger amputation left hand    HX TONSILLECTOMY       Social History     Socioeconomic History    Marital status:      Spouse name: Not on file    Number of children: Not on file    Years of education: Not on file    Highest education level: Not on file   Occupational History    Not on file   Social Needs    Financial resource strain: Not on file    Food insecurity     Worry: Not on file     Inability: Not on file    Transportation needs     Medical: Not on file     Non-medical: Not on file   Tobacco Use    Smoking status: Former Smoker     Last attempt to quit: 2015     Years since quittin.2    Smokeless tobacco: Never Used   Substance and Sexual Activity    Alcohol use: Not Currently     Alcohol/week: 0.0 standard drinks     Comment: former 2015    Drug use: No    Sexual activity: Yes   Lifestyle    Physical activity     Days per week: Not on file     Minutes per session: Not on file    Stress: Not on file   Relationships    Social connections     Talks on phone: Not on file     Gets together: Not on file     Attends Qu[employer] service: Not on file     Active member of club or organization: Not on file     Attends meetings of clubs or organizations: Not on file     Relationship status: Not on file    Intimate partner violence     Fear of current or ex partner: Not on file     Emotionally abused: Not on file     Physically abused: Not on file     Forced sexual activity: Not on file   Other Topics Concern    Not on file   Social History Narrative    Not on file     Family History   Problem Relation Age of Onset    Diabetes Mother     Hypertension Mother     Diabetes Maternal Grandmother     Hypertension Maternal Grandmother     Cancer Sister         brain    Cancer Sister         brain     Current Outpatient Medications   Medication Sig Dispense Refill    tamsulosin (FLOMAX) 0.4 mg capsule Take 1 Cap by mouth daily (after dinner). 90 Cap 0    DULoxetine (CYMBALTA) 60 mg capsule Take 1 Cap by mouth daily. Indications: neuropathic pain 60 Cap 5    finasteride (PROSCAR) 5 mg tablet Take 5 mg by mouth daily.  albuterol sulfate 90 mcg/actuation aebs Take  by inhalation.  gabapentin (NEURONTIN) 300 mg capsule Take 2 Caps by mouth four (4) times daily. 240 Cap 4    pantoprazole (PROTONIX) 40 mg tablet Take 1 Tab by mouth daily. 30 Tab 0    polyethylene glycol (MIRALAX) 17 gram packet Take 1 Packet by mouth daily. 30 Packet 0    amLODIPine (NORVASC) 10 mg tablet Take 1 Tab by mouth daily. 30 Tab 0    spironolactone (ALDACTONE) 25 mg tablet Take  by mouth daily.  diclofenac (Voltaren) 1 % gel Apply 4 g to affected area two (2) times a day. 100 g 0    ammonium lactate (AMLACTIN) 12 % topical cream Apply  to affected area two (2) times a day. rub in to affected area well 280 g 0       Review of Systems   Constitutional: Negative for chills, diaphoresis, fever, malaise/fatigue and weight loss. Respiratory: Negative for cough, hemoptysis, shortness of breath and wheezing. Cardiovascular: Negative for chest pain, palpitations and leg swelling. Gastrointestinal: Negative for abdominal pain, diarrhea, heartburn, nausea and vomiting. Genitourinary: Negative for dysuria, frequency, hematuria and urgency. Musculoskeletal: Positive for back pain. Negative for joint pain and myalgias. Skin: Negative for itching and rash. Neurological: Negative for dizziness, seizures, weakness and headaches. Psychiatric/Behavioral: Negative for depression. The patient does not have insomnia.              Objective:     Visit Vitals  /70 (BP Patient Position: Sitting)   Pulse 67   Temp 98.9 °F (37.2 °C) (Oral)   Resp 16   Wt 76.7 kg (169 lb)   SpO2 94%   BMI 26.47 kg/m² ECOG Performance Status (grade): 1  0 - able to carry on all pre-disease activity w/out restriction  1 - restricted but able to carry out light work  2 - ambulatory and can self- care but unable to carry out work  3 - bed or chair >50% of waking hours  4 - completely disable, total care, confined to bed or chair    Physical Exam  Constitutional:       General: He is not in acute distress. HENT:      Head: Normocephalic and atraumatic. Eyes:      Pupils: Pupils are equal, round, and reactive to light. Neck:      Musculoskeletal: Neck supple. No neck rigidity. Cardiovascular:      Pulses: Normal pulses. Heart sounds: Normal heart sounds. No murmur. Pulmonary:      Effort: Pulmonary effort is normal. No respiratory distress. Breath sounds: Normal breath sounds. Abdominal:      General: Bowel sounds are normal. There is no distension. Palpations: Abdomen is soft. There is no mass. Tenderness: There is no abdominal tenderness. There is no guarding. Musculoskeletal:         General: No swelling or tenderness. Lymphadenopathy:      Cervical: No cervical adenopathy. Skin:     General: Skin is warm. Findings: No rash. Neurological:      Mental Status: He is alert and oriented to person, place, and time. Mental status is at baseline. Cranial Nerves: No cranial nerve deficit. Psychiatric:         Mood and Affect: Mood normal.          Diagnostics:      No results found for this or any previous visit (from the past 96 hour(s)). Imaging:  No results found for this or any previous visit. Results for orders placed during the hospital encounter of 02/18/20   XR CHEST PA LAT    Narrative EXAM:  XR CHEST PA LAT    INDICATION:   chest pain    COMPARISON: 8/13/2019. FINDINGS:  The cardiac and mediastinal silhouette are within normal limits. Pulmonary  vasculature is within normal limits. No pneumothorax or pleural effusions. No  air space opacity.  There is 8 mm density which projects at the intersection  between the left anterior fifth rib and posterior ninth rib. There is suggestion  of similar density on the right side between the anterior fifth and sixth ribs. Thoracic spondylosis and prominent bridging osteophytes. Impression IMPRESSION:    No definite acute cardiopulmonary process. New 8 mm density which projects at the intersection between the left anterior  fifth rib and posterior ninth rib. There is suggestion of similar density on the  right side between the anterior fifth and sixth ribs. These may represent  summation of shadow artifacts or pulmonary nodules or nipple shadows, and the  left-sided density might be osseous, further evaluation with CT chest would be  helpful for further characterization. May consider initially repeat radiographs  with nipple markers and oblique views. Results for orders placed during the hospital encounter of 03/10/20   CT CHEST W CONT    Narrative CT chest with contrast    HISTORY: Chest pain and tenderness with shortness of breath on exertion. Lung  nodule. COMPARISON: None. TECHNIQUE: Helical scans through the chest was obtained from the thoracic inlet  to the diaphragm after uneventful nonionic IV contrast administration. All CT scans at this facility performed using dose optimization techniques as  appreciated to a performed exam, to include automated exposure control,  adjustment of the mA and or KU according to patient size (including appropriate  matching for site specific examination), or use of iterative reconstruction  technique. FINDINGS:     Lung Parenchyma: There is a 5 mm solid nodule identified at anterior right lower  lobe on #33/3. It appears unchanged compared to the remote CT in 10/09. No other  focal findings seen. Mild emphysema and chronic bronchitis noted. Thyroid: Unremarkable. Mediastinum: No adenopathy. Pleural Space And Chest Wall: No significant pleural pathology. Heart:  The heart and the pericardium appear normal.    Vasculature: The aorta and the great vessels are unremarkable. Imaged Upper Abdomen: Unremarkable. Osseous Structures: Unremarkable for age. Impression IMPRESSION:     1. Mild emphysema. No acute pulmonary finding. 3. Stable nodule in right lower lobe over 10 years as a benign finding. No  follow-up necessary for this finding. Thank you for your referral.            Assessment:     1. MGUS (monoclonal gammopathy of unknown significance)    2. Chronic anemia    3. Chronic low back pain, unspecified back pain laterality, unspecified whether sciatica present        Plan:   Monoclonal gammopathy of undetermined significance  Chronic anemia:  -- I have explained to the patient that his most recent M-spike in 10/9/2020 has been stable at 0.5.  -- Clinically the patient is doing well without alarm symptoms. No CRAB criteria. Plan:  -- The patient was advised to notify us if any skin lumps or bumps, swollen lymph nodes, night sweat, unintentional weight loss, worsen fatigue, new bone pain or back pain, or any concerns. -- I have advised the patient to follow up PCP to continue age-appropriate cancer screening. -- I have educated patient regarding lifestyle modifications, minimizing alcohol intake, refraining from smoking, healthy diet, and physical activity. -- We will monitor CBC, CMP, SPEP/JENS/SFLC. Chronic low back pain:   -- Patient will continue pain medications as prescribed by his PCP. -- We will see the patient back in clinic in about 4 months. Always sooner if required. The patient can have lab done prior to our next clinic visit.       Orders Placed This Encounter    CBC WITH AUTOMATED DIFF     Standing Status:   Future     Standing Expiration Date:   25/92/2858    METABOLIC PANEL, COMPREHENSIVE     Standing Status:   Future     Standing Expiration Date:   10/17/2021    PROTEIN ELECTROPHORESIS     Standing Status:   Future Standing Expiration Date:   10/17/2021           Mr. Jaquelin Enriquez has a reminder for a \"due or due soon\" health maintenance. I have asked that he contact his primary care provider for follow-up on this health maintenance. All of patient's questions answered to their apparent satisfaction. They verbally show understanding and agreement with aforementioned plan. Johana Jacome MD  10/16/2020          About 25 minutes were spent for this encounter with more than 50% of the time spent in face-to-face counseling, discussing on diagnosis and management plan going forward, and co-ordination of care. Parts of this document has been produced using Dragon dictation system. Unrecognized errors in transcription may be present. Please do not hesitate to reach out for any questions or clarifications.       CC: Edwardo Stone MD

## 2020-11-30 ENCOUNTER — HOSPITAL ENCOUNTER (OUTPATIENT)
Dept: LAB | Age: 75
Discharge: HOME | End: 2020-11-30
Payer: COMMERCIAL

## 2020-11-30 LAB
ALBUMIN SERPL-MCNC: 3.6 G/DL (ref 3.4–5)
ALBUMIN/GLOB SERPL: 1 {RATIO} (ref 0.8–1.7)
ALP SERPL-CCNC: 76 U/L (ref 45–117)
ALT SERPL-CCNC: 21 U/L (ref 16–61)
ANION GAP SERPL CALC-SCNC: 4 MMOL/L (ref 3–18)
AST SERPL-CCNC: 16 U/L (ref 10–38)
BILIRUB SERPL-MCNC: 0.4 MG/DL (ref 0.2–1)
BUN SERPL-MCNC: 13 MG/DL (ref 7–18)
BUN/CREAT SERPL: 11 (ref 12–20)
CALCIUM SERPL-MCNC: 9.7 MG/DL (ref 8.5–10.1)
CHLORIDE SERPL-SCNC: 106 MMOL/L (ref 100–111)
CHOLEST SERPL-MCNC: 252 MG/DL
CO2 SERPL-SCNC: 28 MMOL/L (ref 21–32)
CREAT SERPL-MCNC: 1.16 MG/DL (ref 0.6–1.3)
ERYTHROCYTE [DISTWIDTH] IN BLOOD BY AUTOMATED COUNT: 14.5 % (ref 11.6–14.5)
EST. AVERAGE GLUCOSE BLD GHB EST-MCNC: 114 MG/DL
GLOBULIN SER CALC-MCNC: 3.6 G/DL (ref 2–4)
GLUCOSE SERPL-MCNC: 80 MG/DL (ref 74–99)
HBA1C MFR BLD: 5.6 % (ref 4.2–5.6)
HCT VFR BLD AUTO: 40.9 % (ref 36–48)
HDLC SERPL-MCNC: 40 MG/DL (ref 40–60)
HDLC SERPL: 6.3 {RATIO} (ref 0–5)
HGB BLD-MCNC: 13.2 G/DL (ref 13–16)
LDLC SERPL CALC-MCNC: 183 MG/DL (ref 0–100)
LIPID PROFILE,FLP: ABNORMAL
MCH RBC QN AUTO: 29.8 PG (ref 24–34)
MCHC RBC AUTO-ENTMCNC: 32.3 G/DL (ref 31–37)
MCV RBC AUTO: 92.3 FL (ref 74–97)
PLATELET # BLD AUTO: 301 K/UL (ref 135–420)
PMV BLD AUTO: 9.4 FL (ref 9.2–11.8)
POTASSIUM SERPL-SCNC: 4.7 MMOL/L (ref 3.5–5.5)
PROT SERPL-MCNC: 7.2 G/DL (ref 6.4–8.2)
RBC # BLD AUTO: 4.43 M/UL (ref 4.7–5.5)
SODIUM SERPL-SCNC: 138 MMOL/L (ref 136–145)
T4 SERPL-MCNC: 9.6 UG/DL (ref 4.5–12.1)
TRIGL SERPL-MCNC: 145 MG/DL (ref ?–150)
TSH SERPL DL<=0.05 MIU/L-ACNC: 0.73 UIU/ML (ref 0.36–3.74)
VLDLC SERPL CALC-MCNC: 29 MG/DL
WBC # BLD AUTO: 5.4 K/UL (ref 4.6–13.2)

## 2020-11-30 PROCEDURE — 84436 ASSAY OF TOTAL THYROXINE: CPT

## 2020-11-30 PROCEDURE — 80061 LIPID PANEL: CPT

## 2020-11-30 PROCEDURE — 84443 ASSAY THYROID STIM HORMONE: CPT

## 2020-11-30 PROCEDURE — 83036 HEMOGLOBIN GLYCOSYLATED A1C: CPT

## 2020-11-30 PROCEDURE — 80053 COMPREHEN METABOLIC PANEL: CPT

## 2020-11-30 PROCEDURE — 85027 COMPLETE CBC AUTOMATED: CPT

## 2020-11-30 PROCEDURE — 36415 COLL VENOUS BLD VENIPUNCTURE: CPT

## 2020-12-09 ENCOUNTER — OFFICE VISIT (OUTPATIENT)
Dept: ORTHOPEDIC SURGERY | Age: 75
End: 2020-12-09
Payer: MEDICAID

## 2020-12-09 VITALS
OXYGEN SATURATION: 96 % | WEIGHT: 172 LBS | BODY MASS INDEX: 27 KG/M2 | DIASTOLIC BLOOD PRESSURE: 68 MMHG | SYSTOLIC BLOOD PRESSURE: 137 MMHG | HEIGHT: 67 IN | RESPIRATION RATE: 16 BRPM | HEART RATE: 83 BPM | TEMPERATURE: 98.3 F

## 2020-12-09 DIAGNOSIS — G56.23 CUBITAL TUNNEL SYNDROME, BILATERAL: Primary | ICD-10-CM

## 2020-12-09 PROCEDURE — 99214 OFFICE O/P EST MOD 30 MIN: CPT | Performed by: ORTHOPAEDIC SURGERY

## 2020-12-09 NOTE — PROGRESS NOTES
Lisa Bejarano is a 69 y.o. male right handed retiree. Worker's Compensation and legal considerations: none filed. Vitals:    12/09/20 1057   BP: 137/68   Pulse: 83   Resp: 16   Temp: 98.3 °F (36.8 °C)   SpO2: 96%   Weight: 172 lb (78 kg)   Height: 5' 7\" (1.702 m)   PainSc:   7           Chief Complaint   Patient presents with    Hand Pain     left       HPI: Patient comes in today regarding left worse than right upper extremity numbness and pain. He reports little finger and the ring finger on the left to be numb most of the time of pain radiating up the arm. He reports occasional pain and swelling on the right side. Initial HPI: Patient comes in today regarding concerns of having a metal foreign body in his on something metallic right middle finger tip in his left thumb tip. He says that he scratched both areas last year and has since had issues with it. He thinks he may have gotten something metallic out of the right middle finger and then it bled after that. He reports some continued tenderness to palpation.     Date of onset:  9/2019    Injury: Yes: Comment: cut left thumb and right middle finger    Prior Treatment:  No    Numbness/ Tingling: Yes: Comment: Left worse than right ulnar-sided digits    ROS: Review of Systems - General ROS: negative  Respiratory ROS: no cough, shortness of breath, or wheezing  Cardiovascular ROS: no chest pain or dyspnea on exertion  Musculoskeletal ROS: positive for - pain in finger - right and thumb - left  Neurological ROS: Positive for numbness and tingling  Dermatological ROS: negative    Past Medical History:   Diagnosis Date    Bladder outlet obstruction     Erectile disorder due to medical 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     Frequency of urination     H/O spinal cord injury 1974    lumbar spine injury secondary to fall    HTN (hypertension)     Hypercholesterolemia     Illiterate     Injury of lumbar spine ([address] 1974    Leg pain, right     Nocturia     Overactive bladder     Peripheral vascular disease [facility])        Past Surgical History:   Procedure Laterality Date    COLONOSCOPY N/A 12/4/2019    COLONOSCOPY performed by Olga Velasquez MD [facility] ENDOSCOPY    HX HEENT Left     lens implant    HX ORTHOPAEDIC      finger amputation left hand    HX TONSILLECTOMY         Current Outpatient Medications   Medication Sig Dispense Refill    tamsulosin (FLOMAX) 0.4 mg capsule Take 1 Cap by mouth daily (after dinner). 90 Cap 0    DULoxetine (CYMBALTA) 60 mg capsule Take 1 Cap by mouth daily. Indications: neuropathic pain 60 Cap 5    finasteride (PROSCAR) 5 mg tablet Take 5 mg by mouth daily.  albuterol sulfate 90 mcg/actuation aebs Take  by inhalation.  gabapentin (NEURONTIN) 300 mg capsule Take 2 Caps by mouth four (4) times daily. 240 Cap 4    pantoprazole (PROTONIX) 40 mg tablet Take 1 Tab by mouth daily. 30 Tab 0    polyethylene glycol (MIRALAX) 17 gram packet Take 1 Packet by mouth daily. 30 Packet 0    amLODIPine (NORVASC) 10 mg tablet Take 1 Tab by mouth daily. 30 Tab 0    spironolactone (ALDACTONE) 25 mg tablet Take  by mouth daily.  diclofenac (Voltaren) 1 % gel Apply 4 g to affected area two (2) times a day. 100 g 0    ammonium lactate (AMLACTIN) 12 % topical cream Apply  to affected area two (2) times a day. rub in to affected area well 280 g 0       Allergies   Allergen Reactions    Ampicillin Angioedema    Asa-Acetaminophen-Caff-Buffers Rash    Penicillins Angioedema    Aspirin Other (comments)     Stomach upset    Atorvastatin Other (comments)     Muscle cramps         PE:     Physical Exam  Vitals signs and nursing note reviewed. Constitutional:       General: He is not in acute distress. Appearance: Normal appearance. He is not ill-appearing. Eyes:      Extraocular Movements: Extraocular movements intact. Pupils: Pupils are equal, round, and reactive to light.    Neck:      Musculoskeletal: Normal range of motion and neck supple. Cardiovascular:      Pulses: Normal pulses. Pulmonary:      Effort: Pulmonary effort is normal. No respiratory distress. Abdominal:      General: Abdomen is flat. There is no distension. Musculoskeletal: Normal range of motion. General: No swelling, tenderness, deformity or signs of injury. Right lower leg: No edema. Left lower leg: No edema. Skin:     General: Skin is warm and dry. Capillary Refill: Capillary refill takes less than 2 seconds. Findings: No bruising or erythema. Neurological:      General: No focal deficit present. Mental Status: He is alert and oriented to person, place, and time. Cranial Nerves: No cranial nerve deficit. Sensory: Sensory deficit present. Psychiatric:         Mood and Affect: Mood normal.         Behavior: Behavior normal.         NEUROVASCULAR    Examination L R Examination L R   Carpal Comp. - - Pronator Comp. - -   Carpal Tinel - - Pronator Tinel - -   Phalen's - - Pronator Stress - -   Cubital Comp. ++ +- Guyon Comp. - -   Cubital Tinel ++ + Guyon Tinel - -   Elbow Hyperflexion + - Adson's - -   Spurling's - - SC Comp. - -   PCB Median abn - - SC Tinel - -   Radial Tinel - - IC Comp. - -   Digital Tinel - - IC Tinel - -   Radial 2-Pt WNL WNL Ulnar 2-Pt WNL WNL     Radial Pulse: 2+  Capillary Refill: < 2 sec  Narayan: Not Performed  Digital Narayan: Not Performed      Imagin2020 plain films of the left thumb as well as the right middle finger does not show any evidence of radiopaque foreign body any fracture dislocation or other osseous abnormality. Of note on the left thumb view a index finger metallic foreign body is noted on the radial aspect of the P1. ICD-10-CM ICD-9-CM    1.  Cubital tunnel syndrome, bilateral  G56.23 354.2 EMG TWO EXTREMITIES UPPER      NCV/LAT MOTOR PER NERVE UP/RT      NCV/LAT MOTOR PER NERVE UP/LT       Plan:     Bilateral upper extremity EMGs    Follow-up and Dispositions    · Return for EMG F/U.          Plan was reviewed with patient, who verbalized agreement and understanding of the plan

## 2020-12-10 ENCOUNTER — OFFICE VISIT (OUTPATIENT)
Dept: ORTHOPEDIC SURGERY | Age: 75
End: 2020-12-10
Payer: COMMERCIAL

## 2020-12-10 VITALS
SYSTOLIC BLOOD PRESSURE: 129 MMHG | OXYGEN SATURATION: 95 % | WEIGHT: 174 LBS | RESPIRATION RATE: 16 BRPM | HEART RATE: 83 BPM | TEMPERATURE: 98 F | DIASTOLIC BLOOD PRESSURE: 71 MMHG | BODY MASS INDEX: 27.31 KG/M2 | HEIGHT: 67 IN

## 2020-12-10 DIAGNOSIS — L84 SKIN CALLUS: Primary | ICD-10-CM

## 2020-12-10 DIAGNOSIS — M79.672 LEFT FOOT PAIN: ICD-10-CM

## 2020-12-10 DIAGNOSIS — M19.071 PRIMARY OSTEOARTHRITIS OF RIGHT FOOT: ICD-10-CM

## 2020-12-10 DIAGNOSIS — M79.671 RIGHT FOOT PAIN: ICD-10-CM

## 2020-12-10 PROCEDURE — 73630 X-RAY EXAM OF FOOT: CPT | Performed by: ORTHOPAEDIC SURGERY

## 2020-12-10 PROCEDURE — 99213 OFFICE O/P EST LOW 20 MIN: CPT | Performed by: ORTHOPAEDIC SURGERY

## 2020-12-10 RX ORDER — AMMONIUM LACTATE 5 %
LOTION (GRAM) TOPICAL 2 TIMES DAILY
Qty: 1 BOTTLE | Refills: 0 | Status: SHIPPED | OUTPATIENT
Start: 2020-12-10 | End: 2021-06-10

## 2020-12-10 NOTE — PATIENT INSTRUCTIONS
Please look into OTC Dr. Milligan Windham full-length memory foam insert at any local pharmacy store or [facility].

## 2020-12-10 NOTE — PROGRESS NOTES
AMBULATORY PROGRESS NOTE      Patient: Darwin Queen             MRN: [id number]     SSN: [id number] Body mass index is 27.25 kg/m². YOB: 1945     AGE: 69 y.o. EX: male    PCP: Sebastian Polanco MD       IMPRESSION //  DIAGNOSIS AND TREATMENT PLAN      DIAGNOSES  1. Skin callus    2. Primary osteoarthritis of right foot    3. Left foot pain    4. Right foot pain        Orders Placed This Encounter    AMB POC XRAY, FOOT; COMPLETE, 3+ VIEW     ASK ALL FEMALE PATIENTS IF PREGNANT     Order Specific Question:   Reason for Exam     Answer:   PAIN    AMB POC XRAY, FOOT; COMPLETE, 3+ VIEW     ASK ALL FEMALE PATIENTS IF PREGNANT     Order Specific Question:   Reason for Exam     Answer:   PAIN    ammonium lactate (Lac-Hydrin Five) 5 % lotion     Sig: Apply  to affected area two (2) times a day. Dispense:  1 Bottle     Refill:  0          PLAN:    1. Lac-Hydrin 12% cream: BID; 1 bottle, 0 refills. 2. Instructed patient to look into OTC Dr. Milligan Gilliam full-length memory foam insert    RTO- February 11th, 2020      HPI //  OBJECTIVE EXAMINATION      aDrwin Queen IS A 69 y.o. male who presents to my outpatient office for RTO evaluation of: bilateral forefoot pain. He describes having pain along his bilateral plantar forefoot. He presents with a very small thickened skin callus to the third met region on his right foot and a larger thickened skin callus to the same region on his left foot. He states that walking worsens the burning pain along the callus.            Visit Vitals  /71 (BP 1 Location: Left arm)   Pulse 83   Temp 98 °F (36.7 °C)   Resp 16   Ht 5' 7\" (1.702 m)   Wt 174 lb (78.9 kg)   SpO2 95%   BMI 27.25 kg/m²       ANKLE/FOOT left    Psychiatry: Alert, oriented x 3 (name,place,time of day); speech normal in context and clarity, memory intact grossly, no involuntary movements - tremors, no dementia  Gait: antalgic  Tenderness: Plantar forefoot, just lateral to the second metatarsal head, not at the metatarsal head. < 1cm small thickened skin callus  Cutaneous: larger corn/ thickened skin callus to plantar forefoot,   Joint Motion: WNL  Joint / Tendon Stability: No Ankle or Subtalar instability or joint laxity. No peroneal sublux ability or dislocation  Alignment: neutral Hindfoot, none Metatarsus Adductus Metatarsus. Neuro Motor/Sensory: NL/NL,  Vascular: NL foot/ankle pulses,   Lymphatics: No extremity lymphedema, No calf swelling, no tenderness to calf muscles. ANKLE/FOOT right    Psychiatry: Alert, oriented x 3 (name,place,time of day); speech normal in context and clarity, memory intact grossly, no involuntary movements - tremors, no dementia  Gait: antalgic  Tenderness: mild mild tenderness to the region below ( 2 mm) skin callus //very small thickened skin callus to plantar forefoot, 3 met region  Cutaneous: very small thickened skin callus to plantar forefoot, 3 met region  Joint Motion: WNL  Joint / Tendon Stability: No Ankle or Subtalar instability or joint laxity. No peroneal sublux ability or dislocation  Alignment: neutral Hindfoot, none Metatarsus Adductus Metatarsus. Neuro Motor/Sensory: NL/NL,  Vascular: NL foot/ankle pulses,   Lymphatics: No extremity lymphedema, No calf swelling, no tenderness to calf muscles.          CHART REVIEW     Patient Active Problem List   Diagnosis Code    Unsteady gait R26.81    Gait instability R26.81    Vertigo R42    Radiculopathy of lumbar region M54.16    ED (erectile dysfunction) of organic origin N52.9    Peripheral vascular disease ([address] I73.9    Overactive bladder N32.81    Nocturia R35.1    Leg pain, right M79.604    Hypercholesterolemia E78.00    HTN (hypertension) I10    H/O spinal cord injury Z87.828    Erectile disorder due to medical 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 N52.1    Bladder outlet obstruction N32.0    Injury of lumbar spine ([address] S34.109A    Frequency of urination R35.0    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 Pain Assessment  12/10/2020   Location of Pain Foot   Pain Location Comment -   Location Modifiers Right;Left   Severity of Pain 7   Quality of Pain Throbbing; Other (Comment)   Quality of Pain Comment 2 knots on the bottom of the feet. Duration of Pain Persistent   Frequency of Pain Constant   Date Pain First Started -   Aggravating Factors Walking   Aggravating Factors Comment -   Limiting Behavior Some   Relieving Factors Rest   Relieving Factors Comment -   Result of Injury -   Work-Related Injury -   How long out of work? -   Type of Injury -   Type of Injury Comment -        Hyacinth Rodriguez  has a past medical history of Bladder outlet obstruction, Erectile disorder due to medical 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, Frequency of urination, H/O spinal cord injury (1974), HTN (hypertension), Hypercholesterolemia, Illiterate, Injury of lumbar spine ([address] (1974), Leg pain, right, Nocturia, Overactive bladder, and Peripheral vascular disease ([address]      Patients is employed at:         Past Medical History:   Diagnosis Date    Bladder outlet obstruction     Erectile disorder due to medical 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     Frequency of urination     H/O spinal cord injury 1974    lumbar spine injury secondary to fall    HTN (hypertension)     Hypercholesterolemia     Illiterate     Injury of lumbar spine ([address] 1974    Leg pain, right     Nocturia     Overactive bladder     Peripheral vascular disease ([address]      Past Surgical History:   Procedure Laterality Date    COLONOSCOPY N/A 12/4/2019    COLONOSCOPY performed by Claudia Madrigal MD at [facility] ENDOSCOPY  HX HEENT Left     lens implant    HX ORTHOPAEDIC      finger amputation left hand    HX TONSILLECTOMY       Current Outpatient Medications   Medication Sig    ammonium lactate (Lac-Hydrin Five) 5 % lotion Apply  to affected area two (2) times a day.  tamsulosin (FLOMAX) 0.4 mg capsule Take 1 Cap by mouth daily (after dinner).  DULoxetine (CYMBALTA) 60 mg capsule Take 1 Cap by mouth daily. Indications: neuropathic pain    finasteride (PROSCAR) 5 mg tablet Take 5 mg by mouth daily.  albuterol sulfate 90 mcg/actuation aebs Take  by inhalation.  gabapentin (NEURONTIN) 300 mg capsule Take 2 Caps by mouth four (4) times daily.  ammonium lactate (AMLACTIN) 12 % topical cream Apply  to affected area two (2) times a day. rub in to affected area well    pantoprazole (PROTONIX) 40 mg tablet Take 1 Tab by mouth daily.  polyethylene glycol (MIRALAX) 17 gram packet Take 1 Packet by mouth daily.  amLODIPine (NORVASC) 10 mg tablet Take 1 Tab by mouth daily.  spironolactone (ALDACTONE) 25 mg tablet Take  by mouth daily.  diclofenac (Voltaren) 1 % gel Apply 4 g to affected area two (2) times a day. No current facility-administered medications for this visit.       Allergies   Allergen Reactions    Ampicillin Angioedema    Asa-Acetaminophen-Caff-Buffers Rash    Penicillins Angioedema    Aspirin Other (comments)     Stomach upset    Atorvastatin Other (comments)     Muscle cramps     Social History     Occupational History    Not on file   Tobacco Use    Smoking status: Former Smoker     Last attempt to quit: 2015     Years since quittin.4    Smokeless tobacco: Never Used   Substance and Sexual Activity    Alcohol use: Not Currently     Alcohol/week: 0.0 standard drinks     Comment: former stopped     Drug use: No    Sexual activity: Yes     Family History   Problem Relation Age of Onset    Diabetes Mother     Hypertension Mother     Diabetes Maternal Grandmother     Hypertension Maternal Grandmother     Cancer Sister         brain    Cancer Sister         brain       THE  FOR Sj Starch  WAS REVIEWED BY Nelsy Varner MD 12/10/2020 . DIAGNOSTIC IMAGING  LAB DATA      Lab Results   Component Value Date/Time    Hemoglobin A1c 5.6 11/30/2020 12:40 PM    Hemoglobin A1c 5.8 (A) 10/09/2019    //   Lab Results   Component Value Date/Time    Glucose 80 11/30/2020 12:39 PM        No results found for: [id number], [id number]      Lab Results   Component Value Date/Time    Vitamin D 25-Hydroxy 18.2 (L) 04/12/2016 01:47 AM         REVIEW OF SYSTEMS : 12/10/2020  ALL BELOW ARE Negative except : SEE HPI     CONSTITUTIONAL: No weight loss  PSYCHOLOGICAL : No Feelings of anxiety, depression, agitation  EYES: No blurred vision and no eye discharge. NO eye pain, double vision  ENT: No nasal discharge. No ear pain. CARDIOVASCULAR: No chest pain and no diaphoresis. RESPIRATORY: No cough, no hemoptysis. GI: No vomiting, no diarrhea   : No urinary frequency and no dysuria. MUSCULOSKELETAL: see HPI  SKIN: No rashes. NEURO:  No dizziness,weakness, headaches// No visual changes or confusion, or seizures,   ENDOCRINE: No polyphagia and no polydipsia. HEMATOLOGY: No bleeding tendencies. DIAGNOSTIC IMAGING         FOOT X RAYS 3 VIEWS Right   12/10/2020    NON WEIGHT BEARING    X RAYS AT [address] [place] [address]  12/10/2020      Bones: No fractures or dislocations. No focal osteolytic or osteoblastic process     Bone Spurs: No significant bone spurs  Foot Alignment: WNL  Joint Condition: Moderate midfoot OA, right #2 3 TMT region, 5 TMT region significant OA//mild right great toe first MTP OA  Soft Tissues: Normal, No radiopaque foreign body and No abnormal calcific densities to soft tissues   No ankle joint effusion in lateral projection.   Mineralization: Suggests  no Osteopenia    I have personally reviewed the results of the above study and the interpretation of this study is my professional opinion     FOOT X RAYS 3 VIEWS LEFT  12/10/2020    NON WEIGHT BEARING    X RAYS AT [facility] [address] [place] [address]  12/10/2020      Bones: No fractures or dislocations. No focal osteolytic or osteoblastic process     Bone Spurs: No significant bone spurs  Foot Alignment: WNL  Joint Condition: Left midfoot TMT joint OA, mild left #2 /3,//mild left great toe first MTP OA  Soft Tissues: Normal, No radiopaque foreign body and No abnormal calcific densities to soft tissues   No ankle joint effusion in lateral projection.   Mineralization: Suggests  no Osteopenia    I have personally reviewed the results of the above study and the interpretation of this study is my professional opinion      Areli Garcia MD  12/10/2020  9:44 AM

## 2020-12-16 DIAGNOSIS — G56.23 CUBITAL TUNNEL SYNDROME, BILATERAL: ICD-10-CM

## 2020-12-16 NOTE — Clinical Note
Right groin and right radial clipped, prepped with ChloraPrep and draped. [de-identified] : Injection: Right knee joint.\par Indication: Osteoarthritis.\par \par A discussion was had with the patient regarding this procedure and all questions were answered. All risks, benefits and alternatives were discussed. These included but were not limited to bleeding, infection, and allergic reaction. Alcohol was used to clean the skin, and betadine was used to sterilize and prep the area in the supero-lateral aspect of the right knee. Ethyl chloride spray was then used as a topical anesthetic. A 21-gauge needle was used to inject 2 cc of Euflexxa into the knee. A sterile bandage was then applied. The patient tolerated the procedure well and there were no complications. \par \par Injection: Left knee joint.\par Indication: Osteoarthritis.\par \par A discussion was had with the patient regarding this procedure and all questions were answered. All risks, benefits and alternatives were discussed. These included but were not limited to bleeding, infection, and allergic reaction. Alcohol was used to clean the skin, and betadine was used to sterilize and prep the area in the supero-lateral aspect of the left knee. Ethyl chloride spray was then used as a topical anesthetic. A 21-gauge needle was used to inject 2 cc of Euflexxa into the knee. A sterile bandage was then applied. The patient tolerated the procedure well and there were no complications. \par \par Ice. Tylenol. Followup one week.

## 2020-12-23 ENCOUNTER — TELEPHONE (OUTPATIENT)
Dept: ORTHOPEDIC SURGERY | Age: 75
End: 2020-12-23

## 2020-12-23 NOTE — TELEPHONE ENCOUNTER
Spoke to Pharmacist Rodolfo Left to make the change to 12% on the Lac-Hydrin per [facility]

## 2021-01-08 ENCOUNTER — OFFICE VISIT (OUTPATIENT)
Dept: ORTHOPEDIC SURGERY | Age: 76
End: 2021-01-08
Payer: COMMERCIAL

## 2021-01-08 VITALS
HEART RATE: 66 BPM | SYSTOLIC BLOOD PRESSURE: 145 MMHG | WEIGHT: 172.4 LBS | OXYGEN SATURATION: 96 % | BODY MASS INDEX: 27.06 KG/M2 | RESPIRATION RATE: 16 BRPM | TEMPERATURE: 98.6 F | DIASTOLIC BLOOD PRESSURE: 64 MMHG | HEIGHT: 67 IN

## 2021-01-08 DIAGNOSIS — M75.101 RIGHT ROTATOR CUFF TEAR ARTHROPATHY: Primary | ICD-10-CM

## 2021-01-08 DIAGNOSIS — M12.811 RIGHT ROTATOR CUFF TEAR ARTHROPATHY: Primary | ICD-10-CM

## 2021-01-08 PROCEDURE — 99214 OFFICE O/P EST MOD 30 MIN: CPT | Performed by: ORTHOPAEDIC SURGERY

## 2021-01-08 NOTE — PROGRESS NOTES
Patient: Kareem Bhatti                MRN: [id number]       SSN: [id number]  YOB: 1945        AGE: 77 y.o. SEX: male  Body mass index is 27 kg/m². PCP: Marck Chiu MD  01/08/21    Chief Complaint: Bilateral shoulder pain     Pain 9/10, right worse than left    HPI: Kareem Bhatti is a 77 y.o. male patient who returns to the office today for bilateral shoulder pain. The right is worse than the left. As last visit which was several months back he had injections in the shoulders which she said did not give him more than a few days at most of relief. He continues to have pain difficulty with raising his arm above his head. He is interested in discussing surgery    Past Medical History:   Diagnosis Date    Bladder outlet obstruction     Erectile disorder due to medical 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     Frequency of urination     H/O spinal cord injury 1974    lumbar spine injury secondary to fall    HTN (hypertension)     Hypercholesterolemia     Illiterate     Injury of lumbar spine ([facility] [address] 1974    Leg pain, right     Nocturia     Overactive bladder     Peripheral vascular disease ([facility] [address]        Family History   Problem Relation Age of Onset    Diabetes Mother     Hypertension Mother     Diabetes Maternal Grandmother     Hypertension Maternal Grandmother     Cancer Sister         brain    Cancer Sister         brain       Current Outpatient Medications   Medication Sig Dispense Refill    ammonium lactate (Lac-Hydrin Five) 5 % lotion Apply  to affected area two (2) times a day. 1 Bottle 0    tamsulosin (FLOMAX) 0.4 mg capsule Take 1 Cap by mouth daily (after dinner). 90 Cap 0    DULoxetine (CYMBALTA) 60 mg capsule Take 1 Cap by mouth daily. Indications: neuropathic pain 60 Cap 5    finasteride (PROSCAR) 5 mg tablet Take 5 mg by mouth daily.  albuterol sulfate 90 mcg/actuation aebs Take  by inhalation.       diclofenac (Voltaren) 1 % gel Apply 4 g to affected area two (2) times a day. 100 g 0    gabapentin (NEURONTIN) 300 mg capsule Take 2 Caps by mouth four (4) times daily. 240 Cap 4    ammonium lactate (AMLACTIN) 12 % topical cream Apply  to affected area two (2) times a day. rub in to affected area well 280 g 0    pantoprazole (PROTONIX) 40 mg tablet Take 1 Tab by mouth daily. 30 Tab 0    polyethylene glycol (MIRALAX) 17 gram packet Take 1 Packet by mouth daily. 30 Packet 0    amLODIPine (NORVASC) 10 mg tablet Take 1 Tab by mouth daily. 30 Tab 0    spironolactone (ALDACTONE) 25 mg tablet Take  by mouth daily.          Allergies   Allergen Reactions    Ampicillin Angioedema    Asa-Acetaminophen-Caff-Buffers Rash    Penicillins Angioedema    Aspirin Other (comments)     Stomach upset    Atorvastatin Other (comments)     Muscle cramps       Past Surgical History:   Procedure Laterality Date    COLONOSCOPY N/A 2019    COLONOS[facility] performed by Rabia [facility]by [facility] at [facility] ENDOSCOPY    HX HEENT Left     lens implant    HX ORTHOPAEDIC      finger amputation left hand    HX TONSILLECTOMY         Social History     Socioeconomic History    Marital status:      Spouse name: Not on file    Number of children: Not on file    Years of education: Not on file    Highest education level: Not on file   Occupational History    Not on file   Social Needs    Financial resource strain: Not on file    Food insecurity     Worry: Not on file     Inability: Not on file    Transportation needs     Medical: Not on file     Non-medical: Not on file   Tobacco Use    Smoking status: Former Smoker     Quit date: 2015     Years since quittin.4    Smokeless tobacco: Never Used   Substance and Sexual Activity    Alcohol use: Not Currently     Alcohol/week: 0.0 standard drinks     Comment: former stopped     Drug use: No    Sexual activity: Yes   Lifestyle    Physical activity     Days per week: Not on file     Minutes per session: Not on file    Stress: Not on file   Relationships    Social connections     Talks on phone: Not on file     Gets together: Not on file     Attends Congregation service: Not on file     Active member of club or organization: Not on file     Attends meetings of clubs or organizations: Not on file     Relationship status: Not on file    Intimate partner violence     Fear of current or ex partner: Not on file     Emotionally abused: Not on file     Physically abused: Not on file     Forced sexual activity: Not on file   Other Topics Concern    Not on file   Social History Narrative    Not on file       REVIEW OF SYSTEMS:      No changes from previous review of systems unless noted. PHYSICAL EXAMINATION:  Visit Vitals  BP (!) 145/64 (BP 1 Location: Left arm, BP Patient Position: Sitting)   Pulse 66   Temp 98.6 °F (37 °C) (Temporal)   Resp 16   Ht 5' 7\" (1.702 m)   Wt 172 lb 6.4 oz (78.2 kg)   SpO2 96%   BMI 27.00 kg/m²     Body mass index is 27 kg/m². GENERAL: Alert and oriented x3, in no acute distress. HEENT: Normocephalic, atraumatic. RESP: Non labored breathing. SKIN: No rashes or lesions noted. Shoulder Examination     R   L  ROM   FF  90   120  ER  20   20   IR  HIp   Hip  Rotator Cuff Pain   Supra  +   +   Infra  +   +   Subscap -   -  Crepitus  -   -  Effusion  -   -  Warmth  -   -   Erythema  -   -  Instability  -   -  AC Joint TTP  -   -  Clavicle   Deformity -   -   TTP  -   -  Proximal Humerus   Deformity -   -   TTP  -   -  Deltoid Strength 5   5  Biceps Strength 5   5  Biceps Deformity -   -  Biceps Groove Pain -   -  Impingement Sign -   -       IMAGING:  No new imaging today    Previous x-rays taken of both shoulders were reviewed which show bilateral rotator cuff arthropathy. ASSESSMENT & PLAN  Diagnosis: Bilateral rotator cuff arthropathy    Edmundo Mena has bilateral rotator cuff arthropathy continues to be symptomatic.   After discussing the treatment options at length today he would like to move forward with a right reverse shoulder arthroplasty. We will start the process of getting that set up for him. All of his questions were answered.       Electronically signed by: Miryam Black MD

## 2021-01-11 ENCOUNTER — OFFICE VISIT (OUTPATIENT)
Dept: ORTHOPEDIC SURGERY | Age: 76
End: 2021-01-11
Payer: MEDICAID

## 2021-01-11 VITALS
HEART RATE: 96 BPM | RESPIRATION RATE: 18 BRPM | BODY MASS INDEX: 26.53 KG/M2 | WEIGHT: 169 LBS | HEIGHT: 67 IN | DIASTOLIC BLOOD PRESSURE: 70 MMHG | TEMPERATURE: 98.2 F | SYSTOLIC BLOOD PRESSURE: 163 MMHG

## 2021-01-11 DIAGNOSIS — R20.0 NUMBNESS AND TINGLING OF BOTH UPPER EXTREMITIES: Primary | ICD-10-CM

## 2021-01-11 DIAGNOSIS — R94.131 ABNORMAL EMG: ICD-10-CM

## 2021-01-11 DIAGNOSIS — R20.0 NUMBNESS AND TINGLING OF BOTH UPPER EXTREMITIES: ICD-10-CM

## 2021-01-11 DIAGNOSIS — R20.2 NUMBNESS AND TINGLING OF BOTH UPPER EXTREMITIES: Primary | ICD-10-CM

## 2021-01-11 DIAGNOSIS — R20.2 NUMBNESS AND TINGLING OF BOTH UPPER EXTREMITIES: ICD-10-CM

## 2021-01-11 PROCEDURE — 95886 MUSC TEST DONE W/N TEST COMP: CPT | Performed by: PHYSICAL MEDICINE & REHABILITATION

## 2021-01-11 PROCEDURE — 95911 NRV CNDJ TEST 9-10 STUDIES: CPT | Performed by: PHYSICAL MEDICINE & REHABILITATION

## 2021-01-11 NOTE — PROGRESS NOTES
[address]  [address], [address]  [employer], [address]  Phone: [phone]  Fax: [phone]        Delroy Ybarra  : 1945  PCP: Micky Lewis MD  2021    ELECTROMYOGRAPHY AND NERVE CONDUCTION STUDIES    Denson Peabody was referred by Dr. Aaliyah Hernandez for electrodiagnostic evaluation of bilateral hand numbness and tingling. NCV & EMG Findings:  Evaluation of the right median motor nerve showed prolonged distal onset latency (4.7 ms). The left ulnar motor and the right ulnar motor nerves showed decreased conduction velocity (A Elbow-B Elbow, L36, R43 m/s). The left median sensory and the right median sensory nerves showed prolonged distal peak latency (L3.8, R4.4 ms) and decreased conduction velocity (Wrist-2nd Digit, L37, R32 m/s). The left ulnar sensory and the right ulnar sensory nerves showed prolonged distal peak latency (L7.3, R4.2 ms), reduced amplitude (L8.3, R7.6 µV), and decreased conduction velocity (Wrist-5th Digit, L19, R33 m/s). All remaining nerves (as indicated in the following tables) were within normal limits. Left vs. Right side comparison data for the median sensory nerve indicates abnormal L-R latency difference (0.6 ms). The ulnar sensory nerve indicates abnormal L-R latency difference (3.1 ms). All remaining left vs. right side differences were within normal limits. All examined muscles (as indicated in the following table) showed no evidence of electrical instability. INTERPRETATION    This is an abnormal electrodiagnostic examination. These findings may be consistent with:   1. Sensorimotor polyneuropathy - this is based on mild unexpected abnormalities throughout the NCS unrelated to other entrapment syndromes. There is possibility that the severity of the other entrapment neuropathies is worsened by this polyneuropathy. 2. Moderate ulnar mononeuropathy at the right elbow (cubital tunnel syndrome)   3. Moderate median mononeuropathy at the right wrist (carpal tunnel syndrome)   4. Moderate ulnar mononeuropathy at the left elbow (cubital tunnel syndrome)   5. Mild median mononeuropathy at the left wrist (carpal tunnel syndrome)     There is no electrodiagnostic evidence of any cervical radiculopathy, brachial plexopathy,  or any other mononeuropathy. CLINICAL INTERPRETATION  His electrodiagnostic findings of carpal tunnel and cubital tunnel syndromes bilaterally are consistent with his bilateral hand symptoms. HISTORY OF PRESENT ILLNESS  Chary Brown is a 77 y.o. male. Pt presents today for BUE EMG evaluation of BUE, L>R, into the little and ring fingers. He reports his hands falling asleep at night, and he has to wake up and shake them out. Pt notes that he also experiences numbness, tingling, and a burning sensation in his feet. PAST MEDICAL HISTORY   Past Medical History:   Diagnosis Date    Bladder outlet obstruction     Erectile disorder due to medical 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     Frequency of urination     H/O spinal cord injury 1974    lumbar spine injury secondary to fall    HTN (hypertension)     Hypercholesterolemia     Illiterate     Injury of lumbar spine ([facility] [address] 1974    Leg pain, right     Nocturia     Overactive bladder     Peripheral vascular disease ([facility] [address]        Past Surgical History:   Procedure Laterality Date    COLONOSCOPY N/A 12/4/2019    COL[facility]ha [facility]ges [facility] at [facility] ENDOSCOPY    HX HEENT Left     lens implant    HX ORTHOPAEDIC      finger amputation left hand    HX TONSILLECTOMY     . MEDICATIONS    Current Outpatient Medications   Medication Sig Dispense Refill    ammonium lactate (Lac-Hydrin Five) 5 % lotion Apply  to affected area two (2) times a day. 1 Bottle 0    tamsulosin (FLOMAX) 0.4 mg capsule Take 1 Cap by mouth daily (after dinner). 90 Cap 0    DULoxetine (CYMBALTA) 60 mg capsule Take 1 Cap by mouth daily. Indications: neuropathic pain 60 Cap 5    finasteride (PROSCAR) 5 mg tablet Take 5 mg by mouth daily.  albuterol sulfate 90 mcg/actuation aebs Take  by inhalation.  diclofenac (Voltaren) 1 % gel Apply 4 g to affected area two (2) times a day. 100 g 0    gabapentin (NEURONTIN) 300 mg capsule Take 2 Caps by mouth four (4) times daily. 240 Cap 4    ammonium lactate (AMLACTIN) 12 % topical cream Apply  to affected area two (2) times a day. rub in to affected area well 280 g 0    pantoprazole (PROTONIX) 40 mg tablet Take 1 Tab by mouth daily. 30 Tab 0    polyethylene glycol (MIRALAX) 17 gram packet Take 1 Packet by mouth daily. 30 Packet 0    amLODIPine (NORVASC) 10 mg tablet Take 1 Tab by mouth daily. 30 Tab 0    spironolactone (ALDACTONE) 25 mg tablet Take  by mouth daily.           ALLERGIES  Allergies   Allergen Reactions    Ampicillin Angioedema    Asa-Acetaminophen-Caff-Buffers Rash    Penicillins Angioedema    Aspirin Other (comments)     Stomach upset    Atorvastatin Other (comments)     Muscle cramps          SOCIAL HISTORY    Social History     Socioeconomic History    Marital status:      Spouse name: Not on file    Number of children: Not on file    Years of education: Not on file    Highest education level: Not on file   Tobacco Use    Smoking status: Former Smoker     Quit date: 2015     Years since quittin.5    Smokeless tobacco: Never Used   Substance and Sexual Activity    Alcohol use: Not Currently     Alcohol/week: 0.0 standard drinks     Comment: former stopped     Drug use: No    Sexual activity: Yes       FAMILY HISTORY  Family History   Problem Relation Age of Onset    Diabetes Mother     Hypertension Mother     Diabetes Maternal Grandmother     Hypertension Maternal Grandmother     Cancer Sister         brain    Cancer Sister         brain         PHYSICAL EXAMINATION  Visit Vitals  BP (!) 163/70   Pulse 96   Temp 98.2 °F (36.8 °C) (Oral) Resp 18   Ht 5' 7\" (1.702 m)   Wt 169 lb (76.7 kg)   BMI 26.47 kg/m²       Pain Assessment  1/8/2021   Location of Pain Shoulder   Pain Location Comment -   Location Modifiers Left;Right   Severity of Pain 9   Quality of Pain Throbbing; Roxann Bonine; Aching   Quality of Pain Comment -   Duration of Pain Persistent   Frequency of Pain Constant   Date Pain First Started -   Aggravating Factors (No Data)   Aggravating Factors Comment ROM   Limiting Behavior -   Relieving Factors Heat   Relieving Factors Comment -   Result of Injury No   Work-Related Injury -   How long out of work? -   Type of Injury -   Type of Injury Comment -           Constitutional:  Well developed, well nourished, in no acute distress. Psychiatric: Affect and mood are appropriate. Integumentary: No rashes or abrasions noted on exposed areas. SPINE/MUSCULOSKELETAL EXAM    On brief examination: None.       NCV & EMG Findings:  Nerve Conduction Studies  Anti Sensory Summary Table     Stim Site NR Peak (ms) Norm Peak (ms) O-P Amp (µV) Norm O-P Amp Site1 Site2 Delta-P (ms) Dist (cm) Stas (m/s) Norm Stas (m/s)   Left Median Anti Sensory (2nd Digit)   Wrist    3.8 <3.6 10.1 >10 Wrist 2nd Digit 3.8 14.0 37 >39   Elbow    3.9  7.9  Elbow Wrist 0.1 0.0  >48   Right Median Anti Sensory (2nd Digit)   Wrist    4.4 <3.6 11.9 >10 Wrist 2nd Digit 4.4 14.0 32 >39   Elbow    4.5  13.0  Elbow Wrist 0.1 0.0  >48   Left Radial Anti Sensory (Base 1st Digit)   Wrist    2.9 <3.1 23.4  Wrist Base 1st Digit 2.9 0.0     Right Radial Anti Sensory (Base 1st Digit)   Wrist    2.7 <3.1 25.3  Wrist Base 1st Digit 2.7 0.0     Left Ulnar Anti Sensory (5th Digit)   Wrist    7.3 <3.7 8.3 >15.0 Wrist 5th Digit 7.3 14.0 19 >38   A Elbow    7.3  7.3          Right Ulnar Anti Sensory (5th Digit)   Wrist    4.2 <3.7 7.6 >15.0 Wrist 5th Digit 4.2 14.0 33 >38   B Elbow    4.3  6.1  B Elbow Wrist 0.1 0.0  >47     Motor Summary Table     Stim Site NR Onset (ms) Norm Onset (ms) O-P Amp (mV) Norm O-P Amp Site1 Site2 Delta-0 (ms) Dist (cm) Stas (m/s) Norm Stas (m/s)   Left Median Motor (Abd Poll Brev)   Wrist    4.1 <4.2 8.0 >5 Elbow Wrist 4.7 23.5 50 >50   Elbow    8.8  8.1          Right Median Motor (Abd Poll Brev)   Wrist    4.7 <4.2 5.7 >5 Elbow Wrist 4.4 24.5 56 >50   Elbow    9.1  5.5          Left Ulnar Motor (Abd Dig Min)   Wrist    3.8 <4.2 9.7 >3 B Elbow Wrist 4.1 22.0 54 >53   B Elbow    7.9  8.8  A Elbow B Elbow 2.8 10.0 36 >53   A Elbow    10.7  8.4          Right Ulnar Motor (Abd Dig Min)   Wrist    3.3 <4.2 11.4 >3 B Elbow Wrist 4.0 22.0 55 >53   B Elbow    7.3  10.4  A Elbow B Elbow 2.3 10.0 43 >53   A Elbow    9.6  10.2            EMG     Side Muscle Nerve Root Ins Act Fibs Psw Amp Dur Poly Recrt Int [facility]   Right Biceps Musculocut C5-6 Nml Nml Nml Nml Nml 0 Nml Nml    Right Triceps Radial C6-7-8 Nml Nml Nml Nml Nml 0 Nml Nml    Right PronatorTeres Median C6-7 Nml Nml Nml Nml Nml 0 Nml Nml    Right Abd Poll Brev Median C8-T1 Nml Nml Nml Nml Nml 0 Nml Nml    Right 1stDorInt Ulnar C8-T1 Nml Nml Nml Nml Nml 0 Nml Nml    Left Biceps Musculocut C5-6 Nml Nml Nml Nml Nml 0 Nml Nml    Left Triceps Radial C6-7-8 Nml Nml Nml Nml Nml 0 Nml Nml    Left PronatorTeres Median C6-7 Nml Nml Nml Nml Nml 0 Nml Nml    Left Abd Poll Brev Median C8-T1 Nml Nml Nml Nml Nml 0 Nml Nml    Left 1stDorInt Ulnar C8-T1 Nml Nml Nml Nml Nml 0 Nml Nml        Nerve Conduction Studies  Anti Sensory Left/Right Comparison     Stim Site L Lat (ms) R Lat (ms) L-R Lat (ms) L Amp (µV) R Amp (µV) L-R Amp (%) Site1 Site2 L Stas (m/s) R Stas (m/s) L-R Stas (m/s)   Median Anti Sensory (2nd Digit)   Wrist 3.8 4.4 0.6 10.1 11.9 15.1 Wrist 2nd Digit 37 32 5   Elbow 3.9 4.5 0.6 7.9 13.0 39.2 Elbow Wrist      Radial Anti Sensory (Base 1st Digit)   Wrist 2.9 2.7 0.2 23.4 25.3 7.5 Wrist Base 1st Digit      Ulnar Anti Sensory (5th Digit)   Wrist 7.3 4.2 3.1 8.3 7.6 8.4 Wrist 5th Digit 19 33 14   A Elbow 7.3   7.3            Motor Left/Right Comparison     Stim Site L Lat (ms) R Lat (ms) L-R Lat (ms) L Amp (mV) R Amp (mV) L-R Amp (%) Site1 Site2 L Stas (m/s) R Stas (m/s) L-R Stas (m/s)   Median Motor (Abd Poll Brev)   Wrist 4.1 4.7 0.6 8.0 5.7 28.7 Elbow Wrist 50 56 6   Elbow 8.8 9.1 0.3 8.1 5.5 32.1        Ulnar Motor (Abd Dig Min)   Wrist 3.8 3.3 0.5 9.7 11.4 14.9 B Elbow Wrist 54 55 1   B Elbow 7.9 7.3 0.6 8.8 10.4 15.4 A Elbow B Elbow 36 43 7   A Elbow 10.7 9.6 1.1 8.4 10.2 17.6              Waveforms:                                  [facility]  OFFICE PROCEDURE PROGRESS NOTE        Chart reviewed for the following:   Yang ISAACS, have reviewed the History, Physical and updated the Allergic reactions for [address] [place] [address] performed immediately prior to start of procedure:   Yang ISAACS, have performed the following reviews on Richardson Murillo prior to the start of the procedure:            * Patient was identified by name and date of birth   * Agreement on procedure being performed was verified  * Risks and Benefits explained to the patient  * Procedure site verified and marked as necessary  * Patient was positioned for comfort  * Consent was signed and verified     Time: 9:49 AM    Date of procedure: 1/11/2021    Procedure performed by:  Noe Zaragoza MD    Provider accompanied by: Scribe. Patient accompanied by: Self.     How tolerated by patient: tolerated the procedure well with no complications    Post Procedural Pain Scale: 0 - No Hurt    Comments: none    Written by Ryanne Corley, [id number] \[facility]\"" 231 as dictated by Yang Donaldson MD

## 2021-01-13 ENCOUNTER — OFFICE VISIT (OUTPATIENT)
Dept: ORTHOPEDIC SURGERY | Age: 76
End: 2021-01-13
Payer: COMMERCIAL

## 2021-01-13 VITALS
WEIGHT: 170 LBS | RESPIRATION RATE: 16 BRPM | HEART RATE: 78 BPM | TEMPERATURE: 98.9 F | HEIGHT: 67 IN | SYSTOLIC BLOOD PRESSURE: 126 MMHG | BODY MASS INDEX: 26.68 KG/M2 | DIASTOLIC BLOOD PRESSURE: 77 MMHG | OXYGEN SATURATION: 93 %

## 2021-01-13 DIAGNOSIS — G62.9 POLYNEUROPATHY: ICD-10-CM

## 2021-01-13 DIAGNOSIS — G56.22 CUBITAL TUNNEL SYNDROME, LEFT: Primary | ICD-10-CM

## 2021-01-13 DIAGNOSIS — G56.02 LEFT CARPAL TUNNEL SYNDROME: ICD-10-CM

## 2021-01-13 DIAGNOSIS — G56.21 CUBITAL TUNNEL SYNDROME, RIGHT: ICD-10-CM

## 2021-01-13 DIAGNOSIS — G56.01 RIGHT CARPAL TUNNEL SYNDROME: ICD-10-CM

## 2021-01-13 PROCEDURE — 99214 OFFICE O/P EST MOD 30 MIN: CPT | Performed by: ORTHOPAEDIC SURGERY

## 2021-01-13 NOTE — PROGRESS NOTES
Deep Mosquera is a 77 y.o. male right handed retiree. Worker's Compensation and legal considerations: none filed. Vitals:    01/13/21 0948   BP: 126/77   Pulse: 78   Resp: 16   Temp: 98.9 °F (37.2 °C)   SpO2: 93%   Weight: 170 lb (77.1 kg)   Height: 5' 7\" (1.702 m)   PainSc:   0 - No pain           Chief Complaint   Patient presents with    Hand Pain     bilateral       HPI: Patient returns today for follow-up of bilateral upper extremity EMGs. He is recently discussed with a shoulder surgeon having a shoulder replacement. This is on the right side. He says he would like to have the left hand numbness taken care of first.    12/2020 HPI: Patient comes in today regarding left worse than right upper extremity numbness and pain. He reports little finger and the ring finger on the left to be numb most of the time of pain radiating up the arm. He reports occasional pain and swelling on the right side. Initial HPI: Patient comes in today regarding concerns of having a metal foreign body in his on something metallic right middle finger tip in his left thumb tip. He says that he scratched both areas last year and has since had issues with it. He thinks he may have gotten something metallic out of the right middle finger and then it bled after that. He reports some continued tenderness to palpation.     Date of onset:  9/2019    Injury: Yes: Comment: cut left thumb and right middle finger    Prior Treatment:  No    Numbness/ Tingling: Yes: Comment: Left worse than right ulnar-sided digits    ROS: Review of Systems - General ROS: negative  Respiratory ROS: no cough, shortness of breath, or wheezing  Cardiovascular ROS: no chest pain or dyspnea on exertion  Musculoskeletal ROS: positive for - pain in finger - right and thumb - left  Neurological ROS: Positive for numbness and tingling  Dermatological ROS: negative    Past Medical History:   Diagnosis Date    Bladder outlet obstruction     Erectile disorder due to medical 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     Frequency of urination     H/O spinal cord injury 1974    lumbar spine injury secondary to fall    HTN (hypertension)     Hypercholesterolemia     Illiterate     Injury of lumbar spine ([facility] 1974    Leg pain, right     Nocturia     Overactive bladder     Peripheral vascular disease ([facility]        Past Surgical History:   Procedure Laterality Date    COLONOSCOPY N/A 12/4/2019    COL[facility] Des [facility]cal [facility]COPY    HX HEENT Left     lens implant    HX ORTHOPAEDIC      finger amputation left hand    HX TONSILLECTOMY         Current Outpatient Medications   Medication Sig Dispense Refill    ammonium lactate (Lac-Hydrin Five) 5 % lotion Apply  to affected area two (2) times a day. 1 Bottle 0    tamsulosin (FLOMAX) 0.4 mg capsule Take 1 Cap by mouth daily (after dinner). 90 Cap 0    DULoxetine (CYMBALTA) 60 mg capsule Take 1 Cap by mouth daily. Indications: neuropathic pain 60 Cap 5    finasteride (PROSCAR) 5 mg tablet Take 5 mg by mouth daily.  albuterol sulfate 90 mcg/actuation aebs Take  by inhalation.  diclofenac (Voltaren) 1 % gel Apply 4 g to affected area two (2) times a day. 100 g 0    gabapentin (NEURONTIN) 300 mg capsule Take 2 Caps by mouth four (4) times daily. 240 Cap 4    ammonium lactate (AMLACTIN) 12 % topical cream Apply  to affected area two (2) times a day. rub in to affected area well 280 g 0    pantoprazole (PROTONIX) 40 mg tablet Take 1 Tab by mouth daily. 30 Tab 0    polyethylene glycol (MIRALAX) 17 gram packet Take 1 Packet by mouth daily. 30 Packet 0    amLODIPine (NORVASC) 10 mg tablet Take 1 Tab by mouth daily. 30 Tab 0    spironolactone (ALDACTONE) 25 mg tablet Take  by mouth daily.          Allergies   Allergen Reactions    Ampicillin Angioedema    Asa-Acetaminophen-Caff-Buffers Rash    Penicillins Angioedema    Aspirin Other (comments)     Stomach upset    Atorvastatin Other (comments)     Muscle cramps         PE:     Physical Exam  Vitals signs and nursing note reviewed. Constitutional:       General: He is not in acute distress. Appearance: Normal appearance. He is not ill-appearing, toxic-appearing or diaphoretic. HENT:      Head: Normocephalic and atraumatic. Nose: Nose normal.      Mouth/Throat:      Mouth: Mucous membranes are moist.   Eyes:      Extraocular Movements: Extraocular movements intact. Pupils: Pupils are equal, round, and reactive to light. Neck:      Musculoskeletal: Normal range of motion and neck supple. Cardiovascular:      Pulses: Normal pulses. Pulmonary:      Effort: Pulmonary effort is normal. No respiratory distress. Abdominal:      General: Abdomen is flat. There is no distension. Musculoskeletal: Normal range of motion. General: No swelling, tenderness, deformity or signs of injury. Right lower leg: No edema. Left lower leg: No edema. Skin:     General: Skin is warm and dry. Capillary Refill: Capillary refill takes less than 2 seconds. Findings: No bruising or erythema. Neurological:      General: No focal deficit present. Mental Status: He is alert and oriented to person, place, and time. Cranial Nerves: No cranial nerve deficit. Sensory: Sensory deficit present. Psychiatric:         Mood and Affect: Mood normal.         Behavior: Behavior normal.         NEUROVASCULAR    Examination L R Examination L R   Carpal Comp. - - Pronator Comp. - -   Carpal Tinel - - Pronator Tinel - -   Phalen's - - Pronator Stress - -   Cubital Comp. ++ +- Guyon Comp. - -   Cubital Tinel ++ + Guyon Tinel - -   Elbow Hyperflexion + - Adson's - -   Spurling's - - SC Comp. - -   PCB Median abn - - SC Tinel - -   Radial Tinel - - IC Comp.  - -   Digital Tinel - - IC Tinel - -   Radial 2-Pt WNL WNL Ulnar 2-Pt WNL WNL     Radial Pulse: 2+  Capillary Refill: < 2 sec  Narayan: Not Performed  [employer]: Not Performed    NCV & EMG Findings:  Evaluation of the right median motor nerve showed prolonged distal onset latency (4.7 ms). The left ulnar motor and the right ulnar motor nerves showed decreased conduction velocity (A Elbow-B Elbow, L36, R43 m/s). The left median sensory and the right median sensory nerves showed prolonged distal peak latency (L3.8, R4.4 ms) and decreased conduction velocity (Wrist-2nd Digit, L37, R32 m/s). The left ulnar sensory and the right ulnar sensory nerves showed prolonged distal peak latency (L7.3, R4.2 ms), reduced amplitude (L8.3, R7.6 µV), and decreased conduction velocity (Wrist-5th Digit, L19, R33 m/s). All remaining nerves (as indicated in the following tables) were within normal limits. Left vs. Right side comparison data for the median sensory nerve indicates abnormal L-R latency difference (0.6 ms). The ulnar sensory nerve indicates abnormal L-R latency difference (3.1 ms). All remaining left vs. right side differences were within normal limits.       All examined muscles (as indicated in the following table) showed no evidence of electrical instability.       INTERPRETATION     This is an abnormal electrodiagnostic examination. These findings may be consistent with:   1. Sensorimotor polyneuropathy - this is based on mild unexpected abnormalities throughout the NCS unrelated to other entrapment syndromes. There is possibility that the severity of the other entrapment neuropathies is worsened by this polyneuropathy. 2. Moderate ulnar mononeuropathy at the right elbow (cubital tunnel syndrome)   3. Moderate median mononeuropathy at the right wrist (carpal tunnel syndrome)   4. Moderate ulnar mononeuropathy at the left elbow (cubital tunnel syndrome)   5.  Mild median mononeuropathy at the left wrist (carpal tunnel syndrome)     There is no electrodiagnostic evidence of any cervical radiculopathy, brachial plexopathy,  or any other mononeuropathy.        CLINICAL INTERPRETATION  His electrodiagnostic findings of carpal tunnel and cubital tunnel syndromes bilaterally are consistent with his bilateral hand symptoms. Imagin2020 plain films of the left thumb as well as the right middle finger does not show any evidence of radiopaque foreign body any fracture dislocation or other osseous abnormality. Of note on the left thumb view a index finger metallic foreign body is noted on the radial aspect of the P1. ICD-10-CM ICD-9-CM    1. Cubital tunnel syndrome, left  G56.22 354.2 SCHEDULE SURGERY   2. Left carpal tunnel syndrome  G56.02 354.0 SCHEDULE SURGERY   3. Cubital tunnel syndrome, right  G56.21 354.2    4. Right carpal tunnel syndrome  G56.01 354.0    5. Polyneuropathy  G62.9 356.9        Plan:     Gradual left cubital tunnel release and left carpal tunnel release. This procedure has been fully reviewed with the patient and written informed consent has been obtained. The patient was counseled at length about the risks of ryan Covid-19 during their perioperative period and any recovery window from their procedure. The patient was made aware that ryan Covid-19  may worsen their prognosis for recovering from their procedure and lend to a higher morbidity and/or mortality risk. All material risks, benefits, and reasonable alternatives including postponing the procedure were discussed. The patient does  wish to proceed with the procedure at this time. Follow-up and Dispositions    · Return for 2 weeks postop.          Plan was reviewed with patient, who verbalized agreement and understanding of the plan

## 2021-01-14 DIAGNOSIS — Z01.818 PREOP EXAMINATION: Primary | ICD-10-CM

## 2021-01-21 ENCOUNTER — OFFICE VISIT (OUTPATIENT)
Dept: CARDIOLOGY CLINIC | Age: 76
End: 2021-01-21
Payer: COMMERCIAL

## 2021-01-21 VITALS
RESPIRATION RATE: 18 BRPM | DIASTOLIC BLOOD PRESSURE: 64 MMHG | HEART RATE: 84 BPM | WEIGHT: 170.6 LBS | HEIGHT: 67 IN | BODY MASS INDEX: 26.78 KG/M2 | TEMPERATURE: 97.5 F | SYSTOLIC BLOOD PRESSURE: 127 MMHG | OXYGEN SATURATION: 97 %

## 2021-01-21 DIAGNOSIS — E78.00 HYPERCHOLESTEROLEMIA: ICD-10-CM

## 2021-01-21 DIAGNOSIS — I10 ESSENTIAL HYPERTENSION: ICD-10-CM

## 2021-01-21 DIAGNOSIS — Q24.5 CORONARY-MYOCARDIAL BRIDGE: Primary | ICD-10-CM

## 2021-01-21 DIAGNOSIS — Z01.810 PREOP CARDIOVASCULAR EXAM: ICD-10-CM

## 2021-01-21 PROCEDURE — 99214 OFFICE O/P EST MOD 30 MIN: CPT | Performed by: INTERNAL MEDICINE

## 2021-01-21 NOTE — PROGRESS NOTES
HISTORY OF PRESENT ILLNESS  Alan Barros is a 77 y.o. male. 2//2020  Patient is here for follow-up after recent evaluation in emergency room for chest pain. Has he was painting all day and subsequently started having pain under her armpit on both side. Pain worse on movement. Worse on sitting and moving around radiates to the back. He has short of breath on exertion which does not appear changed. 1/2021  Patient here for follow-up. His stable pattern of chest pain or shortness of breath. Denies any significant change at present. He is here for preoperative assessment    Chest Pain (Angina)   The history is provided by the patient. This is a new problem. The current episode started more than 1 week ago. The problem has been gradually worsening. The problem occurs daily. The pain is associated with exertion and rest. The pain is present in the substernal region, right side and left side. The pain is mild. The quality of the pain is described as pressure-like and heavy. The pain does not radiate. Pertinent negatives include no abdominal pain, no claudication, no cough, no dizziness, no fever, no headaches, no hemoptysis, no nausea, no orthopnea, no palpitations, no PND, no shortness of breath, no sputum production, no vomiting and no weakness. Shortness of Breath  The history is provided by the patient. This is a new problem. The problem occurs frequently. The current episode started more than 1 week ago. The problem has been gradually worsening. Pertinent negatives include no fever, no headaches, no cough, no sputum production, no hemoptysis, no wheezing, no PND, no orthopnea, no chest pain, no vomiting, no abdominal pain, no rash, no leg swelling and no claudication. Precipitated by: walking,exertion. Review of Systems   Constitutional: Negative for chills and fever. HENT: Negative for nosebleeds. Eyes: Negative for blurred vision and double vision.    Respiratory: Negative for cough, hemoptysis, sputum production, shortness of breath and wheezing. Cardiovascular: Negative for chest pain, palpitations, orthopnea, claudication, leg swelling and PND. Gastrointestinal: Negative for abdominal pain, heartburn, nausea and vomiting. Musculoskeletal: Negative for myalgias. Skin: Negative for rash. Neurological: Negative for dizziness, weakness and headaches. Endo/Heme/Allergies: Does not bruise/bleed easily.      Family History   Problem Relation Age of Onset    Diabetes Mother     Hypertension Mother     Diabetes Maternal Grandmother     Hypertension Maternal Grandmother     Cancer Sister         brain    Cancer Sister         brain       Past Medical History:   Diagnosis Date    Bladder outlet obstruction     Erectile disorder due to medical 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     Frequency of urination     H/O spinal cord injury     lumbar spine injury secondary to fall    HTN (hypertension)     Hypercholesterolemia     Illiterate     [facility] ([facility] [address]     Leg pain, right     Nocturia     Overactive bladder     Peripheral vascular disease ([facility] [facility]        Past Surgical History:   Procedure Laterality Date    COLONOSCOPY N/A 2019    COLONOS[facility] performed by Fariha Hodges MD at [facility] ENDOSCOPY    HX HEENT Left     lens implant    HX ORTHOPAEDIC      finger amputation left hand    HX TONSILLECTOMY         Social History     Tobacco Use    Smoking status: Former Smoker     Quit date: 2015     Years since quittin.5    Smokeless tobacco: Never Used   Substance Use Topics    Alcohol use: Not Currently     Alcohol/week: 0.0 standard drinks     Comment: former stopped        Allergies   Allergen Reactions    Ampicillin Angioedema    Asa-Acetaminophen-Caff-Buffers Rash    Penicillins Angioedema    Aspirin Other (comments)     Stomach upset    Atorvastatin Other (comments)     Muscle cramps       Prior to Admission medications    Medication Sig Start Date End Date Taking? Authorizing Provider   tamsulosin (FLOMAX) 0.4 mg capsule Take 1 Cap by mouth daily (after dinner). 9/24/20  Yes Angelito Sims MD   DULoxetine (CYMBALTA) 60 mg capsule Take 1 Cap by mouth daily. Indications: neuropathic pain 9/11/20  Yes Garfield ALFARO MD   finasteride (PROSCAR) 5 mg tablet Take 5 mg by mouth daily. Yes Provider, Historical   albuterol sulfate 90 mcg/actuation aebs Take  by inhalation. Yes Provider, Historical   ammonium lactate (AMLACTIN) 12 % topical cream Apply  to affected area two (2) times a day. rub in to affected area well 2/20/20  Yes Meir Quintero PA-C   pantoprazole (PROTONIX) 40 mg tablet Take 1 Tab by mouth daily. 3/28/19  Yes Bandar Villalba MD   polyethylene glycol [facility]) 17 gram packet Take 1 Packet by mouth daily. 3/28/19  Yes Bandar Villalba MD   amLODIPine (NORVASC) 10 mg tablet Take 1 Tab by mouth daily. 3/28/19  Yes Bandar Villalba MD   spironolactone (ALDACTONE) 25 mg tablet Take  by mouth daily. Yes Provider, Historical   ammonium lactate (Lac-Hydrin Five) 5 % lotion Apply  to affected area two (2) times a day. 12/10/20   Seth Birmingham MD   diclofenac (Voltaren) 1 % gel Apply 4 g to affected area two (2) times a day. 3/16/20   Seth Birmingham MD   gabapentin (NEURONTIN) 300 mg capsule Take 2 Caps by mouth four (4) times daily. 3/9/20   Jason Oakley MD         Visit Vitals  /64   Pulse 84   Temp 97.5 °F (36.4 °C) (Temporal)   Resp 18   Ht 5' 7\" (1.702 m)   Wt 77.4 kg (170 lb 9.6 oz)   SpO2 97%   BMI 26.72 kg/m²     Physical Exam  Constitutional:       Appearance: He is well-developed and well-nourished. HENT:      Head: Normocephalic and atraumatic. Eyes:      Conjunctiva/sclera: Conjunctivae normal.   Neck:      Musculoskeletal: Neck supple. Thyroid: No thyromegaly. Vascular: No JVD. Trachea: No tracheal deviation.    Cardiovascular:      Rate and Rhythm: Normal rate and regular rhythm. Heart sounds: Normal heart sounds. No murmur. No friction rub. No gallop. Pulmonary:      Effort: No respiratory distress. Breath sounds: Normal breath sounds. No wheezing or rales. Chest:      Chest wall: No tenderness. Abdominal:      Palpations: Abdomen is soft. Tenderness: There is no abdominal tenderness. Musculoskeletal:         General: No edema. Skin:     General: Skin is warm and dry. Neurological:      Mental Status: He is alert and oriented to person, place, and time. Psychiatric:         Mood and Affect: Mood and affect normal.   Interpretation Summary 10/2018    Normal right lower extremity arterial findings. Moderate PAD left lower extremity. Disease noted at tibial level. 10/2018 EKG  DiagnosisFinal Sinus bradycardia   Moderate voltage criteria for LVH, may be normal variant   Possible Acute pericarditis   Abnormal ECG  2015stress echo  Impressions: Normal study after maximal exercise without reproduction of  symptoms   ECG conclusions: The stress ECG was normal. Based on [employer] Treadmill  Scoring, this patient was at low risk for cardiac events. Mr. Man Cardoso has a reminder for a \"due or due soon\" health maintenance. I have asked that he contact his primary care provider for follow-up on this health maintenance. I have personally reviewed patient's records available from hospital and other providers and incorporated findings in patient care. I have personally reviewed patients ekg done at other facility. I Have personally reviewed recent relevant labs available and discussed with patient    Conclusion cardiac cath 3/2019    Non obstructive epicardial arteries with moderate mid LAD myocardial bridging noted. Will optimize CCB dose. Continue risk factor modification. Complications                      Coronary Findings     Diagnostic   Dominance: Right   Left Main   The vessel was visualized by angiography.  The vessel is angiographically normal.   Left Anterior Descending   The vessel was visualized by angiography. The vessel is angiographically normal. Moderate myocardial bridging noted in mid LAD   Left Circumflex   The vessel was visualized by angiography. The vessel is angiographically normal.   Right Coronary Artery   The vessel was visualized by angiography. The vessel is angiographically normal.   Intervention     No interventions have been documented. Procedure Conclusion     Nuclear Stress Test 3/2019    Abnormal myocardial perfusion imaging. Reversible defect consistent with myocardial ischemia. Myocardial perfusion imaging supports an intermediate risk stress test.   There is no prior study available for comparison. .   Interpretation Summary     · Baseline ECG: Sinus bradycardia, ST elevation, consider early repolarization, pericarditis or injury Minimal voltage criteria for LVH maybe normal variant. · Gated SPECT: Left ventricular function post-stress was normal. Calculated ejection fraction is 71%. There is no evidence of transient ischemic dilation (TID). The TID ratio is 0.95. · Negative stress test.  · Left ventricular perfusion is probably abnormal.  · Myocardial perfusion imaging defect 1: There is a defect that is small to moderate in size with a mild reduction in uptake present in the mid to basal inferior location(s) that is partially reversible. There is normal wall motion in the defect area. Viability in the area is good. The possibility of ischemia cannot be excluded. Perfusion defect was visually present without quantitative evidence. · Abnormal myocardial perfusion imaging. Reversible defect consistent with myocardial ischemia. Myocardial perfusion imaging supports an intermediate risk stress test.        Interpretation Summary 11/2019    · Left Ventricle: Normal cavity size, systolic function (ejection fraction normal) and diastolic function. Mildly increased wall thickness. Estimated left ventricular ejection fraction is 56 - 60%. Visually measured ejection fraction. No regional wall motion abnormality noted. Assessment         ICD-10-CM ICD-9-CM    1. 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 Essential hypertension  I10 401.9     Stable controlled continue current therapy   3. Preop cardiovascular exam  Z01.810 V72.81     Stable cardiac status okay to proceed with surgery as planned medium cardiac risk   4. Hypercholesterolemia  E78.00 272.0     Continue current therapy lab with PCP     3/2019  New patient with increased chest pain and shortness of breath. Possible angina. Rule out CHF cardiomyopathy. Patient was intolerant to atorvastatin in past due to muscle spasm. Recheck lipids and decide on alternate statin. Patient had peptic ulcer many years ago and was told not to take aspirin as it upsets his stomach. Consider Plavix if needed    4/2019  Continues to have pain atypical chest pain. No significant CAD. Currently on Protonix. Will use Mylanta plus for gas. He has GI appointment next week. Cardiac status stable  2/2020  Seen for atypical chest pain clinically musculoskeletal on 2 sides and back. Started after painting. Use Aleve 1 tablet twice a day for 1 week    9/2020 virtual visit  On and off episode of chest tightness. We will continue medical management. Emergency room if symptoms are severe. GI work-up is in progress and okay to do work-up as needed  1/ 2021  Cardiac status stable with stable angina. No significant epicardial coronary disease. Has myocardial bridge. Normal ejection fraction okay to proceed with orthopedic surgery as planned. Medium cardiac risk    There are no discontinued medications. No orders of the defined types were placed in this encounter. Follow-up and Dispositions    · Return in about 6 months (around 7/21/2021) for Cleared for planned surgery, moderate risk.

## 2021-01-21 NOTE — LETTER
21 Dear Dr.S Cary Sever: 
 
Re: Tressa Leblanc : 1945 Mr. Delia Cano is cleared from a cardiac standpoint with moderate risk for Hand surgery scheduled on 21. If you have any questions or any further assistance is needed please contact our office. Sincerely, Hiren Nelson M.D.  
 
cc:  Ramy Mathias MD

## 2021-01-21 NOTE — PROGRESS NOTES
Yordan Sutherland presents today for   Chief Complaint   Patient presents with    Shortness of Breath    Surgical Clearance       Is someone accompanying this pt? no    Is the patient using any DME equipment during OV? no    Depression Screening:  3 most recent PHQ Screens 1/21/2021   PHQ Not Done -   Little interest or pleasure in doing things Not at all   Feeling down, depressed, irritable, or hopeless Not at all   Total Score PHQ 2 0       Learning Assessment:  Learning Assessment 4/30/2019   PRIMARY LEARNER Patient   PRIMARY LANGUAGE ENGLISH   LEARNER PREFERENCE PRIMARY DEMONSTRATION     VIDEOS   ANSWERED BY patient   RELATIONSHIP SELF       Fall Risk  Fall Risk Assessment, last 12 mths 1/21/2021   Able to walk? Yes   Fall in past 12 months? 1   Do you feel unsteady? 1   Are you worried about falling 0   Is TUG test greater than 12 seconds? 0   Is the gait abnormal? 0   Number of falls in past 12 months 1   Fall with injury? 0       ADL  No flowsheet data found. Health Maintenance reviewed and discussed and ordered per Provider. Health Maintenance Due   Topic Date Due    Hepatitis C Screening  1945    COVID-19 Vaccine (1 of 2) 12/17/1962    Shingrix Vaccine Age 50> (1 of 2) 12/17/1996    GLAUCOMA SCREENING Q2Y  12/17/2011    Pneumococcal 65+ years (1 of 1 - PPSV23) 12/17/2011    Flu Vaccine (1) 09/01/2020   . Coordination of Care:  1. Have you been to the ER, urgent care clinic since your last visit? Hospitalized since your last visit? no    2. Have you seen or consulted any other health care providers outside of the [address] [place] [address] since your last visit? Include any pap smears or colon screening.  Yes, Ear, Nose and throat, [facility]

## 2021-01-28 ENCOUNTER — HOSPITAL ENCOUNTER (OUTPATIENT)
Dept: PREADMISSION TESTING | Age: 76
Discharge: HOME | End: 2021-01-28
Payer: COMMERCIAL

## 2021-01-28 DIAGNOSIS — Z01.818 PREOP EXAMINATION: ICD-10-CM

## 2021-01-28 LAB
ALBUMIN SERPL-MCNC: 3.8 G/DL (ref 3.4–5)
ALBUMIN/GLOB SERPL: 1.2 {RATIO} (ref 0.8–1.7)
ALP SERPL-CCNC: 72 U/L (ref 45–117)
ALT SERPL-CCNC: 18 U/L (ref 16–61)
ANION GAP SERPL CALC-SCNC: 3 MMOL/L (ref 3–18)
AST SERPL-CCNC: 14 U/L (ref 10–38)
ATRIAL RATE: 79 BPM
BASOPHILS # BLD: 0 K/UL (ref 0–0.1)
BASOPHILS NFR BLD: 0 % (ref 0–2)
BILIRUB SERPL-MCNC: 0.6 MG/DL (ref 0.2–1)
BUN SERPL-MCNC: 9 MG/DL (ref 7–18)
BUN/CREAT SERPL: 9 (ref 12–20)
CALCIUM SERPL-MCNC: 9.2 MG/DL (ref 8.5–10.1)
CALCULATED P AXIS, ECG09: 49 DEGREES
CALCULATED R AXIS, ECG10: -24 DEGREES
CALCULATED T AXIS, ECG11: 71 DEGREES
CHLORIDE SERPL-SCNC: 108 MMOL/L (ref 100–111)
CO2 SERPL-SCNC: 30 MMOL/L (ref 21–32)
CREAT SERPL-MCNC: 1.03 MG/DL (ref 0.6–1.3)
DIAGNOSIS, 93000: NORMAL
DIFFERENTIAL METHOD BLD: ABNORMAL
EOSINOPHIL # BLD: 0.2 K/UL (ref 0–0.4)
EOSINOPHIL NFR BLD: 4 % (ref 0–5)
ERYTHROCYTE [DISTWIDTH] IN BLOOD BY AUTOMATED COUNT: 14.5 % (ref 11.6–14.5)
GLOBULIN SER CALC-MCNC: 3.3 G/DL (ref 2–4)
GLUCOSE SERPL-MCNC: 86 MG/DL (ref 74–99)
HCT VFR BLD AUTO: 41.5 % (ref 36–48)
HGB BLD-MCNC: 13.9 G/DL (ref 13–16)
LYMPHOCYTES # BLD: 1.4 K/UL (ref 0.9–3.6)
LYMPHOCYTES NFR BLD: 29 % (ref 21–52)
MCH RBC QN AUTO: 30.6 PG (ref 24–34)
MCHC RBC AUTO-ENTMCNC: 33.5 G/DL (ref 31–37)
MCV RBC AUTO: 91.4 FL (ref 74–97)
MONOCYTES # BLD: 0.5 K/UL (ref 0.05–1.2)
MONOCYTES NFR BLD: 11 % (ref 3–10)
NEUTS SEG # BLD: 2.8 K/UL (ref 1.8–8)
NEUTS SEG NFR BLD: 56 % (ref 40–73)
P-R INTERVAL, ECG05: 164 MS
PLATELET # BLD AUTO: 260 K/UL (ref 135–420)
PMV BLD AUTO: 9.7 FL (ref 9.2–11.8)
POTASSIUM SERPL-SCNC: 4.4 MMOL/L (ref 3.5–5.5)
PROT SERPL-MCNC: 7.1 G/DL (ref 6.4–8.2)
Q-T INTERVAL, ECG07: 382 MS
QRS DURATION, ECG06: 90 MS
QTC CALCULATION (BEZET), ECG08: 438 MS
RBC # BLD AUTO: 4.54 M/UL (ref 4.7–5.5)
SODIUM SERPL-SCNC: 141 MMOL/L (ref 136–145)
VENTRICULAR RATE, ECG03: 79 BPM
WBC # BLD AUTO: 4.9 K/UL (ref 4.6–13.2)

## 2021-01-28 PROCEDURE — 36415 COLL VENOUS BLD VENIPUNCTURE: CPT

## 2021-01-28 PROCEDURE — U0003 INFECTIOUS AGENT DETECTION BY NUCLEIC ACID (DNA OR RNA); SEVERE ACUTE RESPIRATORY SYNDROME CORONAVIRUS 2 (SARS-COV-2) (CORONAVIRUS DISEASE [COVID-19]), AMPLIFIED PROBE TECHNIQUE, MAKING USE OF HIGH THROUGHPUT TECHNOLOGIES AS DESCRIBED BY CMS-2020-01-R: HCPCS

## 2021-01-28 PROCEDURE — 93005 ELECTROCARDIOGRAM TRACING: CPT

## 2021-01-28 PROCEDURE — 80053 COMPREHEN METABOLIC PANEL: CPT

## 2021-01-28 PROCEDURE — 85025 COMPLETE CBC W/AUTO DIFF WBC: CPT

## 2021-01-29 LAB — SARS-COV-2, COV2NT: NOT DETECTED

## 2021-01-29 NOTE — H&P
Garcia Hawley is a 77 y.o. male right handed retiree. Worker's Compensation and legal considerations: none filed.         Vitals:     01/13/21 0948   BP: 126/77   Pulse: 78   Resp: 16   Temp: 98.9 °F (37.2 °C)   SpO2: 93%   Weight: 170 lb (77.1 kg)   Height: 5' 7\" (1.702 m)   PainSc:   0 - No pain                    Chief Complaint   Patient presents with    Hand Pain       bilateral         HPI: Patient returns today for follow-up of bilateral upper extremity EMGs. He is recently discussed with a shoulder surgeon having a shoulder replacement. This is on the right side. He says he would like to have the left hand numbness taken care of first.     12/2020 HPI: Patient comes in today regarding left worse than right upper extremity numbness and pain. He reports little finger and the ring finger on the left to be numb most of the time of pain radiating up the arm. He reports occasional pain and swelling on the right side.     Initial HPI: Patient comes in today regarding concerns of having a metal foreign body in his on something metallic right middle finger tip in his left thumb tip. He says that he scratched both areas last year and has since had issues with it. He thinks he may have gotten something metallic out of the right middle finger and then it bled after that.   He reports some continued tenderness to palpation.     Date of onset:  9/2019     Injury: Yes: Comment: cut left thumb and right middle finger     Prior Treatment:  No     Numbness/ Tingling: Yes: Comment: Left worse than right ulnar-sided digits     ROS: Review of Systems - General ROS: negative  Respiratory ROS: no cough, shortness of breath, or wheezing  Cardiovascular ROS: no chest pain or dyspnea on exertion  Musculoskeletal ROS: positive for - pain in finger - right and thumb - left  Neurological ROS: Positive for numbness and tingling  Dermatological ROS: negative          Past Medical History:   Diagnosis Date    Bladder outlet obstruction      Erectile disorder due to medical 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      Frequency of urination      H/O spinal cord injury 1974     lumbar spine injury secondary to fall    HTN (hypertension)      Hypercholesterolemia      Illiterate      Injury of lumbar spine ([address] 1974    Leg pain, right      Nocturia      Overactive bladder      Peripheral vascular disease [facility])           Surgical History         Past Surgical History:   Procedure Laterality Date    COLONOSCOPY N/A 12/4/2019     COLONOSCOPY performed by Henny Zimmer MD at [facility] ENDOSCOPY    HX HEENT Left       lens implant    HX ORTHOPAEDIC         finger amputation left hand    HX TONSILLECTOMY                       Current Outpatient Medications   Medication Sig Dispense Refill    ammonium lactate (Lac-Hydrin Five) 5 % lotion Apply  to affected area two (2) times a day. 1 Bottle 0    tamsulosin (FLOMAX) 0.4 mg capsule Take 1 Cap by mouth daily (after dinner). 90 Cap 0    DULoxetine (CYMBALTA) 60 mg capsule Take 1 Cap by mouth daily. Indications: neuropathic pain 60 Cap 5    finasteride (PROSCAR) 5 mg tablet Take 5 mg by mouth daily.        albuterol sulfate 90 mcg/actuation aebs Take  by inhalation.        diclofenac (Voltaren) 1 % gel Apply 4 g to affected area two (2) times a day. 100 g 0    gabapentin (NEURONTIN) 300 mg capsule Take 2 Caps by mouth four (4) times daily. 240 Cap 4    ammonium lactate (AMLACTIN) 12 % topical cream Apply  to affected area two (2) times a day. rub in to affected area well 280 g 0    pantoprazole (PROTONIX) 40 mg tablet Take 1 Tab by mouth daily. 30 Tab 0    polyethylene glycol (MIRALAX) 17 gram packet Take 1 Packet by mouth daily. 30 Packet 0    amLODIPine (NORVASC) 10 mg tablet Take 1 Tab by mouth daily.  30 Tab 0    spironolactone (ALDACTONE) 25 mg tablet Take  by mouth daily.                   Allergies   Allergen Reactions    Ampicillin Angioedema    Asa-Acetaminophen-Caff-Buffers Rash    Penicillins Angioedema    Aspirin Other (comments)       Stomach upset    Atorvastatin Other (comments)       Muscle cramps            PE:      Physical Exam  Vitals signs and nursing note reviewed. Constitutional:       General: He is not in acute distress. Appearance: Normal appearance. He is not ill-appearing, toxic-appearing or diaphoretic. HENT:      Head: Normocephalic and atraumatic. Nose: Nose normal.      Mouth/Throat:      Mouth: Mucous membranes are moist.   Eyes:      Extraocular Movements: Extraocular movements intact. Pupils: Pupils are equal, round, and reactive to light. Neck:      Musculoskeletal: Normal range of motion and neck supple. Cardiovascular:      Pulses: Normal pulses. Pulmonary:      Effort: Pulmonary effort is normal. No respiratory distress. Abdominal:      General: Abdomen is flat. There is no distension. Musculoskeletal: Normal range of motion. General: No swelling, tenderness, deformity or signs of injury. Right lower leg: No edema. Left lower leg: No edema. Skin:     General: Skin is warm and dry. Capillary Refill: Capillary refill takes less than 2 seconds. Findings: No bruising or erythema. Neurological:      General: No focal deficit present. Mental Status: He is alert and oriented to person, place, and time. Cranial Nerves: No cranial nerve deficit. Sensory: Sensory deficit present. Psychiatric:         Mood and Affect: Mood normal.         Behavior: Behavior normal.            NEUROVASCULAR     Examination L R Examination L R   Carpal Comp. - - Pronator Comp. - -   Carpal Tinel - - Pronator Tinel - -   Phalen's - - Pronator Stress - -   Cubital Comp. ++ +- Guyon Comp. - -   Cubital Tinel ++ + Guyon Tinel - -   Elbow Hyperflexion + - Adson's - -   Spurling's - - SC Comp. - -   PCB Median abn - - SC Tinel - -   Radial Tinel - - IC Comp.  - -   Digital Tinel - - IC Tinel - -   Radial 2-Pt WNL WNL Ulnar 2-Pt WNL WNL      Radial Pulse: 2+  Capillary Refill: < 2 sec  [employer]: Not Performed  [employer]: Not Performed     NCV & EMG Findings:  Evaluation of the right median motor nerve showed prolonged distal onset latency (4.7 ms).  The left ulnar motor and the right ulnar motor nerves showed decreased conduction velocity (A Elbow-B Elbow, L36, R43 m/s).  The left median sensory and the right median sensory nerves showed prolonged distal peak latency (L3.8, R4.4 ms) and decreased conduction velocity (Wrist-2nd Digit, L37, R32 m/s).  The left ulnar sensory and the right ulnar sensory nerves showed prolonged distal peak latency (L7.3, R4.2 ms), reduced amplitude (L8.3, R7.6 µV), and decreased conduction velocity (Wrist-5th Digit, L19, R33 m/s).  All remaining nerves (as indicated in the following tables) were within normal limits.  Left vs. Right side comparison data for the median sensory nerve indicates abnormal L-R latency difference (0.6 ms).  The ulnar sensory nerve indicates abnormal L-R latency difference (3.1 ms).  All remaining left vs. right side differences were within normal limits.       All examined muscles (as indicated in the following table) showed no evidence of electrical instability.       INTERPRETATION     This is an abnormal electrodiagnostic examination. These findings may be consistent with:   1. Sensorimotor polyneuropathy - this is based on mild unexpected abnormalities throughout the NCS unrelated to other entrapment syndromes. There is possibility that the severity of the other entrapment neuropathies is worsened by this polyneuropathy.    2. Moderate ulnar mononeuropathy at the right elbow (cubital tunnel syndrome)   3. Moderate median mononeuropathy at the right wrist (carpal tunnel syndrome)   4.  Moderate ulnar mononeuropathy at the left elbow (cubital tunnel syndrome)   5. Mild median mononeuropathy at the left wrist (carpal tunnel syndrome)     There is no electrodiagnostic evidence of any cervical radiculopathy, brachial plexopathy,  or any other mononeuropathy.        CLINICAL INTERPRETATION  His electrodiagnostic findings of carpal tunnel and cubital tunnel syndromes bilaterally are consistent with his bilateral hand symptoms.      Imagin/19/2020 plain films of the left thumb as well as the right middle finger does not show any evidence of radiopaque foreign body any fracture dislocation or other osseous abnormality. Of note on the left thumb view a index finger metallic foreign body is noted on the radial aspect of the P1.            ICD-10-CM ICD-9-CM     1. Cubital tunnel syndrome, left  G56.22 354.2 SCHEDULE SURGERY   2. Left carpal tunnel syndrome  G56.02 354.0 SCHEDULE SURGERY   3. Cubital tunnel syndrome, right  G56.21 354. 2     4. Right carpal tunnel syndrome  G56.01 354. 0     5. Polyneuropathy  G62.9 356. 9           Plan:      Schedule left cubital tunnel release and left carpal tunnel release.     This procedure has been fully reviewed with the patient and written informed consent has been obtained.     The patient was counseled at length about the risks of ryan Covid-19 during their perioperative period and any recovery window from their procedure.  The patient was made aware that ryan Covid-19  may worsen their prognosis for recovering from their procedure and lend to a higher morbidity and/or mortality risk.  All material risks, benefits, and reasonable alternatives including postponing the procedure were discussed.  The patient does  wish to proceed with the procedure at this time.     Follow-up and Dispositions    · Return for 2 weeks postop.            Plan was reviewed with patient, who verbalized agreement and understanding of the plan

## 2021-02-01 ENCOUNTER — ANESTHESIA EVENT (OUTPATIENT)
Dept: SURGERY | Age: 76
End: 2021-02-01
Payer: COMMERCIAL

## 2021-02-02 ENCOUNTER — ANESTHESIA (OUTPATIENT)
Dept: SURGERY | Age: 76
End: 2021-02-02
Payer: COMMERCIAL

## 2021-02-02 ENCOUNTER — HOSPITAL ENCOUNTER (EMERGENCY)
Age: 76
Discharge: HOME | End: 2021-02-02
Attending: EMERGENCY MEDICINE
Payer: COMMERCIAL

## 2021-02-02 ENCOUNTER — HOSPITAL ENCOUNTER (OUTPATIENT)
Age: 76
Setting detail: OUTPATIENT SURGERY
Discharge: HOME | End: 2021-02-02
Attending: ORTHOPAEDIC SURGERY | Admitting: ORTHOPAEDIC SURGERY
Payer: COMMERCIAL

## 2021-02-02 ENCOUNTER — APPOINTMENT (OUTPATIENT)
Dept: CT IMAGING | Age: 76
End: 2021-02-02
Attending: EMERGENCY MEDICINE
Payer: COMMERCIAL

## 2021-02-02 VITALS
WEIGHT: 170 LBS | OXYGEN SATURATION: 95 % | HEART RATE: 65 BPM | HEIGHT: 68 IN | BODY MASS INDEX: 25.76 KG/M2 | SYSTOLIC BLOOD PRESSURE: 123 MMHG | RESPIRATION RATE: 20 BRPM | TEMPERATURE: 97 F | DIASTOLIC BLOOD PRESSURE: 65 MMHG

## 2021-02-02 VITALS
HEART RATE: 61 BPM | RESPIRATION RATE: 18 BRPM | DIASTOLIC BLOOD PRESSURE: 62 MMHG | SYSTOLIC BLOOD PRESSURE: 108 MMHG | OXYGEN SATURATION: 95 % | TEMPERATURE: 97.7 F

## 2021-02-02 DIAGNOSIS — Z98.890 S/P CUBITAL TUNNEL RELEASE: ICD-10-CM

## 2021-02-02 DIAGNOSIS — Z98.890 S/P CARPAL TUNNEL RELEASE: ICD-10-CM

## 2021-02-02 DIAGNOSIS — G56.02 LEFT CARPAL TUNNEL SYNDROME: Primary | ICD-10-CM

## 2021-02-02 DIAGNOSIS — G56.22 CUBITAL TUNNEL SYNDROME, LEFT: ICD-10-CM

## 2021-02-02 DIAGNOSIS — R42 DIZZINESS: Primary | ICD-10-CM

## 2021-02-02 LAB
ALBUMIN SERPL-MCNC: 3.9 G/DL (ref 3.4–5)
ALBUMIN/GLOB SERPL: 0.9 {RATIO} (ref 0.8–1.7)
ALP SERPL-CCNC: 75 U/L (ref 45–117)
ALT SERPL-CCNC: 22 U/L (ref 16–61)
ANION GAP SERPL CALC-SCNC: 4 MMOL/L (ref 3–18)
AST SERPL-CCNC: 14 U/L (ref 10–38)
ATRIAL RATE: 63 BPM
BASOPHILS # BLD: 0 K/UL (ref 0–0.1)
BASOPHILS NFR BLD: 0 % (ref 0–2)
BILIRUB SERPL-MCNC: 0.5 MG/DL (ref 0.2–1)
BUN SERPL-MCNC: 12 MG/DL (ref 7–18)
BUN/CREAT SERPL: 11 (ref 12–20)
CALCIUM SERPL-MCNC: 9.1 MG/DL (ref 8.5–10.1)
CALCULATED P AXIS, ECG09: 57 DEGREES
CALCULATED R AXIS, ECG10: -38 DEGREES
CALCULATED T AXIS, ECG11: 44 DEGREES
CHLORIDE SERPL-SCNC: 108 MMOL/L (ref 100–111)
CO2 SERPL-SCNC: 29 MMOL/L (ref 21–32)
CREAT SERPL-MCNC: 1.14 MG/DL (ref 0.6–1.3)
DIAGNOSIS, 93000: NORMAL
DIFFERENTIAL METHOD BLD: ABNORMAL
EOSINOPHIL # BLD: 0 K/UL (ref 0–0.4)
EOSINOPHIL NFR BLD: 0 % (ref 0–5)
ERYTHROCYTE [DISTWIDTH] IN BLOOD BY AUTOMATED COUNT: 14.4 % (ref 11.6–14.5)
GLOBULIN SER CALC-MCNC: 4.3 G/DL (ref 2–4)
GLUCOSE SERPL-MCNC: 111 MG/DL (ref 74–99)
HCT VFR BLD AUTO: 42.9 % (ref 36–48)
HGB BLD-MCNC: 14.2 G/DL (ref 13–16)
LYMPHOCYTES # BLD: 1.2 K/UL (ref 0.9–3.6)
LYMPHOCYTES NFR BLD: 12 % (ref 21–52)
MCH RBC QN AUTO: 30.1 PG (ref 24–34)
MCHC RBC AUTO-ENTMCNC: 33.1 G/DL (ref 31–37)
MCV RBC AUTO: 91.1 FL (ref 74–97)
MONOCYTES # BLD: 0.7 K/UL (ref 0.05–1.2)
MONOCYTES NFR BLD: 7 % (ref 3–10)
NEUTS SEG # BLD: 7.9 K/UL (ref 1.8–8)
NEUTS SEG NFR BLD: 81 % (ref 40–73)
P-R INTERVAL, ECG05: 184 MS
PLATELET # BLD AUTO: 253 K/UL (ref 135–420)
PMV BLD AUTO: 9 FL (ref 9.2–11.8)
POTASSIUM SERPL-SCNC: 5.4 MMOL/L (ref 3.5–5.5)
PROT SERPL-MCNC: 8.2 G/DL (ref 6.4–8.2)
Q-T INTERVAL, ECG07: 402 MS
QRS DURATION, ECG06: 92 MS
QTC CALCULATION (BEZET), ECG08: 411 MS
RBC # BLD AUTO: 4.71 M/UL (ref 4.7–5.5)
SODIUM SERPL-SCNC: 141 MMOL/L (ref 136–145)
TROPONIN I SERPL-MCNC: <0.02 NG/ML (ref 0–0.04)
VENTRICULAR RATE, ECG03: 63 BPM
WBC # BLD AUTO: 9.9 K/UL (ref 4.6–13.2)

## 2021-02-02 PROCEDURE — 01710 ANES PX NRV MUSC UPR A&E NOS: CPT | Performed by: ANESTHESIOLOGY

## 2021-02-02 PROCEDURE — 77030040922 HC BLNKT HYPOTHRM STRY -A: Performed by: ORTHOPAEDIC SURGERY

## 2021-02-02 PROCEDURE — 77030040361 HC SLV COMPR DVT MDII -B: Performed by: ORTHOPAEDIC SURGERY

## 2021-02-02 PROCEDURE — 99283 EMERGENCY DEPT VISIT LOW MDM: CPT

## 2021-02-02 PROCEDURE — 76060000033 HC ANESTHESIA 1 TO 1.5 HR: Performed by: ORTHOPAEDIC SURGERY

## 2021-02-02 PROCEDURE — 85025 COMPLETE CBC W/AUTO DIFF WBC: CPT

## 2021-02-02 PROCEDURE — 77030002933 HC SUT MCRYL J&J -A: Performed by: ORTHOPAEDIC SURGERY

## 2021-02-02 PROCEDURE — 84484 ASSAY OF TROPONIN QUANT: CPT

## 2021-02-02 PROCEDURE — 93005 ELECTROCARDIOGRAM TRACING: CPT

## 2021-02-02 PROCEDURE — 74011250637 HC RX REV CODE- 250/637: Performed by: NURSE ANESTHETIST, CERTIFIED REGISTERED

## 2021-02-02 PROCEDURE — 70450 CT HEAD/BRAIN W/O DYE: CPT

## 2021-02-02 PROCEDURE — 74011250636 HC RX REV CODE- 250/636: Performed by: ANESTHESIOLOGY

## 2021-02-02 PROCEDURE — 77030020268 HC MISC GENERAL SUPPLY: Performed by: ORTHOPAEDIC SURGERY

## 2021-02-02 PROCEDURE — 99100 ANES PT EXTEME AGE<1 YR&>70: CPT | Performed by: ANESTHESIOLOGY

## 2021-02-02 PROCEDURE — 76210000021 HC REC RM PH II 0.5 TO 1 HR: Performed by: ORTHOPAEDIC SURGERY

## 2021-02-02 PROCEDURE — 64718 REVISE ULNAR NERVE AT ELBOW: CPT | Performed by: ORTHOPAEDIC SURGERY

## 2021-02-02 PROCEDURE — 74011000250 HC RX REV CODE- 250: Performed by: ORTHOPAEDIC SURGERY

## 2021-02-02 PROCEDURE — 2709999900 HC NON-CHARGEABLE SUPPLY: Performed by: ORTHOPAEDIC SURGERY

## 2021-02-02 PROCEDURE — 77030040356 HC CORD BPLR FRCP COVD -A: Performed by: ORTHOPAEDIC SURGERY

## 2021-02-02 PROCEDURE — 77030006689 HC BLD OPHTH BVR BD -A: Performed by: ORTHOPAEDIC SURGERY

## 2021-02-02 PROCEDURE — 77030010509 HC AIRWY LMA MSK TELE -A: Performed by: ANESTHESIOLOGY

## 2021-02-02 PROCEDURE — 74011000250 HC RX REV CODE- 250: Performed by: ANESTHESIOLOGY

## 2021-02-02 PROCEDURE — 64721 CARPAL TUNNEL SURGERY: CPT | Performed by: ORTHOPAEDIC SURGERY

## 2021-02-02 PROCEDURE — 76010000149 HC OR TIME 1 TO 1.5 HR: Performed by: ORTHOPAEDIC SURGERY

## 2021-02-02 PROCEDURE — 77030011266 HC ELECTRD BLD INSL COVD -A: Performed by: ORTHOPAEDIC SURGERY

## 2021-02-02 PROCEDURE — 80053 COMPREHEN METABOLIC PANEL: CPT

## 2021-02-02 PROCEDURE — 77030010813: Performed by: ORTHOPAEDIC SURGERY

## 2021-02-02 PROCEDURE — 77030000032 HC CUF TRNQT ZIMM -B: Performed by: ORTHOPAEDIC SURGERY

## 2021-02-02 PROCEDURE — 74011000258 HC RX REV CODE- 258: Performed by: ANESTHESIOLOGY

## 2021-02-02 PROCEDURE — 76210000016 HC OR PH I REC 1 TO 1.5 HR: Performed by: ORTHOPAEDIC SURGERY

## 2021-02-02 PROCEDURE — 74011250636 HC RX REV CODE- 250/636: Performed by: ORTHOPAEDIC SURGERY

## 2021-02-02 RX ORDER — ONDANSETRON 2 MG/ML
4 INJECTION INTRAMUSCULAR; INTRAVENOUS ONCE
Status: COMPLETED | OUTPATIENT
Start: 2021-02-02 | End: 2021-02-02

## 2021-02-02 RX ORDER — TRAMADOL HYDROCHLORIDE 50 MG/1
50 TABLET ORAL
Status: DISCONTINUED | OUTPATIENT
Start: 2021-02-02 | End: 2021-02-02 | Stop reason: HOSPADM

## 2021-02-02 RX ORDER — BUPIVACAINE HYDROCHLORIDE 2.5 MG/ML
INJECTION, SOLUTION EPIDURAL; INFILTRATION; INTRACAUDAL AS NEEDED
Status: DISCONTINUED | OUTPATIENT
Start: 2021-02-02 | End: 2021-02-02 | Stop reason: HOSPADM

## 2021-02-02 RX ORDER — SODIUM CHLORIDE 0.9 % (FLUSH) 0.9 %
5-40 SYRINGE (ML) INJECTION EVERY 8 HOURS
Status: DISCONTINUED | OUTPATIENT
Start: 2021-02-02 | End: 2021-02-02 | Stop reason: HOSPADM

## 2021-02-02 RX ORDER — HYDROMORPHONE HYDROCHLORIDE 2 MG/ML
0.2 INJECTION, SOLUTION INTRAMUSCULAR; INTRAVENOUS; SUBCUTANEOUS
Status: DISCONTINUED | OUTPATIENT
Start: 2021-02-02 | End: 2021-02-02 | Stop reason: HOSPADM

## 2021-02-02 RX ORDER — SODIUM CHLORIDE 0.9 % (FLUSH) 0.9 %
5-40 SYRINGE (ML) INJECTION AS NEEDED
Status: DISCONTINUED | OUTPATIENT
Start: 2021-02-02 | End: 2021-02-02 | Stop reason: HOSPADM

## 2021-02-02 RX ORDER — DIPHENHYDRAMINE HYDROCHLORIDE 50 MG/ML
12.5 INJECTION, SOLUTION INTRAMUSCULAR; INTRAVENOUS
Status: DISCONTINUED | OUTPATIENT
Start: 2021-02-02 | End: 2021-02-02 | Stop reason: HOSPADM

## 2021-02-02 RX ORDER — SODIUM CHLORIDE, SODIUM LACTATE, POTASSIUM CHLORIDE, CALCIUM CHLORIDE 600; 310; 30; 20 MG/100ML; MG/100ML; MG/100ML; MG/100ML
50 INJECTION, SOLUTION INTRAVENOUS CONTINUOUS
Status: DISCONTINUED | OUTPATIENT
Start: 2021-02-03 | End: 2021-02-02 | Stop reason: SDUPTHER

## 2021-02-02 RX ORDER — TRAMADOL HYDROCHLORIDE 50 MG/1
50 TABLET ORAL
Qty: 16 TAB | Refills: 0 | Status: SHIPPED | OUTPATIENT
Start: 2021-02-02 | End: 2021-02-06

## 2021-02-02 RX ORDER — SODIUM CHLORIDE, SODIUM LACTATE, POTASSIUM CHLORIDE, CALCIUM CHLORIDE 600; 310; 30; 20 MG/100ML; MG/100ML; MG/100ML; MG/100ML
50 INJECTION, SOLUTION INTRAVENOUS CONTINUOUS
Status: DISCONTINUED | OUTPATIENT
Start: 2021-02-02 | End: 2021-02-02 | Stop reason: HOSPADM

## 2021-02-02 RX ORDER — NALOXONE HYDROCHLORIDE 0.4 MG/ML
0.1 INJECTION, SOLUTION INTRAMUSCULAR; INTRAVENOUS; SUBCUTANEOUS AS NEEDED
Status: DISCONTINUED | OUTPATIENT
Start: 2021-02-02 | End: 2021-02-02 | Stop reason: HOSPADM

## 2021-02-02 RX ORDER — HYDROMORPHONE HYDROCHLORIDE 2 MG/ML
0.5 INJECTION, SOLUTION INTRAMUSCULAR; INTRAVENOUS; SUBCUTANEOUS
Status: DISCONTINUED | OUTPATIENT
Start: 2021-02-02 | End: 2021-02-02 | Stop reason: HOSPADM

## 2021-02-02 RX ORDER — FENTANYL CITRATE 50 UG/ML
INJECTION, SOLUTION INTRAMUSCULAR; INTRAVENOUS AS NEEDED
Status: DISCONTINUED | OUTPATIENT
Start: 2021-02-02 | End: 2021-02-02 | Stop reason: HOSPADM

## 2021-02-02 RX ORDER — PROPOFOL 10 MG/ML
INJECTION, EMULSION INTRAVENOUS AS NEEDED
Status: DISCONTINUED | OUTPATIENT
Start: 2021-02-02 | End: 2021-02-02 | Stop reason: HOSPADM

## 2021-02-02 RX ORDER — FAMOTIDINE 20 MG/1
20 TABLET, FILM COATED ORAL ONCE
Status: COMPLETED | OUTPATIENT
Start: 2021-02-02 | End: 2021-02-02

## 2021-02-02 RX ORDER — ONDANSETRON 2 MG/ML
INJECTION INTRAMUSCULAR; INTRAVENOUS AS NEEDED
Status: DISCONTINUED | OUTPATIENT
Start: 2021-02-02 | End: 2021-02-02 | Stop reason: HOSPADM

## 2021-02-02 RX ADMIN — FENTANYL CITRATE 50 MCG: 50 INJECTION, SOLUTION INTRAMUSCULAR; INTRAVENOUS at 08:13

## 2021-02-02 RX ADMIN — FAMOTIDINE 20 MG: 20 TABLET, FILM COATED ORAL at 07:54

## 2021-02-02 RX ADMIN — SODIUM CHLORIDE, SODIUM LACTATE, POTASSIUM CHLORIDE, AND CALCIUM CHLORIDE 50 ML/HR: 600; 310; 30; 20 INJECTION, SOLUTION INTRAVENOUS at 07:54

## 2021-02-02 RX ADMIN — ONDANSETRON 4 MG: 2 INJECTION INTRAMUSCULAR; INTRAVENOUS at 08:24

## 2021-02-02 RX ADMIN — FENTANYL CITRATE 25 MCG: 50 INJECTION, SOLUTION INTRAMUSCULAR; INTRAVENOUS at 08:23

## 2021-02-02 RX ADMIN — ONDANSETRON 4 MG: 2 INJECTION INTRAMUSCULAR; INTRAVENOUS at 10:05

## 2021-02-02 RX ADMIN — PROPOFOL 130 MG: 10 INJECTION, EMULSION INTRAVENOUS at 08:12

## 2021-02-02 RX ADMIN — HYDROMORPHONE HYDROCHLORIDE 0.5 MG: 2 INJECTION, SOLUTION INTRAMUSCULAR; INTRAVENOUS; SUBCUTANEOUS at 10:00

## 2021-02-02 RX ADMIN — CLINDAMYCIN PHOSPHATE 900 MG: 150 INJECTION, SOLUTION INTRAVENOUS at 08:13

## 2021-02-02 RX ADMIN — FENTANYL CITRATE 25 MCG: 50 INJECTION, SOLUTION INTRAMUSCULAR; INTRAVENOUS at 08:19

## 2021-02-02 NOTE — ED PROVIDER NOTES
EMERGENCY DEPARTMENT HISTORY AND PHYSICAL EXAM    Date: 2/2/2021  Patient Name: Billie Pan    History of Presenting Illness     Chief Complaint   Patient presents with    Dizziness         History Provided By: Patient    Additional History (Context): Billie Pan is a 77 y.o. male with hypertension, hyperlipidemia and PVD who presents with dizziness today; he had carpal tunnel release surgery this morning by Dr. Omayra Dow. Patient said he did well in the surgery had a good recovery and was discharged home. He fixed himself something to eat and he felt suddenly warm and then dizzy. He said the entire episode lasted about 20 minutes and feels perfectly well now but he called EMS while the event was occurring. Denies chest pain shortness of breath vomiting diarrhea. Denies numbness weakness or headache. PCP: Nanda Mariscal MD    Current Outpatient Medications   Medication Sig Dispense Refill    traMADoL (ULTRAM) 50 mg tablet Take 1 Tab by mouth every six (6) hours as needed for Pain for up to 4 days. Max Daily Amount: 200 mg. 16 Tab 0    ammonium lactate (Lac-Hydrin Five) 5 % lotion Apply  to affected area two (2) times a day. 1 Bottle 0    tamsulosin (FLOMAX) 0.4 mg capsule Take 1 Cap by mouth daily (after dinner). 90 Cap 0    DULoxetine (CYMBALTA) 60 mg capsule Take 1 Cap by mouth daily. Indications: neuropathic pain 60 Cap 5    finasteride (PROSCAR) 5 mg tablet Take 5 mg by mouth daily.  albuterol sulfate 90 mcg/actuation aebs Take  by inhalation as needed.  diclofenac (Voltaren) 1 % gel Apply 4 g to affected area two (2) times a day. 100 g 0    ammonium lactate (AMLACTIN) 12 % topical cream Apply  to affected area two (2) times a day. rub in to affected area well 280 g 0    pantoprazole (PROTONIX) 40 mg tablet Take 1 Tab by mouth daily. 30 Tab 0    polyethylene glycol (MIRALAX) 17 gram packet Take 1 Packet by mouth daily.  30 Packet 0    amLODIPine (NORVASC) 10 mg tablet Take 1 Tab by mouth daily. 30 Tab 0    spironolactone (ALDACTONE) 25 mg tablet Take  by mouth daily. Past History     Past Medical History:  Past Medical History:   Diagnosis Date    Bladder outlet obstruction     Erectile disorder due to medical 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     Frequency of urination     H/O spinal cord injury     lumbar spine injury secondary to fall    HTN (hypertension)     Hypercholesterolemia     Illiterate     [address] of lumbar spine [address] [address]     Leg pain, right     Nocturia     Overactive bladder     Peripheral vascular disease ([facility] [address]        Past Surgical History:  Past Surgical History:   Procedure Laterality Date    COLONOSCOPY N/A 2019    COLONOS[facility] performed by Feliciano Ernandez [facility] at [facility]OSCOPY    HX HEENT Left     lens implant    HX ORTHOPAEDIC      finger amputation left hand    HX TONSILLECTOMY         Family History:  Family History   Problem Relation Age of Onset    Diabetes Mother     Hypertension Mother     Diabetes Maternal Grandmother     Hypertension Maternal Grandmother     Cancer Sister         brain    Cancer Sister         brain       Social History:  Social History     Tobacco Use    Smoking status: Former Smoker     Quit date: 2015     Years since quittin.5    Smokeless tobacco: Never Used   Substance Use Topics    Alcohol use: Not Currently     Alcohol/week: 0.0 standard drinks     Comment: former stopped     Drug use: No       Allergies: Allergies   Allergen Reactions    Latex Rash    Ampicillin Angioedema    Asa-Acetaminophen-Caff-Buffers Rash    Penicillins Angioedema    Crab Hives    Shellfish Derived Rash    Aspirin Other (comments)     Stomach upset    Atorvastatin Other (comments)     Muscle cramps         Review of Systems   Review of Systems   Constitutional: Positive for diaphoresis. Respiratory: Negative for shortness of breath. Cardiovascular: Negative for chest pain.    Gastrointestinal: Negative for nausea and vomiting. Neurological: Positive for dizziness. Negative for seizures, syncope, speech difficulty, weakness, light-headedness, numbness and headaches. All Other Systems Negative  Physical Exam     Vitals:    02/02/21 1307   BP: 108/62   Pulse: 61   Resp: 18   Temp: 97.7 °F (36.5 °C)   SpO2: 95%     Physical Exam  Vitals signs and nursing note reviewed. Constitutional:       General: He is not in acute distress. Appearance: He is well-developed. He is not ill-appearing, toxic-appearing or diaphoretic. HENT:      Head: Normocephalic and atraumatic. Eyes:      Extraocular Movements: Extraocular movements intact. Neck:      Musculoskeletal: Normal range of motion and neck supple. Thyroid: No thyromegaly. Vascular: No carotid bruit. Trachea: No tracheal deviation. Cardiovascular:      Rate and Rhythm: Normal rate and regular rhythm. Heart sounds: Normal heart sounds. No murmur. No friction rub. No gallop. Pulmonary:      Effort: Pulmonary effort is normal. No respiratory distress. Breath sounds: Normal breath sounds. No stridor. No wheezing or rales. Chest:      Chest wall: No tenderness. Abdominal:      General: There is no distension. Palpations: Abdomen is soft. There is no mass. Tenderness: There is no abdominal tenderness. There is no guarding or rebound. Musculoskeletal: Normal range of motion. Skin:     General: Skin is warm and dry. Coloration: Skin is not pale. Neurological:      General: No focal deficit present. Mental Status: He is alert and oriented to person, place, and time. Cranial Nerves: No cranial nerve deficit. Gait: Gait normal.   Psychiatric:         Speech: Speech normal.         Behavior: Behavior normal.         Thought Content:  Thought content normal.         Judgment: Judgment normal.                Diagnostic Study Results     Labs -     Recent Results (from the past 12 hour(s)) EKG, 12 LEAD, INITIAL    Collection Time: 02/02/21  2:50 PM   Result Value Ref Range    Ventricular Rate 63 BPM    Atrial Rate 63 BPM    P-R Interval 184 ms    QRS Duration 92 ms    Q-T Interval 402 ms    QTC Calculation (Bezet) 411 ms    Calculated P Axis 57 degrees    Calculated R Axis -38 degrees    Calculated T Axis 44 degrees    Diagnosis       Normal sinus rhythm  Left axis deviation  Moderate voltage criteria for LVH, may be normal variant  Abnormal ECG  When compared with ECG of 28-JAN-2021 07:18,  No significant change was found  Confirmed by Fabiola Galvin ([id number]) on 2/2/2021 4:29:46 PM     CBC WITH AUTOMATED DIFF    Collection Time: 02/02/21  3:00 PM   Result Value Ref Range    WBC 9.9 4.6 - 13.2 K/uL    RBC 4.71 4.70 - 5.50 M/uL    HGB 14.2 13.0 - 16.0 g/dL    HCT 42.9 36.0 - 48.0 %    MCV 91.1 74.0 - 97.0 FL    MCH 30.1 24.0 - 34.0 PG    MCHC 33.1 31.0 - 37.0 g/dL    RDW 14.4 11.6 - 14.5 %    PLATELET 514 937 - 959 K/uL    MPV 9.0 (L) 9.2 - 11.8 FL    NEUTROPHILS 81 (H) 40 - 73 %    LYMPHOCYTES 12 (L) 21 - 52 %    MONOCYTES 7 3 - 10 %    EOSINOPHILS 0 0 - 5 %    BASOPHILS 0 0 - 2 %    ABS. NEUTROPHILS 7.9 1.8 - 8.0 K/UL    ABS. LYMPHOCYTES 1.2 0.9 - 3.6 K/UL    ABS. MONOCYTES 0.7 0.05 - 1.2 K/UL    ABS. EOSINOPHILS 0.0 0.0 - 0.4 K/UL    ABS. BASOPHILS 0.0 0.0 - 0.1 K/UL    DF AUTOMATED     METABOLIC PANEL, COMPREHENSIVE    Collection Time: 02/02/21  3:00 PM   Result Value Ref Range    Sodium 141 136 - 145 mmol/L    Potassium 5.4 3.5 - 5.5 mmol/L    Chloride 108 100 - 111 mmol/L    CO2 29 21 - 32 mmol/L    Anion gap 4 3.0 - 18 mmol/L    Glucose 111 (H) 74 - 99 mg/dL    BUN 12 7.0 - 18 MG/DL    Creatinine 1.14 0.6 - 1.3 MG/DL    BUN/Creatinine ratio 11 (L) 12 - 20      GFR est AA >60 >60 ml/min/1.73m2    GFR est non-AA >60 >60 ml/min/1.73m2    Calcium 9.1 8.5 - 10.1 MG/DL    Bilirubin, total 0.5 0.2 - 1.0 MG/DL    ALT (SGPT) 22 16 - 61 U/L    AST (SGOT) 14 10 - 38 U/L    Alk.  phosphatase 75 45 - 117 U/L Protein, total 8.2 6.4 - 8.2 g/dL    Albumin 3.9 3.4 - 5.0 g/dL    Globulin 4.3 (H) 2.0 - 4.0 g/dL    A-G Ratio 0.9 0.8 - 1.7     TROPONIN I    Collection Time: 02/02/21  3:00 PM   Result Value Ref Range    Troponin-I, QT <0.02 0.0 - 0.045 NG/ML       Radiologic Studies -   CT HEAD WO CONT   Final Result   1. No hemorrhage identified. No evidence of acute intracranial pathology. CT Results  (Last 48 hours)               02/02/21 1549  CT HEAD WO CONT Final result    Impression:  1. No hemorrhage identified. No evidence of acute intracranial pathology. Narrative:  CT HEAD UNENHANCED       CPT code: 27930       INDICATION: Dizziness. Hand surgery earlier today. TECHNIQUE: 5 mm collimation axial images obtained from the skull base through   the vertex without administration of nonionic intravenous contrast.        All CT scans at this facility are performed using dose optimization technique as   appropriate to this specific exam, to include automated exposure control,   adjustment of the mA and/or KP according to patient size or use of iterative   reconstruction techniques. COMPARISON: Prior exam 4/11/2016       FINDINGS:    Mild prominence of the cortical sulci globally. Ventricles are symmetric and not enlarged. Minimal low attenuation in the deep hemispheric white matter suggest mild degree   of chronic ischemic small vessel disease change. No edema within any discrete vascular distribution. No parenchymal hemorrhage identified. No evidence for acute infarct involving any major vascular distribution. No midline shift of structures. No extra-axial fluid collections. Calvarium intact to the extent included. Partially included paranasal sinuses appear clear. CXR Results  (Last 48 hours)    None            Medical Decision Making   I am the first provider for this patient.     I reviewed the vital signs, available nursing notes, past medical history, past surgical history, family history and social history. Vital Signs-Reviewed the patient's vital signs. Procedures:  Procedures    Provider Notes (Medical Decision Making):  labs, CT show nothing acute. Feels well; likely late anesthesia effect as discussed w/ED attending. MED RECONCILIATION:  No current facility-administered medications for this encounter. Current Outpatient Medications   Medication Sig    traMADoL (ULTRAM) 50 mg tablet Take 1 Tab by mouth every six (6) hours as needed for Pain for up to 4 days. Max Daily Amount: 200 mg.  ammonium lactate (Lac-Hydrin Five) 5 % lotion Apply  to affected area two (2) times a day.  tamsulosin (FLOMAX) 0.4 mg capsule Take 1 Cap by mouth daily (after dinner).  DULoxetine (CYMBALTA) 60 mg capsule Take 1 Cap by mouth daily. Indications: neuropathic pain    finasteride (PROSCAR) 5 mg tablet Take 5 mg by mouth daily.  albuterol sulfate 90 mcg/actuation aebs Take  by inhalation as needed.  diclofenac (Voltaren) 1 % gel Apply 4 g to affected area two (2) times a day.  ammonium lactate (AMLACTIN) 12 % topical cream Apply  to affected area two (2) times a day. rub in to affected area well    pantoprazole (PROTONIX) 40 mg tablet Take 1 Tab by mouth daily.  polyethylene glycol (MIRALAX) 17 gram packet Take 1 Packet by mouth daily.  amLODIPine (NORVASC) 10 mg tablet Take 1 Tab by mouth daily.  spironolactone (ALDACTONE) 25 mg tablet Take  by mouth daily. Disposition:  home    DISCHARGE NOTE:   5:11 PM    Pt has been reexamined. Patient has no new complaints, changes, or physical findings. Care plan outlined and precautions discussed. Results of labs, CT were reviewed with the patient. All medications were reviewed with the patient. All of pt's questions and concerns were addressed. Patient was instructed and agrees to follow up with PCP, as well as to return to the ED upon further deterioration.  Patient is ready to go home. Follow-up Information     Follow up With Specialties Details Why Contact Info    Leata Lennox, MD Family Medicine Schedule an appointment as soon as possible for a visit in 1 day  [address] [facility] [address]  [phone]      [facility]  If symptoms worsen return immediately [address]  [phone]          Current Discharge Medication List            Diagnosis     Clinical Impression:   1.  Dizziness

## 2021-02-02 NOTE — PROGRESS NOTES
conducted a pre-surgery visit with Moon Friedman, who is a 77 y.o.,male. The  provided the following Interventions:  Initiated a relationship of care and support. Plan:  Chaplains will continue to follow and will provide pastoral care on an as needed/requested basis.  recommends bedside caregivers page  on duty if patient shows signs of acute spiritual or emotional distress.     [address] [place], [place] [address]  [phone]

## 2021-02-02 NOTE — H&P
Update History & Physical    The Patient's History and Physical of January 13, 2021 was reviewed with the patient and I examined the patient. There was no change. The surgical site was confirmed by the patient and me. Plan:  The risk, benefits, expected outcome, and alternative to the recommended procedure have been discussed with the patient. Patient understands and wants to proceed with the procedure.     Electronically signed by Kennedy William DO on 2/2/2021 at 7:23 AM

## 2021-02-02 NOTE — OP NOTES
Operative Report    Patient: Richardson Murillo MRN: [id number]  SSN: [id number]    YOB: 1945  Age: 77 y.o. Sex: male       Date of Surgery: 2/2/2021     Preoperative Diagnosis: Cubital tunnel syndrome, left [G56.22]  Left carpal tunnel syndrome [G56.02]     Postoperative Diagnosis: Cubital tunnel syndrome, left [G56.22]  Left carpal tunnel syndrome [G56     Surgeon(s) and Role:     Keanu Schmidt, DO - Primary    Assistant:  Bipin Tong    Anesthesia: General and local    Procedure: Procedure(s):  LEFT CUBITAL TUNNEL RELEASE [id number]  LEFT CARPAL TUNNEL RELEASE [id number]    Findings: Flattened median nerve at the carpal tunnel and restricted ulnar nerve at the cubital tunnel. Procedure in Detail:     Indications for procedure of been outlined in the perioperative documentation most notably refractory to conservative treatment as well as severity of electrodiagnostic findings. Informed consent was obtained from the patient. The risks and benefits of the procedure were discussed with the patient. They include but are not limited to neurovascular injury, tendon injury, incomplete release of nerve, incomplete relief of symptoms, blood loss, infection, hematoma, recurrence of symptoms, need for further surgery, chronic pain, chronic stiffness, complications from anesthesia including death, and the possibility of ryan Covid. After informed consent was obtained from the patient, he was taken back to the operative suite. The arm was prepped and draped in the normal sterile fashion and a sterile tourniquet was applied. The arm was exsanguinated the tourniquet was elevated to 250 mmHg. Attention was turned to the elbow where a curvilinear incision was made long term between the medial epicondyle and the olecranon. The subcutaneous tissues were dissected and electrocautery was used for hemostasis. An incision was made in Tay's ligament at the level of the medial epicondyle.   At this point the ulnar nerve was identified. Attention was first turned distally where the FCU aponeurosis was then incised. A limited neurolysis was then done about the ulnar nerve. The ulnar nerve was found to be completely released MCC up the forearm. Attention was then turned proximally where Tay's fascia was incised. Additionally the ulnar nerve was completely released up to the level of the sterile tourniquet. Additionally here a limited neurolysis was done. The wound was copiously irrigated and 20 mL of quarter percent Marcaine plain was injected into the subcutaneous tissues as well as the periosteum surrounding the ulnar nerve. Attention was then turned to the carpal tunnel where an incision was made in line with the radial border of the fourth ray. The subcutaneous tissues were dissected and electrocautery was used for hemostasis. A self-retaining retractor was placed and the palmar fascia was incised in line with the incision. The palmaris brevis was elevated off the transverse carpal ligament. The transverse carpal ligament was incised centrally. Attention was then turned distally where the ligament was released up to the level of the fat pad with motor branch was visualized. Attention was then turned proximally where the remainder of the transverse carpal ligament was incised up to the level of but not including the antebrachial fascia. The wound was copiously irrigated and 10 mL of quarter percent Marcaine plain was injected into the subcutaneous tissues as well as the deep tissues. The tourniquet was let down electrocautery was used for hemostasis of any remaining bleeders at both wounds. The medial elbow was closed with 3-0 Monocryl in interrupted fashion followed by 4-0 Vicryl repeat. The hand was also closed with 4-0 Vicryl Rapide. The patient was placed into a sterile dressing at the elbow and the hand.   The patient was given appropriate wound care instructions, follow-up with me in the outpatient setting, and a prescription for pain medicine. Estimated Blood Loss: 20 mL    Tourniquet Time:   Total Tourniquet Time Documented:  Upper Arm (Left) - 22 minutes  Total: Upper Arm (Left) - 22 minutes        Implants: * No implants in log *            Specimens: * No specimens in log *        Drains: None                Complications: None    Counts: Sponge and needle counts were correct times two.     Signed By:  Dimitry Frias DO     February 2, 2021

## 2021-02-02 NOTE — ED TRIAGE NOTES
PT arrived via ems from home. Per pt, he had hand surgery this am and once he got home he was dizzy and hot all at once.  This has now resolved

## 2021-02-02 NOTE — DISCHARGE INSTRUCTIONS
Ice and Elevate wrist.    Move fingers into fist early and often. Keep dressing clean and dry. Cover when showering. Remove Elbow Dressing on Friday, wash and dry. Continue to cover hand dressing after elbow dressing removed. Keep elbow wound clean and dry. DISCHARGE SUMMARY from Nurse    PATIENT INSTRUCTIONS:    After general anesthesia or intravenous sedation, for 24 hours or while taking prescription Narcotics:  · Limit your activities  · Do not drive and operate hazardous machinery  · Do not make important personal or business decisions  · Do  not drink alcoholic beverages  · If you have not urinated within 8 hours after discharge, please contact your surgeon on call. Report the following to your surgeon:  · Excessive pain, swelling, redness or odor of or around the surgical area  · Temperature over 100.5  · Nausea and vomiting lasting longer than 4 hours or if unable to take medications  · Any signs of decreased circulation or nerve impairment to extremity: change in color, persistent  numbness, tingling, coldness or increase pain  · Any questions    What to do at Home:  Recommended activity: Activity as tolerated and no driving for today. *  Please give a list of your current medications to your Primary Care Provider. *  Please update this list whenever your medications are discontinued, doses are      changed, or new medications (including over-the-counter products) are added. *  Please carry medication information at all times in case of emergency situations. These are general instructions for a healthy lifestyle:    No smoking/ No tobacco products/ Avoid exposure to second hand smoke  Surgeon General's Warning:  Quitting smoking now greatly reduces serious risk to your health.     Obesity, smoking, and sedentary lifestyle greatly increases your risk for illness    A healthy diet, regular physical exercise & weight monitoring are important for maintaining a healthy lifestyle    You may be retaining fluid if you have a history of heart failure or if you experience any of the following symptoms:  Weight gain of 3 pounds or more overnight or 5 pounds in a week, increased swelling in our hands or feet or shortness of breath while lying flat in bed. Please call your doctor as soon as you notice any of these symptoms; do not wait until your next office visit. The discharge information has been reviewed with the patient. The patient verbalized understanding. Discharge medications reviewed with the patient and appropriate educational materials and side effects teaching were provided. ___________________________________________________________________________________________________________________________________      Patient Education        Carpal Tunnel Release: What to Expect at [facility] tunnel reduces the pressure on a nerve in the wrist. Your doctor cut a ligament that presses on the nerve. This lets the nerve pass freely through the tunnel without being squeezed. Your hand will hurt and may feel weak with some numbness. This usually goes away in a few days, but it may take several months. Your doctor may remove the large bandage, or he or she will tell you when and how to remove it yourself. In some cases, you may have a splint. If you have one, you will wear it for about 2 weeks. Your doctor will take out your stitches in 1 to 2 weeks. Your hand and wrist may feel worse than they used to feel. But the pain should start to go away. It usually takes 3 to 4 months to recover and up to 1 year before hand strength returns. How much strength returns will vary. The timing of your return to work depends on the type of surgery you had, whether the surgery was on your dominant hand (the hand you use most), and your work activities.   If you had open surgery on your dominant hand and you do repeated actions at work, you may be able to go back to work in 10 to 8 weeks. Repeated motions include typing or assembly-line work. If the surgery was on the other hand and you don't do repeated actions at work, you may be able to return to work in 9 to 14 days. If you had endoscopic surgery, you may be able to go back to work sooner than with open surgery. This care sheet gives you a general idea about how long it will take for you to recover. But each person recovers at a different pace. Follow the steps below to get better as quickly as possible. How can you care for yourself at home? Activity    · Rest when you feel tired. Getting enough sleep will help you recover.     · Try to walk each day. Start by walking a little more than you did the day before. Bit by bit, increase the amount you walk.     · For up to 2 weeks after surgery, avoid lifting things heavier than 1 to 2 pounds and using your hand. This includes doing repeated arm or hand movements, such as typing or using a computer mouse, washing windows, vacuuming, or chopping food. Do not use power tools, and avoid activities that cause vibration.     · You may do heavier tasks about 4 weeks after surgery. These include vacuuming, mowing the lawn, and gardening.     · You may shower 24 to 48 hours after surgery, if your doctor okays it. Keep your bandage dry by taping a sheet of plastic to cover it. If you have a splint, keep it dry. Your doctor will tell you if you can remove it when you shower. Be careful not to put the splint on too tight. Do not take a bath until the incision heals, or until your doctor tells you it is okay.     · You may drive when you are fully able to use your hand. Diet    · You can eat your normal diet. If your stomach is upset, try bland, low-fat foods like plain rice, broiled chicken, toast, and yogurt. Medicines    · Your doctor will tell you if and when you can restart your medicines.  He or she will also give you instructions about taking any new medicines.     · If you take aspirin or some other blood thinner, ask your doctor if and when to start taking it again. Make sure that you understand exactly what your doctor wants you to do.     · Take pain medicines exactly as directed. ? If the doctor gave you a prescription medicine for pain, take it as prescribed. ? If you are not taking a prescription pain medicine, take an over-the-counter medicine such as acetaminophen (Tylenol), ibuprofen (Advil, Motrin), or naproxen (Aleve). Read and follow all instructions on the label. ? Do not take two or more pain medicines at the same time unless the doctor told you to. Many pain medicines have acetaminophen, which is Tylenol. Too much acetaminophen (Tylenol) can be harmful.     · If you think your pain medicine is making you sick to your stomach:  ? Take your medicine after meals (unless your doctor has told you not to). ? Ask your doctor for a different pain medicine.     · If your doctor prescribed antibiotics, take them as directed. Do not stop taking them just because you feel better. You need to take the full course of antibiotics. Incision and splint care    · Keep your bandage dry. If it gets dirty, you may change it.     · If you have a splint, talk to your doctor about when you should wear it. Exercise    · You may need wrist and hand rehabilitation. This is a series of exercises you do after your surgery. This helps you get back your wrist's and hand's range of motion, strength, and . You will work with your doctor and physical or occupational therapist to plan this exercise program. To get the best results, you need to do the exercises correctly and as often and as long as your doctor tells you. Ice and elevation    · Put ice or a cold pack on your wrist for 10 to 20 minutes at a time. Try to do this every 1 to 2 hours for the next 3 days (when you are awake) or until the swelling goes down.  Put a thin cloth between the ice and your skin.     · Prop up the sore wrist on a pillow when you ice it or anytime you sit or lie down during the next 3 days. Try to keep it above the level of your heart. This will help reduce swelling. Other instructions    · Avoid letting your hand hang down. This can cause swelling. Follow-up care is a key part of your treatment and safety. Be sure to make and go to all appointments, and call your doctor if you are having problems. It's also a good idea to know your test results and keep a list of the medicines you take. When should you call for help? Call 911 anytime you think you may need emergency care. For example, call if:    · You passed out (lost consciousness).     · You have chest pain, are short of breath, or cough up blood. Call your doctor now or seek immediate medical care if:    · You have pain that does not get better after you take pain medicine.     · Your hand is cool or pale or changes color.     · Your cast or splint feels too tight.     · You have tingling, weakness, or numbness in your hand or fingers.     · You are sick to your stomach or cannot drink fluids.     · You have loose stitches, or your incision comes open.     · You have signs of a blood clot in your leg (called a deep vein thrombosis), such as:  ? Pain in your calf, back of the knee, thigh, or groin. ? Redness or swelling in your leg.     · You have signs of infection, such as:  ? Increased pain, swelling, warmth, or redness. ? Red streaks leading from the incision. ? Pus draining from the incision. ? A fever.     · Bright red blood has soaked through the bandage over your incision. Watch closely for any changes in your health, and be sure to contact your doctor if:    · You have a problem with your cast or splint.     · You do not get better as expected. Where can you learn more? Go to http://www.gray.com/  Enter N388 in the search box to learn more about \"Carpal Tunnel Release: What to Expect at Home. \"  Current as of: March 2, 2020               Content Version: 12.6  © 2792-8131 [employer]. Care instructions adapted under license by [employer] (which disclaims liability or warranty for this information). If you have questions about a medical condition or this instruction, always ask your healthcare professional. [address] any warranty or liability for your use of this information. Patient Education        Ulnar Neuropathy (Handlebar Palsy): Exercises  Introduction  Here are some examples of exercises for you to try. The exercises may be suggested for a condition or for rehabilitation. Start each exercise slowly. Ease off the exercises if you start to have pain. You will be told when to start these exercises and which ones will work best for you. How to do the exercises  Neck rotation   1. Sit in a firm chair, or stand up straight. 2. Keeping your chin level, turn your head to the right, and hold for 15 to 30 seconds. 3. Turn your head to the left, and hold for 15 to 30 seconds. 4. Repeat 2 to 4 times to each side. Shoulder blade squeeze   1. While standing with your arms at your sides, squeeze your shoulder blades together. Do not raise your shoulders as you are squeezing. 2. Hold for 6 seconds. 3. Repeat 8 to 12 times. Neck stretches   1. Look straight ahead, and tip your right ear to your right shoulder. Do not let your left shoulder rise as you tip your head to the right. 2. Hold for 15 to 30 seconds. 3. Tilt your head to the left. Do not let your right shoulder rise as you tip your head to the left. 4. Hold for 15 to 30 seconds. 5. Repeat 2 to 4 times to each side. Elbow flexion and extension   If this exercise causes numbness, tingling, or pain in your hand, ease off of the stretch. You should not have symptoms as you stretch. If you cannot back off enough so that you can do the exercise without symptoms, stop doing the exercise right away.   1. Stand with your arms relaxed at your sides. 2. With your affected arm, gently bend your elbow up toward you as far as possible. 3. Then straighten your arm as much as you can. 4. Repeat 2 to 4 times. Wrist flexor stretch   If this exercise causes numbness, tingling, or pain in your hand, ease off of the stretch. You should not have symptoms as you stretch. If you cannot back off enough so that you can do the exercise without symptoms, stop doing the exercise right away. 1. Extend your affected arm in front of you with your palm facing away from your body. 2. Bend back your wrist on your affected arm, pointing your hand up toward the ceiling. 3. With your other hand, gently bend your wrist farther until you feel a mild to moderate stretch in your forearm. 4. Hold for at least 15 to 30 seconds. 5. Repeat 2 to 4 times. 6. Repeat steps 1 through 5, but this time extend your affected arm in front of you with your palm facing up. Then bend back your wrist, pointing your hand toward the floor. Wrist flexion and extension   If this exercise causes numbness, tingling, or pain in your hand, ease off of the stretch. You should not have symptoms as you stretch. If you cannot back off enough so that you can do the exercise without symptoms, stop doing the exercise right away. 1. Place your forearm on a table, with your affected hand and wrist extended beyond the table, palm down. 2. Slowly bend your wrist to move your hand upward and allow your hand to close into a fist. Hold for about 6 seconds. 3. Then lower your hand and allow your fingers to relax. Hold this position for about 6 seconds. You should feel a gentle stretch. 4. Repeat 8 to 12 times. Follow-up care is a key part of your treatment and safety. Be sure to make and go to all appointments, and call your doctor if you are having problems. It's also a good idea to know your test results and keep a list of the medicines you take. Where can you learn more?   Go to http://www.gray.com/  Enter N051 in the search box to learn more about \"Ulnar Neuropathy (Handlebar Palsy): Exercises. \"  Current as of: March 2, 2020               Content Version: 12.6  © 4372-0247 [employer]. Care instructions adapted under license by [employer] (which disclaims liability or warranty for this information). If you have questions about a medical condition or this instruction, always ask your healthcare professional. [address] any warranty or liability for your use of this information.

## 2021-02-02 NOTE — ANESTHESIA POSTPROCEDURE EVALUATION
Procedure(s):  LEFT CUBITAL TUNNEL RELEASE  LEFT CARPAL TUNNEL RELEASE.    general    Anesthesia Post Evaluation      Multimodal analgesia: multimodal analgesia used between 6 hours prior to anesthesia start to PACU discharge  Patient location during evaluation: PACU  Patient participation: complete - patient participated  Level of consciousness: awake and alert  Pain management: adequate  Airway patency: patent  Anesthetic complications: no  Cardiovascular status: acceptable  Respiratory status: acceptable  Hydration status: acceptable  Post anesthesia nausea and vomiting:  controlled  Final Post Anesthesia Temperature Assessment:  Normothermia (36.0-37.5 degrees C)      INITIAL Post-op Vital signs:   Vitals Value Taken Time   /58 02/02/21 1016   Temp 36.9 °C (98.4 °F) 02/02/21 0921   Pulse 63 02/02/21 1017   Resp 17 02/02/21 1017   SpO2 99 % 02/02/21 1017

## 2021-02-02 NOTE — BRIEF OP NOTE
Brief Postoperative Note    Patient: Denson Peabody  YOB: 1945  MRN: [id number]    Date of Procedure: 2/2/2021     Pre-Op Diagnosis: Cubital tunnel syndrome, left [G56.22]  Left carpal tunnel syndrome [G56.02]    Post-Op Diagnosis: Same as preoperative diagnosis. Procedure(s):  LEFT CUBITAL TUNNEL RELEASE  LEFT CARPAL TUNNEL RELEASE    Surgeon(s):   Kasandra Natarajan DO    Surgical Assistant: Surg Asst-1: Jassi Ramirez    Anesthesia: General     Estimated Blood Loss (mL): less than 50     Complications: None    Specimens: * No specimens in log *     Implants: * No implants in log *    Drains: * No LDAs found *    Findings: flattened median and restricted ulnar nerves    Electronically Signed by Harjeet Burnett DO on 2/2/2021 at 8:59 AM

## 2021-02-09 ENCOUNTER — TELEPHONE (OUTPATIENT)
Dept: ORTHOPEDIC SURGERY | Age: 76
End: 2021-02-09

## 2021-02-09 NOTE — TELEPHONE ENCOUNTER
Please have him remove bandages, wash with soap and water, and leave open to air. Please tell him to start moving his fingers every hour while awake. Thanks.

## 2021-02-09 NOTE — TELEPHONE ENCOUNTER
Spoke with patient and notified him of Dr. Jessica Davis instructions. Patient verbalized understanding.

## 2021-02-09 NOTE — TELEPHONE ENCOUNTER
Patient has instructions after surgery but doesn't read that well, so he would like to know should he remove the bandages to clean he said its a little tight and his fingers are going numb but no sure how to go about doing it.  Please advise

## 2021-02-11 ENCOUNTER — APPOINTMENT (OUTPATIENT)
Dept: CT IMAGING | Age: 76
End: 2021-02-11
Attending: EMERGENCY MEDICINE
Payer: COMMERCIAL

## 2021-02-11 ENCOUNTER — APPOINTMENT (OUTPATIENT)
Dept: GENERAL RADIOLOGY | Age: 76
End: 2021-02-11
Attending: EMERGENCY MEDICINE
Payer: COMMERCIAL

## 2021-02-11 ENCOUNTER — HOSPITAL ENCOUNTER (EMERGENCY)
Age: 76
Discharge: HOME | End: 2021-02-11
Attending: EMERGENCY MEDICINE
Payer: COMMERCIAL

## 2021-02-11 VITALS
WEIGHT: 175 LBS | RESPIRATION RATE: 15 BRPM | SYSTOLIC BLOOD PRESSURE: 140 MMHG | HEIGHT: 67 IN | BODY MASS INDEX: 27.47 KG/M2 | HEART RATE: 83 BPM | DIASTOLIC BLOOD PRESSURE: 81 MMHG | OXYGEN SATURATION: 100 %

## 2021-02-11 DIAGNOSIS — R07.89 ATYPICAL CHEST PAIN: ICD-10-CM

## 2021-02-11 DIAGNOSIS — R51.9 NONINTRACTABLE HEADACHE, UNSPECIFIED CHRONICITY PATTERN, UNSPECIFIED HEADACHE TYPE: Primary | ICD-10-CM

## 2021-02-11 DIAGNOSIS — J98.11 ATELECTASIS: ICD-10-CM

## 2021-02-11 DIAGNOSIS — J01.01 ACUTE RECURRENT MAXILLARY SINUSITIS: ICD-10-CM

## 2021-02-11 LAB
ALBUMIN SERPL-MCNC: 4 G/DL (ref 3.4–5)
ALBUMIN/GLOB SERPL: 1 {RATIO} (ref 0.8–1.7)
ALP SERPL-CCNC: 86 U/L (ref 45–117)
ALT SERPL-CCNC: 20 U/L (ref 16–61)
ANION GAP SERPL CALC-SCNC: 5 MMOL/L (ref 3–18)
APPEARANCE UR: CLEAR
APTT PPP: 29.2 SEC (ref 23–36.4)
AST SERPL-CCNC: 18 U/L (ref 10–38)
BASOPHILS # BLD: 0 K/UL (ref 0–0.1)
BASOPHILS NFR BLD: 0 % (ref 0–2)
BILIRUB SERPL-MCNC: 0.2 MG/DL (ref 0.2–1)
BILIRUB UR QL: NEGATIVE
BUN SERPL-MCNC: 11 MG/DL (ref 7–18)
BUN/CREAT SERPL: 10 (ref 12–20)
CALCIUM SERPL-MCNC: 9.1 MG/DL (ref 8.5–10.1)
CHLORIDE SERPL-SCNC: 107 MMOL/L (ref 100–111)
CK MB CFR SERPL CALC: 1.8 % (ref 0–4)
CK MB SERPL-MCNC: 3.8 NG/ML (ref 5–25)
CK SERPL-CCNC: 217 U/L (ref 39–308)
CO2 SERPL-SCNC: 30 MMOL/L (ref 21–32)
COLOR UR: YELLOW
CREAT SERPL-MCNC: 1.11 MG/DL (ref 0.6–1.3)
DIFFERENTIAL METHOD BLD: ABNORMAL
EOSINOPHIL # BLD: 0.2 K/UL (ref 0–0.4)
EOSINOPHIL NFR BLD: 4 % (ref 0–5)
ERYTHROCYTE [DISTWIDTH] IN BLOOD BY AUTOMATED COUNT: 14.4 % (ref 11.6–14.5)
GLOBULIN SER CALC-MCNC: 3.9 G/DL (ref 2–4)
GLUCOSE SERPL-MCNC: 92 MG/DL (ref 74–99)
GLUCOSE UR STRIP.AUTO-MCNC: NEGATIVE MG/DL
HCT VFR BLD AUTO: 42 % (ref 36–48)
HGB BLD-MCNC: 14 G/DL (ref 13–16)
HGB UR QL STRIP: NEGATIVE
INR PPP: 1.1 (ref 0.8–1.2)
KETONES UR QL STRIP.AUTO: NEGATIVE MG/DL
LEUKOCYTE ESTERASE UR QL STRIP.AUTO: NEGATIVE
LYMPHOCYTES # BLD: 1.9 K/UL (ref 0.9–3.6)
LYMPHOCYTES NFR BLD: 32 % (ref 21–52)
MAGNESIUM SERPL-MCNC: 2.1 MG/DL (ref 1.6–2.6)
MCH RBC QN AUTO: 30.5 PG (ref 24–34)
MCHC RBC AUTO-ENTMCNC: 33.3 G/DL (ref 31–37)
MCV RBC AUTO: 91.5 FL (ref 74–97)
MONOCYTES # BLD: 0.7 K/UL (ref 0.05–1.2)
MONOCYTES NFR BLD: 12 % (ref 3–10)
NEUTS SEG # BLD: 3.2 K/UL (ref 1.8–8)
NEUTS SEG NFR BLD: 52 % (ref 40–73)
NITRITE UR QL STRIP.AUTO: NEGATIVE
PH UR STRIP: 7.5 [PH] (ref 5–8)
PLATELET # BLD AUTO: 288 K/UL (ref 135–420)
PMV BLD AUTO: 9 FL (ref 9.2–11.8)
POTASSIUM SERPL-SCNC: 4 MMOL/L (ref 3.5–5.5)
PROT SERPL-MCNC: 7.9 G/DL (ref 6.4–8.2)
PROT UR STRIP-MCNC: NEGATIVE MG/DL
PROTHROMBIN TIME: 13.6 SEC (ref 11.5–15.2)
RBC # BLD AUTO: 4.59 M/UL (ref 4.7–5.5)
SODIUM SERPL-SCNC: 142 MMOL/L (ref 136–145)
SP GR UR REFRACTOMETRY: 1.01 (ref 1–1.03)
TROPONIN I SERPL-MCNC: <0.02 NG/ML (ref 0–0.04)
TSH SERPL DL<=0.05 MIU/L-ACNC: 0.54 UIU/ML (ref 0.36–3.74)
UROBILINOGEN UR QL STRIP.AUTO: 0.2 EU/DL (ref 0.2–1)
WBC # BLD AUTO: 6.1 K/UL (ref 4.6–13.2)

## 2021-02-11 PROCEDURE — 71045 X-RAY EXAM CHEST 1 VIEW: CPT

## 2021-02-11 PROCEDURE — 80053 COMPREHEN METABOLIC PANEL: CPT

## 2021-02-11 PROCEDURE — 85025 COMPLETE CBC W/AUTO DIFF WBC: CPT

## 2021-02-11 PROCEDURE — 84443 ASSAY THYROID STIM HORMONE: CPT

## 2021-02-11 PROCEDURE — 81003 URINALYSIS AUTO W/O SCOPE: CPT

## 2021-02-11 PROCEDURE — 93005 ELECTROCARDIOGRAM TRACING: CPT

## 2021-02-11 PROCEDURE — 83735 ASSAY OF MAGNESIUM: CPT

## 2021-02-11 PROCEDURE — 82553 CREATINE MB FRACTION: CPT

## 2021-02-11 PROCEDURE — 85610 PROTHROMBIN TIME: CPT

## 2021-02-11 PROCEDURE — 99284 EMERGENCY DEPT VISIT MOD MDM: CPT

## 2021-02-11 PROCEDURE — 85730 THROMBOPLASTIN TIME PARTIAL: CPT

## 2021-02-11 PROCEDURE — 70450 CT HEAD/BRAIN W/O DYE: CPT

## 2021-02-11 PROCEDURE — 74011250637 HC RX REV CODE- 250/637: Performed by: EMERGENCY MEDICINE

## 2021-02-11 RX ORDER — CLINDAMYCIN HYDROCHLORIDE 300 MG/1
300 CAPSULE ORAL 3 TIMES DAILY
Qty: 21 CAP | Refills: 0 | Status: SHIPPED | OUTPATIENT
Start: 2021-02-11 | End: 2021-02-18

## 2021-02-11 RX ORDER — OXYMETAZOLINE HCL 0.05 %
2 SPRAY, NON-AEROSOL (ML) NASAL
Status: COMPLETED | OUTPATIENT
Start: 2021-02-11 | End: 2021-02-11

## 2021-02-11 RX ADMIN — OXYMETAZOLINE HCL 2 SPRAY: 0.05 SPRAY NASAL at 19:56

## 2021-02-11 NOTE — ED PROVIDER NOTES
EMERGENCY DEPARTMENT HISTORY AND PHYSICAL EXAM    6:16 PM      Date: 2/11/2021  Patient Name: Susana Argueta    History of Presenting Illness     Chief Complaint   Patient presents with    Chest Pain         History Provided By: Patient  Location/Duration/Severity/Modifying factors   Patient is a 71-year-old male with a history of hypertension, erectile dysfunction, dyslipidemia, neuropathy, recent left hand surgery for numbness, the presents emergency department with complaint of several weeks of intermittent numbness to his left leg and face that increased today. Patient noted that his face has been feeling numb mostly on the left greater than the right however feels it bilaterally with a pulling sensation like his hair is being pulled out. Patient notes that he has having intermittent numbness the left leg and has chronic numbness of the right leg. Patient has been having nasal congestion and some fullness and feels like his head is full especially in the front and denies any nasal drainage. Patient says he is been following with an ear nose and throat doctor, primary doctor, and recently had surgery on his left hand because his hand was numb. Patient said that was done by hand surgeon. Patient denies any nausea, vomiting, fevers, chills, diaphoresis, or shortness of breath. Patient denies any leg swelling. Patient denies any other aggravating or alleviating factors. Patient was concerned mostly because he felt like his face was twisting however says he cannot see that in the mirror when he looked at it and lives alone. Patient is a former smoker, denies any active alcohol use, and has not drug user.           PCP: Rosendo Joy MD    Current Facility-Administered Medications   Medication Dose Route Frequency Provider Last Rate Last Admin    oxymetazoline (AFRIN) 0.05 % nasal spray 2 Spray  2 Spray Both Nostrils NOW Angel Morrison MD         Current Outpatient Medications   Medication Sig Dispense Refill    clindamycin (CLEOCIN) 300 mg capsule Take 1 Cap by mouth three (3) times daily for 7 days. 21 Cap 0    ammonium lactate (Lac-Hydrin Five) 5 % lotion Apply  to affected area two (2) times a day. 1 Bottle 0    tamsulosin (FLOMAX) 0.4 mg capsule Take 1 Cap by mouth daily (after dinner). 90 Cap 0    DULoxetine (CYMBALTA) 60 mg capsule Take 1 Cap by mouth daily. Indications: neuropathic pain 60 Cap 5    finasteride (PROSCAR) 5 mg tablet Take 5 mg by mouth daily.  albuterol sulfate 90 mcg/actuation aebs Take  by inhalation as needed.  diclofenac (Voltaren) 1 % gel Apply 4 g to affected area two (2) times a day. 100 g 0    ammonium lactate (AMLACTIN) 12 % topical cream Apply  to affected area two (2) times a day. rub in to affected area well 280 g 0    pantoprazole (PROTONIX) 40 mg tablet Take 1 Tab by mouth daily. 30 Tab 0    polyethylene glycol (MIRALAX) 17 gram packet Take 1 Packet by mouth daily. 30 Packet 0    amLODIPine (NORVASC) 10 mg tablet Take 1 Tab by mouth daily. 30 Tab 0    spironolactone (ALDACTONE) 25 mg tablet Take  by mouth daily.          Past History     Past Medical History:  Past Medical History:   Diagnosis Date    Bladder outlet obstruction     Erectile disorder due to medical 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     Frequency of urination     H/O spinal cord injury 1974    lumbar spine injury secondary to fall    HTN (hypertension)     Hypercholesterolemia     Illiterate     Injury of lumbar spine ([facility] [address] 1974    Leg pain, right     Nocturia     Overactive bladder     Peripheral vascular disease ([facility] [address]        Past Surgical History:  Past Surgical History:   Procedure Laterality Date    COLONOSCOPY N/A 12/4/2019    COLONOSCOPY performed by Elsie Gregg [facility] at [facility] ENDOSCOPY    HX HEENT Left     lens implant    HX ORTHOPAEDIC      finger amputation left hand    HX TONSILLECTOMY         Family History:  Family History   Problem Relation Age of Onset    Diabetes Mother     Hypertension Mother     Diabetes Maternal Grandmother     Hypertension Maternal Grandmother     Cancer Sister         brain    Cancer Sister         brain       Social History:  Social History     Tobacco Use    Smoking status: Former Smoker     Quit date: 2015     Years since quittin.5    Smokeless tobacco: Never Used   Substance Use Topics    Alcohol use: Not Currently     Alcohol/week: 0.0 standard drinks     Comment: former stopped     Drug use: No       Allergies: Allergies   Allergen Reactions    Latex Rash    Ampicillin Angioedema    Asa-Acetaminophen-Caff-Buffers Rash    Penicillins Angioedema    Crab Hives    Shellfish Derived Rash    Aspirin Other (comments)     Stomach upset    Atorvastatin Other (comments)     Muscle cramps         Review of Systems       Review of Systems   Constitutional: Negative for activity change, fatigue and fever. HENT: Positive for congestion. Negative for rhinorrhea. Eyes: Negative for visual disturbance. Respiratory: Negative for shortness of breath. Cardiovascular: Negative for chest pain and palpitations. Gastrointestinal: Negative for abdominal pain, diarrhea, nausea and vomiting. Genitourinary: Negative for dysuria and hematuria. Musculoskeletal: Negative for back pain. Skin: Negative for rash. Neurological: Positive for numbness and headaches. Negative for dizziness, weakness and light-headedness. All other systems reviewed and are negative. Physical Exam     Visit Vitals  BP (!) 140/81   Pulse 83   Resp 15   Ht 5' 7\" (1.702 m)   Wt 79.4 kg (175 lb)   SpO2 100%   BMI 27.41 kg/m²         Physical Exam  Vitals signs and nursing note reviewed. Constitutional:       General: He is not in acute distress. Appearance: He is well-developed. HENT:      Head: Normocephalic and atraumatic.       Right Ear: External ear normal.      Left Ear: External ear normal.      Nose: Nose normal.   Eyes: General: No scleral icterus. Conjunctiva/sclera: Conjunctivae normal.      Pupils: Pupils are equal, round, and reactive to light. Neck:      Musculoskeletal: Normal range of motion and neck supple. Thyroid: No thyromegaly. Vascular: No JVD. Trachea: No tracheal deviation. Cardiovascular:      Rate and Rhythm: Normal rate and regular rhythm. Heart sounds: Normal heart sounds. No murmur. No friction rub. No gallop. Pulmonary:      Effort: Pulmonary effort is normal.      Breath sounds: Normal breath sounds. Chest:      Chest wall: No tenderness. Abdominal:      General: Bowel sounds are normal. There is no distension. Palpations: Abdomen is soft. Tenderness: There is no abdominal tenderness. There is no guarding or rebound. Musculoskeletal: Normal range of motion. General: No tenderness. Comments: Tanned with observable suture noted no erythema, wound is clean dry and intact   Lymphadenopathy:      Cervical: No cervical adenopathy. Skin:     General: Skin is warm and dry. Capillary Refill: Capillary refill takes less than 2 seconds. Neurological:      Mental Status: He is alert and oriented to person, place, and time. Cranial Nerves: No cranial nerve deficit. Coordination: Coordination normal.      Comments: No sensory loss, Gait normal, Motor 5/5  No arm or leg drift, no facial asymmetry   Psychiatric:         Behavior: Behavior normal.         Thought Content:  Thought content normal.         Judgment: Judgment normal.           Diagnostic Study Results     Labs -  Recent Results (from the past 12 hour(s))   CBC WITH AUTOMATED DIFF    Collection Time: 02/11/21  6:42 PM   Result Value Ref Range    WBC 6.1 4.6 - 13.2 K/uL    RBC 4.59 (L) 4.70 - 5.50 M/uL    HGB 14.0 13.0 - 16.0 g/dL    HCT 42.0 36.0 - 48.0 %    MCV 91.5 74.0 - 97.0 FL    MCH 30.5 24.0 - 34.0 PG    MCHC 33.3 31.0 - 37.0 g/dL    RDW 14.4 11.6 - 14.5 %    PLATELET 187 962 - 420 K/uL    MPV 9.0 (L) 9.2 - 11.8 FL    NEUTROPHILS 52 40 - 73 %    LYMPHOCYTES 32 21 - 52 %    MONOCYTES 12 (H) 3 - 10 %    EOSINOPHILS 4 0 - 5 %    BASOPHILS 0 0 - 2 %    ABS. NEUTROPHILS 3.2 1.8 - 8.0 K/UL    ABS. LYMPHOCYTES 1.9 0.9 - 3.6 K/UL    ABS. MONOCYTES 0.7 0.05 - 1.2 K/UL    ABS. EOSINOPHILS 0.2 0.0 - 0.4 K/UL    ABS. BASOPHILS 0.0 0.0 - 0.1 K/UL    DF AUTOMATED     METABOLIC PANEL, COMPREHENSIVE    Collection Time: 02/11/21  6:42 PM   Result Value Ref Range    Sodium 142 136 - 145 mmol/L    Potassium 4.0 3.5 - 5.5 mmol/L    Chloride 107 100 - 111 mmol/L    CO2 30 21 - 32 mmol/L    Anion gap 5 3.0 - 18 mmol/L    Glucose 92 74 - 99 mg/dL    BUN 11 7.0 - 18 MG/DL    Creatinine 1.11 0.6 - 1.3 MG/DL    BUN/Creatinine ratio 10 (L) 12 - 20      GFR est AA >60 >60 ml/min/1.73m2    GFR est non-AA >60 >60 ml/min/1.73m2    Calcium 9.1 8.5 - 10.1 MG/DL    Bilirubin, total 0.2 0.2 - 1.0 MG/DL    ALT (SGPT) 20 16 - 61 U/L    AST (SGOT) 18 10 - 38 U/L    Alk.  phosphatase 86 45 - 117 U/L    Protein, total 7.9 6.4 - 8.2 g/dL    Albumin 4.0 3.4 - 5.0 g/dL    Globulin 3.9 2.0 - 4.0 g/dL    A-G Ratio 1.0 0.8 - 1.7     CARDIAC PANEL,(CK, CKMB & TROPONIN)    Collection Time: 02/11/21  6:42 PM   Result Value Ref Range    CK - MB 3.8 (H) <3.6 ng/ml    CK-MB Index 1.8 0.0 - 4.0 %     39 - 308 U/L    Troponin-I, QT <0.02 0.0 - 0.045 NG/ML   PROTHROMBIN TIME + INR    Collection Time: 02/11/21  6:42 PM   Result Value Ref Range    Prothrombin time 13.6 11.5 - 15.2 sec    INR 1.1 0.8 - 1.2     PTT    Collection Time: 02/11/21  6:42 PM   Result Value Ref Range    aPTT 29.2 23.0 - 36.4 SEC   MAGNESIUM    Collection Time: 02/11/21  6:42 PM   Result Value Ref Range    Magnesium 2.1 1.6 - 2.6 mg/dL   TSH 3RD GENERATION    Collection Time: 02/11/21  6:42 PM   Result Value Ref Range    TSH 0.54 0.36 - 3.74 uIU/mL   URINALYSIS W/ RFLX MICROSCOPIC    Collection Time: 02/11/21  7:46 PM   Result Value Ref Range    Color YELLOW Appearance CLEAR      Specific gravity 1.006 1.005 - 1.030      pH (UA) 7.5 5.0 - 8.0      Protein Negative NEG mg/dL    Glucose Negative NEG mg/dL    Ketone Negative NEG mg/dL    Bilirubin Negative NEG      Blood Negative NEG      Urobilinogen 0.2 0.2 - 1.0 EU/dL    Nitrites Negative NEG      Leukocyte Esterase Negative NEG         Radiologic Studies -   CT HEAD WO CONT   Final Result                  1.  No acute intracranial process. 2.  No significant interval change. XR CHEST SNGL V    (Results Pending)     Atelectasis of the left base interpreted by me    Medical Decision Making   I am the first provider for this patient. I reviewed the vital signs, available nursing notes, past medical history, past surgical history, family history and social history. Vital Signs-Reviewed the patient's vital signs. EKG: Normal sinus rhythm at 77, LVH, no STEMI, interpreted by me, slight ST elevation in V2 not seen previously however no contiguous ST elevations and no reciprocal change interpreted by me    Records Reviewed: Nursing Notes, Old Medical Records, Previous Radiology Studies and Previous Laboratory Studies (Time of Review: 6:16 PM)    ED Course: Progress Notes, Reevaluation, and Consults:     Patient's labs are reassuring including cardiac markers,, CT head is reassuring, and the patient is comfortable. Patient does have some atelectasis in the left base on his chest x-ray and will send the patient home on doxycycline and have him follow closely with his primary doctor. I do not suspect ACS or ischemic stroke at this time. Workup and recommendations were reviewed with the patient and all questions were answered. The patient understands the plan and will proceed with close outpatient care. I have encouraged the patient to return if at all worsened or concerned.    Feliciano Duke DO 9:16 PM      Provider Notes (Medical Decision Making):   [facility]  Number of Diagnoses or Management Options  Diagnosis management comments: Patient is a 80-year-old female male with history of hypertension, neuropathy, recent carpal tunnel release of the left hand on February 2 by Dr. Mack Harada, hypercholesterolemia, erectile dysfunction, lumbar spinal injury in the remote past, the presents emergency department with complaint of facial fullness and vague numbness of the face that does not seem to localize and chest pressure as well as some left leg numbness is intermittent. Patient at this time is nonfocal and is in no distress. We will start with a cardiac markers, CT his head and see there is any change from the last CT done approximately a week ago, electrolytes, TSH, Afrin for his nasal congestion to see if it helps with his facial pressure, and then reevaluate. Cony Ferrer DO 6:22 PM        Procedures          Diagnosis     Clinical Impression:   1. Nonintractable headache, unspecified chronicity pattern, unspecified headache type    2. Atelectasis    3. Acute recurrent maxillary sinusitis    4. Atypical chest pain        Disposition: DC    Follow-up Information    None          Patient's Medications   Start Taking    CLINDAMYCIN (CLEOCIN) 300 MG CAPSULE    Take 1 Cap by mouth three (3) times daily for 7 days. Continue Taking    ALBUTEROL SULFATE 90 MCG/ACTUATION AEBS    Take  by inhalation as needed. AMLODIPINE (NORVASC) 10 MG TABLET    Take 1 Tab by mouth daily. AMMONIUM LACTATE (AMLACTIN) 12 % TOPICAL CREAM    Apply  to affected area two (2) times a day. rub in to affected area well    AMMONIUM LACTATE (LAC-HYDRIN FIVE) 5 % LOTION    Apply  to affected area two (2) times a day. DICLOFENAC (VOLTAREN) 1 % GEL    Apply 4 g to affected area two (2) times a day. DULOXETINE (CYMBALTA) 60 MG CAPSULE    Take 1 Cap by mouth daily. Indications: neuropathic pain    FINASTERIDE (PROSCAR) 5 MG TABLET    Take 5 mg by mouth daily.     PANTOPRAZOLE (PROTONIX) 40 MG TABLET    Take 1 Tab by mouth daily. POLYETHYLENE GLYCOL (MIRALAX) 17 GRAM PACKET    Take 1 Packet by mouth daily. SPIRONOLACTONE (ALDACTONE) 25 MG TABLET    Take  by mouth daily. TAMSULOSIN (FLOMAX) 0.4 MG CAPSULE    Take 1 Cap by mouth daily (after dinner). These Medications have changed    No medications on file   Stop Taking    No medications on file     Disclaimer: Sections of this note are dictated using utilizing voice recognition software. Minor typographical errors may be present. If questions arise, please do not hesitate to contact me or call our department.

## 2021-02-11 NOTE — ED TRIAGE NOTES
Pt presents to the ED via EMS c/o head tightness and numbness on the left side that began an hour pta, pt also c/o intermittet left side chest tightness that began few days ago.  Denies hx of MI, stent placement, CVA,

## 2021-02-12 LAB
ATRIAL RATE: 77 BPM
CALCULATED P AXIS, ECG09: 49 DEGREES
CALCULATED R AXIS, ECG10: -26 DEGREES
CALCULATED T AXIS, ECG11: 50 DEGREES
DIAGNOSIS, 93000: NORMAL
P-R INTERVAL, ECG05: 182 MS
Q-T INTERVAL, ECG07: 384 MS
QRS DURATION, ECG06: 88 MS
QTC CALCULATION (BEZET), ECG08: 434 MS
VENTRICULAR RATE, ECG03: 77 BPM

## 2021-02-15 NOTE — PROGRESS NOTES
Hematology/Oncology  Progress Note    Name: David Fitzpatrick  Date: 2021  : 1945    Silvia Bruce MD     Mr. Andrew Dhillon is a 77y.o. year old male who was seen for Monoclonal gammopathy of undetermined significance. Current Therapy- Active Surveillance       Subjective:      Mr. Andrew Dhillon is a 50-year-old male with a past medical history of CAD, HTN, PVD, radiculopathy of the lumbar region, chronic low back pain, osteoarthritis, and monoclonal gammopathy of unknown significance. He was diagnosed on 2018. The bone survey on 10/18/2018 showed no evidence of lytic lesions in the skeleton. Patient was being followed by Dr. Alyson Billingsley who retired. Today the patient reports that he is doing well other than chronic pain related to his arthritis. He has f/u with Orthopedics s.p Left carpal tunnel syndrome surgery recently. He denies shortness of breath, dizziness, and chest pain. The patient otherwise has no other complaints. Denied fever, chills, night sweat, unintentional weight loss, skin lumps or bumps, acute bleeding or bruising issues. No acute bleeding, blood in stool, dark stool, melena, hematochezia, hemoptysis, dark urine, or easily bruising. Denied headache, acute vision change, dizziness, chest pain, worsen shortness of breath, palpitation, productive cough, nausea, vomiting, abdominal pain, altered bowel habits, dysuria, new back pain, worsening focal weakness. Past medical history, family history, and social history: these were reviewed and remains unchanged.     Past Medical History:   Diagnosis Date    Bladder outlet obstruction     Erectile disorder due to medical 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     Frequency of urination     H/O spinal cord injury     lumbar spine injury secondary to fall    HTN (hypertension)     Hypercholesterolemia     Illiterate     Injury of lumbar spine ([address]     Leg pain, right     Nocturia     Overactive bladder     Peripheral vascular disease [facility])      Past Surgical History:   Procedure Laterality Date    COLONOSCOPY N/A 2019    COLONOS[facility] performed [facility] Darling Hernandez [facility] at [facility]COPY    HX HEENT Left     lens implant    HX ORTHOPAEDIC      finger amputation left hand    HX TONSILLECTOMY       Social History     Socioeconomic History    Marital status:      Spouse name: Not on file    Number of children: Not on file    Years of education: Not on file    Highest education level: Not on file   Occupational History    Not on file   Social Needs    Financial resource strain: Not on file    Food insecurity     Worry: Not on file     Inability: Not on file    Transportation needs     Medical: Not on file     Non-medical: Not on file   Tobacco Use    Smoking status: Former Smoker     Quit date: 2015     Years since quittin.6    Smokeless tobacco: Never Used   Substance and Sexual Activity    Alcohol use: Not Currently     Alcohol/week: 0.0 standard drinks     Comment: former 2015    Drug use: No    Sexual activity: Yes   Lifestyle    Physical activity     Days per week: Not on file     Minutes per session: Not on file    Stress: Not on file   Relationships    Social connections     Talks on phone: Not on file     Gets together: Not on file     Attends Faith service: Not on file     Active member of club or organization: Not on file     Attends meetings of clubs or organizations: Not on file     Relationship status: Not on file    Intimate partner violence     Fear of current or ex partner: Not on file     Emotionally abused: Not on file     Physically abused: Not on file     Forced sexual activity: Not on file   Other Topics Concern    Not on file   Social History Narrative    Not on file     Family History   Problem Relation Age of Onset    Diabetes Mother     Hypertension Mother     Diabetes Maternal Grandmother     Hypertension Maternal Grandmother     Cancer Sister         brain  Cancer Sister         brain     Current Outpatient Medications   Medication Sig Dispense Refill    clindamycin (CLEOCIN) 300 mg capsule Take 1 Cap by mouth three (3) times daily for 7 days. 21 Cap 0    ammonium lactate (Lac-Hydrin Five) 5 % lotion Apply  to affected area two (2) times a day. 1 Bottle 0    tamsulosin (FLOMAX) 0.4 mg capsule Take 1 Cap by mouth daily (after dinner). 90 Cap 0    DULoxetine (CYMBALTA) 60 mg capsule Take 1 Cap by mouth daily. Indications: neuropathic pain 60 Cap 5    finasteride (PROSCAR) 5 mg tablet Take 5 mg by mouth daily.  albuterol sulfate 90 mcg/actuation aebs Take  by inhalation as needed.  diclofenac (Voltaren) 1 % gel Apply 4 g to affected area two (2) times a day. 100 g 0    ammonium lactate (AMLACTIN) 12 % topical cream Apply  to affected area two (2) times a day. rub in to affected area well 280 g 0    pantoprazole (PROTONIX) 40 mg tablet Take 1 Tab by mouth daily. 30 Tab 0    polyethylene glycol (MIRALAX) 17 gram packet Take 1 Packet by mouth daily. 30 Packet 0    amLODIPine (NORVASC) 10 mg tablet Take 1 Tab by mouth daily. 30 Tab 0    spironolactone (ALDACTONE) 25 mg tablet Take  by mouth daily. Review of Systems   Constitutional: Negative for chills, diaphoresis, fever, malaise/fatigue and weight loss. Respiratory: Negative for cough, hemoptysis, shortness of breath and wheezing. Cardiovascular: Negative for chest pain, palpitations and leg swelling. Gastrointestinal: Negative for abdominal pain, diarrhea, heartburn, nausea and vomiting. Genitourinary: Negative for dysuria, frequency, hematuria and urgency. Musculoskeletal: Positive for back pain and joint pain (Left hand, s.p surgery). Negative for myalgias. Skin: Negative for itching and rash. Neurological: Negative for dizziness, seizures, weakness and headaches. Psychiatric/Behavioral: Negative for depression. The patient does not have insomnia.              Objective: Visit Vitals  BP (!) 154/72   Pulse 85   Temp 98.6 °F (37 °C) (Oral)   Resp 18   Ht 5' 7\" (1.702 m)   Wt 79.4 kg (175 lb)   SpO2 97%   BMI 27.41 kg/m²       ECOG Performance Status (grade): 1  0 - able to carry on all pre-disease activity w/out restriction  1 - restricted but able to carry out light work  2 - ambulatory and can self- care but unable to carry out work  3 - bed or chair >50% of waking hours  4 - completely disable, total care, confined to bed or chair    Physical Exam  Constitutional:       General: He is not in acute distress. HENT:      Head: Normocephalic and atraumatic. Eyes:      Pupils: Pupils are equal, round, and reactive to light. Neck:      Musculoskeletal: Neck supple. No neck rigidity. Cardiovascular:      Pulses: Normal pulses. Heart sounds: Normal heart sounds. No murmur. Pulmonary:      Effort: Pulmonary effort is normal. No respiratory distress. Breath sounds: Normal breath sounds. Abdominal:      General: Bowel sounds are normal. There is no distension. Palpations: Abdomen is soft. There is no mass. Tenderness: There is no abdominal tenderness. There is no guarding. Musculoskeletal:         General: No swelling or tenderness. Lymphadenopathy:      Cervical: No cervical adenopathy. Skin:     General: Skin is warm. Findings: No rash. Neurological:      Mental Status: He is alert and oriented to person, place, and time. Mental status is at baseline. Cranial Nerves: No cranial nerve deficit. Psychiatric:         Mood and Affect: Mood normal.          Diagnostics:      No results found for this or any previous visit (from the past 96 hour(s)). Imaging:  No results found for this or any previous visit.     Results for orders placed during the hospital encounter of 02/11/21   XR CHEST SNGL V    Narrative EXAM: Chest Radiograph    INDICATION:  CP    TECHNIQUE: AP view of the chest    COMPARISON: 2/18/2020, 8/13/2019, 3/26/2019    FINDINGS: No pneumothorax identified. Interstitial opacities are noted in the  mid to lower lung fields which are new since prior imaging. No effusions  identified. The cardiomediastinal silhouette is unremarkable. The pulmonary  vasculature is unremarkable. The osseous structures are unremarkable. Impression 1. Mild interstitial pneumonitis. Follow-up to resolution is recommended. Results for orders placed during the hospital encounter of 02/11/21   CT HEAD WO CONT    Narrative EXAM:  CT Head without Contrast              CLINICAL INDICATION:  Severe headache. COMPARISON:  02/02/21. .           TECHNIQUE:      - Helical volumetric CT imaging of the head is performed from the base of the  skull to the vertex without IV contrast administration. Axial, coronal and  sagittal reconstruction images are generated from this volumetric data set. - Dose optimization techniques are utilized as appropriate to the performed exam  with combination of automated exposure control, adjustment of mA and/or kV  according to patient size, and use of iterative reconstructive technique. FINDINGS:     Brain:    - Hemorrhage/ hematoma:  No evidence of intracranial hemorrhage or hematoma is  detected. - Mass:  No definite space-occupying lesion is apparent. No significant mass  effect such as midline shift or sulcal effacement. - Infarct:  No convincing evidence of acute infarct is noted. - Gray-white matter differentiation:  Intact. CSF spaces:  No acute abnormalities. Calvarium:  Intact. Sinuses:  Clear. Interval assessment:  No significant interval changes are observed. Impression               1.  No acute intracranial process. 2.  No significant interval change. Assessment:     1. MGUS (monoclonal gammopathy of unknown significance)    2.  Chronic anemia        Plan:   Monoclonal gammopathy of undetermined significance  Chronic anemia, improved:  -- I have explained to the patient that his most recent M-spike in 10/9/2020 has been stable at 0.5.  -- Clinically the patient is doing stable. No CRAB criteria. 2/11/2021 CBC reported hemoglobin 14.0, hematocrit 42.0, total WBC 6.1, and platelet 582,[phone]. Plan:  -- We will check SPEP/JENS/SFLC today. -- The patient was advised to notify us if any skin lumps or bumps, swollen lymph nodes, night sweat, unintentional weight loss, worsen fatigue, new bone pain or back pain, or any concerns. -- I have advised the patient to follow up PCP to continue age-appropriate cancer screening. -- I have educated patient regarding lifestyle modifications, minimizing alcohol intake, refraining from smoking, healthy diet, and physical activity. Chronic low back pain:   -- Patient will continue pain medications as prescribed by his PCP. -- We will see the patient back in clinic in about 6 months. Always sooner if required. The patient can have lab done prior to our next clinic visit. Orders Placed This Encounter    GAMMOPATHY EVAL, SPEP/JENS, IG QT/FLC     Standing Status:   Future     Standing Expiration Date:   2/16/2022           Mr. Man Cardoso has a reminder for a \"due or due soon\" health maintenance. I have asked that he contact his primary care provider for follow-up on this health maintenance. All of patient's questions answered to their apparent satisfaction. They verbally show understanding and agreement with aforementioned plan. Yani Monreal MD  2/16/2021          About 25 minutes were spent for this encounter with more than 50% of the time spent in face-to-face counseling, discussing on diagnosis and management plan going forward, and co-ordination of care. Parts of this document has been produced using Dragon dictation system. Unrecognized errors in transcription may be present.  Please do not hesitate to reach out for any questions or clarifications.       CC: Brian Pelaez MD

## 2021-02-16 ENCOUNTER — OFFICE VISIT (OUTPATIENT)
Dept: ONCOLOGY | Age: 76
End: 2021-02-16
Payer: COMMERCIAL

## 2021-02-16 ENCOUNTER — HOSPITAL ENCOUNTER (OUTPATIENT)
Dept: LAB | Age: 76
Discharge: HOME | End: 2021-02-16
Payer: COMMERCIAL

## 2021-02-16 VITALS
SYSTOLIC BLOOD PRESSURE: 154 MMHG | BODY MASS INDEX: 27.47 KG/M2 | OXYGEN SATURATION: 97 % | DIASTOLIC BLOOD PRESSURE: 72 MMHG | WEIGHT: 175 LBS | RESPIRATION RATE: 18 BRPM | HEIGHT: 67 IN | HEART RATE: 85 BPM | TEMPERATURE: 98.6 F

## 2021-02-16 DIAGNOSIS — D47.2 MGUS (MONOCLONAL GAMMOPATHY OF UNKNOWN SIGNIFICANCE): Primary | ICD-10-CM

## 2021-02-16 DIAGNOSIS — G89.29 CHRONIC LOW BACK PAIN, UNSPECIFIED BACK PAIN LATERALITY, UNSPECIFIED WHETHER SCIATICA PRESENT: ICD-10-CM

## 2021-02-16 DIAGNOSIS — D64.9 CHRONIC ANEMIA: ICD-10-CM

## 2021-02-16 DIAGNOSIS — D50.8 IRON DEFICIENCY ANEMIA SECONDARY TO INADEQUATE DIETARY IRON INTAKE: ICD-10-CM

## 2021-02-16 DIAGNOSIS — D47.2 MGUS (MONOCLONAL GAMMOPATHY OF UNKNOWN SIGNIFICANCE): ICD-10-CM

## 2021-02-16 DIAGNOSIS — M54.50 CHRONIC LOW BACK PAIN, UNSPECIFIED BACK PAIN LATERALITY, UNSPECIFIED WHETHER SCIATICA PRESENT: ICD-10-CM

## 2021-02-16 DIAGNOSIS — D47.2 MONOCLONAL GAMMOPATHY OF UNDETERMINED SIGNIFICANCE: ICD-10-CM

## 2021-02-16 PROCEDURE — 36415 COLL VENOUS BLD VENIPUNCTURE: CPT

## 2021-02-16 PROCEDURE — 99214 OFFICE O/P EST MOD 30 MIN: CPT | Performed by: INTERNAL MEDICINE

## 2021-02-16 PROCEDURE — 82784 ASSAY IGA/IGD/IGG/IGM EACH: CPT

## 2021-02-16 NOTE — PATIENT INSTRUCTIONS
Learning About Monoclonal Gammopathy of Unknown Significance (MGUS) What is MGUS? Monoclonal gammopathy of unknown significance (MGUS) is a blood condition. The blood is made of many kinds of cells, including red blood cells, platelets, and white blood cells. With MGUS, a type of white blood cell called a plasma cell makes too much of the \"M\" (for \"monoclonal\") protein in the blood. Most people with MGUS are fine for many years. MGUS seldom causes symptoms or major health problems. In rare cases, MGUS may turn into multiple myeloma. This is a cancer of plasma cells in the blood that can cause bone weakness. Some people with MGUS may also have a higher risk for osteoporosis, in which bones become thin and weak. MGUS is sometimes seen in younger people, but is more common in people as they get older. It's seen most often in those over the age of 79. How is MGUS diagnosed? MGUS is often found by chance when blood tests are done for other reasons. If you have high levels of a certain protein in your blood, your doctor may order more tests. Blood tests can help identify the protein. The protein is sometimes found in the urine, so you may get a urine test too. Other tests may be done if your doctor thinks you might have a medical problem. In some cases, a bone marrow biopsy may be done to rule out a problem like multiple myeloma. How is it treated? Most people with MGUS don't need any treatment. But you may need regular physical exams, blood tests, and urine tests to make sure that MGUS isn't progressing to a medical problem. Follow-up care is a key part of your treatment and safety. Be sure to make and go to all appointments, and call your doctor if you are having problems. It's also a good idea to know your test results and keep a list of the medicines you take. Where can you learn more? Go to http://www.[employer].com/ Enter A917 in the search box to learn more about \"Learning About Monoclonal Gammopathy of Unknown Significance (MGUS). \" 
Current as of: November 8, 2019               Content Version: 12.6 © 4655-0188 [employer]. Care instructions adapted under license by [employer] (which disclaims liability or warranty for this information). If you have questions about a medical condition or this instruction, always ask your healthcare professional. [place] [address] any warranty or liability for your use of this information.

## 2021-02-17 ENCOUNTER — OFFICE VISIT (OUTPATIENT)
Dept: ORTHOPEDIC SURGERY | Age: 76
End: 2021-02-17
Payer: COMMERCIAL

## 2021-02-17 VITALS
WEIGHT: 172.2 LBS | HEART RATE: 81 BPM | DIASTOLIC BLOOD PRESSURE: 70 MMHG | BODY MASS INDEX: 27.03 KG/M2 | SYSTOLIC BLOOD PRESSURE: 139 MMHG | HEIGHT: 67 IN | TEMPERATURE: 97.7 F | OXYGEN SATURATION: 98 % | RESPIRATION RATE: 15 BRPM

## 2021-02-17 DIAGNOSIS — Z98.890 S/P CUBITAL TUNNEL RELEASE: ICD-10-CM

## 2021-02-17 DIAGNOSIS — Z98.890 S/P CARPAL TUNNEL RELEASE: ICD-10-CM

## 2021-02-17 DIAGNOSIS — G56.22 CUBITAL TUNNEL SYNDROME, LEFT: Primary | ICD-10-CM

## 2021-02-17 DIAGNOSIS — G56.02 LEFT CARPAL TUNNEL SYNDROME: ICD-10-CM

## 2021-02-17 PROCEDURE — 99024 POSTOP FOLLOW-UP VISIT: CPT | Performed by: ORTHOPAEDIC SURGERY

## 2021-02-17 NOTE — PROGRESS NOTES
Chris Moreno is a 77 y.o. male right handed retiree. Worker's Compensation and legal considerations: none filed. Vitals:    02/17/21 0849   BP: 139/70   Pulse: 81   Resp: 15   Temp: 97.7 °F (36.5 °C)   TempSrc: Temporal   SpO2: 98%   Weight: 172 lb 3.2 oz (78.1 kg)   Height: 5' 7\" (1.702 m)   PainSc:   6   PainLoc: Elbow           Chief Complaint   Patient presents with    Elbow Pain     left elbow pain       HPI: Patient presents today 2 weeks status post left carpal tunnel and cubital tunnel releases. He reports feeling better after surgery but that his numbness is recently gotten worse. Preop HPI: Patient returns today for follow-up of bilateral upper extremity EMGs. He is recently discussed with a shoulder surgeon having a shoulder replacement. This is on the right side. He says he would like to have the left hand numbness taken care of first.    12/2020 HPI: Patient comes in today regarding left worse than right upper extremity numbness and pain. He reports little finger and the ring finger on the left to be numb most of the time of pain radiating up the arm. He reports occasional pain and swelling on the right side. Initial HPI: Patient comes in today regarding concerns of having a metal foreign body in his on something metallic right middle finger tip in his left thumb tip. He says that he scratched both areas last year and has since had issues with it. He thinks he may have gotten something metallic out of the right middle finger and then it bled after that. He reports some continued tenderness to palpation.     Date of onset:  9/2019    Injury: Yes: Comment: cut left thumb and right middle finger    Prior Treatment:  Yes: Comment:  left carpal tunnel and cubital tunnel releases    Numbness/ Tingling: Yes: Comment: Left worse than right ulnar-sided digits    ROS: Review of Systems - General ROS: negative  Respiratory ROS: no cough, shortness of breath, or wheezing  Cardiovascular ROS: no chest pain or dyspnea on exertion  Musculoskeletal ROS: positive for - pain in finger - right and thumb - left  Neurological ROS: Positive for numbness and tingling  Dermatological ROS: negative    Past Medical History:   Diagnosis Date    Bladder outlet obstruction     Erectile disorder due to medical 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     Frequency of urination     H/O spinal cord injury 1974    lumbar spine injury secondary to fall    HTN (hypertension)     Hypercholesterolemia     Illiterate     Injury of lumbar spine ([facility] [address]    Leg pain, right     Nocturia     Overactive bladder     Peripheral vascular disease ([facility] [address]        Past Surgical History:   Procedure Laterality Date    COLONOSCOPY N/A 12/4/2019    COL[facility] by Best Henderson MD at [facility]COPY    HX HEENT Left     lens implant    HX ORTHOPAEDIC      finger amputation left hand    HX TONSILLECTOMY      UPPER ARM/ELBOW SURGERY UNLISTED         Current Outpatient Medications   Medication Sig Dispense Refill    clindamycin (CLEOCIN) 300 mg capsule Take 1 Cap by mouth three (3) times daily for 7 days. 21 Cap 0    ammonium lactate (Lac-Hydrin Five) 5 % lotion Apply  to affected area two (2) times a day. 1 Bottle 0    tamsulosin (FLOMAX) 0.4 mg capsule Take 1 Cap by mouth daily (after dinner). 90 Cap 0    DULoxetine (CYMBALTA) 60 mg capsule Take 1 Cap by mouth daily. Indications: neuropathic pain 60 Cap 5    finasteride (PROSCAR) 5 mg tablet Take 5 mg by mouth daily.  albuterol sulfate 90 mcg/actuation aebs Take  by inhalation as needed.  diclofenac (Voltaren) 1 % gel Apply 4 g to affected area two (2) times a day. 100 g 0    ammonium lactate (AMLACTIN) 12 % topical cream Apply  to affected area two (2) times a day. rub in to affected area well 280 g 0    pantoprazole (PROTONIX) 40 mg tablet Take 1 Tab by mouth daily. 30 Tab 0    polyethylene glycol (MIRALAX) 17 gram packet Take 1 Packet by mouth daily.  30 Packet 0  amLODIPine (NORVASC) 10 mg tablet Take 1 Tab by mouth daily. 30 Tab 0    spironolactone (ALDACTONE) 25 mg tablet Take  by mouth daily. Allergies   Allergen Reactions    Latex Rash    Ampicillin Angioedema    Asa-Acetaminophen-Caff-Buffers Rash    Penicillins Angioedema    Crab Hives    Shellfish Derived Rash    Aspirin Other (comments)     Stomach upset    Atorvastatin Other (comments)     Muscle cramps         PE:     Physical Exam  Vitals signs and nursing note reviewed. Constitutional:       General: He is not in acute distress. Appearance: Normal appearance. He is not ill-appearing. Eyes:      Extraocular Movements: Extraocular movements intact. Pupils: Pupils are equal, round, and reactive to light. Neck:      Musculoskeletal: Normal range of motion and neck supple. Cardiovascular:      Pulses: Normal pulses. Pulmonary:      Effort: Pulmonary effort is normal. No respiratory distress. Abdominal:      General: Abdomen is flat. There is no distension. Musculoskeletal: Normal range of motion. General: No swelling, tenderness, deformity or signs of injury. Right lower leg: No edema. Left lower leg: No edema. Skin:     General: Skin is warm and dry. Capillary Refill: Capillary refill takes less than 2 seconds. Findings: No bruising or erythema. Neurological:      General: No focal deficit present. Mental Status: He is alert and oriented to person, place, and time. Cranial Nerves: No cranial nerve deficit. Sensory: No sensory deficit. Psychiatric:         Mood and Affect: Mood normal.         Behavior: Behavior normal.         Left upper extremity: Incisions clean dry and intact with no drainage breakdown erythema or any signs of infection. Range of motion is full in the elbow near full in the hand. NEUROVASCULAR    Examination L R Examination L R   Carpal Comp.  - Pronator Comp.  - -   Carpal Tinel  - Pronator Tinel - - Phalen's  - Pronator Stress - -   Cubital Comp.  +- Guyon Comp. - -   Cubital Tinel  + Guyon Tinel - -   Elbow Hyperflexion  - Adson's - -   Spurling's - - SC Comp. - -   PCB Median abn - - SC Tinel - -   Radial Tinel - - IC Comp. - -   Digital Tinel - - IC Tinel - -   Radial 2-Pt WNL WNL Ulnar 2-Pt WNL WNL     Radial Pulse: 2+  Capillary Refill: < 2 sec  [employer]: Not Performed  [employer]: Not Performed    NCV & EMG Findings:  Evaluation of the right median motor nerve showed prolonged distal onset latency (4.7 ms). The left ulnar motor and the right ulnar motor nerves showed decreased conduction velocity (A Elbow-B Elbow, L36, R43 m/s). The left median sensory and the right median sensory nerves showed prolonged distal peak latency (L3.8, R4.4 ms) and decreased conduction velocity (Wrist-2nd Digit, L37, R32 m/s). The left ulnar sensory and the right ulnar sensory nerves showed prolonged distal peak latency (L7.3, R4.2 ms), reduced amplitude (L8.3, R7.6 µV), and decreased conduction velocity (Wrist-5th Digit, L19, R33 m/s). All remaining nerves (as indicated in the following tables) were within normal limits. Left vs. Right side comparison data for the median sensory nerve indicates abnormal L-R latency difference (0.6 ms). The ulnar sensory nerve indicates abnormal L-R latency difference (3.1 ms). All remaining left vs. right side differences were within normal limits.       All examined muscles (as indicated in the following table) showed no evidence of electrical instability.       INTERPRETATION     This is an abnormal electrodiagnostic examination. These findings may be consistent with:   1. Sensorimotor polyneuropathy - this is based on mild unexpected abnormalities throughout the NCS unrelated to other entrapment syndromes. There is possibility that the severity of the other entrapment neuropathies is worsened by this polyneuropathy.     2. Moderate ulnar mononeuropathy at the right elbow (cubital tunnel syndrome)   3. Moderate median mononeuropathy at the right wrist (carpal tunnel syndrome)   4. Moderate ulnar mononeuropathy at the left elbow (cubital tunnel syndrome)   5. Mild median mononeuropathy at the left wrist (carpal tunnel syndrome)     There is no electrodiagnostic evidence of any cervical radiculopathy, brachial plexopathy,  or any other mononeuropathy.        CLINICAL INTERPRETATION  His electrodiagnostic findings of carpal tunnel and cubital tunnel syndromes bilaterally are consistent with his bilateral hand symptoms. Imagin2020 plain films of the left thumb as well as the right middle finger does not show any evidence of radiopaque foreign body any fracture dislocation or other osseous abnormality. Of note on the left thumb view a index finger metallic foreign body is noted on the radial aspect of the P1. ICD-10-CM ICD-9-CM    1. Cubital tunnel syndrome, left  G56.22 354.2 REFERRAL TO OCCUPATIONAL THERAPY   2. Left carpal tunnel syndrome  G56.02 354.0 REFERRAL TO OCCUPATIONAL THERAPY   3. S/P cubital tunnel release  Z98.890 V45.89 REFERRAL TO OCCUPATIONAL THERAPY   4. S/P carpal tunnel release  Z98.890 V45.89 REFERRAL TO OCCUPATIONAL THERAPY       Plan:     Discussed range of motion exercises and scar care. OT for range of motion exercises, edema control, and other modalities. Follow-up and Dispositions    · Return in about 4 weeks (around 3/17/2021) for Reevaluation and OT follow-up.          Plan was reviewed with patient, who verbalized agreement and understanding of the plan

## 2021-02-18 LAB
ALBUMIN SERPL ELPH-MCNC: 3.8 G/DL (ref 2.9–4.4)
ALBUMIN/GLOB SERPL: 1.3 {RATIO} (ref 0.7–1.7)
ALPHA1 GLOB SERPL ELPH-MCNC: 0.2 G/DL (ref 0–0.4)
ALPHA2 GLOB SERPL ELPH-MCNC: 0.6 G/DL (ref 0.4–1)
B-GLOBULIN SERPL ELPH-MCNC: 1.1 G/DL (ref 0.7–1.3)
GAMMA GLOB SERPL ELPH-MCNC: 1.1 G/DL (ref 0.4–1.8)
GLOBULIN SER-MCNC: 3 G/DL (ref 2.2–3.9)
IGA SERPL-MCNC: 195 MG/DL (ref 61–437)
IGG SERPL-MCNC: 1261 MG/DL (ref 603–1613)
IGM SERPL-MCNC: 47 MG/DL (ref 15–143)
INTERPRETATION SERPL IEP-IMP: ABNORMAL
KAPPA LC FREE SER-MCNC: 24.4 MG/L (ref 3.3–19.4)
KAPPA LC FREE/LAMBDA FREE SER: 1.22 {RATIO} (ref 0.26–1.65)
LAMBDA LC FREE SERPL-MCNC: 20 MG/L (ref 5.7–26.3)
M PROTEIN SERPL ELPH-MCNC: 0.5 G/DL
PROT SERPL-MCNC: 6.8 G/DL (ref 6–8.5)

## 2021-03-01 ENCOUNTER — OFFICE VISIT (OUTPATIENT)
Dept: ORTHOPEDIC SURGERY | Age: 76
End: 2021-03-01
Payer: COMMERCIAL

## 2021-03-01 VITALS — TEMPERATURE: 96.9 F | BODY MASS INDEX: 27.6 KG/M2 | WEIGHT: 176.2 LBS

## 2021-03-01 DIAGNOSIS — M25.562 CHRONIC PAIN OF LEFT KNEE: ICD-10-CM

## 2021-03-01 DIAGNOSIS — G89.29 CHRONIC PAIN OF LEFT KNEE: ICD-10-CM

## 2021-03-01 DIAGNOSIS — G89.29 BILATERAL CHRONIC KNEE PAIN: Primary | ICD-10-CM

## 2021-03-01 DIAGNOSIS — M25.562 BILATERAL CHRONIC KNEE PAIN: Primary | ICD-10-CM

## 2021-03-01 DIAGNOSIS — M25.561 BILATERAL CHRONIC KNEE PAIN: Primary | ICD-10-CM

## 2021-03-01 DIAGNOSIS — M54.10 RADICULAR LOW BACK PAIN: ICD-10-CM

## 2021-03-01 PROCEDURE — 99214 OFFICE O/P EST MOD 30 MIN: CPT | Performed by: ORTHOPAEDIC SURGERY

## 2021-03-01 PROCEDURE — 73564 X-RAY EXAM KNEE 4 OR MORE: CPT | Performed by: ORTHOPAEDIC SURGERY

## 2021-03-01 NOTE — PROGRESS NOTES
Patient: Patti Caballero                MRN: [id number]       SSN: [id number]  YOB: 1945        AGE: 77 y.o. SEX: male  Body mass index is 27.6 kg/m². PCP: Emmie Arce MD  03/01/21    HISTORY:  Arabella Spring is presenting with low back pain with radiculopathy. He has been seen at the [address] [place] [address] previously and would like a repeat. He is complaining of left knee tightness, it hurts him. He has difficulties kneeling, getting up from a chair. He does get some catching, locking, giving way. The knee does tend to swell on him. Not too much pain at rest.  He has had cortisone injection in the past, which really was not all that efficacious for him. The right knee is not really bothering him. No shortness of breath or chest pain, no fevers or chills, no weight loss, no loss of sense of smell or taste. PHYSICAL EXAMINATION:  He has a mild effusion in the left knee, mild medial lateral joint line tenderness, mild click associated. A little bit of patellofemoral irritation with terminal extension. He has good motion in the knee, -2 degrees extension, bends to about 108 degrees flexion. The hips rotate nicely. Mild evidence of neuropathy distally with sharp testing. No foot drop. Calf non tender. Low back is mildly tender. Straight leg raise is just equivocal today. RADIOGRAPHS:  X-rays today of both knees, 03/01/21, four views, AP, tunnel, lateral and skyline confirm moderate arthritis, but not severely so. PLAN:  He has already tried nonoperative management with the left knee and has not done too well. I would recommend MRI to rule out meniscal tear and AVN. I would recommend referral back to [address] [place] [address] and he would like a handicap placard, which I would be happy to help him with.   There was a discussion with the patient regarding surgery and it was decided we need to investigate further, it is not recommended today, and we will see if he has a significant meniscal tear.      It has been a pleasure to share in his care and provide opinion and advice in regards to his management. Julio Cesar Mena MD        REVIEW OF SYSTEMS:      CON: negative  EYE: negative   ENT: negative  RESP: negative  GI:    negative   :  negative  MSK: Positive  A twelve point review of systems was completed, positives noted and all other systems were reviewed and are negative          Past Medical History:   Diagnosis Date    Bladder outlet obstruction     Erectile disorder due to medical 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     Frequency of urination     H/O spinal cord injury 1974    lumbar spine injury secondary to fall    HTN (hypertension)     Hypercholesterolemia     Illiterate     Injury of lumbar spine ([facility] 1974    Leg pain, right     Nocturia     Overactive bladder     Peripheral vascular disease ([facility]        Family History   Problem Relation Age of Onset    Diabetes Mother     Hypertension Mother     Diabetes Maternal Grandmother     Hypertension Maternal Grandmother     Cancer Sister         brain    Cancer Sister         brain       Current Outpatient Medications   Medication Sig Dispense Refill    ammonium lactate (Lac-Hydrin Five) 5 % lotion Apply  to affected area two (2) times a day. 1 Bottle 0    tamsulosin (FLOMAX) 0.4 mg capsule Take 1 Cap by mouth daily (after dinner). 90 Cap 0    DULoxetine (CYMBALTA) 60 mg capsule Take 1 Cap by mouth daily. Indications: neuropathic pain 60 Cap 5    finasteride (PROSCAR) 5 mg tablet Take 5 mg by mouth daily.  albuterol sulfate 90 mcg/actuation aebs Take  by inhalation as needed.  diclofenac (Voltaren) 1 % gel Apply 4 g to affected area two (2) times a day. 100 g 0    ammonium lactate (AMLACTIN) 12 % topical cream Apply  to affected area two (2) times a day. rub in to affected area well 280 g 0    pantoprazole (PROTONIX) 40 mg tablet Take 1 Tab by mouth daily.  30 Tab 0    polyethylene glycol (MIRALAX) 17 gram packet Take 1 Packet by mouth daily. 30 Packet 0    amLODIPine (NORVASC) 10 mg tablet Take 1 Tab by mouth daily. 30 Tab 0    spironolactone (ALDACTONE) 25 mg tablet Take  by mouth daily.          Allergies   Allergen Reactions    Latex Rash    Ampicillin Angioedema    Asa-Acetaminophen-Caff-Buffers Rash    Penicillins Angioedema    Crab Hives    Shellfish Derived Rash    Aspirin Other (comments)     Stomach upset    Atorvastatin Other (comments)     Muscle cramps       Past Surgical History:   Procedure Laterality Date    COLONOSCOPY N/A 2019    COLONOSCOPY performed by Jess Armendariz MD at [facility] ENDOSCOPY    HX HEENT Left     lens implant    HX ORTHOPAEDIC      finger amputation left hand    HX TONSILLECTOMY      UPPER ARM/ELBOW SURGERY UNLISTED         Social History     Socioeconomic History    Marital status:      Spouse name: Not on file    Number of children: Not on file    Years of education: Not on file    Highest education level: Not on file   Occupational History    Not on file   Social Needs    Financial resource strain: Not on file    Food insecurity     Worry: Not on file     Inability: Not on file    Transportation needs     Medical: Not on file     Non-medical: Not on file   Tobacco Use    Smoking status: Former Smoker     Quit date: 2015     Years since quittin.6    Smokeless tobacco: Never Used   Substance and Sexual Activity    Alcohol use: Not Currently     Alcohol/week: 0.0 standard drinks     Comment: former stopped     Drug use: No    Sexual activity: Yes   Lifestyle    Physical activity     Days per week: Not on file     Minutes per session: Not on file    Stress: Not on file   Relationships    Social connections     Talks on phone: Not on file     Gets together: Not on file     Attends Worship service: Not on file     Active member of club or organization: Not on file     Attends meetings of clubs or organizations: Not on file Relationship status: Not on file    Intimate partner violence     Fear of current or ex partner: Not on file     Emotionally abused: Not on file     Physically abused: Not on file     Forced sexual activity: Not on file   Other Topics Concern    Not on file   Social History Narrative    Not on file       Visit Vitals  Temp 96.9 °F (36.1 °C) (Temporal)   Wt 79.9 kg (176 lb 3.2 oz)   BMI 27.60 kg/m²         PHYSICAL EXAMINATION:  GENERAL: Alert and oriented x3, in no acute distress, well-developed, well-nourished, afebrile. HEART: No JVD. EYES: No scleral icterus   NECK: No significant lymphadenopathy   LUNGS: No respiratory compromise or indrawing  ABDOMEN: Soft, non-tender, non-distended. Electronically signed by:  Emiliana Goyal MD

## 2021-03-03 ENCOUNTER — OFFICE VISIT (OUTPATIENT)
Dept: ORTHOPEDIC SURGERY | Age: 76
End: 2021-03-03
Payer: COMMERCIAL

## 2021-03-03 VITALS
SYSTOLIC BLOOD PRESSURE: 144 MMHG | OXYGEN SATURATION: 94 % | DIASTOLIC BLOOD PRESSURE: 74 MMHG | HEART RATE: 80 BPM | BODY MASS INDEX: 26.43 KG/M2 | WEIGHT: 174.4 LBS | HEIGHT: 68 IN | TEMPERATURE: 98.1 F

## 2021-03-03 DIAGNOSIS — G89.29 CHRONIC BILATERAL LOW BACK PAIN WITH BILATERAL SCIATICA: Primary | ICD-10-CM

## 2021-03-03 DIAGNOSIS — M54.41 CHRONIC BILATERAL LOW BACK PAIN WITH BILATERAL SCIATICA: ICD-10-CM

## 2021-03-03 DIAGNOSIS — M54.41 CHRONIC BILATERAL LOW BACK PAIN WITH BILATERAL SCIATICA: Primary | ICD-10-CM

## 2021-03-03 DIAGNOSIS — M43.10 ANTEROLISTHESIS: ICD-10-CM

## 2021-03-03 DIAGNOSIS — G89.29 CHRONIC BILATERAL LOW BACK PAIN WITH BILATERAL SCIATICA: ICD-10-CM

## 2021-03-03 DIAGNOSIS — M54.42 CHRONIC BILATERAL LOW BACK PAIN WITH BILATERAL SCIATICA: ICD-10-CM

## 2021-03-03 DIAGNOSIS — M48.07 SPINAL STENOSIS OF LUMBOSACRAL REGION: ICD-10-CM

## 2021-03-03 DIAGNOSIS — M54.42 CHRONIC BILATERAL LOW BACK PAIN WITH BILATERAL SCIATICA: Primary | ICD-10-CM

## 2021-03-03 PROCEDURE — 99213 OFFICE O/P EST LOW 20 MIN: CPT | Performed by: NURSE PRACTITIONER

## 2021-03-03 PROCEDURE — 72100 X-RAY EXAM L-S SPINE 2/3 VWS: CPT | Performed by: NURSE PRACTITIONER

## 2021-03-03 NOTE — PROGRESS NOTES
Chief complaint   Chief Complaint   Patient presents with    Back Pain       History of Present Illness:  David Fitzpatrick is a  77 y.o.  male comes in today after last being seen by Dr. Raul Rahman on October 16, 2018. He continues to have back pain that radiates into his bilateral buttocks down to his feet and can. He states it is a pulling sensation in his legs and is very painful and he has numbness and tingling. He states he has terrible pain when he tries to go from sitting to standing. He is on Cymbalta 60 mg through the neurologist for this pain. He used to be on gabapentin 300 mg 4 times a day but he states he stopped it several months ago because it really did not help. He recently underwent left cubital and carpal tunnel release by Dr. Palak Alexander on February 2. He ended up going to the ER that same day for dizziness and a CT scan of his head was without acute findings. He denies fever bowel bladder dysfunction. Physical Exam: The patient is a 78-year-old male well-developed well-nourished who is alert and oriented with a normal mood and affect. He has a very slow full weightbearing gait with a bent forward posture. He did not use any assistive device. He has 4 out of 5 strength bilateral lower extremities. Negative straight leg raise. He has pain with hyperextension lumbar spine. Assessment and Plan: This is a patient who has a anterior listhesis L5 on S1 with spinal stenosis that gives him back and leg pain. Is progressively gotten worse. We did a lumbar AP lateral x-ray. We will get a get a new MRI. He is open to having interventional blocks to help with his pain. We will see him back with one of the physicians following the MRI. XRAY: 03/03/21   body part: Lumbar  side (rt/lt): bilateral  number of views taken:2  Findings: no acute finding    The x-ray will be officially read by Dr. Nessa Torres and scanned into the chart.            Review of systems:    Past Medical History: Diagnosis Date    Bladder outlet obstruction     Erectile disorder due to medical 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     Frequency of urination     H/O spinal cord injury     lumbar spine injury secondary to fall    HTN (hypertension)     Hypercholesterolemia     Illiterate     Injury of lumbar spine ([address]     Leg pain, right     Nocturia     Overactive bladder     Peripheral vascular disease (HCC)      Past Surgical History:   Procedure Laterality Date    COLONOSCOPY N/A 2019    COL[facility] ENDOSCOPY    HX HEENT Left     lens implant    HX ORTHOPAEDIC      finger amputation left hand    HX TONSILLECTOMY      UPPER ARM/ELBOW SURGERY UNLISTED       Social History     Socioeconomic History    Marital status:      Spouse name: Not on file    Number of children: Not on file    Years of education: Not on file    Highest education level: Not on file   Occupational History    Not on file   Social Needs    Financial resource strain: Not on file    Food insecurity     Worry: Not on file     Inability: Not on file    Transportation needs     Medical: Not on file     Non-medical: Not on file   Tobacco Use    Smoking status: Former Smoker     Quit date: 2015     Years since quittin.6    Smokeless tobacco: Never Used   Substance and Sexual Activity    Alcohol use: Not Currently     Alcohol/week: 0.0 standard drinks     Comment: former stopped     Drug use: No    Sexual activity: Yes   Lifestyle    Physical activity     Days per week: Not on file     Minutes per session: Not on file    Stress: Not on file   Relationships    Social connections     Talks on phone: Not on file     Gets together: Not on file     Attends Congregation service: Not on file     Active member of club or organization: Not on file     Attends meetings of clubs or organizations: Not on file     Relationship status: Not on file    Intimate partner violence Fear of current or ex partner: Not on file     Emotionally abused: Not on file     Physically abused: Not on file     Forced sexual activity: Not on file   Other Topics Concern    Not on file   Social History Narrative    Not on file     Family History   Problem Relation Age of Onset    Diabetes Mother     Hypertension Mother     Diabetes Maternal Grandmother     Hypertension Maternal Grandmother     Cancer Sister         brain    Cancer Sister         brain       Physical Exam:  Visit Vitals  BP (!) 144/74 (BP 1 Location: Left upper arm, BP Patient Position: Sitting, BP Cuff Size: Large adult)   Pulse 80   Temp 98.1 °F (36.7 °C) (Temporal)   Ht 5' 7.5\" (1.715 m)   Wt 174 lb 6.4 oz (79.1 kg)   SpO2 94%   BMI 26.91 kg/m²     Pain Scale: 7/10       has been . reviewed and is appropriate          Diagnoses and all orders for this visit:    1. Chronic bilateral low back pain with bilateral sciatica  -     AMB POC XRAY, SPINE, LUMBOSACRAL; 2 O  -     MRI LUMB SPINE WO CONT; Future    2. Anterolisthesis  -     MRI LUMB SPINE WO CONT; Future    3. Spinal stenosis of lumbosacral region  -     MRI LUMB SPINE WO CONT; Future            Follow-up and Dispositions    · Return in about 3 weeks (around 3/24/2021) for with Dr Jewel Campuzano.              We have informed Lexus Ellison to notify us for immediate appointment if he has any worsening neurogical symptoms or if an emergency situation presents, then call [phone]

## 2021-03-04 ENCOUNTER — HOSPITAL ENCOUNTER (OUTPATIENT)
Dept: PHYSICAL THERAPY | Age: 76
Discharge: HOME | End: 2021-03-04
Payer: COMMERCIAL

## 2021-03-04 PROCEDURE — 97165 OT EVAL LOW COMPLEX 30 MIN: CPT

## 2021-03-04 NOTE — PROGRESS NOTES
OT DAILY TREATMENT NOTE     Patient Name: Deejay Lopez  Date:3/4/2021  : 1945  [x]  Patient  Verified  Payor: [facility] / Plan: [facility] / Product Type: Managed Care Medicaid /    In time:345  Out time:420  Total Treatment Time (min): 35  Visit #: 1 of 8    Treatment Area: Pain in left hand [M79.642]  Left elbow pain [M25.522]  Carpal tunnel syndrome, left upper limb [G56.02]  Lesion of ulnar nerve, left upper limb [G56.22]    SUBJECTIVE  Pain Level (0-10 scale): 3/10  Any medication changes, allergies to medications, adverse drug reactions, diagnosis change, or new procedure performed?: [x] No    [] Yes (see summary sheet for update)  Subjective functional status/changes:   [] No changes reported  Fingers worse than elbow    OBJECTIVE       35 min []Eval                  []Re-Eval       With   [] TE   [] TA   [] neuro   [x] other: Patient Education: [x] Review HEP    [] Progressed/Changed HEP based on: OT POC  [] positioning   [] body mechanics   [] transfers   [] heat/ice application   [] Splint wear/care   [] Sensory re-education   [] scar management      [] other:positioning            Other Objective/Functional Measures:   Subjective: pt is a right hand dominant, 75 y.o.y/o, male who had one year history of pain and numbness in left UE and had surgery on 21.   Prior level of function: ,fish, cut grass, I self care, home care, driving  Pain level:(0-no pain 10-debilitating pain) mild    Description/Location: left elbow and left hand with c/o no relief   Worst pain8/10 Least pain 3/10   Activities which aggravate pain: bent elbow, hanging down   Activities which ease pain: rest  Current functional limitations/living situation: alone    Medical hx: Right shoulder pain pending replacement, right CTS, asthma, HTN, blood cancer    Medications: See the written copy of this report in the patient's paper medical record.  Objective:    Sensation:Reports numbness in hand occurs when hanging on median nerve side, in pinky when elbow bent  harris of Motion:     Active Passive     Norms Right Left Right Left                                                         Elbow Ext/flex 0-150 105 130                       Wrist Flex 0-80 50 45      Ext 0-70 50 55      Ulnar Dev 0-30 30 30      Radial Dev 0-20 20 30         Hand ROM WNL      Nine-Hole Peg Test:  Left= ___25__seconds  Right=_20____seconds  Finger Opposition:to all tips and base    Palpation: Tender in palm , hypersensitive on elbow incision    ADLs I with pain present          Pain Level (0-10 scale) post treatment: 3/10    ASSESSMENT/Changes in Function:    [x]  See Plan of Care  []  See progress note/recertification  []  See Discharge Summary           PLAN  []  Upgrade activities as tolerated     []  Continue plan of care  []  Update interventions per flow sheet       []  Discharge due to:_  []  Other:_      Casimiro Meléndez OTR/L 3/4/2021  3:34 PM    Future Appointments   Date Time Provider Raúl Billingsi   3/4/2021  3:45 PM Rohan Barboza OTR/L [facility]   3/17/2021  9:15 AM Milan Prasad DO [facility] BS AMB   3/24/2021  9:00 AM Ashleigh Cordero MD [facility] BS AMB   7/22/2021 10:00 AM Ruby Ford MD [facility] BS AMB   8/17/2021  8:30 AM Evangelina Martinez MD [facility] BS AMB

## 2021-03-04 NOTE — PROGRESS NOTES
[facility] - [facility] [employer] [facility]  [address] [place] [address]  [phone] fax    Plan of Care/Statement of Necessity for Occupational Therapy Services    Patient name: Chhaya Marion Start of Care: 3/4/2021   Referral source: Maico Marquez DO : 1945    Medical Diagnosis: Pain in left hand [M79.642]  Left elbow pain [M25.522]  Carpal tunnel syndrome, left upper limb [G56.02]  Lesion of ulnar nerve, left upper limb [G56.22]  Payor: [employer] / Plan: [id number] / Product Type: Managed Care Medicaid /  Onset Date:21    Treatment Diagnosis: pain left elbow and hand   Prior Hospitalization: see medical history Provider#: [id number]   Medications: Verified on Patient summary List    Comorbidities: Right shoulder pain pending replacement, right CTS, asthma, HTN, blood cancer      Prior Level of Function:  ,fish, cut grass, I self care, home care, driving            The Plan of Care and following information is based on the information from the initial evaluation. Assessment/ key information:pt is a right hand dominant, 77 y.o.y/o, male who had one year history of pain and numbness in left UE and had surgery on 21. he reports numbness in left hand on radial side when arm is hanging and on ulnar side when elbow is bent. Incision are well healed and smooth. He is hypersenitive to touch on incision sites as well as on elbow adjacent to incision scar. His pain is 3/10. His Elbow is limited to 130 flexion. Wrist is limited to flexion 45, extension 55-it is of note that right wrist is similarly limited. Digit ROM is WNL with discomfort in opposition due to thenar edema. His fine motor speed with left is reduced to 25 seconds ( 20 on right). He is currently performing all self care but with pain. He reports difficulty with lifting carrying and using a knife.   His FOTO for hand is 44/100 ( severe impairment) and elbow is 39/100 severe impairment. He will benefit from skilled occupational therapy to reduce pain, improve left UE use as this will be primary hand if patient undergoes shoulder surgery on right as planned. Evaluation Complexity: History LOW Complexity : Brief history review  Examination LOW Complexity : 1-3 performance deficits relating to physical, cognitive , or psychosocial skils that result in activity limitations and / or participation restrictions  Clinical Decision Making LOW Complexity : No comorbidities that affect functional and no verbal or physical assistance needed to complete eval tasks   Overall Complexity Rating: LOW     Problem List: Pain effecting function, Decreased range of motion, Decreased strength, Decreased coordination/prehension, Decreased ADL/functional abilities , Decreased activity tolerance and Sensability     Treatment Plan may include any combination of the following: Therapeutic exercise, Therapeutic activities, Physical agent/modality, Scar management, Patient education and ADLs/IADLs    Patient / Family readiness to learn indicated by: asking questions, trying to perform skills and interest    Persons(s) to be included in education:   patient (P)    Barriers to Learning/Limitations: yes;  reading/writing    Patient Goal (s): less pain and numbness    Patient Self Reported Health Status: fair    Rehabilitation Potential:good    Short Term Goals: To be accomplished in 2 weeks:  1. Patient will be familiar with proper positioning for left UE to reduce episodes of numbness. 2.  Patient to be independent in scar management techniques. 3.  Patient to be independent in edema management strategies to reduce diffuse edema and decrease hypersensitivity. .    Long Term Goals: To be accomplished in 4 weeks:   1. Patient will tolerate pressure on palm to complete ADLs/IADLs successfully  without discomfort   2.   Patient will improve left hand fine motor speed by at least 15% for ease of fasteners. 3.  Patient  will be able to lift and carry groceries in left hand due to adequate  strength and reduced pain. 4.  Patient will be able to cut food with left assist.  5.  Patient will report improved performance of lifting and carrying as shown by increase in FOTOs of at least 15 points each. Frequency / Duration: Patient to be seen 2 times per week for 4 weeks:    Patient/ Caregiver education and instruction: Diagnosis, prognosis, self care, activity modification, brace/ splint application and exercises   [x]  Plan of care has been reviewed with BARBARA López 3/4/2021 5:18 PM    _____________________________________________________________________    I certify that the above Therapy Services are being furnished while the patient is under my care. I agree with the treatment plan and certify that this therapy is necessary.     [de-identified] Signature:____________Date:_________TIME:________     Norris Grimes DO  ** Signature, Date and Time must be completed for valid certification **    Please sign and return to [facility] [employer] [facility]   [address] [place] [address]  [phone] fax

## 2021-03-08 DIAGNOSIS — M54.10 RADICULAR LOW BACK PAIN: ICD-10-CM

## 2021-03-08 DIAGNOSIS — M25.562 CHRONIC PAIN OF LEFT KNEE: ICD-10-CM

## 2021-03-08 DIAGNOSIS — G89.29 CHRONIC PAIN OF LEFT KNEE: ICD-10-CM

## 2021-03-17 ENCOUNTER — HOSPITAL ENCOUNTER (OUTPATIENT)
Dept: PHYSICAL THERAPY | Age: 76
Discharge: HOME | End: 2021-03-17
Payer: COMMERCIAL

## 2021-03-17 ENCOUNTER — OFFICE VISIT (OUTPATIENT)
Dept: ORTHOPEDIC SURGERY | Age: 76
End: 2021-03-17
Payer: COMMERCIAL

## 2021-03-17 VITALS
DIASTOLIC BLOOD PRESSURE: 68 MMHG | WEIGHT: 171.8 LBS | OXYGEN SATURATION: 93 % | RESPIRATION RATE: 16 BRPM | HEART RATE: 87 BPM | SYSTOLIC BLOOD PRESSURE: 124 MMHG | TEMPERATURE: 99.1 F | BODY MASS INDEX: 26.04 KG/M2 | HEIGHT: 68 IN

## 2021-03-17 DIAGNOSIS — G56.22 CUBITAL TUNNEL SYNDROME, LEFT: Primary | ICD-10-CM

## 2021-03-17 DIAGNOSIS — G56.02 LEFT CARPAL TUNNEL SYNDROME: ICD-10-CM

## 2021-03-17 DIAGNOSIS — Z98.890 S/P CARPAL TUNNEL RELEASE: ICD-10-CM

## 2021-03-17 DIAGNOSIS — Z98.890 S/P CUBITAL TUNNEL RELEASE: ICD-10-CM

## 2021-03-17 DIAGNOSIS — G56.01 RIGHT CARPAL TUNNEL SYNDROME: ICD-10-CM

## 2021-03-17 DIAGNOSIS — G56.21 CUBITAL TUNNEL SYNDROME, RIGHT: ICD-10-CM

## 2021-03-17 PROCEDURE — 97110 THERAPEUTIC EXERCISES: CPT

## 2021-03-17 PROCEDURE — 99024 POSTOP FOLLOW-UP VISIT: CPT | Performed by: ORTHOPAEDIC SURGERY

## 2021-03-17 PROCEDURE — 97018 PARAFFIN BATH THERAPY: CPT

## 2021-03-17 PROCEDURE — 97530 THERAPEUTIC ACTIVITIES: CPT

## 2021-03-17 PROCEDURE — 97535 SELF CARE MNGMENT TRAINING: CPT

## 2021-03-17 NOTE — PROGRESS NOTES
Richardson Murillo is a 77 y.o. male right handed retiree. Worker's Compensation and legal considerations: none filed. Vitals:    03/17/21 0907   BP: 124/68   Pulse: 87   Resp: 16   Temp: 99.1 °F (37.3 °C)   TempSrc: Temporal   SpO2: 93%   Weight: 171 lb 12.8 oz (77.9 kg)   Height: 5' 7.5\" (1.715 m)   PainSc:   7   PainLoc: Hand           Chief Complaint   Patient presents with    Hand Pain     Left       HPI: Patient presents today 6 weeks status post left carpal tunnel and cubital tunnel releases. He reports some mild improvement but still having pain around his hand and his elbow. 2wk HPI: Patient presents today 2 weeks status post left carpal tunnel and cubital tunnel releases. He reports feeling better after surgery but that his numbness is recently gotten worse. Preop HPI: Patient returns today for follow-up of bilateral upper extremity EMGs. He is recently discussed with a shoulder surgeon having a shoulder replacement. This is on the right side. He says he would like to have the left hand numbness taken care of first.    12/2020 HPI: Patient comes in today regarding left worse than right upper extremity numbness and pain. He reports little finger and the ring finger on the left to be numb most of the time of pain radiating up the arm. He reports occasional pain and swelling on the right side. Initial HPI: Patient comes in today regarding concerns of having a metal foreign body in his on something metallic right middle finger tip in his left thumb tip. He says that he scratched both areas last year and has since had issues with it. He thinks he may have gotten something metallic out of the right middle finger and then it bled after that. He reports some continued tenderness to palpation.     Date of onset:  9/2019    Injury: Yes: Comment: cut left thumb and right middle finger    Prior Treatment:  Yes: Comment:  left carpal tunnel and cubital tunnel releases    Numbness/ Tingling: Yes: Comment: Left worse than right ulnar-sided digits    ROS: Review of Systems - General ROS: negative  Respiratory ROS: no cough, shortness of breath, or wheezing  Cardiovascular ROS: no chest pain or dyspnea on exertion  Musculoskeletal ROS: positive for - pain in finger - right and thumb - left  Neurological ROS: Positive for numbness and tingling  Dermatological ROS: negative    Past Medical History:   Diagnosis Date    Bladder outlet obstruction     Erectile disorder due to medical 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     Frequency of urination     H/O spinal cord injury 1974    lumbar spine injury secondary to fall    HTN (hypertension)     Hypercholesterolemia     Illiterate     Injury of lumbar spine ([facility] [address] 1974    Leg pain, right     Nocturia     Overactive bladder     Peripheral vascular disease ([facility] [address]        Past Surgical History:   Procedure Laterality Date    COLONOSCOPY N/A 12/4/2019    COLONOSCOPY performed by Ricky Samaniego MD at [facility]    HX HEENT Left     lens implant    HX ORTHOPAEDIC      finger amputation left hand    HX TONSILLECTOMY      UPPER ARM/ELBOW SURGERY UNLISTED         Current Outpatient Medications   Medication Sig Dispense Refill    ammonium lactate (Lac-Hydrin Five) 5 % lotion Apply  to affected area two (2) times a day. 1 Bottle 0    tamsulosin (FLOMAX) 0.4 mg capsule Take 1 Cap by mouth daily (after dinner). 90 Cap 0    DULoxetine (CYMBALTA) 60 mg capsule Take 1 Cap by mouth daily. Indications: neuropathic pain 60 Cap 5    finasteride (PROSCAR) 5 mg tablet Take 5 mg by mouth daily.  albuterol sulfate 90 mcg/actuation aebs Take  by inhalation as needed.  diclofenac (Voltaren) 1 % gel Apply 4 g to affected area two (2) times a day. 100 g 0    ammonium lactate (AMLACTIN) 12 % topical cream Apply  to affected area two (2) times a day. rub in to affected area well 280 g 0    pantoprazole (PROTONIX) 40 mg tablet Take 1 Tab by mouth daily.  30 Tab 0    polyethylene glycol (MIRALAX) 17 gram packet Take 1 Packet by mouth daily. 30 Packet 0    amLODIPine (NORVASC) 10 mg tablet Take 1 Tab by mouth daily. 30 Tab 0    spironolactone (ALDACTONE) 25 mg tablet Take  by mouth daily. Allergies   Allergen Reactions    Latex Rash    Ampicillin Angioedema    Asa-Acetaminophen-Caff-Buffers Rash    Penicillins Angioedema    Crab Hives    Shellfish Derived Rash    Aspirin Other (comments)     Stomach upset    Atorvastatin Other (comments)     Muscle cramps         PE:     Physical Exam  Vitals signs and nursing note reviewed. Constitutional:       General: He is not in acute distress. Appearance: Normal appearance. He is not ill-appearing. Neck:      Musculoskeletal: Normal range of motion and neck supple. Cardiovascular:      Pulses: Normal pulses. Musculoskeletal: Normal range of motion. General: Tenderness present. No swelling, deformity or signs of injury. Right lower leg: No edema. Left lower leg: No edema. Skin:     General: Skin is warm and dry. Capillary Refill: Capillary refill takes less than 2 seconds. Findings: No bruising or erythema. Neurological:      General: No focal deficit present. Mental Status: He is alert and oriented to person, place, and time. Cranial Nerves: No cranial nerve deficit. Sensory: No sensory deficit. Psychiatric:         Mood and Affect: Mood normal.         Behavior: Behavior normal.         Left upper extremity: Incisions healed. Sensation grossly intact distally. Cap refill brisk. Range of motion full in digits and elbow. NEUROVASCULAR    Examination L R Examination L R   Carpal Comp.  - Pronator Comp. - -   Carpal Tinel  - Pronator Tinel - -   Phalen's  - Pronator Stress - -   Cubital Comp.  +- Guyon Comp. - -   Cubital Tinel  + Guyon Tinel - -   Elbow Hyperflexion  - Adson's - -   Spurling's - - SC Comp.  - -   PCB Median abn - - SC Tinel - -   Radial Tinel - - IC Comp. - -   Digital Tinel - - IC Tinel - -   Radial 2-Pt WNL WNL Ulnar 2-Pt WNL WNL     Radial Pulse: 2+  Capillary Refill: < 2 sec  Narayan: Not Performed  [employer]: Not Performed    NCV & EMG Findings:  Evaluation of the right median motor nerve showed prolonged distal onset latency (4.7 ms). The left ulnar motor and the right ulnar motor nerves showed decreased conduction velocity (A Elbow-B Elbow, L36, R43 m/s). The left median sensory and the right median sensory nerves showed prolonged distal peak latency (L3.8, R4.4 ms) and decreased conduction velocity (Wrist-2nd Digit, L37, R32 m/s). The left ulnar sensory and the right ulnar sensory nerves showed prolonged distal peak latency (L7.3, R4.2 ms), reduced amplitude (L8.3, R7.6 µV), and decreased conduction velocity (Wrist-5th Digit, L19, R33 m/s). All remaining nerves (as indicated in the following tables) were within normal limits. Left vs. Right side comparison data for the median sensory nerve indicates abnormal L-R latency difference (0.6 ms). The ulnar sensory nerve indicates abnormal L-R latency difference (3.1 ms). All remaining left vs. right side differences were within normal limits.       All examined muscles (as indicated in the following table) showed no evidence of electrical instability.       INTERPRETATION     This is an abnormal electrodiagnostic examination. These findings may be consistent with:   1. Sensorimotor polyneuropathy - this is based on mild unexpected abnormalities throughout the NCS unrelated to other entrapment syndromes. There is possibility that the severity of the other entrapment neuropathies is worsened by this polyneuropathy. 2. Moderate ulnar mononeuropathy at the right elbow (cubital tunnel syndrome)   3. Moderate median mononeuropathy at the right wrist (carpal tunnel syndrome)   4. Moderate ulnar mononeuropathy at the left elbow (cubital tunnel syndrome)   5.  Mild median mononeuropathy at the left wrist (carpal tunnel syndrome)     There is no electrodiagnostic evidence of any cervical radiculopathy, brachial plexopathy,  or any other mononeuropathy.        CLINICAL INTERPRETATION  His electrodiagnostic findings of carpal tunnel and cubital tunnel syndromes bilaterally are consistent with his bilateral hand symptoms. Imagin2020 plain films of the left thumb as well as the right middle finger does not show any evidence of radiopaque foreign body any fracture dislocation or other osseous abnormality. Of note on the left thumb view a index finger metallic foreign body is noted on the radial aspect of the P1. ICD-10-CM ICD-9-CM    1. Cubital tunnel syndrome, left  G56.22 354.2    2. S/P cubital tunnel release  Z98.890 V45.89    3. Left carpal tunnel syndrome  G56.02 354.0    4. S/P carpal tunnel release  Z98.890 V45.89    5. Cubital tunnel syndrome, right  G56.21 354.2    6. Right carpal tunnel syndrome  G56.01 354.0        Plan:     Discussed range of motion exercises and scar care. OT for range of motion exercises, edema control, and other modalities. Follow-up and Dispositions    · Return in about 2 months (around 2021) for Reevaluation and OT follow-up.          Plan was reviewed with patient, who verbalized agreement and understanding of the plan

## 2021-03-17 NOTE — PROGRESS NOTES
OT DAILY TREATMENT NOTE     Patient Name: Wilson Ruiz  Date:3/17/2021  : 1945  [x]  Patient  Verified  Payor: Da 22 / Plan: [id number] / Product Type: Managed Care Medicaid /    In time:130  Out time:215  Total Treatment Time (min): 45  Visit #: 2 of 8             Treatment Area: Pain in left hand [M79.642]  Left elbow pain [M25.522]  Carpal tunnel syndrome, left upper limb [G56.02]  Lesion of ulnar nerve, left upper limb [G56.22]    SUBJECTIVE  Pain Level (0-10 scale): 4/10  Any medication changes, allergies to medications, adverse drug reactions, diagnosis change, or new procedure performed?: [x] No    [] Yes (see summary sheet for update)  Subjective functional status/changes:   [] No changes reported  Wore sleeve for a while, then didn't need it anymore    OBJECTIVE    Modality rationale: decrease pain and increase tissue extensibility to improve the patients ability to grasp   Min Type Additional Details    [] Estim:  []Unatt       []IFC  []Premod                        []Other:  []w/ice   []w/heat  Position:  Location:    [] Estim: []Att    []TENS instruct  []NMES                    []Other:  []w/US   []w/ice   []w/heat  Position:  Location:    []  Traction: [] Cervical       []Lumbar                       [] Prone          []Supine                       []Intermittent   []Continuous Lbs:  [] before manual  [] after manual    []  Ultrasound: []Continuous   [] Pulsed                           []1MHz   []3MHz W/cm2:  Location:    []  Iontophoresis with dexamethasone         Location: [] Take home patch   [] In clinic    []  Ice     []  heat  []  Ice massage  []  Laser   [x]  Paraffin 10 mins Position:  Location:    []  Laser with stim  []  Other:  Position:  Location:    []  Vasopneumatic Device Pressure:       [] lo [] med [] hi   Temperature: [] lo [] med [] hi       [x] Skin assessment post-treatment:  [x]intact []redness- no adverse reaction    []redness  adverse reaction:       15 min Therapeutic Exercise:  [] See flow sheet :   Rationale: increase ROM and tolerate palmar pressure to improve the patients ability to grasp  Soft putty HEP to reduce sensitivity and improve left hand use    10 mins Therapeutic activities  To improve hand use  Nuts and bolts assembly with no vision left hand    10 min Self Care/Home Management: Scar care   Rationale: reduce scarring  to improve the patients ability to tolerate pressure on palm  Scar massage and edema management reviewed and completed    With   [] TE   [] TA   [] neuro   [] other: Patient Education: [x] Review HEP    [] Progressed/Changed HEP based on: soft putty HEP  [] positioning   [] body mechanics   [] transfers   [] heat/ice application   [] Splint wear/care   [] Sensory re-education   [x] scar management      [] other:            Other Objective/Functional Measures:   Able to tolerate soft putty      Pain Level (0-10 scale) post treatment: 0/10    ASSESSMENT/Changes in Function: Pain decreased with massage and putty    Patient will continue to benefit from skilled OT services to modify and progress therapeutic interventions, address ROM deficits, address strength deficits, analyze and address soft tissue restrictions, analyze and cue movement patterns and instruct in home and community integration to attain remaining goals. []  See Plan of Care  []  See progress note/recertification  []  See Discharge Summary         Progress towards goals / Updated goals:  1. Patient will be familiar with proper positioning for left UE to reduce episodes of numbness. 2.  Patient to be independent in scar management techniques. 3/17/21 reviewed scar mASSAGE   3. Patient to be independent in edema management strategies to reduce diffuse edema and decrease hypersensitivity. .  3/17/21 REVIEWED EDEMA MANaGEMENT     Long Term Goals: To be accomplished in 4 weeks:          1.   Patient will tolerate pressure on palm to complete ADLs/IADLs successfully  without discomfort   2. Patient will improve left hand fine motor speed by at least 15% for ease of fasteners. 3.  Patient  will be able to lift and carry groceries in left hand due to adequate  strength and reduced pain. 4.  Patient will be able to cut food with left assist.  5.  Patient will report improved performance of lifting and carrying as shown by increase in FOTOs of at least 15 points each.       PLAN  []  Upgrade activities as tolerated     []  Continue plan of care  []  Update interventions per flow sheet       []  Discharge due to:_  []  Other:_      Orradha Cordova, OTR/L 3/17/2021  1:30 PM    Future Appointments   Date Time Provider Raúl Clayton   3/22/2021 10:30 AM Skye Manzo [id number]   3/24/2021  9:00 AM Paulette Squires MD [facility] AMB   4/6/2021  7:00 AM [facility] MRI RM 1 [facility]   4/6/2021  7:45 AM [facility] MRI RM 1 [facility]   5/19/2021  9:15 AM Milan Lobo DO [facility] BS AMB   7/22/2021 10:00 AM Ross Yao MD [facility] AMB   8/17/2021  8:30 AM Javi Martinez MD [facility] BS AMB

## 2021-03-22 ENCOUNTER — HOSPITAL ENCOUNTER (OUTPATIENT)
Dept: PHYSICAL THERAPY | Age: 76
Discharge: HOME | End: 2021-03-22
Payer: COMMERCIAL

## 2021-03-22 PROCEDURE — 97018 PARAFFIN BATH THERAPY: CPT

## 2021-03-22 PROCEDURE — 97530 THERAPEUTIC ACTIVITIES: CPT

## 2021-03-22 PROCEDURE — 97110 THERAPEUTIC EXERCISES: CPT

## 2021-03-22 NOTE — PROGRESS NOTES
OT DAILY TREATMENT NOTE     Patient Name: Bradley Celaya  Date:3/22/2021  : 1945  [x]  Patient  Verified  Payor: Da  / Plan: [id number] / Product Type: Managed Care Medicaid /    In time:1035  Out time:1115  Total Treatment Time (min): 40  Visit #: 3 of 8    Treatment Area: Pain in left hand [M79.642]  Left elbow pain [M25.522]  Carpal tunnel syndrome, left upper limb [G56.02]  Lesion of ulnar nerve, left upper limb [G56.22]    SUBJECTIVE  Pain Level (0-10 scale): 2/10  Any medication changes, allergies to medications, adverse drug reactions, diagnosis change, or new procedure performed?: [x] No    [] Yes (see summary sheet for update)  Subjective functional status/changes:   [] No changes reported  It still gets sore on my elbow  There is a swollen spot along the bone here    OBJECTIVE    Modality rationale: decrease pain and increase tissue extensibility to improve the patients ability to grasp   Min Type Additional Details    [] Estim:  []Unatt       []IFC  []Premod                        []Other:  []w/ice   []w/heat  Position:  Location:    [] Estim: []Att    []TENS instruct  []NMES                    []Other:  []w/US   []w/ice   []w/heat  Position:  Location:    []  Traction: [] Cervical       []Lumbar                       [] Prone          []Supine                       []Intermittent   []Continuous Lbs:  [] before manual  [] after manual    []  Ultrasound: []Continuous   [] Pulsed                           []1MHz   []3MHz W/cm2:  Location:    []  Iontophoresis with dexamethasone         Location: [] Take home patch   [] In clinic         10 []  Ice     []  heat  []  Ice massage  []  Laser   [x]  Paraffin Position:  Location:left hand    []  Laser with stim  []  Other:  Position:  Location:    []  Vasopneumatic Device Pressure:       [] lo [] med [] hi   Temperature: [] lo [] med [] hi       [x] Skin assessment post-treatment:  []intact []redness- no adverse reaction    []redness  adverse reaction:       15 min Therapeutic Exercise:  [] See flow sheet :   Rationale: increase ROM and increase strength to improve the patients ability to grasp  Gripper left hand 55# x 25  Left  Med putty and pegs flatten to find 1/4 in pegs      15 min Therapeutic Activity:  []  See flow sheet :   Rationale: improve coordination  to improve the patients ability to manipulate items  Grooved pegs placed with left hand, removed with recip opp  Left placed 25 1 inch pegs         With   [] TE   [] TA   [] neuro   [] other: Patient Education: [x] Review HEP    [] Progressed/Changed HEP based on:   [x] positioning  Keep elbow straight when resting   [] body mechanics   [] transfers   [] heat/ice application   [] Splint wear/care   [] Sensory re-education   [] scar management      [] other:            Other Objective/Functional Measures:   No increase in pain after session     Pain Level (0-10 scale) post treatment: 2/10    ASSESSMENT/Changes in Function: Improving pain, tolerance to use. Has edema present on ulna    Patient will continue to benefit from skilled OT services to modify and progress therapeutic interventions, address ROM deficits, address strength deficits, analyze and address soft tissue restrictions and instruct in home and community integration to attain remaining goals. []  See Plan of Care  []  See progress note/recertification  []  See Discharge Summary         Progress towards goals / Updated goals:  1.  Patient will be familiar with proper positioning for left UE to reduce episodes of numbness. 3/22/21 reports decreasing numbness but has burning pain in ring finger  2.  Patient to be independent in scar management techniques. 3/17/21 reviewed scar mASSAGE    3/22/21 Met       3.  Patient to be independent in edema management strategies to reduce diffuse edema and decrease hypersensitivity. .  3/17/21 REVIEWED EDEMA MANaGEMENT  3/22/21 Met     Long Term Goals: To be accomplished in 4 weeks:          1.  Patient will tolerate pressure on palm to complete ADLs/IADLs successfully  without discomfort   3/22/21 tolerated medium putty    2.  Patient will improve left hand fine motor speed by at least 15% for ease of fasteners. 3.  Patient  will be able to lift and carry groceries in left hand due to adequate  strength and reduced pain.    4.  Patient will be able to cut food with left assist.  5.  Patient will report improved performance of lifting and carrying as shown by increase in FOTOs of at least 15 points each.     PLAN  []  Upgrade activities as tolerated     [x]  Continue plan of care  []  Update interventions per flow sheet       []  Discharge due to:_  []  Other:_      Kiran Gutierrez, OTR/L 3/22/2021  10:57 AM    Future Appointments   Date Time Provider Raúl Clayton   3/24/2021  9:00 AM Tiago Hernández MD [facility] BS AMB   4/6/2021  7:00 AM [facility] MRI RM 1 [facility]   4/6/2021  7:45 AM [facility] MRI RM 1 [facility]   5/19/2021  9:15 AM Milan Hunt DO [facility] BS AMB   7/22/2021 10:00 AM Alexa Saucedo MD [facility] AMB   8/17/2021  8:30 AM Sherlyn Martinez MD [facility] BS AMB

## 2021-03-26 ENCOUNTER — HOSPITAL ENCOUNTER (OUTPATIENT)
Dept: PHYSICAL THERAPY | Age: 76
Discharge: HOME | End: 2021-03-26
Payer: COMMERCIAL

## 2021-03-26 PROCEDURE — 97530 THERAPEUTIC ACTIVITIES: CPT

## 2021-03-26 PROCEDURE — 97110 THERAPEUTIC EXERCISES: CPT

## 2021-03-26 PROCEDURE — 97018 PARAFFIN BATH THERAPY: CPT

## 2021-03-26 NOTE — PROGRESS NOTES
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]    SUBJECTIVE  Pain Level (0-10 scale): 6/10  Any medication changes, allergies to medications, adverse drug reactions, diagnosis change, or new procedure performed?: [x] No    [] Yes (see summary sheet for update)  Subjective functional status/changes:   [] No changes reported  \"My left wrist hurts and fingers tingle during the day\"     OBJECTIVE    Modality rationale: decrease pain and increase tissue extensibility to improve the patients ability to increase ROM and increase functional use of Left hand    Min Type Additional Details    [] Estim:  []Unatt       []IFC  []Premod                        []Other:  []w/ice   []w/heat  Position:  Location:    [] Estim: []Att    []TENS instruct  []NMES                    []Other:  []w/US   []w/ice   []w/heat  Position:  Location:    []  Traction: [] Cervical       []Lumbar                       [] Prone          []Supine                       []Intermittent   []Continuous Lbs:  [] before manual  [] after manual    []  Ultrasound: []Continuous   [] Pulsed                           []1MHz   []3MHz W/cm2:  Location:    []  Iontophoresis with dexamethasone         Location: [] Take home patch   [] In clinic   10 min []  Ice     []  heat  []  Ice massage    Laser   [x]  Paraffin Position:  Location: Left wrist     []  Laser with stim  []  Other:  Position:  Location:    []  Vasopneumatic Device Pressure:       [] lo [] med [] hi   Temperature: [] lo [] med [] hi     [x] Skin assessment post-treatment: [x]intact []redness- no adverse reaction    []redness  adverse reaction:       17 min Therapeutic Exercise:  [] See flow sheet :   Rationale: increase ROM and increase strength to improve the patients ability to  objects and increase functional use of left hand. Gripper left hand 55# x 25 removing 1\" pegs from resistive board  Wrist mazes hard 5x with left hand   Yellow therabar flexion/extension,pronation/supinaton 10x      15 min Therapeutic Activity:  []  See flow sheet :   Rationale: improve coordination  to improve the patients ability to manipulate small items      Left hand:  Grooved pegs placed  into resistive board 3 at a time palm <> digit translation  25 1\" inch pegs   Placed nuts and bolts of various sizes into bolt bench with no vision   [employer] with yellow therabar to increase jones pressure tolerance     With   [x] TE   [x] TA   [] neuro   [] other: Patient Education: [x] Review HEP    [] Progressed/Changed HEP based on:   [] positioning   [] body mechanics   [] transfers   [] heat/ice application   [] Splint wear/care   [] Sensory re-education   [] scar management      [] other:            Other Objective/Functional Measures:   Patient able to tolerate #55 Gripper without complaint of pain  Patient able to complete fine motor tasks with some difficulty  Patient able to tolerate palm rolls    Reported pain in left wrist and hand and tingling in fingers        Pain Level (0-10 scale) post treatment: 3-4/10    ASSESSMENT/Changes in Function:   Patient progressing with  strength and fine motor manipulation     Patient will continue to benefit from skilled OT services to modify and progress therapeutic interventions, address ROM deficits, address strength deficits, analyze and address soft tissue restrictions and instruct in home and community integration to attain remaining goals.        [x]  See Plan of Care  []  See progress note/recertification  []  See Discharge Summary Progress towards goals / Updated goals:  1.  Patient will be familiar with proper positioning for left UE to reduce episodes of numbness. 3/22/21 reports decreasing numbness but has burning pain in ring finger     2.  Patient to be independent in scar management techniques. 3/22/21 Met        3.  Patient to be independent in edema management strategies to reduce diffuse edema and decrease hypersensitivity. .  3/22/21 Met     Long Term Goals: To be accomplished in 4 weeks:          1.  Patient will tolerate pressure on palm to complete ADLs/IADLs successfully  without discomfort    3/26/21: Patient tolerated palm rolls with yellow therabar     2.  Patient will improve left hand fine motor speed by at least 15% for ease of fasteners. 3/26/21: Addressed with fine motor activities. 3.  Patient  will be able to lift and carry groceries in left hand due to adequate  strength and reduced pain.    3/26/21: Addressed with gripper 55#    4.  Patient will be able to cut food with left assist.    5.  Patient will report improved performance of lifting and carrying as shown by increase in FOTOs of at least 15 points each.     PLAN  []  Upgrade activities as tolerated     [x]  Continue plan of care  []  Update interventions per flow sheet       []  Discharge due to:_  []  Other:_      [place], [facility] 3/26/2021  11:06 AM  PABLO Cantor/L     Future Appointments   Date Time Provider Raúl Clayton   3/26/2021 11:15 AM Kamila Kemal [id number] [facility]   3/30/2021 11:15 AM RAO Ribeiro/DAT MMCPTPB [facility]   4/2/2021  9:00 AM Kamial Kemal [facility]CPTP[facility]   4/6/2021  7:00 AM [facility] MRI RM 1 MMCRMRI [facility]   4/6/2021  7:45 AM [facility] MRI RM 1 MMCR[facility]   4/7/2021  9:45 AM RAO Ribeiro/DAT MMCPTPB [facility]   4/8/2021  1:00 PM Jorden Fletcher MD [facility] BS AMB   4/9/2021  9:00 AM Kamila Sommer MMCPTPB [facility]   5/19/2021  9:15 AM Milan Rose DO [facility] BS AMB   7/22/2021 10:00 AM Radha Osorio MD [facility] BS AMB 8/17/2021  8:30 AM Porter Martinez MD [facility] BS AMB

## 2021-03-30 ENCOUNTER — APPOINTMENT (OUTPATIENT)
Dept: CT IMAGING | Age: 76
End: 2021-03-30
Attending: EMERGENCY MEDICINE
Payer: COMMERCIAL

## 2021-03-30 ENCOUNTER — APPOINTMENT (OUTPATIENT)
Dept: GENERAL RADIOLOGY | Age: 76
End: 2021-03-30
Attending: EMERGENCY MEDICINE
Payer: COMMERCIAL

## 2021-03-30 ENCOUNTER — HOSPITAL ENCOUNTER (EMERGENCY)
Age: 76
Discharge: HOME | End: 2021-03-31
Attending: EMERGENCY MEDICINE
Payer: COMMERCIAL

## 2021-03-30 ENCOUNTER — HOSPITAL ENCOUNTER (OUTPATIENT)
Dept: PHYSICAL THERAPY | Age: 76
Discharge: HOME | End: 2021-03-30
Payer: COMMERCIAL

## 2021-03-30 DIAGNOSIS — R07.89 ATYPICAL CHEST PAIN: Primary | ICD-10-CM

## 2021-03-30 DIAGNOSIS — K21.9 GASTROESOPHAGEAL REFLUX DISEASE WITHOUT ESOPHAGITIS: ICD-10-CM

## 2021-03-30 DIAGNOSIS — M54.2 NECK PAIN: ICD-10-CM

## 2021-03-30 LAB
ALBUMIN SERPL-MCNC: 3.4 G/DL (ref 3.4–5)
ALBUMIN/GLOB SERPL: 1 {RATIO} (ref 0.8–1.7)
ALP SERPL-CCNC: 60 U/L (ref 45–117)
ALT SERPL-CCNC: 23 U/L (ref 16–61)
ANION GAP SERPL CALC-SCNC: 3 MMOL/L (ref 3–18)
AST SERPL-CCNC: 25 U/L (ref 10–38)
BASOPHILS # BLD: 0 K/UL (ref 0–0.1)
BASOPHILS NFR BLD: 0 % (ref 0–2)
BILIRUB SERPL-MCNC: 0.2 MG/DL (ref 0.2–1)
BUN SERPL-MCNC: 12 MG/DL (ref 7–18)
BUN/CREAT SERPL: 16 (ref 12–20)
CALCIUM SERPL-MCNC: 8.5 MG/DL (ref 8.5–10.1)
CHLORIDE SERPL-SCNC: 109 MMOL/L (ref 100–111)
CK MB CFR SERPL CALC: 0.8 % (ref 0–4)
CK MB SERPL-MCNC: 5.5 NG/ML (ref 5–25)
CK SERPL-CCNC: 727 U/L (ref 39–308)
CO2 SERPL-SCNC: 27 MMOL/L (ref 21–32)
CREAT SERPL-MCNC: 0.77 MG/DL (ref 0.6–1.3)
DIFFERENTIAL METHOD BLD: ABNORMAL
EOSINOPHIL # BLD: 0.2 K/UL (ref 0–0.4)
EOSINOPHIL NFR BLD: 5 % (ref 0–5)
ERYTHROCYTE [DISTWIDTH] IN BLOOD BY AUTOMATED COUNT: 14.5 % (ref 11.6–14.5)
GLOBULIN SER CALC-MCNC: 3.5 G/DL (ref 2–4)
GLUCOSE SERPL-MCNC: 95 MG/DL (ref 74–99)
HCT VFR BLD AUTO: 36.4 % (ref 36–48)
HGB BLD-MCNC: 12.3 G/DL (ref 13–16)
LYMPHOCYTES # BLD: 2.2 K/UL (ref 0.9–3.6)
LYMPHOCYTES NFR BLD: 43 % (ref 21–52)
MCH RBC QN AUTO: 30.1 PG (ref 24–34)
MCHC RBC AUTO-ENTMCNC: 33.8 G/DL (ref 31–37)
MCV RBC AUTO: 89.2 FL (ref 74–97)
MONOCYTES # BLD: 0.5 K/UL (ref 0.05–1.2)
MONOCYTES NFR BLD: 10 % (ref 3–10)
NEUTS SEG # BLD: 2 K/UL (ref 1.8–8)
NEUTS SEG NFR BLD: 42 % (ref 40–73)
PLATELET # BLD AUTO: 231 K/UL (ref 135–420)
PMV BLD AUTO: 8.9 FL (ref 9.2–11.8)
POTASSIUM SERPL-SCNC: 3.7 MMOL/L (ref 3.5–5.5)
PROT SERPL-MCNC: 6.9 G/DL (ref 6.4–8.2)
RBC # BLD AUTO: 4.08 M/UL (ref 4.7–5.5)
SODIUM SERPL-SCNC: 139 MMOL/L (ref 136–145)
TROPONIN I SERPL-MCNC: <0.02 NG/ML (ref 0–0.04)
WBC # BLD AUTO: 4.9 K/UL (ref 4.6–13.2)

## 2021-03-30 PROCEDURE — 97530 THERAPEUTIC ACTIVITIES: CPT

## 2021-03-30 PROCEDURE — 85025 COMPLETE CBC W/AUTO DIFF WBC: CPT

## 2021-03-30 PROCEDURE — 99285 EMERGENCY DEPT VISIT HI MDM: CPT

## 2021-03-30 PROCEDURE — 80053 COMPREHEN METABOLIC PANEL: CPT

## 2021-03-30 PROCEDURE — 93005 ELECTROCARDIOGRAM TRACING: CPT

## 2021-03-30 PROCEDURE — 71045 X-RAY EXAM CHEST 1 VIEW: CPT

## 2021-03-30 PROCEDURE — 70491 CT SOFT TISSUE NECK W/DYE: CPT

## 2021-03-30 PROCEDURE — 97110 THERAPEUTIC EXERCISES: CPT

## 2021-03-30 PROCEDURE — 97018 PARAFFIN BATH THERAPY: CPT

## 2021-03-30 PROCEDURE — 82553 CREATINE MB FRACTION: CPT

## 2021-03-30 RX ORDER — FAMOTIDINE 10 MG/ML
20 INJECTION INTRAVENOUS
Status: COMPLETED | OUTPATIENT
Start: 2021-03-30 | End: 2021-03-31

## 2021-03-30 NOTE — PROGRESS NOTES
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]    SUBJECTIVE  Pain Level (0-10 scale): 4/10  Any medication changes, allergies to medications, adverse drug reactions, diagnosis change, or new procedure performed?: [x] No    [] Yes (see summary sheet for update)  Subjective functional status/changes:   [] No changes reported  Gets better, then it stiffens right back up  Got a blister on my elbow (bursa)    OBJECTIVE    Modality rationale: decrease pain and increase tissue extensibility to improve the patients ability to grasp   Min Type Additional Details    [] Estim:  []Unatt       []IFC  []Premod                        []Other:  []w/ice   []w/heat  Position:  Location:    [] Estim: []Att    []TENS instruct  []NMES                    []Other:  []w/US   []w/ice   []w/heat  Position:  Location:    []  Traction: [] Cervical       []Lumbar                       [] Prone          []Supine                       []Intermittent   []Continuous Lbs:  [] before manual  [] after manual    []  Ultrasound: []Continuous   [] Pulsed                           []1MHz   []3MHz W/cm2:  Location:    []  Iontophoresis with dexamethasone         Location: [] Take home patch   [] In clinic         10 []  Ice     []  heat  []  Ice massage  []  Laser   [x]  Paraffin Position:  Location:fist left hand    []  Laser with stim  []  Other:  Position:  Location:    []  Vasopneumatic Device Pressure:       [] lo [] med [] hi   Temperature: [] lo [] med [] hi       [x] Skin assessment post-treatment:  [x]intact []redness- no adverse reaction    []redness  adverse reaction:       17 min Therapeutic Exercise:  [] See flow sheet :   Rationale: increase ROM to improve the patients ability to reduce tenderness  REd therabar x 15 each,   Median nerve glides left hand x 5 ( table level)    18 min Therapeutic Activity:  []  See flow sheet :   Rationale: increase strength  to improve the patients ability to grasp  Rolling therabar   Nuts and bolts assembly using left on nuts      With   [x] TE   [] TA   [] neuro   [] other: Patient Education: [x] Review HEP    [] Progressed/Changed HEP based on: median nerve glides, bursitis on elbow  [] positioning   [] body mechanics   [] transfers   [] heat/ice application   [] Splint wear/care   [] Sensory re-education   [] scar management      [x] other: Do not scrub hand with sensory kit item           Other Objective/Functional Measures:   No difficulty with red therabar all motions     Pain Level (0-10 scale) post treatment: 4/10    ASSESSMENT/Changes in Function: Improving strength    Patient will continue to benefit from skilled OT services to modify and progress therapeutic interventions, address strength deficits, analyze and address soft tissue restrictions and instruct in home and community integration to attain remaining goals. []  See Plan of Care  []  See progress note/recertification  []  See Discharge Summary         Progress towards goals / Updated goals:  1.  Patient will be familiar with proper positioning for left UE to reduce episodes of numbness. 3/22/21 reports decreasing numbness but has burning pain in ring finger  3/30/21 REports pain stays about the same at 4/10      2.  Patient to be independent in scar management techniques. 3/22/21 Met        3.  Patient to be independent in edema management strategies to reduce diffuse edema and decrease hypersensitivity. .  3/22/21 Met     Long Term Goals: To be accomplished in 4 weeks:          1.  Patient will tolerate pressure on palm to complete ADLs/IADLs successfully  without discomfort    3/26/21: Patient tolerated palm rolls with yellow therabar   3/30/21 red therabar tolerated     2.  Patient will improve left hand fine motor speed by at least 15% for ease of fasteners. 3/26/21: Addressed with fine motor activities.      3.  Patient  will be able to lift and carry groceries in left hand due to adequate  strength and reduced pain.    3/26/21: Addressed with gripper 55#     4.  Patient will be able to cut food with left assist.     5.  Patient will report improved performance of lifting and carrying as shown by increase in FOTOs of at least 15 points each.     PLAN  []  Upgrade activities as tolerated     []  Continue plan of care  []  Update interventions per flow sheet       []  Discharge due to:_  []  Other:_      Herb Matos OTR/L 3/30/2021  10:15 AM    Future Appointments   Date Time Provider Raúl Clayton   3/30/2021 11:15 AM Franci Haley OTR/L [facility]CPTP[facility]   4/1/2021 10:30 AM Adonna Eglin VVXARDR [facility]   4/5/2021  1:30 PM Adalia Yungin MMCPTP[facility]   4/6/2021  7:00 AM [facility] MRI RM 1 MMCRMRI [facility]   4/6/2021  7:45 AM [facility] MRI RM 1 MMCR[facility]   4/8/2021  1:00 PM Nina Robledo MD [facility] BS AMB   4/9/2021 10:30 AM Adhenrya Eglin MMCPTPB [facility]   5/19/2021  9:15 AM Milan Miranda DO [facility] BS AMB   7/22/2021 10:00 AM Ivania Mistry MD [facility] BS AMB   8/17/2021  8:30 AM Mary Martinez MD [facility] BS AMB

## 2021-03-31 VITALS
OXYGEN SATURATION: 95 % | SYSTOLIC BLOOD PRESSURE: 117 MMHG | DIASTOLIC BLOOD PRESSURE: 88 MMHG | HEART RATE: 51 BPM | TEMPERATURE: 98.2 F | RESPIRATION RATE: 17 BRPM

## 2021-03-31 LAB
ATRIAL RATE: 59 BPM
CALCULATED P AXIS, ECG09: 56 DEGREES
CALCULATED R AXIS, ECG10: -27 DEGREES
CALCULATED T AXIS, ECG11: 43 DEGREES
DIAGNOSIS, 93000: NORMAL
LIPASE SERPL-CCNC: 174 U/L (ref 73–393)
P-R INTERVAL, ECG05: 176 MS
Q-T INTERVAL, ECG07: 428 MS
QRS DURATION, ECG06: 92 MS
QTC CALCULATION (BEZET), ECG08: 423 MS
TROPONIN I SERPL-MCNC: <0.02 NG/ML (ref 0–0.04)
VENTRICULAR RATE, ECG03: 59 BPM

## 2021-03-31 PROCEDURE — 83690 ASSAY OF LIPASE: CPT

## 2021-03-31 PROCEDURE — 70491 CT SOFT TISSUE NECK W/DYE: CPT

## 2021-03-31 PROCEDURE — 84484 ASSAY OF TROPONIN QUANT: CPT

## 2021-03-31 PROCEDURE — 96374 THER/PROPH/DIAG INJ IV PUSH: CPT

## 2021-03-31 PROCEDURE — 74011000636 HC RX REV CODE- 636: Performed by: EMERGENCY MEDICINE

## 2021-03-31 PROCEDURE — 74011250636 HC RX REV CODE- 250/636: Performed by: EMERGENCY MEDICINE

## 2021-03-31 RX ORDER — FAMOTIDINE 20 MG/1
20 TABLET, FILM COATED ORAL 2 TIMES DAILY
Qty: 20 TAB | Refills: 0 | Status: SHIPPED | OUTPATIENT
Start: 2021-03-31 | End: 2021-04-10

## 2021-03-31 RX ADMIN — IOPAMIDOL 100 ML: 612 INJECTION, SOLUTION INTRAVENOUS at 00:28

## 2021-03-31 RX ADMIN — FAMOTIDINE 20 MG: 10 INJECTION INTRAVENOUS at 00:01

## 2021-03-31 NOTE — ED PROVIDER NOTES
EMERGENCY DEPARTMENT HISTORY AND PHYSICAL EXAM    11:43 PM  Date: 3/30/2021  Patient Name: Toan Gtz    History of Presenting Illness     Chief Complaint   Patient presents with    Chest Pain        History Provided By: Patient    HPI: Toan Gtz is a 77 y.o. male with history of multiple medical problems as below. Patient was brought in by ambulance for epigastric pain radiating to his chest and associated with nausea and vomiting. Patient denies shortness of breath. No history of fever or cough. Reports he had similar previous episodes but not sure what the diagnosis was. Pain is sharp and lasting for about few minutes then resolved. Is also complaining of left-sided jaw and neck pain associated with difficulty swallowing for the past 2 days. No neurological symptoms. No history of ear pain or sore throat. Location:  Severity:  Timing/course:    Onset/Duration:     PCP: Santy Mckeon MD    Past History     Past Medical History:  Past Medical History:   Diagnosis Date    Bladder outlet obstruction     Erectile disorder due to medical 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     Frequency of urination     H/O spinal cord injury 1974    lumbar spine injury secondary to fall    HTN (hypertension)     Hypercholesterolemia     Illiterate     Injury of lumbar spine ([address] 1974    Leg pain, right     Nocturia     Overactive bladder     Peripheral vascular disease ([address]        Past Surgical History:  Past Surgical History:   Procedure Laterality Date    COLONOSCOPY N/A 12/4/2019    COLONOSCOPY performed [facility]os [facility] ENDOSCOPY    HX HEENT Left     lens implant    HX ORTHOPAEDIC      finger amputation left hand    HX TONSILLECTOMY      UPPER ARM/ELBOW SURGERY UNLISTED         Family History:  Family History   Problem Relation Age of Onset    Diabetes Mother     Hypertension Mother     Diabetes Maternal Grandmother     Hypertension Maternal Grandmother     Cancer Sister brain    Cancer Sister         brain       Social History:  Social History     Tobacco Use    Smoking status: Former Smoker     Quit date: 2015     Years since quittin.7    Smokeless tobacco: Never Used   Substance Use Topics    Alcohol use: Not Currently     Alcohol/week: 0.0 standard drinks     Comment: former stopped     Drug use: No       Allergies: Allergies   Allergen Reactions    Latex Rash    Ampicillin Angioedema    Asa-Acetaminophen-Caff-Buffers Rash    Penicillins Angioedema    Crab Hives    Shellfish Derived Rash    Aspirin Other (comments)     Stomach upset    Atorvastatin Other (comments)     Muscle cramps       Review of Systems   Review of Systems   Cardiovascular: Positive for chest pain. Gastrointestinal: Positive for abdominal pain and vomiting. Musculoskeletal: Positive for neck pain. All other systems reviewed and are negative. Physical Exam     Patient Vitals for the past 12 hrs:   Temp Pulse Resp BP SpO2   21 2242 98.2 °F (36.8 °C) 62 22 (!) 126/59 97 %       Physical Exam  Vitals signs and nursing note reviewed. Constitutional:       Appearance: He is well-developed. HENT:      Head: Normocephalic and atraumatic. Eyes:      Extraocular Movements: Extraocular movements intact. Neck:      Musculoskeletal: Normal range of motion and neck supple. Thyroid: No thyromegaly. Trachea: No tracheal deviation. Cardiovascular:      Rate and Rhythm: Normal rate. Pulmonary:      Effort: Pulmonary effort is normal.      Breath sounds: No decreased breath sounds. Abdominal:      Palpations: Abdomen is soft. Tenderness: There is abdominal tenderness in the epigastric area. Musculoskeletal: Normal range of motion. Lymphadenopathy:      Cervical: No cervical adenopathy. Skin:     General: Skin is warm and dry. Neurological:      General: No focal deficit present.       Mental Status: He is alert and oriented to person, place, and time.   Psychiatric:         Mood and Affect: Mood normal.         Behavior: Behavior normal.         Diagnostic Study Results     Labs -  Recent Results (from the past 12 hour(s))   EKG, 12 LEAD, INITIAL    Collection Time: 03/30/21 10:32 PM   Result Value Ref Range    Ventricular Rate 59 BPM    Atrial Rate 59 BPM    P-R Interval 176 ms    QRS Duration 92 ms    Q-T Interval 428 ms    QTC Calculation (Bezet) 423 ms    Calculated P Axis 56 degrees    Calculated R Axis -27 degrees    Calculated T Axis 43 degrees    Diagnosis       Sinus bradycardia  Moderate voltage criteria for LVH, may be normal variant  Early repolarization  Borderline ECG  When compared with ECG of 11-FEB-2021 17:55,  No significant change was found     CBC WITH AUTOMATED DIFF    Collection Time: 03/30/21 10:52 PM   Result Value Ref Range    WBC 4.9 4.6 - 13.2 K/uL    RBC 4.08 (L) 4.70 - 5.50 M/uL    HGB 12.3 (L) 13.0 - 16.0 g/dL    HCT 36.4 36.0 - 48.0 %    MCV 89.2 74.0 - 97.0 FL    MCH 30.1 24.0 - 34.0 PG    MCHC 33.8 31.0 - 37.0 g/dL    RDW 14.5 11.6 - 14.5 %    PLATELET 278 702 - 634 K/uL    MPV 8.9 (L) 9.2 - 11.8 FL    NEUTROPHILS 42 40 - 73 %    LYMPHOCYTES 43 21 - 52 %    MONOCYTES 10 3 - 10 %    EOSINOPHILS 5 0 - 5 %    BASOPHILS 0 0 - 2 %    ABS. NEUTROPHILS 2.0 1.8 - 8.0 K/UL    ABS. LYMPHOCYTES 2.2 0.9 - 3.6 K/UL    ABS. MONOCYTES 0.5 0.05 - 1.2 K/UL    ABS. EOSINOPHILS 0.2 0.0 - 0.4 K/UL    ABS.  BASOPHILS 0.0 0.0 - 0.1 K/UL    DF AUTOMATED     METABOLIC PANEL, COMPREHENSIVE    Collection Time: 03/30/21 10:52 PM   Result Value Ref Range    Sodium 139 136 - 145 mmol/L    Potassium 3.7 3.5 - 5.5 mmol/L    Chloride 109 100 - 111 mmol/L    CO2 27 21 - 32 mmol/L    Anion gap 3 3.0 - 18 mmol/L    Glucose 95 74 - 99 mg/dL    BUN 12 7.0 - 18 MG/DL    Creatinine 0.77 0.6 - 1.3 MG/DL    BUN/Creatinine ratio 16 12 - 20      GFR est AA >60 >60 ml/min/1.73m2    GFR est non-AA >60 >60 ml/min/1.73m2    Calcium 8.5 8.5 - 10.1 MG/DL    Bilirubin, total 0.2 0.2 - 1.0 MG/DL    ALT (SGPT) 23 16 - 61 U/L    AST (SGOT) 25 10 - 38 U/L    Alk. phosphatase 60 45 - 117 U/L    Protein, total 6.9 6.4 - 8.2 g/dL    Albumin 3.4 3.4 - 5.0 g/dL    Globulin 3.5 2.0 - 4.0 g/dL    A-G Ratio 1.0 0.8 - 1.7     CARDIAC PANEL,(CK, CKMB & TROPONIN)    Collection Time: 03/30/21 10:52 PM   Result Value Ref Range    CK - MB 5.5 (H) <3.6 ng/ml    CK-MB Index 0.8 0.0 - 4.0 %     (H) 39 - 308 U/L    Troponin-I, QT <0.02 0.0 - 0.045 NG/ML       Radiologic Studies -   No results found. Medical Decision Making     ED Course: Progress Notes, Reevaluation, and Consults:    11:43 PM Initial assessment performed. The patients presenting problems have been discussed, and they/their family are in agreement with the care plan formulated and outlined with them. I have encouraged them to ask questions as they arise throughout their visit. Provider Notes (Medical Decision Making): 46-year-old male with multiple medical problems presenting with epigastric pain radiating to the chest associated with nausea and vomiting as well as jaw pain. Well-appearing on exam and not in distress. Chest pain epigastric pain likely gastritis versus GERD however given his history we will get screening labs including cardiac work-up and 2 sets of cardiac enzymes. Chest x-ray to evaluate for possible pneumonia and treat him with Pepcid. As for her jaw pain he has tenderness right under his angle of the mandible on the left side I could not palpate any masses or lymph nodes he states that it radiates to the back of his throat and is causing him to have difficulty swallowing. Oropharyngeal exam was normal.  Also stating that it hurts more when he moves his neck so this would be concerning for RPA versus deep neck abscess I will obtain a CT of his neck to evaluate for that. No headache or ear pain concerning for central cause.     Procedures:     Critical Care Time:     Vital Signs-Reviewed the patient's vital signs. Reviewed pt's pulse ox reading. EKG: Interpreted by the EP. Time Interpreted:    Rate:    Rhythm:    Interpretation:   Comparison:     Records Reviewed: Nursing Notes, Old Medical Records and Previous electrocardiograms (Time of Review: 11:43 PM)  -I am the first provider for this patient.  -I reviewed the vital signs, available nursing notes, past medical history, past surgical history, family history and social history. Current Outpatient Medications   Medication Sig Dispense Refill    ammonium lactate (Lac-Hydrin Five) 5 % lotion Apply  to affected area two (2) times a day. 1 Bottle 0    tamsulosin (FLOMAX) 0.4 mg capsule Take 1 Cap by mouth daily (after dinner). 90 Cap 0    DULoxetine (CYMBALTA) 60 mg capsule Take 1 Cap by mouth daily. Indications: neuropathic pain 60 Cap 5    finasteride (PROSCAR) 5 mg tablet Take 5 mg by mouth daily.  albuterol sulfate 90 mcg/actuation aebs Take  by inhalation as needed.  diclofenac (Voltaren) 1 % gel Apply 4 g to affected area two (2) times a day. 100 g 0    ammonium lactate (AMLACTIN) 12 % topical cream Apply  to affected area two (2) times a day. rub in to affected area well 280 g 0    pantoprazole (PROTONIX) 40 mg tablet Take 1 Tab by mouth daily. 30 Tab 0    polyethylene glycol (MIRALAX) 17 gram packet Take 1 Packet by mouth daily. 30 Packet 0    amLODIPine (NORVASC) 10 mg tablet Take 1 Tab by mouth daily. 30 Tab 0    spironolactone (ALDACTONE) 25 mg tablet Take  by mouth daily. Clinical Impression     Clinical Impression: No diagnosis found. Disposition: dc          This note was dictated utilizing voice recognition software which may lead to typographical errors. I apologize in advance if the situation occurs. If questions arise please do not hesitate to contact me or call our department.     Ruben Chan MD  11:43 PM Chet Monae

## 2021-03-31 NOTE — ED TRIAGE NOTES
Pt arrived via EMS from home with substernal non radiating sharp chest pain onset around 930pm 8/10. In route pt was given 1 of sublingal nitro where his pain decreased to 5/10. Pt also states the right side of his face/mouth hurts. Airway patent.

## 2021-04-01 ENCOUNTER — HOSPITAL ENCOUNTER (OUTPATIENT)
Dept: PHYSICAL THERAPY | Age: 76
Discharge: HOME | End: 2021-04-01
Payer: COMMERCIAL

## 2021-04-01 PROCEDURE — 97530 THERAPEUTIC ACTIVITIES: CPT

## 2021-04-01 PROCEDURE — 97018 PARAFFIN BATH THERAPY: CPT

## 2021-04-01 PROCEDURE — 97110 THERAPEUTIC EXERCISES: CPT

## 2021-04-01 NOTE — PROGRESS NOTES
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]    SUBJECTIVE  Pain Level (0-10 scale): 6/10  Any medication changes, allergies to medications, adverse drug reactions, diagnosis change, or new procedure performed?: [x] No    [] Yes (see summary sheet for update)  Subjective functional status/changes:   [] No changes reported  \"My hand has been hurting and I still have numbness at the elbow\" Left UE  Patient reports going to the hospital for gastritis   \"I feel better today\" (after hospitalization)     OBJECTIVE    Modality rationale: decrease pain and increase tissue extensibility to improve the patients ability to grasp   Min Type Additional Details    [] Estim:  []Unatt       []IFC  []Premod                        []Other:  []w/ice   []w/heat  Position:  Location:    [] Estim: []Att    []TENS instruct  []NMES                    []Other:  []w/US   []w/ice   []w/heat  Position:  Location:    []  Traction: [] Cervical       []Lumbar                       [] Prone          []Supine                       []Intermittent   []Continuous Lbs:  [] before manual  [] after manual    []  Ultrasound: []Continuous   [] Pulsed                           []1MHz   []3MHz W/cm2:  Location:    []  Iontophoresis with dexamethasone         Location: [] Take home patch   [] In clinic   10 min  []  Ice     []  heat  []  Ice massage  []  Laser   [x]  Paraffin Position:  Location: Left hand     []  Laser with stim  []  Other:  Position:  Location:    []  Vasopneumatic Device Pressure:       [] lo [] med [] hi Temperature: [] lo [] med [] hi       [x] Skin assessment post-treatment:  [x]intact []redness- no adverse reaction    []redness - adverse reaction:     14 min Therapeutic Exercise:  [] See flow sheet :   Rationale: increase ROM to improve the patients ability to reduce tenderness    Red therabar 3 ways x12   Wrist mazes x5 hard left UE       14 min Therapeutic Activity:  []  See flow sheet :   Rationale: increase ROM and increase strength  to improve the patients ability to grasp and manipulate objects        Red therabar palm rolls left hand   Nuts and bolts assembly using left hand no vision x10       With   [] TE   [] TA   [] neuro   [] other: Patient Education: [x] Review HEP    [] Progressed/Changed HEP based on:   [] positioning   [] body mechanics   [] transfers   [] heat/ice application   [] Splint wear/care   [] Sensory re-education   [] scar management      [] other:           Other Objective/Functional Measures:     Able to complete therabar 3 ways with min difficulty   Able to tolerate palm rolls red therabar without complaint of discomfort       Pain Level (0-10 scale) post treatment: 3/10    ASSESSMENT/Changes in Function:    Progressing with fine motor manipulation skills left UE     Patient will continue to benefit from skilled OT services to modify and progress therapeutic interventions, address strength deficits, analyze and address soft tissue restrictions and instruct in home and community integration to attain remaining goals. []  See Plan of Care  []  See progress note/recertification  []  See Discharge Summary         Progress towards goals / Updated goals:  1.  Patient will be familiar with proper positioning for left UE to reduce episodes of numbness. 4/1/21: reports implementing positioning techniques, continued numbness and pain at 4/10      2.  Patient to be independent in scar management techniques.   3/22/21 Met        3.  Patient to be independent in edema management strategies to reduce diffuse edema and decrease hypersensitivity. .  3/22/21 Met     Long Term Goals: To be accomplished in 4 weeks:          1.  Patient will tolerate pressure on palm to complete ADLs/IADLs successfully  without discomfort   4/1/21 proggresing with red therabar palm rolls, no complaint of pain      2.  Patient will improve left hand fine motor speed by at least 15% for ease of fasteners. 4/1/21 progressing with nuts and bolts assembly, completed with min difficulty      3.  Patient  will be able to lift and carry groceries in left hand due to adequate  strength and reduced pain.    4/1/21: Progressing with Red therabar 3 ways, min/mod difficulty to complete per patient report     4.  Patient will be able to cut food with left assist.     5.  Patient will report improved performance of lifting and carrying as shown by increase in FOTOs of at least 15 points each.     PLAN  []  Upgrade activities as tolerated     [x]  Continue plan of care  []  Update interventions per flow sheet       []  Discharge due to:_  []  Other:_      Brooke Labor, OTAS 4/1/2021  10:46 AM  PABLO Reyes/L     Future Appointments   Date Time Provider Raúl Clayton   4/5/2021  1:30 PM Marco Antonio Jenkins [id number]   4/6/2021  7:00 AM [facility] MRI RM 1 [facility]   4/6/2021  7:45 AM [facility] MRI RM 1 [facility]   4/8/2021  1:00 PM Sander Torres MD [facility] BS AMB   4/9/2021 10:30 AM Marco Antonio Jenkins [facility]CP[facility]   5/19/2021  9:15 AM Milan Amador DO [facility] BS AMB   7/22/2021 10:00 AM Kandy Prader, MD [facility] BS AMB   8/17/2021  8:30 AM Trista Martinez MD [facility] BS AMB

## 2021-04-05 ENCOUNTER — HOSPITAL ENCOUNTER (OUTPATIENT)
Dept: PHYSICAL THERAPY | Age: 76
Discharge: HOME | End: 2021-04-05
Payer: COMMERCIAL

## 2021-04-05 PROCEDURE — 97018 PARAFFIN BATH THERAPY: CPT

## 2021-04-05 PROCEDURE — 97110 THERAPEUTIC EXERCISES: CPT

## 2021-04-05 PROCEDURE — 97530 THERAPEUTIC ACTIVITIES: CPT

## 2021-04-05 NOTE — PROGRESS NOTES
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]    SUBJECTIVE  Pain Level (0-10 scale): 8/10   Any medication changes, allergies to medications, adverse drug reactions, diagnosis change, or new procedure performed?: [x] No    [] Yes (see summary sheet for update)  Subjective functional status/changes:   [] No changes reported  \"My hand has been hurting a lot probably because of how much I use it\"   \"It hurts at my palm and I feel tingles up my arm\"     OBJECTIVE    Modality rationale: decrease pain and increase tissue extensibility to improve the patients ability to grasp   Min Type Additional Details    [] Estim:  []Unatt       []IFC  []Premod                        []Other:  []w/ice   []w/heat  Position:  Location:    [] Estim: []Att    []TENS instruct  []NMES                    []Other:  []w/US   []w/ice   []w/heat  Position:  Location:    []  Traction: [] Cervical       []Lumbar                       [] Prone          []Supine                       []Intermittent   []Continuous Lbs:  [] before manual  [] after manual    []  Ultrasound: []Continuous   [] Pulsed                           []1MHz   []3MHz W/cm2:  Location:    []  Iontophoresis with dexamethasone         Location: [] Take home patch   [] In clinic   10 min []  Ice     []  heat  []  Ice massage  []  Laser   [x]  Paraffin Position:   Location: left hand     []  Laser with stim  []  Other:  Position:  Location:    []  Vasopneumatic Device Pressure:       [] lo [] med [] hi   Temperature: [] lo [] med [] hi   [x] Skin assessment post-treatment:  [x]intact []redness- no adverse reaction    []redness - adverse reaction:         15 min Therapeutic Exercise:  [] See flow sheet :   Rationale: increase ROM and increase strength  to improve the patients ability to grasp and manipulate objects     1/4 in peg retrieval med putty 10/10 left hand    Red therabar 3 ways x12     15 min Therapeutic Activity:  []  See flow sheet :   Rationale: improve coordination  to improve the patients ability to manipulate items and reduce tenderness         Palm rolls red therabar left hand   Grooved pegs digit <> palm translation x25 left hand        With   [x] TE   [] TA   [] neuro   [] other: Patient Education: [x] Review HEP    [] Progressed/Changed HEP based on:   [] positioning   [] body mechanics   [] transfers   [] heat/ice application   [] Splint wear/care   [] Sensory re-education   [x] scar management      [] other:            Other Objective/Functional Measures:      Able to tolerate palm rolls red therabar without complaint of pain   Completed median nerve glides- reported pain afterwards   Therabar 3 ways patient reported mod difficulty with flex/ext   Pain reduced post treatment from 8/10 to 0/10      Pain Level (0-10 scale) post treatment: 0/10    ASSESSMENT/Changes in Function:   Progressing with palmar tolerance left hand   Progressing with wrist strength left wrist     Patient will continue to benefit from skilled OT services to modify and progress therapeutic interventions, address strength deficits, analyze and address soft tissue restrictions and instruct in home and community integration to attain remaining goals. .     []  See Plan of Care  []  See progress note/recertification  []  See Discharge Summary        Progress towards goals / Updated goals:  1.  Patient will be familiar with proper positioning for left UE to reduce episodes of numbness.    4/1/21: reports implementing positioning techniques, continued numbness and pain at 4/10    2.  Patient to be independent in scar management techniques. 3/22/21 Met        3.  Patient to be independent in edema management strategies to reduce diffuse edema and decrease hypersensitivity. .  3/22/21 Met     Long Term Goals: To be accomplished in 4 weeks:          1.  Patient will tolerate pressure on palm to complete ADLs/IADLs successfully  without discomfort   4/5/21 proggresing with medium putty, no complaint of pain      2.  Patient will improve left hand fine motor speed by at least 15% for ease of fasteners.   4/5/21 progressing with grooved pegs palm <> digit x25     3.  Patient  will be able to lift and carry groceries in left hand due to adequate  strength and reduced pain.    4/5/21: Progressing with Red therabar 3 ways reduced difficulty per patient report      4.  Patient will be able to cut food with left assist.     5.  Patient will report improved performance of lifting and carrying as shown by increase in FOTOs of at least 15 points each.       PLAN  []  Upgrade activities as tolerated     [x]  Continue plan of care  []  Update interventions per flow sheet       []  Discharge due to:_  []  Other:_      ADONAY Harrington 4/5/2021  1:34 PM  PABLO Oliveros Ra/DAT      Future Appointments   Date Time Provider Raúl Clayton   4/6/2021  7:00 AM [facility] MRI RM 1 [facility]   4/6/2021  7:45 AM [facility] MRI RM 1 [facility]   4/8/2021  1:00 PM Diamond Ray MD [facility] BS AMB   4/9/2021 10:30 AM Elio Steele [facility]   5/19/2021  9:15 AM Milan Escalante DO [facility] BS AMB   7/22/2021 10:00 AM Estevan Feliciano MD [facility] BS AMB   8/17/2021  8:30 AM Bijal Martinez MD [facility] BS AMB

## 2021-04-06 ENCOUNTER — HOSPITAL ENCOUNTER (OUTPATIENT)
Dept: MRI IMAGING | Age: 76
Discharge: HOME | End: 2021-04-06
Attending: NURSE PRACTITIONER
Payer: COMMERCIAL

## 2021-04-06 ENCOUNTER — HOSPITAL ENCOUNTER (OUTPATIENT)
Dept: MRI IMAGING | Age: 76
Discharge: HOME | End: 2021-04-06
Attending: ORTHOPAEDIC SURGERY
Payer: COMMERCIAL

## 2021-04-06 PROCEDURE — 73721 MRI JNT OF LWR EXTRE W/O DYE: CPT

## 2021-04-06 PROCEDURE — 72148 MRI LUMBAR SPINE W/O DYE: CPT

## 2021-04-09 ENCOUNTER — HOSPITAL ENCOUNTER (OUTPATIENT)
Dept: PHYSICAL THERAPY | Age: 76
Discharge: HOME | End: 2021-04-09
Payer: COMMERCIAL

## 2021-04-09 PROCEDURE — 97018 PARAFFIN BATH THERAPY: CPT

## 2021-04-09 PROCEDURE — 97530 THERAPEUTIC ACTIVITIES: CPT

## 2021-04-09 PROCEDURE — 97110 THERAPEUTIC EXERCISES: CPT

## 2021-04-09 NOTE — PROGRESS NOTES
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]  Payor: [employer] / Plan: [address] / Product Type: Managed Care Medicaid /  Onset Date:21     Prior Hospitalization: see medical history Provider#: [id number]   Medications: Verified on Patient Summary List    Comorbidities: Right shoulder pain pending replacement, right CTS, asthma, HTN, blood cancer  Prior Level of Function: ,fish, cut grass, I self care, home care, driving  Visits from Start of Care: 8    Missed Visits: 0    Established Goals:         Excellent           Good         Limited           None  [x] Increased ROM   []  []  [x]  []  [x] Increased Strength  []  []  []  []  [] Increased Mobility  []  []  []  []   [x] Decreased Pain   []  []  [x]  []  [] Decreased Swelling  []  []  []  []  [x] Increased Fine Motor Skills []  [x]  []  []  [x] Increased ADL [place] []  [x]  []  []    Key Functional Changes: patient demonstrating improvement with Fine Motor skills.      Measurements: Taken with Deshawn Dynamometer, in Lbs   Level 2       Right 97   Left 74        9 Hole    3/4 4/9 Change   Right 20       Left 25 21  16%         FOTO    3/4  Eval    Score 44 67   Change   +23      Pinch Measurements: Taken with Pinch Gauge, in Lbs   (hand)    3 pt     Right 18   Left  10               Lateral     Right 24   Left 20               Tip     Right 17   Left 7                  UE ROM  Date     3/4 4/9         Norms left Left  Change   Wrist Flex 0-80  45 52  +7     Ext 0-70 55 60   +5      1.  Patient will be familiar with proper positioning for left UE to reduce episodes of numbness. Status at Eval: Not initiated  Current Status as of 4/9[de-identified] reports implementing positioning techniques, continued numbness and pain at 4/10      2.  Patient to be independent in scar management techniques. Status at Eval: Initiated  Current Status as of 4/9: Met        3.  Patient to be independent in edema management strategies to reduce diffuse edema and decrease hypersensitivity. Status at Eval: Initiated  Current Status as of 4/9: Met     Long Term Goals: To be accomplished in 4 weeks:          1.  Patient will tolerate pressure on palm to complete ADLs/IADLs successfully  without discomfort    Status at Eval: unable  Current Status as of 4/9: Progressing, some discomfort still present with palmar pressure      2.  Patient will improve left hand fine motor speed by at least 15% for ease of fasteners. Status at Eval: 25 seconds   Current Status as of 4/9: 21 seconds (+16%), Goal Met      3.  Patient  will be able to lift and carry groceries in left hand due to adequate  strength and reduced pain.    Status at Eval: unable  Current Status as of 4/9: Progressing, some improvement noted      4.  Patient will be able to cut food with left assist.  Status at Eval: Unable  Current Status as of 4/9: Ongoing difficulty      5.  Patient will report improved performance of lifting and carrying as shown by increase in FOTOs of at least 15 points each. Status at Eval: 40  Current Status as of 4/9: Goal Met, 67 (+23)     Updated Goals: to be achieved in 4 weeks:    1.  Patient will be familiar with proper positioning for left UE to reduce episodes of numbness.   Status at PN: reports implementing positioning techniques, continued numbness and pain at 4/10      Long Term Goals: To be accomplished in 4 weeks:          1.  Patient will tolerate pressure on palm to complete ADLs/IADLs successfully  without discomfort    Status at PN: Progressing, some discomfort still present with palmar pressure     3.  Patient  will be able to lift and carry groceries in left hand due to adequate  strength and reduced pain.    Status at PN: Progressing, some improvement noted      4.  Patient will be able to cut food with left assist.  Status at PN: Ongoing difficulty     New Goal:  6. Patient will improve Left  strength by an additional 10# for improved ability to hold tools. Status at PN: 76     ASSESSMENT/RECOMMENDATIONS:  [x]Continue therapy per initial plan/protocol at a frequency of  2 x per week for 4 weeks  []Continue therapy with the following recommended changes:_____________________      _____________________________________________________________________  []Discontinue therapy progressing towards or have reached established goals  []Discontinue therapy due to lack of appreciable progress towards goals  []Discontinue therapy due to lack of attendance or compliance  []Await Physician's recommendations/decisions regarding therapy  []Other:________________________________________________________________    Thank you for this referral.   ROMELIA Williamson   4/9/2021 2:09 PM  NOTE TO PHYSICIAN:  PLEASE COMPLETE THE ORDERS BELOW AND   FAX TO [facility]: [phone]  If you are unable to process this request in 24 hours please contact our office: [phone] I have read the above report and request that my patient continue as recommended. ? I have read the above report and request that my patient continue therapy with the following changes/special instructions:________________________________________________________  ? I have read the above report and request that my patient be discharged from therapy.     [de-identified] Signature:____________Date:_________TIME:________     Lucinda Wilburn DO  ** Signature, Date and Time must be completed for valid certification **

## 2021-04-09 NOTE — PROGRESS NOTES
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]    SUBJECTIVE  Pain Level (0-10 scale): 5/10  Any medication changes, allergies to medications, adverse drug reactions, diagnosis change, or new procedure performed?: [x] No    [] Yes (see summary sheet for update)  Subjective functional status/changes:   [] No changes reported  \"My hand still hurts but I'm used it\"   Patient continues to report pain in left hand     OBJECTIVE    Modality rationale: decrease pain and increase tissue extensibility to improve the patients ability to grasp   Min Type Additional Details    [] Estim:  []Unatt       []IFC  []Premod                        []Other:  []w/ice   []w/heat  Position:  Location:    [] Estim: []Att    []TENS instruct  []NMES                    []Other:  []w/US   []w/ice   []w/heat  Position:  Location:    []  Traction: [] Cervical       []Lumbar                       [] Prone          []Supine                       []Intermittent   []Continuous Lbs:  [] before manual  [] after manual    []  Ultrasound: []Continuous   [] Pulsed                           []1MHz   []3MHz W/cm2:  Location:    []  Iontophoresis with dexamethasone         Location: [] Take home patch   [] In clinic   10 min  []  Ice     []  heat  []  Ice massage  []  Laser   [x]  Paraffin Position:  Location: left     []  Laser with stim  []  Other:  Position:  Location:    []  Vasopneumatic Device Pressure:       [] lo [] med [] hi   Temperature: [] lo [] med [] hi     [x] Skin assessment post-treatment:  [x]intact []redness- no adverse reaction  []redness - adverse reaction:       20 min Therapeutic Exercise:  [] See flow sheet :   Rationale: increase ROM and increase strength  to improve the patients ability to grasp and manipulate objects     Recheck:  All pinches   strength   Wrist flex/ext ROM    10 min Therapeutic Activity:  []  See flow sheet :   Rationale: increase ROM and improve coordination  to improve the patients ability to manipulate small items     Recheck Goals for PN  Review Edema/Scar Management       With   [] TE   [] TA   [] neuro   [] other: Patient Education: [x] Review HEP    [] Progressed/Changed HEP based on:   [] positioning   [] body mechanics   [] transfers   [] heat/ice application   [] Splint wear/care   [] Sensory re-education   [] scar management      [] other:            Other Objective/Functional Measures:      Measurements: Taken with Deshawn Dynamometer, in Lbs   Level 2   4/9   Right 97   Left 74        9 Hole   3/4 4/9 Change   Right 20     Left 25 21  16%         FOTO   3/4  Eval 4/9   Score 44 67   Change  +23     Pinch Measurements: Taken with Pinch Gauge, in Lbs   (hand) 4/9   3 pt    Right 18   Left  10             Lateral    Right 24   Left 20             Tip    Right 17   Left 7                 UE ROM  Date   3/4 4/9        Norms left Left  Change   Wrist Flex 0-80  45 52  +7     Ext 0-70 55 60   +5       Pain Level (0-10 scale) post treatment: 3-4/10    ASSESSMENT/Changes in Function:     Increasing speed of fine motor manipulation with left hand       Patient will continue to benefit from skilled OT services to modify and progress therapeutic interventions, address strength deficits, analyze and address soft tissue restrictions and instruct in home and community integration to attain remaining goals. .    []  See Plan of Care  [x]  See progress note/recertification  []  See Discharge Summary         Progress towards goals / Updated goals:  1.  Patient will be familiar with proper positioning for left UE to reduce episodes of numbness. Status at Eval: Not initiated  Current Status as of 4/9[de-identified] reports implementing positioning techniques, continued numbness and pain at 4/10      2.  Patient to be independent in scar management techniques. Status at Eval: Initiated  Current Status as of 4/9: Met        3.  Patient to be independent in edema management strategies to reduce diffuse edema and decrease hypersensitivity. Status at Eval: Initiated  Current Status as of 4/9: Met     Long Term Goals: To be accomplished in 4 weeks:          1.  Patient will tolerate pressure on palm to complete ADLs/IADLs successfully  without discomfort    Status at Eval: unable  Current Status as of 4/9: Progressing, some discomfort still present with palmar pressure      2.  Patient will improve left hand fine motor speed by at least 15% for ease of fasteners. Status at Eval: 25 seconds   Current Status as of 4/9: 21 seconds (+16%), Goal Met      3.  Patient  will be able to lift and carry groceries in left hand due to adequate  strength and reduced pain.    Status at Eval: unable  Current Status as of 4/9: Progressing, some improvement noted      4.  Patient will be able to cut food with left assist.  Status at Eval: Unable  Current Status as of 4/9: Ongoing difficulty     5.  Patient will report improved performance of lifting and carrying as shown by increase in FOTOs of at least 15 points each.   Status at Eval: 40  Current Status as of 4/9: Goal Met, 67 (+23)    PLAN  []  Upgrade activities as tolerated     [x]  Continue plan of care  []  Update interventions per flow sheet       []  Discharge due to:_   []  Other:_      Kathleen Friday, OTAS 4/9/2021  10:07 AM  PABLO Mistry/L     Future Appointments   Date Time Provider Raúl Matilde   4/9/2021 10:30 AM Forrest Min [facility]   4/14/2021  9:30 AM Bruna Zambrano MD [facility] BS AMB   5/19/2021  9:15 AM Faustino Whitfield DO [facility] BS AMB   7/22/2021 10:00 AM Radha Rockwell MD [facility] BS AMB   8/17/2021  8:30 AM Bozena Martinez MD [facility] BS AMB

## 2021-04-14 ENCOUNTER — OFFICE VISIT (OUTPATIENT)
Dept: ORTHOPEDIC SURGERY | Age: 76
End: 2021-04-14
Payer: COMMERCIAL

## 2021-04-14 VITALS
OXYGEN SATURATION: 97 % | BODY MASS INDEX: 26.52 KG/M2 | WEIGHT: 175 LBS | HEART RATE: 76 BPM | HEIGHT: 68 IN | TEMPERATURE: 98.1 F

## 2021-04-14 DIAGNOSIS — M43.06 LUMBAR SPONDYLOLYSIS: ICD-10-CM

## 2021-04-14 DIAGNOSIS — M54.41 CHRONIC BILATERAL LOW BACK PAIN WITH BILATERAL SCIATICA: Primary | ICD-10-CM

## 2021-04-14 DIAGNOSIS — M48.061 SPINAL STENOSIS AT L4-L5 LEVEL: ICD-10-CM

## 2021-04-14 DIAGNOSIS — M54.42 CHRONIC BILATERAL LOW BACK PAIN WITH BILATERAL SCIATICA: Primary | ICD-10-CM

## 2021-04-14 DIAGNOSIS — G89.29 CHRONIC BILATERAL LOW BACK PAIN WITH BILATERAL SCIATICA: Primary | ICD-10-CM

## 2021-04-14 PROCEDURE — 99212 OFFICE O/P EST SF 10 MIN: CPT | Performed by: PHYSICAL MEDICINE & REHABILITATION

## 2021-04-14 RX ORDER — FAMOTIDINE 20 MG/1
20 TABLET, FILM COATED ORAL 2 TIMES DAILY
COMMUNITY
Start: 2021-04-12 | End: 2021-06-11

## 2021-04-14 RX ORDER — CLINDAMYCIN PALMITATE HYDROCHLORIDE 75 MG/5ML
20 SOLUTION ORAL 3 TIMES DAILY
COMMUNITY
Start: 2021-04-02 | End: 2021-06-05 | Stop reason: ALTCHOICE

## 2021-04-14 RX ORDER — TRIAMCINOLONE ACETONIDE 1 MG/G
OINTMENT TOPICAL
COMMUNITY
Start: 2021-04-08 | End: 2021-06-10

## 2021-04-14 NOTE — PROGRESS NOTES
Anton Das presents today for   Chief Complaint   Patient presents with    Back Pain       Is someone accompanying this pt? no    Is the patient using any DME equipment during OV? no    Depression Screening:  3 most recent PHQ Screens 2/17/2021   PHQ Not Done -   Little interest or pleasure in doing things Not at all   Feeling down, depressed, irritable, or hopeless Not at all   Total Score PHQ 2 0       Learning Assessment:  Learning Assessment 4/30/2019   PRIMARY LEARNER Patient   PRIMARY LANGUAGE ENGLISH   LEARNER PREFERENCE PRIMARY DEMONSTRATION     VIDEOS   ANSWERED BY patient   RELATIONSHIP SELF       Abuse Screening:  Abuse Screening Questionnaire 4/30/2019   Do you ever feel afraid of your partner? N   Are you in a relationship with someone who physically or mentally threatens you? N   Is it safe for you to go home? Y       Fall Risk  Fall Risk Assessment, last 12 mths 1/21/2021   Able to walk? Yes   Fall in past 12 months? 1   Do you feel unsteady? 1   Are you worried about falling 0   Is TUG test greater than 12 seconds? 0   Is the gait abnormal? 0   Number of falls in past 12 months 1   Fall with injury? 0       Coordination of Care:  1. Have you been to the ER, urgent care clinic since your last visit? Yes, pt states he went to the ER for chest pain. Notes in connect care. Hospitalized since your last visit? no    2. Have you seen or consulted any other health care providers outside of the [address] [place] [address] [place] since your last visit? Yes, pcp Include any pap smears or colon screening.  no

## 2021-04-14 NOTE — PROGRESS NOTES
[address], [address]  [place], [address] [place] [address] Street  Phone: [phone]  Fax: [phone]      Patient: Valentin Shah                                                                              MRN: [id number]        YOB: 1945          AGE: 77 y.o. PCP: Claudine Duvall MD  Date:  04/14/21    Reason for Consultation: Back Pain      HPI:  Valentin Shah is a 77 y.o. male with relevant PMH of HTN, CAD, PVD who presents with chronic low back pain radiating into b/l buttocks. Pain is worse from sit to stand. He is taking currently Cymbalta and tried gabapentin but stopped because he thought it would make him too drowsy. He is here today to review recent MRI which is relatively unchanged from prior MRI in 2018 with degenerative changes mild  central stenosis L4-5  and mild foraminal narrowing at several levels. Denies any recent precipitating incident or trauma. In 1970s justo was hospitalized for over 1 month in traction    Neurologic symptoms: No numbness, tingling, weakness, bowel or bladder changes. No recent falls      Location: The pain is located in the low back   Radiation: The pain does radiate bilateral buttock L>R. Pain Score: Currently: 7/10    Quality: Pain is of a Achy and Pulling quality. Aggravating: Pain is exacerbated by sit to stand  Alleviating:  The pain is alleviated by walking    Prior Treatments: occupational therapy for ulnar transposition 2/2021  PT for the knee  Lumbar brace  Previous Medications: gabapentin 300mg stopped because he was worried it would make him drowsy  Current Medications: cymbalta 60mg, lidocaine patch  Previous work-up has included:     Past Medical History:   Past Medical History:   Diagnosis Date    Bladder outlet obstruction     Erectile disorder due to medical 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     Frequency of urination     H/O spinal cord injury 1974    lumbar spine injury secondary to fall    HTN (hypertension)     Hypercholesterolemia     Illiterate     Injury of lumbar spine ([address] 1974    Leg pain, right     Nocturia     Overactive bladder     Peripheral vascular disease (HCC)       Past Surgical History:   Past Surgical History:   Procedure Laterality Date    COLONOSCOPY N/A 2019    COLONOSCOPY performed by Maurice Birmingham MD at [facility] ENDOSCOPY    HX HEENT Left     lens implant    HX ORTHOPAEDIC      finger amputation left hand    HX TONSILLECTOMY      UPPER ARM/ELBOW SURGERY UNLISTED        SocHx:   Social History     Tobacco Use    Smoking status: Former Smoker     Quit date: 2015     Years since quittin.7    Smokeless tobacco: Never Used   Substance Use Topics    Alcohol use: Not Currently     Alcohol/week: 0.0 standard drinks     Comment: former stopped       FamHx:? Family History   Problem Relation Age of Onset    Diabetes Mother     Hypertension Mother     Diabetes Maternal Grandmother     Hypertension Maternal Grandmother     Cancer Sister         brain    Cancer Sister         brain       Current Medications:    Current Outpatient Medications   Medication Sig Dispense Refill    Clindamycin Pediatric 75 mg/5 mL solution Take 20 mg/kg/day by mouth three (3) times daily.  famotidine (PEPCID) 20 mg tablet Take 20 mg by mouth two (2) times a day.  triamcinolone acetonide (KENALOG) 0.1 % ointment Apply  to affected area daily as needed.  ammonium lactate (Lac-Hydrin Five) 5 % lotion Apply  to affected area two (2) times a day. 1 Bottle 0    tamsulosin (FLOMAX) 0.4 mg capsule Take 1 Cap by mouth daily (after dinner). 90 Cap 0    DULoxetine (CYMBALTA) 60 mg capsule Take 1 Cap by mouth daily. Indications: neuropathic pain 60 Cap 5    finasteride (PROSCAR) 5 mg tablet Take 5 mg by mouth daily.  albuterol sulfate 90 mcg/actuation aebs Take  by inhalation as needed.       diclofenac (Voltaren) 1 % gel Apply 4 g to affected area two (2) times a day. 100 g 0    ammonium lactate (AMLACTIN) 12 % topical cream Apply  to affected area two (2) times a day. rub in to affected area well 280 g 0    pantoprazole (PROTONIX) 40 mg tablet Take 1 Tab by mouth daily. 30 Tab 0    polyethylene glycol (MIRALAX) 17 gram packet Take 1 Packet by mouth daily. 30 Packet 0    amLODIPine (NORVASC) 10 mg tablet Take 1 Tab by mouth daily. 30 Tab 0    spironolactone (ALDACTONE) 25 mg tablet Take  by mouth daily. Allergies: Allergies   Allergen Reactions    Latex Rash    Ampicillin Angioedema    Asa-Acetaminophen-Caff-Buffers Rash    Penicillins Angioedema    Crab Hives    Shellfish Derived Rash    Aspirin Other (comments)     Stomach upset    Atorvastatin Other (comments)     Muscle cramps        Review of Systems:   Gen:    Denied fevers, chills, malaise, fatigue, weight changes   Resp: Denied shortness of breath, cough, wheezing   CVS: Denied chest pain, palpitations   : Denied urinary urgency, frequency, incontinence   GI: Denied nausea, vomiting, constipation, diarrhea   Skin: Denied rashes, wounds   Psych: Denied anxiety, depression   Vasc: Denied claudication, ulcers   Hem: Denied easy bruising/bleeding   MSK: See HPI   Neuro: See HPI         Physical Exam     Vital Signs:   Visit Vitals  Pulse 76   Temp 98.1 °F (36.7 °C) (Tympanic)   Ht 5' 7.5\" (1.715 m)   Wt 175 lb (79.4 kg)   SpO2 97% Comment: RA   BMI 27.00 kg/m²      General: ??????? Well nourished and well developed male without any acute distress   Psychiatric: ?  Alert and oriented x 3 with normal mood    HEENT: ???????? Atraumatic   Respiratory:   Breathing non-labored and non dyspneic   CV: ???????????????? Peripheral pulses intact, no peripheral edema   Skin: ????????????? No rashes       Neurologic: ??       Sensation: normal and grossly intact thebilateral, lower extremity(s)   Strength: 5/5 in the bilateral, lower extremity(s)   Reflexes: reveals 2+ symmetric DTRs throughout LE  Gait: normal    Musculoskeletal: Lumbar Exam     Inspection:   Alignment: Abnormal decreased lumbar lordosis  Atrophy: None     Tenderness to Palpation:   Lumbar paraspinals Positive b/l  Lumbar spinous processes Negative  SI Joint:  Positive b/l  Gluteal:Negative   Greater trochanter: Negative  IT Band:Negative    ROM:   Lumbar ROM: Abnormal limited extension  Lumbar facet loading: Negative  Hip ROM: No reproduction of pain with movement -seated    Special Tests      Slump test: Negative  SLR: Negative  GARCÍA: Positive ++ low back pain  FADIR: Negative  Stinchfield: Positive ++ low back pain  Log Roll: Negative        Medical Decision Making:    Images: MRI lumbar spine 3/3/21  Relatively unchanged from 2018  L2-3 disc bulge, mild facet arthropathy  L3/4 disc bulge, mild facet arthropathy, mild to moderate central stenosis, mild /l foraminal narrowing  L4-5 disc bulge , mild facet arthropathy,   L5/S1 anteriolisthesis, mild L>R foraminal narrowing         Assessment:   1. Lumbar spondylosis  Plan:      -Physical therapy -  Referral to physical therapy for lumbar motion, balance training, LE flexibility  -Medications - d/c gabapentin- made him feel to drowsy. Counseled regarding side effects and appropriate administration of medications.    -Diagnostics/Imaging - Reviewed MRI lumbar spine  -Injections -patient is not interested in any spine injection at his point. Consider SI joint injection or possible TPIs  -DME- patient is interested in more supportive brace, will see how he feels after PT and consider TLSO but worry about causing weakness  -Education - The patient's diagnosis, prognosis and treatment options were discussed today. All questions were answered. F/U - in 6 month(s) or sooner if needed.          [address] [facility]

## 2021-04-26 ENCOUNTER — HOSPITAL ENCOUNTER (OUTPATIENT)
Dept: PHYSICAL THERAPY | Age: 76
Discharge: HOME | End: 2021-04-26
Payer: COMMERCIAL

## 2021-04-26 PROCEDURE — 97110 THERAPEUTIC EXERCISES: CPT

## 2021-04-26 PROCEDURE — 97162 PT EVAL MOD COMPLEX 30 MIN: CPT

## 2021-04-26 NOTE — PROGRESS NOTES
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]    SUBJECTIVE  Pain Level (0-10 scale): 8/10  Any medication changes, allergies to medications, adverse drug reactions, diagnosis change, or new procedure performed?: [x] No    [] Yes (see summary sheet for update)  Subjective functional status/changes:   [] No changes reported  See eval    OBJECTIVE    Modality rationale: decrease pain and increase tissue extensibility to improve the patients ability to ease with ADL's   Min Type Additional Details    [] Estim:  []Unatt       []IFC  []Premod                        []Other:  []w/ice   []w/heat  Position:  Location:    [] Estim: []Att    []TENS instruct  []NMES                    []Other:  []w/US   []w/ice   []w/heat  Position:  Location:    []  Traction: [] Cervical       []Lumbar                       [] Prone          []Supine                       []Intermittent   []Continuous Lbs:  [] before manual  [] after manual    []  Ultrasound: []Continuous   [] Pulsed                           []1MHz   []3MHz W/cm2:  Location:    []  Iontophoresis with dexamethasone         Location: [] Take home patch   [] In clinic   10 []  Ice     [x]  heat  []  Ice massage  []  Laser   []  Anodyne Position:seated  Location:LS    []  Laser with stim  []  Other:  Position:  Location:    []  Vasopneumatic Device Pressure:       [] lo [] med [] hi   Temperature: [] lo [] med [] hi   [] Skin assessment post-treatment:  []intact []redness- no adverse reaction    []redness  adverse reaction:     10 min [x]Eval                  []Re-Eval       25 min Therapeutic Exercise:  [] See flow sheet :   Rationale: increase ROM, improve coordination and improve balance to improve the patients ability to ease with ADLs'          With   [] TE   [] TA   [] neuro   [] other: Patient Education: [x] Review HEP    [] Progressed/Changed HEP based on:   [] positioning   [] body mechanics   [] transfers   [] heat/ice application    [] other:      Other Objective/Functional Measures: see eval     Pain Level (0-10 scale) post treatment: 7/10    ASSESSMENT/Changes in Function: see poc    Patient will continue to benefit from skilled PT services to modify and progress therapeutic interventions, address functional mobility deficits, address ROM deficits, address strength deficits, analyze and address soft tissue restrictions, analyze and cue movement patterns, analyze and modify body mechanics/ergonomics, assess and modify postural abnormalities and address imbalance/dizziness to attain remaining goals.      [x]  See Plan of Care  []  See progress note/recertification  []  See Discharge Summary         Progress towards goals / Updated goals:  See poc    PLAN  [x]  Upgrade activities as tolerated     [x]  Continue plan of care  []  Update interventions per flow sheet       []  Discharge due to:_  []  Other:_      Mindi Gore, PT 4/26/2021  9:53 AM    Future Appointments   Date Time Provider Raúl Clayton   5/19/2021  9:15 AM Jerry JOHNSON DO [facility] BS AMB   7/22/2021 10:00 AM iNka Mancilla MD [facility] BS AMB   8/17/2021  8:30 AM Kandace Martinez MD [facility] BS AMB   10/14/2021  9:00 AM Becky Fitzgerald MD [facility] BS AMB

## 2021-04-26 NOTE — PROGRESS NOTES
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 Ulnar Transposition 2021. Prior Level of Function: Lives Alone on the 3rd floor. Independent with ADL's. The Plan of Care and following information is based on the information from the initial evaluation. Assessment/ key information: pt is a 77 yr old male with complaints of chronic back pain that increases with moving, transfers, standing. Pt reports pain at  8 /10 today. He had a previous back injury where he fell off a ladder in the 70's. He is was in Traction for approx 1 month. Pt reports a fall last month and has a lot of stumbles. He also has decreased vision. Pt presents today with pain that is achy and increases with sit to stand and initial steps. Pain decreases with walking. Pt reports wearing a Lumbar Brace to help alleviate sx. Pt preents with a decreased LS Lordosis and TTP to lumbar paraspinals. Pt presents with a Positive GARCÍA, bilaterally, Negative SLR Test bilaterally. Pt has limited LE flexibility, decreased balance strategies, statically and dynamically .  Pt presents with decreased core strength and will benefit from skilled PT to further improve Core strength, mobility, flexibility, decrease pain and improve balance deficits. Evaluation Complexity History MEDIUM  Complexity : 1-2 comorbidities / personal factors will impact the outcome/ POC ; Examination MEDIUM Complexity : 3 Standardized tests and measures addressing body structure, function, activity limitation and / or participation in recreation  ;Presentation MEDIUM Complexity : Evolving with changing characteristics  ; Clinical Decision Making MEDIUM Complexity : FOTO score of 26-74  Overall Complexity Rating: MEDIUM  Problem List: pain affecting function, decrease ROM, decrease strength, impaired gait/ balance, decrease ADL/ functional abilitiies, decrease activity tolerance, decrease flexibility/ joint mobility and decrease transfer abilities   Treatment Plan may include any combination of the following: Therapeutic exercise, Therapeutic activities, Neuromuscular re-education, Physical agent/modality, Gait/balance training, Manual therapy, Patient education, Self Care training, Functional mobility training, Home safety training and Stair training  Patient / Family readiness to learn indicated by: asking questions, trying to perform skills and interest  Persons(s) to be included in education: patient (P)  Barriers to Learning/Limitations: None  Patient Goal (s): Relieve the pain  Patient Self Reported Health Status: good  Rehabilitation Potential: good    Short Term Goals: To be accomplished in 1 weeks:   1. Pt will be compliant with a HEP to improve function. Long Term Goals: To be accomplished in 4 weeks:   1. Pt will increase FOTO score by 7  pts to improve function. 2. Pt will report at least 40% improvement in sx to ease with ADL's.    3. Pt will ambulate 4 stairs x 4 with HR  to ease with being able to get to/from his home. 4. Pt will  Report pain <4/10 greater than 50% of the time to be able to perform ADL's and dressing activities. Frequency / Duration: Patient to be seen 2 times per week for 4 weeks. Patient/ Caregiver education and instruction: Diagnosis, prognosis, self care and exercises   [x]  Plan of care has been reviewed with PTA    Certification Period: 4/26/21-5/25/21  Alvaro Flynn, PT 4/26/2021 9:52 AM    ________________________________________________________________________    I certify that the above Therapy Services are being furnished while the patient is under my care. I agree with the treatment plan and certify that this therapy is necessary.     Physician's Signature:____________Date:_________TIME:________     Sirena Nesbitt*  ** Signature, Date and Time must be completed for valid certification **    Please sign and return to [facility]  [address] [facility] [place] [facility]  [phone] fax

## 2021-05-19 ENCOUNTER — OFFICE VISIT (OUTPATIENT)
Dept: ORTHOPEDIC SURGERY | Age: 76
End: 2021-05-19
Payer: COMMERCIAL

## 2021-05-19 VITALS
OXYGEN SATURATION: 97 % | WEIGHT: 170.6 LBS | HEART RATE: 75 BPM | RESPIRATION RATE: 15 BRPM | HEIGHT: 68 IN | TEMPERATURE: 97.3 F | BODY MASS INDEX: 25.85 KG/M2

## 2021-05-19 DIAGNOSIS — Z98.890 S/P CARPAL TUNNEL RELEASE: ICD-10-CM

## 2021-05-19 DIAGNOSIS — Z98.890 S/P CUBITAL TUNNEL RELEASE: ICD-10-CM

## 2021-05-19 DIAGNOSIS — G56.02 LEFT CARPAL TUNNEL SYNDROME: ICD-10-CM

## 2021-05-19 DIAGNOSIS — G56.21 CUBITAL TUNNEL SYNDROME, RIGHT: ICD-10-CM

## 2021-05-19 DIAGNOSIS — G56.01 RIGHT CARPAL TUNNEL SYNDROME: ICD-10-CM

## 2021-05-19 DIAGNOSIS — G56.22 CUBITAL TUNNEL SYNDROME, LEFT: Primary | ICD-10-CM

## 2021-05-19 PROCEDURE — 99213 OFFICE O/P EST LOW 20 MIN: CPT | Performed by: ORTHOPAEDIC SURGERY

## 2021-05-19 RX ORDER — WHEAT DEXTRIN 3 G/3.5 G
POWDER IN PACKET (EA) ORAL
COMMUNITY
End: 2021-06-10

## 2021-05-19 RX ORDER — DOXYCYCLINE 100 MG/1
100 CAPSULE ORAL 2 TIMES DAILY
COMMUNITY
End: 2021-06-05 | Stop reason: ALTCHOICE

## 2021-05-19 RX ORDER — METRONIDAZOLE 500 MG/1
500 TABLET ORAL 3 TIMES DAILY
COMMUNITY
End: 2021-06-05

## 2021-05-19 NOTE — PROGRESS NOTES
Gianluca Fajardo is a 77 y.o. male right handed retiree. Worker's Compensation and legal considerations: none filed. Vitals:    05/19/21 0917   Pulse: 75   Resp: 15   Temp: 97.3 °F (36.3 °C)   SpO2: 97%   Weight: 170 lb 9.6 oz (77.4 kg)   Height: 5' 7.5\" (1.715 m)   PainSc:   6   PainLoc: Hand           Chief Complaint   Patient presents with    Hand Pain     left       HPI: Patient presents today more than 3 months status post left carpal tunnel release and cubital tunnel release. He started therapy but had to have an interruption but he is planning to resume in a couple days. He reports some continued pain around the elbow. He says the right side has not been bothering him. 6wk HPI: Patient presents today 6 weeks status post left carpal tunnel and cubital tunnel releases. He reports some mild improvement but still having pain around his hand and his elbow. 2wk HPI: Patient presents today 2 weeks status post left carpal tunnel and cubital tunnel releases. He reports feeling better after surgery but that his numbness is recently gotten worse. Preop HPI: Patient returns today for follow-up of bilateral upper extremity EMGs. He is recently discussed with a shoulder surgeon having a shoulder replacement. This is on the right side. He says he would like to have the left hand numbness taken care of first.    12/2020 HPI: Patient comes in today regarding left worse than right upper extremity numbness and pain. He reports little finger and the ring finger on the left to be numb most of the time of pain radiating up the arm. He reports occasional pain and swelling on the right side. Initial HPI: Patient comes in today regarding concerns of having a metal foreign body in his on something metallic right middle finger tip in his left thumb tip. He says that he scratched both areas last year and has since had issues with it.   He thinks he may have gotten something metallic out of the right middle finger and then it bled after that. He reports some continued tenderness to palpation. Date of onset:  9/2019    Injury: Yes: Comment: cut left thumb and right middle finger    Prior Treatment:  Yes: Comment:  left carpal tunnel and cubital tunnel releases    Numbness/ Tingling: Yes: Comment: Left worse than right ulnar-sided digits    ROS: Review of Systems - General ROS: negative  Respiratory ROS: no cough, shortness of breath, or wheezing  Cardiovascular ROS: no chest pain or dyspnea on exertion  Musculoskeletal ROS: positive for - pain in finger - right and thumb - left  Neurological ROS: Positive for numbness and tingling  Dermatological ROS: negative    Past Medical History:   Diagnosis Date    Bladder outlet obstruction     Erectile disorder due to medical 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     Frequency of urination     H/O spinal cord injury 1974    lumbar spine injury secondary to fall    HTN (hypertension)     Hypercholesterolemia     Illiterate     Injury of lumbar spine ([facility] [address] 1974    Leg pain, right     Nocturia     Overactive bladder     Peripheral vascular disease ([facility] [address]        Past Surgical History:   Procedure Laterality Date    COLONOSCOPY N/A 12/4/2019    COLONOSCOPY performed by Rocío Castro MD at [facility] ENDOSCOPY    HAND/FINGER SURGERY UNLISTED Right     carpal tunnel    HX HEENT Left     lens implant    HX ORTHOPAEDIC      finger amputation left hand    HX TONSILLECTOMY      UPPER ARM/ELBOW SURGERY UNLISTED Right        Current Outpatient Medications   Medication Sig Dispense Refill    doxycycline (MONODOX) 100 mg capsule Take 100 mg by mouth two (2) times a day.  metroNIDAZOLE (FLAGYL) 500 mg tablet Take 500 mg by mouth three (3) times daily.  Wheat Dextrin (Benefiber Clear) 3 gram/3.5 gram pwpk Take  by mouth.  famotidine (PEPCID) 20 mg tablet Take 20 mg by mouth two (2) times a day.       triamcinolone acetonide (KENALOG) 0.1 % ointment Apply  to affected area daily as needed.  ammonium lactate (Lac-Hydrin Five) 5 % lotion Apply  to affected area two (2) times a day. 1 Bottle 0    tamsulosin (FLOMAX) 0.4 mg capsule Take 1 Cap by mouth daily (after dinner). 90 Cap 0    DULoxetine (CYMBALTA) 60 mg capsule Take 1 Cap by mouth daily. Indications: neuropathic pain 60 Cap 5    finasteride (PROSCAR) 5 mg tablet Take 5 mg by mouth daily.  albuterol sulfate 90 mcg/actuation aebs Take  by inhalation as needed.  diclofenac (Voltaren) 1 % gel Apply 4 g to affected area two (2) times a day. 100 g 0    ammonium lactate (AMLACTIN) 12 % topical cream Apply  to affected area two (2) times a day. rub in to affected area well 280 g 0    pantoprazole (PROTONIX) 40 mg tablet Take 1 Tab by mouth daily. 30 Tab 0    polyethylene glycol (MIRALAX) 17 gram packet Take 1 Packet by mouth daily. 30 Packet 0    amLODIPine (NORVASC) 10 mg tablet Take 1 Tab by mouth daily. 30 Tab 0    spironolactone (ALDACTONE) 25 mg tablet Take  by mouth daily.  Clindamycin Pediatric 75 mg/5 mL solution Take 20 mg/kg/day by mouth three (3) times daily. (Patient not taking: Reported on 5/19/2021)         Allergies   Allergen Reactions    Latex Rash    Ampicillin Angioedema    Asa-Acetaminophen-Caff-Buffers Rash    Penicillins Angioedema    Crab Hives    Shellfish Derived Rash    Aspirin Other (comments)     Stomach upset    Atorvastatin Other (comments)     Muscle cramps         PE:     Physical Exam  Vitals and nursing note reviewed. Constitutional:       General: He is not in acute distress. Appearance: Normal appearance. He is not ill-appearing. Cardiovascular:      Pulses: Normal pulses. Musculoskeletal:         General: Tenderness present. No swelling, deformity or signs of injury. Normal range of motion. Cervical back: Normal range of motion and neck supple. Right lower leg: No edema. Left lower leg: No edema. Skin:     General: Skin is warm and dry. Capillary Refill: Capillary refill takes less than 2 seconds. Findings: No bruising or erythema. Neurological:      General: No focal deficit present. Mental Status: He is alert and oriented to person, place, and time. Cranial Nerves: No cranial nerve deficit. Sensory: No sensory deficit. Psychiatric:         Mood and Affect: Mood normal.         Behavior: Behavior normal.         Left upper extremity: Incisions healed. There is continued tenderness to palpation about the elbow. Range of motion of the digits and elbow is full. NEUROVASCULAR    Examination L R Examination L R   Carpal Comp.  - Pronator Comp. - -   Carpal Tinel  - Pronator Tinel - -   Phalen's  - Pronator Stress - -   Cubital Comp.  +- Guyon Comp. - -   Cubital Tinel  + Guyon Tinel - -   Elbow Hyperflexion  - Adson's - -   Spurling's - - SC Comp. - -   PCB Median abn - - SC Tinel - -   Radial Tinel - - IC Comp. - -   Digital Tinel - - IC Tinel - -   Radial 2-Pt WNL WNL Ulnar 2-Pt WNL WNL     Radial Pulse: 2+  Capillary Refill: < 2 sec  [employer]: Not Performed  [employer]: Not Performed    NCV & EMG Findings:  Evaluation of the right median motor nerve showed prolonged distal onset latency (4.7 ms). The left ulnar motor and the right ulnar motor nerves showed decreased conduction velocity (A Elbow-B Elbow, L36, R43 m/s). The left median sensory and the right median sensory nerves showed prolonged distal peak latency (L3.8, R4.4 ms) and decreased conduction velocity (Wrist-2nd Digit, L37, R32 m/s). The left ulnar sensory and the right ulnar sensory nerves showed prolonged distal peak latency (L7.3, R4.2 ms), reduced amplitude (L8.3, R7.6 µV), and decreased conduction velocity (Wrist-5th Digit, L19, R33 m/s). All remaining nerves (as indicated in the following tables) were within normal limits. Left vs. Right side comparison data for the median sensory nerve indicates abnormal L-R latency difference (0.6 ms).   The ulnar sensory nerve indicates abnormal L-R latency difference (3.1 ms). All remaining left vs. right side differences were within normal limits.       All examined muscles (as indicated in the following table) showed no evidence of electrical instability.       INTERPRETATION     This is an abnormal electrodiagnostic examination. These findings may be consistent with:   1. Sensorimotor polyneuropathy - this is based on mild unexpected abnormalities throughout the NCS unrelated to other entrapment syndromes. There is possibility that the severity of the other entrapment neuropathies is worsened by this polyneuropathy. 2. Moderate ulnar mononeuropathy at the right elbow (cubital tunnel syndrome)   3. Moderate median mononeuropathy at the right wrist (carpal tunnel syndrome)   4. Moderate ulnar mononeuropathy at the left elbow (cubital tunnel syndrome)   5. Mild median mononeuropathy at the left wrist (carpal tunnel syndrome)     There is no electrodiagnostic evidence of any cervical radiculopathy, brachial plexopathy,  or any other mononeuropathy.        CLINICAL INTERPRETATION  His electrodiagnostic findings of carpal tunnel and cubital tunnel syndromes bilaterally are consistent with his bilateral hand symptoms. Imagin2020 plain films of the left thumb as well as the right middle finger does not show any evidence of radiopaque foreign body any fracture dislocation or other osseous abnormality. Of note on the left thumb view a index finger metallic foreign body is noted on the radial aspect of the P1. ICD-10-CM ICD-9-CM    1. Cubital tunnel syndrome, left  G56.22 354.2    2. S/P cubital tunnel release  Z98.890 V45.89    3. Left carpal tunnel syndrome  G56.02 354.0    4. S/P carpal tunnel release  Z98.890 V45.89    5. Cubital tunnel syndrome, right  G56.21 354.2    6.  Right carpal tunnel syndrome  G56.01 354.0        Plan:     Continue therapy as directed by therapist and follow-up as needed. Follow-up and Dispositions    · Return if symptoms worsen or fail to improve.          Plan was reviewed with patient, who verbalized agreement and understanding of the plan

## 2021-05-21 ENCOUNTER — APPOINTMENT (OUTPATIENT)
Dept: GENERAL RADIOLOGY | Age: 76
End: 2021-05-21
Attending: EMERGENCY MEDICINE
Payer: COMMERCIAL

## 2021-05-21 ENCOUNTER — HOSPITAL ENCOUNTER (EMERGENCY)
Age: 76
Discharge: HOME | End: 2021-05-21
Attending: EMERGENCY MEDICINE
Payer: COMMERCIAL

## 2021-05-21 ENCOUNTER — APPOINTMENT (OUTPATIENT)
Dept: CT IMAGING | Age: 76
End: 2021-05-21
Attending: EMERGENCY MEDICINE
Payer: COMMERCIAL

## 2021-05-21 VITALS
OXYGEN SATURATION: 96 % | WEIGHT: 170 LBS | TEMPERATURE: 99.2 F | RESPIRATION RATE: 18 BRPM | BODY MASS INDEX: 26.68 KG/M2 | SYSTOLIC BLOOD PRESSURE: 126 MMHG | HEART RATE: 66 BPM | HEIGHT: 67 IN | DIASTOLIC BLOOD PRESSURE: 56 MMHG

## 2021-05-21 DIAGNOSIS — K29.90 GASTRITIS AND DUODENITIS: Primary | ICD-10-CM

## 2021-05-21 LAB
ALBUMIN SERPL-MCNC: 3.7 G/DL (ref 3.4–5)
ALBUMIN/GLOB SERPL: 1 {RATIO} (ref 0.8–1.7)
ALP SERPL-CCNC: 65 U/L (ref 45–117)
ALT SERPL-CCNC: 30 U/L (ref 16–61)
ANION GAP SERPL CALC-SCNC: 6 MMOL/L (ref 3–18)
AST SERPL-CCNC: 29 U/L (ref 10–38)
BASOPHILS # BLD: 0 K/UL (ref 0–0.1)
BASOPHILS NFR BLD: 1 % (ref 0–2)
BILIRUB SERPL-MCNC: 0.4 MG/DL (ref 0.2–1)
BNP SERPL-MCNC: 19 PG/ML (ref 0–900)
BUN SERPL-MCNC: 9 MG/DL (ref 7–18)
BUN/CREAT SERPL: 10 (ref 12–20)
CALCIUM SERPL-MCNC: 8.9 MG/DL (ref 8.5–10.1)
CHLORIDE SERPL-SCNC: 108 MMOL/L (ref 100–111)
CK MB CFR SERPL CALC: 1.2 % (ref 0–4)
CK MB CFR SERPL CALC: 1.3 % (ref 0–4)
CK MB SERPL-MCNC: 2.9 NG/ML (ref 5–25)
CK MB SERPL-MCNC: 4.2 NG/ML (ref 5–25)
CK SERPL-CCNC: 233 U/L (ref 39–308)
CK SERPL-CCNC: 318 U/L (ref 39–308)
CO2 SERPL-SCNC: 25 MMOL/L (ref 21–32)
CREAT SERPL-MCNC: 0.89 MG/DL (ref 0.6–1.3)
DIFFERENTIAL METHOD BLD: ABNORMAL
EOSINOPHIL # BLD: 0.1 K/UL (ref 0–0.4)
EOSINOPHIL NFR BLD: 2 % (ref 0–5)
ERYTHROCYTE [DISTWIDTH] IN BLOOD BY AUTOMATED COUNT: 14.5 % (ref 11.6–14.5)
GLOBULIN SER CALC-MCNC: 3.7 G/DL (ref 2–4)
GLUCOSE SERPL-MCNC: 98 MG/DL (ref 74–99)
HCT VFR BLD AUTO: 39.5 % (ref 36–48)
HEMOCCULT STL QL: NEGATIVE
HGB BLD-MCNC: 13.2 G/DL (ref 13–16)
LYMPHOCYTES # BLD: 1.1 K/UL (ref 0.9–3.6)
LYMPHOCYTES NFR BLD: 30 % (ref 21–52)
MAGNESIUM SERPL-MCNC: 2 MG/DL (ref 1.6–2.6)
MCH RBC QN AUTO: 30.1 PG (ref 24–34)
MCHC RBC AUTO-ENTMCNC: 33.4 G/DL (ref 31–37)
MCV RBC AUTO: 90.2 FL (ref 74–97)
MONOCYTES # BLD: 0.5 K/UL (ref 0.05–1.2)
MONOCYTES NFR BLD: 12 % (ref 3–10)
NEUTS SEG # BLD: 2.1 K/UL (ref 1.8–8)
NEUTS SEG NFR BLD: 54 % (ref 40–73)
PLATELET # BLD AUTO: 246 K/UL (ref 135–420)
PMV BLD AUTO: 8.9 FL (ref 9.2–11.8)
POTASSIUM SERPL-SCNC: 3.8 MMOL/L (ref 3.5–5.5)
PROT SERPL-MCNC: 7.4 G/DL (ref 6.4–8.2)
RBC # BLD AUTO: 4.38 M/UL (ref 4.35–5.65)
SODIUM SERPL-SCNC: 139 MMOL/L (ref 136–145)
TROPONIN I SERPL-MCNC: <0.02 NG/ML (ref 0–0.04)
TROPONIN I SERPL-MCNC: <0.02 NG/ML (ref 0–0.04)
WBC # BLD AUTO: 3.8 K/UL (ref 4.6–13.2)

## 2021-05-21 PROCEDURE — 74011250636 HC RX REV CODE- 250/636: Performed by: EMERGENCY MEDICINE

## 2021-05-21 PROCEDURE — 85025 COMPLETE CBC W/AUTO DIFF WBC: CPT

## 2021-05-21 PROCEDURE — 80053 COMPREHEN METABOLIC PANEL: CPT

## 2021-05-21 PROCEDURE — 82272 OCCULT BLD FECES 1-3 TESTS: CPT

## 2021-05-21 PROCEDURE — 99284 EMERGENCY DEPT VISIT MOD MDM: CPT

## 2021-05-21 PROCEDURE — 83880 ASSAY OF NATRIURETIC PEPTIDE: CPT

## 2021-05-21 PROCEDURE — 93005 ELECTROCARDIOGRAM TRACING: CPT

## 2021-05-21 PROCEDURE — 82553 CREATINE MB FRACTION: CPT

## 2021-05-21 PROCEDURE — 96375 TX/PRO/DX INJ NEW DRUG ADDON: CPT

## 2021-05-21 PROCEDURE — 74022 RADEX COMPL AQT ABD SERIES: CPT

## 2021-05-21 PROCEDURE — 74177 CT ABD & PELVIS W/CONTRAST: CPT

## 2021-05-21 PROCEDURE — 96374 THER/PROPH/DIAG INJ IV PUSH: CPT

## 2021-05-21 PROCEDURE — 74011000250 HC RX REV CODE- 250: Performed by: EMERGENCY MEDICINE

## 2021-05-21 PROCEDURE — 83735 ASSAY OF MAGNESIUM: CPT

## 2021-05-21 PROCEDURE — C9113 INJ PANTOPRAZOLE SODIUM, VIA: HCPCS | Performed by: EMERGENCY MEDICINE

## 2021-05-21 PROCEDURE — 74011000636 HC RX REV CODE- 636: Performed by: EMERGENCY MEDICINE

## 2021-05-21 RX ORDER — FAMOTIDINE 20 MG/1
20 TABLET, FILM COATED ORAL 2 TIMES DAILY
Qty: 10 TABLET | Refills: 0 | Status: SHIPPED | OUTPATIENT
Start: 2021-05-21 | End: 2021-05-26

## 2021-05-21 RX ADMIN — IOPAMIDOL 100 ML: 612 INJECTION, SOLUTION INTRAVENOUS at 13:58

## 2021-05-21 RX ADMIN — FAMOTIDINE 20 MG: 10 INJECTION INTRAVENOUS at 14:25

## 2021-05-21 RX ADMIN — SODIUM CHLORIDE 40 MG: 9 INJECTION, SOLUTION INTRAMUSCULAR; INTRAVENOUS; SUBCUTANEOUS at 14:25

## 2021-05-21 NOTE — ED PROVIDER NOTES
EMERGENCY DEPARTMENT HISTORY AND PHYSICAL EXAM    10:08 AM  Date: 5/21/2021  Patient Name: Anton Das    History of Presenting Illness       History Provided By:     HPI: Anton Das is a 77 y.o. male with past medical history of hypertension, hypercholesterolemia, esophageal varices, GERD, ulcers presents with epigastric pain that started this morning. Patient states pain is moderate severity, constant, sharp, radiates to his chest area, no associated symptoms or modifying factors. Patient states that he takes antibiotics for stomach infection, he thinks H. pylori. No nausea, vomiting. Denies any fever or chills. States that his stool is black.     Social history: Used to drink alcohol 5 years ago     PCP: Erica Perales MD    Past History     Past Medical History:  Past Medical History:   Diagnosis Date    Bladder outlet obstruction     Erectile disorder due to medical 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     Frequency of urination     H/O spinal cord injury 1974    lumbar spine injury secondary to fall    HTN (hypertension)     Hypercholesterolemia     Illiterate     Injury of lumbar spine ([facility] [address] 1974    Leg pain, right     Nocturia     Overactive bladder     Peripheral vascular disease ([facility] [address]        Past Surgical History:  Past Surgical History:   Procedure Laterality Date    COLONOSCOPY N/A 12/4/2019    COL[facility] performed by Trenton Mast [facility] ENDOSCOPY    HAND/FINGER SURGERY UNLISTED Right     carpal tunnel    HX HEENT Left     lens implant    HX ORTHOPAEDIC      finger amputation left hand    HX TONSILLECTOMY      UPPER ARM/ELBOW SURGERY UNLISTED Right        Family History:  Family History   Problem Relation Age of Onset    Diabetes Mother     Hypertension Mother     Diabetes Maternal Grandmother     Hypertension Maternal Grandmother     Cancer Sister         brain    Cancer Sister         brain       Social History:  Social History     Tobacco Use    Smoking status: Former Smoker     Quit date: 2015     Years since quittin.8    Smokeless tobacco: Never Used   Substance Use Topics    Alcohol use: Not Currently     Alcohol/week: 0.0 standard drinks     Comment: former stopped     Drug use: No       Allergies: Allergies   Allergen Reactions    Latex Rash    Ampicillin Angioedema    Asa-Acetaminophen-Caff-Buffers Rash    Penicillins Angioedema    Crab Hives    Shellfish Derived Rash    Aspirin Other (comments)     Stomach upset    Atorvastatin Other (comments)     Muscle cramps       Review of Systems   Review of Systems   Constitutional: Negative for activity change, appetite change and chills. HENT: Negative for congestion, ear discharge, ear pain and sore throat. Eyes: Negative for photophobia and pain. Respiratory: Negative for cough, choking and chest tightness. Cardiovascular: Negative for palpitations and leg swelling. Gastrointestinal: Positive for abdominal pain. Negative for anal bleeding and rectal pain. Endocrine: Negative for polydipsia and polyuria. Genitourinary: Negative for genital sores and urgency. Musculoskeletal: Negative for arthralgias and myalgias. Neurological: Negative for dizziness, seizures and speech difficulty. Psychiatric/Behavioral: Negative for hallucinations, self-injury and suicidal ideas. Physical Exam     Patient Vitals for the past 12 hrs:   Temp Pulse Resp BP SpO2   21 1002 99.2 °F (37.3 °C) 85 16 116/72 99 %       Physical Exam  Vitals and nursing note reviewed. Constitutional:       Appearance: He is well-developed. HENT:      Head: Normocephalic and atraumatic. Eyes:      General:         Right eye: No discharge. Left eye: No discharge. Cardiovascular:      Rate and Rhythm: Normal rate and regular rhythm. Heart sounds: Normal heart sounds. No murmur heard. Pulmonary:      Effort: Pulmonary effort is normal. No respiratory distress.       Breath sounds: Normal breath sounds. No stridor. No wheezing or rales. Chest:      Chest wall: No tenderness. Abdominal:      General: Bowel sounds are normal. There is no distension. Palpations: Abdomen is soft. Tenderness: There is abdominal tenderness. There is no guarding or rebound. Comments: Dark stool on my glove  Epigastric tenderness on exam   Musculoskeletal:         General: Normal range of motion. Cervical back: Normal range of motion and neck supple. Skin:     General: Skin is warm and dry. Neurological:      Mental Status: He is alert and oriented to person, place, and time. Diagnostic Study Results     Labs -  Recent Results (from the past 12 hour(s))   EKG, 12 LEAD, INITIAL    Collection Time: 05/21/21  9:55 AM   Result Value Ref Range    Ventricular Rate 88 BPM    Atrial Rate 88 BPM    P-R Interval 162 ms    QRS Duration 86 ms    Q-T Interval 362 ms    QTC Calculation (Bezet) 438 ms    Calculated P Axis 58 degrees    Calculated R Axis -17 degrees    Calculated T Axis 70 degrees    Diagnosis       Normal sinus rhythm  Normal ECG  When compared with ECG of 30-MAR-2021 22:32,  Vent. rate has increased BY  29 BPM         Radiologic Studies -   No results found. Medical Decision Making     ED Course: Progress Notes, Reevaluation, and Consults:    10:08 AM Initial assessment performed. The patients presenting problems have been discussed, and they/their family are in agreement with the care plan formulated and outlined with them. I have encouraged them to ask questions as they arise throughout their visit.       Provider Notes (Medical Decision Making):   Patient presents with chest/epigastric pain  Epigastric tenderness on exam  Patient hemodynamically stable  I suspect that this is gastritis  Plan to obtain labs, imaging  Old medical records reviewed:  EKG as interpreted by me:  Labs as interpreted by me:  2 sets of troponin negative fecal occult negative no electrolyte abnormality   CT Abdo pelvis no acute abdominal findings  Patient feels well on reassessment  EKG as interpreted by me:  normal sinus rhythm, no ST changes, normal intervals 88  Clinical impression gastritis. Advised to follow-up with his gastroenterologist            Vital Signs-Reviewed the patient's vital signs. Reviewed pt's pulse ox reading. Records Reviewed: old medical records  -I am the first provider for this patient.  -I reviewed the vital signs, available nursing notes, past medical history, past surgical history, family history and social history. Current Outpatient Medications   Medication Sig Dispense Refill    doxycycline (MONODOX) 100 mg capsule Take 100 mg by mouth two (2) times a day.  metroNIDAZOLE (FLAGYL) 500 mg tablet Take 500 mg by mouth three (3) times daily.  Wheat Dextrin (Benefiber Clear) 3 gram/3.5 gram pwpk Take  by mouth.  Clindamycin Pediatric 75 mg/5 mL solution Take 20 mg/kg/day by mouth three (3) times daily. (Patient not taking: Reported on 5/19/2021)      famotidine (PEPCID) 20 mg tablet Take 20 mg by mouth two (2) times a day.  triamcinolone acetonide (KENALOG) 0.1 % ointment Apply  to affected area daily as needed.  ammonium lactate (Lac-Hydrin Five) 5 % lotion Apply  to affected area two (2) times a day. 1 Bottle 0    tamsulosin (FLOMAX) 0.4 mg capsule Take 1 Cap by mouth daily (after dinner). 90 Cap 0    DULoxetine (CYMBALTA) 60 mg capsule Take 1 Cap by mouth daily. Indications: neuropathic pain 60 Cap 5    finasteride (PROSCAR) 5 mg tablet Take 5 mg by mouth daily.  albuterol sulfate 90 mcg/actuation aebs Take  by inhalation as needed.  diclofenac (Voltaren) 1 % gel Apply 4 g to affected area two (2) times a day. 100 g 0    ammonium lactate (AMLACTIN) 12 % topical cream Apply  to affected area two (2) times a day. rub in to affected area well 280 g 0    pantoprazole (PROTONIX) 40 mg tablet Take 1 Tab by mouth daily.  30 Tab 0    polyethylene glycol (MIRALAX) 17 gram packet Take 1 Packet by mouth daily. 30 Packet 0    amLODIPine (NORVASC) 10 mg tablet Take 1 Tab by mouth daily. 30 Tab 0    spironolactone (ALDACTONE) 25 mg tablet Take  by mouth daily. Clinical Impression     Clinical Impression: No diagnosis found. Disposition:    Pt has been reexamined. Patient has no new complaints, changes, or physical findings. Care plan outlined and precautions discussed. Results were reviewed with the patient. All medications were reviewed with the patient; will d/c home with PMD f/u. All of pt's questions and concerns were addressed. Patient was instructed and agrees to follow up with PMD, as well as to return to the ED upon further deterioration. Patient is ready to go home. This note was dictated utilizing voice recognition software which may lead to typographical errors. I apologize in advance if the situation occurs. If questions arise please do not hesitate to contact me or call our department. This note was dictated utilizing voice recognition software which may lead to typographical errors. I apologize in advance if the situation occurs. If questions arise please do not hesitate to contact me or call our department.     Flaco Durand MD  10:08 AM

## 2021-05-21 NOTE — ED TRIAGE NOTES
Patient with epigastric pain and chest pain that started this am, patient just had endoscopy on 5/16 and was placed on medication

## 2021-05-22 LAB
ATRIAL RATE: 88 BPM
CALCULATED P AXIS, ECG09: 58 DEGREES
CALCULATED R AXIS, ECG10: -17 DEGREES
CALCULATED T AXIS, ECG11: 70 DEGREES
DIAGNOSIS, 93000: NORMAL
P-R INTERVAL, ECG05: 162 MS
Q-T INTERVAL, ECG07: 362 MS
QRS DURATION, ECG06: 86 MS
QTC CALCULATION (BEZET), ECG08: 438 MS
VENTRICULAR RATE, ECG03: 88 BPM

## 2021-05-25 ENCOUNTER — OFFICE VISIT (OUTPATIENT)
Dept: ORTHOPEDIC SURGERY | Age: 76
End: 2021-05-25
Payer: COMMERCIAL

## 2021-05-25 VITALS
WEIGHT: 169.8 LBS | TEMPERATURE: 97.5 F | OXYGEN SATURATION: 98 % | HEART RATE: 75 BPM | BODY MASS INDEX: 26.65 KG/M2 | RESPIRATION RATE: 15 BRPM | HEIGHT: 67 IN

## 2021-05-25 DIAGNOSIS — M12.811 ROTATOR CUFF ARTHROPATHY OF BOTH SHOULDERS: Primary | ICD-10-CM

## 2021-05-25 DIAGNOSIS — M12.812 ROTATOR CUFF ARTHROPATHY OF BOTH SHOULDERS: Primary | ICD-10-CM

## 2021-05-25 PROCEDURE — 99213 OFFICE O/P EST LOW 20 MIN: CPT | Performed by: ORTHOPAEDIC SURGERY

## 2021-05-25 RX ORDER — DICLOFENAC SODIUM 10 MG/G
2 GEL TOPICAL 4 TIMES DAILY
Qty: 100 G | Refills: 2 | Status: SHIPPED | OUTPATIENT
Start: 2021-05-25 | End: 2022-06-30 | Stop reason: SDUPTHER

## 2021-05-25 RX ORDER — BISMUTH SUBSALICYLATE 262 MG/1
TABLET, CHEWABLE ORAL
COMMUNITY
Start: 2021-05-13 | End: 2021-06-13

## 2021-05-25 NOTE — PROGRESS NOTES
Patient: Anton Das                MRN: [id number]       SSN: [id number]  YOB: 1945        AGE: 77 y.o. SEX: male  Body mass index is 26.59 kg/m². PCP: Erica Perales MD  05/25/21    Chief Complaint: Bilateral shoulder pain 6/10    HPI: Anton Das is a 77 y.o. male patient who returns to the office today with bilateral shoulder pain. At his last visit he received corticosteroid injections into both shoulders which she said gave him 1 to 2 days of relief. The pain is subsequently returned. He has difficulty with range of motion and pain reaching above shoulder height. He does not notice any relief with over-the-counter medications. Past Medical History:   Diagnosis Date    Bladder outlet obstruction     Erectile disorder due to medical 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     Frequency of urination     H/O spinal cord injury 1974    lumbar spine injury secondary to fall    HTN (hypertension)     Hypercholesterolemia     Illiterate     Injury of lumbar spine ([facility] [address] 1974    Leg pain, right     Nocturia     Overactive bladder     Peripheral vascular disease ([facility] [address]        Family History   Problem Relation Age of Onset    Diabetes Mother     Hypertension Mother     Diabetes Maternal Grandmother     Hypertension Maternal Grandmother     Cancer Sister         brain    Cancer Sister         brain       Current Outpatient Medications   Medication Sig Dispense Refill    BismatroL 262 mg chew       diclofenac (VOLTAREN) 1 % gel Apply 2 g to affected area four (4) times daily. 100 g 2    famotidine (Pepcid) 20 mg tablet Take 1 Tablet by mouth two (2) times a day for 5 days. 10 Tablet 0    doxycycline (MONODOX) 100 mg capsule Take 100 mg by mouth two (2) times a day.  metroNIDAZOLE (FLAGYL) 500 mg tablet Take 500 mg by mouth three (3) times daily.  Wheat Dextrin (Benefiber Clear) 3 gram/3.5 gram pwpk Take  by mouth.       famotidine (PEPCID) 20 mg tablet Take 20 mg by mouth two (2) times a day.  triamcinolone acetonide (KENALOG) 0.1 % ointment Apply  to affected area daily as needed.  ammonium lactate (Lac-Hydrin Five) 5 % lotion Apply  to affected area two (2) times a day. 1 Bottle 0    tamsulosin (FLOMAX) 0.4 mg capsule Take 1 Cap by mouth daily (after dinner). 90 Cap 0    DULoxetine (CYMBALTA) 60 mg capsule Take 1 Cap by mouth daily. Indications: neuropathic pain 60 Cap 5    finasteride (PROSCAR) 5 mg tablet Take 5 mg by mouth daily.  albuterol sulfate 90 mcg/actuation aebs Take  by inhalation as needed.  ammonium lactate (AMLACTIN) 12 % topical cream Apply  to affected area two (2) times a day. rub in to affected area well 280 g 0    pantoprazole (PROTONIX) 40 mg tablet Take 1 Tab by mouth daily. 30 Tab 0    amLODIPine (NORVASC) 10 mg tablet Take 1 Tab by mouth daily. 30 Tab 0    spironolactone (ALDACTONE) 25 mg tablet Take  by mouth daily.  Clindamycin Pediatric 75 mg/5 mL solution Take 20 mg/kg/day by mouth three (3) times daily. (Patient not taking: Reported on 5/19/2021)      polyethylene glycol (MIRALAX) 17 gram packet Take 1 Packet by mouth daily.  (Patient not taking: Reported on 5/25/2021) 30 Packet 0       Allergies   Allergen Reactions    Latex Rash    Ampicillin Angioedema    Asa-Acetaminophen-Caff-Buffers Rash    Penicillins Angioedema    Crab Hives    Shellfish Derived Rash    Aspirin Other (comments)     Stomach upset    Atorvastatin Other (comments)     Muscle cramps       Past Surgical History:   Procedure Laterality Date    COLONOSCOPY N/A 12/4/2019    COLONOSCOPY performed by Neda Danielson MD at [facility] ENDOSCOPY    HAND/FINGER SURGERY UNLISTED Right     carpal tunnel    HX HEENT Left     lens implant    HX ORTHOPAEDIC      finger amputation left hand    HX TONSILLECTOMY      UPPER ARM/ELBOW SURGERY UNLISTED Right        Social History     Socioeconomic History    Marital status:      Spouse name: Not on file    Number of children: Not on file    Years of education: Not on file    Highest education level: Not on file   Occupational History    Not on file   Tobacco Use    Smoking status: Former Smoker     Quit date: 2015     Years since quittin.8    Smokeless tobacco: Never Used   Substance and Sexual Activity    Alcohol use: Not Currently     Alcohol/week: 0.0 standard drinks     Comment: former stopped     Drug use: No    Sexual activity: Yes   Other Topics Concern    Not on file   Social History Narrative    Not on file     Social Determinants of Health     Financial Resource Strain:     Difficulty of Paying Living Expenses:    Food Insecurity:     Worried About Running Out of Food in the Last Year:     [address] in the Last Year:    Transportation Needs:     Lack of Transportation (Medical):  Lack of Transportation (Non-Medical):    Physical Activity:     Days of Exercise per Week:     Minutes of Exercise per Session:    Stress:     Feeling of Stress :    Social Connections:     Frequency of Communication with Friends and Family:     Frequency of Social Gatherings with Friends and Family:     Attends Shinto Services:     Active Member of Clubs or Organizations:     Attends Club or Organization Meetings:     Marital Status:    Intimate Partner Violence:     Fear of Current or Ex-Partner:     Emotionally Abused:     Physically Abused:     Sexually Abused:        REVIEW OF SYSTEMS:      No changes from previous review of systems unless noted. PHYSICAL EXAMINATION:  Visit Vitals  Pulse 75   Temp 97.5 °F (36.4 °C)   Resp 15   Ht 5' 7\" (1.702 m)   Wt 169 lb 12.8 oz (77 kg)   SpO2 98%   BMI 26.59 kg/m²     Body mass index is 26.59 kg/m². GENERAL: Alert and oriented x3, in no acute distress. HEENT: Normocephalic, atraumatic. RESP: Non labored breathing. SKIN: No rashes or lesions noted.    Shoulder Examination     R   L  ROM   FF  160   160  ER  30   30   IR  Full   Full  Rotator Cuff Pain   Supra  +   +   Infra  +   +   Subscap -   -  Crepitus  +   +  Effusion  -   -  Warmth  -   -   Erythema  -   -  Instability  -   -  AC Joint TTP  -   -  Clavicle   Deformity -   -   TTP  -   -  Proximal Humerus   Deformity -   -   TTP  -   -  Deltoid Strength 5   5  Biceps Strength 5   5  Biceps Deformity -   -  Biceps Groove Pain -   -  Impingement Sign -   -       IMAGING:  No imaging taken today. Previously taken x-rays of both shoulders severe rotator cuff arthropathy    ASSESSMENT & PLAN  Diagnosis: Bilateral rotator cuff arthropathy    John Fraga continues have symptomatic bilateral rotator cuff arthropathy. I recommended surgery for this due to the failure of conservative management. He is not interested in surgery at this time. He would like to try a topical anti-inflammatory which I prescribed. Follow-up as needed.       Electronically signed by: Pagie Mann MD

## 2021-05-28 ENCOUNTER — HOSPITAL ENCOUNTER (EMERGENCY)
Age: 76
Discharge: HOME | End: 2021-05-29
Attending: STUDENT IN AN ORGANIZED HEALTH CARE EDUCATION/TRAINING PROGRAM
Payer: MEDICAID

## 2021-05-28 ENCOUNTER — APPOINTMENT (OUTPATIENT)
Dept: CT IMAGING | Age: 76
End: 2021-05-28
Attending: STUDENT IN AN ORGANIZED HEALTH CARE EDUCATION/TRAINING PROGRAM
Payer: MEDICAID

## 2021-05-28 DIAGNOSIS — K57.92 DIVERTICULITIS: Primary | ICD-10-CM

## 2021-05-28 LAB
ABO + RH BLD: NORMAL
ALBUMIN SERPL-MCNC: 3.7 G/DL (ref 3.4–5)
ALBUMIN/GLOB SERPL: 1 {RATIO} (ref 0.8–1.7)
ALP SERPL-CCNC: 54 U/L (ref 45–117)
ALT SERPL-CCNC: 22 U/L (ref 16–61)
ANION GAP SERPL CALC-SCNC: 6 MMOL/L (ref 3–18)
APTT PPP: 31 SEC (ref 23–36.4)
AST SERPL-CCNC: 17 U/L (ref 10–38)
BASOPHILS # BLD: 0 K/UL (ref 0–0.1)
BASOPHILS NFR BLD: 1 % (ref 0–2)
BILIRUB DIRECT SERPL-MCNC: <0.1 MG/DL (ref 0–0.2)
BILIRUB SERPL-MCNC: 0.3 MG/DL (ref 0.2–1)
BLOOD GROUP ANTIBODIES SERPL: NORMAL
BUN SERPL-MCNC: 9 MG/DL (ref 7–18)
BUN/CREAT SERPL: 11 (ref 12–20)
CALCIUM SERPL-MCNC: 9 MG/DL (ref 8.5–10.1)
CHLORIDE SERPL-SCNC: 108 MMOL/L (ref 100–111)
CO2 SERPL-SCNC: 25 MMOL/L (ref 21–32)
CREAT SERPL-MCNC: 0.81 MG/DL (ref 0.6–1.3)
DIFFERENTIAL METHOD BLD: ABNORMAL
EOSINOPHIL # BLD: 0.1 K/UL (ref 0–0.4)
EOSINOPHIL NFR BLD: 2 % (ref 0–5)
ERYTHROCYTE [DISTWIDTH] IN BLOOD BY AUTOMATED COUNT: 14.6 % (ref 11.6–14.5)
GLOBULIN SER CALC-MCNC: 3.7 G/DL (ref 2–4)
GLUCOSE SERPL-MCNC: 87 MG/DL (ref 74–99)
HCT VFR BLD AUTO: 39.3 % (ref 36–48)
HEMOCCULT STL QL: NEGATIVE
HGB BLD-MCNC: 13.4 G/DL (ref 13–16)
INR PPP: 1.2 (ref 0.8–1.2)
LIPASE SERPL-CCNC: 160 U/L (ref 73–393)
LYMPHOCYTES # BLD: 1.7 K/UL (ref 0.9–3.6)
LYMPHOCYTES NFR BLD: 36 % (ref 21–52)
MCH RBC QN AUTO: 30.5 PG (ref 24–34)
MCHC RBC AUTO-ENTMCNC: 34.1 G/DL (ref 31–37)
MCV RBC AUTO: 89.3 FL (ref 74–97)
MONOCYTES # BLD: 0.6 K/UL (ref 0.05–1.2)
MONOCYTES NFR BLD: 13 % (ref 3–10)
NEUTS SEG # BLD: 2.2 K/UL (ref 1.8–8)
NEUTS SEG NFR BLD: 48 % (ref 40–73)
PLATELET # BLD AUTO: 255 K/UL (ref 135–420)
PMV BLD AUTO: 8.8 FL (ref 9.2–11.8)
POTASSIUM SERPL-SCNC: 3.6 MMOL/L (ref 3.5–5.5)
PROT SERPL-MCNC: 7.4 G/DL (ref 6.4–8.2)
PROTHROMBIN TIME: 15.1 SEC (ref 11.5–15.2)
RBC # BLD AUTO: 4.4 M/UL (ref 4.35–5.65)
SODIUM SERPL-SCNC: 139 MMOL/L (ref 136–145)
SPECIMEN EXP DATE BLD: NORMAL
WBC # BLD AUTO: 4.6 K/UL (ref 4.6–13.2)

## 2021-05-28 PROCEDURE — 82272 OCCULT BLD FECES 1-3 TESTS: CPT

## 2021-05-28 PROCEDURE — 96368 THER/DIAG CONCURRENT INF: CPT

## 2021-05-28 PROCEDURE — 96365 THER/PROPH/DIAG IV INF INIT: CPT

## 2021-05-28 PROCEDURE — 96375 TX/PRO/DX INJ NEW DRUG ADDON: CPT

## 2021-05-28 PROCEDURE — 74174 CTA ABD&PLVS W/CONTRAST: CPT

## 2021-05-28 PROCEDURE — 86901 BLOOD TYPING SEROLOGIC RH(D): CPT

## 2021-05-28 PROCEDURE — 83690 ASSAY OF LIPASE: CPT

## 2021-05-28 PROCEDURE — 85730 THROMBOPLASTIN TIME PARTIAL: CPT

## 2021-05-28 PROCEDURE — 85025 COMPLETE CBC W/AUTO DIFF WBC: CPT

## 2021-05-28 PROCEDURE — 74011250636 HC RX REV CODE- 250/636: Performed by: STUDENT IN AN ORGANIZED HEALTH CARE EDUCATION/TRAINING PROGRAM

## 2021-05-28 PROCEDURE — 96361 HYDRATE IV INFUSION ADD-ON: CPT

## 2021-05-28 PROCEDURE — 85610 PROTHROMBIN TIME: CPT

## 2021-05-28 PROCEDURE — 74011000636 HC RX REV CODE- 636: Performed by: STUDENT IN AN ORGANIZED HEALTH CARE EDUCATION/TRAINING PROGRAM

## 2021-05-28 PROCEDURE — 80048 BASIC METABOLIC PNL TOTAL CA: CPT

## 2021-05-28 PROCEDURE — 80076 HEPATIC FUNCTION PANEL: CPT

## 2021-05-28 PROCEDURE — 99284 EMERGENCY DEPT VISIT MOD MDM: CPT

## 2021-05-28 RX ORDER — ONDANSETRON 2 MG/ML
4 INJECTION INTRAMUSCULAR; INTRAVENOUS
Status: COMPLETED | OUTPATIENT
Start: 2021-05-28 | End: 2021-05-28

## 2021-05-28 RX ORDER — MORPHINE SULFATE 2 MG/ML
2 INJECTION, SOLUTION INTRAMUSCULAR; INTRAVENOUS
Status: COMPLETED | OUTPATIENT
Start: 2021-05-28 | End: 2021-05-28

## 2021-05-28 RX ORDER — LEVOFLOXACIN 5 MG/ML
750 INJECTION, SOLUTION INTRAVENOUS
Status: COMPLETED | OUTPATIENT
Start: 2021-05-28 | End: 2021-05-29

## 2021-05-28 RX ORDER — MORPHINE SULFATE 4 MG/ML
4 INJECTION INTRAVENOUS
Status: DISCONTINUED | OUTPATIENT
Start: 2021-05-28 | End: 2021-05-28

## 2021-05-28 RX ORDER — METRONIDAZOLE 500 MG/100ML
500 INJECTION, SOLUTION INTRAVENOUS
Status: COMPLETED | OUTPATIENT
Start: 2021-05-28 | End: 2021-05-29

## 2021-05-28 RX ORDER — MOXIFLOXACIN HYDROCHLORIDE 400 MG/250ML
400 INJECTION, SOLUTION INTRAVENOUS
Status: DISCONTINUED | OUTPATIENT
Start: 2021-05-28 | End: 2021-05-28

## 2021-05-28 RX ORDER — LEVOFLOXACIN 750 MG/1
750 TABLET ORAL DAILY
Qty: 14 TABLET | Refills: 0 | Status: ON HOLD | OUTPATIENT
Start: 2021-05-28 | End: 2021-06-10 | Stop reason: SDUPTHER

## 2021-05-28 RX ORDER — METRONIDAZOLE 500 MG/1
500 TABLET ORAL 3 TIMES DAILY
Qty: 42 TABLET | Refills: 0 | Status: ON HOLD | OUTPATIENT
Start: 2021-05-28 | End: 2021-06-10 | Stop reason: SDUPTHER

## 2021-05-28 RX ADMIN — ONDANSETRON 4 MG: 2 INJECTION INTRAMUSCULAR; INTRAVENOUS at 21:19

## 2021-05-28 RX ADMIN — LEVOFLOXACIN 750 MG: 5 INJECTION, SOLUTION INTRAVENOUS at 23:00

## 2021-05-28 RX ADMIN — METRONIDAZOLE 500 MG: 500 INJECTION, SOLUTION INTRAVENOUS at 22:59

## 2021-05-28 RX ADMIN — IOPAMIDOL 100 ML: 755 INJECTION, SOLUTION INTRAVENOUS at 19:35

## 2021-05-28 RX ADMIN — MORPHINE SULFATE 2 MG: 2 INJECTION, SOLUTION INTRAMUSCULAR; INTRAVENOUS at 21:19

## 2021-05-28 RX ADMIN — SODIUM CHLORIDE 1000 ML: 900 INJECTION, SOLUTION INTRAVENOUS at 20:31

## 2021-05-28 NOTE — ED PROVIDER NOTES
78-year-old male with past medical history significant for peptic ulcer disease, H. pylori, hypertension, hyperlipidemia and remainder of past medical history reviewed as documented below presents to the ED with complaint of loose, black stools since yesterday and acute onset of abdominal pain around noon today. He feels nauseous but has not vomited. He describes the discomfort as a constant, burning sensation associated with reflux and he said it feels the same as when he was diagnosed with peptic ulcer disease in the past.  He recently completed a course of antibiotics for H. pylori. He did not take any pain medication prior to arrival.  He denies any fever, chills, chest pain, shortness of breath or any other complaints. He is a former smoker and drinker but denies any recreational drug use. Family history reviewed and noncontributory.            Past Medical History:   Diagnosis Date    Bladder outlet obstruction     Erectile disorder due to medical 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     Frequency of urination     H/O spinal cord injury 1974    lumbar spine injury secondary to fall    HTN (hypertension)     Hypercholesterolemia     Illiterate     Injury of lumbar spine ([facility] [address] 1974    Leg pain, right     Nocturia     Overactive bladder     Peripheral vascular disease ([facility] [address]        Past Surgical History:   Procedure Laterality Date    COLONOSCOPY N/A 12/4/2019    COLON[facility]COPY performed by Maurice [facility] MD at [facility] ENDOSCOPY    HAND/FINGER SURGERY UNLISTED Right     carpal tunnel    HX HEENT Left     lens implant    HX ORTHOPAEDIC      finger amputation left hand    HX TONSILLECTOMY      UPPER ARM/ELBOW SURGERY UNLISTED Right          Family History:   Problem Relation Age of Onset    Diabetes Mother     Hypertension Mother     Diabetes Maternal Grandmother     Hypertension Maternal Grandmother     Cancer Sister         brain    Cancer Sister         brain       Social History     Socioeconomic History    Marital status:      Spouse name: Not on file    Number of children: Not on file    Years of education: Not on file    Highest education level: Not on file   Occupational History    Not on file   Tobacco Use    Smoking status: Former Smoker     Quit date: 2015     Years since quittin.8    Smokeless tobacco: Never Used   Substance and Sexual Activity    Alcohol use: Not Currently     Alcohol/week: 0.0 standard drinks     Comment: former stopped     Drug use: No    Sexual activity: Yes   Other Topics Concern    Not on file   Social History Narrative    Not on file     Social Determinants of Health     Financial Resource Strain:     Difficulty of Paying Living Expenses:    Food Insecurity:     Worried About Running Out of Food in the Last Year:     [address] in the Last Year:    Transportation Needs:     Lack of Transportation (Medical):  Lack of Transportation (Non-Medical):    Physical Activity:     Days of Exercise per Week:     Minutes of Exercise per Session:    Stress:     Feeling of Stress :    Social Connections:     Frequency of Communication with Friends and Family:     Frequency of Social Gatherings with Friends and Family:     Attends Restorationist Services:     Active Member of Clubs or Organizations:     Attends Club or Organization Meetings:     Marital Status:    Intimate Partner Violence:     Fear of Current or Ex-Partner:     Emotionally Abused:     Physically Abused:     Sexually Abused: ALLERGIES: Latex, Ampicillin, Asa-acetaminophen-caff-buffers, Penicillins, Crab, Shellfish derived, Aspirin, and Atorvastatin    Review of Systems   Constitutional: Negative for activity change and appetite change. HENT: Negative for drooling and facial swelling. Eyes: Negative for pain and discharge. Respiratory: Negative for apnea and choking. Cardiovascular: Negative for palpitations and leg swelling.    Gastrointestinal: Positive for abdominal pain, nausea and vomiting. Negative for blood in stool and rectal pain. Dark stools   Endocrine: Negative for polydipsia and polyphagia. Genitourinary: Negative for genital sores and hematuria. Musculoskeletal: Negative for gait problem and neck stiffness. Skin: Negative for color change and rash. Allergic/Immunologic: Negative for environmental allergies. Neurological: Negative for tremors. Hematological: Negative for adenopathy. Psychiatric/Behavioral: Negative for agitation and behavioral problems. Vitals:    05/28/21 1642   BP: 118/67   Pulse: 86   Resp: 18   Temp: 98.5 °F (36.9 °C)   SpO2: 97%   Weight: 79.4 kg (175 lb)   Height: 5' 9\" (1.753 m)            Physical Exam  Vitals and nursing note reviewed. Constitutional:       Appearance: Normal appearance. He is obese. HENT:      Head: Normocephalic and atraumatic. Eyes:      Extraocular Movements: Extraocular movements intact. Pupils: Pupils are equal, round, and reactive to light. Cardiovascular:      Rate and Rhythm: Normal rate and regular rhythm. Heart sounds: Normal heart sounds. No murmur heard. No friction rub. Pulmonary:      Effort: Pulmonary effort is normal.      Breath sounds: Normal breath sounds. Abdominal:      Palpations: Abdomen is soft. Comments: Periumbilical tenderness to palpation but no rebound, rigidity or guarding. Abdomen soft. Genitourinary:     Rectum: Normal.      Comments: Soft brown stool in the rectal vault, no blood, masses or hemorrhoids  Musculoskeletal:         General: Normal range of motion. Skin:     General: Skin is warm and dry. Capillary Refill: Capillary refill takes less than 2 seconds. Neurological:      General: No focal deficit present. Mental Status: He is alert and oriented to person, place, and time.    Psychiatric:         Mood and Affect: Mood normal.          MDM  Number of Diagnoses or Management Options  Diverticulitis  Diagnosis management comments: 52-year-old male with past medical history significant for peptic ulcer disease and H. pylori presents to the ED with complaint of loose, dark stools since yesterday and acute onset of abdominal pain associated with nausea that started earlier this afternoon. Will perform full laboratory work-up and obtain CT of the abdomen pelvis to assess for possible acute GI bleed. Symptomatic control with IV fluids, morphine and Zofran. Stool guaiac. Patient is completely hemodynamically stable with a normal heart rate. Will continue to monitor. Laboratory work-up unremarkable. Stool guaiac negative. CT of the abdomen/pelvis is significant for:  IMPRESSION   1. No active gastrointestinal bleeding during the time of this exam.  2. Acute diverticulitis, either of the terminal ileum versus adjacent cecum. No abscess. 3. Other stable findings as above. Upon reevaluation, patient states he feels significantly better. Patient is stable for discharge home with prescriptions for levaquin and flagyl. Will give first doses of both antibiotics IV here. Pt has been reexamined. Patient has no new complaints, changes, or physical findings. Care plan outlined and precautions discussed. Results were reviewed with the patient. All medications were reviewed with the patient; will d/c home with PMD f/u. All of pt's questions and concerns were addressed. Patient was instructed and agrees to follow up with PMD, as well as to return to the ED upon further deterioration. Patient is ready to go home. This note was dictated utilizing voice recognition software which may lead to typographical errors.  I apologize in advance if the situation occurs.  If questions arise please do not hesitate to contact me or call our department.     Magalys Garcia, DO           Procedures

## 2021-05-28 NOTE — ED NOTES
I performed a brief history of the patient here in triage and I have determined that pt will need further treatment and evaluation from the main side ER physician. I have placed initial orders based on the history to help in expediting patients care.      Visit Vitals  /67 (BP 1 Location: Left upper arm, BP Patient Position: At rest)   Pulse 86   Temp 98.5 °F (36.9 °C)   Resp 18   Ht 5' 9\" (1.753 m)   Wt 79.4 kg (175 lb)   SpO2 97%   BMI 25.84 kg/m²      KENIA Dixon 4:53 PM

## 2021-05-29 VITALS
BODY MASS INDEX: 25.92 KG/M2 | OXYGEN SATURATION: 97 % | HEIGHT: 69 IN | TEMPERATURE: 98.5 F | SYSTOLIC BLOOD PRESSURE: 120 MMHG | DIASTOLIC BLOOD PRESSURE: 55 MMHG | RESPIRATION RATE: 18 BRPM | WEIGHT: 175 LBS | HEART RATE: 62 BPM

## 2021-05-29 PROCEDURE — 96376 TX/PRO/DX INJ SAME DRUG ADON: CPT

## 2021-05-29 PROCEDURE — 74011250636 HC RX REV CODE- 250/636: Performed by: EMERGENCY MEDICINE

## 2021-05-29 RX ORDER — ONDANSETRON 2 MG/ML
4 INJECTION INTRAMUSCULAR; INTRAVENOUS
Status: COMPLETED | OUTPATIENT
Start: 2021-05-29 | End: 2021-05-29

## 2021-05-29 RX ADMIN — ONDANSETRON 4 MG: 2 INJECTION INTRAMUSCULAR; INTRAVENOUS at 03:02

## 2021-05-29 NOTE — ED NOTES
Patient awake and alert laying in ED bed tolerating antibiotic infusions well. Patient able to sit up and use urinal without assistance.

## 2021-05-29 NOTE — ED NOTES
I have reviewed discharge instructions with the patient in detail. The patient verbalized understanding of the instructions and the importance of scheduling a follow up appointment as well as adhering to the prescribed medication regimen.

## 2021-06-05 ENCOUNTER — HOSPITAL ENCOUNTER (EMERGENCY)
Dept: CT IMAGING | Age: 76
Discharge: HOME | DRG: 134 | End: 2021-06-05
Attending: EMERGENCY MEDICINE
Payer: COMMERCIAL

## 2021-06-05 ENCOUNTER — HOSPITAL ENCOUNTER (INPATIENT)
Age: 76
LOS: 5 days | Discharge: NURSING, REHAB OR LONG-TERM CARE | DRG: 134 | End: 2021-06-10
Attending: EMERGENCY MEDICINE | Admitting: HOSPITALIST
Payer: COMMERCIAL

## 2021-06-05 DIAGNOSIS — I10 ESSENTIAL HYPERTENSION: ICD-10-CM

## 2021-06-05 DIAGNOSIS — R07.81 PLEURODYNIA: ICD-10-CM

## 2021-06-05 DIAGNOSIS — I26.99 OTHER ACUTE PULMONARY EMBOLISM WITHOUT ACUTE COR PULMONALE (HCC): ICD-10-CM

## 2021-06-05 DIAGNOSIS — E43 SEVERE PROTEIN-CALORIE MALNUTRITION (HCC): Chronic | ICD-10-CM

## 2021-06-05 DIAGNOSIS — I26.99 PULMONARY EMBOLISM AND INFARCTION (HCC): Primary | ICD-10-CM

## 2021-06-05 DIAGNOSIS — N32.0 BLADDER OUTLET OBSTRUCTION: ICD-10-CM

## 2021-06-05 LAB
ALBUMIN SERPL-MCNC: 3.5 G/DL (ref 3.4–5)
ALBUMIN/GLOB SERPL: 0.9 {RATIO} (ref 0.8–1.7)
ALP SERPL-CCNC: 57 U/L (ref 45–117)
ALT SERPL-CCNC: 22 U/L (ref 16–61)
ANION GAP SERPL CALC-SCNC: 4 MMOL/L (ref 3–18)
APPEARANCE UR: CLEAR
APTT PPP: 29.3 SEC (ref 23–36.4)
APTT PPP: >180 SEC (ref 23–36.4)
AST SERPL-CCNC: 18 U/L (ref 10–38)
ATRIAL RATE: 88 BPM
BACTERIA URNS QL MICRO: ABNORMAL /HPF
BASOPHILS # BLD: 0 K/UL (ref 0–0.1)
BASOPHILS NFR BLD: 0 % (ref 0–2)
BILIRUB SERPL-MCNC: 0.4 MG/DL (ref 0.2–1)
BILIRUB UR QL: NEGATIVE
BNP SERPL-MCNC: 22 PG/ML (ref 0–900)
BUN SERPL-MCNC: 11 MG/DL (ref 7–18)
BUN/CREAT SERPL: 13 (ref 12–20)
CALCIUM SERPL-MCNC: 8.9 MG/DL (ref 8.5–10.1)
CALCULATED P AXIS, ECG09: 45 DEGREES
CALCULATED R AXIS, ECG10: -34 DEGREES
CALCULATED T AXIS, ECG11: 47 DEGREES
CHLORIDE SERPL-SCNC: 105 MMOL/L (ref 100–111)
CK MB CFR SERPL CALC: 1 % (ref 0–4)
CK MB SERPL-MCNC: 1.5 NG/ML (ref 5–25)
CK SERPL-CCNC: 147 U/L (ref 39–308)
CO2 SERPL-SCNC: 27 MMOL/L (ref 21–32)
COLOR UR: YELLOW
COVID-19 RAPID TEST, COVR: NOT DETECTED
CREAT SERPL-MCNC: 0.88 MG/DL (ref 0.6–1.3)
D DIMER PPP FEU-MCNC: 1.79 UG/ML(FEU)
DIAGNOSIS, 93000: NORMAL
DIFFERENTIAL METHOD BLD: ABNORMAL
EOSINOPHIL # BLD: 0 K/UL (ref 0–0.4)
EOSINOPHIL NFR BLD: 1 % (ref 0–5)
EPITH CASTS URNS QL MICRO: ABNORMAL /LPF (ref 0–5)
ERYTHROCYTE [DISTWIDTH] IN BLOOD BY AUTOMATED COUNT: 14.6 % (ref 11.6–14.5)
GLOBULIN SER CALC-MCNC: 4.1 G/DL (ref 2–4)
GLUCOSE SERPL-MCNC: 104 MG/DL (ref 74–99)
GLUCOSE UR STRIP.AUTO-MCNC: NEGATIVE MG/DL
HCT VFR BLD AUTO: 40.9 % (ref 36–48)
HGB BLD-MCNC: 13.9 G/DL (ref 13–16)
HGB UR QL STRIP: NEGATIVE
KETONES UR QL STRIP.AUTO: NEGATIVE MG/DL
LEUKOCYTE ESTERASE UR QL STRIP.AUTO: ABNORMAL
LIPASE SERPL-CCNC: 122 U/L (ref 73–393)
LYMPHOCYTES # BLD: 0.8 K/UL (ref 0.9–3.6)
LYMPHOCYTES NFR BLD: 12 % (ref 21–52)
MCH RBC QN AUTO: 30.3 PG (ref 24–34)
MCHC RBC AUTO-ENTMCNC: 34 G/DL (ref 31–37)
MCV RBC AUTO: 89.1 FL (ref 74–97)
MONOCYTES # BLD: 0.8 K/UL (ref 0.05–1.2)
MONOCYTES NFR BLD: 13 % (ref 3–10)
NEUTS SEG # BLD: 4.8 K/UL (ref 1.8–8)
NEUTS SEG NFR BLD: 74 % (ref 40–73)
NITRITE UR QL STRIP.AUTO: NEGATIVE
P-R INTERVAL, ECG05: 168 MS
PH UR STRIP: 5.5 [PH] (ref 5–8)
PLATELET # BLD AUTO: 235 K/UL (ref 135–420)
PMV BLD AUTO: 8.9 FL (ref 9.2–11.8)
POTASSIUM SERPL-SCNC: 3.8 MMOL/L (ref 3.5–5.5)
PROT SERPL-MCNC: 7.6 G/DL (ref 6.4–8.2)
PROT UR STRIP-MCNC: NEGATIVE MG/DL
Q-T INTERVAL, ECG07: 380 MS
QRS DURATION, ECG06: 78 MS
QTC CALCULATION (BEZET), ECG08: 459 MS
RBC # BLD AUTO: 4.59 M/UL (ref 4.35–5.65)
RBC #/AREA URNS HPF: NEGATIVE /HPF (ref 0–5)
SODIUM SERPL-SCNC: 136 MMOL/L (ref 136–145)
SOURCE, COVRS: NORMAL
SP GR UR REFRACTOMETRY: 1.02 (ref 1–1.03)
TROPONIN I SERPL-MCNC: <0.02 NG/ML (ref 0–0.04)
UROBILINOGEN UR QL STRIP.AUTO: 0.2 EU/DL (ref 0.2–1)
VENTRICULAR RATE, ECG03: 88 BPM
WBC # BLD AUTO: 6.5 K/UL (ref 4.6–13.2)
WBC URNS QL MICRO: ABNORMAL /HPF (ref 0–4)

## 2021-06-05 PROCEDURE — 83880 ASSAY OF NATRIURETIC PEPTIDE: CPT

## 2021-06-05 PROCEDURE — 96374 THER/PROPH/DIAG INJ IV PUSH: CPT

## 2021-06-05 PROCEDURE — 81001 URINALYSIS AUTO W/SCOPE: CPT

## 2021-06-05 PROCEDURE — 74011000636 HC RX REV CODE- 636: Performed by: EMERGENCY MEDICINE

## 2021-06-05 PROCEDURE — 74011250636 HC RX REV CODE- 250/636: Performed by: EMERGENCY MEDICINE

## 2021-06-05 PROCEDURE — 83690 ASSAY OF LIPASE: CPT

## 2021-06-05 PROCEDURE — 65660000000 HC RM CCU STEPDOWN

## 2021-06-05 PROCEDURE — 85379 FIBRIN DEGRADATION QUANT: CPT

## 2021-06-05 PROCEDURE — 99223 1ST HOSP IP/OBS HIGH 75: CPT | Performed by: HOSPITALIST

## 2021-06-05 PROCEDURE — 85025 COMPLETE CBC W/AUTO DIFF WBC: CPT

## 2021-06-05 PROCEDURE — 94761 N-INVAS EAR/PLS OXIMETRY MLT: CPT

## 2021-06-05 PROCEDURE — 80053 COMPREHEN METABOLIC PANEL: CPT

## 2021-06-05 PROCEDURE — 74177 CT ABD & PELVIS W/CONTRAST: CPT

## 2021-06-05 PROCEDURE — 87635 SARS-COV-2 COVID-19 AMP PRB: CPT

## 2021-06-05 PROCEDURE — 74011250636 HC RX REV CODE- 250/636: Performed by: HOSPITALIST

## 2021-06-05 PROCEDURE — 96375 TX/PRO/DX INJ NEW DRUG ADDON: CPT

## 2021-06-05 PROCEDURE — 93005 ELECTROCARDIOGRAM TRACING: CPT

## 2021-06-05 PROCEDURE — 74011250637 HC RX REV CODE- 250/637: Performed by: HOSPITALIST

## 2021-06-05 PROCEDURE — 85730 THROMBOPLASTIN TIME PARTIAL: CPT

## 2021-06-05 PROCEDURE — 36415 COLL VENOUS BLD VENIPUNCTURE: CPT

## 2021-06-05 PROCEDURE — 99285 EMERGENCY DEPT VISIT HI MDM: CPT

## 2021-06-05 PROCEDURE — 71275 CT ANGIOGRAPHY CHEST: CPT

## 2021-06-05 PROCEDURE — 82553 CREATINE MB FRACTION: CPT

## 2021-06-05 RX ORDER — MORPHINE SULFATE 4 MG/ML
4 INJECTION INTRAVENOUS
Status: COMPLETED | OUTPATIENT
Start: 2021-06-05 | End: 2021-06-05

## 2021-06-05 RX ORDER — PANTOPRAZOLE SODIUM 40 MG/1
40 TABLET, DELAYED RELEASE ORAL
Status: DISCONTINUED | OUTPATIENT
Start: 2021-06-06 | End: 2021-06-10 | Stop reason: HOSPADM

## 2021-06-05 RX ORDER — SODIUM CHLORIDE 0.9 % (FLUSH) 0.9 %
5-40 SYRINGE (ML) INJECTION EVERY 8 HOURS
Status: DISCONTINUED | OUTPATIENT
Start: 2021-06-05 | End: 2021-06-10 | Stop reason: HOSPADM

## 2021-06-05 RX ORDER — METRONIDAZOLE 500 MG/1
500 TABLET ORAL 3 TIMES DAILY
Status: DISCONTINUED | OUTPATIENT
Start: 2021-06-05 | End: 2021-06-10 | Stop reason: HOSPADM

## 2021-06-05 RX ORDER — POLYETHYLENE GLYCOL 3350 17 G/17G
17 POWDER, FOR SOLUTION ORAL DAILY PRN
Status: DISCONTINUED | OUTPATIENT
Start: 2021-06-05 | End: 2021-06-05

## 2021-06-05 RX ORDER — AMLODIPINE BESYLATE 5 MG/1
5 TABLET ORAL DAILY
Status: DISCONTINUED | OUTPATIENT
Start: 2021-06-06 | End: 2021-06-10 | Stop reason: HOSPADM

## 2021-06-05 RX ORDER — DULOXETIN HYDROCHLORIDE 30 MG/1
30 CAPSULE, DELAYED RELEASE ORAL DAILY
Status: DISCONTINUED | OUTPATIENT
Start: 2021-06-06 | End: 2021-06-05

## 2021-06-05 RX ORDER — TAMSULOSIN HYDROCHLORIDE 0.4 MG/1
0.4 CAPSULE ORAL
Status: DISCONTINUED | OUTPATIENT
Start: 2021-06-05 | End: 2021-06-10 | Stop reason: HOSPADM

## 2021-06-05 RX ORDER — FINASTERIDE 5 MG/1
5 TABLET, FILM COATED ORAL DAILY
Status: DISCONTINUED | OUTPATIENT
Start: 2021-06-06 | End: 2021-06-10 | Stop reason: HOSPADM

## 2021-06-05 RX ORDER — SODIUM CHLORIDE 0.9 % (FLUSH) 0.9 %
5-40 SYRINGE (ML) INJECTION AS NEEDED
Status: DISCONTINUED | OUTPATIENT
Start: 2021-06-05 | End: 2021-06-10 | Stop reason: HOSPADM

## 2021-06-05 RX ORDER — ONDANSETRON 2 MG/ML
4 INJECTION INTRAMUSCULAR; INTRAVENOUS
Status: DISCONTINUED | OUTPATIENT
Start: 2021-06-05 | End: 2021-06-10 | Stop reason: HOSPADM

## 2021-06-05 RX ORDER — AMMONIUM LACTATE 12 G/100G
LOTION TOPICAL DAILY
Status: DISCONTINUED | OUTPATIENT
Start: 2021-06-06 | End: 2021-06-10 | Stop reason: HOSPADM

## 2021-06-05 RX ORDER — FAMOTIDINE 20 MG/1
20 TABLET, FILM COATED ORAL 2 TIMES DAILY
Status: DISCONTINUED | OUTPATIENT
Start: 2021-06-05 | End: 2021-06-05

## 2021-06-05 RX ORDER — ACETAMINOPHEN 650 MG/1
650 SUPPOSITORY RECTAL
Status: DISCONTINUED | OUTPATIENT
Start: 2021-06-05 | End: 2021-06-10 | Stop reason: HOSPADM

## 2021-06-05 RX ORDER — DULOXETIN HYDROCHLORIDE 60 MG/1
60 CAPSULE, DELAYED RELEASE ORAL DAILY
Status: DISCONTINUED | OUTPATIENT
Start: 2021-06-06 | End: 2021-06-10 | Stop reason: HOSPADM

## 2021-06-05 RX ORDER — DOCUSATE SODIUM 100 MG/1
100 CAPSULE, LIQUID FILLED ORAL 2 TIMES DAILY
Status: DISCONTINUED | OUTPATIENT
Start: 2021-06-05 | End: 2021-06-10 | Stop reason: HOSPADM

## 2021-06-05 RX ORDER — HEPARIN SODIUM 10000 [USP'U]/100ML
18-36 INJECTION, SOLUTION INTRAVENOUS
Status: DISCONTINUED | OUTPATIENT
Start: 2021-06-05 | End: 2021-06-09

## 2021-06-05 RX ORDER — LEVOFLOXACIN 750 MG/1
750 TABLET ORAL DAILY
Status: DISCONTINUED | OUTPATIENT
Start: 2021-06-06 | End: 2021-06-10 | Stop reason: HOSPADM

## 2021-06-05 RX ORDER — ACETAMINOPHEN 325 MG/1
650 TABLET ORAL
Status: DISCONTINUED | OUTPATIENT
Start: 2021-06-05 | End: 2021-06-10 | Stop reason: HOSPADM

## 2021-06-05 RX ORDER — HEPARIN SODIUM 1000 [USP'U]/ML
80 INJECTION, SOLUTION INTRAVENOUS; SUBCUTANEOUS ONCE
Status: COMPLETED | OUTPATIENT
Start: 2021-06-05 | End: 2021-06-05

## 2021-06-05 RX ORDER — POLYETHYLENE GLYCOL 3350 17 G/17G
17 POWDER, FOR SOLUTION ORAL DAILY
Status: DISCONTINUED | OUTPATIENT
Start: 2021-06-06 | End: 2021-06-10 | Stop reason: HOSPADM

## 2021-06-05 RX ORDER — ONDANSETRON 2 MG/ML
4 INJECTION INTRAMUSCULAR; INTRAVENOUS ONCE
Status: COMPLETED | OUTPATIENT
Start: 2021-06-05 | End: 2021-06-05

## 2021-06-05 RX ADMIN — DOCUSATE SODIUM 100 MG: 100 CAPSULE ORAL at 22:06

## 2021-06-05 RX ADMIN — IOPAMIDOL 100 ML: 755 INJECTION, SOLUTION INTRAVENOUS at 14:29

## 2021-06-05 RX ADMIN — HEPARIN SODIUM 18 UNITS/KG/HR: 10000 INJECTION, SOLUTION INTRAVENOUS at 16:06

## 2021-06-05 RX ADMIN — HEPARIN SODIUM 5980 UNITS: 1000 INJECTION INTRAVENOUS; SUBCUTANEOUS at 16:05

## 2021-06-05 RX ADMIN — Medication 10 ML: at 22:14

## 2021-06-05 RX ADMIN — METRONIDAZOLE 500 MG: 500 TABLET ORAL at 22:06

## 2021-06-05 RX ADMIN — TAMSULOSIN HYDROCHLORIDE 0.4 MG: 0.4 CAPSULE ORAL at 18:39

## 2021-06-05 RX ADMIN — ONDANSETRON 4 MG: 2 INJECTION INTRAMUSCULAR; INTRAVENOUS at 11:54

## 2021-06-05 RX ADMIN — METRONIDAZOLE 500 MG: 500 TABLET ORAL at 15:51

## 2021-06-05 RX ADMIN — MORPHINE SULFATE 4 MG: 4 INJECTION, SOLUTION INTRAMUSCULAR; INTRAVENOUS at 13:56

## 2021-06-05 RX ADMIN — MORPHINE SULFATE 4 MG: 4 INJECTION, SOLUTION INTRAMUSCULAR; INTRAVENOUS at 15:45

## 2021-06-05 RX ADMIN — SODIUM CHLORIDE 1000 ML: 900 INJECTION, SOLUTION INTRAVENOUS at 11:54

## 2021-06-05 NOTE — H&P
History & Physical    Patient: Crystal Yañez MRN: [id number]  CSN: [id number]    YOB: 1945  Age: 77 y.o. Sex: male      DOA: 6/5/2021    Chief Complaint   Patient presents with    Flank Pain     right    Nausea          HPI:     Crystal Yañez is a 77 y.o.  gentleman with a past medical history of tobacco abuse, peptic ulcer disease, H. pylori, hypertension, hyperlipidemia. He did have a stress test in December 2015, which revealed an EF of 60% and was interpreted as no echocardiographic evidence for stress-induced ischemia. Patient recently evaluated in our ED, 5/29/2021 for diverticulitis. During that ED  visit, he received Levaquin and Flagyl IV and was discharged with prescriptions for same. Patient was guaiac negative at that visit. Patient presented to the ED today with right-sided abdominal pain. Patient was found to have elevated D-dimer, CTA was positive for PE, no findings of heart strain. Suspected dependent infarcts. Also he had CT abdomen and pelvis which revealed near complete resolution of cecal diverticulitis, and dependent lung opacities favored to represent aspiration. In the ED, he was started on heparin drip. He also received morphine 8 mg total, 1 L normal saline, Zofran 4 mg. Patient has been admitted to telemetry bed, where he relates that he started having right-sided chest pain a couple of days ago \"heaviness in my chest.\"  He rated the pain 6 out of 10, alleviated with sitting up in bed, aggravated when he tries to lay flat at which time he notices pain in his back which radiates forward into his chest.  Pain is presently rated as 3 out of 10. Patient has been falling at home, several times over the past couple of weeks. 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 He has 2 daughters and 3 sons, his M[address]A is [id number]   She resides in [place] but has 3 kids and she is unable to accommodate him in her home as she has no more space. Patient denies nausea or vomiting, but he is constipated with associated lower back pain. Last bowel movement was a couple of days ago. Also he reports burning with urination, and sensation of incomplete void. He denies hematuria, hematochezia. He does not have a urologist in the community. He states she has been losing weight unintentionally, which he attributes to the fact that he has no teeth. He drank heavily for many years, last drink was 5 years ago. Denies personal use of tobacco or secondhand smoke exposure. 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 He states his daughter is to visit this evening. Home medications are in his bag, reconciled in [facility].       Past Medical History:   Diagnosis Date    Bladder outlet obstruction     Erectile disorder due to medical 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     Frequency of urination     H/O spinal cord injury 1974    lumbar spine injury secondary to fall    HTN (hypertension)     Hypercholesterolemia     Illiterate     Injury of lumbar spine ([facility] [address] 1974    Leg pain, right     Nocturia     Overactive bladder     Peripheral vascular disease ([facility] [address]        Past Surgical History:   Procedure Laterality Date    COLONOSCOPY N/A 12/4/2019    COLONOSCOPY performed by Wilfredo Maurer MD at [facility] ENDOSCOPY    HAND/FINGER SURGERY UNLISTED Right     carpal tunnel    HX HEENT Left     lens implant    HX ORTHOPAEDIC      finger amputation left hand    HX TONSILLECTOMY      UPPER ARM/ELBOW SURGERY UNLISTED Right        Family History   Problem Relation Age of Onset    Diabetes Mother     Hypertension Mother     Diabetes Maternal Grandmother     Hypertension Maternal Grandmother     Cancer Sister         brain    Cancer Sister         brain       Social History     Socioeconomic History    Marital status:      Spouse name: Not on file    Number of children: Not on file    Years of education: Not on file    Highest education level: Not on file   Tobacco Use    Smoking status: Former Smoker     Quit date: 2015     Years since quittin.9    Smokeless tobacco: Never Used   Substance and Sexual Activity    Alcohol use: Not Currently     Alcohol/week: 0.0 standard drinks     Comment: former stopped     Drug use: No    Sexual activity: Yes     Social Determinants of Health     Financial Resource Strain:     Difficulty of Paying Living Expenses:    Food Insecurity:     Worried About Running Out of Food in the Last Year:     Ran Out of Food in the Last Year:    Transportation Needs:     Lack of Transportation (Medical):  Lack of Transportation (Non-Medical):    Physical Activity:     Days of Exercise per Week:     Minutes of Exercise per Session:    Stress:     Feeling of Stress :    Social Connections:     Frequency of Communication with Friends and Family:     Frequency of Social Gatherings with Friends and Family:     Attends [employer] Services:     Active Member of Clubs or Organizations:     Attends Club or Organization Meetings:     Marital Status:        Prior to Admission medications    Medication Sig Start Date End Date Taking? Authorizing Provider   metroNIDAZOLE (FlagyL) 500 mg tablet Take 1 Tablet by mouth three (3) times daily for 14 days. Indications: diverticulitis 21  Harjeet Marcos DO   levoFLOXacin (LEVAQUIN) 750 mg tablet Take 1 Tablet by mouth daily for 14 days. Indications: Diverticulitis 21  Harjeet Marcos DO   BismatroL 262 mg chew  21   Provider, Historical   diclofenac (VOLTAREN) 1 % gel Apply 2 g to affected area four (4) times daily. 21   Susanna Adamson MD   doxycycline (MONODOX) 100 mg capsule Take 100 mg by mouth two (2) times a day. Provider, Historical   metroNIDAZOLE (FLAGYL) 500 mg tablet Take 500 mg by mouth three (3) times daily.     Provider, Historical   Wheat Dextrin (Benefiber Clear) 3 gram/3.5 gram pwpk Take  by mouth. Provider, Historical   famotidine (PEPCID) 20 mg tablet Take 20 mg by mouth two (2) times a day. 4/12/21   Provider, Historical   triamcinolone acetonide (KENALOG) 0.1 % ointment Apply  to affected area daily as needed. 4/8/21   Provider, Historical   ammonium lactate (Lac-Hydrin Five) 5 % lotion Apply  to affected area two (2) times a day. 12/10/20   Yvonne Rincon MD   tamsulosin (FLOMAX) 0.4 mg capsule Take 1 Cap by mouth daily (after dinner). 9/24/20   Ojo-Carons, Palmer Soulier, MD   DULoxetine (CYMBALTA) 60 mg capsule Take 1 Cap by mouth daily. Indications: neuropathic pain 9/11/20   Luciano ALFARO MD   finasteride (PROSCAR) 5 mg tablet Take 5 mg by mouth daily. Provider, Historical   albuterol sulfate 90 mcg/actuation aebs Take  by inhalation as needed. Provider, Historical   ammonium lactate (AMLACTIN) 12 % topical cream Apply  to affected area two (2) times a day. rub in to affected area well 2/20/20   Marybeth JOHNSON PA-C   pantoprazole (PROTONIX) 40 mg tablet Take 1 Tab by mouth daily. 3/28/19   Cezar Martin MD   polyethylene glycol [facility]) 17 gram packet Take 1 Packet by mouth daily. Patient not taking: Reported on 5/25/2021 3/28/19   Cezar Martin MD   amLODIPine (NORVASC) 10 mg tablet Take 1 Tab by mouth daily. 3/28/19   Cezar Martin MD   spironolactone (ALDACTONE) 25 mg tablet Take  by mouth daily. Provider, Historical       Allergies   Allergen Reactions    Latex Rash    Ampicillin Angioedema    Asa-Acetaminophen-Caff-Buffers Rash    Penicillins Angioedema    Crab Hives    Shellfish Derived Rash    Aspirin Other (comments)     Stomach upset    Atorvastatin Other (comments)     Muscle cramps       Review of Systems  GENERAL: No fevers or chills. HEENT: No change in vision, sore throat or sinus congestion. NECK: No pain or stiffness. CARDIOVASCULAR: + chest pressure. No palpitations.    PULMONARY: + shortness of breath, + dyspnea on exertion. No cough or wheeze. GASTROINTESTINAL: No abdominal pain, nausea, vomiting or diarrhea, melena or       bright red blood per rectum. +constipation. GENITOURINARY: No urinary frequency, urgency. +hesitancy  +dysuria. +sensation of incomplete void. MUSCULOSKELETAL: No joint or muscle pain, no recent trauma. . +lower back pain which he attributes to constipation. DERMATOLOGIC: No rash, no itching, no lesions. ENDOCRINE: No polyuria, polydipsia, no heat or cold intolerance. No recent change in    weight. HEMATOLOGICAL: No anemia or easy bruising or bleeding. NEUROLOGIC: No headache, seizures, numbness, tingling or weakness. PSYCHIATRIC: No depression, anxiety, mood disorder, no loss of interest in normal       activity or change in sleep pattern. Physical Exam:     Physical Exam:  Visit Vitals  BP (!) 152/70   Pulse 96   Temp 98.7 °F (37.1 °C)   Resp 24   Ht 5' 7\" (1.702 m)   Wt 74.8 kg (165 lb)   SpO2 100%   BMI 25.84 kg/m²    O2 Flow Rate (L/min): 3 l/min O2 Device: Nasal cannula    Temp (24hrs), Av.7 °F (37.1 °C), Min:98.7 °F (37.1 °C), Max:98.7 °F (37.1 °C)       No intake/output data recorded. No intake/output data recorded. Awake alert and oriented x4. Sitting up in bed speaking in full sentences on supplemental oxygen. Normocephalic atraumatic pupils equally round and reactive to light. Edentulous. Oropharynx clear. Regular rate and rhythm  Clear to auscultation bilaterally  Soft nontender nondistended normoactive bowel sounds  No edema.   Dorsalis pedis pulses 2+ bilaterally  Neuro no focal deficit  Skin no rash no lesion      Labs Reviewed:    Recent Results (from the past 24 hour(s))   CBC WITH AUTOMATED DIFF    Collection Time: 21 10:46 AM   Result Value Ref Range    WBC 6.5 4.6 - 13.2 K/uL    RBC 4.59 4.35 - 5.65 M/uL    HGB 13.9 13.0 - 16.0 g/dL    HCT 40.9 36.0 - 48.0 %    MCV 89.1 74.0 - 97.0 FL    MCH 30.3 24.0 - 34.0 PG    MCHC 34.0 31.0 - 37.0 g/dL    RDW 14.6 (H) 11.6 - 14.5 %    PLATELET 257 157 - 564 K/uL    MPV 8.9 (L) 9.2 - 11.8 FL    NEUTROPHILS 74 (H) 40 - 73 %    LYMPHOCYTES 12 (L) 21 - 52 %    MONOCYTES 13 (H) 3 - 10 %    EOSINOPHILS 1 0 - 5 %    BASOPHILS 0 0 - 2 %    ABS. NEUTROPHILS 4.8 1.8 - 8.0 K/UL    ABS. LYMPHOCYTES 0.8 (L) 0.9 - 3.6 K/UL    ABS. MONOCYTES 0.8 0.05 - 1.2 K/UL    ABS. EOSINOPHILS 0.0 0.0 - 0.4 K/UL    ABS. BASOPHILS 0.0 0.0 - 0.1 K/UL    DF AUTOMATED     METABOLIC PANEL, COMPREHENSIVE    Collection Time: 06/05/21 10:46 AM   Result Value Ref Range    Sodium 136 136 - 145 mmol/L    Potassium 3.8 3.5 - 5.5 mmol/L    Chloride 105 100 - 111 mmol/L    CO2 27 21 - 32 mmol/L    Anion gap 4 3.0 - 18 mmol/L    Glucose 104 (H) 74 - 99 mg/dL    BUN 11 7.0 - 18 MG/DL    Creatinine 0.88 0.6 - 1.3 MG/DL    BUN/Creatinine ratio 13 12 - 20      GFR est AA >60 >60 ml/min/1.73m2    GFR est non-AA >60 >60 ml/min/1.73m2    Calcium 8.9 8.5 - 10.1 MG/DL    Bilirubin, total 0.4 0.2 - 1.0 MG/DL    ALT (SGPT) 22 16 - 61 U/L    AST (SGOT) 18 10 - 38 U/L    Alk.  phosphatase 57 45 - 117 U/L    Protein, total 7.6 6.4 - 8.2 g/dL    Albumin 3.5 3.4 - 5.0 g/dL    Globulin 4.1 (H) 2.0 - 4.0 g/dL    A-G Ratio 0.9 0.8 - 1.7     LIPASE    Collection Time: 06/05/21 10:46 AM   Result Value Ref Range    Lipase 122 73 - 393 U/L   D DIMER    Collection Time: 06/05/21 10:46 AM   Result Value Ref Range    D DIMER 1.79 (H) <0.46 ug/ml(FEU)   CARDIAC PANEL,(CK, CKMB & TROPONIN)    Collection Time: 06/05/21 10:46 AM   Result Value Ref Range    CK - MB 1.5 <3.6 ng/ml    CK-MB Index 1.0 0.0 - 4.0 %     39 - 308 U/L    Troponin-I, QT <0.02 0.0 - 0.045 NG/ML   URINALYSIS W/ RFLX MICROSCOPIC    Collection Time: 06/05/21 12:05 PM   Result Value Ref Range    Color YELLOW      Appearance CLEAR      Specific gravity 1.016 1.005 - 1.030      pH (UA) 5.5 5.0 - 8.0      Protein Negative NEG mg/dL    Glucose Negative NEG mg/dL    Ketone Negative NEG mg/dL Bilirubin Negative NEG      Blood Negative NEG      Urobilinogen 0.2 0.2 - 1.0 EU/dL    Nitrites Negative NEG      Leukocyte Esterase TRACE (A) NEG     URINE MICROSCOPIC ONLY    Collection Time: 06/05/21 12:05 PM   Result Value Ref Range    WBC 0 to 2 0 - 4 /hpf    RBC Negative 0 - 5 /hpf    Epithelial cells FEW 0 - 5 /lpf    Bacteria FEW (A) NEG /hpf   EKG, 12 LEAD, INITIAL    Collection Time: 06/05/21 12:13 PM   Result Value Ref Range    Ventricular Rate 88 BPM    Atrial Rate 88 BPM    P-R Interval 168 ms    QRS Duration 78 ms    Q-T Interval 380 ms    QTC Calculation (Bezet) 459 ms    Calculated P Axis 45 degrees    Calculated R Axis -34 degrees    Calculated T Axis 47 degrees    Diagnosis       Normal sinus rhythm  Left axis deviation  Minimal voltage criteria for LVH, may be normal variant  Cannot rule out Anterior infarct , age undetermined  Abnormal ECG  When compared with ECG of 21-MAY-2021 09:55,  No significant change was found         Procedures/imaging: see electronic medical records for all procedures/Xrays and details which were not copied into this note but were reviewed prior to creation of Plan        Assessment/Plan     1. Acute PE. On heparin drip. Echo. Pulmonary to consult. Concern for safety of anticoagulation as patient  having falls at home, and he resides by himself. States he will discuss further with his family, as he has 2 daughters and 3 sons. Unable to reach daughter at this time. 2. Falls at home. Fall precautions. PT OT after 48 hours of anticoagulation. 3.  BPH . States he does not have a urologist in the community. PVRs requested. UA in the ED negative. Continue home med proscar, and tamsulosin. 4.  Unintentional weight loss, in the setting of edentulous state. Consult nutritionist, SLP.  5. Hx etOH use d/o, quit 5 years ago. Multivitamin, thiamine, folic acid. 6. Bipolar d/o per patient. Not on treatment per home med review.    7. Neuropathy right foot, continue home med cymbalta  8. Recent diverticulitis. Continue Levaquin and Flagyl, 6 days remaining  9. Hypertension. Continue home med Norvasc. 10.  Constipation bowel regimen  11. Hx esophageal varices. US 6/3/2021 Unremarkable right upper quadrant ultrasound. No obvious varices noted around the liver. 12. S/p EGD with bx 4/23/21, Falguni Block., MD [facility].   Nereida Salmons, BIOPSY:     - ACTIVE CHRONIC GASTRITIS - continue ppi.    -   HELICOBACTER PYLORI IDENTIFIED - patient states he completed course of antibiotics. 13. MBS 2/9/21   Normal oral phase of swallowing. No penetration of the laryngeal airway or tracheal aspiration occurred with any consistency. No significant residual pooling of contrast within the vallecula or piriform sinuses. 14. Full code. Admit to telemetry. Daughter Shanon [address]azo  [phone] mailbox is full.        Time spent 70 minutes    Shola Shepard MD  June 5, 2021

## 2021-06-05 NOTE — ED PROVIDER NOTES
EMERGENCY DEPARTMENT HISTORY AND PHYSICAL EXAM    11:22 AM    Date: 6/5/2021  Patient Name: Nabeel Perrin    History of Presenting Illness     Chief Complaint   Patient presents with    Flank Pain     right    Nausea       History Provided By: Patient  Location/Duration/Severity/Modifying factors   Patient is a 61-year-old male with noted past medical history of hypertension, BPH, presents to the emergency department with a chief complaint of right-sided abdominal pain. Is located in the right subcostal region. Reports has been going on for 2 days. Reports he had somewhat similar symptoms a little more midline last week when he was seen, diagnosed with diverticulitis and treated with Levaquin and Flagyl. The patient reports that his symptoms got better in the interim and they came back last night. Rates it as a 9 out of 10, describes it as a \"nagging\" pain not having any fevers or chills at home. No chest pain or shortness of breath. The pain is located in the right subcostal region. Starting just below the right costovertebral angle and radiating to the right abdomen. Worsens with inspiration, movement and palpation. Nothing in particular seems to make it better. He denies similar symptoms like this in the past.  He denies any history of DVT or PE in the past.  Reports that he completed the treatment for the diverticulitis.           PCP: Faby Huntley MD    Current Facility-Administered Medications   Medication Dose Route Frequency Provider Last Rate Last Admin    heparin 25,000 units in D5W 250 ml infusion  18-36 Units/kg/hr IntraVENous TITRATE Glen Salamanca MD 13.5 mL/hr at 06/05/21 1606 18 Units/kg/hr at 06/05/21 1606    [START ON 6/6/2021] amLODIPine (NORVASC) tablet 5 mg  5 mg Oral DAILY Glen Salamanca MD        [START ON 6/6/2021] ammonium lactate (LAC-HYDRIN) 12 % lotion   Topical DAILY Michael Jama MD        [START ON 6/6/2021] DULoxetine (CYMBALTA) capsule 30 mg  30 mg Oral DAILY Matt Jama MD        famotidine (PEPCID) tablet 20 mg  20 mg Oral BID Diane Tapia MD        [START ON 6/6/2021] finasteride (PROSCAR) tablet 5 mg  5 mg Oral DAILY Matt Jama MD        [START ON 6/6/2021] levoFLOXacin (LEVAQUIN) tablet 750 mg  750 mg Oral DAILY Matt Jama MD        metroNIDAZOLE (FLAGYL) tablet 500 mg  500 mg Oral TID Diane Tapia MD   500 mg at 06/05/21 1551    tamsulosin (FLOMAX) capsule 0.4 mg  0.4 mg Oral PCD Matt Jama MD        sodium chloride (NS) flush 5-40 mL  5-40 mL IntraVENous Q8H Matt Jama MD        sodium chloride (NS) flush 5-40 mL  5-40 mL IntraVENous PRN Diane Tapia MD        acetaminophen (TYLENOL) tablet 650 mg  650 mg Oral Q6H PRN Diane Tapia MD        Or   Collette Satchetai acetaminophen (TYLENOL) suppository 650 mg  650 mg Rectal Q6H PRN Matt Jama MD        polyethylene glycol (MIRALAX) packet 17 g  17 g Oral DAILY PRN Diane Tapia MD        ondansetron [facility]) injection 4 mg  4 mg IntraVENous Q8H PRN Diane Tapai MD         Current Outpatient Medications   Medication Sig Dispense Refill    metroNIDAZOLE (FlagyL) 500 mg tablet Take 1 Tablet by mouth three (3) times daily for 14 days. Indications: diverticulitis 42 Tablet 0    levoFLOXacin (LEVAQUIN) 750 mg tablet Take 1 Tablet by mouth daily for 14 days. Indications: Diverticulitis 14 Tablet 0    BismatroL 262 mg chew       diclofenac (VOLTAREN) 1 % gel Apply 2 g to affected area four (4) times daily. 100 g 2    Wheat Dextrin (Benefiber Clear) 3 gram/3.5 gram pwpk Take  by mouth.  famotidine (PEPCID) 20 mg tablet Take 20 mg by mouth two (2) times a day.  triamcinolone acetonide (KENALOG) 0.1 % ointment Apply  to affected area daily as needed.  ammonium lactate (Lac-Hydrin Five) 5 % lotion Apply  to affected area two (2) times a day. 1 Bottle 0    tamsulosin (FLOMAX) 0.4 mg capsule Take 1 Cap by mouth daily (after dinner).  90 Cap 0    DULoxetine (CYMBALTA) 60 mg capsule Take 1 Cap by mouth daily. Indications: neuropathic pain 60 Cap 5    finasteride (PROSCAR) 5 mg tablet Take 5 mg by mouth daily.  albuterol sulfate 90 mcg/actuation aebs Take  by inhalation as needed.  ammonium lactate (AMLACTIN) 12 % topical cream Apply  to affected area two (2) times a day. rub in to affected area well 280 g 0    pantoprazole (PROTONIX) 40 mg tablet Take 1 Tab by mouth daily. 30 Tab 0    amLODIPine (NORVASC) 10 mg tablet Take 1 Tab by mouth daily. 30 Tab 0    spironolactone (ALDACTONE) 25 mg tablet Take  by mouth daily.          Past History     Past Medical History:  Past Medical History:   Diagnosis Date    Bladder outlet obstruction     Erectile disorder due to medical 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     Frequency of urination     H/O spinal cord injury     lumbar spine injury secondary to fall    HTN (hypertension)     Hypercholesterolemia     Illiterate     Injury of lumbar spine ([facility] [address]     Leg pain, right     Nocturia     Overactive bladder     Peripheral vascular disease ([facility] [address]        Past Surgical History:  Past Surgical History:   Procedure Laterality Date    COLONOSCOPY N/A 2019    COLONOSCOPY performed by Farida Alfred MD at [facility] ENDOSCOPY    HAND/FINGER SURGERY UNLISTED Right     carpal tunnel    HX HEENT Left     lens implant    HX ORTHOPAEDIC      finger amputation left hand    HX TONSILLECTOMY      UPPER ARM/ELBOW SURGERY UNLISTED Right        Family History:  Family History   Problem Relation Age of Onset    Diabetes Mother     Hypertension Mother     Diabetes Maternal Grandmother     Hypertension Maternal Grandmother     Cancer Sister         brain    Cancer Sister         brain       Social History:  Social History     Tobacco Use    Smoking status: Former Smoker     Quit date: 2015     Years since quittin.9    Smokeless tobacco: Never Used   Substance Use Topics    Alcohol use: Not Currently     Alcohol/week: 0.0 standard drinks     Comment: former stopped 2015    Drug use: No       Allergies: Allergies   Allergen Reactions    Latex Rash    Ampicillin Angioedema    Asa-Acetaminophen-Caff-Buffers Rash    Penicillins Angioedema    Crab Hives    Shellfish Derived Rash    Aspirin Other (comments)     Stomach upset    Atorvastatin Other (comments)     Muscle cramps       I reviewed and confirmed the above information with patient and updated as necessary. Review of Systems     Review of Systems   Constitutional: Negative for chills and fever. HENT: Negative for congestion, rhinorrhea, sinus pressure and sneezing. Eyes: Negative for visual disturbance. Respiratory: Negative for cough and shortness of breath. Cardiovascular: Negative for chest pain. Gastrointestinal: Positive for abdominal pain and nausea. Negative for diarrhea and vomiting. Genitourinary: Negative for dysuria, frequency and urgency. Musculoskeletal: Negative for back pain and neck pain. Skin: Negative for rash. Neurological: Negative for syncope, numbness and headaches. Physical Exam     Visit Vitals  BP (!) 152/70   Pulse 96   Temp 98.7 °F (37.1 °C)   Resp 24   Ht 5' 7\" (1.702 m)   Wt 74.8 kg (165 lb)   SpO2 100%   BMI 25.84 kg/m²       Physical Exam  Constitutional:       General: He is not in acute distress. Appearance: Normal appearance. He is normal weight. He is not toxic-appearing. Comments: Appears older than stated age, nontoxic not in any overt distress. HENT:      Head: Normocephalic and atraumatic. Right Ear: External ear normal.      Left Ear: External ear normal.      Nose: Nose normal. No congestion or rhinorrhea. Mouth/Throat:      Mouth: Mucous membranes are moist.      Pharynx: No oropharyngeal exudate or posterior oropharyngeal erythema. Eyes:      Conjunctiva/sclera: Conjunctivae normal.      Pupils: Pupils are equal, round, and reactive to light. Cardiovascular:      Rate and Rhythm: Normal rate and regular rhythm. Pulses: Normal pulses. Heart sounds: Normal heart sounds. No murmur heard. No friction rub. Pulmonary:      Effort: Pulmonary effort is normal.      Breath sounds: Normal breath sounds. No wheezing, rhonchi or rales. Abdominal:      General: Abdomen is flat. Tenderness: There is abdominal tenderness (Right upper quadrant, subcostal region on the right side mild right-sided CVA tenderness. ). There is no guarding or rebound. Musculoskeletal:         General: No swelling or tenderness. Normal range of motion. Cervical back: Normal range of motion and neck supple. Right lower leg: No edema. Left lower leg: No edema. Skin:     General: Skin is warm and dry. Capillary Refill: Capillary refill takes less than 2 seconds. Findings: No rash. Neurological:      General: No focal deficit present. Mental Status: He is alert. Motor: No weakness. Diagnostic Study Results     Labs -  Recent Results (from the past 24 hour(s))   CBC WITH AUTOMATED DIFF    Collection Time: 06/05/21 10:46 AM   Result Value Ref Range    WBC 6.5 4.6 - 13.2 K/uL    RBC 4.59 4.35 - 5.65 M/uL    HGB 13.9 13.0 - 16.0 g/dL    HCT 40.9 36.0 - 48.0 %    MCV 89.1 74.0 - 97.0 FL    MCH 30.3 24.0 - 34.0 PG    MCHC 34.0 31.0 - 37.0 g/dL    RDW 14.6 (H) 11.6 - 14.5 %    PLATELET 910 087 - 543 K/uL    MPV 8.9 (L) 9.2 - 11.8 FL    NEUTROPHILS 74 (H) 40 - 73 %    LYMPHOCYTES 12 (L) 21 - 52 %    MONOCYTES 13 (H) 3 - 10 %    EOSINOPHILS 1 0 - 5 %    BASOPHILS 0 0 - 2 %    ABS. NEUTROPHILS 4.8 1.8 - 8.0 K/UL    ABS. LYMPHOCYTES 0.8 (L) 0.9 - 3.6 K/UL    ABS. MONOCYTES 0.8 0.05 - 1.2 K/UL    ABS. EOSINOPHILS 0.0 0.0 - 0.4 K/UL    ABS.  BASOPHILS 0.0 0.0 - 0.1 K/UL    DF AUTOMATED     METABOLIC PANEL, COMPREHENSIVE    Collection Time: 06/05/21 10:46 AM   Result Value Ref Range    Sodium 136 136 - 145 mmol/L    Potassium 3.8 3.5 - 5.5 mmol/L    Chloride 105 100 - 111 mmol/L    CO2 27 21 - 32 mmol/L    Anion gap 4 3.0 - 18 mmol/L    Glucose 104 (H) 74 - 99 mg/dL    BUN 11 7.0 - 18 MG/DL    Creatinine 0.88 0.6 - 1.3 MG/DL    BUN/Creatinine ratio 13 12 - 20      GFR est AA >60 >60 ml/min/1.73m2    GFR est non-AA >60 >60 ml/min/1.73m2    Calcium 8.9 8.5 - 10.1 MG/DL    Bilirubin, total 0.4 0.2 - 1.0 MG/DL    ALT (SGPT) 22 16 - 61 U/L    AST (SGOT) 18 10 - 38 U/L    Alk.  phosphatase 57 45 - 117 U/L    Protein, total 7.6 6.4 - 8.2 g/dL    Albumin 3.5 3.4 - 5.0 g/dL    Globulin 4.1 (H) 2.0 - 4.0 g/dL    A-G Ratio 0.9 0.8 - 1.7     LIPASE    Collection Time: 06/05/21 10:46 AM   Result Value Ref Range    Lipase 122 73 - 393 U/L   D DIMER    Collection Time: 06/05/21 10:46 AM   Result Value Ref Range    D DIMER 1.79 (H) <0.46 ug/ml(FEU)   CARDIAC PANEL,(CK, CKMB & TROPONIN)    Collection Time: 06/05/21 10:46 AM   Result Value Ref Range    CK - MB 1.5 <3.6 ng/ml    CK-MB Index 1.0 0.0 - 4.0 %     39 - 308 U/L    Troponin-I, QT <0.02 0.0 - 0.045 NG/ML   NT-PRO BNP    Collection Time: 06/05/21 10:46 AM   Result Value Ref Range    NT pro-BNP 22 0 - 900 PG/ML   URINALYSIS W/ RFLX MICROSCOPIC    Collection Time: 06/05/21 12:05 PM   Result Value Ref Range    Color YELLOW      Appearance CLEAR      Specific gravity 1.016 1.005 - 1.030      pH (UA) 5.5 5.0 - 8.0      Protein Negative NEG mg/dL    Glucose Negative NEG mg/dL    Ketone Negative NEG mg/dL    Bilirubin Negative NEG      Blood Negative NEG      Urobilinogen 0.2 0.2 - 1.0 EU/dL    Nitrites Negative NEG      Leukocyte Esterase TRACE (A) NEG     URINE MICROSCOPIC ONLY    Collection Time: 06/05/21 12:05 PM   Result Value Ref Range    WBC 0 to 2 0 - 4 /hpf    RBC Negative 0 - 5 /hpf    Epithelial cells FEW 0 - 5 /lpf    Bacteria FEW (A) NEG /hpf   EKG, 12 LEAD, INITIAL    Collection Time: 06/05/21 12:13 PM   Result Value Ref Range    Ventricular Rate 88 BPM    Atrial Rate 88 BPM    P-R Interval 168 ms QRS Duration 78 ms    Q-T Interval 380 ms    QTC Calculation (Bezet) 459 ms    Calculated P Axis 45 degrees    Calculated R Axis -34 degrees    Calculated T Axis 47 degrees    Diagnosis       Normal sinus rhythm  Left axis deviation  Minimal voltage criteria for LVH, may be normal variant  Cannot rule out Anterior infarct , age undetermined  Abnormal ECG  When compared with ECG of 21-MAY-2021 09:55,  No significant change was found           Radiologic Studies -   CTA CHEST W OR W WO CONT   Final Result   Right greater than left pulmonary emboli, most proximally on the right at the   branching of the main pulmonary artery but no findings of heart strain. Suspect   right greater than left dependent infarcts which may be causing flank pain. Alternatively aspiration is possible. Results discussed with Dr. Nick Sauer on 6/5/2021 at 1449 hours. CT ABD PELV W CONT   Final Result   Near complete resolution of the cecal diverticulitis. Mild new dependent lung opacities favored to represent aspiration. Mild bronchitis. Medical Decision Making   I am the first provider for this patient. I reviewed the vital signs, available nursing notes, past medical history, past surgical history, family history and social history. Vital Signs-Reviewed the patient's vital signs. EKG: Normal sinus rhythm, rate of 88. Normal OH, QRS and QTc intervals. Normal axis. No ST segment elevation or depression. Overall normal sinus rhythm and normal EKG. Records Reviewed: Nursing Notes, Old Medical Records, Previous Radiology Studies and Previous Laboratory Studies (Time of Review: 11:22 AM)    ED Course: Progress Notes, Reevaluation, and Consults:  ED Course as of Jun 05 1612   Sat Jun 05, 2021   1350 1:51 PM Minute Rounding Report: Patient reassessed by me. Patient reports no improvement as it appears the patient has not received any the pain medication I ordered for him yet. Hudson Quezada  Updated on progress and results thus far and plan, as well as outstanding or pending results. [ROBERTO]   12 Notified the radiologist the patient has pulmonary embolism. [ROBERTO]      ED Course User Index  [ROBERTO] Chago Latif,          Provider Notes (Medical Decision Making):   MDM  Number of Diagnoses or Management Options  Pulmonary embolism and infarction [facility])  Diagnosis management comments: Patient is a 61-year-old male who presents to the emergency department with a chief complaint of right-sided flank and abdominal pain. Reports it started originally yesterday, was dull to the day worsened last night. The differential diagnosis would include biliary pathology such as cholecystitis, cholelithiasis, diverticulitis, especially considering he was recently diagnosed with this of the right-sided colon. Patient also would be considered to have PE possibly, D-dimer was positive. Will do a CTA. Consider out complication of diverticulitis such as abscess fistula or perforation. Not really complaining of any symptoms concerning for acute coronary syndrome pain is quite atypical in nature although he did complain of some tightness after getting into the treatment room. We will plan to get CTA chest, CT abdomen pelvis with IV contrast to evaluate his complaints. Results reviewed: CTA chest shows multiple pulmonary emboli. CT abdomen pelvis shows resolution of the diverticulitis. This would account for his symptoms given possible pulmonary infarct present, could certainly be causing his pain today. His cardiac enzymes are negative. I did page pulmonology with no return call. Plan will be to admit to the hospitalist service, discussed with the hospitalist on-call who agreed to meet the patient. I have started the patient on a heparin drip. Patient hemodynamically stable I think it is okay to go to telemetry floor. He is complaining of ongoing pain to order some additional morphine.           Procedures    Critical Care Time: CRITICAL CARE NOTE:    I have spent 45 minutes of critical care time involved in lab review, consultations with specialist, family decision-making, and documentation. During this entire length of time I was immediately available to the patient. Critical Care: The reason for providing this level of medical care for this critically ill patient was due a critical illness that impaired one or more vital organ systems such that there was a high probability of imminent or life threatening deterioration in the patients condition. This care involved high complexity decision making to assess, manipulate, and support vital system functions, to treat this vital organ system failure and to prevent further life threatening deterioration of the patients condition. Time is exclusive of procedural and teaching time. Tori Guan DO      Diagnosis     Clinical Impression:   1. Pulmonary embolism and infarction [facility])        Disposition: Admit    Follow-up Information    None          Patient's Medications   Start Taking    No medications on file   Continue Taking    ALBUTEROL SULFATE 90 MCG/ACTUATION AEBS    Take  by inhalation as needed. AMLODIPINE (NORVASC) 10 MG TABLET    Take 1 Tab by mouth daily. AMMONIUM LACTATE (AMLACTIN) 12 % TOPICAL CREAM    Apply  to affected area two (2) times a day. rub in to affected area well    AMMONIUM LACTATE (LAC-HYDRIN FIVE) 5 % LOTION    Apply  to affected area two (2) times a day. BISMATROL 262 MG CHEW        DICLOFENAC (VOLTAREN) 1 % GEL    Apply 2 g to affected area four (4) times daily. DULOXETINE (CYMBALTA) 60 MG CAPSULE    Take 1 Cap by mouth daily. Indications: neuropathic pain    FAMOTIDINE (PEPCID) 20 MG TABLET    Take 20 mg by mouth two (2) times a day. FINASTERIDE (PROSCAR) 5 MG TABLET    Take 5 mg by mouth daily. LEVOFLOXACIN (LEVAQUIN) 750 MG TABLET    Take 1 Tablet by mouth daily for 14 days.  Indications: Diverticulitis    METRONIDAZOLE (FLAGYL) 500 MG TABLET    Take 1 Tablet by mouth three (3) times daily for 14 days. Indications: diverticulitis    PANTOPRAZOLE (PROTONIX) 40 MG TABLET    Take 1 Tab by mouth daily. SPIRONOLACTONE (ALDACTONE) 25 MG TABLET    Take  by mouth daily. TAMSULOSIN (FLOMAX) 0.4 MG CAPSULE    Take 1 Cap by mouth daily (after dinner). TRIAMCINOLONE ACETONIDE (KENALOG) 0.1 % OINTMENT    Apply  to affected area daily as needed. WHEAT DEXTRIN (BENEFIBER CLEAR) 3 GRAM/3.5 GRAM PWPK    Take  by mouth. These Medications have changed    No medications on file   Stop Taking    CLINDAMYCIN PEDIATRIC 75 MG/5 ML SOLUTION    Take 20 mg/kg/day by mouth three (3) times daily. DOXYCYCLINE (MONODOX) 100 MG CAPSULE    Take 100 mg by mouth two (2) times a day. METRONIDAZOLE (FLAGYL) 500 MG TABLET    Take 500 mg by mouth three (3) times daily. POLYETHYLENE GLYCOL (MIRALAX) 17 GRAM PACKET    Take 1 Packet by mouth daily. Sumi Acosta DO   Emergency Medicine   June 5, 2021, 11:22 AM     This note is dictated utilizing Dragon voice recognition software. Unfortunately this leads to occasional typographical errors using the voice recognition. I apologize in advance if the situation occurs. If questions occur please do not hesitate to contact me directly.     Sumi Acosta, DO

## 2021-06-05 NOTE — ED NOTES
TRANSFER - OUT REPORT:    Verbal report given to juana (name) on Alysha Hurd  being transferred to 403(unit) for routine post - op       Report consisted of patients Situation, Background, Assessment and   Recommendations(SBAR). Information from the following report(s) ED Summary was reviewed with the receiving nurse. Lines:   Peripheral IV 06/05/21 Left Wrist (Active)   Site Assessment Clean, dry, & intact 06/05/21 1056   Phlebitis Assessment 0 06/05/21 1056   Infiltration Assessment 0 06/05/21 1056       Peripheral IV 06/05/21 Left;Upper Arm (Active)   Site Assessment Clean, dry, & intact 06/05/21 1255   Phlebitis Assessment 0 06/05/21 1255   Infiltration Assessment 0 06/05/21 1255   Dressing Status Clean, dry, & intact 06/05/21 1255   Hub Color/Line Status Green 06/05/21 1255        Opportunity for questions and clarification was provided.       Patient transported with:  tech

## 2021-06-05 NOTE — ED NOTES
Pt reports right flank pain the radiates into his right upper and lower abdomen that started last night. Pt states the pain increases with breathing. Pt also reports urinary urgency and retention.

## 2021-06-05 NOTE — PROGRESS NOTES
Bedside and Verbal shift change report given to Josue Dinh (oncoming nurse) by Kristie Nails RN   (offgoing nurse). Report given with SBAR, Kardex, MAR and Recent Results.

## 2021-06-06 ENCOUNTER — APPOINTMENT (OUTPATIENT)
Dept: NON INVASIVE DIAGNOSTICS | Age: 76
DRG: 134 | End: 2021-06-06
Attending: HOSPITALIST
Payer: COMMERCIAL

## 2021-06-06 LAB
ANION GAP SERPL CALC-SCNC: 3 MMOL/L (ref 3–18)
APTT PPP: 88.2 SEC (ref 23–36.4)
APTT PPP: >180 SEC (ref 23–36.4)
BASOPHILS # BLD: 0 K/UL (ref 0–0.1)
BASOPHILS NFR BLD: 0 % (ref 0–2)
BUN SERPL-MCNC: 8 MG/DL (ref 7–18)
BUN/CREAT SERPL: 11 (ref 12–20)
CALCIUM SERPL-MCNC: 8.8 MG/DL (ref 8.5–10.1)
CHLORIDE SERPL-SCNC: 106 MMOL/L (ref 100–111)
CO2 SERPL-SCNC: 28 MMOL/L (ref 21–32)
CREAT SERPL-MCNC: 0.73 MG/DL (ref 0.6–1.3)
DIFFERENTIAL METHOD BLD: ABNORMAL
ECHO AO ROOT DIAM: 3.2 CM
ECHO LA AREA 4C: 18.33 CM2
ECHO LA VOL 2C: 43.13 ML (ref 18–58)
ECHO LA VOL 4C: 44.51 ML (ref 18–58)
ECHO LA VOL BP: 48.17 ML (ref 18–58)
ECHO LA VOL/BSA BIPLANE: 26.18 ML/M2 (ref 16–28)
ECHO LA VOLUME INDEX A2C: 23.44 ML/M2 (ref 16–28)
ECHO LA VOLUME INDEX A4C: 24.19 ML/M2 (ref 16–28)
ECHO LV E' LATERAL VELOCITY: 8.5 CM/S
ECHO LV E' SEPTAL VELOCITY: 6.83 CM/S
ECHO LV INTERNAL DIMENSION DIASTOLIC: 3.47 CM (ref 4.2–5.9)
ECHO LV INTERNAL DIMENSION SYSTOLIC: 2.2 CM
ECHO LV IVSD: 1.13 CM (ref 0.6–1)
ECHO LV MASS 2D: 135.8 G (ref 88–224)
ECHO LV MASS INDEX 2D: 73.8 G/M2 (ref 49–115)
ECHO LV POSTERIOR WALL DIASTOLIC: 1.29 CM (ref 0.6–1)
ECHO LVOT DIAM: 2 CM
ECHO MV A VELOCITY: 132.97 CM/S
ECHO MV E DECELERATION TIME (DT): 182.46 MS
ECHO MV E VELOCITY: 68.18 CM/S
ECHO MV E/A RATIO: 0.51
ECHO MV E/E' LATERAL: 8.02
ECHO MV E/E' RATIO (AVERAGED): 9
ECHO MV E/E' SEPTAL: 9.98
ECHO PVEIN A DURATION: 117.99 MS
ECHO PVEIN A VELOCITY: 37.95 CM/S
ECHO RV TAPSE: 2.22 CM (ref 1.5–2)
EOSINOPHIL # BLD: 0 K/UL (ref 0–0.4)
EOSINOPHIL NFR BLD: 0 % (ref 0–5)
ERYTHROCYTE [DISTWIDTH] IN BLOOD BY AUTOMATED COUNT: 14.6 % (ref 11.6–14.5)
GLUCOSE SERPL-MCNC: 109 MG/DL (ref 74–99)
HCT VFR BLD AUTO: 40.5 % (ref 36–48)
HGB BLD-MCNC: 13.5 G/DL (ref 13–16)
INR PPP: 1.4 (ref 0.8–1.2)
LYMPHOCYTES # BLD: 1.2 K/UL (ref 0.9–3.6)
LYMPHOCYTES NFR BLD: 13 % (ref 21–52)
MAGNESIUM SERPL-MCNC: 2.3 MG/DL (ref 1.6–2.6)
MCH RBC QN AUTO: 30 PG (ref 24–34)
MCHC RBC AUTO-ENTMCNC: 33.3 G/DL (ref 31–37)
MCV RBC AUTO: 90 FL (ref 74–97)
MONOCYTES # BLD: 1.1 K/UL (ref 0.05–1.2)
MONOCYTES NFR BLD: 13 % (ref 3–10)
NEUTS SEG # BLD: 6.4 K/UL (ref 1.8–8)
NEUTS SEG NFR BLD: 74 % (ref 40–73)
PHOSPHATE SERPL-MCNC: 2.6 MG/DL (ref 2.5–4.9)
PLATELET # BLD AUTO: 215 K/UL (ref 135–420)
PMV BLD AUTO: 8.8 FL (ref 9.2–11.8)
POTASSIUM SERPL-SCNC: 4.2 MMOL/L (ref 3.5–5.5)
PROTHROMBIN TIME: 16.5 SEC (ref 11.5–15.2)
RBC # BLD AUTO: 4.5 M/UL (ref 4.35–5.65)
SODIUM SERPL-SCNC: 137 MMOL/L (ref 136–145)
WBC # BLD AUTO: 8.7 K/UL (ref 4.6–13.2)

## 2021-06-06 PROCEDURE — 65660000000 HC RM CCU STEPDOWN

## 2021-06-06 PROCEDURE — 93306 TTE W/DOPPLER COMPLETE: CPT

## 2021-06-06 PROCEDURE — 74011250636 HC RX REV CODE- 250/636: Performed by: NURSE PRACTITIONER

## 2021-06-06 PROCEDURE — 85610 PROTHROMBIN TIME: CPT

## 2021-06-06 PROCEDURE — APPSS60 APP SPLIT SHARED TIME 46-60 MINUTES: Performed by: NURSE PRACTITIONER

## 2021-06-06 PROCEDURE — 74011250636 HC RX REV CODE- 250/636: Performed by: HOSPITALIST

## 2021-06-06 PROCEDURE — 85025 COMPLETE CBC W/AUTO DIFF WBC: CPT

## 2021-06-06 PROCEDURE — 80048 BASIC METABOLIC PNL TOTAL CA: CPT

## 2021-06-06 PROCEDURE — 92526 ORAL FUNCTION THERAPY: CPT

## 2021-06-06 PROCEDURE — 77010033678 HC OXYGEN DAILY

## 2021-06-06 PROCEDURE — 85730 THROMBOPLASTIN TIME PARTIAL: CPT

## 2021-06-06 PROCEDURE — 74011250637 HC RX REV CODE- 250/637: Performed by: HOSPITALIST

## 2021-06-06 PROCEDURE — 36415 COLL VENOUS BLD VENIPUNCTURE: CPT

## 2021-06-06 PROCEDURE — 84100 ASSAY OF PHOSPHORUS: CPT

## 2021-06-06 PROCEDURE — 51798 US URINE CAPACITY MEASURE: CPT

## 2021-06-06 PROCEDURE — 92610 EVALUATE SWALLOWING FUNCTION: CPT

## 2021-06-06 PROCEDURE — 99222 1ST HOSP IP/OBS MODERATE 55: CPT | Performed by: INTERNAL MEDICINE

## 2021-06-06 PROCEDURE — 74011250637 HC RX REV CODE- 250/637: Performed by: NURSE PRACTITIONER

## 2021-06-06 PROCEDURE — 83735 ASSAY OF MAGNESIUM: CPT

## 2021-06-06 RX ORDER — POLYETHYLENE GLYCOL 3350 17 G/17G
17 POWDER, FOR SOLUTION ORAL 2 TIMES DAILY
Status: DISCONTINUED | OUTPATIENT
Start: 2021-06-06 | End: 2021-06-10 | Stop reason: HOSPADM

## 2021-06-06 RX ORDER — MORPHINE SULFATE 2 MG/ML
2 INJECTION, SOLUTION INTRAMUSCULAR; INTRAVENOUS
Status: DISCONTINUED | OUTPATIENT
Start: 2021-06-06 | End: 2021-06-10 | Stop reason: HOSPADM

## 2021-06-06 RX ORDER — KETOROLAC TROMETHAMINE 15 MG/ML
15 INJECTION, SOLUTION INTRAMUSCULAR; INTRAVENOUS
Status: DISCONTINUED | OUTPATIENT
Start: 2021-06-06 | End: 2021-06-09

## 2021-06-06 RX ADMIN — Medication 10 ML: at 22:48

## 2021-06-06 RX ADMIN — FINASTERIDE 5 MG: 5 TABLET, FILM COATED ORAL at 08:40

## 2021-06-06 RX ADMIN — POLYETHYLENE GLYCOL 3350 17 G: 17 POWDER, FOR SOLUTION ORAL at 08:39

## 2021-06-06 RX ADMIN — DULOXETINE HYDROCHLORIDE 60 MG: 60 CAPSULE, DELAYED RELEASE ORAL at 08:36

## 2021-06-06 RX ADMIN — METRONIDAZOLE 500 MG: 500 TABLET ORAL at 08:36

## 2021-06-06 RX ADMIN — HEPARIN SODIUM 12 UNITS/KG/HR: 10000 INJECTION, SOLUTION INTRAVENOUS at 16:14

## 2021-06-06 RX ADMIN — DOCUSATE SODIUM 100 MG: 100 CAPSULE ORAL at 08:36

## 2021-06-06 RX ADMIN — ONDANSETRON 4 MG: 2 INJECTION INTRAMUSCULAR; INTRAVENOUS at 11:05

## 2021-06-06 RX ADMIN — METRONIDAZOLE 500 MG: 500 TABLET ORAL at 22:47

## 2021-06-06 RX ADMIN — METRONIDAZOLE 500 MG: 500 TABLET ORAL at 16:17

## 2021-06-06 RX ADMIN — POLYETHYLENE GLYCOL 3350 17 G: 17 POWDER, FOR SOLUTION ORAL at 17:20

## 2021-06-06 RX ADMIN — PANTOPRAZOLE 40 MG: 40 TABLET, DELAYED RELEASE ORAL at 08:36

## 2021-06-06 RX ADMIN — MORPHINE SULFATE 2 MG: 2 INJECTION, SOLUTION INTRAMUSCULAR; INTRAVENOUS at 16:22

## 2021-06-06 RX ADMIN — LEVOFLOXACIN 750 MG: 750 TABLET, FILM COATED ORAL at 08:36

## 2021-06-06 RX ADMIN — TAMSULOSIN HYDROCHLORIDE 0.4 MG: 0.4 CAPSULE ORAL at 17:19

## 2021-06-06 RX ADMIN — Medication 10 ML: at 06:00

## 2021-06-06 RX ADMIN — Medication 10 ML: at 16:21

## 2021-06-06 RX ADMIN — DOCUSATE SODIUM 100 MG: 100 CAPSULE ORAL at 17:19

## 2021-06-06 RX ADMIN — Medication: at 08:42

## 2021-06-06 NOTE — PROGRESS NOTES
[facility]  Progress Note    Patient: Nissa Levy Age: 77 y.o. : 1945 MR#: [id number] SSN: [id number]  Date: 2021     Subjective:     Cont to have some discomfort to right abd / flank worse with deep breathing and movt; denies SOB, n/v, some discomfort from pelayo and intermittent nausea w/o vomiting    Assessment/Plan:   1. Acute PE - cont heparin drip, cont PPI for prophylaxis. Pulmonary consulted in ED - input pending  2. Falls at home - Fall precautions. PT / OT after 48 hours of anticoagulation. 3. BPH w/ acute urinary retention - cont flomax / proscar / pelayo catheter for now for acute retention. Previously followed by Dr. Clemencia Rowell with [facility], consider consult IP vs OP followup. Request alternate MD per pt and family request  4. Unintentional weight loss, in the setting of edentulous state. Consult nutritionist, SLP rec pureed diet  5. Hx etOH use d/o, quit 5 years ago. Multivitamin, thiamine, folic acid. 6. Bipolar d/o per patient. Not on treatment per home med review. 7. Neuropathy right foot, continue home med cymbalta  8. Recent diverticulitis - improved, continue Levaquin and Flagyl  9. Hypertension - cont norvasc w/ hold parameters   10. Hx esophageal varices. US 6/3/2021 Unremarkable right upper quadrant ultrasound. No obvious varices noted around the liver. 11. S/p EGD with bx 21, Andreia Hernandez., MD [facility].   Zaid Gonsales, BIOPSY:     - ACTIVE CHRONIC GASTRITIS - continue ppi.    -   HELICOBACTER PYLORI IDENTIFIED - patient states he completed course of antibiotics. Plan of care was discussed with family member daughter at bedside with patient permission    Addendum: revisit with patient later in day - he is c/o constipation (LBM 2 days ago) and abd pain, HR upto 150's transiently now in 80's-90's.   Will start miralax BID and morphine PRN    Additional Notes:      Case discussed with:  [x]Patient  [x]Family  [x]Nursing  []Case Management  DVT Prophylaxis:  []Lovenox  [x]Hep drip  []SCDs  []Coumadin   []On Heparin gtt    Objective:     VS:   Visit Vitals  /75   Pulse 99   Temp 99.3 °F (37.4 °C)   Resp 19   Ht 5' 7\" (1.702 m)   Wt 72.8 kg (160 lb 6.4 oz)   SpO2 95%   BMI 25.12 kg/m²      Tmax/24hrs: Temp (24hrs), Av °F (37.2 °C), Min:97.7 °F (36.5 °C), Max:100.4 °F (38 °C)      Intake/Output Summary (Last 24 hours) at 2021 0951  Last data filed at 2021 0611  Gross per 24 hour   Intake 489 ml   Output 950 ml   Net -461 ml     General:  Alert, NAD at rest, + discomfort with mov't  HEENT: Oral mucosa moist; PERRLA  Cardiovascular:  RRR, Nl S1/S2  Pulmonary:  LSC throughout. Normal resp effort  GI:  +BS in all four quadrants, soft, non-tender  : + pelayo   Extremities:  No edema; 2+ dorsalis pedis pulses bilaterally  Neuro: alert and oriented x 4    Labs / micro / imaging :    Recent Results (from the past 24 hour(s))   CBC WITH AUTOMATED DIFF    Collection Time: 21 10:46 AM   Result Value Ref Range    WBC 6.5 4.6 - 13.2 K/uL    RBC 4.59 4.35 - 5.65 M/uL    HGB 13.9 13.0 - 16.0 g/dL    HCT 40.9 36.0 - 48.0 %    MCV 89.1 74.0 - 97.0 FL    MCH 30.3 24.0 - 34.0 PG    MCHC 34.0 31.0 - 37.0 g/dL    RDW 14.6 (H) 11.6 - 14.5 %    PLATELET 792 356 - 333 K/uL    MPV 8.9 (L) 9.2 - 11.8 FL    NEUTROPHILS 74 (H) 40 - 73 %    LYMPHOCYTES 12 (L) 21 - 52 %    MONOCYTES 13 (H) 3 - 10 %    EOSINOPHILS 1 0 - 5 %    BASOPHILS 0 0 - 2 %    ABS. NEUTROPHILS 4.8 1.8 - 8.0 K/UL    ABS. LYMPHOCYTES 0.8 (L) 0.9 - 3.6 K/UL    ABS. MONOCYTES 0.8 0.05 - 1.2 K/UL    ABS. EOSINOPHILS 0.0 0.0 - 0.4 K/UL    ABS.  BASOPHILS 0.0 0.0 - 0.1 K/UL    DF AUTOMATED     METABOLIC PANEL, COMPREHENSIVE    Collection Time: 21 10:46 AM   Result Value Ref Range    Sodium 136 136 - 145 mmol/L    Potassium 3.8 3.5 - 5.5 mmol/L    Chloride 105 100 - 111 mmol/L    CO2 27 21 - 32 mmol/L    Anion gap 4 3.0 - 18 mmol/L    Glucose 104 (H) 74 - 99 mg/dL    BUN 11 7.0 - 18 MG/DL    Creatinine 0.88 0.6 - 1.3 MG/DL    BUN/Creatinine ratio 13 12 - 20      GFR est AA >60 >60 ml/min/1.73m2    GFR est non-AA >60 >60 ml/min/1.73m2    Calcium 8.9 8.5 - 10.1 MG/DL    Bilirubin, total 0.4 0.2 - 1.0 MG/DL    ALT (SGPT) 22 16 - 61 U/L    AST (SGOT) 18 10 - 38 U/L    Alk.  phosphatase 57 45 - 117 U/L    Protein, total 7.6 6.4 - 8.2 g/dL    Albumin 3.5 3.4 - 5.0 g/dL    Globulin 4.1 (H) 2.0 - 4.0 g/dL    A-G Ratio 0.9 0.8 - 1.7     LIPASE    Collection Time: 06/05/21 10:46 AM   Result Value Ref Range    Lipase 122 73 - 393 U/L   D DIMER    Collection Time: 06/05/21 10:46 AM   Result Value Ref Range    D DIMER 1.79 (H) <0.46 ug/ml(FEU)   CARDIAC PANEL,(CK, CKMB & TROPONIN)    Collection Time: 06/05/21 10:46 AM   Result Value Ref Range    CK - MB 1.5 <3.6 ng/ml    CK-MB Index 1.0 0.0 - 4.0 %     39 - 308 U/L    Troponin-I, QT <0.02 0.0 - 0.045 NG/ML   NT-PRO BNP    Collection Time: 06/05/21 10:46 AM   Result Value Ref Range    NT pro-BNP 22 0 - 900 PG/ML   URINALYSIS W/ RFLX MICROSCOPIC    Collection Time: 06/05/21 12:05 PM   Result Value Ref Range    Color YELLOW      Appearance CLEAR      Specific gravity 1.016 1.005 - 1.030      pH (UA) 5.5 5.0 - 8.0      Protein Negative NEG mg/dL    Glucose Negative NEG mg/dL    Ketone Negative NEG mg/dL    Bilirubin Negative NEG      Blood Negative NEG      Urobilinogen 0.2 0.2 - 1.0 EU/dL    Nitrites Negative NEG      Leukocyte Esterase TRACE (A) NEG     URINE MICROSCOPIC ONLY    Collection Time: 06/05/21 12:05 PM   Result Value Ref Range    WBC 0 to 2 0 - 4 /hpf    RBC Negative 0 - 5 /hpf    Epithelial cells FEW 0 - 5 /lpf    Bacteria FEW (A) NEG /hpf   EKG, 12 LEAD, INITIAL    Collection Time: 06/05/21 12:13 PM   Result Value Ref Range    Ventricular Rate 88 BPM    Atrial Rate 88 BPM    P-R Interval 168 ms    QRS Duration 78 ms    Q-T Interval 380 ms    QTC Calculation (Bezet) 459 ms    Calculated P Axis 45 degrees    Calculated R Axis -34 degrees Calculated T Axis 47 degrees    Diagnosis       Normal sinus rhythm  Left axis deviation  Minimal voltage criteria for LVH, may be normal variant  Cannot rule out Anterior infarct , age undetermined  Abnormal ECG  When compared with ECG of 21-MAY-2021 09:55,  No significant change was found  Confirmed by Chinmay Cadet ([id number]) on 6/5/2021 5:08:55 PM     PTT    Collection Time: 06/05/21  3:47 PM   Result Value Ref Range    aPTT 29.3 23.0 - 36.4 SEC   COVID-19 RAPID TEST    Collection Time: 06/05/21  3:55 PM   Result Value Ref Range    Specimen source Nasopharyngeal      COVID-19 rapid test Not detected NOTD     PTT    Collection Time: 06/05/21 10:20 PM   Result Value Ref Range    aPTT >180.0 (HH) 23.0 - 10.0 SEC   METABOLIC PANEL, BASIC    Collection Time: 06/06/21  6:15 AM   Result Value Ref Range    Sodium 137 136 - 145 mmol/L    Potassium 4.2 3.5 - 5.5 mmol/L    Chloride 106 100 - 111 mmol/L    CO2 28 21 - 32 mmol/L    Anion gap 3 3.0 - 18 mmol/L    Glucose 109 (H) 74 - 99 mg/dL    BUN 8 7.0 - 18 MG/DL    Creatinine 0.73 0.6 - 1.3 MG/DL    BUN/Creatinine ratio 11 (L) 12 - 20      GFR est AA >60 >60 ml/min/1.73m2    GFR est non-AA >60 >60 ml/min/1.73m2    Calcium 8.8 8.5 - 10.1 MG/DL   MAGNESIUM    Collection Time: 06/06/21  6:15 AM   Result Value Ref Range    Magnesium 2.3 1.6 - 2.6 mg/dL   CBC WITH AUTOMATED DIFF    Collection Time: 06/06/21  6:15 AM   Result Value Ref Range    WBC 8.7 4.6 - 13.2 K/uL    RBC 4.50 4.35 - 5.65 M/uL    HGB 13.5 13.0 - 16.0 g/dL    HCT 40.5 36.0 - 48.0 %    MCV 90.0 74.0 - 97.0 FL    MCH 30.0 24.0 - 34.0 PG    MCHC 33.3 31.0 - 37.0 g/dL    RDW 14.6 (H) 11.6 - 14.5 %    PLATELET 132 250 - 017 K/uL    MPV 8.8 (L) 9.2 - 11.8 FL    NEUTROPHILS 74 (H) 40 - 73 %    LYMPHOCYTES 13 (L) 21 - 52 %    MONOCYTES 13 (H) 3 - 10 %    EOSINOPHILS 0 0 - 5 %    BASOPHILS 0 0 - 2 %    ABS. NEUTROPHILS 6.4 1.8 - 8.0 K/UL    ABS. LYMPHOCYTES 1.2 0.9 - 3.6 K/UL    ABS. MONOCYTES 1.1 0.05 - 1.2 K/UL    ABS. EOSINOPHILS 0.0 0.0 - 0.4 K/UL    ABS. BASOPHILS 0.0 0.0 - 0.1 K/UL    DF AUTOMATED     PROTHROMBIN TIME + INR    Collection Time: 06/06/21  6:15 AM   Result Value Ref Range    Prothrombin time 16.5 (H) 11.5 - 15.2 sec    INR 1.4 (H) 0.8 - 1.2     PHOSPHORUS    Collection Time: 06/06/21  6:15 AM   Result Value Ref Range    Phosphorus 2.6 2.5 - 4.9 MG/DL   PTT    Collection Time: 06/06/21  6:15 AM   Result Value Ref Range    aPTT >180.0 (HH) 23.0 - 36.4 SEC       Results     Procedure Component Value Units Date/Time    COVID-19 RAPID TEST [[id number]] Collected: 06/05/21 1555    Order Status: Completed Specimen: Nasopharyngeal Updated: 06/05/21 1613     Specimen source Nasopharyngeal        COVID-19 rapid test Not detected        Comment: Rapid Abbott ID Now       Rapid NAAT:  The specimen is NEGATIVE for SARS-CoV-2, the novel coronavirus associated with COVID-19. Negative results should be treated as presumptive and, if inconsistent with clinical signs and symptoms or necessary for patient management, should be tested with an alternative molecular assay. Negative results do not preclude SARS-CoV-2 infection and should not be used as the sole basis for patient management decisions. This test has been authorized by the FDA under an Emergency Use Authorization (EUA) for use by authorized laboratories. Fact sheet for Healthcare Providers: ConventionUpdate.co.nz  Fact sheet for Patients: ConventionUpdate.co.nz       Methodology: Isothermal Nucleic Acid Amplification               CTA CHEST W OR W WO CONT    Result Date: 6/5/2021  Right greater than left pulmonary emboli, most proximally on the right at the branching of the main pulmonary artery but no findings of heart strain. Suspect right greater than left dependent infarcts which may be causing flank pain. Alternatively aspiration is possible. Results discussed with Dr. Jasvir Phoenix on 6/5/2021 at 1449 hours.     CT ABD PELV W CONT    Result Date: 6/5/2021  Near complete resolution of the cecal diverticulitis. Mild new dependent lung opacities favored to represent aspiration. Mild bronchitis.         Signed By: Avery Walton NP     June 6, 2021

## 2021-06-06 NOTE — PROGRESS NOTES
Bedside and Verbal shift change report given to Kathleen Charles (oncoming nurse) by Alek Styles RN   (offgoing nurse). Report given with TUYET, Juan A, MAR and Recent Results.

## 2021-06-06 NOTE — ROUTINE PROCESS
Doctor order pelayo insertion -- patient refusing at this time-- he just voided prior to this conversation Bladder scanned patient >358 He agreed to pelayo placement finally Pelayo placed no issues noted Pelayo placed no issues Heparin drip held and restarted per protocol Bedside and Verbal shift change report given to [facility] (oncoming nurse) by Carolanne Goldberg RN (offgoing nurse). Report given with SBAR, Kardex, Intake/Output, MAR, Accordion and Recent Results.

## 2021-06-06 NOTE — CONSULTS
[facility]. Pulmonary, Critical Care, and Sleep Medicine    Initial Patient Consult    Name: Ruby Chavira MRN: [id number]   : 1945 Hospital: [address] [place] [address]   Date: 2021        IMPRESSION:   · R sided Pulmonary Embolism  · - extensive clot burden of upper and lower branches  · - not hypoxic, no signs of RV strain on echo or CTA  · - very likely chronic  · Pleuritic chest pain  · - possibly 2/2 lung infarction?, not evident on CT- aspiration? · Hx of falls  · - may be poor long term AC candidate  · Possible aspiration  · - dysphagia  · Hx of esophageal varices  · Hx of EtoH abuse  · Recent Diverticulitis  · - now resolved.    · Hx of tobacco abuse  · - over 50 packs years, quit 5 years ago     Patient Active Problem List   Diagnosis Code    Unsteady gait R26.81    Gait instability R26.81    Vertigo R42    Radiculopathy of lumbar region M54.16    ED (erectile dysfunction) of organic origin N52.9    Peripheral vascular disease ([address] I73.9    Overactive bladder N32.81    Nocturia R35.1    Leg pain, right M79.604    Hypercholesterolemia E78.00    HTN (hypertension) I10    H/O spinal cord injury Z87.828    Erectile disorder due to medical 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 N52.1    Bladder outlet obstruction N32.0    Injury of lumbar spine ([address] S34.109A    Frequency of urination R35.0    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    History of rheumatic fever Z86.79    Chest pain, moderate coronary artery risk R07.9    Chest pain R07.9    CAD (coronary artery disease) I25.10    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 · Agree with speech evaluation  · ABX per primary team- needs to complete course for diverticulitis  · Aspiration precautions   · Assess home Oxygen needs at discharge  · OT, PT, OOB and ambulate  · Will Follow     Subjective: This patient has been seen and evaluated at the request of Dr. Joanna Davidson for PE. Patient is a 77 y.o. male with PMHx of HTN, esophageal varices, prior heavy EtOH use and recently Dx diverticulitis who presented to the ED with abdominal and flank pain. Vitals were stable and he was on RA but D-dimer was + so a CTA was ordered which showed R sided PE with extensive clot burden. There was no evidence of RH strain and troponin was negative. He was started on a heparin gtt and admitted to the floor. We were consulted for further management. On my assessment, the patient is awake and alert , having some abdominal pain and pleuritic pain. He is on NC and appears comfortable. He endorses a long history of smoking but quit 5 years ago. He does not take inhalers. He does not have history of blood clots. He states he is very forgetful with medications and injuries himself a lot because he lives alone.       Past Medical History:   Diagnosis Date    Bladder outlet obstruction     Erectile disorder due to medical 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     Frequency of urination     H/O spinal cord injury 1974    lumbar spine injury secondary to fall    HTN (hypertension)     Hypercholesterolemia     Illiterate     Injury of lumbar spine ([address] 1974    Leg pain, right     Nocturia     Overactive bladder     Peripheral vascular disease ([address]       Past Surgical History:   Procedure Laterality Date    COLONOSCOPY N/A 12/4/2019    COLONOSCOPY performed by Kamille Felder MD at [facility] ENDOSCOPY    HAND/FINGER SURGERY UNLISTED Right     carpal tunnel    HX HEENT Left lens implant    HX ORTHOPAEDIC      finger amputation left hand    HX TONSILLECTOMY      UPPER ARM/ELBOW SURGERY UNLISTED Right       Prior to Admission medications    Medication Sig Start Date End Date Taking? Authorizing Provider   metroNIDAZOLE (FlagyL) 500 mg tablet Take 1 Tablet by mouth three (3) times daily for 14 days. Indications: diverticulitis 5/28/21 6/11/21  Leisa Delia, DO   levoFLOXacin (LEVAQUIN) 750 mg tablet Take 1 Tablet by mouth daily for 14 days. Indications: Diverticulitis 5/28/21 6/11/21  Leisa Delia, DO   BismatroL 262 mg chew  5/13/21   Provider, Historical   diclofenac (VOLTAREN) 1 % gel Apply 2 g to affected area four (4) times daily. 5/25/21   Zoe Callaway MD   Wheat Dextrin (Benefiber Clear) 3 gram/3.5 gram pwpk Take  by mouth. Provider, Historical   famotidine (PEPCID) 20 mg tablet Take 20 mg by mouth two (2) times a day. 4/12/21   Provider, Historical   triamcinolone acetonide (KENALOG) 0.1 % ointment Apply  to affected area daily as needed. 4/8/21   Provider, Historical   ammonium lactate (Lac-Hydrin Five) 5 % lotion Apply  to affected area two (2) times a day. 12/10/20   Lisa Vital MD   tamsulosin (FLOMAX) 0.4 mg capsule Take 1 Cap by mouth daily (after dinner). 9/24/20   Renetta Sims MD   DULoxetine (CYMBALTA) 60 mg capsule Take 1 Cap by mouth daily. Indications: neuropathic pain 9/11/20   Jake ALFARO MD   finasteride (PROSCAR) 5 mg tablet Take 5 mg by mouth daily. Provider, Historical   albuterol sulfate 90 mcg/actuation aebs Take  by inhalation as needed. Provider, Historical   ammonium lactate (AMLACTIN) 12 % topical cream Apply  to affected area two (2) times a day. rub in to affected area well 2/20/20   Andra JOHNSON PA-C   pantoprazole (PROTONIX) 40 mg tablet Take 1 Tab by mouth daily. 3/28/19   Cameron Mccracken MD   amLODIPine (NORVASC) 10 mg tablet Take 1 Tab by mouth daily.  3/28/19   Cameron Mccracken MD spironolactone (ALDACTONE) 25 mg tablet Take  by mouth daily. Provider, Historical     Allergies   Allergen Reactions    Latex Rash    Ampicillin Angioedema    Asa-Acetaminophen-Caff-Buffers Rash    Penicillins Angioedema    Crab Hives    Shellfish Derived Rash    Aspirin Other (comments)     Stomach upset    Atorvastatin Other (comments)     Muscle cramps      Social History     Tobacco Use    Smoking status: Former Smoker     Quit date: 2015     Years since quittin.9    Smokeless tobacco: Never Used   Substance Use Topics    Alcohol use: Not Currently     Alcohol/week: 0.0 standard drinks     Comment: former stopped       Family History   Problem Relation Age of Onset    Diabetes Mother     Hypertension Mother     Diabetes Maternal Grandmother     Hypertension Maternal Grandmother     Cancer Sister         brain    Cancer Sister         brain        Current Facility-Administered Medications   Medication Dose Route Frequency    polyethylene glycol (MIRALAX) packet 17 g  17 g Oral BID    heparin 25,000 units in D5W 250 ml infusion  18-36 Units/kg/hr IntraVENous TITRATE    amLODIPine (NORVASC) tablet 5 mg  5 mg Oral DAILY    ammonium lactate (LAC-HYDRIN) 12 % lotion   Topical DAILY    finasteride (PROSCAR) tablet 5 mg  5 mg Oral DAILY    levoFLOXacin (LEVAQUIN) tablet 750 mg  750 mg Oral DAILY    metroNIDAZOLE (FLAGYL) tablet 500 mg  500 mg Oral TID    tamsulosin (FLOMAX) capsule 0.4 mg  0.4 mg Oral PCD    sodium chloride (NS) flush 5-40 mL  5-40 mL IntraVENous Q8H    DULoxetine (CYMBALTA) capsule 60 mg  60 mg Oral DAILY    pantoprazole (PROTONIX) tablet 40 mg  40 mg Oral ACB    docusate sodium (COLACE) capsule 100 mg  100 mg Oral BID    polyethylene glycol (MIRALAX) packet 17 g  17 g Oral DAILY       Review of Systems:  Pertinent items are noted in HPI.   ROS    Objective:   Vital Signs:    Visit Vitals  BP (!) 161/87   Pulse 90   Temp 97.7 °F (36.5 °C)   Resp 18   Ht 5' 7\" (1.702 m)   Wt 72.6 kg (160 lb)   SpO2 97%   BMI 25.06 kg/m²       O2 Device: Nasal cannula   O2 Flow Rate (L/min): 3 l/min   Temp (24hrs), Av.8 °F (37.1 °C), Min:97.7 °F (36.5 °C), Max:100.4 °F (38 °C)       Intake/Output:   Last shift:      No intake/output data recorded. Last 3 shifts:  1901 -  0700  In: 489 [P.O.:480; I.V.:9]  Out: 950 [Urine:950]    Intake/Output Summary (Last 24 hours) at 2021 1524  Last data filed at 2021 9599  Gross per 24 hour   Intake 489 ml   Output 950 ml   Net -461 ml      Physical Exam:   General:  Alert, cooperative, no distress, appears stated age. Head:  Normocephalic, without obvious abnormality, atraumatic. Lungs:   Bilateral auscultation clear, no rales or wheezing. Chest wall:  No tenderness or deformity. NO CREPITUS   Heart:  Regular rate and rhythm, S1, S2 normal, no murmur, click, rub or gallop. Abdomen:   Soft,mild tenderness to palpation in the center. Bowel sounds normal. No masses,  No organomegaly. No paradox   Extremities: normal, atraumatic, no cyanosis or edema. Pulses: 1-2+ and symmetric all extremities.    Neurologic: Grossly nonfocal          Data review:   Labs:  Recent Results (from the past 24 hour(s))   PTT    Collection Time: 21  3:47 PM   Result Value Ref Range    aPTT 29.3 23.0 - 36.4 SEC   COVID-19 RAPID TEST    Collection Time: 21  3:55 PM   Result Value Ref Range    Specimen source Nasopharyngeal      COVID-19 rapid test Not detected NOTD     PTT    Collection Time: 21 10:20 PM   Result Value Ref Range    aPTT >180.0 (HH) 23.0 - 34.8 SEC   METABOLIC PANEL, BASIC    Collection Time: 21  6:15 AM   Result Value Ref Range    Sodium 137 136 - 145 mmol/L    Potassium 4.2 3.5 - 5.5 mmol/L    Chloride 106 100 - 111 mmol/L    CO2 28 21 - 32 mmol/L    Anion gap 3 3.0 - 18 mmol/L    Glucose 109 (H) 74 - 99 mg/dL    BUN 8 7.0 - 18 MG/DL    Creatinine 0.73 0.6 - 1.3 MG/DL    BUN/Creatinine ratio 11 (L) 12 - 20      GFR est AA >60 >60 ml/min/1.73m2    GFR est non-AA >60 >60 ml/min/1.73m2    Calcium 8.8 8.5 - 10.1 MG/DL   MAGNESIUM    Collection Time: 06/06/21  6:15 AM   Result Value Ref Range    Magnesium 2.3 1.6 - 2.6 mg/dL   CBC WITH AUTOMATED DIFF    Collection Time: 06/06/21  6:15 AM   Result Value Ref Range    WBC 8.7 4.6 - 13.2 K/uL    RBC 4.50 4.35 - 5.65 M/uL    HGB 13.5 13.0 - 16.0 g/dL    HCT 40.5 36.0 - 48.0 %    MCV 90.0 74.0 - 97.0 FL    MCH 30.0 24.0 - 34.0 PG    MCHC 33.3 31.0 - 37.0 g/dL    RDW 14.6 (H) 11.6 - 14.5 %    PLATELET 025 602 - 959 K/uL    MPV 8.8 (L) 9.2 - 11.8 FL    NEUTROPHILS 74 (H) 40 - 73 %    LYMPHOCYTES 13 (L) 21 - 52 %    MONOCYTES 13 (H) 3 - 10 %    EOSINOPHILS 0 0 - 5 %    BASOPHILS 0 0 - 2 %    ABS. NEUTROPHILS 6.4 1.8 - 8.0 K/UL    ABS. LYMPHOCYTES 1.2 0.9 - 3.6 K/UL    ABS. MONOCYTES 1.1 0.05 - 1.2 K/UL    ABS. EOSINOPHILS 0.0 0.0 - 0.4 K/UL    ABS.  BASOPHILS 0.0 0.0 - 0.1 K/UL    DF AUTOMATED     PROTHROMBIN TIME + INR    Collection Time: 06/06/21  6:15 AM   Result Value Ref Range    Prothrombin time 16.5 (H) 11.5 - 15.2 sec    INR 1.4 (H) 0.8 - 1.2     PHOSPHORUS    Collection Time: 06/06/21  6:15 AM   Result Value Ref Range    Phosphorus 2.6 2.5 - 4.9 MG/DL   PTT    Collection Time: 06/06/21  6:15 AM   Result Value Ref Range    aPTT >180.0 (HH) 23.0 - 36.4 SEC   ECHO ADULT COMPLETE    Collection Time: 06/06/21 11:07 AM   Result Value Ref Range    IVSd 1.13 (A) 0.60 - 1.00 cm    LVIDd 3.47 (A) 4.20 - 5.90 cm    LVIDs 2.20 cm    LVOT d 2.00 cm    LVPWd 1.29 (A) 0.60 - 1.00 cm    LA Volume 48.17 18.0 - 58.0 mL    LA Area 4C 18.33 cm2    LA Vol 2C 43.13 18.00 - 58.00 mL    LA Vol 4C 44.51 18.00 - 58.00 mL    MV A Stas 132.97 cm/s    Mitral Valve E Wave Deceleration Time 182.46 ms    MV E Stas 68.18 cm/s    E/E' lateral 8.02     E/E' septal 9.98     LV E' Lateral Velocity 8.50 cm/s    LV E' Septal Velocity 6.83 cm/s    Tapse 2.22 (A) 1.50 - 2.00 cm    Ao Root D 3.20 cm    Pulmonary Vein \"A\" Wave Velocity 37.95 cm/s    P Vein A Dur 117.99 ms    MV E/A 0.51     LV Mass .8 88.0 - 224.0 g    LV Mass AL Index 73.8 49.0 - 115.0 g/m2    E/E' ratio (averaged) 9.00     LA Vol Index 26.18 16.00 - 28.00 ml/m2    LA Vol Index 23.44 16.00 - 28.00 ml/m2    LA Vol Index 24.19 16.00 - 28.00 ml/m2   PTT    Collection Time: 06/06/21  2:24 PM   Result Value Ref Range    aPTT 88.2 (H) 23.0 - 36.4 SEC     Imaging:  I have personally reviewed the patients radiographs and have reviewed the reports:  CXR Results  (Last 48 hours)    None        CT Results  (Last 48 hours)               06/05/21 1428  CTA CHEST W OR W WO CONT Final result    Impression:  Right greater than left pulmonary emboli, most proximally on the right at the   branching of the main pulmonary artery but no findings of heart strain. Suspect   right greater than left dependent infarcts which may be causing flank pain. Alternatively aspiration is possible. Results discussed with Dr. Nisreen Rosenberg on 6/5/2021 at 1449 hours. Narrative:  CTA CHEST PULMONARY EMBOLISM PROTOCOL         INDICATION: Right flank/subcostal pain, positive d-dimer. . Question pulmonary   embolism. TECHNIQUE: Thin collimation axial images obtained through the level of the   pulmonary arteries with additional imaging through the chest following the   uneventful administration of nonionic intravenous contrast.  Images   reconstructed into three dimensional coronal and sagittal projections for   complete evaluation of the tortuous and overlapping pulmonary vascular   structures and to reduce patient radiation dose. All CT scans at this facility are performed using dose optimization technique as   appropriate to a performed exam, to include automated exposure control,   adjustment of the mA and/or kV according to patient size (including appropriate   matching first site-specific examinations), or use of iterative reconstruction   technique. COMPARISON: 5/28/2021; 5/10/2020. FINDINGS:       There is pulmonary emboli at the branching of the right main pulmonary artery   extending mildly into the upper lobar branch, more significantly in the right   lower lobe and mildly in the right middle lobe branch. Some of the filling   defects in the right lower lobe are nearly occlusive. Filling defect in the   periphery of lingular branches and in subsegmental branches left lower lobe. There is no enlargement of the right ventricle relative to left. No bowing of   the interventricular septum. Main pulmonary artery is not enlarged. No reflux   into the IVC. Thyroid: Unremarkable in its visualized aspects. Pericardium/ Heart: No significant effusion. Aorta/ Vessels: No aneurysm or dissection. Mild aortic atherosclerosis. Lymph Nodes: Unremarkable. .       Lungs: Unremarkable. Pleura: No effusion. Upper Abdomen: See dedicated CT abdomen report. Bones/soft tissues: Degenerative disc disease. 06/05/21 1428  CT ABD PELV W CONT Final result    Impression:  Near complete resolution of the cecal diverticulitis. Mild new dependent lung opacities favored to represent aspiration. Mild bronchitis. Narrative:  CT ABDOMEN AND PELVIS WITH ENHANCEMENT       INDICATION: Right abdominal pain, recent right-sided diverticulitis. History of   hypertension, BPH, symptoms for 2 days. Symptoms had improved but returned. TECHNIQUE: Axial images obtained of the abdomen and pelvis following the   uneventful administration of  100 cc's Isovue-370 nonionic intravenous contrast.    Coronal and sagittal reformatted images of the abdomen and pelvis were   obtained.        All CT scans at this facility are performed using dose optimization technique as   appropriate to a performed exam, to include automated exposure control,   adjustment of the mA and/or kV according to patient size (including appropriate   matching first site-specific examinations), or use of iterative reconstruction   technique. COMPARISON: 5/28/2021. ABDOMEN FINDINGS:        Liver: Unremarkable. Spleen: Unremarkable. Pancreas: Unremarkable. Biliary: Unremarkable. Bowel: Diverticulosis. Prior fat haziness medial to the cecum has significantly   improved. No abscess. No free air. Peritoneum/ Retroperitoneum: Unremarkable. Lymph Nodes: Unremarkable. Adrenal Glands: Mild thickening. Kidneys: Unremarkable. Vessels: Mild atherosclerosis. Infrarenal aorta measures up to 2.2 cm. PELVIS FINDINGS:        Bladder/ Pelvic Organs: Unremarkable. Lung Base: Increased mild dependent groundglass. Mild bronchial wall thickening. Bones: SI joint degenerative changes with anterior spurring. Degenerative   changes bilateral hips and thoracolumbar spine. High complexity decision making was performed during the evaluation of this patient at high risk for decompensation with multiple organ involvement     Above mentioned total time spent on reviewing the case/medical record/data/notes/EMR/patient examination/documentation/coordinating care with nurse/consultants, exclusive of procedures with complex decision making performed and > 50% time spent in face to face evaluation.      Gilberto Henry MD

## 2021-06-06 NOTE — PROGRESS NOTES
Problem: Pain  Goal: *Control of Pain  Outcome: Progressing Towards Goal  Goal: *PALLIATIVE CARE:  Alleviation of Pain  Outcome: Progressing Towards Goal     Problem: Patient Education: Go to Patient Education Activity  Goal: Patient/Family Education  Outcome: Progressing Towards Goal     Problem: Falls - Risk of  Goal: *Absence of Falls  Description: Document Mirza Neal Fall Risk and appropriate interventions in the flowsheet.   Outcome: Progressing Towards Goal  Note: Fall Risk Interventions:  Mobility Interventions: Bed/chair exit alarm, Patient to call before getting OOB, Utilize walker, cane, or other assistive device         Medication Interventions: Patient to call before getting OOB    Elimination Interventions: Bed/chair exit alarm, Call light in reach, Patient to call for help with toileting needs    History of Falls Interventions: Bed/chair exit alarm         Problem: Patient Education: Go to Patient Education Activity  Goal: Patient/Family Education  Outcome: Progressing Towards Goal     Problem: Pulmonary Embolism Care Plan (Adult)  Goal: *Improvement of existing pulmonary embolism  Outcome: Progressing Towards Goal  Goal: *Absence of bleeding  Outcome: Progressing Towards Goal  Goal: *Labs within defined limits  Outcome: Progressing Towards Goal     Problem: Patient Education: Go to Patient Education Activity  Goal: Patient/Family Education  Outcome: Progressing Towards Goal     Problem: Injury - Risk of, Adverse Drug Event  Goal: *Absence of adverse drug events  Outcome: Progressing Towards Goal  Goal: *Absence of medication errors  Outcome: Progressing Towards Goal  Goal: *Knowledge of prescribed medications  Outcome: Progressing Towards Goal     Problem: Patient Education: Go to Patient Education Activity  Goal: Patient/Family Education  Outcome: Progressing Towards Goal     Problem: Infection - Risk of, Urinary Catheter-Associated Urinary Tract Infection  Goal: *Absence of infection signs and symptoms  Outcome: Progressing Towards Goal     Problem: Patient Education: Go to Patient Education Activity  Goal: Patient/Family Education  Outcome: Progressing Towards Goal     Problem: Patient Education: Go to Patient Education Activity  Goal: Patient/Family Education  Outcome: Progressing Towards Goal     Problem: Constipation - Risk of  Goal: *Prevention of constipation  Outcome: Progressing Towards Goal  Goal: *PALLIATIVE CARE:  Prevention/Alleviation of constipation  Outcome: Progressing Towards Goal     Problem: Patient Education: Go to Patient Education Activity  Goal: Patient/Family Education  Outcome: Progressing Towards Goal

## 2021-06-06 NOTE — PROGRESS NOTES
Problem: Pain  Goal: *Control of Pain  Outcome: Progressing Towards Goal  Goal: *PALLIATIVE CARE:  Alleviation of Pain  Outcome: Progressing Towards Goal     Problem: Patient Education: Go to Patient Education Activity  Goal: Patient/Family Education  Outcome: Progressing Towards Goal     Problem: Falls - Risk of  Goal: *Absence of Falls  Description: Document Steffanie Gross Fall Risk and appropriate interventions in the flowsheet.   Outcome: Progressing Towards Goal  Note: Fall Risk Interventions:  Mobility Interventions: Assess mobility with egress test, Bed/chair exit alarm, Strengthening exercises (ROM-active/passive)         Medication Interventions: Patient to call before getting OOB    Elimination Interventions: Bed/chair exit alarm, Call light in reach, Patient to call for help with toileting needs    History of Falls Interventions: Bed/chair exit alarm, Door open when patient unattended, Room close to nurse's station         Problem: Patient Education: Go to Patient Education Activity  Goal: Patient/Family Education  Outcome: Progressing Towards Goal     Problem: Pulmonary Embolism Care Plan (Adult)  Goal: *Improvement of existing pulmonary embolism  Outcome: Progressing Towards Goal  Goal: *Absence of bleeding  Outcome: Progressing Towards Goal  Goal: *Labs within defined limits  Outcome: Progressing Towards Goal     Problem: Patient Education: Go to Patient Education Activity  Goal: Patient/Family Education  Outcome: Progressing Towards Goal     Problem: Injury - Risk of, Adverse Drug Event  Goal: *Absence of adverse drug events  Outcome: Progressing Towards Goal  Goal: *Absence of medication errors  Outcome: Progressing Towards Goal  Goal: *Knowledge of prescribed medications  Outcome: Progressing Towards Goal     Problem: Patient Education: Go to Patient Education Activity  Goal: Patient/Family Education  Outcome: Progressing Towards Goal     Problem: Infection - Risk of, Urinary Catheter-Associated Urinary Tract Infection  Goal: *Absence of infection signs and symptoms  Outcome: Progressing Towards Goal     Problem: Patient Education: Go to Patient Education Activity  Goal: Patient/Family Education  Outcome: Progressing Towards Goal

## 2021-06-06 NOTE — PROGRESS NOTES
Problem: Dysphagia (Adult)  Goal: *Acute Goals and Plan of Care (Insert Text)  Description: Recommendations:  Diet: puree/thin  Meds: per pt preference  Aspiration Precautions  Oral Care TID  Other: If pt does not like puree, can advance to \"minced and moist\"    Goals:  Patient will:  1. Tolerate diet and/or diet upgrade without overt s/sx of aspiration under SLP supervision  2. Utilize compensatory swallow strategies of small bite/sip, alternate liquid/solid with min cues       Outcome: Progressing Towards Goal      SPEECH LANGUAGE PATHOLOGY BEDSIDE SWALLOW   EVALUATION & TREATMENT     Patient: Valentin Shah (89 y.o. male)  Date: 6/6/2021  Primary Diagnosis: Acute pulmonary embolism (HCC) [I26.99]        Precautions: aspiration     PLOF: soft/puree/thin    ASSESSMENT :  Based on the objective data described below, the patient presents with intact oropharyngeal swallow function, but difficulty chewing due to edentulous. Pt A&Ox3; basic cognition intact. Oral-motor exam revealed pt edentulous; however, all other structures grossly intact for mastication and deglutition. Dtr at b/s reporting pt does not eat solids at home due to edentulous; preference for soups and purees; further reporting he will try to eat meat, but chews, gets flavor, and spits out. Pt reports ~10# weight loss in a week. Accepted successive swallows of thin liquids + straw, pudding, and ricky crackers. Mildly labored A-P transit with cracker; however, with increased time, 100% oral bolus clearance appreciated. No aspiration s/s noted. Per pt/family request, SLP will change diet to puree/thin liquids. Should pt report dislike of puree, diet can be advanced to \"minced and moist\". SLP to follow 1-2 visits for diet tolerance and advancement.        TREATMENT :  Skilled therapy initiated; Educated pt on aspiration precautions and importance of compensatory swallow techniques to decrease aspiration risk (decrease rate of intake & sip/bite size, upright @HOB for all po intake and ~30 minutes after po); verbalized comprehension. SLP to follow as indicated. Patient will benefit from skilled intervention to address the above impairments. Patient's rehabilitation potential is considered to be Good  Factors which may influence rehabilitation potential include:  []            None noted  []            Mental ability/status  [x]            Medical condition  []            Home/family situation and support systems  []            Safety awareness  []            Pain tolerance/management  []            Other:      PLAN :  Recommendations and Planned Interventions:  puree/thin liquids or minced/moist.  Frequency/Duration: Patient will be followed by speech-language pathology 1-2 visits to address goals. Discharge Recommendations: Home Health     SUBJECTIVE:   Patient stated \"I've lost a lot of weight.     OBJECTIVE:     Past Medical History:   Diagnosis Date    Bladder outlet obstruction     Erectile disorder due to medical 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     Frequency of urination     H/O spinal cord injury 1974    lumbar spine injury secondary to fall    HTN (hypertension)     Hypercholesterolemia     Illiterate     Injury of lumbar spine ([facility] [address] 1974    Leg pain, right     Nocturia     Overactive bladder     Peripheral vascular disease ([facility] [address]      Past Surgical History:   Procedure Laterality Date    COLONOSCOPY N/A 12/4/2019    COLONOSCOPY performed by Wilfredo Maurer MD at [facility] ENDOSCOPY    HAND/FINGER SURGERY UNLISTED Right     carpal tunnel    HX HEENT Left     lens implant    HX ORTHOPAEDIC      finger amputation left hand    HX TONSILLECTOMY      UPPER ARM/ELBOW SURGERY UNLISTED Right      Home Situation:   Home Situation  Home Environment: Apartment  # Steps to Enter: 25  One/Two Story Residence: One story  Living Alone: Yes  Support Systems: Child(sam)  Patient Expects to be Discharged to[de-identified] Other (comment)  Current DME Used/Available at Home: None    Diet prior to admission: soft/puree/thin  Current Diet:  Per pt/family request, SLP will change diet to puree/thin liquids. Should pt report dislike of puree, diet can be advanced to \"minced and moist\"     Cognitive and Communication Status:  Neurologic State: Alert  Orientation Level: Oriented X4  Cognition: Appropriate for age attention/concentration  Perception: Appears intact  Perseveration: No perseveration noted     Oral Assessment:  Oral Assessment  Labial: No impairment  Dentition: Edentulous  Oral Hygiene: good  Lingual: No impairment  Velum: No impairment  Mandible: No impairment  P.O. Trials:  Patient Position: [facility] [place] [facility]  Vocal quality prior to P.O.: No impairment  Consistency Presented: Thin liquid; Solid;Puree  How Presented: Self-fed/presented;Straw;Successive swallows     Bolus Acceptance: No impairment  Bolus Formation/Control: Impaired  Type of Impairment: Mastication  Propulsion: Delayed (# of seconds)  Oral Residue: Lingual;10-50% of bolus  Initiation of Swallow: No impairment  Laryngeal Elevation: Functional  Aspiration Signs/Symptoms: None  Pharyngeal Phase Characteristics: No impairment, issues, or problems      Cues for Modifications: Minimal       Oral Phase Severity: Mild  Pharyngeal Phase Severity : No impairment    PAIN:  Pain level pre-treatment: 0/10   Pain level post-treatment: 0/10     After treatment:   []            Patient left in no apparent distress sitting up in chair  [x]            Patient left in no apparent distress in bed  [x]            Call bell left within reach  []            Nursing notified  [x]            Family present  []            Caregiver present  []            Bed alarm activated    COMMUNICATION/EDUCATION:   [x]            Aspiration precautions; swallow safety; compensatory techniques. [x]            Patient/family have participated as able in goal setting and plan of care. [x]            Patient/family agree to work toward stated goals and plan of care.   [] Patient understands intent and goals of therapy; neutral about participation. []            Patient unable to participate in goal setting/plan of care; educ ongoing with interdisciplinary staff  []         Posted safety precautions in patient's room.     Thank you for this referral.  ROBBIE Laurent  Time Calculation: 30 mins  Evaluation Time: 20 minutes   Treatment Time: 10 minutes

## 2021-06-07 ENCOUNTER — APPOINTMENT (OUTPATIENT)
Dept: GENERAL RADIOLOGY | Age: 76
DRG: 134 | End: 2021-06-07
Attending: HOSPITALIST
Payer: COMMERCIAL

## 2021-06-07 ENCOUNTER — APPOINTMENT (OUTPATIENT)
Dept: VASCULAR SURGERY | Age: 76
DRG: 134 | End: 2021-06-07
Attending: INTERNAL MEDICINE
Payer: COMMERCIAL

## 2021-06-07 PROBLEM — E43 SEVERE PROTEIN-CALORIE MALNUTRITION (HCC): Chronic | Status: ACTIVE | Noted: 2021-06-07

## 2021-06-07 LAB
ANION GAP SERPL CALC-SCNC: 4 MMOL/L (ref 3–18)
APTT PPP: 112.7 SEC (ref 23–36.4)
APTT PPP: 75.3 SEC (ref 23–36.4)
APTT PPP: 79.1 SEC (ref 23–36.4)
BASOPHILS # BLD: 0 K/UL (ref 0–0.1)
BASOPHILS NFR BLD: 0 % (ref 0–2)
BUN SERPL-MCNC: 10 MG/DL (ref 7–18)
BUN/CREAT SERPL: 15 (ref 12–20)
CALCIUM SERPL-MCNC: 9.2 MG/DL (ref 8.5–10.1)
CHLORIDE SERPL-SCNC: 102 MMOL/L (ref 100–111)
CO2 SERPL-SCNC: 28 MMOL/L (ref 21–32)
CREAT SERPL-MCNC: 0.68 MG/DL (ref 0.6–1.3)
DIFFERENTIAL METHOD BLD: ABNORMAL
EOSINOPHIL # BLD: 0 K/UL (ref 0–0.4)
EOSINOPHIL NFR BLD: 0 % (ref 0–5)
ERYTHROCYTE [DISTWIDTH] IN BLOOD BY AUTOMATED COUNT: 14.6 % (ref 11.6–14.5)
GLUCOSE BLD STRIP.AUTO-MCNC: 136 MG/DL (ref 70–110)
GLUCOSE SERPL-MCNC: 118 MG/DL (ref 74–99)
HCT VFR BLD AUTO: 39.7 % (ref 36–48)
HGB BLD-MCNC: 13.2 G/DL (ref 13–16)
LYMPHOCYTES # BLD: 0.9 K/UL (ref 0.9–3.6)
LYMPHOCYTES NFR BLD: 8 % (ref 21–52)
MCH RBC QN AUTO: 29.9 PG (ref 24–34)
MCHC RBC AUTO-ENTMCNC: 33.2 G/DL (ref 31–37)
MCV RBC AUTO: 89.8 FL (ref 74–97)
MONOCYTES # BLD: 1.2 K/UL (ref 0.05–1.2)
MONOCYTES NFR BLD: 12 % (ref 3–10)
NEUTS SEG # BLD: 8.5 K/UL (ref 1.8–8)
NEUTS SEG NFR BLD: 80 % (ref 40–73)
PLATELET # BLD AUTO: 238 K/UL (ref 135–420)
PMV BLD AUTO: 9.5 FL (ref 9.2–11.8)
POTASSIUM SERPL-SCNC: 3.9 MMOL/L (ref 3.5–5.5)
RBC # BLD AUTO: 4.42 M/UL (ref 4.35–5.65)
SODIUM SERPL-SCNC: 134 MMOL/L (ref 136–145)
WBC # BLD AUTO: 10.6 K/UL (ref 4.6–13.2)

## 2021-06-07 PROCEDURE — 85025 COMPLETE CBC W/AUTO DIFF WBC: CPT

## 2021-06-07 PROCEDURE — 85730 THROMBOPLASTIN TIME PARTIAL: CPT

## 2021-06-07 PROCEDURE — 92526 ORAL FUNCTION THERAPY: CPT

## 2021-06-07 PROCEDURE — 99233 SBSQ HOSP IP/OBS HIGH 50: CPT | Performed by: INTERNAL MEDICINE

## 2021-06-07 PROCEDURE — 74011250637 HC RX REV CODE- 250/637: Performed by: NURSE PRACTITIONER

## 2021-06-07 PROCEDURE — 74011250636 HC RX REV CODE- 250/636: Performed by: NURSE PRACTITIONER

## 2021-06-07 PROCEDURE — 74011250636 HC RX REV CODE- 250/636: Performed by: HOSPITALIST

## 2021-06-07 PROCEDURE — 65660000000 HC RM CCU STEPDOWN

## 2021-06-07 PROCEDURE — 99232 SBSQ HOSP IP/OBS MODERATE 35: CPT | Performed by: NURSE PRACTITIONER

## 2021-06-07 PROCEDURE — 93970 EXTREMITY STUDY: CPT

## 2021-06-07 PROCEDURE — APPSS60 APP SPLIT SHARED TIME 46-60 MINUTES: Performed by: NURSE PRACTITIONER

## 2021-06-07 PROCEDURE — 74018 RADEX ABDOMEN 1 VIEW: CPT

## 2021-06-07 PROCEDURE — 80048 BASIC METABOLIC PNL TOTAL CA: CPT

## 2021-06-07 PROCEDURE — 74011250637 HC RX REV CODE- 250/637: Performed by: HOSPITALIST

## 2021-06-07 PROCEDURE — 2709999900 HC NON-CHARGEABLE SUPPLY

## 2021-06-07 PROCEDURE — APPNB30 APP NON BILLABLE TIME 0-30 MINS: Performed by: NURSE PRACTITIONER

## 2021-06-07 PROCEDURE — 82962 GLUCOSE BLOOD TEST: CPT

## 2021-06-07 PROCEDURE — 36415 COLL VENOUS BLD VENIPUNCTURE: CPT

## 2021-06-07 PROCEDURE — 74011250636 HC RX REV CODE- 250/636: Performed by: INTERNAL MEDICINE

## 2021-06-07 RX ORDER — THERA TABS 400 MCG
1 TAB ORAL DAILY
Status: DISCONTINUED | OUTPATIENT
Start: 2021-06-08 | End: 2021-06-10 | Stop reason: HOSPADM

## 2021-06-07 RX ORDER — FACIAL-BODY WIPES
10 EACH TOPICAL DAILY PRN
Status: DISCONTINUED | OUTPATIENT
Start: 2021-06-07 | End: 2021-06-10 | Stop reason: HOSPADM

## 2021-06-07 RX ADMIN — METRONIDAZOLE 500 MG: 500 TABLET ORAL at 15:06

## 2021-06-07 RX ADMIN — PANTOPRAZOLE 40 MG: 40 TABLET, DELAYED RELEASE ORAL at 05:53

## 2021-06-07 RX ADMIN — Medication 10 ML: at 13:47

## 2021-06-07 RX ADMIN — Medication 10 ML: at 05:54

## 2021-06-07 RX ADMIN — Medication: at 09:00

## 2021-06-07 RX ADMIN — DOCUSATE SODIUM 100 MG: 100 CAPSULE ORAL at 08:59

## 2021-06-07 RX ADMIN — POLYETHYLENE GLYCOL 3350 17 G: 17 POWDER, FOR SOLUTION ORAL at 18:03

## 2021-06-07 RX ADMIN — ACETAMINOPHEN 650 MG: 325 TABLET ORAL at 08:59

## 2021-06-07 RX ADMIN — AMLODIPINE BESYLATE 5 MG: 5 TABLET ORAL at 08:59

## 2021-06-07 RX ADMIN — ONDANSETRON 4 MG: 2 INJECTION INTRAMUSCULAR; INTRAVENOUS at 20:28

## 2021-06-07 RX ADMIN — LEVOFLOXACIN 750 MG: 750 TABLET, FILM COATED ORAL at 08:59

## 2021-06-07 RX ADMIN — TAMSULOSIN HYDROCHLORIDE 0.4 MG: 0.4 CAPSULE ORAL at 18:03

## 2021-06-07 RX ADMIN — METRONIDAZOLE 500 MG: 500 TABLET ORAL at 22:42

## 2021-06-07 RX ADMIN — FINASTERIDE 5 MG: 5 TABLET, FILM COATED ORAL at 11:19

## 2021-06-07 RX ADMIN — MORPHINE SULFATE 2 MG: 2 INJECTION, SOLUTION INTRAMUSCULAR; INTRAVENOUS at 12:40

## 2021-06-07 RX ADMIN — HEPARIN SODIUM 10 UNITS/KG/HR: 10000 INJECTION, SOLUTION INTRAVENOUS at 07:33

## 2021-06-07 RX ADMIN — DULOXETINE HYDROCHLORIDE 60 MG: 60 CAPSULE, DELAYED RELEASE ORAL at 08:59

## 2021-06-07 RX ADMIN — MORPHINE SULFATE 2 MG: 2 INJECTION, SOLUTION INTRAMUSCULAR; INTRAVENOUS at 08:30

## 2021-06-07 RX ADMIN — POLYETHYLENE GLYCOL 3350 17 G: 17 POWDER, FOR SOLUTION ORAL at 08:59

## 2021-06-07 RX ADMIN — KETOROLAC TROMETHAMINE 15 MG: 15 INJECTION, SOLUTION INTRAMUSCULAR; INTRAVENOUS at 20:28

## 2021-06-07 RX ADMIN — BISACODYL 10 MG: 10 SUPPOSITORY RECTAL at 20:45

## 2021-06-07 RX ADMIN — Medication 10 ML: at 22:43

## 2021-06-07 RX ADMIN — METRONIDAZOLE 500 MG: 500 TABLET ORAL at 08:59

## 2021-06-07 RX ADMIN — PANTOPRAZOLE 40 MG: 40 TABLET, DELAYED RELEASE ORAL at 07:30

## 2021-06-07 RX ADMIN — DOCUSATE SODIUM 100 MG: 100 CAPSULE ORAL at 18:03

## 2021-06-07 NOTE — PROGRESS NOTES
Reason for Admission:  Acute pulmonary embolism ([facility] [address] [I26.99]                 RUR Score:    16            Plan for utilizing home health: If needed                      Likelihood of Readmission:   LOW                         Transition of Care Plan:              Initial assessment completed with patient. Cognitive status of patient: oriented to time, place, person and situation. Face sheet information confirmed:  yes. The patient designates daughter to participate in his discharge plan and to receive any needed information. This patient lives in a single family home with patient. Patient is able to navigate steps as needed. Prior to hospitalization, patient was considered to be independent with ADLs/IADLS : yes . Patient has a current ACP document on file: no      Healthcare Decision Maker:     Click here to complete [address] including selection of the Healthcare Decision Maker Relationship (ie \"Primary\")    The patient and daughter will be available to transport patient home upon discharge. The patient already has none reported,  medical equipment available in the home. Patient is not currently active with home health. Patient has not stayed in a skilled nursing facility or rehab. This patient is on dialysis :no    List of available Home Health agencies were provided and reviewed with the patient prior to discharge. Freedom of choice signed: yes, for [facility]. Also freedom of choice obtained for [facility] and [address] [place] [address]. Currently, the discharge plan is Home with [address] [place], [place] [address]luc and SNF. The patient states that he can obtain his medications from the pharmacy, and take his medications as directed. Patient's current insurance is [facility] complete care       Care Management Interventions  PCP Verified by CM:  Yes  Mode of Transport at Discharge: Self  Physical Therapy Consult: Yes  Occupational Therapy Consult: Yes  Current Support Network: Lives Alone  Confirm Follow Up Transport: Family  The Plan for Transition of Care is Related to the Following Treatment Goals : Home health vs SNF  The Patient and/or Patient Representative was Provided with a Choice of Provider and Agrees with the Discharge Plan?: Yes  Freedom of Choice List was Provided with Basic Dialogue that Supports the Patient's Individualized Plan of Care/Goals, Treatment Preferences and Shares the Quality Data Associated with the Providers?: Yes  Discharge Location  Discharge Placement: Home with home health (vs SNF)        Osman Tellez RN BSN  Care Manager  [phone]

## 2021-06-07 NOTE — ROUTINE PROCESS
Bedside and Verbal shift change report given to Monica Fuentes (oncoming nurse) by Sera Quinones RN (offgoing nurse). Report given with SBAR, Kardex, Intake/Output, MAR, Accordion and Recent Results.

## 2021-06-07 NOTE — PROGRESS NOTES
[facility]  Progress Note    Patient: Gianluca Fajardo Age: 77 y.o. : 1945 MR#: [id number] SSN: [id number]  Date: 2021     Subjective:     Patient reports pain control is improved, continues to have discomfort with movement and no bowel movement. He denies shortness of breath or nausea vomiting present    Assessment/Plan:   1. Acute PE - cont heparin drip, cont PPI for prophylaxis. Consider thrombectomy per pulmonary once pneumonia improved. 2. Falls at home - Fall precautions. PT / OT after 48 hours of anticoagulation. 3. ? Aspiration pna - cont levaquin and flagyl, asp precautions and modified diet  4. BPH w/ acute urinary retention - cont flomax / proscar / pelayo catheter for now for acute retention. Urology consulted, pt reports compliance with flomax PTA  5. Unintentional weight loss, in the setting of edentulous state. Consult nutritionist, SLP rec pureed diet -if patient dislikes food can transition to \"minced and moist\" per SLP  6. Constipation -last BM documented as 2021, complaints of discomfort, +cont miralax, add lax as needed, prune juice  7. Hx etOH use d/o, quit 5 years ago. Multivitamin, thiamine, folic acid. 8. Bipolar d/o per patient. Not on treatment per home med review. 9. Neuropathy right foot, continue home cymbalta  10. Recent diverticulitis - improved, continue Levaquin and Flagyl  11. Hypertension - cont norvasc w/ hold parameters   12. Hx esophageal varices. US 6/3/2021 Unremarkable right upper quadrant ultrasound. No obvious varices noted around the liver. 13. S/p EGD with bx 21, Gaurav Martinez MD [facility].   Susu Sharma, BIOPSY:     - ACTIVE CHRONIC GASTRITIS - continue ppi.    -   HELICOBACTER PYLORI IDENTIFIED - patient states he completed course of antibiotics.     Discussed with daughter Mindi Crenshaw via phone call at phone number [phone]    Additional Notes:      Case discussed with:  [x]Patient []Family  [x]Nursing  []Case Management  DVT Prophylaxis:  []Lovenox  [x]Hep drip  []SCDs  []Coumadin   []On Heparin gtt    Objective:     VS:   Visit Vitals  /71 (BP 1 Location: Right upper arm, BP Patient Position: At rest)   Pulse 88   Temp 98.4 °F (36.9 °C)   Resp 16   Ht 5' 7\" (1.702 m)   Wt 72.6 kg (160 lb)   SpO2 96%   BMI 25.06 kg/m²      Tmax/24hrs: Temp (24hrs), Av.2 °F (36.8 °C), Min:97.6 °F (36.4 °C), Max:98.7 °F (37.1 °C)      Intake/Output Summary (Last 24 hours) at 2021 1541  Last data filed at 2021 1100  Gross per 24 hour   Intake 570.08 ml   Output 750 ml   Net -179.92 ml     General:  Alert, NAD at rest, + discomfort with mov't  HEENT: Oral mucosa moist; PERRLA  Cardiovascular:  RRR, Nl S1/S2  Pulmonary:  LSC throughout.  Normal resp effort  GI:  +BS in all four quadrants, soft, non-tender  : + pelayo   Extremities:  No edema; 2+ dorsalis pedis pulses bilaterally  Neuro: alert and oriented x 4    Labs / micro / imaging :    Recent Results (from the past 24 hour(s))   PTT    Collection Time: 21  4:40 AM   Result Value Ref Range    aPTT 112.7 (H) 23.0 - 95.4 SEC   METABOLIC PANEL, BASIC    Collection Time: 21  4:40 AM   Result Value Ref Range    Sodium 134 (L) 136 - 145 mmol/L    Potassium 3.9 3.5 - 5.5 mmol/L    Chloride 102 100 - 111 mmol/L    CO2 28 21 - 32 mmol/L    Anion gap 4 3.0 - 18 mmol/L    Glucose 118 (H) 74 - 99 mg/dL    BUN 10 7.0 - 18 MG/DL    Creatinine 0.68 0.6 - 1.3 MG/DL    BUN/Creatinine ratio 15 12 - 20      GFR est AA >60 >60 ml/min/1.73m2    GFR est non-AA >60 >60 ml/min/1.73m2    Calcium 9.2 8.5 - 10.1 MG/DL   CBC WITH AUTOMATED DIFF    Collection Time: 21  4:40 AM   Result Value Ref Range    WBC 10.6 4.6 - 13.2 K/uL    RBC 4.42 4.35 - 5.65 M/uL    HGB 13.2 13.0 - 16.0 g/dL    HCT 39.7 36.0 - 48.0 %    MCV 89.8 74.0 - 97.0 FL    MCH 29.9 24.0 - 34.0 PG    MCHC 33.2 31.0 - 37.0 g/dL    RDW 14.6 (H) 11.6 - 14.5 %    PLATELET 952 581 - 091 K/uL MPV 9.5 9.2 - 11.8 FL    NEUTROPHILS 80 (H) 40 - 73 %    LYMPHOCYTES 8 (L) 21 - 52 %    MONOCYTES 12 (H) 3 - 10 %    EOSINOPHILS 0 0 - 5 %    BASOPHILS 0 0 - 2 %    ABS. NEUTROPHILS 8.5 (H) 1.8 - 8.0 K/UL    ABS. LYMPHOCYTES 0.9 0.9 - 3.6 K/UL    ABS. MONOCYTES 1.2 0.05 - 1.2 K/UL    ABS. EOSINOPHILS 0.0 0.0 - 0.4 K/UL    ABS. BASOPHILS 0.0 0.0 - 0.1 K/UL    DF AUTOMATED     PTT    Collection Time: 06/07/21  8:50 AM   Result Value Ref Range    aPTT 79.1 (H) 23.0 - 36.4 SEC   GLUCOSE, POC    Collection Time: 06/07/21 11:19 AM   Result Value Ref Range    Glucose (POC) 136 (H) 70 - 110 mg/dL   PTT    Collection Time: 06/07/21  2:23 PM   Result Value Ref Range    aPTT 75.3 (H) 23.0 - 36.4 SEC       Results     Procedure Component Value Units Date/Time    COVID-19 RAPID TEST [[id number]] Collected: 06/05/21 1555    Order Status: Completed Specimen: Nasopharyngeal Updated: 06/05/21 1618     Specimen source Nasopharyngeal        COVID-19 rapid test Not detected        Comment: Rapid Abbott ID Now       Rapid NAAT:  The specimen is NEGATIVE for SARS-CoV-2, the novel coronavirus associated with COVID-19. Negative results should be treated as presumptive and, if inconsistent with clinical signs and symptoms or necessary for patient management, should be tested with an alternative molecular assay. Negative results do not preclude SARS-CoV-2 infection and should not be used as the sole basis for patient management decisions. This test has been authorized by the FDA under an Emergency Use Authorization (EUA) for use by authorized laboratories.    Fact sheet for Healthcare Providers: kstattoo.com  Fact sheet for Patients: kstattoo.com       Methodology: Isothermal Nucleic Acid Amplification               CTA CHEST W OR W WO CONT    Result Date: 6/5/2021  Right greater than left pulmonary emboli, most proximally on the right at the branching of the main pulmonary artery but no findings of heart strain. Suspect right greater than left dependent infarcts which may be causing flank pain. Alternatively aspiration is possible. Results discussed with Dr. Allen Willis on 6/5/2021 at 1449 hours. CT ABD PELV W CONT    Result Date: 6/5/2021  Near complete resolution of the cecal diverticulitis. Mild new dependent lung opacities favored to represent aspiration. Mild bronchitis.         Signed By: Karyna Chen NP     June 7, 2021

## 2021-06-07 NOTE — PROGRESS NOTES
[facility]  Pulmonary, Critical Care, and Sleep Medicine    Name: Janine Pickard MRN: [id number]   : 1945 Hospital: [facility]   Date: 2021        IMPRESSION:   · R sided Pulmonary Embolism- extensive clot burden of upper and lower branches, likely chronic. No hypoxia, no signs of RV strain on echo or CTA very likely chronic  · Pleuritic chest pain -possibly 2/2 lung infarction?, not evident on CT. Possible aspiration event  · Hx of falls - may be poor long term AC candidate  · Possible aspiration   · Dsphagia  · Hx of esophageal varices  · Hx of EtoH abuse  · Recent Diverticulitis- now resolved. · Hx of tobacco abuse - over 50 pack years, quit 5 years ago     Patient Active Problem List   Diagnosis Code    Unsteady gait R26.81    Gait instability R26.81    Vertigo R42    Radiculopathy of lumbar region M54.16    ED (erectile dysfunction) of organic origin N52.9    Peripheral vascular disease ([facility] [address] I73.9    Overactive bladder N32.81    Nocturia R35.1    Leg pain, right M79.604    Hypercholesterolemia E78.00    HTN (hypertension) I10    H/O spinal cord injury Z87.828    Erectile disorder due to medical 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 N52.1    Bladder outlet obstruction N32.0    Injury of lumbar spine ([facility] [address] S34.109A    Frequency of urination R35.0    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    · Supplemental O2 as needed for SOB  · Aspiration precautions  · IR consult for possible thrombectomy recommended  · LE dopplers positive for non-occlusive DVT in LLE and RLE - consider need for  IVC filter placement  · Recommend age appropriate cancer screening if not done- colonoscopy, PSA etc.  · Recommend NSAID therapy for pleuritic chest pain. · Continue puree diet per SLP due to dysphagia and aspiration risk  · ABX per primary team, currently levaquin and flagyl -  needs to complete course for diverticulitis  · Assess home Oxygen needs at discharge  · OT, PT, OOB and ambulate  · Will Follow        Subjective/Interval History:     77year old male with PMHx of HTN, esophageal varices, prior heavy EtOH use and recently Dx diverticulitis admitted on 21 admitted for right sided PE with extensive clot burden but no right heart strain. 2021    · Patient alert and oriented x 3, in NAD  · SpO2 > 92% on 3L NC  · Complaints of right sided pleuritic chest pain, no worsening SOB  · Continues on heparin gtt, IR eval for thrombectomy pending  · LE dopplers with non-occlusive DVT in right distal femoral vein and left posterior tibial veins    ROS:A comprehensive review of systems was negative except for that written in the HPI. Objective:   Vital Signs:    Visit Vitals  /72 (BP 1 Location: Right upper arm, BP Patient Position: At rest)   Pulse 97   Temp 97.6 °F (36.4 °C)   Resp 15   Ht 5' 7\" (1.702 m)   Wt 72.6 kg (160 lb)   SpO2 96%   BMI 25.06 kg/m²       O2 Device: Nasal cannula   O2 Flow Rate (L/min): 3 l/min   Temp (24hrs), Av.2 °F (36.8 °C), Min:97.6 °F (36.4 °C), Max:98.7 °F (37.1 °C)       Intake/Output:   Last shift:      701 - 1900  In: 484.6 [P.O.:360; I.V.:124.6]  Out: -   Last 3 shifts: 1901 -  0700  In: 565.5 [P.O.:480; I.V.:85.5]  Out: 1500 [Urine:1500]    Intake/Output Summary (Last 24 hours) at 2021 1134  Last data filed at 2021 1100  Gross per 24 hour   Intake 570.08 ml   Output 750 ml   Net -179.92 ml       Physical Exam:   General:  Alert, cooperative, no distress, appears stated age.    Head: Normocephalic, without obvious abnormality, atraumatic. Lungs:   Bilateral auscultation clear, no rales or wheezing. Chest wall:  No tenderness or deformity. NO CREPITUS   Heart:  Regular rate and rhythm, S1, S2 normal, no murmur, click, rub or gallop. Abdomen:   Soft,mild tenderness to palpation in the center. Bowel sounds normal. No masses,  No organomegaly. No paradox   Extremities: normal, atraumatic, no cyanosis or edema. Pulses: 1-2+ and symmetric all extremities. Neurologic: Grossly nonfocal             DATA:  Labs:  Recent Labs     06/07/21 0440 06/06/21 0615 06/05/21  1046   WBC 10.6 8.7 6.5   HGB 13.2 13.5 13.9   HCT 39.7 40.5 40.9    215 235     Recent Labs     06/07/21 0440 06/06/21 0615 06/05/21  1046   * 137 136   K 3.9 4.2 3.8    106 105   CO2 28 28 27   * 109* 104*   BUN 10 8 11   CREA 0.68 0.73 0.88   CA 9.2 8.8 8.9   MG  --  2.3  --    PHOS  --  2.6  --    ALB  --   --  3.5   ALT  --   --  22   INR  --  1.4*  --      No results for input(s): PH, PCO2, PO2, HCO3, FIO2 in the last 72 hours. PFT:                                                     Echo:    Imaging:  [x]I have personally reviewed the patients radiographs  []Radiographs reviewed with radiologist   [x]No change from prior, tubes and lines in adequate position  []Improved   []Worsening    Kylie Henriquez, NP - C  06/07/2021  Pulmonary, Critical Care Medicine  [facility]

## 2021-06-07 NOTE — CONSULTS
Comprehensive Nutrition Assessment    Type and Reason for Visit: Initial, Consult    Nutrition Recommendations/Plan:   - Provide assistance and encourage meal intake. Add daily multivitamin and oral nutrition supplements: Ensure Enlive TID.  - Continue bowel regimen and add prune juice with each meal. Recommend providing suppository if constipation not resolved in the next 24-48 hours. Nutrition Assessment:  Patient reports decreased appetite and poor meal intake, significant abdominal pain attributed to gas with c/o constipation, chronic on bowel regimen at home of daily miralax and prune juice. Minimal intake of recent meals, agreeable to supplements and adding prune juice with meals. Evaluated by SLP yesterday (6/6/21). Malnutrition Assessment:  Malnutrition Status:  Severe malnutrition    Context:  Chronic illness     Findings of the 6 clinical characteristics of malnutrition:   Energy Intake:  7 - 75% or less est energy requirements for 1 month or longer  Weight Loss:  7 - Greater than 5% over 1 month (15 lb, 9% weight loss x month)     Body Fat Loss:  7 - Severe body fat loss, Buccal region, Orbital   Muscle Mass Loss:  7 - Severe muscle mass loss, Clavicles (pectoralis &deltoids), Hand (interosseous), Temples (temporalis)    Nutrition History and Allergies: Past medical history of tobacco abuse, peptic ulcer disease, H. pylori, hypertension, hyperlipidemia, bladder outlet obstruction, PVD, bipolar, diverticulosis, hx of alcohol abuse (last intake 5 years ago), chronic constipation, esophageal varices in the past with chronic gastritis s/p endoscopy (4/23/21), dysphagia s/p MBS (2/9/21), edentulous with unintentional weight loss admitted with c/o abdominal pain and chest pain with right sided PE with extensive clot burden. Fair to poor meal intake PTA with significant weight loss over less than a month per patient report with usual weight of 174-176 lb.  Weight history per chart review: 175 lb (5/28/21), 160 lb (6/6/21) with 15 lb, 8.6% weight loss x month. Food allergies to crab and shellfish. Estimated Daily Nutrient Needs:  Energy (kcal): 2553-2253; Weight Used for Energy Requirements: Current (72 kg)  Protein (g): 58-86; Weight Used for Protein Requirements: Current (0.8-1.2)  Fluid (ml/day): 0391-3072; Method Used for Fluid Requirements: 1 ml/kcal    Nutrition Related Findings:  Last BM PTA (6/4 per patient) with constipation on bowel regimen (colace BID, 17 gm miralax BID). Wounds:  None       Current Nutrition Therapies:  ADULT DIET Dysphagia - Pureed; Low Sodium (2 gm)    Anthropometric Measures:  · Height:  5' 7\" (170.2 cm)  · Current Body Wt:  72.6 kg (160 lb 0.9 oz)   · Admission Body Wt:  165 lb    · Usual Body Wt:  79.4 kg (175 lb)     · Ideal Body Wt:  148 lbs:  108.1 %   · BMI Category:  Normal weight (BMI 18.5-24. 9)       Nutrition Diagnosis:   · Severe malnutrition, In context of chronic illness related to biting/chewing (masticatory) difficulty, altered GI function, inadequate protein-energy intake, early satiety, partial or complete edentulism as evidenced by poor intake prior to admission, swallowing study results, weight loss greater than or equal to 5% in 1 month, severe loss of subcutaneous fat, severe muscle loss, poor dentition, constipation, GI abnormality    Nutrition Interventions:   Food and/or Nutrient Delivery: Continue current diet, Vitamin supplement, Start oral nutrition supplement  Nutrition Education and Counseling: Education not indicated  Coordination of Nutrition Care: Continue to monitor while inpatient, Coordination of community care, Swallow evaluation    Goals:  PO nutrition intake will meet >75% of patient estimated nutritional needs within the next 7 days       Nutrition Monitoring and Evaluation:   Behavioral-Environmental Outcomes: None identified  Food/Nutrient Intake Outcomes: Diet advancement/tolerance, Supplement intake, Vitamin/mineral intake  Physical Signs/Symptoms Outcomes: Biochemical data, Chewing or swallowing, Constipation, GI status, Nausea/vomiting, Meal time behavior, Nutrition focused physical findings    Discharge Planning:    Continue oral nutrition supplement, Continue current diet     Electronically signed by Javy Barcenas RD, [address]  on 6/7/2021 at 3:39 PM    Contact: [phone]

## 2021-06-07 NOTE — CONSULTS
I have seen and examined this patient independently, I reviewed pertinent labs and imaging, and I agree with the assessing provider's assessment and plan as outlined above, with ammendments as follows:     Maximize medical therapy  VT once acute issues resolved or in office in ~7 days  Follow up arranged     Ginger Carrasquillo MD  [facility] [place] [place]  Pager [phone]    1. Pulmonary embolism and infarction ([facility] [address]    2. Other acute pulmonary embolism without acute cor pulmonale ([facility] [address]    3. Pleurodynia        ASSESSMENT:   BPH with Urinary Retention   WBC 10.6>8.7   UA: Trace LE, WBC 0-2, Bacteria Few   Creat: 0.68 (Baseline 0.7-1.0)   Tmax 99.3, Tachy to 104    Acute PE   On Heparin Drip    Constipation, last BM 5 days ago per Patient    Recent Diverticulitis   On Levaquin and Flagyl    H/o Esophageal Varices        PLAN:    Spoke to patient, he seems reluctant for surgical management for BPH. Recommend increase Flomax to 0.8 mg.  Maintain pelayo. Will do VT in office. If patient fails, will proceed with Cysto and TRUS with Dr. Promise Dial. Retention may be d/t constipation. Recommend good bowel regimen to treat constipation. CT reviewed. No further  intervention. Will sign off. Follow up arranged? YES  Note sent to JACQUELINE villavicencio. [place], [place]    [phone]        Chief Complaint   Patient presents with    Flank Pain     right    Nausea       HISTORY OF PRESENT ILLNESS:  Valentin Shah is a 77 y.o. male who is seen in consultation as referred by   for urinary retention. Patient last seen  by  on 1/20/202 for urinary retention, possible BPH. At the time, patient presented with pelayo placed for urinary retention at ED while already on Flomax and Finasteride. Pt and daughter refused cysto.      Patient with past medical history significant for tobacco abuse, peptic ulcer disease, H. pylori, hypertension, hyperlipidemia , presented to ED on 6/5/21 with right flank pain radiating to right upper and lower abdomen, with urinary urgency and retention x2 days. Reports he had somewhat similar symptoms on 5/29/21, he was diagnosed with diverticulitis and treated with Levaquin and Flagyl. CTA done, shows PE. Patient also complaining of chest pain, alleviated when sitting up in bed, aggravated when laying flat. Also, has dysuria, constipation. Patient admitted by [facility] for PE. PVR on 6/6 with 358 cc, pelayo placed. Denies gross hematuria. Says that he has to use the restroom every 15 minutes. AUA Symptom Score 6/21/2017   Over the past month how often have you had the sensation that your bladder was not completely empty after you finished urinating? 5   Over the past month, how often have had to urinate again less than 2 hours after you last finished urinating? 4   Over the past month, how often have you found you stopped and started again several times when you urinated? 4   Over the past month, how often have you found it difficult to postpone urination? 0   Over the past month, how often have you had a weak urinary stream? 5   Over the past month, how often have you had to push or strain to begin urinating? 0   Over the past month, how many times did you most typically get up to urinate from the time you went to bed at night until the time you got up in the morning? 5   AUA Score 23   If you were to spend the rest of your life with your urinary condition the way it is now, how would you feel about that?  Unhappy         Past Medical History:   Diagnosis Date    Bladder outlet obstruction     Erectile disorder due to medical 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     Frequency of urination     H/O spinal cord injury 1974    lumbar spine injury secondary to fall    HTN (hypertension)     Hypercholesterolemia     Illiterate     Injury of lumbar spine ([facility] [address] 1974    Leg pain, right     Nocturia     Overactive bladder     Peripheral vascular disease ([facility] [address]        Past Surgical History:   Procedure Laterality Date COLONOSCOPY N/A 2019    COLONOSCOPY performed by Edward Morales MD at [facility] ENDOSCOPY    HAND/FINGER SURGERY UNLISTED Right     carpal tunnel    HX HEENT Left     lens implant    HX ORTHOPAEDIC      finger amputation left hand    HX TONSILLECTOMY      UPPER ARM/ELBOW SURGERY UNLISTED Right        Social History     Tobacco Use    Smoking status: Former Smoker     Quit date: 2015     Years since quittin.9    Smokeless tobacco: Never Used   Substance Use Topics    Alcohol use: Not Currently     Alcohol/week: 0.0 standard drinks     Comment: former stopped     Drug use: No       Allergies   Allergen Reactions    Latex Rash    Ampicillin Angioedema    Asa-Acetaminophen-Caff-Buffers Rash    Penicillins Angioedema    Crab Hives    Shellfish Derived Rash    Aspirin Other (comments)     Stomach upset    Atorvastatin Other (comments)     Muscle cramps       Family History   Problem Relation Age of Onset    Diabetes Mother     Hypertension Mother     Diabetes Maternal Grandmother     Hypertension Maternal Grandmother     Cancer Sister         brain    Cancer Sister         brain       Current Facility-Administered Medications   Medication Dose Route Frequency Provider Last Rate Last Admin    [START ON 2021] therapeutic multivitamin (THERAGRAN) tablet 1 Tablet  1 Tablet Oral DAILY Sanket Traylor MD        bisacodyL (DULCOLAX) suppository 10 mg  10 mg Rectal DAILY PRN Vernel Den B, NP        morphine injection 2 mg  2 mg IntraVENous Q4H PRN Vernel Den B, NP   2 mg at 21 1240    polyethylene glycol (MIRALAX) packet 17 g  17 g Oral BID Vernel Den B, NP   17 g at 21 0859    ketorolac (TORADOL) injection 15 mg  15 mg IntraVENous Q6H PRN Oscar Roach MD        heparin 25,000 units in D5W 250 ml infusion  18-36 Units/kg/hr IntraVENous TITRATE Ana Lawler MD 7.5 mL/hr at 21 0733 10 Units/kg/hr at 21 0733    amLODIPine (NORVASC) tablet 5 mg  5 mg Oral DAILY Miesha Clark MD   5 mg at 06/07/21 0859    ammonium lactate (LAC-HYDRIN) 12 % lotion   Topical DAILY Miesha Clark MD   Given at 06/07/21 0900    finasteride (PROSCAR) tablet 5 mg  5 mg Oral DAILY [place] LuliFeliciano MD   5 mg at 06/07/21 1119    levoFLOXacin (LEVAQUIN) tablet 750 mg  750 mg Oral DAILY Miesha Clark MD   750 mg at 06/07/21 0859    metroNIDAZOLE (FLAGYL) tablet 500 mg  500 mg Oral TID Miesha Clark MD   500 mg at 06/07/21 1506    tamsulosin (FLOMAX) capsule 0.4 mg  0.4 mg Oral PCD Miesha Clark MD   0.4 mg at 06/06/21 1719    sodium chloride (NS) flush 5-40 mL  5-40 mL IntraVENous Q8H Feliciano Jama MD   10 mL at 06/07/21 1347    sodium chloride (NS) flush 5-40 mL  5-40 mL IntraVENous PRN Miesha Clark MD        acetaminophen (TYLENOL) tablet 650 mg  650 mg Oral Q6H PRN Miesha Clark MD   650 mg at 06/07/21 6747    Or    acetaminophen (TYLENOL) suppository 650 mg  650 mg Rectal Q6H PRN Miesha Clark MD        ondansetron (ZOFRAN) injection 4 mg  4 mg IntraVENous Q8H PRN Miesha Clark MD   4 mg at 06/06/21 1105    DULoxetine (CYMBALTA) capsule 60 mg  60 mg Oral DAILY Miesha Clark MD   60 mg at 06/07/21 0859    pantoprazole (PROTONIX) tablet 40 mg  40 mg Oral ACB Miesha Clark MD   40 mg at 06/07/21 0730    docusate sodium (COLACE) capsule 100 mg  100 mg Oral BID Miesha Clark MD   100 mg at 06/07/21 0859    polyethylene glycol (MIRALAX) packet 17 g  17 g Oral DAILY Miesha Clark MD   17 g at 06/06/21 6434       Review of Systems  ROS is:      Negative for: Ophthalmologic issues, ENT issues, Cardiovascular issues, respiratory issues, GI issues, neurologic issues, hematoogic issues, skin lesions, musculoskeletal issues, psychiatric issues  Exceptions: yes    Positive for:    Dysuria, constipation, Chest pain, Right flank pain radiating to right upper and lower abdomen, with urinary urgency and retention           PHYSICAL EXAMINATION:   Visit Vitals  /66 (BP 1 Location: Right upper arm, BP Patient Position: At rest)   Pulse 85   Temp 97.5 °F (36.4 °C)   Resp 18   Ht 5' 7\" (1.702 m)   Wt 72.6 kg (160 lb)   SpO2 97%   BMI 25.06 kg/m²     Constitutional: Well developed, well nourished male. No acute distress. HEENT: Normocephalic, Atraumatic, EOM's intact   CV:  no edema  Respiratory: NC in place  Abdomen:  Soft, TTP in RU and RLQ, Midline tenderness., LLQ TTP. Complains of gas pains.  Male: Ayala in place with clear, yellow urine. BRUCE:Perineum normal to visual inspection, no erythema or irritation, Sphincter with good tone, Rectum with no hemorrhoids, fissures or masses, Prostate smooth, symmetric and anodular. Prostate is medium  SCROTUM:  No scrotal rash or lesions noticed. Normal bilateral testes and epididymis. PENIS: Urethral meatus normal in location and size. No urethral discharge. UnCircumsized   Skin: No evidence of jaundice. Normal color  Neuro/Psych:  Alert and oriented. Affect appropriate. REVIEW OF LABS AND IMAGING:    CT 6/5:    IMPRESSION  Near complete resolution of the cecal diverticulitis. Mild new dependent lung opacities favored to represent aspiration. Mild bronchitis. Labs: Results:   Chemistry    Recent Labs     06/07/21 0440 06/06/21  0615 06/05/21  1046   * 109* 104*   * 137 136   K 3.9 4.2 3.8    106 105   CO2 28 28 27   BUN 10 8 11   CREA 0.68 0.73 0.88   CA 9.2 8.8 8.9   AGAP 4 3 4   BUCR 15 11* 13   AP  --   --  57   TP  --   --  7.6   ALB  --   --  3.5   GLOB  --   --  4.1*   AGRAT  --   --  0.9      CBC w/Diff Recent Labs     06/07/21 0440 06/06/21  0615 06/05/21  1046   WBC 10.6 8.7 6.5   RBC 4.42 4.50 4.59   HGB 13.2 13.5 13.9   HCT 39.7 40.5 40.9    215 235   GRANS 80* 74* 74*   LYMPH 8* 13* 12*   EOS 0 0 1      Cultures No results for input(s): CULT in the last 72 hours.   All Micro Results       Procedure Component Value Units Date/Time    COVID-19 RAPID TEST [[id number]] Collected: 06/05/21 1555    Order Status: Completed Specimen: Nasopharyngeal Updated: 06/05/21 1618     Specimen source Nasopharyngeal        COVID-19 rapid test Not detected        Comment: Rapid Abbott ID Now       Rapid NAAT:  The specimen is NEGATIVE for SARS-CoV-2, the novel coronavirus associated with COVID-19. Negative results should be treated as presumptive and, if inconsistent with clinical signs and symptoms or necessary for patient management, should be tested with an alternative molecular assay. Negative results do not preclude SARS-CoV-2 infection and should not be used as the sole basis for patient management decisions. This test has been authorized by the FDA under an Emergency Use Authorization (EUA) for use by authorized laboratories.    Fact sheet for Healthcare Providers: ConventionUpdate.co.nz  Fact sheet for Patients: ConventionUpdate.co.nz       Methodology: Isothermal Nucleic Acid Amplification                   Urinalysis Color   Date Value Ref Range Status   06/05/2021 YELLOW   Final     Appearance   Date Value Ref Range Status   06/05/2021 CLEAR   Final     Specific gravity   Date Value Ref Range Status   06/05/2021 1.016 1.005 - 1.030   Final     pH (UA)   Date Value Ref Range Status   06/05/2021 5.5 5.0 - 8.0   Final     Protein   Date Value Ref Range Status   06/05/2021 Negative NEG mg/dL Final     Ketone   Date Value Ref Range Status   06/05/2021 Negative NEG mg/dL Final     Bilirubin   Date Value Ref Range Status   06/05/2021 Negative NEG   Final     Blood   Date Value Ref Range Status   06/05/2021 Negative NEG   Final     Urobilinogen   Date Value Ref Range Status   06/05/2021 0.2 0.2 - 1.0 EU/dL Final     Nitrites   Date Value Ref Range Status   06/05/2021 Negative NEG   Final     Leukocyte Esterase   Date Value Ref Range Status   06/05/2021 TRACE (A) NEG   Final     Potassium   Date Value Ref Range Status   06/07/2021 3.9 3.5 - 5.5 mmol/L Final     Creatinine   Date Value Ref Range Status   06/07/2021 0.68 0.6 - 1.3 MG/DL Final     BUN   Date Value Ref Range Status   06/07/2021 10 7.0 - 18 MG/DL Final      PSA No results for input(s): PSA in the last 72 hours.    Coagulation Lab Results   Component Value Date/Time    Prothrombin time 16.5 (H) 06/06/2021 06:15 AM    Prothrombin time 15.1 05/28/2021 04:50 PM    INR 1.4 (H) 06/06/2021 06:15 AM    INR 1.2 05/28/2021 04:50 PM    aPTT 75.3 (H) 06/07/2021 02:23 PM    aPTT 79.1 (H) 06/07/2021 08:50 AM

## 2021-06-07 NOTE — PROGRESS NOTES
Problem: Dysphagia (Adult)  Goal: *Acute Goals and Plan of Care (Insert Text)  Description: Recommendations:  Diet: puree/ nectar thick liquids  Meds: per pt preference  Aspiration Precautions  Oral Care TID      Goals:  Patient will:  1. Tolerate diet and/or diet upgrade without overt s/sx of aspiration under SLP supervision  2. Utilize compensatory swallow strategies of small bite/sip, alternate liquid/solid with min cues     Outcome: Not Progressing Towards Goal   SPEECH LANGUAGE PATHOLOGY DYSPHAGIA TREATMENT    Patient: Ruby Chavira (30 y.o. male)  Date: 6/7/2021  Diagnosis: Acute pulmonary embolism ([facility] [address] [I26.99] <principal problem not specified>       Precautions: Aspiration     PLOF:per H&P     ASSESSMENT:    Pt seen at bedside this date for follow up dysphagia management/tx. Pt c/o'ing of stomach pain and that he feels like po gets stuck and can't go down, pointing to distal esophagus. Pt willing to accept the following po trials: Thin liquids x1 cup sip with double weak/ swallow with slow laryngeal elevation and change in VQ (wet) post swallow. Improved tolerance achieved with Nectar thick liquids via cup displaying no overt s/sx of aspiration. Pt accepted x2 bites of pudding and jello. Pt demo'd functional labored oral prep with reduced mastication of jello d/t dental status. He demo'd funcal oral prep with pudding. With both solid presentations, he had a timely swallow initiation, but  multiple swallows warranted. Pt c/o'ing of globus sensation across all po. Rec downgrade to puree with Nectar thick liquids and MBS scheduled for tomorrow to further assess pharyngeal swallow. Pt agreeable to plan. D/W MIKE Pierre. GI consult to be considered.      Progression toward goals:  []         Improving appropriately and progressing toward goals  []         Improving slowly and progressing toward goals  [x]         Not making progress toward goals and plan of care will be adjusted     PLAN:  Recommendations and Planned Interventions:  See above  -downgrade to NTLs and MBS scheduled for tomorrow    Patient continues to benefit from skilled intervention to address the above impairments. Continue treatment per established plan of care. Discharge Recommendations:  Rehab     SUBJECTIVE:   Patient stated That tastes like chaulk, in refrence to tsp of vanilla pudding. OBJECTIVE:   Cognitive and Communication Status:  Neurologic State: Alert  Orientation Level: Oriented X4  Cognition: Appropriate decision making, Follows commands  Perception: Appears intact  Perseveration: No perseveration noted     Dysphagia Treatment:  Oral Assessment:  Oral Assessment  Labial: No impairment  Dentition: Edentulous  Oral Hygiene: good  Lingual: No impairment  Velum: No impairment  Mandible: No impairment  P.O. Trials:   Patient Position: [facility] [place] [facility]   Vocal quality prior to P.O.: No impairment   Consistency Presented:  Thin liquid, Solid, Puree   How Presented: Self-fed/presented, Straw, Successive swallows       Bolus Acceptance: No impairment   Bolus Formation/Control: Impaired   Type of Impairment: Mastication   Propulsion: Delayed (# of seconds)   Oral Residue: Lingual, 10-50% of bolus   Initiation of Swallow: No impairment   Laryngeal Elevation: Functional   Aspiration Signs/Symptoms: None   Pharyngeal Phase Characteristics: No impairment, issues, or problems        Cues for Modifications: Minimal         Oral Phase Severity: Mild   Pharyngeal Phase Severity : No impairment   Oral Motor Exercises:                                                                                                                                                                                                                             Exercises:  Laryngeal Exercises:                                                                                                                                     PAIN:  Pain level pre-treatment: 6-7 stomach/10   Pain level post-treatment: 6-7/10   Pain Intervention(s): Medication (see MAR); Rest, Ice, Repositioning   Response to intervention: Nurse notified, See doc flow    After treatment:   []              Patient left in no apparent distress sitting up in chair  [x]              Patient left in no apparent distress in bed  [x]              Call bell left within reach  [x]              Nursing notified  []              Family present  []              Caregiver present  []              Bed alarm activated      COMMUNICATION/EDUCATION:   [x] Aspiration precautions; swallow safety; compensatory techniques  []        Patient unable to participate in education; education ongoing with staff  [x]  Posted safety precautions in patient's room.   [] Oral-motor/laryngeal strengthening exercises      Mary Desai, SLP  MA, CCC-SLP  Speech-Language Pathologist    Time Calculation: 10 mins

## 2021-06-08 ENCOUNTER — APPOINTMENT (OUTPATIENT)
Dept: GENERAL RADIOLOGY | Age: 76
DRG: 134 | End: 2021-06-08
Attending: INTERNAL MEDICINE
Payer: COMMERCIAL

## 2021-06-08 LAB
ANION GAP SERPL CALC-SCNC: 3 MMOL/L (ref 3–18)
APTT PPP: 112.4 SEC (ref 23–36.4)
APTT PPP: 61.2 SEC (ref 23–36.4)
APTT PPP: 83.5 SEC (ref 23–36.4)
BASOPHILS # BLD: 0 K/UL (ref 0–0.1)
BASOPHILS NFR BLD: 0 % (ref 0–2)
BUN SERPL-MCNC: 14 MG/DL (ref 7–18)
BUN/CREAT SERPL: 19 (ref 12–20)
CALCIUM SERPL-MCNC: 9.6 MG/DL (ref 8.5–10.1)
CHLORIDE SERPL-SCNC: 100 MMOL/L (ref 100–111)
CO2 SERPL-SCNC: 29 MMOL/L (ref 21–32)
CREAT SERPL-MCNC: 0.75 MG/DL (ref 0.6–1.3)
DIFFERENTIAL METHOD BLD: ABNORMAL
EOSINOPHIL # BLD: 0 K/UL (ref 0–0.4)
EOSINOPHIL NFR BLD: 0 % (ref 0–5)
ERYTHROCYTE [DISTWIDTH] IN BLOOD BY AUTOMATED COUNT: 14.6 % (ref 11.6–14.5)
GLUCOSE SERPL-MCNC: 122 MG/DL (ref 74–99)
HCT VFR BLD AUTO: 37.2 % (ref 36–48)
HCT VFR BLD AUTO: 38 % (ref 36–48)
HGB BLD-MCNC: 12.6 G/DL (ref 13–16)
HGB BLD-MCNC: 12.7 G/DL (ref 13–16)
LYMPHOCYTES # BLD: 0.6 K/UL (ref 0.9–3.6)
LYMPHOCYTES NFR BLD: 6 % (ref 21–52)
MCH RBC QN AUTO: 30 PG (ref 24–34)
MCHC RBC AUTO-ENTMCNC: 33.4 G/DL (ref 31–37)
MCV RBC AUTO: 89.8 FL (ref 74–97)
MONOCYTES # BLD: 1 K/UL (ref 0.05–1.2)
MONOCYTES NFR BLD: 10 % (ref 3–10)
NEUTS SEG # BLD: 8.4 K/UL (ref 1.8–8)
NEUTS SEG NFR BLD: 84 % (ref 40–73)
PLATELET # BLD AUTO: 244 K/UL (ref 135–420)
PMV BLD AUTO: 10.2 FL (ref 9.2–11.8)
POTASSIUM SERPL-SCNC: 3.8 MMOL/L (ref 3.5–5.5)
RBC # BLD AUTO: 4.23 M/UL (ref 4.35–5.65)
SODIUM SERPL-SCNC: 132 MMOL/L (ref 136–145)
WBC # BLD AUTO: 10.1 K/UL (ref 4.6–13.2)

## 2021-06-08 PROCEDURE — 97535 SELF CARE MNGMENT TRAINING: CPT

## 2021-06-08 PROCEDURE — 74011250636 HC RX REV CODE- 250/636: Performed by: INTERNAL MEDICINE

## 2021-06-08 PROCEDURE — 97165 OT EVAL LOW COMPLEX 30 MIN: CPT

## 2021-06-08 PROCEDURE — 74011250637 HC RX REV CODE- 250/637: Performed by: HOSPITALIST

## 2021-06-08 PROCEDURE — 74011250636 HC RX REV CODE- 250/636: Performed by: HOSPITALIST

## 2021-06-08 PROCEDURE — 85730 THROMBOPLASTIN TIME PARTIAL: CPT

## 2021-06-08 PROCEDURE — 74011250637 HC RX REV CODE- 250/637: Performed by: INTERNAL MEDICINE

## 2021-06-08 PROCEDURE — 85018 HEMOGLOBIN: CPT

## 2021-06-08 PROCEDURE — 80048 BASIC METABOLIC PNL TOTAL CA: CPT

## 2021-06-08 PROCEDURE — 65660000000 HC RM CCU STEPDOWN

## 2021-06-08 PROCEDURE — 74230 X-RAY XM SWLNG FUNCJ C+: CPT

## 2021-06-08 PROCEDURE — 74011250636 HC RX REV CODE- 250/636: Performed by: NURSE PRACTITIONER

## 2021-06-08 PROCEDURE — 92611 MOTION FLUOROSCOPY/SWALLOW: CPT

## 2021-06-08 PROCEDURE — 97161 PT EVAL LOW COMPLEX 20 MIN: CPT

## 2021-06-08 PROCEDURE — 36415 COLL VENOUS BLD VENIPUNCTURE: CPT

## 2021-06-08 PROCEDURE — 97530 THERAPEUTIC ACTIVITIES: CPT

## 2021-06-08 PROCEDURE — APPNB30 APP NON BILLABLE TIME 0-30 MINS: Performed by: NURSE PRACTITIONER

## 2021-06-08 PROCEDURE — 99232 SBSQ HOSP IP/OBS MODERATE 35: CPT | Performed by: INTERNAL MEDICINE

## 2021-06-08 PROCEDURE — 99223 1ST HOSP IP/OBS HIGH 75: CPT | Performed by: INTERNAL MEDICINE

## 2021-06-08 PROCEDURE — 85025 COMPLETE CBC W/AUTO DIFF WBC: CPT

## 2021-06-08 PROCEDURE — 74011000250 HC RX REV CODE- 250: Performed by: INTERNAL MEDICINE

## 2021-06-08 PROCEDURE — 92526 ORAL FUNCTION THERAPY: CPT

## 2021-06-08 RX ORDER — HEPARIN SODIUM 1000 [USP'U]/ML
40 INJECTION, SOLUTION INTRAVENOUS; SUBCUTANEOUS ONCE
Status: COMPLETED | OUTPATIENT
Start: 2021-06-08 | End: 2021-06-08

## 2021-06-08 RX ADMIN — BARIUM SULFATE 60 ML: 400 SUSPENSION ORAL at 09:00

## 2021-06-08 RX ADMIN — METRONIDAZOLE 500 MG: 500 TABLET ORAL at 11:27

## 2021-06-08 RX ADMIN — THERA TABS 1 TABLET: TAB at 11:27

## 2021-06-08 RX ADMIN — HEPARIN SODIUM 12 UNITS/KG/HR: 10000 INJECTION, SOLUTION INTRAVENOUS at 07:30

## 2021-06-08 RX ADMIN — METRONIDAZOLE 500 MG: 500 TABLET ORAL at 17:42

## 2021-06-08 RX ADMIN — FINASTERIDE 5 MG: 5 TABLET, FILM COATED ORAL at 11:27

## 2021-06-08 RX ADMIN — BARIUM SULFATE 60 G: 960 POWDER, FOR SUSPENSION ORAL at 09:00

## 2021-06-08 RX ADMIN — DULOXETINE HYDROCHLORIDE 60 MG: 60 CAPSULE, DELAYED RELEASE ORAL at 11:27

## 2021-06-08 RX ADMIN — TAMSULOSIN HYDROCHLORIDE 0.4 MG: 0.4 CAPSULE ORAL at 17:41

## 2021-06-08 RX ADMIN — BARIUM SULFATE 700 MG: 700 TABLET ORAL at 09:00

## 2021-06-08 RX ADMIN — PANTOPRAZOLE 40 MG: 40 TABLET, DELAYED RELEASE ORAL at 07:30

## 2021-06-08 RX ADMIN — Medication 10 ML: at 15:01

## 2021-06-08 RX ADMIN — LEVOFLOXACIN 750 MG: 750 TABLET, FILM COATED ORAL at 11:27

## 2021-06-08 RX ADMIN — HEPARIN SODIUM 2900 UNITS: 1000 INJECTION INTRAVENOUS; SUBCUTANEOUS at 07:30

## 2021-06-08 RX ADMIN — HEPARIN SODIUM 10 UNITS/KG/HR: 10000 INJECTION, SOLUTION INTRAVENOUS at 14:59

## 2021-06-08 RX ADMIN — HEPARIN SODIUM 10 UNITS/KG/HR: 10000 INJECTION, SOLUTION INTRAVENOUS at 03:35

## 2021-06-08 RX ADMIN — PANTOPRAZOLE 40 MG: 40 TABLET, DELAYED RELEASE ORAL at 06:27

## 2021-06-08 RX ADMIN — MORPHINE SULFATE 2 MG: 2 INJECTION, SOLUTION INTRAMUSCULAR; INTRAVENOUS at 13:41

## 2021-06-08 RX ADMIN — Medication 10 ML: at 07:31

## 2021-06-08 RX ADMIN — METRONIDAZOLE 500 MG: 500 TABLET ORAL at 21:00

## 2021-06-08 RX ADMIN — Medication: at 11:40

## 2021-06-08 RX ADMIN — BARIUM SULFATE 15 ML: 400 PASTE ORAL at 09:00

## 2021-06-08 RX ADMIN — KETOROLAC TROMETHAMINE 15 MG: 15 INJECTION, SOLUTION INTRAMUSCULAR; INTRAVENOUS at 17:41

## 2021-06-08 NOTE — PROGRESS NOTES
conducted an initial consultation and Spiritual Assessment for Juan Schafer, who is a 77 y.o.,male. Patient's Primary Language is: [place]. According to the patient's EMR Worship Affiliation is: [place]. 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    Chest pain, moderate coronary artery risk 03/27/2019    Chest pain 03/27/2019    CAD (coronary artery disease) 03/26/2019    History of rheumatic fever 03/22/2019    Plasma cell dyscrasia 10/05/2018    Peripheral vascular disease (HCC)     Overactive bladder     Nocturia     Leg pain, right     Hypercholesterolemia     HTN (hypertension)     Erectile disorder due to medical 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     Bladder outlet obstruction     Frequency of urination     ED (erectile dysfunction) of organic origin 06/21/2017    Vertigo 05/17/2016    Radiculopathy of lumbar region 05/17/2016    Unsteady gait 04/11/2016    Gait instability 04/11/2016    H/O spinal cord injury 01/01/1974    [facility]        The  provided the following Interventions:   was unable to assess during nurse visit. Plan:  Chaplains will continue to follow and will provide pastoral care on an as needed/requested basis.  recommends bedside caregivers page  on duty if patient shows signs of acute spiritual or emotional distress.     [address] [facility]   [phone]

## 2021-06-08 NOTE — PROGRESS NOTES
Nutrition Note      Pt with poor meal intake; he dislikes pureed consistency diet and only wants salt for seasoning. S/p MBS today and advanced to minced & moist consistency per SLP. Food preference discussed. Dislikes Ensure Enlive; discussed other nutrition supplement options. Encouraged meal/ supplement intake. Pt received suppository last night; he reported having a BM last night and this morning. Feeling better. Poor progression towards nutrition goals. Nutrition Recommendations/Plan:   - Provide assistance and encourage meal intake. - Update food preference in diet order  - Modify supplement: change to Magic Cup BID and Ensure Pudding BID  - Continue daily MVI  - Continue bowel regimen.          Electronically signed by Sheryl Murillo RD on 6/8/2021 at 11:13 AM    Contact: [phone]

## 2021-06-08 NOTE — PROGRESS NOTES
PHYSICAL THERAPY EVALUATION AND DISCHARGE    Patient: Kristina Hammonds (18 y.o. male)  Date: 6/8/2021  Primary Diagnosis: Acute pulmonary embolism ([facility] [address] [I26.99]        Precautions:   Fall, Aspiration  PLOF: Pt was independent with self care and uses SPC with functional mobility. ASSESSMENT :  Based on the objective data described below, the patient is at/close to his baseline level of mobility. He is independent with bed mobility and performs functional transfers with supervision. Patient ambulates inside the room without LOB, though antalgic gait due to back pain. He denies SOB with mobility and verbalizes understanding of activity pacing. Patient typically uses elevator to access 3rd floor apartment. Patient does not require further skilled intervention at this level of care and will discharge from PT caseload at this time. PLAN :  Recommendations and Planned Interventions:   No formal PT needs identified at this time. Discharge Recommendations: Home Health with increased family supervision  Further Equipment Recommendations for Discharge: N/A     SUBJECTIVE:   Patient stated My legs gave out on me so I use my cane.  \" My daughter is a nurse and she lives a few blocks away\"    OBJECTIVE DATA SUMMARY:     Past Medical History:   Diagnosis Date    Bladder outlet obstruction     Erectile disorder due to medical 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     Frequency of urination     H/O spinal cord injury 1974    lumbar spine injury secondary to fall    HTN (hypertension)     Hypercholesterolemia     Illiterate     Injury of lumbar spine ([facility] [address] 1974    Leg pain, right     Nocturia     Overactive bladder     Peripheral vascular disease ([facility] [address]      Past Surgical History:   Procedure Laterality Date    COLONOSCOPY N/A 12/4/2019    COLONOSCOPY performed by Meredith Laguna MD at [facility] ENDOSCOPY    HAND/FINGER SURGERY UNLISTED Right     carpal tunnel    HX HEENT Left     lens implant    HX ORTHOPAEDIC      finger amputation left hand    HX TONSILLECTOMY      UPPER ARM/ELBOW SURGERY UNLISTED Right      Barriers to Learning/Limitations: None  Compensate with: N/A  Home Situation:   Home Situation  Home Environment: Apartment  # Steps to Enter: 25  One/Two Story Residence: One story  Living Alone: Yes  Support Systems: Child(sam)  Patient Expects to be Discharged to[de-identified] Other (comment)  Current DME Used/Available at Home: Cane, straight, Grab bars  Tub or Shower Type: Tub/Shower combination  Critical Behavior:  Neurologic State: Alert  Orientation Level: Oriented X4  Cognition: Follows commands     Psychosocial  Patient Behaviors: Calm; Cooperative  Purposeful Interaction: Yes  Pt Identified Daily Priority: Clinical issues (comment)  Caritas Process: Attend basic human needs;Nurture loving kindness; Teaching/learning  Caring Interventions: Reassure  Reassure: Caring rounds          Strength BLE:    Strength: Generally decreased, functional       Tone & Sensation BLE:   Tone: Normal    Sensation: Intact    Range Of Motion BLE:  AROM: Within functional limits           Functional Mobility:  Bed Mobility:     Supine to Sit: Modified independent  Sit to Supine: Modified independent     Transfers:  Sit to Stand: Modified independent  Stand to Sit: Modified independent       Balance:   Sitting: Intact  Standing: Impaired  Standing - Static: Good  Standing - Dynamic : Fair ((+))    Ambulation/Gait Training:  Distance (ft): 30 Feet (ft)  Ambulation - Level of Assistance: Supervision  Gait Abnormalities: Decreased step clearance  Speed/Indu: Slow     Pain:  Pain level pre-treatment: 5/10 low back pain  Pain level post-treatment: 5/10  Pain Intervention(s): Medication (see MAR); Rest, Ice, Repositioning   Response to intervention: Nurse notified, See doc flow    Activity Tolerance:   Fair +  Please refer to the flowsheet for vital signs taken during this treatment.   After treatment:   []         Patient left in no apparent distress sitting up in chair  [x]         Patient left in no apparent distress in bed  [x]         Call bell left within reach  [x]         Nursing notified  []         Caregiver present  []         Bed alarm activated  []         SCDs applied    COMMUNICATION/EDUCATION:   [x]         Role of Physical Therapy in the acute care setting. [x]         Fall prevention education was provided and the patient/caregiver indicated understanding. []         Patient/family have participated as able in goal setting and plan of care. []         Patient/family agree to work toward stated goals and plan of care. []         Patient understands intent and goals of therapy, but is neutral about his/her participation. []         Patient is unable to participate in goal setting/plan of care: ongoing with therapy staff.  []         Other:     Thank you for this referral.  Glo Hedrick, PT   Time Calculation: 25 mins      Eval Complexity: History: LOW Complexity : Zero comorbidities / personal factors that will impact the outcome / POCExam:LOW Complexity : 1-2 Standardized tests and measures addressing body structure, function, activity limitation and / or participation in recreation  Presentation: LOW Complexity : Stable, uncomplicated  Clinical Decision Making:Low Complexity    Overall Complexity:LOW

## 2021-06-08 NOTE — PROGRESS NOTES
[facility]  Progress Note    Patient: Wilbert Titus Age: 77 y.o. : 1945 MR#: [id number] SSN: [id number]  Date: 2021     Subjective:     Pt has no complaints. Per nursing having BM's. Assessment/Plan:   77year old male admitted on  after presenting with c/o right sided abdominal pain, noted to have high D dimer and CTA that was positive for PE.    -Acute PE - on heparin drip, cont PPI for prophylaxis. Consider thrombectomy per pulmonary once pneumonia improved. -Acute non-occlusive thrombus present in the right distal femoral vein. Age indeterminate non-occlusive thrombus present in the left posterior tibial vein. IVC filter placement      Pt is poor candidate for oac treatment. Pulm following, final decision not made. IR  consulted for thrombectomy and IVC placement    -Falls at home - Fall precautions. PT / OT after 48 hours of anticoagulation. -? Aspiration pna - cont levaquin and flagyl, asp precautions and modified diet  -BPH w/ acute urinary retention - cont flomax / proscar / pelayo catheter for now for acute retention. Urology input noted. outpt f/u, maintain pelayo  -Unintentional weight loss, in the setting of edentulous state. Consult nutritionist, SLP rec pureed diet -if patient dislikes food can transition to \"minced and moist\" per SLP  -Constipation -per nursing pt now having bm's  -Recent diverticulitis - improved, continue Levaquin and Flagyl    HISTORY OF:  -EtOH use d/o, quit 5 years ago. Multivitamin, thiamine, folic acid. -Bipolar d/o per patient. Not on treatment per home med review.   -Neuropathy right foot, continue home cymbalta  -Hypertension - cont norvasc w/ hold parameters   -Hx esophageal varices. US 6/3/2021 Unremarkable right upper quadrant ultrasound. No obvious varices noted around the liver.    S/p EGD with bx 21, Susie Lopez MD [facility].   Angelika Tapia, BIOPSY:     - ACTIVE CHRONIC GASTRITIS - continue ppi.    -   HELICOBACTER PYLORI IDENTIFIED - patient states he completed course of antibiotics. Dispo: PT/OT ordered for dispo determination    Daughter Tarsha Akhtar via phone call at phone number [phone]    Additional Notes:      Case discussed with:  [x]Patient  []Family  [x]Nursing  []Case Management  DVT Prophylaxis:  []Lovenox  [x]Hep drip  []SCDs  []Coumadin   []On Heparin gtt    Objective:     VS:   Visit Vitals  /74   Pulse 81   Temp 98.4 °F (36.9 °C)   Resp 18   Ht 5' 7\" (1.702 m)   Wt 72.6 kg (160 lb)   SpO2 97%   BMI 25.06 kg/m²      Tmax/24hrs: Temp (24hrs), Av.1 °F (36.7 °C), Min:97.5 °F (36.4 °C), Max:98.7 °F (37.1 °C)      Intake/Output Summary (Last 24 hours) at 2021 1251  Last data filed at 2021 1824  Gross per 24 hour   Intake 796.13 ml   Output 475 ml   Net 321.13 ml     General:  Alert, NAD at rest, + discomfort with mov't  HEENT: Oral mucosa moist; PERRLA  Cardiovascular:  RRR, Nl S1/S2  Pulmonary:  LSC throughout. Normal resp effort  GI:  +BS in all four quadrants, soft, non-tender  : + pelayo   Extremities:  No edema; 2+ dorsalis pedis pulses bilaterally  Neuro: alert and oriented x 4    Labs / micro / imaging :    Recent Results (from the past 24 hour(s))   PTT    Collection Time: 21  2:23 PM   Result Value Ref Range    aPTT 75.3 (H) 23.0 - 36.4 SEC   CBC WITH AUTOMATED DIFF    Collection Time: 21  4:50 AM   Result Value Ref Range    WBC 10.1 4.6 - 13.2 K/uL    RBC 4.23 (L) 4.35 - 5.65 M/uL    HGB 12.7 (L) 13.0 - 16.0 g/dL    HCT 38.0 36.0 - 48.0 %    MCV 89.8 74.0 - 97.0 FL    MCH 30.0 24.0 - 34.0 PG    MCHC 33.4 31.0 - 37.0 g/dL    RDW 14.6 (H) 11.6 - 14.5 %    PLATELET 761 591 - 352 K/uL    MPV 10.2 9.2 - 11.8 FL    NEUTROPHILS 84 (H) 40 - 73 %    LYMPHOCYTES 6 (L) 21 - 52 %    MONOCYTES 10 3 - 10 %    EOSINOPHILS 0 0 - 5 %    BASOPHILS 0 0 - 2 %    ABS. NEUTROPHILS 8.4 (H) 1.8 - 8.0 K/UL    ABS. LYMPHOCYTES 0.6 (L) 0.9 - 3.6 K/UL    ABS.  MONOCYTES 1.0 0.05 - 1.2 K/UL    ABS. EOSINOPHILS 0.0 0.0 - 0.4 K/UL    ABS. BASOPHILS 0.0 0.0 - 0.1 K/UL    DF AUTOMATED     METABOLIC PANEL, BASIC    Collection Time: 06/08/21  4:50 AM   Result Value Ref Range    Sodium 132 (L) 136 - 145 mmol/L    Potassium 3.8 3.5 - 5.5 mmol/L    Chloride 100 100 - 111 mmol/L    CO2 29 21 - 32 mmol/L    Anion gap 3 3.0 - 18 mmol/L    Glucose 122 (H) 74 - 99 mg/dL    BUN 14 7.0 - 18 MG/DL    Creatinine 0.75 0.6 - 1.3 MG/DL    BUN/Creatinine ratio 19 12 - 20      GFR est AA >60 >60 ml/min/1.73m2    GFR est non-AA >60 >60 ml/min/1.73m2    Calcium 9.6 8.5 - 10.1 MG/DL   PTT    Collection Time: 06/08/21  4:50 AM   Result Value Ref Range    aPTT 61.2 (H) 23.0 - 36.4 SEC       Results     Procedure Component Value Units Date/Time    COVID-19 RAPID TEST [[id number]] Collected: 06/05/21 1555    Order Status: Completed Specimen: Nasopharyngeal Updated: 06/05/21 1618     Specimen source Nasopharyngeal        COVID-19 rapid test Not detected        Comment: Rapid Abbott ID Now       Rapid NAAT:  The specimen is NEGATIVE for SARS-CoV-2, the novel coronavirus associated with COVID-19. Negative results should be treated as presumptive and, if inconsistent with clinical signs and symptoms or necessary for patient management, should be tested with an alternative molecular assay. Negative results do not preclude SARS-CoV-2 infection and should not be used as the sole basis for patient management decisions. This test has been authorized by the FDA under an Emergency Use Authorization (EUA) for use by authorized laboratories. Fact sheet for Healthcare Providers: kstattoo.com  Fact sheet for Patients: kstattoo.com       Methodology: Isothermal Nucleic Acid Amplification               XR ABD (KUB)    Result Date: 6/8/2021  1. Nonobstructive bowel gas pattern with no radiographic abnormality.     XR SWALLOW FUNC VIDEO    Result Date: 6/8/2021  No heriberto penetration or aspiration with all tested consistencies. Please see speech pathologist report for additional details and recommendations. CTA CHEST W OR W WO CONT    Result Date: 6/5/2021  Right greater than left pulmonary emboli, most proximally on the right at the branching of the main pulmonary artery but no findings of heart strain. Suspect right greater than left dependent infarcts which may be causing flank pain. Alternatively aspiration is possible. Results discussed with Dr. Lisa Meadows on 6/5/2021 at 1449 hours. CT ABD PELV W CONT    Result Date: 6/5/2021  Near complete resolution of the cecal diverticulitis. Mild new dependent lung opacities favored to represent aspiration. Mild bronchitis.         Signed By: Aman Polanco MD     June 8, 2021

## 2021-06-08 NOTE — PROGRESS NOTES
OCCUPATIONAL THERAPY EVALUATION/DISCHARGE    Patient: Alysha Hurd (44 y.o. male)  Date: 6/8/2021  Primary Diagnosis: Acute pulmonary embolism ([facility] [address] [I26.99]       Precautions:  Fall, Aspiration  PLOF: Pt was independent with self-care tasks prior to hospital admission. Pt lives alone in apartment on 3rd floor; utilizes elevator mostly, but will take the stairs. Pt uses straight cane when feels weak for support. ASSESSMENT AND RECOMMENDATIONS:  Based on the objective data described below, the patient presents with overall functional independence to complete self-care skills. Cleared by RN for eval. Co-treat with PT to maximize pt safety and functional mobility. Pt completed bed mobility to sit EOB with mod I. Pt demonstrated functional ROM and strength in UE for participation in self-care skills. Pt don/doff socks with mod I. Pt able to stand with mod I. Supervision for walking only for line management at this time. Mod I to complete bathroom transfer. Subhash Mcelroy arrived to talk to pt briefly. Pt returned to sitting EOB with mod I. Provided education regarding energy conservation techniques and pt safety upon return to home. Recommended pt acquire shower chair for energy conservation during bathing tasks. Pt demonstrated understanding. Pt left with all needs met, in bed with call bell and nursing notified. No further OT required at this time. Skilled occupational therapy is not indicated at this time. Discharge Recommendations: Home Health with inc assistance from local family as needed  Further Equipment Recommendations for Discharge: shower chair      SUBJECTIVE:   Patient stated  I might have to go to the bathroom again, but I need to wait for it to shift down.     OBJECTIVE DATA SUMMARY:     Past Medical History:   Diagnosis Date    Bladder outlet obstruction     Erectile disorder due to medical 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     Frequency of urination     H/O spinal cord injury 1974    lumbar spine injury secondary to fall    HTN (hypertension)     Hypercholesterolemia     Illiterate     Injury of lumbar spine ([facility] [address] 1974    Leg pain, right     Nocturia     Overactive bladder     Peripheral vascular disease ([facility] [address]      Past Surgical History:   Procedure Laterality Date    COLONOSCOPY N/A 12/4/2019    COL[facility]COPY performed by Lexx Light [facility] at [facility] ENDOSCOPY    HAND/FINGER SURGERY UNLISTED Right     carpal tunnel    HX HEENT Left     lens implant    HX ORTHOPAEDIC      finger amputation left hand    HX TONSILLECTOMY      UPPER ARM/ELBOW SURGERY UNLISTED Right      Barriers to Learning/Limitations: None  Compensate with: visual, verbal, tactile, kinesthetic cues/model    Home Situation:   Home Situation  Home Environment: Apartment  # Steps to Enter: 25  One/Two Story Residence: One story  Living Alone: Yes  Support Systems: Child(sam)  Patient Expects to be Discharged to[de-identified] Other (comment)  Current DME Used/Available at Home: Cane, straight, Grab bars  Tub or Shower Type: Tub/Shower combination  [x]     Right hand dominant   []     Left hand dominant    Cognitive/Behavioral Status:  Neurologic State: Alert  Orientation Level: Oriented X4  Cognition: Follows commands       Skin: visible skin intact  Edema: none    Vision/Perceptual:    Acuity: Within Defined Limits         Coordination: BUE  Coordination: Within functional limits  Fine Motor Skills-Upper: Left Intact; Right Intact (c/o numbness/cold fingers; hx CTS and CubitalTS )    Gross Motor Skills-Upper: Left Intact; Right Intact    Balance:  Sitting: Intact  Standing: Impaired  Standing - Static: Good  Standing - Dynamic : Good    Strength: BUE  Strength:  Within functional limits      Tone & Sensation: BUE  Tone: Normal  Sensation: Intact        Range of Motion: BUE  AROM: Within functional limits    Functional Mobility and Transfers for ADLs:  Bed Mobility:     Supine to Sit: Modified independent  Sit to Supine: Modified independent Transfers:  Sit to Stand: Modified independent  Stand to Sit: Modified independent    Bathroom Mobility: Modified independent (supv for safety and line management only)    ADL Assessment:  Feeding: Modified independent    Oral Facial Hygiene/Grooming: Modified Independent    Bathing: Modified independent    Upper Body Dressing: Modified independent    Lower Body Dressing: Modified independent    Toileting: Modified independent     ADL Intervention:    Upper Body Dressing Assistance  Dressing Assistance: Modified independent  Shirt simulation with hospital gown: Modified independent    Lower Body Dressing Assistance  Dressing Assistance: Modified independent  Socks: Modified independent  Leg Crossed Method Used: No  Position Performed: Seated edge of bed       Pain:  Pain level pre-treatment: 5/10   Pain level post-treatment: 5/10   Pain Intervention(s): Medication (see MAR); Rest, Ice, Repositioning   Response to intervention: Nurse notified, See doc flow    Activity Tolerance:    Fair +   Please refer to the flowsheet for vital signs taken during this treatment. After treatment:   []  Patient left in no apparent distress sitting up in chair  [x]  Patient left in no apparent distress in bed  [x]  Call bell left within reach  [x]  Nursing notified  []  Caregiver present  []  Bed alarm activated    COMMUNICATION/EDUCATION:   [x]      Role of Occupational Therapy in the acute care setting  [x]      Home safety education was provided and the patient/caregiver indicated understanding. [x]      Patient/family have participated as able and agree with findings and recommendations. []      Patient is unable to participate in plan of care at this time. Thank you for this referral.  Mario Woods, SHANDA, OTR/L  Time Calculation: 25 mins      Eval Complexity: History: LOW Complexity : Brief history review ;    Examination: LOW Complexity : 1-3 performance deficits relating to physical, cognitive , or psychosocial skils that result in activity limitations and / or participation restrictions ;    Decision Making:LOW Complexity : No comorbidities that affect functional and no verbal or physical assistance needed to complete eval tasks

## 2021-06-08 NOTE — ROUTINE PROCESS
0700-/Bedside and Verbal shift change report given to [employer] and Myrna (oncoming nurse) by Alejandra Simpson (offgoing nurse). Report included the following information SBAR, Kardex and Recent Results. 0730-patient transported off unit to radiology 0926-patient returned to unit. Shift assessment completed. 1125-meds given.

## 2021-06-08 NOTE — PROGRESS NOTES
Problem: Pain  Goal: *Control of Pain  Outcome: Progressing Towards Goal  Goal: *PALLIATIVE CARE:  Alleviation of Pain  Outcome: Progressing Towards Goal     Problem: Patient Education: Go to Patient Education Activity  Goal: Patient/Family Education  Outcome: Progressing Towards Goal     Problem: Falls - Risk of  Goal: *Absence of Falls  Description: Document Zurita Maple Fall Risk and appropriate interventions in the flowsheet.   Outcome: Progressing Towards Goal  Note: Fall Risk Interventions:  Mobility Interventions: Assess mobility with egress test, OT consult for ADLs, Patient to call before getting OOB, PT Consult for mobility concerns, Utilize walker, cane, or other assistive device         Medication Interventions: Bed/chair exit alarm, Patient to call before getting OOB    Elimination Interventions: Bed/chair exit alarm, Call light in reach, Patient to call for help with toileting needs    History of Falls Interventions: Bed/chair exit alarm         Problem: Patient Education: Go to Patient Education Activity  Goal: Patient/Family Education  Outcome: Progressing Towards Goal     Problem: Pulmonary Embolism Care Plan (Adult)  Goal: *Improvement of existing pulmonary embolism  Outcome: Progressing Towards Goal  Goal: *Absence of bleeding  Outcome: Progressing Towards Goal  Goal: *Labs within defined limits  Outcome: Progressing Towards Goal     Problem: Patient Education: Go to Patient Education Activity  Goal: Patient/Family Education  Outcome: Progressing Towards Goal     Problem: Injury - Risk of, Adverse Drug Event  Goal: *Absence of adverse drug events  Outcome: Progressing Towards Goal  Goal: *Absence of medication errors  Outcome: Progressing Towards Goal  Goal: *Knowledge of prescribed medications  Outcome: Progressing Towards Goal     Problem: Patient Education: Go to Patient Education Activity  Goal: Patient/Family Education  Outcome: Progressing Towards Goal     Problem: Infection - Risk of, Urinary Catheter-Associated Urinary Tract Infection  Goal: *Absence of infection signs and symptoms  Outcome: Progressing Towards Goal     Problem: Patient Education: Go to Patient Education Activity  Goal: Patient/Family Education  Outcome: Progressing Towards Goal     Problem: Patient Education: Go to Patient Education Activity  Goal: Patient/Family Education  Outcome: Progressing Towards Goal     Problem: Constipation - Risk of  Goal: *Prevention of constipation  Outcome: Progressing Towards Goal  Goal: *PALLIATIVE CARE:  Prevention/Alleviation of constipation  Outcome: Progressing Towards Goal     Problem: Patient Education: Go to Patient Education Activity  Goal: Patient/Family Education  Outcome: Progressing Towards Goal     Problem: Nutrition Deficit  Goal: *Optimize nutritional status  Outcome: Progressing Towards Goal     Problem: Pressure Injury - Risk of  Goal: *Prevention of pressure injury  Description: Document Aristeo Scale and appropriate interventions in the flowsheet.   Outcome: Progressing Towards Goal     Problem: Patient Education: Go to Patient Education Activity  Goal: Patient/Family Education  Outcome: Progressing Towards Goal

## 2021-06-08 NOTE — ROUTINE PROCESS
Bedside and Verbal shift change report given to akhil rn (oncoming nurse) by Davida Jones RN (offgoing nurse). Report given with SBAR, Kardex, Intake/Output, MAR, Accordion and Recent Results. Heparin drip check, changed and bolus done during bedside report

## 2021-06-08 NOTE — PROGRESS NOTES
Called Mike Walters to check to see if pt has a SNF benefit. [facility] is running insurance to check benefit. Plan SNF vs HH.     Osman Tellez RN BSN  Care Manager  [phone]

## 2021-06-08 NOTE — PROGRESS NOTES
[facility]  Pulmonary, Critical Care, and Sleep Medicine    Name: Nick Rain MRN: [id number]   : 1945 Hospital: [address] [place] [address]   Date: 2021        IMPRESSION:   · R sided Pulmonary Embolism- extensive clot burden of upper and lower branches, likely chronic. No hypoxia, no signs of RV strain on echo or CTA very likely chronic  · Pleuritic chest pain -possibly 2/2 lung infarction?, not evident on CT. Possible aspiration event  · Hx of falls - may be poor long term AC candidate  · Possible aspiration   · Dsphagia  · Hx of esophageal varices  · Hx of EtoH abuse  · Recent Diverticulitis- now resolved.   · Hx of tobacco abuse - over 50 pack years, quit 5 years ago     Patient Active Problem List   Diagnosis Code    Unsteady gait R26.81    Gait instability R26.81    Vertigo R42    Radiculopathy of lumbar region M54.16    ED (erectile dysfunction) of organic origin N52.9    Peripheral vascular disease ([facility] [address] I73.9    Overactive bladder N32.81    Nocturia R35.1    Leg pain, right M79.604    Hypercholesterolemia E78.00    HTN (hypertension) I10    H/O spinal cord injury Z87.828    Erectile disorder due to medical 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 N52.1    Bladder outlet obstruction N32.0    Injury of lumbar spine ([facility] [address] S34.109A    Frequency of urination R35.0    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    · Supplemental O2 as needed for SOB  · Aspiration precautions  · IR consult for possible thrombectomy recommended  · LE dopplers positive for non-occlusive DVT in LLE and RLE - consider need for  IVC filter placement  · Recommend age appropriate cancer screening if not done- colonoscopy, PSA etc.  · Recommend NSAID therapy for pleuritic chest pain. · Continue puree diet per SLP due to dysphagia and aspiration risk  · ABX per primary team, currently levaquin and flagyl -  needs to complete course for diverticulitis  · Assess home Oxygen needs at discharge  · OT, PT, OOB and ambulate  · Will Follow     Subjective/Interval History:     77year old male with PMHx of HTN, esophageal varices, prior heavy EtOH use and recently Dx diverticulitis admitted on 21 admitted for right sided PE with extensive clot burden but no right heart strain.     2021     · Patient alert and oriented x 3, in NAD  · SpO2 > 92% on 3L NC  ·  right sided pleuritic chest pain improving, no worsening SOB. · Continues on heparin gtt  · LE dopplers with non-occlusive DVT in right distal femoral vein and left posterior tibial veins       ROS:A comprehensive review of systems was negative except for that written in the HPI. Objective:   Vital Signs:    Visit Vitals  /65   Pulse 90   Temp 98.3 °F (36.8 °C)   Resp 18   Ht 5' 7\" (1.702 m)   Wt 72.6 kg (160 lb)   SpO2 97%   BMI 25.06 kg/m²       O2 Device: Nasal cannula   O2 Flow Rate (L/min): 3 l/min   Temp (24hrs), Av.1 °F (36.7 °C), Min:97.5 °F (36.4 °C), Max:98.7 °F (37.1 °C)       Intake/Output:   Last shift:      No intake/output data recorded. Last 3 shifts:  1901 -  0700  In: 1280.7 [P.O.:1080; I.V.:200.7]  Out: 725 [Urine:725]    Intake/Output Summary (Last 24 hours) at 2021 1012  Last data filed at 2021 1824  Gross per 24 hour   Intake 916.13 ml   Output 475 ml   Net 441.13 ml      Physical Exam:   General:  Alert, cooperative, no distress, appears stated age. Head:  Normocephalic, without obvious abnormality, atraumatic.    Lungs:   Bilateral auscultation clear, no rales or wheezing.    Chest wall:  No tenderness or deformity. NO CREPITUS   Heart:  Regular rate and rhythm, S1, S2 normal, no murmur, click, rub or gallop. Abdomen:   Soft,mild tenderness to palpation in the center. Bowel sounds normal. No masses,  No organomegaly. No paradox   Extremities: normal, atraumatic, no cyanosis or edema. Pulses: 1-2+ and symmetric all extremities. Neurologic: Grossly nonfocal                DATA:  Labs:  Recent Labs     06/08/21 0450 06/07/21 0440 06/06/21 0615   WBC 10.1 10.6 8.7   HGB 12.7* 13.2 13.5   HCT 38.0 39.7 40.5    238 215     Recent Labs     06/08/21 0450 06/07/21 0440 06/06/21 0615 06/05/21  1046   * 134* 137 136   K 3.8 3.9 4.2 3.8    102 106 105   CO2 29 28 28 27   * 118* 109* 104*   BUN 14 10 8 11   CREA 0.75 0.68 0.73 0.88   CA 9.6 9.2 8.8 8.9   MG  --   --  2.3  --    PHOS  --   --  2.6  --    ALB  --   --   --  3.5   ALT  --   --   --  22   INR  --   --  1.4*  --      No results for input(s): PH, PCO2, PO2, HCO3, FIO2 in the last 72 hours. PFT:                                                     Echo:    Imaging:  [x]I have personally reviewed the patients radiographs  []Radiographs reviewed with radiologist   [x]No change from prior, tubes and lines in adequate position  []Improved   []Worsening  Kylie Larson, NP - C  06/08/2021  Pulmonary, Critical Care Medicine  [facility]

## 2021-06-08 NOTE — ROUTINE PROCESS
Bedside and Verbal shift change report given to [address] (oncoming nurse) by Aimee Sampson (offgoing nurse). Report included the following information SBAR, Kardex, Intake/Output and MAR.

## 2021-06-08 NOTE — ROUTINE PROCESS
Notified Dr. Mk Walters that pt h&h trending down and pt urine output had changed from akhil to brown within during shift. Pt continues to complain of abdominal pain in right lower quadrant. Pt given morphine for pain. Orders for h&h given. Pt continues to have multiple loose bowel movements. Will hold stool softeners and Dr. Mk Walters made aware.

## 2021-06-08 NOTE — PROGRESS NOTES
[facility]  Pulmonary, Critical Care, and Sleep Medicine    Name: Marco Vallecillo MRN: [id number]   : 1945 Hospital: [address] [place] [address]   Date: 2021        IMPRESSION:   · R sided Pulmonary Embolism- extensive clot burden of upper and lower branches, likely chronic. No hypoxia, no signs of RV strain on echo or CTA very likely chronic  · Pleuritic chest pain -possibly 2/2 lung infarction?, not evident on CT. Possible aspiration event. Improved today  · DVT- right LE  · Hx of falls - may be poor long term AC candidate  · Possible aspiration   · Dsphagia  · Hx of esophageal varices  · Hx of EtoH abuse  · Recent Diverticulitis- now resolved.   · Hx of tobacco abuse - over 50 pack years, quit 5 years ago     Patient Active Problem List   Diagnosis Code    Unsteady gait R26.81    Gait instability R26.81    Vertigo R42    Radiculopathy of lumbar region M54.16    ED (erectile dysfunction) of organic origin N52.9    Peripheral vascular disease ([address] I73.9    Overactive bladder N32.81    Nocturia R35.1    Leg pain, right M79.604    Hypercholesterolemia E78.00    HTN (hypertension) I10    H/O spinal cord injury Z87.828    Erectile disorder due to medical 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 N52.1    Bladder outlet obstruction N32.0    Injury of lumbar spine ([facility] [address] S34.109A    Frequency of urination R35.0    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 Start oral agents after SLP evaluation  · Supplemental O2 as needed for SOB  · Aspiration precautions  · LE dopplers positive for non-occlusive DVT in LLE and RLE - consider need for  IVC filter placement  · Recommend age appropriate cancer screening if not done- colonoscopy, PSA etc.  · Recommend NSAID therapy for pleuritic chest pain. · Continue puree diet per SLP due to dysphagia and aspiration risk  · ABX per primary team, currently levaquin and flagyl -  needs to complete course for diverticulitis  · Assess home Oxygen needs at discharge  · OT, PT, OOB and ambulate  · Will Follow     Subjective/Interval History:     77year old male with PMHx of HTN, esophageal varices, prior heavy EtOH use and recently Dx diverticulitis admitted on 21 admitted for right sided PE with extensive clot burden but no right heart strain.     21     · Patient alert and oriented x 3, in NAD  · SpO2 > 92% on 3L NC  ·  right sided pleuritic chest pain improving, no worsening SOB. · Continues on heparin gtt  · LE dopplers with non-occlusive DVT in right distal femoral vein and left posterior tibial veins       ROS:A comprehensive review of systems was negative except for that written in the HPI. Objective:   Vital Signs:    Visit Vitals  /74   Pulse 81   Temp 98.4 °F (36.9 °C)   Resp 18   Ht 5' 7\" (1.702 m)   Wt 72.6 kg (160 lb)   SpO2 97%   BMI 25.06 kg/m²       O2 Device: Nasal cannula   O2 Flow Rate (L/min): 3 l/min   Temp (24hrs), Av.1 °F (36.7 °C), Min:97.5 °F (36.4 °C), Max:98.7 °F (37.1 °C)       Intake/Output:   Last shift:      No intake/output data recorded. Last 3 shifts:  1901 -  0700  In: 1280.7 [P.O.:1080; I.V.:200.7]  Out: 725 [Urine:725]    Intake/Output Summary (Last 24 hours) at 2021 1430  Last data filed at 2021 1824  Gross per 24 hour   Intake 556.13 ml   Output 475 ml   Net 81.13 ml      Physical Exam:   General:  Alert, cooperative, no distress, appears stated age. Head:  Normocephalic, without obvious abnormality, atraumatic.    Lungs:   Bilateral auscultation clear, no rales or wheezing.    Chest wall:  No tenderness or deformity. NO CREPITUS   Heart:  Regular rate and rhythm, S1, S2 normal, no murmur, click, rub or gallop. Abdomen:   Soft,mild tenderness to palpation in the center. Bowel sounds normal. No masses,  No organomegaly. No paradox   Extremities: normal, atraumatic, no cyanosis or edema. Pulses: 1-2+ and symmetric all extremities. Neurologic: Grossly nonfocal          DATA:  Labs:  Recent Labs     06/08/21  1350 06/08/21  0450 06/07/21  0440 06/06/21  0615   WBC  --  10.1 10.6 8.7   HGB 12.6* 12.7* 13.2 13.5   HCT 37.2 38.0 39.7 40.5   PLT  --  244 238 215     Recent Labs     06/08/21  0450 06/07/21  0440 06/06/21  0615   * 134* 137   K 3.8 3.9 4.2    102 106   CO2 29 28 28   * 118* 109*   BUN 14 10 8   CREA 0.75 0.68 0.73   CA 9.6 9.2 8.8   MG  --   --  2.3   PHOS  --   --  2.6   INR  --   --  1.4*     No results for input(s): PH, PCO2, PO2, HCO3, FIO2 in the last 72 hours.       Suzan Reynolds MD  Pulmonary, Critical Care Medicine  [facility]

## 2021-06-08 NOTE — PROGRESS NOTES
Physician Progress Note      Derrick Martinez  [facility] #:                  [id number]  :                       1945  ADMIT DATE:       2021 10:33 AM  DISCH DATE:  RESPONDING  PROVIDER #:        Mallika Sims MD          QUERY TEXT:    Dear Hospitalist  Pt admitted with Acute PE . Noted documentation of   Severe malnutrition  on 21  by ordered RD consultant. If possible, please document in progress notes and discharge summary:    The medical record reflects the following:  Risk Factors: Acute PE  and ? Aspiration pna    Clinical Indicators:Per RDPN Malnutrition Assessment:Malnutrition Status:  Severe malnutrition  Context:  Chronic illness  Findings of the 6 clinical characteristics of malnutrition: Energy Intake:  7 - 75% or less est energy requirements for 1 month or longerWeight Loss:  7 - Greater than 5% over 1 month (15 lb, 9% weight loss x month)   Body Fat Loss:  7 - Severe body fat loss, Buccal region, Orbital Muscle Mass Loss:  7 - Severe muscle mass loss, Clavicles (pectoralis &deltoids), Hand (interosseous), Temples (temporalis) /p endoscopy (21), dysphagia s/p MBS (21), edentulous with unintentional weight loss admitted with c/o abdominal pain and chest pain with right sided PE with extensive clot burden. Fair to poor meal intake PTA with significant weight loss over less than a month per patient report with usual weight of 174-176 lb. Weight history per chart review: 175 lb (21), 160 lb (21) with 15 lb, 8.6% weight loss x month.  Food allergies to crab and shellfish. -Severe malnutrition, In context of chronic illness related to biting/chewing (masticatory) difficulty, altered GI function, inadequate protein-energy intake, early satiety, partial or complete edentulous as evidenced by poor intake prior to admission, swallowing study results, weight loss greater than or equal to 5% in 1 month, severe loss of subcutaneous fat, severe muscle loss, poor dentition, constipation, GI abnormality    Treatment:  daily multivitamin and oral nutrition supplements: Ensure Enlive TID , RD consult    Thank you  Zena Hager RN    Options provided:  -- Severe malnutrition   confirmed present on admission  -- Severe malnutrition    ruled out  -- Other - I will add my own diagnosis  -- Disagree - Not applicable / Not valid  -- Disagree - Clinically unable to determine / Unknown  -- Refer to Clinical Documentation Reviewer    PROVIDER RESPONSE TEXT:    The diagnosis of Severe malnutrition was confirmed as present on admission. Query created by:  Arian Meyer on 6/8/2021 12:10 PM      Electronically signed by:  Maryjo Beltran MD 6/8/2021 2:07 PM

## 2021-06-08 NOTE — CONSULTS
Interventional Radiology Consult Note    Patient: Kristina Hammonds               Sex: male          DOA: 6/5/2021       YOB: 1945      Age:  77 y.o.        LOS:  LOS: 3 days              HPI:     Kristina Hammonds is a 77 y.o. male who has been seen in evaluation of possible pulmonary embolism at the request of Dr. Demetrice Delarosa. Patient reports that he has been feeling right anterior chest pain ongoing for many months that he has been applying compresses to at home to treat the pain. He reports the pain is currently 9 out of 10 and aggravated by twisting motions. He reports that the pain is intermittent and was present yesterday, gone this morning, and back again this afternoon after a long conversation with a friend. The patient reports that he is \"clumsy\" with right leg numbness. He reports that he trips often and falls approximately once per month. The patient states that he will be following up with a physician regarding his right leg numbness and limited mobility. The patient states that he does not use a walker or cane at home although those have helped him in the past.  Patient denies easy bruising or easy bleeding. He denies hemoptysis or palpitations. The patient reports ongoing numbness and discomfort in his right leg.     Past Medical History:   Diagnosis Date    Bladder outlet obstruction     Erectile disorder due to medical 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     Frequency of urination     H/O spinal cord injury 1974    lumbar spine injury secondary to fall    HTN (hypertension)     Hypercholesterolemia     Illiterate     Injury of lumbar spine ([facility] [address] 1974    Leg pain, right     Nocturia     Overactive bladder     Peripheral vascular disease ([facility] [address]        Past Surgical History:   Procedure Laterality Date    COLONOSCOPY N/A 12/4/2019    COLONOSCOPY performed by Meredith Laguna MD at [facility] ENDOSCOPY    HAND/FINGER SURGERY UNLISTED Right     carpal tunnel    HX HEENT Left     lens implant    HX ORTHOPAEDIC      finger amputation left hand    HX TONSILLECTOMY      UPPER ARM/ELBOW SURGERY UNLISTED Right        Family History   Problem Relation Age of Onset    Diabetes Mother     Hypertension Mother     Diabetes Maternal Grandmother     Hypertension Maternal Grandmother     Cancer Sister         brain    Cancer Sister         brain       Social History     Socioeconomic History    Marital status:      Spouse name: Not on file    Number of children: Not on file    Years of education: Not on file    Highest education level: Not on file   Tobacco Use    Smoking status: Former Smoker     Quit date: 2015     Years since quittin.9    Smokeless tobacco: Never Used   Substance and Sexual Activity    Alcohol use: Not Currently     Alcohol/week: 0.0 standard drinks     Comment: former stopped     Drug use: No    Sexual activity: Yes     Social Determinants of Health     Financial Resource Strain:     Difficulty of Paying Living Expenses:    Food Insecurity:     Worried About Running Out of Food in the Last Year:     Ran Out of Food in the Last Year:    Transportation Needs:     Lack of Transportation (Medical):  Lack of Transportation (Non-Medical):    Physical Activity:     Days of Exercise per Week:     Minutes of Exercise per Session:    Stress:     Feeling of Stress :    Social Connections:     Frequency of Communication with Friends and Family:     Frequency of Social Gatherings with Friends and Family:     Attends Taoism Services:     Active Member of Clubs or Organizations:     Attends Club or Organization Meetings:     Marital Status:        Prior to Admission medications    Medication Sig Start Date End Date Taking? Authorizing Provider   metroNIDAZOLE (FlagyL) 500 mg tablet Take 1 Tablet by mouth three (3) times daily for 14 days.  Indications: diverticulitis 21  Thaddeus Angeles,    levoFLOXacin (LEVAQUIN) 750 mg tablet Take 1 Tablet by mouth daily for 14 days. Indications: Diverticulitis 5/28/21 6/11/21  Harjeet Marcos DO   BismatroL 262 mg chew  5/13/21   Provider, Historical   diclofenac (VOLTAREN) 1 % gel Apply 2 g to affected area four (4) times daily. 5/25/21   Susanna Adamson MD   Wheat Dextrin (Benefiber Clear) 3 gram/3.5 gram pwpk Take  by mouth. Provider, Historical   famotidine (PEPCID) 20 mg tablet Take 20 mg by mouth two (2) times a day. 4/12/21   Provider, Historical   triamcinolone acetonide (KENALOG) 0.1 % ointment Apply  to affected area daily as needed. 4/8/21   Provider, Historical   ammonium lactate (Lac-Hydrin Five) 5 % lotion Apply  to affected area two (2) times a day. 12/10/20   Adarsh Paiz MD   tamsulosin (FLOMAX) 0.4 mg capsule Take 1 Cap by mouth daily (after dinner). 9/24/20   Rohith Sims MD   DULoxetine (CYMBALTA) 60 mg capsule Take 1 Cap by mouth daily. Indications: neuropathic pain 9/11/20   Aram ALFARO MD   finasteride (PROSCAR) 5 mg tablet Take 5 mg by mouth daily. Provider, Historical   albuterol sulfate 90 mcg/actuation aebs Take  by inhalation as needed. Provider, Historical   ammonium lactate (AMLACTIN) 12 % topical cream Apply  to affected area two (2) times a day. rub in to affected area well 2/20/20   Jaison JOHNSON PA-C   pantoprazole (PROTONIX) 40 mg tablet Take 1 Tab by mouth daily. 3/28/19   Cheri Fox MD   amLODIPine (NORVASC) 10 mg tablet Take 1 Tab by mouth daily. 3/28/19   Cheri Fox MD   spironolactone (ALDACTONE) 25 mg tablet Take  by mouth daily.     Provider, Historical       Allergies   Allergen Reactions    Latex Rash    Ampicillin Angioedema    Asa-Acetaminophen-Caff-Buffers Rash    Penicillins Angioedema    Crab Hives    Shellfish Derived Rash    Aspirin Other (comments)     Stomach upset    Atorvastatin Other (comments)     Muscle cramps       Review of Systems  Pertinent items are noted in the History of Present Illness. Physical Exam:      Visit Vitals  /74   Pulse 81   Temp 98.4 °F (36.9 °C)   Resp 18   Ht 5' 7\" (1.702 m)   Wt 72.6 kg (160 lb)   SpO2 97%   BMI 25.06 kg/m²     Physical Exam:  Constitutional: Awake alert and oriented x4  Respiratory: Normal work of breathing on nasal cannula  Cardiovascular: RRR  Gastrointestinal: Soft nontender nondistended    Labs Reviewed:  CMP:   Lab Results   Component Value Date/Time     (L) 06/08/2021 04:50 AM    K 3.8 06/08/2021 04:50 AM     06/08/2021 04:50 AM    CO2 29 06/08/2021 04:50 AM    AGAP 3 06/08/2021 04:50 AM     (H) 06/08/2021 04:50 AM    BUN 14 06/08/2021 04:50 AM    CREA 0.75 06/08/2021 04:50 AM    GFRAA >60 06/08/2021 04:50 AM    GFRNA >60 06/08/2021 04:50 AM    CA 9.6 06/08/2021 04:50 AM     CBC:   Lab Results   Component Value Date/Time    WBC 10.1 06/08/2021 04:50 AM    HGB 12.6 (L) 06/08/2021 01:50 PM    HCT 37.2 06/08/2021 01:50 PM     06/08/2021 04:50 AM     COAGS:   Lab Results   Component Value Date/Time    APTT 112.4 (H) 06/08/2021 01:50 PM       Assessment     Active Problems:    Acute pulmonary embolism ([facility] [address] (6/5/2021)      Severe protein-calorie malnutrition ([facility] [address] (6/7/2021)      Plan   Case and images reviewed by Dr. Miguel Borges. Our recommendations are as follows:    1. Chronic right PE without right heart strain or pulmonary hypertension would be unlikely to benefit from mechanical thrombectomy at this time. Continue treatment with heparin gtt. 2.  IVC filter placement if the patient does not tolerate anticoagulation - he appears to be sustaining his H&H and no signs of bleeding complications currently. Recommend continuing with physical therapy due to mobility issues. IR will remain available as needed.     Thank you,  KENIA Wang

## 2021-06-08 NOTE — PROGRESS NOTES
Problem: Dysphagia (Adult)  Goal: *Acute Goals and Plan of Care (Insert Text)  Description:   Patient will:  1. Tolerate PO trials with 0 s/s overt distress in 4/5 trials  2. Utilize compensatory swallow strategies/maneuvers (decrease bite/sip, size/rate, alt. liq/sol) with min cues in 4/5 trials  3. Complete an objective swallow study (i.e., MBSS) to assess swallow integrity, r/o aspiration, and determine of safest LRD, min A as indicated/ordered by MD  - met 6/8/2021    Rec:     Mech-soft diet with thin liquids  Aspiration precautions  HOB >45 during po intake, remain >30 for 30-45 minutes after po   Small bites/sips; alternate liquid/solid with slow feeding rate   Oral care TID  Meds per pt preference  Outcome: Progressing Towards Goal  SPEECH PATHOLOGY MODIFIED BARIUM SWALLOW STUDY & TREATMENT    Patient: Abigail Zuniga (9 y.o. male)  Date: 6/8/2021  Primary Diagnosis: Acute pulmonary embolism (HCC) [I26.99]        Precautions: aspiration       ASSESSMENT :  Based on the objective data described below, the patient presents with mild oropharyngeal dysphagia. Pt tolerated reg solid, puree, nectar-thick +/- straw, thin +/- straw, and 13 mm Ba pill with thin liquid wash without any aspiration/penetration events. Increased mastication of solids secondary to edentulous status with eventual positive oral clearance. Decreased tongue base retraction resulted in premature spillage across all trials. Impaired pharyngeal motility resulted in pharyngeal stasis across trials; cleared with second volitional swallow. Recommend mech soft diet with thin liquids, aspiration precautions, oral care TID, and meds as tolerated. TREATMENT :  Treatment provided post diagnostic testing including oropharyngeal anatomy/physiology, MBS results, diet recommendations and compensatory strategies/positioning. Pt able to verbalize understanding. Will continue to follow to ensure safety of PO.        Patient will benefit from skilled intervention to address the above impairments. Patient's rehabilitation potential is considered to be Good  Factors which may influence rehabilitation potential include:   []              None noted  []              Mental ability/status  [x]              Medical condition  []              Home/family situation and support systems  []              Safety awareness  []              Pain tolerance/management  []              Other:      PLAN :  Recommendations and Planned Interventions: See above  Frequency/Duration: Patient will be followed by speech-language pathology x 2-3 more visits to address goals. Discharge Recommendations: None     SUBJECTIVE:   Patient stated Ever Leon god you're changing me from that stuff that tastes like nothing.     OBJECTIVE:     Past Medical History:   Diagnosis Date    Bladder outlet obstruction     Erectile disorder due to medical 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     Frequency of urination     H/O spinal cord injury 1974    lumbar spine injury secondary to fall    HTN (hypertension)     Hypercholesterolemia     Illiterate     Injury of lumbar spine ([facility] [address] 1974    Leg pain, right     Nocturia     Overactive bladder     Peripheral vascular disease ([facility] [address]      Past Surgical History:   Procedure Laterality Date    COLONOSCOPY N/A 12/4/2019    COLONOSCOPY performed by Elif Valerio MD at [facility]    HAND/FINGER SURGERY UNLISTED Right     carpal tunnel    HX HEENT Left     lens implant    HX ORTHOPAEDIC      finger amputation left hand    HX TONSILLECTOMY      UPPER ARM/ELBOW SURGERY UNLISTED Right      Prior Level of Function/Home Situation: see below  Home Situation  Home Environment: Apartment  # Steps to Enter: 25  One/Two Story Residence: One story  Living Alone: Yes  Support Systems: Child(sam)  Patient Expects to be Discharged to[de-identified] Other (comment)  Current DME Used/Available at Home: None  Diet prior to admission: mech soft with thin  Current Diet:  mech soft with thin Radiologist:    Film Views: Lateral;Fluoro  Patient Position: adequate    Trial 1:   Consistency Presented: Thin liquid;Puree;Pill/Tablet;Mechanical soft   How Presented: Self-fed/presented;Cup/sip;Spoon;Straw;Successive swallows       Bolus Acceptance: No impairment   Bolus Formation/Control: Impaired: Mastication;Premature spillage   Propulsion: No impairment   Oral Residue: None   Initiation of Swallow: No impairment   Timing: No impairment   Penetration: None   Aspiration/Timing: No evidence of aspiration   Pharyngeal Clearance: Vallecular residue;Pyriform residue ; Less than 10%   Attempted Modifications: Small sips and bites   Effective Modifications: Small sips and bites   Cues for Modifications: Moderate       Decreased Tongue Base Retraction?: Yes  Laryngeal Elevation: WFL (within functional limits)  Aspiration/Penetration Score: 1 (No penetration or aspiration-Contrast does not enter the airway)  Pharyngeal Symmetry: Not assessed  Pharyngeal-Esophageal Segment: No impairment  Pharyngeal Dysfunction: Decreased strength    Oral Phase Severity: Mild  Pharyngeal Phase Severity: Mild    8-point Penetration-Aspiration Scale: Score 1    PAIN:  Pt reports 0/10 pain or discomfort prior to MBS. Pt reports 0/10 pain or discomfort post MBS. COMMUNICATION/EDUCATION:   [x]  Patient educated regarding MBS results and diet recommendations. [x]  Patient/family have participated as able in goal setting and plan of care. []  Patient/family agree to work toward stated goals and plan of care. []  Patient understands intent and goals of therapy, but is neutral about his/her participation. []  Patient is unable to participate in goal setting and plan of care.     Thank you for this referral.    Cem Kendall M.S. CCC-SLP/L  Speech-Language Pathologist

## 2021-06-09 ENCOUNTER — HOME HEALTH ADMISSION (OUTPATIENT)
Dept: HOME HEALTH SERVICES | Facility: HOME HEALTH | Age: 76
End: 2021-06-09

## 2021-06-09 LAB
ANION GAP SERPL CALC-SCNC: 4 MMOL/L (ref 3–18)
APTT PPP: 79.8 SEC (ref 23–36.4)
BASOPHILS # BLD: 0 K/UL (ref 0–0.1)
BASOPHILS NFR BLD: 0 % (ref 0–2)
BUN SERPL-MCNC: 14 MG/DL (ref 7–18)
BUN/CREAT SERPL: 22 (ref 12–20)
CALCIUM SERPL-MCNC: 9.5 MG/DL (ref 8.5–10.1)
CHLORIDE SERPL-SCNC: 98 MMOL/L (ref 100–111)
CO2 SERPL-SCNC: 29 MMOL/L (ref 21–32)
CREAT SERPL-MCNC: 0.64 MG/DL (ref 0.6–1.3)
DIFFERENTIAL METHOD BLD: ABNORMAL
EOSINOPHIL # BLD: 0 K/UL (ref 0–0.4)
EOSINOPHIL NFR BLD: 0 % (ref 0–5)
ERYTHROCYTE [DISTWIDTH] IN BLOOD BY AUTOMATED COUNT: 14.4 % (ref 11.6–14.5)
GLUCOSE SERPL-MCNC: 94 MG/DL (ref 74–99)
HCT VFR BLD AUTO: 36.7 % (ref 36–48)
HGB BLD-MCNC: 12.2 G/DL (ref 13–16)
LYMPHOCYTES # BLD: 1 K/UL (ref 0.9–3.6)
LYMPHOCYTES NFR BLD: 15 % (ref 21–52)
MCH RBC QN AUTO: 30 PG (ref 24–34)
MCHC RBC AUTO-ENTMCNC: 33.2 G/DL (ref 31–37)
MCV RBC AUTO: 90.4 FL (ref 74–97)
MONOCYTES # BLD: 0.9 K/UL (ref 0.05–1.2)
MONOCYTES NFR BLD: 13 % (ref 3–10)
NEUTS SEG # BLD: 4.8 K/UL (ref 1.8–8)
NEUTS SEG NFR BLD: 71 % (ref 40–73)
PLATELET # BLD AUTO: 279 K/UL (ref 135–420)
PMV BLD AUTO: 9.7 FL (ref 9.2–11.8)
POTASSIUM SERPL-SCNC: 3.9 MMOL/L (ref 3.5–5.5)
RBC # BLD AUTO: 4.06 M/UL (ref 4.35–5.65)
SODIUM SERPL-SCNC: 131 MMOL/L (ref 136–145)
WBC # BLD AUTO: 6.8 K/UL (ref 4.6–13.2)

## 2021-06-09 PROCEDURE — 65660000000 HC RM CCU STEPDOWN

## 2021-06-09 PROCEDURE — 99232 SBSQ HOSP IP/OBS MODERATE 35: CPT | Performed by: INTERNAL MEDICINE

## 2021-06-09 PROCEDURE — APPNB30 APP NON BILLABLE TIME 0-30 MINS: Performed by: NURSE PRACTITIONER

## 2021-06-09 PROCEDURE — 74011250636 HC RX REV CODE- 250/636: Performed by: NURSE PRACTITIONER

## 2021-06-09 PROCEDURE — 92526 ORAL FUNCTION THERAPY: CPT

## 2021-06-09 PROCEDURE — 85025 COMPLETE CBC W/AUTO DIFF WBC: CPT

## 2021-06-09 PROCEDURE — 74011250636 HC RX REV CODE- 250/636: Performed by: HOSPITALIST

## 2021-06-09 PROCEDURE — 85730 THROMBOPLASTIN TIME PARTIAL: CPT

## 2021-06-09 PROCEDURE — 36415 COLL VENOUS BLD VENIPUNCTURE: CPT

## 2021-06-09 PROCEDURE — 74011250636 HC RX REV CODE- 250/636: Performed by: INTERNAL MEDICINE

## 2021-06-09 PROCEDURE — 74011250637 HC RX REV CODE- 250/637: Performed by: INTERNAL MEDICINE

## 2021-06-09 PROCEDURE — 74011250637 HC RX REV CODE- 250/637: Performed by: HOSPITALIST

## 2021-06-09 PROCEDURE — 80048 BASIC METABOLIC PNL TOTAL CA: CPT

## 2021-06-09 PROCEDURE — 74011250637 HC RX REV CODE- 250/637: Performed by: NURSE PRACTITIONER

## 2021-06-09 RX ADMIN — TAMSULOSIN HYDROCHLORIDE 0.4 MG: 0.4 CAPSULE ORAL at 17:28

## 2021-06-09 RX ADMIN — DOCUSATE SODIUM 100 MG: 100 CAPSULE ORAL at 17:27

## 2021-06-09 RX ADMIN — Medication: at 11:39

## 2021-06-09 RX ADMIN — DULOXETINE HYDROCHLORIDE 60 MG: 60 CAPSULE, DELAYED RELEASE ORAL at 08:43

## 2021-06-09 RX ADMIN — PANTOPRAZOLE 40 MG: 40 TABLET, DELAYED RELEASE ORAL at 04:58

## 2021-06-09 RX ADMIN — KETOROLAC TROMETHAMINE 15 MG: 15 INJECTION, SOLUTION INTRAMUSCULAR; INTRAVENOUS at 04:58

## 2021-06-09 RX ADMIN — LEVOFLOXACIN 750 MG: 750 TABLET, FILM COATED ORAL at 08:43

## 2021-06-09 RX ADMIN — POLYETHYLENE GLYCOL 3350 17 G: 17 POWDER, FOR SOLUTION ORAL at 17:28

## 2021-06-09 RX ADMIN — ONDANSETRON 4 MG: 2 INJECTION INTRAMUSCULAR; INTRAVENOUS at 04:58

## 2021-06-09 RX ADMIN — FINASTERIDE 5 MG: 5 TABLET, FILM COATED ORAL at 08:42

## 2021-06-09 RX ADMIN — METRONIDAZOLE 500 MG: 500 TABLET ORAL at 16:06

## 2021-06-09 RX ADMIN — DOCUSATE SODIUM 100 MG: 100 CAPSULE ORAL at 08:43

## 2021-06-09 RX ADMIN — THERA TABS 1 TABLET: TAB at 08:44

## 2021-06-09 RX ADMIN — APIXABAN 10 MG: 5 TABLET, FILM COATED ORAL at 17:27

## 2021-06-09 RX ADMIN — Medication 10 ML: at 17:31

## 2021-06-09 RX ADMIN — Medication 10 ML: at 05:16

## 2021-06-09 RX ADMIN — MORPHINE SULFATE 2 MG: 2 INJECTION, SOLUTION INTRAMUSCULAR; INTRAVENOUS at 01:24

## 2021-06-09 RX ADMIN — METRONIDAZOLE 500 MG: 500 TABLET ORAL at 08:43

## 2021-06-09 RX ADMIN — METRONIDAZOLE 500 MG: 500 TABLET ORAL at 21:10

## 2021-06-09 RX ADMIN — MORPHINE SULFATE 2 MG: 2 INJECTION, SOLUTION INTRAMUSCULAR; INTRAVENOUS at 16:07

## 2021-06-09 RX ADMIN — POLYETHYLENE GLYCOL 3350 17 G: 17 POWDER, FOR SOLUTION ORAL at 08:45

## 2021-06-09 RX ADMIN — HEPARIN SODIUM 10 UNITS/KG/HR: 10000 INJECTION, SOLUTION INTRAVENOUS at 11:37

## 2021-06-09 NOTE — PROGRESS NOTES
[facility]  [facility]    Patient: Dewey Jones Age: 77 y.o. : 1945 MR#: [id number] SSN: [id number]  Date: 2021     Subjective:     Pt has no complaints. Does not want to go home tomorrow. Reports right sided chest pain is better. Assessment/Plan:   77year old male admitted on  after presenting with c/o right sided abdominal pain, noted to have high D dimer and CTA that was positive for PE.    -Acute PE - on heparin drip, cont PPI for prophylaxis. Discussed w/ pulm, plan to change to NOAC, stop heparin gtt, monitor for bleeding and dc on NOAC. IVC filter placement on hold. Titrate 02 down as tolerated.  -Acute non-occlusive thrombus present in the right distal femoral vein. Age indeterminate non-occlusive thrombus present in the left posterior tibial vein. IVC filter placement  -Falls at home - Fall precautions. PT / OT after 48 hours of anticoagulation. -? Aspiration pna - cont levaquin and flagyl, asp precautions and modified diet  -BPH w/ acute urinary retention - cont flomax / proscar / pelayo catheter for now for acute retention. Urology input noted. outpt f/u, maintain pelayo  -Unintentional weight loss, in the setting of edentulous state. Consult nutritionist, SLP rec pureed diet -if patient dislikes food can transition to \"minced and moist\" per SLP  -Constipation -per nursing pt now having bm's  -Recent diverticulitis - improved, continue Levaquin and Flagyl, last day     HISTORY OF:  -EtOH use d/o, quit 5 years ago. Multivitamin, thiamine, folic acid. -Bipolar d/o per patient. Not on treatment per home med review.   -Neuropathy right foot, continue home cymbalta  -Hypertension - cont norvasc w/ hold parameters   -Hx esophageal varices. US 6/3/2021 Unremarkable right upper quadrant ultrasound. No obvious varices noted around the liver.    S/p EGD with bx 21, Brad Chicas MD [facility].   Sumeet Saleh, BIOPSY:     - ACTIVE CHRONIC GASTRITIS - continue ppi.    -   HELICOBACTER PYLORI IDENTIFIED - patient states he completed course of antibiotics. Dispo: PT/OT ordered for dispo determination, possible dc tomorrow if home discharge is possible. Daughter Jesús Pascual via phone call at phone number [phone]    Additional Notes:      Case discussed with:  [x]Patient  []Family  [x]Nursing  []Case Management  DVT Prophylaxis:  []Lovenox  [x]Hep drip  []SCDs  []Coumadin   []On Heparin gtt    Objective:     VS:   Visit Vitals  /73 (BP 1 Location: Right upper arm)   Pulse 83   Temp 97.8 °F (36.6 °C)   Resp 20   Ht 5' 7\" (1.702 m)   Wt 72.6 kg (160 lb)   SpO2 95%   BMI 25.06 kg/m²      Tmax/24hrs: Temp (24hrs), Av.6 °F (37 °C), Min:97.8 °F (36.6 °C), Max:99.3 °F (37.4 °C)      Intake/Output Summary (Last 24 hours) at 2021 1639  Last data filed at 2021 5988  Gross per 24 hour   Intake 120 ml   Output 1180 ml   Net -1060 ml     General:  Alert, NAD at rest, + discomfort with mov't  HEENT: Oral mucosa moist; PERRLA  Cardiovascular:  RRR, Nl S1/S2  Pulmonary:  LSC throughout.  Normal resp effort  GI:  +BS in all four quadrants, soft, non-tender  : + pelayo   Extremities:  No edema; 2+ dorsalis pedis pulses bilaterally  Neuro: alert and oriented x 4    Labs / micro / imaging :    Recent Results (from the past 24 hour(s))   PTT    Collection Time: 21  7:40 PM   Result Value Ref Range    aPTT 83.5 (H) 23.0 - 36.4 SEC   CBC WITH AUTOMATED DIFF    Collection Time: 21  4:10 AM   Result Value Ref Range    WBC 6.8 4.6 - 13.2 K/uL    RBC 4.06 (L) 4.35 - 5.65 M/uL    HGB 12.2 (L) 13.0 - 16.0 g/dL    HCT 36.7 36.0 - 48.0 %    MCV 90.4 74.0 - 97.0 FL    MCH 30.0 24.0 - 34.0 PG    MCHC 33.2 31.0 - 37.0 g/dL    RDW 14.4 11.6 - 14.5 %    PLATELET 209 690 - 231 K/uL    MPV 9.7 9.2 - 11.8 FL    NEUTROPHILS 71 40 - 73 %    LYMPHOCYTES 15 (L) 21 - 52 %    MONOCYTES 13 (H) 3 - 10 %    EOSINOPHILS 0 0 - 5 %    BASOPHILS 0 0 - 2 % ABS. NEUTROPHILS 4.8 1.8 - 8.0 K/UL    ABS. LYMPHOCYTES 1.0 0.9 - 3.6 K/UL    ABS. MONOCYTES 0.9 0.05 - 1.2 K/UL    ABS. EOSINOPHILS 0.0 0.0 - 0.4 K/UL    ABS. BASOPHILS 0.0 0.0 - 0.1 K/UL    DF AUTOMATED     METABOLIC PANEL, BASIC    Collection Time: 06/09/21  4:10 AM   Result Value Ref Range    Sodium 131 (L) 136 - 145 mmol/L    Potassium 3.9 3.5 - 5.5 mmol/L    Chloride 98 (L) 100 - 111 mmol/L    CO2 29 21 - 32 mmol/L    Anion gap 4 3.0 - 18 mmol/L    Glucose 94 74 - 99 mg/dL    BUN 14 7.0 - 18 MG/DL    Creatinine 0.64 0.6 - 1.3 MG/DL    BUN/Creatinine ratio 22 (H) 12 - 20      GFR est AA >60 >60 ml/min/1.73m2    GFR est non-AA >60 >60 ml/min/1.73m2    Calcium 9.5 8.5 - 10.1 MG/DL   PTT    Collection Time: 06/09/21  4:10 AM   Result Value Ref Range    aPTT 79.8 (H) 23.0 - 36.4 SEC       Results     Procedure Component Value Units Date/Time    COVID-19 RAPID TEST [[id number]] Collected: 06/05/21 1555    Order Status: Completed Specimen: Nasopharyngeal Updated: 06/05/21 1618     Specimen source Nasopharyngeal        COVID-19 rapid test Not detected        Comment: Rapid Abbott ID Now       Rapid NAAT:  The specimen is NEGATIVE for SARS-CoV-2, the novel coronavirus associated with COVID-19. Negative results should be treated as presumptive and, if inconsistent with clinical signs and symptoms or necessary for patient management, should be tested with an alternative molecular assay. Negative results do not preclude SARS-CoV-2 infection and should not be used as the sole basis for patient management decisions. This test has been authorized by the FDA under an Emergency Use Authorization (EUA) for use by authorized laboratories. Fact sheet for Healthcare Providers: ConventionUpdate.co.nz  Fact sheet for Patients: ConventionUpdate.co.nz       Methodology: Isothermal Nucleic Acid Amplification               XR ABD (KUB)    Result Date: 6/8/2021  1.  Nonobstructive bowel gas pattern with no radiographic abnormality. XR SWALLOW FUNC VIDEO    Result Date: 6/8/2021  No heriberto penetration or aspiration with all tested consistencies. Please see speech pathologist report for additional details and recommendations. CTA CHEST W OR W WO CONT    Result Date: 6/5/2021  Right greater than left pulmonary emboli, most proximally on the right at the branching of the main pulmonary artery but no findings of heart strain. Suspect right greater than left dependent infarcts which may be causing flank pain. Alternatively aspiration is possible. Results discussed with Dr. Nataliya Chávez on 6/5/2021 at 1449 hours. CT ABD PELV W CONT    Result Date: 6/5/2021  Near complete resolution of the cecal diverticulitis. Mild new dependent lung opacities favored to represent aspiration. Mild bronchitis.         Signed By: Susan Holter, MD     June 9, 2021

## 2021-06-09 NOTE — PROGRESS NOTES
[facility]  Pulmonary, Critical Care, and Sleep Medicine    Name: Candace Oropeza MRN: [id number]   : 1945 Hospital: [facility]   Date: 2021        IMPRESSION:   · R sided Pulmonary Embolism- extensive clot burden of upper and lower branches, likely chronic.  No hypoxia, no signs of RV strain on echo or CTA very likely chronic  · Pleuritic chest pain -possibly 2/2 lung infarction?, not evident on CT.  Possible aspiration event  · Hx of falls - may be poor long term AC candidate  · Possible aspiration   · Dsphagia  · Hyponatremia  · Hx of esophageal varices  · Hx of EtoH abuse  · Recent Diverticulitis- now resolved.   · Hx of tobacco abuse - over 50 pack years, quit 5 years ago            Patient Active Problem List   Diagnosis Code    Unsteady gait R26.81    Gait instability R26.81    Vertigo R42    Radiculopathy of lumbar region M54.16    ED (erectile dysfunction) of organic origin N52.9    Peripheral vascular disease ([facility] [address] I73.9    Overactive bladder N32.81    Nocturia R35.1    Leg pain, right M79.604    Hypercholesterolemia E78.00    HTN (hypertension) I10    H/O spinal cord injury Z87.828    Erectile disorder due to medical 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 N52.1    Bladder outlet obstruction N32.0    Injury of lumbar spine ([facility] [address] S34.109A    Frequency of urination R35.0    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    · Supplemental O2 as needed for SOB  · Aspiration precautions  · LE dopplers positive for non-occlusive DVT in LLE and RLE - consider need for  IVC filter placement  · Recommend age appropriate cancer screening if not done- colonoscopy, PSA etc.  · Recommend NSAID therapy for pleuritic chest pain. · Continue puree diet per SLP due to dysphagia and aspiration risk  · ABX per primary team, currently levaquin and flagyl -  needs to complete course for diverticulitis  · Assess home Oxygen needs at discharge  · OT, PT, OOB and ambulate  · Will Follow     Subjective/Interval History:        77year old male with PMHx of HTN, esophageal varices, prior heavy EtOH use and recently Dx diverticulitis admitted on 21 admitted for right sided PE with extensive clot burden but no right heart strain.     2021     · Patient alert and oriented x 3, in NAD  · SpO2 > 92% on 3L NC  ·  right sided pleuritic chest pain improving, no worsening SOB. · Continues on heparin gtt  · Complaints of nausea overnight with good response to zofran. No vomiting       ROS:A comprehensive review of systems was negative except for that written in the HPI. Objective:   Vital Signs:    Visit Vitals  /66 (BP 1 Location: Right upper arm)   Pulse 80   Temp 98.8 °F (37.1 °C)   Resp 16   Ht 5' 7\" (1.702 m)   Wt 72.6 kg (160 lb)   SpO2 99%   BMI 25.06 kg/m²       O2 Device: Nasal cannula   O2 Flow Rate (L/min): 3 l/min   Temp (24hrs), Av.5 °F (36.9 °C), Min:97.4 °F (36.3 °C), Max:99.3 °F (37.4 °C)       Intake/Output:   Last shift:      No intake/output data recorded. Last 3 shifts:  1901 -  0700  In: 120 [P.O.:120]  Out: 1780 [Urine:1780]    Intake/Output Summary (Last 24 hours) at 2021 1138  Last data filed at 2021 0929  Gross per 24 hour   Intake 120 ml   Output 1780 ml   Net -1660 ml        Physical Exam:   General:  Alert, cooperative, no distress, appears stated age. Head:  Normocephalic, without obvious abnormality, atraumatic.    Lungs:   Bilateral auscultation clear, no rales or wheezing.    Chest wall:  No tenderness or deformity. NO CREPITUS   Heart:  Regular rate and rhythm, S1, S2 normal, no murmur, click, rub or gallop. Abdomen:   Soft,mild tenderness to palpation in the center. Bowel sounds normal. No masses,  No organomegaly. No paradox   Extremities: normal, atraumatic, no cyanosis or edema. Pulses: 1-2+ and symmetric all extremities. Neurologic: Grossly nonfocal             DATA:  Labs:  Recent Labs     06/09/21  0410 06/08/21  1350 06/08/21  0450 06/07/21  0440   WBC 6.8  --  10.1 10.6   HGB 12.2* 12.6* 12.7* 13.2   HCT 36.7 37.2 38.0 39.7     --  244 238     Recent Labs     06/09/21  0410 06/08/21  0450 06/07/21  0440   * 132* 134*   K 3.9 3.8 3.9   CL 98* 100 102   CO2 29 29 28   GLU 94 122* 118*   BUN 14 14 10   CREA 0.64 0.75 0.68   CA 9.5 9.6 9.2     No results for input(s): PH, PCO2, PO2, HCO3, FIO2 in the last 72 hours. PFT:                                                     Echo:    Imaging:  [x]I have personally reviewed the patients radiographs  []Radiographs reviewed with radiologist   [x]No change from prior, tubes and lines in adequate position  []Improved   []Worsening    Kylie Goode NP - C  06/09/21  Pulmonary, Critical Care Medicine  [facility]

## 2021-06-09 NOTE — PROGRESS NOTES
Problem: Pain  Goal: *Control of Pain  Outcome: Progressing Towards Goal  Goal: *PALLIATIVE CARE:  Alleviation of Pain  Outcome: Progressing Towards Goal     Problem: Patient Education: Go to Patient Education Activity  Goal: Patient/Family Education  Outcome: Progressing Towards Goal     Problem: Falls - Risk of  Goal: *Absence of Falls  Description: Document Onalee Gum Fall Risk and appropriate interventions in the flowsheet.   Outcome: Progressing Towards Goal  Note: Fall Risk Interventions:  Mobility Interventions: Patient to call before getting OOB         Medication Interventions: Patient to call before getting OOB    Elimination Interventions: Call light in reach, Patient to call for help with toileting needs    History of Falls Interventions: Room close to nurse's station         Problem: Patient Education: Go to Patient Education Activity  Goal: Patient/Family Education  Outcome: Progressing Towards Goal     Problem: Pulmonary Embolism Care Plan (Adult)  Goal: *Improvement of existing pulmonary embolism  Outcome: Progressing Towards Goal  Goal: *Absence of bleeding  Outcome: Progressing Towards Goal  Goal: *Labs within defined limits  Outcome: Progressing Towards Goal     Problem: Patient Education: Go to Patient Education Activity  Goal: Patient/Family Education  Outcome: Progressing Towards Goal     Problem: Injury - Risk of, Adverse Drug Event  Goal: *Absence of adverse drug events  Outcome: Progressing Towards Goal  Goal: *Absence of medication errors  Outcome: Progressing Towards Goal  Goal: *Knowledge of prescribed medications  Outcome: Progressing Towards Goal     Problem: Patient Education: Go to Patient Education Activity  Goal: Patient/Family Education  Outcome: Progressing Towards Goal     Problem: Infection - Risk of, Urinary Catheter-Associated Urinary Tract Infection  Goal: *Absence of infection signs and symptoms  Outcome: Progressing Towards Goal     Problem: Patient Education: Go to Patient Education Activity  Goal: Patient/Family Education  Outcome: Progressing Towards Goal     Problem: Patient Education: Go to Patient Education Activity  Goal: Patient/Family Education  Outcome: Progressing Towards Goal     Problem: Constipation - Risk of  Goal: *Prevention of constipation  Outcome: Progressing Towards Goal  Goal: *PALLIATIVE CARE:  Prevention/Alleviation of constipation  Outcome: Progressing Towards Goal     Problem: Patient Education: Go to Patient Education Activity  Goal: Patient/Family Education  Outcome: Progressing Towards Goal     Problem: Nutrition Deficit  Goal: *Optimize nutritional status  Outcome: Progressing Towards Goal     Problem: Pressure Injury - Risk of  Goal: *Prevention of pressure injury  Description: Document Aristeo Scale and appropriate interventions in the flowsheet.   Outcome: Progressing Towards Goal  Note: Pressure Injury Interventions:       Moisture Interventions: Internal/External urinary devices    Activity Interventions: Pressure redistribution bed/mattress(bed type)    Mobility Interventions: HOB 30 degrees or less    Nutrition Interventions: Document food/fluid/supplement intake    Friction and Shear Interventions: HOB 30 degrees or less                Problem: Patient Education: Go to Patient Education Activity  Goal: Patient/Family Education  Outcome: Progressing Towards Goal     Problem: Patient Education: Go to Patient Education Activity  Goal: Patient/Family Education  Outcome: Progressing Towards Goal

## 2021-06-09 NOTE — PROGRESS NOTES
Problem: Pain  Goal: *Control of Pain  6/9/2021 0736 by Dolly Amanda RN  Outcome: Progressing Towards Goal  6/8/2021 2300 by Dolly Amanda RN  Outcome: Progressing Towards Goal  Goal: *PALLIATIVE CARE:  Alleviation of Pain  6/9/2021 0736 by Dolly Amanda RN  Outcome: Progressing Towards Goal  6/8/2021 2300 by Dolly Amanda RN  Outcome: Progressing Towards Goal     Problem: Patient Education: Go to Patient Education Activity  Goal: Patient/Family Education  6/9/2021 0736 by Dolly Amanda RN  Outcome: Progressing Towards Goal  6/8/2021 2300 by Dolly Amanda RN  Outcome: Progressing Towards Goal     Problem: Falls - Risk of  Goal: *Absence of Falls  Description: Document Mirza Neal Fall Risk and appropriate interventions in the flowsheet.   6/9/2021 0736 by Dolly Amanda RN  Outcome: Progressing Towards Goal  Note: Fall Risk Interventions:  Mobility Interventions: Patient to call before getting OOB         Medication Interventions: Patient to call before getting OOB    Elimination Interventions: Call light in reach, Patient to call for help with toileting needs    History of Falls Interventions: Room close to nurse's station      6/8/2021 2300 by Dolly Amanda RN  Outcome: Progressing Towards Goal  Note: Fall Risk Interventions:  Mobility Interventions: Patient to call before getting OOB         Medication Interventions: Patient to call before getting OOB    Elimination Interventions: Call light in reach, Patient to call for help with toileting needs    History of Falls Interventions: Room close to nurse's station         Problem: Patient Education: Go to Patient Education Activity  Goal: Patient/Family Education  6/9/2021 0736 by Dolly Aamnda RN  Outcome: Progressing Towards Goal  6/8/2021 2300 by Dolly Amanda RN  Outcome: Progressing Towards Goal     Problem: Pulmonary Embolism Care Plan (Adult)  Goal: *Improvement of existing pulmonary embolism  6/9/2021 0736 by Dolly Amanda RN  Outcome: Progressing Towards Goal  6/8/2021 2300 by Chente Mcallister RN  Outcome: Progressing Towards Goal  Goal: *Absence of bleeding  6/9/2021 0736 by Chente Mcallister RN  Outcome: Progressing Towards Goal  6/8/2021 2300 by Chente Mcallister RN  Outcome: Progressing Towards Goal  Goal: *Labs within defined limits  6/9/2021 0736 by Chente Mcallister RN  Outcome: Progressing Towards Goal  6/8/2021 2300 by Chente Mcallister RN  Outcome: Progressing Towards Goal     Problem: Patient Education: Go to Patient Education Activity  Goal: Patient/Family Education  6/9/2021 0736 by Chente Mcallister RN  Outcome: Progressing Towards Goal  6/8/2021 2300 by Chente Mcallister RN  Outcome: Progressing Towards Goal     Problem: Injury - Risk of, Adverse Drug Event  Goal: *Absence of adverse drug events  6/9/2021 0736 by Chente Mcallister RN  Outcome: Progressing Towards Goal  6/8/2021 2300 by Chente Mcallister RN  Outcome: Progressing Towards Goal  Goal: *Absence of medication errors  6/9/2021 0736 by Chente Mcallister RN  Outcome: Progressing Towards Goal  6/8/2021 2300 by Chente Mcallister RN  Outcome: Progressing Towards Goal  Goal: *Knowledge of prescribed medications  6/9/2021 0736 by Chente Mcallister RN  Outcome: Progressing Towards Goal  6/8/2021 2300 by Chente Mcallister RN  Outcome: Progressing Towards Goal     Problem: Patient Education: Go to Patient Education Activity  Goal: Patient/Family Education  6/9/2021 0736 by Chente Mcallister RN  Outcome: Progressing Towards Goal  6/8/2021 2300 by Chente Mcallister RN  Outcome: Progressing Towards Goal     Problem: Infection - Risk of, Urinary Catheter-Associated Urinary Tract Infection  Goal: *Absence of infection signs and symptoms  6/9/2021 0736 by Chente Mcallister RN  Outcome: Progressing Towards Goal  6/8/2021 2300 by Chente Mcallister RN  Outcome: Progressing Towards Goal     Problem: Patient Education: Go to Patient Education Activity  Goal: Patient/Family Education  6/9/2021 0736 by Chente Mcallister RN  Outcome: Progressing Towards Goal  6/8/2021 2300 by Liliana Mcgraw RN  Outcome: Progressing Towards Goal     Problem: Patient Education: Go to Patient Education Activity  Goal: Patient/Family Education  6/9/2021 0736 by Liliana Mcgraw RN  Outcome: Progressing Towards Goal  6/8/2021 2300 by Liliana Mcgraw RN  Outcome: Progressing Towards Goal     Problem: Constipation - Risk of  Goal: *Prevention of constipation  6/9/2021 0736 by Liliana Mcgraw RN  Outcome: Progressing Towards Goal  6/8/2021 2300 by Liliana Mcgraw RN  Outcome: Progressing Towards Goal  Goal: *PALLIATIVE CARE:  Prevention/Alleviation of constipation  6/9/2021 0736 by Liliana Mcgraw RN  Outcome: Progressing Towards Goal  6/8/2021 2300 by Liliana Mcgraw RN  Outcome: Progressing Towards Goal     Problem: Patient Education: Go to Patient Education Activity  Goal: Patient/Family Education  6/9/2021 0736 by Liliana Mcgraw RN  Outcome: Progressing Towards Goal  6/8/2021 2300 by Liliana Mcgraw RN  Outcome: Progressing Towards Goal     Problem: Nutrition Deficit  Goal: *Optimize nutritional status  6/9/2021 0736 by Liliana Mcgraw RN  Outcome: Progressing Towards Goal  6/8/2021 2300 by Liliana Mcgraw RN  Outcome: Progressing Towards Goal     Problem: Pressure Injury - Risk of  Goal: *Prevention of pressure injury  Description: Document Aristeo Scale and appropriate interventions in the flowsheet.   6/9/2021 0736 by Liliana Mcgraw RN  Outcome: Progressing Towards Goal  6/8/2021 2300 by Liliana Mcgraw RN  Outcome: Progressing Towards Goal  Note: Pressure Injury Interventions:       Moisture Interventions: Internal/External urinary devices    Activity Interventions: Pressure redistribution bed/mattress(bed type)    Mobility Interventions: HOB 30 degrees or less    Nutrition Interventions: Document food/fluid/supplement intake    Friction and Shear Interventions: HOB 30 degrees or less                Problem: Patient Education: Go to Patient Education Activity  Goal: Patient/Family Education  6/9/2021 0736 by Jodi Amor RN  Outcome: Progressing Towards Goal  6/8/2021 2300 by Jodi Amor RN  Outcome: Progressing Towards Goal     Problem: Patient Education: Go to Patient Education Activity  Goal: Patient/Family Education  6/9/2021 0736 by Jodi Amor RN  Outcome: Progressing Towards Goal  6/8/2021 2300 by Jodi Amor RN  Outcome: Progressing Towards Goal

## 2021-06-09 NOTE — PROGRESS NOTES
Problem: Pain  Goal: *Control of Pain  Outcome: Progressing Towards Goal     Problem: Patient Education: Go to Patient Education Activity  Goal: Patient/Family Education  Outcome: Progressing Towards Goal     Problem: Falls - Risk of  Goal: *Absence of Falls  Description: Document Christianne Marcum Fall Risk and appropriate interventions in the flowsheet.   Outcome: Progressing Towards Goal  Note: Fall Risk Interventions:  Mobility Interventions: Assess mobility with egress test, OT consult for ADLs, Patient to call before getting OOB, PT Consult for mobility concerns, PT Consult for assist device competence, Strengthening exercises (ROM-active/passive)         Medication Interventions: Teach patient to arise slowly    Elimination Interventions: Bed/chair exit alarm, Call light in reach, Patient to call for help with toileting needs    History of Falls Interventions: Bed/chair exit alarm, Door open when patient unattended, Room close to nurse's station         Problem: Pulmonary Embolism Care Plan (Adult)  Goal: *Absence of bleeding  Outcome: Progressing Towards Goal     Problem: Injury - Risk of, Adverse Drug Event  Goal: *Absence of adverse drug events  Outcome: Progressing Towards Goal  Goal: *Absence of medication errors  Outcome: Progressing Towards Goal  Goal: *Knowledge of prescribed medications  Outcome: Progressing Towards Goal     Problem: Infection - Risk of, Urinary Catheter-Associated Urinary Tract Infection  Goal: *Absence of infection signs and symptoms  Outcome: Progressing Towards Goal     Problem: Patient Education: Go to Patient Education Activity  Goal: Patient/Family Education  Outcome: Progressing Towards Goal

## 2021-06-09 NOTE — PROGRESS NOTES
Problem: Dysphagia (Adult)  Goal: *Acute Goals and Plan of Care (Insert Text)  Description: Patient will:  1. Tolerate PO trials with 0 s/s overt distress in 4/5 trials-met   2. Utilize compensatory swallow strategies/maneuvers (decrease bite/sip, size/rate, alt. liq/sol) with min cues in 4/5 trials-met   3. Complete an objective swallow study (i.e., MBSS) to assess swallow integrity, r/o aspiration, and determine of safest LRD, min A as indicated/ordered by MD  - met 6/8/2021    Rec:     Mech-soft diet with thin liquids  Aspiration precautions  HOB >45 during po intake, remain >30 for 30-45 minutes after po   Small bites/sips; alternate liquid/solid with slow feeding rate   Oral care TID  Meds per pt preference  Outcome: Resolved/Met    Kevin Craig    Patient: Nathanael William (10 y.o. male)  Date: 6/9/2021  Diagnosis: Acute pulmonary embolism ([facility] [address] [I26.99]   Precautions: Fall, Aspiration  PLOF: As per H&P     ASSESSMENT:  Pt seen for follow up dysphagia treatment, reporting preference for softer food. Tolerating mech soft with positive mastication/clearance and no overt distress s/sx. Declining solid trials or further diet advancement. Pt continuing to tolerate thin liquids + straw with timely swallow initiation, adequate laryngeal elevation to palpation and no overt s/sx aspiration. Educated with regard to results of MBS, diet recs, aspiration risk/precautions, oral care and positioning. Pt able to verbalize understanding. Will sign off. Please re-consult as indicated. Progression toward goals:  [x]         Goals met/approximated     PLAN:  Recommendations and Planned Interventions:  Maximum therapeutic gains met; safest, least restrictive diet achieved. D/C ST intervention at this time.    Discharge Recommendations:  Do not anticipate further SLP needs upon discharge      SUBJECTIVE:   Patient stated No this is good    OBJECTIVE:   Cognitive and Communication Status:  Neurologic State: Alert  Orientation Level: Oriented X4  Cognition: Appropriate decision making, Appropriate for age attention/concentration, Appropriate safety awareness, Follows commands  Perception: Appears intact  Perseveration: No perseveration noted     Dysphagia Treatment:  Oral Assessment:  Oral Assessment  Labial: No impairment  Dentition: Edentulous  Oral Hygiene: adequate  Lingual: No impairment  Velum: No impairment  Mandible: No impairment  P.O.  Trials:   See above     PAIN:  Start of Tx: not reported   End of Tx: not reported      After treatment:   []              Patient left in no apparent distress sitting up in chair  [x]              Patient left in no apparent distress in bed  [x]              Call bell left within reach  [x]              Nursing notified  []              Caregiver present  []              Bed alarm activated      COMMUNICATION/EDUCATION:   [x] Aspiration precautions; swallow safety; compensatory techniques  [x]        Patient/family able to participate in training and education     Thank you for this referral,  Claudeen Stank, M.S., [address] [facility]  Speech-Language Pathologist

## 2021-06-09 NOTE — HOME CARE
Received [facility] referral for PT/OT; verified demographics and explained [facility] services to patient. Spoke with both patient and daughter primary caregiver to be [employer]. She verbalizes she lives only 5 minutes away from patient. This writer has suggested, patient should not be left alone and needs to be home bound. Ms. Queen Wynne states, herself and her sister will take turns to care for the patient. Ms. Queen Wynne had concerns and query of patient's bed at home. She explains he has a bed which is too high and mostly sleeps on couch which is low and patient also has made a \"make shift\" bed with 2 chairs. Contacted CM and discussed safety concern.       She would like all calls to her:  1)  [phone]    Unable to get sister's number

## 2021-06-10 ENCOUNTER — APPOINTMENT (OUTPATIENT)
Dept: GENERAL RADIOLOGY | Age: 76
DRG: 134 | End: 2021-06-10
Attending: INTERNAL MEDICINE
Payer: COMMERCIAL

## 2021-06-10 ENCOUNTER — HOSPITAL ENCOUNTER (OUTPATIENT)
Age: 76
Setting detail: OBSERVATION
Discharge: NURSING, REHAB OR LONG-TERM CARE | End: 2021-06-13
Attending: EMERGENCY MEDICINE | Admitting: HOSPITALIST
Payer: COMMERCIAL

## 2021-06-10 ENCOUNTER — APPOINTMENT (OUTPATIENT)
Dept: CT IMAGING | Age: 76
End: 2021-06-10
Attending: EMERGENCY MEDICINE
Payer: COMMERCIAL

## 2021-06-10 VITALS
TEMPERATURE: 98.6 F | RESPIRATION RATE: 18 BRPM | OXYGEN SATURATION: 92 % | HEART RATE: 92 BPM | BODY MASS INDEX: 25.27 KG/M2 | SYSTOLIC BLOOD PRESSURE: 133 MMHG | WEIGHT: 161 LBS | DIASTOLIC BLOOD PRESSURE: 78 MMHG | HEIGHT: 67 IN

## 2021-06-10 DIAGNOSIS — N32.0 BLADDER OUTLET OBSTRUCTION: ICD-10-CM

## 2021-06-10 DIAGNOSIS — R07.81 PLEURODYNIA: ICD-10-CM

## 2021-06-10 DIAGNOSIS — R04.2 HEMOPTYSIS: ICD-10-CM

## 2021-06-10 DIAGNOSIS — I26.99 ACUTE PULMONARY EMBOLISM WITHOUT ACUTE COR PULMONALE, UNSPECIFIED PULMONARY EMBOLISM TYPE (HCC): ICD-10-CM

## 2021-06-10 LAB
ALBUMIN SERPL-MCNC: 2.8 G/DL (ref 3.4–5)
ALBUMIN/GLOB SERPL: 0.6 {RATIO} (ref 0.8–1.7)
ALP SERPL-CCNC: 45 U/L (ref 45–117)
ALT SERPL-CCNC: 13 U/L (ref 16–61)
ANION GAP SERPL CALC-SCNC: 3 MMOL/L (ref 3–18)
ANION GAP SERPL CALC-SCNC: 4 MMOL/L (ref 3–18)
APTT PPP: 40.8 SEC (ref 23–36.4)
AST SERPL-CCNC: 14 U/L (ref 10–38)
BASOPHILS # BLD: 0 K/UL (ref 0–0.1)
BASOPHILS # BLD: 0 K/UL (ref 0–0.1)
BASOPHILS NFR BLD: 0 % (ref 0–2)
BASOPHILS NFR BLD: 0 % (ref 0–2)
BILIRUB SERPL-MCNC: 0.4 MG/DL (ref 0.2–1)
BUN SERPL-MCNC: 12 MG/DL (ref 7–18)
BUN SERPL-MCNC: 12 MG/DL (ref 7–18)
BUN/CREAT SERPL: 18 (ref 12–20)
BUN/CREAT SERPL: 19 (ref 12–20)
CALCIUM SERPL-MCNC: 10 MG/DL (ref 8.5–10.1)
CALCIUM SERPL-MCNC: 9.8 MG/DL (ref 8.5–10.1)
CHLORIDE SERPL-SCNC: 98 MMOL/L (ref 100–111)
CHLORIDE SERPL-SCNC: 98 MMOL/L (ref 100–111)
CO2 SERPL-SCNC: 29 MMOL/L (ref 21–32)
CO2 SERPL-SCNC: 30 MMOL/L (ref 21–32)
CREAT SERPL-MCNC: 0.64 MG/DL (ref 0.6–1.3)
CREAT SERPL-MCNC: 0.66 MG/DL (ref 0.6–1.3)
DIFFERENTIAL METHOD BLD: ABNORMAL
DIFFERENTIAL METHOD BLD: ABNORMAL
EOSINOPHIL # BLD: 0 K/UL (ref 0–0.4)
EOSINOPHIL # BLD: 0.1 K/UL (ref 0–0.4)
EOSINOPHIL NFR BLD: 0 % (ref 0–5)
EOSINOPHIL NFR BLD: 1 % (ref 0–5)
ERYTHROCYTE [DISTWIDTH] IN BLOOD BY AUTOMATED COUNT: 14.2 % (ref 11.6–14.5)
ERYTHROCYTE [DISTWIDTH] IN BLOOD BY AUTOMATED COUNT: 14.4 % (ref 11.6–14.5)
GLOBULIN SER CALC-MCNC: 4.7 G/DL (ref 2–4)
GLUCOSE SERPL-MCNC: 100 MG/DL (ref 74–99)
GLUCOSE SERPL-MCNC: 102 MG/DL (ref 74–99)
HCT VFR BLD AUTO: 38 % (ref 36–48)
HCT VFR BLD AUTO: 38.3 % (ref 36–48)
HGB BLD-MCNC: 12.7 G/DL (ref 13–16)
HGB BLD-MCNC: 13.2 G/DL (ref 13–16)
INR PPP: 2 (ref 0.8–1.2)
LACTATE BLD-SCNC: 1.06 MMOL/L (ref 0.4–2)
LYMPHOCYTES # BLD: 0.8 K/UL (ref 0.9–3.6)
LYMPHOCYTES # BLD: 1.1 K/UL (ref 0.9–3.6)
LYMPHOCYTES NFR BLD: 12 % (ref 21–52)
LYMPHOCYTES NFR BLD: 14 % (ref 21–52)
MCH RBC QN AUTO: 30 PG (ref 24–34)
MCH RBC QN AUTO: 30.4 PG (ref 24–34)
MCHC RBC AUTO-ENTMCNC: 33.4 G/DL (ref 31–37)
MCHC RBC AUTO-ENTMCNC: 34.5 G/DL (ref 31–37)
MCV RBC AUTO: 88.2 FL (ref 74–97)
MCV RBC AUTO: 89.6 FL (ref 74–97)
MONOCYTES # BLD: 1.1 K/UL (ref 0.05–1.2)
MONOCYTES # BLD: 1.2 K/UL (ref 0.05–1.2)
MONOCYTES NFR BLD: 15 % (ref 3–10)
MONOCYTES NFR BLD: 15 % (ref 3–10)
NEUTS SEG # BLD: 5.1 K/UL (ref 1.8–8)
NEUTS SEG # BLD: 5.6 K/UL (ref 1.8–8)
NEUTS SEG NFR BLD: 70 % (ref 40–73)
NEUTS SEG NFR BLD: 72 % (ref 40–73)
PLATELET # BLD AUTO: 305 K/UL (ref 135–420)
PLATELET # BLD AUTO: 343 K/UL (ref 135–420)
PMV BLD AUTO: 9 FL (ref 9.2–11.8)
PMV BLD AUTO: 9.2 FL (ref 9.2–11.8)
POTASSIUM SERPL-SCNC: 4 MMOL/L (ref 3.5–5.5)
POTASSIUM SERPL-SCNC: 4.2 MMOL/L (ref 3.5–5.5)
PROT SERPL-MCNC: 7.5 G/DL (ref 6.4–8.2)
PROTHROMBIN TIME: 22.5 SEC (ref 11.5–15.2)
RBC # BLD AUTO: 4.24 M/UL (ref 4.35–5.65)
RBC # BLD AUTO: 4.34 M/UL (ref 4.35–5.65)
SODIUM SERPL-SCNC: 131 MMOL/L (ref 136–145)
SODIUM SERPL-SCNC: 131 MMOL/L (ref 136–145)
TROPONIN I SERPL-MCNC: <0.02 NG/ML (ref 0–0.04)
WBC # BLD AUTO: 7 K/UL (ref 4.6–13.2)
WBC # BLD AUTO: 8 K/UL (ref 4.6–13.2)

## 2021-06-10 PROCEDURE — 74011250636 HC RX REV CODE- 250/636: Performed by: NURSE PRACTITIONER

## 2021-06-10 PROCEDURE — 74011250637 HC RX REV CODE- 250/637: Performed by: INTERNAL MEDICINE

## 2021-06-10 PROCEDURE — 83690 ASSAY OF LIPASE: CPT

## 2021-06-10 PROCEDURE — 86901 BLOOD TYPING SEROLOGIC RH(D): CPT

## 2021-06-10 PROCEDURE — 2709999900 HC NON-CHARGEABLE SUPPLY

## 2021-06-10 PROCEDURE — 73630 X-RAY EXAM OF FOOT: CPT

## 2021-06-10 PROCEDURE — 80053 COMPREHEN METABOLIC PANEL: CPT

## 2021-06-10 PROCEDURE — 84484 ASSAY OF TROPONIN QUANT: CPT

## 2021-06-10 PROCEDURE — 73610 X-RAY EXAM OF ANKLE: CPT

## 2021-06-10 PROCEDURE — 99232 SBSQ HOSP IP/OBS MODERATE 35: CPT | Performed by: INTERNAL MEDICINE

## 2021-06-10 PROCEDURE — 85610 PROTHROMBIN TIME: CPT

## 2021-06-10 PROCEDURE — 36415 COLL VENOUS BLD VENIPUNCTURE: CPT

## 2021-06-10 PROCEDURE — 85025 COMPLETE CBC W/AUTO DIFF WBC: CPT

## 2021-06-10 PROCEDURE — 99285 EMERGENCY DEPT VISIT HI MDM: CPT

## 2021-06-10 PROCEDURE — 77030012865 HC BG URIN LEG MDII -A

## 2021-06-10 PROCEDURE — APPNB30 APP NON BILLABLE TIME 0-30 MINS: Performed by: NURSE PRACTITIONER

## 2021-06-10 PROCEDURE — 96366 THER/PROPH/DIAG IV INF ADDON: CPT

## 2021-06-10 PROCEDURE — 97530 THERAPEUTIC ACTIVITIES: CPT

## 2021-06-10 PROCEDURE — 97164 PT RE-EVAL EST PLAN CARE: CPT

## 2021-06-10 PROCEDURE — 71275 CT ANGIOGRAPHY CHEST: CPT

## 2021-06-10 PROCEDURE — 83605 ASSAY OF LACTIC ACID: CPT

## 2021-06-10 PROCEDURE — 85730 THROMBOPLASTIN TIME PARTIAL: CPT

## 2021-06-10 PROCEDURE — 74011250637 HC RX REV CODE- 250/637: Performed by: HOSPITALIST

## 2021-06-10 PROCEDURE — 99239 HOSP IP/OBS DSCHRG MGMT >30: CPT | Performed by: INTERNAL MEDICINE

## 2021-06-10 PROCEDURE — 74011250637 HC RX REV CODE- 250/637: Performed by: NURSE PRACTITIONER

## 2021-06-10 RX ORDER — METRONIDAZOLE 500 MG/1
500 TABLET ORAL 3 TIMES DAILY
Qty: 3 TABLET | Refills: 0 | Status: SHIPPED
Start: 2021-06-11 | End: 2021-06-13

## 2021-06-10 RX ORDER — LEVOFLOXACIN 750 MG/1
750 TABLET ORAL DAILY
Qty: 1 TABLET | Refills: 0 | Status: SHIPPED
Start: 2021-06-11 | End: 2021-06-13

## 2021-06-10 RX ORDER — THERA TABS 400 MCG
1 TAB ORAL DAILY
Qty: 30 TABLET | Refills: 0 | Status: SHIPPED
Start: 2021-06-11 | End: 2021-07-11

## 2021-06-10 RX ORDER — POLYETHYLENE GLYCOL 3350 17 G/17G
17 POWDER, FOR SOLUTION ORAL DAILY
Qty: 30 PACKET | Refills: 0 | Status: SHIPPED
Start: 2021-06-10

## 2021-06-10 RX ADMIN — METRONIDAZOLE 500 MG: 500 TABLET ORAL at 17:20

## 2021-06-10 RX ADMIN — TAMSULOSIN HYDROCHLORIDE 0.4 MG: 0.4 CAPSULE ORAL at 17:20

## 2021-06-10 RX ADMIN — AMLODIPINE BESYLATE 5 MG: 5 TABLET ORAL at 08:57

## 2021-06-10 RX ADMIN — METRONIDAZOLE 500 MG: 500 TABLET ORAL at 08:56

## 2021-06-10 RX ADMIN — MORPHINE SULFATE 2 MG: 2 INJECTION, SOLUTION INTRAMUSCULAR; INTRAVENOUS at 09:12

## 2021-06-10 RX ADMIN — DOCUSATE SODIUM 100 MG: 100 CAPSULE ORAL at 08:57

## 2021-06-10 RX ADMIN — MORPHINE SULFATE 2 MG: 2 INJECTION, SOLUTION INTRAMUSCULAR; INTRAVENOUS at 01:22

## 2021-06-10 RX ADMIN — THERA TABS 1 TABLET: TAB at 08:56

## 2021-06-10 RX ADMIN — APIXABAN 10 MG: 5 TABLET, FILM COATED ORAL at 17:20

## 2021-06-10 RX ADMIN — FINASTERIDE 5 MG: 5 TABLET, FILM COATED ORAL at 08:58

## 2021-06-10 RX ADMIN — PANTOPRAZOLE 40 MG: 40 TABLET, DELAYED RELEASE ORAL at 06:45

## 2021-06-10 RX ADMIN — POLYETHYLENE GLYCOL 3350 17 G: 17 POWDER, FOR SOLUTION ORAL at 08:58

## 2021-06-10 RX ADMIN — DULOXETINE HYDROCHLORIDE 60 MG: 60 CAPSULE, DELAYED RELEASE ORAL at 08:57

## 2021-06-10 RX ADMIN — DOCUSATE SODIUM 100 MG: 100 CAPSULE ORAL at 17:20

## 2021-06-10 RX ADMIN — APIXABAN 10 MG: 5 TABLET, FILM COATED ORAL at 08:57

## 2021-06-10 RX ADMIN — POLYETHYLENE GLYCOL 3350 17 G: 17 POWDER, FOR SOLUTION ORAL at 17:21

## 2021-06-10 RX ADMIN — LEVOFLOXACIN 750 MG: 750 TABLET, FILM COATED ORAL at 08:57

## 2021-06-10 NOTE — PROGRESS NOTES
Daughter wanting patient to go to SNF. Patient does have a SNF benefit. [facility] of choice obtained for [facility] and [facility].       Leroy Barrera RN BSN  Care Manager  [phone]

## 2021-06-10 NOTE — PROGRESS NOTES
[facility]  Pulmonary, Critical Care, and Sleep Medicine    Name: Gabriel Duarte MRN: [id number]   : 1945 Hospital: [address] [place] [address]   Date: 6/10/2021        IMPRESSION:   · R sided Pulmonary Embolism- extensive clot burden of upper and lower branches, likely chronic.  No hypoxia, no signs of RV strain on echo or CTA very likely chronic  · Pleuritic chest pain -possibly 2/2 lung infarction?, not evident on CT.  Possible aspiration event  · Hx of falls - may be poor long term AC candidate  · Possible aspiration   · Dsphagia  · Hyponatremia  · Hx of esophageal varices  · Hx of EtoH abuse  · Recent Diverticulitis- now resolved.   · Hx of tobacco abuse - over 50 pack years, quit 5 years ago            Patient Active Problem List   Diagnosis Code    Unsteady gait R26.81    Gait instability R26.81    Vertigo R42    Radiculopathy of lumbar region M54.16    ED (erectile dysfunction) of organic origin N52.9    Peripheral vascular disease ([address] I73.9    Overactive bladder N32.81    Nocturia R35.1    Leg pain, right M79.604    Hypercholesterolemia E78.00    HTN (hypertension) I10    H/O spinal cord injury Z87.828    Erectile disorder due to medical 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 N52.1    Bladder outlet obstruction N32.0    Injury of lumbar spine ([address] S34.109A    Frequency of urination R35.0    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 .   · Supplemental O2 as needed for SOB  · Aspiration precautions  · Recommend age appropriate cancer screening if not done- colonoscopy, PSA etc.  · Recommend NSAID therapy for pleuritic chest pain. · Continue puree diet per SLP due to dysphagia and aspiration risk  · Course of abx for diverticulitis will be complete  today - currently levaquin and flagyl  · Assess home Oxygen needs at discharge  · OT, PT, OOB and ambulate  · Will Follow           Subjective/Interval History:     77year old male with PMHx of HTN, esophageal varices, prior heavy EtOH use and recently Dx diverticulitis admitted on 21 admitted for right sided PE with extensive clot burden but no right heart strain.     2021     · Patient alert and oriented x 3, in NAD  · SpO2 = 92% on RA  ·  still complaining of right sided pleuritic chest pain that is worse with inspiration  · Overnight patient coughed up small amount of blood - tinged mucous. No further episodes reported  · Heparin gtt stopped and patient transitioned to Eliquis 10 mg BID       ROS:A comprehensive review of systems was negative except for that written in the HPI. Objective:   Vital Signs:    Visit Vitals  /72 (BP 1 Location: Right arm, BP Patient Position: Sitting)   Pulse 60   Temp 98.7 °F (37.1 °C)   Resp 18   Ht 5' 7\" (1.702 m)   Wt 73 kg (161 lb)   SpO2 97%   BMI 25.22 kg/m²       O2 Device: None (Room air) (weaned to room air)   O2 Flow Rate (L/min): 1 l/min   Temp (24hrs), Av.5 °F (36.9 °C), Min:97.8 °F (36.6 °C), Max:99.2 °F (37.3 °C)       Intake/Output:   Last shift:      06/10 0701 - 06/10 1900  In: 120 [P.O.:120]  Out: -   Last 3 shifts: 1901 - 06/10 07  In: -   Out: 2730 [Urine:2730]    Intake/Output Summary (Last 24 hours) at 6/10/2021 1000  Last data filed at 6/10/2021 0847  Gross per 24 hour   Intake 120 ml   Output 1750 ml   Net -1630 ml      Physical Exam:   General:  Alert, cooperative, no distress, appears stated age. Head:  Normocephalic, without obvious abnormality, atraumatic. Lungs:   Bilateral auscultation clear, no rales or wheezing.    Chest wall:  No tenderness or deformity. NO CREPITUS   Heart:  Regular rate and rhythm, S1, S2 normal, no murmur, click, rub or gallop. Abdomen:   Soft,mild tenderness to palpation in the center. Bowel sounds normal. No masses,  No organomegaly. No paradox   Extremities: normal, atraumatic, no cyanosis or edema. Pulses: 1-2+ and symmetric all extremities. Neurologic: Grossly nonfocal             DATA:  Labs:  Recent Labs     06/10/21  0430 06/09/21  0410 06/08/21  1350 06/08/21  0450   WBC 7.0 6.8  --  10.1   HGB 12.7* 12.2* 12.6* 12.7*   HCT 38.0 36.7 37.2 38.0    279  --  244     Recent Labs     06/10/21  0430 06/09/21  0410 06/08/21  0450   * 131* 132*   K 4.2 3.9 3.8   CL 98* 98* 100   CO2 29 29 29   * 94 122*   BUN 12 14 14   CREA 0.64 0.64 0.75   CA 9.8 9.5 9.6     No results for input(s): PH, PCO2, PO2, HCO3, FIO2 in the last 72 hours. PFT:                                                     Echo:    Imaging:  [x]I have personally reviewed the patients radiographs  []Radiographs reviewed with radiologist   [x]No change from prior, tubes and lines in adequate position  []Improved   []Worsening    Kylie Gross, SERGEY - C  06/10/2021  Pulmonary, Critical Care Medicine  [facility]

## 2021-06-10 NOTE — PROGRESS NOTES
New OT order received and chart reviewed. Patient evaluated and discharged from skilled OT caseload on 6/8/2021 with recommendation for home health with family support. Please see full note for details. Will acknowledge/complete the order.   Thank you for the referral.  Joslyn Walters MS OTR/L

## 2021-06-10 NOTE — PROGRESS NOTES
PT orders received and chart reviewed. Pt evaluated 6/8/21, Ubaldo mobility. Recommendations for Home health with increased family supervision. Will acknowledge new orders and sign off.      Thank you for this referral.   Henry [place] PT DPT

## 2021-06-10 NOTE — PROGRESS NOTES
Requested Case Management specialist to assist with transportation to [address] [facility]. Patient will require BLS transport. Pt requires Stretcher If stretcher, reason: PE, Debility, Weakness  Patient is currently requiring oxygen No  Height:5'7''   Weight: 161  Pt is on isolation: No    Is the pt ready now? yes  Requested time: Next Available  PCS Faxed: no  Insurance verified on face sheet: yes  Auth needed for transport: yes  CM completed PCS/ Envelope and placed on chart.      Seamus Meléndez RN BSN  Care Manager  [phone]

## 2021-06-10 NOTE — PROGRESS NOTES
Physician Progress Note      Faith Ac  CSN #:                  [id number]  :                       1945  ADMIT DATE:       2021 10:33 AM  DISCH DATE:  RESPONDING  PROVIDER #:        Sergey Rodriguez MD          QUERY TEXT:    Dear Hospitalist  Pt admitted with Acute PE . Pt noted to have? Aspiration pna . If possible, please document in the progress notes and discharge summary if you are evaluating and/or treating any of the following:        Note: CAP and HCAP indicate where the pneumonia was acquired, not a specific type. The medical record reflects the following:  Risk Factors: Acute PE, dysphagia and ? Aspiration pna  Clinical Indicators: MDPN ? Aspiration pna  , CT of abd/pelvis on 21 Mild new dependent lung opacities favored to represent aspiration. Mild bronchitis. ?  Treatment: cont levaquin and flagyl ,asp precautions and modified diet and  SLT ordered    Thank you  Cynthia Serra RN       Options provided:  -- Aspiration pneumonia  -- Aspiration pneumonia ruled out  -- Other - I will add my own diagnosis  -- Disagree - Not applicable / Not valid  -- Disagree - Clinically unable to determine / Unknown  -- Refer to Clinical Documentation Reviewer    PROVIDER RESPONSE TEXT:    This patient dx of aspiration pneumonia has been ruled out . Query created by:  Lisa Kimble on 2021 6:46 PM      Electronically signed by:  Sergey Rodriguez MD 2021 11:10 PM

## 2021-06-10 NOTE — PROGRESS NOTES
[facility]  Pulmonary, Critical Care, and Sleep Medicine    Name: Karla Byrne MRN: [id number]   : 1945 Hospital: [address] [place] [address]   Date: 6/10/2021        IMPRESSION:   · R sided Pulmonary Embolism- extensive clot burden of upper and lower branches, likely chronic.  No hypoxia, no signs of RV strain on echo or CTA very likely chronic  · Pleuritic chest pain -possibly 2/2 lung infarction?, not evident on CT.  Possible aspiration event  · Hx of falls - may be poor long term AC candidate  · Possible aspiration   · Dsphagia  · Hyponatremia  · Hx of esophageal varices  · Hx of EtoH abuse  · Recent Diverticulitis- now resolved.   · Hx of tobacco abuse - over 50 pack years, quit 5 years ago            Patient Active Problem List   Diagnosis Code    Unsteady gait R26.81    Gait instability R26.81    Vertigo R42    Radiculopathy of lumbar region M54.16    ED (erectile dysfunction) of organic origin N52.9    Peripheral vascular disease ([address] I73.9    Overactive bladder N32.81    Nocturia R35.1    Leg pain, right M79.604    Hypercholesterolemia E78.00    HTN (hypertension) I10    H/O spinal cord injury Z87.828    Erectile disorder due to medical 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 N52.1    Bladder outlet obstruction N32.0    Injury of lumbar spine (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 .   · Supplemental O2 as needed for SOB  · Aspiration precautions  · Recommend age appropriate cancer screening if not done- colonoscopy, PSA etc.  · Recommend NSAID therapy for pleuritic chest pain. · Continue puree diet per SLP due to dysphagia and aspiration risk  · ABX per primary team, currently levaquin and flagyl -  needs to complete course for diverticulitis  · Assess home Oxygen needs at discharge  · OT, PT, OOB and ambulate  · Will Follow     Subjective/Interval History:        77year old male with PMHx of HTN, esophageal varices, prior heavy EtOH use and recently Dx diverticulitis admitted on 21 admitted for right sided PE with extensive clot burden but no right heart strain.     06/10/21     · Patient alert and oriented x 3, in NAD  · SpO2 > 92% on 3L NC  ·  right sided pleuritic chest pain improving, no worsening SOB. ROS:A comprehensive review of systems was negative except for that written in the HPI. Objective:   Vital Signs:    Visit Vitals  /78 (BP 1 Location: Right arm, BP Patient Position: At rest)   Pulse 92   Temp 98.6 °F (37 °C)   Resp 18   Ht 5' 7\" (1.702 m)   Wt 73 kg (161 lb)   SpO2 92%   BMI 25.22 kg/m²       O2 Device: None (Room air)   O2 Flow Rate (L/min): 1 l/min   Temp (24hrs), Av.6 °F (37 °C), Min:98 °F (36.7 °C), Max:99.2 °F (37.3 °C)       Intake/Output:   Last shift:      06/10 07 - 06/10 1900  In: 240 [P.O.:240]  Out: 250 [Urine:250]  Last 3 shifts: 1901 - 06/10 0700  In: -   Out: 2730 [Urine:2730]    Intake/Output Summary (Last 24 hours) at 6/10/2021 1537  Last data filed at 6/10/2021 1244  Gross per 24 hour   Intake 240 ml   Output 2000 ml   Net -1760 ml        Physical Exam:   General:  Alert, cooperative, no distress, appears stated age. Head:  Normocephalic, without obvious abnormality, atraumatic. Lungs:   Bilateral auscultation clear, no rales or wheezing.    Chest wall:  No tenderness or deformity. NO CREPITUS   Heart:  Regular rate and rhythm, S1, S2 normal, no murmur, click, rub or gallop.    Abdomen:   Soft,mild tenderness to palpation in the center. Bowel sounds normal. No masses,  No organomegaly. No paradox   Extremities: normal, atraumatic, no cyanosis or edema. Pulses: 1-2+ and symmetric all extremities. Neurologic: Grossly nonfocal             DATA:  Labs:  Recent Labs     06/10/21  0430 06/09/21  0410 06/08/21  1350 06/08/21  0450   WBC 7.0 6.8  --  10.1   HGB 12.7* 12.2* 12.6* 12.7*   HCT 38.0 36.7 37.2 38.0    279  --  244     Recent Labs     06/10/21  0430 06/09/21  0410 06/08/21  0450   * 131* 132*   K 4.2 3.9 3.8   CL 98* 98* 100   CO2 29 29 29   * 94 122*   BUN 12 14 14   CREA 0.64 0.64 0.75   CA 9.8 9.5 9.6     No results for input(s): PH, PCO2, PO2, HCO3, FIO2 in the last 72 hours.     Keith Barrett MD  Pulmonary, Critical Care Medicine  [facility]

## 2021-06-10 NOTE — PROGRESS NOTES
Patient coughed up small bloody mucus clot and patient c/o having trouble putting weight on his left leg Dr Alyssa Champion made aware will follow up with PT

## 2021-06-10 NOTE — PROGRESS NOTES
Problem: Mobility Impaired (Adult and Pediatric)  Goal: *Acute Goals and Plan of Care (Insert Text)  Description: Physical Therapy Goals  Initiated 6/10/2021 and to be accomplished within 7 day(s)  1. Patient will move from supine to sit and sit to supine , scoot up and down, and roll side to side in bed with modified independence. 2.  Patient will transfer from bed to chair and chair to bed with supervision/set-up using the least restrictive device. 3.  Patient will perform sit to stand with supervision/set-up. 4.  Patient will ambulate with supervision/set-up for 30 feet with the least restrictive device. 5.  Assess stairs as appropriate or needed for discharge    PLOF: Pt reports he lives in 3rd floor apartment with elevator though reports many times the elevator is broken. Pt reports independence without AD, lives alone. Outcome: Progressing Towards Goal    PHYSICAL THERAPY RE-EVALUATION    Patient: Toyin Bonner (57 y.o. male)  Date: 6/10/2021  Primary Diagnosis: Acute pulmonary embolism ([facility]) [I26.99]        Precautions:  Fall, Aspiration  PLOF: see above     ASSESSMENT :  Pt cleared to participate in PT session, pt received semi-reclined in bed and agreeable to therapy session. Based on the objective data described below, the patient presents with decreased endurance, decreased strength, decreased balance reactions, gait deviations, increased pain, and decreased independence in functional mobility. Pt completing supine to sit with SBA. Pt sitting EOB with good balance but reporting increased trunk pain from blood clots in lungs. Pt able to move L ankle but reporting pain with AROM, able to complete ankle PF against resistance without pain. Pt then standing with Ra to RW. Pt at first unwilling to place LLE on floor. With time and weight shift, able to take several steps forward with forefoot weight bearing only. Pt then requesting BSC, sitting with trunk flexion and supervision.  SPT back from BSC to bed, SBA for sit to supine. Pt declining further mobility due to pain. Pt positioned for comfort and educated to call for assist before getting up, pt verbalized understanding. Pt left with all needs met and call bell in reach. RN notified of position and participation. Patient will benefit from skilled intervention to address the above impairments. Patient's rehabilitation potential is considered to be Fair  Factors which may influence rehabilitation potential include:   []         None noted  []         Mental ability/status  [x]         Medical condition  [x]         Home/family situation and support systems  []         Safety awareness  []         Pain tolerance/management  []         Other:      PLAN :  Recommendations and Planned Interventions:   [x]           Bed Mobility Training             []    Neuromuscular Re-Education  [x]           Transfer Training                   []    Orthotic/Prosthetic Training  [x]           Gait Training                          []    Modalities  [x]           Therapeutic Exercises           []    Edema Management/Control  [x]           Therapeutic Activities            [x]    Family Training/Education  [x]           Patient Education  []           Other (comment):    Frequency/Duration: Patient will be followed by physical therapy 1-2 times per day/3-5 days per week to address goals. Discharge Recommendations: [facility]  Further Equipment Recommendations for Discharge: rolling walker, possible W/c      SUBJECTIVE:   Patient stated I don't know why it hurts.     OBJECTIVE DATA SUMMARY:   Hospital course since last seen and reason for re-evaluation: Pt reordered for PT due to change in status. Pt reporting increased L ankle pain over the last 2 days. Pt unable to bear full weight on LLE. Pt would benefit from skilled PT while admitted.    Past Medical History:   Diagnosis Date    Bladder outlet obstruction     Erectile disorder due to medical 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     Frequency of urination     H/O spinal cord injury 1974    lumbar spine injury secondary to fall    HTN (hypertension)     Hypercholesterolemia     Illiterate     Injury of lumbar spine ([facility] [address] 1974    Leg pain, right     Nocturia     Overactive bladder     Peripheral vascular disease ([facility] [address]      Past Surgical History:   Procedure Laterality Date    COLONOSCOPY N/A 12/4/2019    COLONOSCOPY performed by Nino Castellano MD at [facility]    HAND/FINGER SURGERY UNLISTED Right     carpal tunnel    HX HEENT Left     lens implant    HX ORTHOPAEDIC      finger amputation left hand    HX TONSILLECTOMY      UPPER ARM/ELBOW SURGERY UNLISTED Right      Barriers to Learning/Limitations: yes;  physical  Compensate with: Visual Cues, Verbal Cues, and Tactile Cues  Home Situation:   Home Situation  Home Environment: Apartment  # Steps to Enter: 25 (reports has elevator )  One/Two Story Residence: One story  Living Alone: Yes  Support Systems: Child(sam)  Patient Expects to be Discharged to[de-identified] Other (comment)  Current DME Used/Available at Home: Cane, straight, Grab bars  Tub or Shower Type: Tub/Shower combination  Critical Behavior:  Neurologic State: Alert  Orientation Level: Oriented X4  Cognition: Follows commands  Safety/Judgement: Awareness of environment; Fall prevention  Psychosocial  Patient Behaviors: Calm  Purposeful Interaction: Yes  Pt Identified Daily Priority: Clinical issues (comment)  Caritas Process: Establish trust;Create healing environment  Caring Interventions: Therapeutic modalities; Other caring modalities  Reassure: Informing;Caring rounds  Therapeutic Modalities: Humor  Other Caring Modalities: hourly rounds    Strength:    Strength: Generally decreased, functional    Tone & Sensation:   Tone: Normal    Sensation: Intact    Range Of Motion:  AROM: Generally decreased, functional (L ankle due to pain )    Posture:  Posture (WDL): Exceptions to WDL  Posture Assessment:  Forward head;Trunk flexion (hip hinge )  Functional Mobility:  Bed Mobility:     Supine to Sit: Stand-by assistance  Sit to Supine: Stand-by assistance  Scooting: Stand-by assistance  Transfers:  Sit to Stand: Minimum assistance  Stand to Sit: Minimum assistance    Balance:   Sitting: Intact  Standing: Impaired; With support  Standing - Static: Fair (-)  Standing - Dynamic : Poor    Ambulation/Gait Training:  Distance (ft): 2 Feet (ft)  Assistive Device: Walker, rolling  Ambulation - Level of Assistance: Minimal assistance     Gait Description (WDL): Exceptions to WDL  Gait Abnormalities: Antalgic;Decreased step clearance    Base of Support: Center of gravity altered;Shift to right;Narrowed  Stance: Left decreased  Speed/Indu: Slow;Shuffled  Step Length: Left shortened;Right shortened    Pain:  Pain level pre-treatment: 10/10   Pain level post-treatment: 10/10   L ankle pain, rib pain, lung pain per patient     Activity Tolerance:   Poor activity tolerance due to pain     Please refer to the flowsheet for vital signs taken during this treatment. After treatment:   []         Patient left in no apparent distress sitting up in chair  [x]         Patient left in no apparent distress in bed  [x]         Call bell left within reach  [x]         Nursing notified  []         Caregiver present  []         Bed alarm activated  []         SCDs applied    COMMUNICATION/EDUCATION:   [x]         Role of Physical Therapy in the acute care setting. [x]         Fall prevention education was provided and the patient/caregiver indicated understanding. [x]         Patient/family have participated as able in goal setting and plan of care. [x]         Patient/family agree to work toward stated goals and plan of care. []         Patient understands intent and goals of therapy, but is neutral about his/her participation. []         Patient is unable to participate in goal setting/plan of care: ongoing with therapy staff.   [] Other:    Thank you for this referral.  Fabricio Dixon, PT   Time Calculation: 32 mins

## 2021-06-10 NOTE — PROGRESS NOTES
Per Mily (TALYA) called [facility] transportation at [phone] scheduled stretcher transport to [facility] and [address] [place] [address] ([address], [place], [facility] [address]) with [employer] and received confirmation number [id number]. Wilbert Fall stated transport can span up to 3 hours.  will call nurse's station when in route to the facility. Informed Mily of transportation conversation and arrangements.

## 2021-06-10 NOTE — PROGRESS NOTES
Shift progress note:  Assumed care of patient in bed awake and alert, weaned from 1 lpm to room air, no s/s of acute distress or discomfort. OOB on bsc, no change in status. contiue to monitor.   6:46 AM  Uneventful night, remained on room air medicated x 1 for pain resting without distress, call bell within reach  Patient Vitals for the past 12 hrs:   Temp Pulse Resp BP SpO2   06/10/21 0401 99.2 °F (37.3 °C) 87 18 130/68 92 %   06/10/21 0000 98.7 °F (37.1 °C) 97 19 (!) 147/70 92 %   06/09/21 2003 98 °F (36.7 °C) 96 19 (!) 144/75 96 %

## 2021-06-10 NOTE — PROGRESS NOTES
Problem: Pain  Goal: *Control of Pain  Outcome: Progressing Towards Goal     Problem: Patient Education: Go to Patient Education Activity  Goal: Patient/Family Education  Outcome: Progressing Towards Goal     Problem: Falls - Risk of  Goal: *Absence of Falls  Description: Document Damion Padilla Fall Risk and appropriate interventions in the flowsheet.   Outcome: Progressing Towards Goal  Note: Fall Risk Interventions:  Mobility Interventions: Assess mobility with egress test, Bed/chair exit alarm, OT consult for ADLs, Patient to call before getting OOB, PT Consult for mobility concerns, PT Consult for assist device competence, Strengthening exercises (ROM-active/passive)         Medication Interventions: Teach patient to arise slowly    Elimination Interventions: Bed/chair exit alarm, Call light in reach    History of Falls Interventions: Bed/chair exit alarm, Room close to nurse's station, Door open when patient unattended         Problem: Pulmonary Embolism Care Plan (Adult)  Goal: *Improvement of existing pulmonary embolism  Outcome: Progressing Towards Goal     Problem: Injury - Risk of, Adverse Drug Event  Goal: *Absence of adverse drug events  Outcome: Progressing Towards Goal

## 2021-06-10 NOTE — DISCHARGE SUMMARY
[facility]  Discharge Summary       Patient: Gabriel Duarte Age: 77 y.o. : 1945 MR#: [id number] SSN: [id number]  PCP on record: Sulaiman Proctor MD  Admit date: 2021  Discharge date: 6/10/2021    Consults:  -CECE Alfred, urology  - Dr Deepti Duenas., [facility]  -AZ Resendez, [place], [place]  Procedures: none  -     Significant Diagnostic Studies: -CT abd pelv w cont 21:  IMPRESSION  Near complete resolution of the cecal diverticulitis. Mild new dependent lung opacities favored to represent aspiration. Mild bronchitis. -CTA chest 21:   Right greater than left pulmonary emboli, most proximally on the right at the  branching of the main pulmonary artery but no findings of heart strain. Suspect  right greater than left dependent infarcts which may be causing flank pain. Alternatively aspiration is possible.    Discharge Diagnoses- Acute PE  -Acute non-occlusive  -Falls at home  -Possible aspiration pneumonia  -BPH w/ acute urinary retention  -Unintentional weight loss  -Constipation  -Recent diverticulitis                                       Patient Active Problem List   Diagnosis Code    Unsteady gait R26.81    Gait instability R26.81    Vertigo R42    Radiculopathy of lumbar region M54.16    ED (erectile dysfunction) of organic origin N52.9    Peripheral vascular disease (HCC) I73.9    Overactive bladder N32.81    Nocturia R35.1    Leg pain, right M79.604    Hypercholesterolemia E78.00    HTN (hypertension) I10    H/O spinal cord injury Z87.828    Erectile disorder due to medical 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 N52.1    Bladder outlet obstruction N32.0    Injury of lumbar spine ([facility] [address] S34.109A    Frequency of urination R35.0    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     -Acute PE - initially treated with heparin drip, transitioned to Eliquis starter pack on 6/9/21. 02 titrated down to RA, near time of dc and he tolerated transition well. On date of dc he reported one episode of coughing up blood but has not since done that. Continue to monitor closely. Discussion held with pulmonologist regarding risks and benefits of oral anticoagulation given his high risk of falls. The overall plan is to have patient work with physical therapy at the skilled nursing facility to improve his stability with mobility. Plan is to use oral anticoagulation as it offers the best reduction in continued thromboembolism risk compared to IVC filter or antiplatelet therapy;  the benefit outweighs risk especially if mobility issues are addressed consistently while at rehab and his mobility issues are addressed on a continual basis even after discharge from the rehab facility. Recommendation is to also decrease medications that would interfere with his cognition such as narcotics, his blood pressure such as Flomax which he currently is on due to urinary retention. The Flomax should be discontinued at the earliest possible timing to avoid orthostatic blood pressure changes and falls due to this positional hypotension.    -Acute non-occlusive thrombus present in the right distal femoral vein. Age indeterminate non-occlusive thrombus present in the left posterior tibial vein. -Falls at home - Fall precautions.  PT / OT. Pt need cont'd physical therapy to address fall risk.   -? Aspiration pna - cont levaquin and flagyl, asp precautions and modified diet.  -Unintentional weight loss, in the setting of edentulous state.  Consult nutritionist, SLP rec pureed diet -if patient dislikes food can transition to \"minced and moist\" per SLP:  -Left leg/ankle pain: complaints noted on date of dc. No reported trauma. Xrays ordered, pending. On exam no evidence of trauma, full ROM of left ankle, although with some pain. Diet as recommended by SLP:  Rec:     Mech-soft diet with thin liquids  Aspiration precautions  HOB >45 during po intake, remain >30 for 30-45 minutes after po   Small bites/sips; alternate liquid/solid with slow feeding rate   Oral care TID  Meds per pt preference    -BPH w/ acute urinary retention - cont flomax / proscar / pelayo catheter for now for acute retention. Urology input noted. Outpt f/u, maintain pelayo. Please ensure follow up with urologist Dr Mello Olsen within one week for outpt follow up and voiding trial. (daughter prefers not to have pt see dr Vipin Jackson)  -Constipation -per nursing pt now having bm's  -Recent diverticulitis - improved, continue Levaquin and Flagyl, last day 6/11     HISTORY OF:  -EtOH use d/o, quit 5 years ago.  Multivitamin, thiamine, folic acid. -Bipolar d/o per patient. Not on treatment per home med review.   -Neuropathy right foot, continue home cymbalta  -Hypertension - cont norvasc w/ hold parameters   -Hx esophageal varices. US 6/3/2021 Unremarkable right upper quadrant ultrasound.  No obvious varices noted around the liver.   S/p EGD with bx 4/23/21, Gena Gomez., MD [facility].   [facility] [place], [place], BIOPSY:     -[facility]- continue ppi.    -   HELICOBACTER PYLORI IDENTIFIED - patient states he completed course of antibiotics.     Dispo: SNF             Today's examination of the patient revealed:     Subjective:   Pt w/ c/o left leg pain  Objective:   VS:   Visit Vitals  /79 (BP 1 Location: Right arm, BP Patient Position: Sitting)   Pulse 92   Temp 98.4 °F (36.9 °C)   Resp 18   Ht 5' 7\" (1.702 m)   Wt 73 kg (161 lb)   SpO2 92%   BMI 25.22 kg/m²      Tmax/24hrs: Temp (24hrs), Av.6 °F (37 °C), Min:98 °F (36.7 °C), Max:99.2 °F (37.3 °C)     Input/Output:     Intake/Output Summary (Last 24 hours) at 6/10/2021 1423  Last data filed at 6/10/2021 1244  Gross per 24 hour   Intake 240 ml   Output 1750 ml   Net -1510 ml       General:  Alert, NAD at rest, + discomfort with mov't  HEENT: Oral mucosa moist; PERRLA  Cardiovascular:  RRR, Nl S1/S2  Pulmonary:  LSC throughout. Normal resp effort  GI:  +BS in all four quadrants, soft, non-tender  : + pelayo   Extremities:  No edema, left ankle full ROM, no edema or swelling/redness anywhere in left leg   Neuro: alert and oriented x 4     Labs:    Recent Results (from the past 24 hour(s))   PTT    Collection Time: 06/10/21  4:30 AM   Result Value Ref Range    aPTT 40.8 (H) 23.0 - 36.4 SEC   CBC WITH AUTOMATED DIFF    Collection Time: 06/10/21  4:30 AM   Result Value Ref Range    WBC 7.0 4.6 - 13.2 K/uL    RBC 4.24 (L) 4.35 - 5.65 M/uL    HGB 12.7 (L) 13.0 - 16.0 g/dL    HCT 38.0 36.0 - 48.0 %    MCV 89.6 74.0 - 97.0 FL    MCH 30.0 24.0 - 34.0 PG    MCHC 33.4 31.0 - 37.0 g/dL    RDW 14.4 11.6 - 14.5 %    PLATELET 972 930 - 693 K/uL    MPV 9.2 9.2 - 11.8 FL    NEUTROPHILS 72 40 - 73 %    LYMPHOCYTES 12 (L) 21 - 52 %    MONOCYTES 15 (H) 3 - 10 %    EOSINOPHILS 0 0 - 5 %    BASOPHILS 0 0 - 2 %    ABS. NEUTROPHILS 5.1 1.8 - 8.0 K/UL    ABS. LYMPHOCYTES 0.8 (L) 0.9 - 3.6 K/UL    ABS. MONOCYTES 1.1 0.05 - 1.2 K/UL    ABS. EOSINOPHILS 0.0 0.0 - 0.4 K/UL    ABS.  BASOPHILS 0.0 0.0 - 0.1 K/UL    DF AUTOMATED     METABOLIC PANEL, BASIC    Collection Time: 06/10/21  4:30 AM   Result Value Ref Range    Sodium 131 (L) 136 - 145 mmol/L    Potassium 4.2 3.5 - 5.5 mmol/L    Chloride 98 (L) 100 - 111 mmol/L    CO2 29 21 - 32 mmol/L    Anion gap 4 3.0 - 18 mmol/L    Glucose 100 (H) 74 - 99 mg/dL    BUN 12 7.0 - 18 MG/DL    Creatinine 0.64 0.6 - 1.3 MG/DL    BUN/Creatinine ratio 19 12 - 20      GFR est AA >60 >60 ml/min/1.73m2    GFR est non-AA >60 >60 ml/min/1.73m2    Calcium 9.8 8.5 - 10.1 MG/DL     Additional Data Reviewed:     Condition on discharge:stable   Disposition:    []Home   []Home with Home Health   [x]SNF/NH   []Rehab   []Home with family   []Alternate Facility:____________________      Discharge Medications:     Current Discharge Medication List      START taking these medications    Details   !! apixaban (ELIQUIS) 5 mg tablet Take 2 Tablets by mouth two (2) times a day for 6 days. Qty: 24 Tablet, Refills: 0      !! apixaban (ELIQUIS) 5 mg tablet Take 1 Tablet by mouth two (2) times a day for 14 days. Qty: 28 Tablet, Refills: 1      polyethylene glycol (MIRALAX) 17 gram packet Take 1 Packet by mouth daily. Qty: 30 Packet, Refills: 0      therapeutic multivitamin (THERAGRAN) tablet Take 1 Tablet by mouth daily for 30 days. Qty: 30 Tablet, Refills: 0       !! - Potential duplicate medications found. Please discuss with provider. CONTINUE these medications which have CHANGED    Details   levoFLOXacin (LEVAQUIN) 750 mg tablet Take 1 Tablet by mouth daily for 14 days. Indications: Diverticulitis  Qty: 1 Tablet, Refills: 0      metroNIDAZOLE (FlagyL) 500 mg tablet Take 1 Tablet by mouth three (3) times daily for 1 day. Indications: diverticulitis  Qty: 3 Tablet, Refills: 0         CONTINUE these medications which have NOT CHANGED    Details   BismatroL 262 mg chew       diclofenac (VOLTAREN) 1 % gel Apply 2 g to affected area four (4) times daily. Qty: 100 g, Refills: 2    Associated Diagnoses: Rotator cuff arthropathy of both shoulders      famotidine (PEPCID) 20 mg tablet Take 20 mg by mouth two (2) times a day. tamsulosin (FLOMAX) 0.4 mg capsule Take 1 Cap by mouth daily (after dinner). Qty: 90 Cap, Refills: 0      DULoxetine (CYMBALTA) 60 mg capsule Take 1 Cap by mouth daily.  Indications: neuropathic pain  Qty: 60 Cap, Refills: 5    Associated Diagnoses: Chronic midline low back pain, unspecified whether sciatica present      finasteride (PROSCAR) 5 mg tablet Take 5 mg by mouth daily. albuterol sulfate 90 mcg/actuation aebs Take  by inhalation as needed. ammonium lactate (AMLACTIN) 12 % topical cream Apply  to affected area two (2) times a day. rub in to affected area well  Qty: 280 g, Refills: 0      pantoprazole (PROTONIX) 40 mg tablet Take 1 Tab by mouth daily. Qty: 30 Tab, Refills: 0      amLODIPine (NORVASC) 10 mg tablet Take 1 Tab by mouth daily. Qty: 30 Tab, Refills: 0         STOP taking these medications       Wheat Dextrin (Benefiber Clear) 3 gram/3.5 gram pwpk Comments:   Reason for Stopping:         doxycycline (MONODOX) 100 mg capsule Comments:   Reason for Stopping:         triamcinolone acetonide (KENALOG) 0.1 % ointment Comments:   Reason for Stopping:         Clindamycin Pediatric 75 mg/5 mL solution Comments:   Reason for Stopping:         ammonium lactate (Lac-Hydrin Five) 5 % lotion Comments:   Reason for Stopping:         spironolactone (ALDACTONE) 25 mg tablet Comments:   Reason for Stopping: Follow-up Appointments:   1. Your PCP: Trinidad Block MD, within 7-10days  2.  Urologist, Dr Markel Rodríguez in 7 days            >30 minutes spent coordinating this discharge (review instructions/follow-up, prescriptions, preparing report for sign off)    Signed:  Ramon Thomas MD  6/10/2021  2:23 PM

## 2021-06-10 NOTE — PROGRESS NOTES
[facility] at [address] IV, armband, and tele discontinued patient dressed with personal belonging packed

## 2021-06-10 NOTE — PROGRESS NOTES
Transition of Care Plan to SNF/Rehab    SNF/Rehab Transition:  Patient has been accepted to [facility] and meets criteria for admission. Patient will  be transported by [facility] stretcher  and expected to leave at [phone]. Communication to Patient/Family:  Met with patient and daughter (identified care giver) and they are agreeable to the transition plan. Communication to SNF/Rehab:  Bedside RN, has been notified of the transition plan to the facility and to call report (phone number [phone]). Discharge information has been updated on the AVS. And communicated to facility via [facility], or CC link. SNF/Rehab Transition:    PCP/Specialist:     Nursing Please include all hard scripts for controlled substances, med rec and dc summary, and AVS in packet. Reviewed and confirmed with facility representative,   at  they can manage the patient care needs for the following:     Yady Perez with (X) only those applicable:        Medication:  [x]  Medications will be available at the facility  []  IV Antibiotics []  Controlled Substance - hard copy to be sent with patient   []  Weekly Labs    Documents:  [] Hard RX  Number sent   [] MAR  [] Kardex  [] AVS  [] Wound care note  [x]Transfer Summary/Discharge Summary    Equipment:  []  CPAP/BiPAP  []  Wound Vacuum  [x]  Ayala or Urinary Device  []  PICC/Central Line  []  Nebulizer  []  Ventilator    Treatment:  []Isolation (for MRSA, VRE, etc.)  []Surgical Drain Management  []Tracheostomy Care  []Dressing Changes  []Dialysis with transportation and chair time  Center Mode of tranpsort   []PEG Care  []Oxygen  []Daily Weights for Heart Failure    Dietary:  []Any diet limitations  []Tube Feedings   []Total Parenteral Management (TPN)    Eligible for Medicaid Long Term Services and Supports  Yes:  [] Eligible for medical assistance or will become eligible within 180 days and LTSS completed. [] Provider/Patient and/or support system has requested screening.   [x] LTSS copy provided to patient or responsible party, .  [] LTSS unavailable at discharge will send once processed to SNF provider.  [] LTSS  unavailable at discharged mailed to patient  [] LTSS performed by outside agency  on  with tracking number   No:   [] Private pay and is not financially eligible for Medicaid within the next 180 days. [] Reside out-of-state.   [] A residents of a state owned/operated facility that is licensed  by [address] [employer] [facility] or [facility]  [] Enrollment in [facility] hospice services  [] [address] [place] [address]  [] Patient /Family declines to have screening completed or provide financial information for screening          Financial Resources:  Medicaid    [] Initiated and application pending   [] Full coverage      Advanced Care Plan:  []Surrogate Decision Maker of Care  []POA  []Communicated Code Status/ sent  (DDNR, POST, Advance Directive)     Other  Eileen Garcia RN BSN  Care Manager  [phone]

## 2021-06-10 NOTE — PROGRESS NOTES
Confirmed with Wing Grajeda, that insurance [employer] has been obtained for hospitals today. Requested LTSS from [address] [place] [address] [facility]        ADAM Juarez, [place]- [phone]

## 2021-06-11 ENCOUNTER — ANESTHESIA (OUTPATIENT)
Dept: ENDOSCOPY | Age: 76
End: 2021-06-11
Payer: COMMERCIAL

## 2021-06-11 ENCOUNTER — ANESTHESIA EVENT (OUTPATIENT)
Dept: ENDOSCOPY | Age: 76
End: 2021-06-11
Payer: COMMERCIAL

## 2021-06-11 PROBLEM — R04.2 HEMOPTYSIS: Status: ACTIVE | Noted: 2021-06-11

## 2021-06-11 LAB
ABO + RH BLD: NORMAL
APPEARANCE UR: CLEAR
BACTERIA URNS QL MICRO: ABNORMAL /HPF
BILIRUB UR QL: NEGATIVE
BLOOD GROUP ANTIBODIES SERPL: NORMAL
COLOR UR: YELLOW
COVID-19 RAPID TEST, COVR: NOT DETECTED
EPITH CASTS URNS QL MICRO: ABNORMAL /LPF (ref 0–5)
GLUCOSE UR STRIP.AUTO-MCNC: NEGATIVE MG/DL
HCT VFR BLD AUTO: 35.8 % (ref 36–48)
HCT VFR BLD AUTO: 36 % (ref 36–48)
HGB BLD-MCNC: 11.9 G/DL (ref 13–16)
HGB BLD-MCNC: 12.3 G/DL (ref 13–16)
HGB UR QL STRIP: ABNORMAL
KETONES UR QL STRIP.AUTO: NEGATIVE MG/DL
LEUKOCYTE ESTERASE UR QL STRIP.AUTO: NEGATIVE
LIPASE SERPL-CCNC: 46 U/L (ref 73–393)
NITRITE UR QL STRIP.AUTO: NEGATIVE
PH UR STRIP: 7.5 [PH] (ref 5–8)
PROT UR STRIP-MCNC: NEGATIVE MG/DL
RBC #/AREA URNS HPF: ABNORMAL /HPF (ref 0–5)
SARS-COV-2, COV2: NORMAL
SOURCE, COVRS: NORMAL
SP GR UR REFRACTOMETRY: 1.03 (ref 1–1.03)
SPECIMEN EXP DATE BLD: NORMAL
UROBILINOGEN UR QL STRIP.AUTO: 0.2 EU/DL (ref 0.2–1)
WBC URNS QL MICRO: NEGATIVE /HPF (ref 0–5)

## 2021-06-11 PROCEDURE — 74011000250 HC RX REV CODE- 250: Performed by: INTERNAL MEDICINE

## 2021-06-11 PROCEDURE — 99218 HC RM OBSERVATION: CPT

## 2021-06-11 PROCEDURE — 87070 CULTURE OTHR SPECIMN AEROBIC: CPT

## 2021-06-11 PROCEDURE — 2709999900 HC NON-CHARGEABLE SUPPLY: Performed by: INTERNAL MEDICINE

## 2021-06-11 PROCEDURE — 74011250636 HC RX REV CODE- 250/636: Performed by: INTERNAL MEDICINE

## 2021-06-11 PROCEDURE — 99100 ANES PT EXTEME AGE<1 YR&>70: CPT | Performed by: NURSE ANESTHETIST, CERTIFIED REGISTERED

## 2021-06-11 PROCEDURE — 74011250637 HC RX REV CODE- 250/637: Performed by: NURSE PRACTITIONER

## 2021-06-11 PROCEDURE — 74011000636 HC RX REV CODE- 636: Performed by: EMERGENCY MEDICINE

## 2021-06-11 PROCEDURE — 00520 ANES CLOSED CHEST PX NOS: CPT | Performed by: ANESTHESIOLOGY

## 2021-06-11 PROCEDURE — 77030022556 HC FCPS BIOP TIS ENDOSC BSC -B: Performed by: INTERNAL MEDICINE

## 2021-06-11 PROCEDURE — 77030003400 HC NDL ASPIR BIOP CNMD -B: Performed by: INTERNAL MEDICINE

## 2021-06-11 PROCEDURE — 96375 TX/PRO/DX INJ NEW DRUG ADDON: CPT

## 2021-06-11 PROCEDURE — 00520 ANES CLOSED CHEST PX NOS: CPT | Performed by: NURSE ANESTHETIST, CERTIFIED REGISTERED

## 2021-06-11 PROCEDURE — 74011250636 HC RX REV CODE- 250/636: Performed by: EMERGENCY MEDICINE

## 2021-06-11 PROCEDURE — 99220 PR INITIAL OBSERVATION CARE/DAY 70 MINUTES: CPT | Performed by: HOSPITALIST

## 2021-06-11 PROCEDURE — 87635 SARS-COV-2 COVID-19 AMP PRB: CPT

## 2021-06-11 PROCEDURE — 81001 URINALYSIS AUTO W/SCOPE: CPT

## 2021-06-11 PROCEDURE — 96366 THER/PROPH/DIAG IV INF ADDON: CPT

## 2021-06-11 PROCEDURE — 77030012699 HC VLV SUC CNTRL OCOA -A: Performed by: INTERNAL MEDICINE

## 2021-06-11 PROCEDURE — 99223 1ST HOSP IP/OBS HIGH 75: CPT | Performed by: INTERNAL MEDICINE

## 2021-06-11 PROCEDURE — 77030003454 HC NDL BIOP BRONCH BSC -B: Performed by: INTERNAL MEDICINE

## 2021-06-11 PROCEDURE — APPSS180 APP SPLIT SHARED TIME > 60 MINUTES: Performed by: NURSE PRACTITIONER

## 2021-06-11 PROCEDURE — 87116 MYCOBACTERIA CULTURE: CPT

## 2021-06-11 PROCEDURE — 99100 ANES PT EXTEME AGE<1 YR&>70: CPT | Performed by: ANESTHESIOLOGY

## 2021-06-11 PROCEDURE — 87102 FUNGUS ISOLATION CULTURE: CPT

## 2021-06-11 PROCEDURE — 96365 THER/PROPH/DIAG IV INF INIT: CPT

## 2021-06-11 PROCEDURE — 88112 CYTOPATH CELL ENHANCE TECH: CPT

## 2021-06-11 PROCEDURE — 31624 DX BRONCHOSCOPE/LAVAGE: CPT | Performed by: INTERNAL MEDICINE

## 2021-06-11 PROCEDURE — 76060000031 HC ANESTHESIA FIRST 0.5 HR: Performed by: INTERNAL MEDICINE

## 2021-06-11 PROCEDURE — 77030018823 HC SLV COMPR VENO -B: Performed by: INTERNAL MEDICINE

## 2021-06-11 PROCEDURE — 76040000019: Performed by: INTERNAL MEDICINE

## 2021-06-11 PROCEDURE — 88305 TISSUE EXAM BY PATHOLOGIST: CPT

## 2021-06-11 PROCEDURE — 74011250636 HC RX REV CODE- 250/636: Performed by: NURSE ANESTHETIST, CERTIFIED REGISTERED

## 2021-06-11 PROCEDURE — 74011250636 HC RX REV CODE- 250/636: Performed by: NURSE PRACTITIONER

## 2021-06-11 PROCEDURE — 77030013140 HC MSK NEB VYRM -A: Performed by: INTERNAL MEDICINE

## 2021-06-11 PROCEDURE — 85018 HEMOGLOBIN: CPT

## 2021-06-11 PROCEDURE — 74011000250 HC RX REV CODE- 250: Performed by: NURSE ANESTHETIST, CERTIFIED REGISTERED

## 2021-06-11 RX ORDER — THERA TABS 400 MCG
1 TAB ORAL DAILY
Status: DISCONTINUED | OUTPATIENT
Start: 2021-06-11 | End: 2021-06-14 | Stop reason: HOSPADM

## 2021-06-11 RX ORDER — ACETAMINOPHEN 650 MG/1
650 SUPPOSITORY RECTAL
Status: DISCONTINUED | OUTPATIENT
Start: 2021-06-11 | End: 2021-06-14 | Stop reason: HOSPADM

## 2021-06-11 RX ORDER — ONDANSETRON 2 MG/ML
4 INJECTION INTRAMUSCULAR; INTRAVENOUS ONCE
Status: CANCELLED | OUTPATIENT
Start: 2021-06-11 | End: 2021-06-11

## 2021-06-11 RX ORDER — MIDAZOLAM HYDROCHLORIDE 1 MG/ML
1 INJECTION, SOLUTION INTRAMUSCULAR; INTRAVENOUS
Status: DISCONTINUED | OUTPATIENT
Start: 2021-06-11 | End: 2021-06-11 | Stop reason: HOSPADM

## 2021-06-11 RX ORDER — FACIAL-BODY WIPES
10 EACH TOPICAL DAILY PRN
Status: DISCONTINUED | OUTPATIENT
Start: 2021-06-11 | End: 2021-06-14 | Stop reason: HOSPADM

## 2021-06-11 RX ORDER — TAMSULOSIN HYDROCHLORIDE 0.4 MG/1
0.4 CAPSULE ORAL
Status: DISCONTINUED | OUTPATIENT
Start: 2021-06-11 | End: 2021-06-14 | Stop reason: HOSPADM

## 2021-06-11 RX ORDER — POLYETHYLENE GLYCOL 3350 17 G/17G
17 POWDER, FOR SOLUTION ORAL DAILY
Status: DISCONTINUED | OUTPATIENT
Start: 2021-06-11 | End: 2021-06-14 | Stop reason: HOSPADM

## 2021-06-11 RX ORDER — MORPHINE SULFATE 2 MG/ML
2 INJECTION, SOLUTION INTRAMUSCULAR; INTRAVENOUS
Status: DISCONTINUED | OUTPATIENT
Start: 2021-06-11 | End: 2021-06-14 | Stop reason: HOSPADM

## 2021-06-11 RX ORDER — PROPOFOL 10 MG/ML
INJECTION, EMULSION INTRAVENOUS AS NEEDED
Status: DISCONTINUED | OUTPATIENT
Start: 2021-06-11 | End: 2021-06-11 | Stop reason: HOSPADM

## 2021-06-11 RX ORDER — SODIUM CHLORIDE 0.9 % (FLUSH) 0.9 %
5-40 SYRINGE (ML) INJECTION EVERY 8 HOURS
Status: DISCONTINUED | OUTPATIENT
Start: 2021-06-11 | End: 2021-06-14 | Stop reason: HOSPADM

## 2021-06-11 RX ORDER — SODIUM CHLORIDE 0.9 % (FLUSH) 0.9 %
5-40 SYRINGE (ML) INJECTION AS NEEDED
Status: DISCONTINUED | OUTPATIENT
Start: 2021-06-11 | End: 2021-06-14 | Stop reason: HOSPADM

## 2021-06-11 RX ORDER — PROPOFOL 10 MG/ML
VIAL (ML) INTRAVENOUS
Status: DISCONTINUED | OUTPATIENT
Start: 2021-06-11 | End: 2021-06-11 | Stop reason: HOSPADM

## 2021-06-11 RX ORDER — ONDANSETRON 4 MG/1
4 TABLET, ORALLY DISINTEGRATING ORAL
Status: DISCONTINUED | OUTPATIENT
Start: 2021-06-11 | End: 2021-06-14 | Stop reason: HOSPADM

## 2021-06-11 RX ORDER — FENTANYL CITRATE 50 UG/ML
50 INJECTION, SOLUTION INTRAMUSCULAR; INTRAVENOUS AS NEEDED
Status: CANCELLED | OUTPATIENT
Start: 2021-06-11

## 2021-06-11 RX ORDER — SODIUM CHLORIDE 9 MG/ML
25 INJECTION, SOLUTION INTRAVENOUS CONTINUOUS
Status: DISCONTINUED | OUTPATIENT
Start: 2021-06-11 | End: 2021-06-11

## 2021-06-11 RX ORDER — ACETAMINOPHEN 325 MG/1
650 TABLET ORAL
Status: DISCONTINUED | OUTPATIENT
Start: 2021-06-11 | End: 2021-06-14 | Stop reason: HOSPADM

## 2021-06-11 RX ORDER — PANTOPRAZOLE SODIUM 40 MG/1
40 TABLET, DELAYED RELEASE ORAL DAILY
Status: DISCONTINUED | OUTPATIENT
Start: 2021-06-11 | End: 2021-06-14 | Stop reason: HOSPADM

## 2021-06-11 RX ORDER — FINASTERIDE 5 MG/1
5 TABLET, FILM COATED ORAL DAILY
Status: DISCONTINUED | OUTPATIENT
Start: 2021-06-11 | End: 2021-06-14 | Stop reason: HOSPADM

## 2021-06-11 RX ORDER — DULOXETIN HYDROCHLORIDE 60 MG/1
60 CAPSULE, DELAYED RELEASE ORAL DAILY
Status: DISCONTINUED | OUTPATIENT
Start: 2021-06-11 | End: 2021-06-14 | Stop reason: HOSPADM

## 2021-06-11 RX ORDER — LIDOCAINE HYDROCHLORIDE 20 MG/ML
JELLY TOPICAL ONCE
Status: DISCONTINUED | OUTPATIENT
Start: 2021-06-11 | End: 2021-06-11 | Stop reason: HOSPADM

## 2021-06-11 RX ORDER — SODIUM CHLORIDE, SODIUM LACTATE, POTASSIUM CHLORIDE, CALCIUM CHLORIDE 600; 310; 30; 20 MG/100ML; MG/100ML; MG/100ML; MG/100ML
25 INJECTION, SOLUTION INTRAVENOUS CONTINUOUS
Status: CANCELLED | OUTPATIENT
Start: 2021-06-11

## 2021-06-11 RX ORDER — LIDOCAINE HYDROCHLORIDE 40 MG/ML
SOLUTION TOPICAL ONCE
Status: DISCONTINUED | OUTPATIENT
Start: 2021-06-11 | End: 2021-06-11 | Stop reason: HOSPADM

## 2021-06-11 RX ORDER — LIDOCAINE HYDROCHLORIDE 10 MG/ML
2 INJECTION, SOLUTION EPIDURAL; INFILTRATION; INTRACAUDAL; PERINEURAL ONCE
Status: DISCONTINUED | OUTPATIENT
Start: 2021-06-11 | End: 2021-06-11 | Stop reason: HOSPADM

## 2021-06-11 RX ORDER — ONDANSETRON 2 MG/ML
4 INJECTION INTRAMUSCULAR; INTRAVENOUS
Status: DISCONTINUED | OUTPATIENT
Start: 2021-06-11 | End: 2021-06-14 | Stop reason: HOSPADM

## 2021-06-11 RX ORDER — ESMOLOL HYDROCHLORIDE 10 MG/ML
INJECTION INTRAVENOUS AS NEEDED
Status: DISCONTINUED | OUTPATIENT
Start: 2021-06-11 | End: 2021-06-11 | Stop reason: HOSPADM

## 2021-06-11 RX ORDER — LIDOCAINE HYDROCHLORIDE 20 MG/ML
INJECTION, SOLUTION EPIDURAL; INFILTRATION; INTRACAUDAL; PERINEURAL AS NEEDED
Status: DISCONTINUED | OUTPATIENT
Start: 2021-06-11 | End: 2021-06-11 | Stop reason: HOSPADM

## 2021-06-11 RX ORDER — ONDANSETRON 2 MG/ML
4 INJECTION INTRAMUSCULAR; INTRAVENOUS ONCE
Status: COMPLETED | OUTPATIENT
Start: 2021-06-11 | End: 2021-06-11

## 2021-06-11 RX ORDER — NALOXONE HYDROCHLORIDE 0.4 MG/ML
0.4 INJECTION, SOLUTION INTRAMUSCULAR; INTRAVENOUS; SUBCUTANEOUS
Status: DISCONTINUED | OUTPATIENT
Start: 2021-06-11 | End: 2021-06-11 | Stop reason: HOSPADM

## 2021-06-11 RX ORDER — AMLODIPINE BESYLATE 10 MG/1
10 TABLET ORAL DAILY
Status: DISCONTINUED | OUTPATIENT
Start: 2021-06-11 | End: 2021-06-14 | Stop reason: HOSPADM

## 2021-06-11 RX ORDER — FLUMAZENIL 0.1 MG/ML
0.2 INJECTION INTRAVENOUS
Status: DISCONTINUED | OUTPATIENT
Start: 2021-06-11 | End: 2021-06-11 | Stop reason: HOSPADM

## 2021-06-11 RX ORDER — LEVOFLOXACIN 5 MG/ML
750 INJECTION, SOLUTION INTRAVENOUS ONCE
Status: COMPLETED | OUTPATIENT
Start: 2021-06-11 | End: 2021-06-11

## 2021-06-11 RX ORDER — FENTANYL CITRATE 50 UG/ML
25 INJECTION, SOLUTION INTRAMUSCULAR; INTRAVENOUS
Status: DISCONTINUED | OUTPATIENT
Start: 2021-06-11 | End: 2021-06-11 | Stop reason: HOSPADM

## 2021-06-11 RX ADMIN — ONDANSETRON 4 MG: 2 INJECTION INTRAMUSCULAR; INTRAVENOUS at 20:04

## 2021-06-11 RX ADMIN — Medication 10 ML: at 16:44

## 2021-06-11 RX ADMIN — Medication 10 ML: at 21:21

## 2021-06-11 RX ADMIN — PROPOFOL 100 MCG/KG/MIN: 10 INJECTION, EMULSION INTRAVENOUS at 14:48

## 2021-06-11 RX ADMIN — IOPAMIDOL 100 ML: 755 INJECTION, SOLUTION INTRAVENOUS at 00:52

## 2021-06-11 RX ADMIN — Medication 10 ML: at 16:43

## 2021-06-11 RX ADMIN — MORPHINE SULFATE 2 MG: 2 INJECTION, SOLUTION INTRAMUSCULAR; INTRAVENOUS at 20:10

## 2021-06-11 RX ADMIN — SODIUM CHLORIDE 25 ML/HR: 900 INJECTION, SOLUTION INTRAVENOUS at 14:27

## 2021-06-11 RX ADMIN — PROPOFOL 20 MG: 10 INJECTION, EMULSION INTRAVENOUS at 14:51

## 2021-06-11 RX ADMIN — ESMOLOL HYDROCHLORIDE 20 MG: 10 INJECTION, SOLUTION INTRAVENOUS at 14:52

## 2021-06-11 RX ADMIN — Medication 10 ML: at 21:20

## 2021-06-11 RX ADMIN — ESMOLOL HYDROCHLORIDE 10 MG: 10 INJECTION, SOLUTION INTRAVENOUS at 15:03

## 2021-06-11 RX ADMIN — Medication 10 ML: at 20:05

## 2021-06-11 RX ADMIN — LEVOFLOXACIN 750 MG: 5 INJECTION, SOLUTION INTRAVENOUS at 08:28

## 2021-06-11 RX ADMIN — ONDANSETRON 4 MG: 2 INJECTION INTRAMUSCULAR; INTRAVENOUS at 03:11

## 2021-06-11 RX ADMIN — Medication 10 ML: at 07:01

## 2021-06-11 RX ADMIN — LIDOCAINE HYDROCHLORIDE 30 MG: 20 INJECTION, SOLUTION EPIDURAL; INFILTRATION; INTRACAUDAL; PERINEURAL at 14:48

## 2021-06-11 RX ADMIN — TAMSULOSIN HYDROCHLORIDE 0.4 MG: 0.4 CAPSULE ORAL at 17:10

## 2021-06-11 RX ADMIN — PROPOFOL 50 MG: 10 INJECTION, EMULSION INTRAVENOUS at 14:48

## 2021-06-11 NOTE — ED NOTES
TRANSFER - OUT REPORT:    Verbal report given to MIKE Castrejon(name) on [facility]  being transferred to [address] [place] [address](unit) for routine progression of care       Report consisted of patients Situation, Background, Assessment and   Recommendations(SBAR). Information from the following report(s) SBAR, Kardex, Intake/Output, MAR, Recent Results and Med Rec Status was reviewed with the receiving nurse. Lines:   Peripheral IV 06/10/21 (Active)   Site Assessment Clean, dry, & intact 06/10/21 2330   Phlebitis Assessment 0 06/10/21 2330   Infiltration Assessment 0 06/10/21 2330   Dressing Status Clean, dry, & intact 06/10/21 2330        Opportunity for questions and clarification was provided. Patient transported with:   O2 @ 2 liters and transportation.

## 2021-06-11 NOTE — ED NOTES
Bedside and Verbal shift change report given to Monster Clinton RN (oncoming nurse) by Areli Goodrich RN (offgoing nurse). Report included the following information SBAR, Kardex, Intake/Output, MAR, Recent Results and Med Rec Status.

## 2021-06-11 NOTE — ANESTHESIA PREPROCEDURE EVALUATION
Relevant Problems   No relevant active problems       Anesthetic History   No history of anesthetic complications            Review of Systems / Medical History  Patient summary reviewed and pertinent labs reviewed    Pulmonary  Within defined limits                 Neuro/Psych   Within defined limits           Cardiovascular    Hypertension          CAD, PAD and hyperlipidemia    Exercise tolerance: >4 METS     GI/Hepatic/Renal  Within defined limits              Endo/Other  Within defined limits           Other Findings              Physical Exam    Airway  Mallampati: II  TM Distance: 4 - 6 cm  Neck ROM: normal range of motion   Mouth opening: Normal     Cardiovascular  Regular rate and rhythm,  S1 and S2 normal,  no murmur, click, rub, or gallop  Rhythm: regular  Rate: normal         Dental    Dentition: Poor dentition     Pulmonary  Breath sounds clear to auscultation               Abdominal  GI exam deferred       Other Findings            Anesthetic Plan    ASA: 3  Anesthesia type: MAC          Induction: Intravenous  Anesthetic plan and risks discussed with: Patient

## 2021-06-11 NOTE — H&P
Hospitalist Admission History and Physical    NAME:  Yin Ceballos   :   1945   MRN:   [id number]     PCP:  Al Hodge MD  Date/Time:  2021 7:14 AM    Subjective:   CHIEF COMPLAINT:  Coughing up blood     HISTORY OF PRESENT ILLNESS:     Joelle Euceda is a 77 y.o.  male who presents with from [address] [place] [address] where he was discharged to yesterday. He reports he had a small amount of coughing up blood prior at time of discharge and now seen in ED states he has coughed up approximately 4 to 5 tablespoons of blood. Patient denies chest pain at rest but does state he continues to have pain in her right side of chest with deep breathing or movement. He is without significant shortness of breath but states he gets easily fatigued. He needs to have some generalized abdominal discomfort and bloated feeling, last bowel movement 3 days ago, reports some nausea which improved following antiemetic administration in ED. He also reports significant pain to left ankle which she states has been present for the last 2 days and that he has been unable to weight-bear on this foot.       Past Medical History:   Diagnosis Date    Bladder outlet obstruction     Erectile disorder due to medical 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     Frequency of urination     H/O spinal cord injury     lumbar spine injury secondary to fall    HTN (hypertension)     Hypercholesterolemia     Illiterate     Injury of lumbar spine ([address]     Leg pain, right     Nocturia     Overactive bladder     Peripheral vascular disease ([address]         Past Surgical History:   Procedure Laterality Date    COLONOSCOPY N/A 2019    COL[facility] END[facility]COPY    HAND/FINGER SURGERY UNLISTED Right     carpal tunnel    HX HEENT Left     lens implant    HX ORTHOPAEDIC      finger amputation left hand    HX TONSILLECTOMY      UPPER ARM/ELBOW SURGERY UNLISTED Right        Social History     Tobacco Use    Smoking status: Former Smoker     Quit date: 2015     Years since quittin.9    Smokeless tobacco: Never Used   Substance Use Topics    Alcohol use: Not Currently     Alcohol/week: 0.0 standard drinks     Comment: former stopped         Family History   Problem Relation Age of Onset    Diabetes Mother     Hypertension Mother     Diabetes Maternal Grandmother     Hypertension Maternal Grandmother     Cancer Sister         brain    Cancer Sister         brain        Allergies   Allergen Reactions    Latex Rash    Ampicillin Angioedema    Asa-Acetaminophen-Caff-Buffers Rash    Penicillins Angioedema    Crab Hives    Shellfish Derived Rash    Aspirin Other (comments)     Stomach upset    Atorvastatin Other (comments)     Muscle cramps        Prior to Admission Medications   Prescriptions Last Dose Informant Patient Reported? Taking? BismatroL 262 mg chew 6/10/2021 at Unknown time  Yes Yes   DULoxetine (CYMBALTA) 60 mg capsule 6/10/2021 at Unknown time  No Yes   Sig: Take 1 Cap by mouth daily. Indications: neuropathic pain   albuterol sulfate 90 mcg/actuation aebs Unknown at Unknown time  Yes No   Sig: Take  by inhalation as needed. amLODIPine (NORVASC) 10 mg tablet 6/10/2021 at Unknown time  No Yes   Sig: Take 1 Tab by mouth daily. ammonium lactate (AMLACTIN) 12 % topical cream 6/10/2021 at Unknown time  No Yes   Sig: Apply  to affected area two (2) times a day. rub in to affected area well   apixaban (ELIQUIS) 5 mg tablet 6/10/2021 at Unknown time  No Yes   Sig: Take 2 Tablets by mouth two (2) times a day for 6 days. apixaban (ELIQUIS) 5 mg tablet   No No   Sig: Take 1 Tablet by mouth two (2) times a day for 14 days. diclofenac (VOLTAREN) 1 % gel 6/10/2021 at Unknown time  No Yes   Sig: Apply 2 g to affected area four (4) times daily. finasteride (PROSCAR) 5 mg tablet 6/10/2021 at Unknown time  Yes Yes   Sig: Take 5 mg by mouth daily.    levoFLOXacin (LEVAQUIN) 750 mg tablet 6/10/2021 at Unknown time  No Yes   Sig: Take 1 Tablet by mouth daily for 14 days. Indications: Diverticulitis   metroNIDAZOLE (FlagyL) 500 mg tablet 6/10/2021 at Unknown time  No Yes   Sig: Take 1 Tablet by mouth three (3) times daily for 1 day. Indications: diverticulitis   pantoprazole (PROTONIX) 40 mg tablet 6/10/2021 at Unknown time  No Yes   Sig: Take 1 Tab by mouth daily. polyethylene glycol (MIRALAX) 17 gram packet 6/10/2021 at Unknown time  No Yes   Sig: Take 1 Packet by mouth daily. tamsulosin (FLOMAX) 0.4 mg capsule 6/10/2021 at Unknown time  No Yes   Sig: Take 1 Cap by mouth daily (after dinner). therapeutic multivitamin (THERAGRAN) tablet 6/10/2021 at Unknown time  No Yes   Sig: Take 1 Tablet by mouth daily for 30 days. Facility-Administered Medications: None       REVIEW OF SYSTEMS:     [] Unable to obtain  ROS due to  []mental status change  []sedated   []intubated   [x]Total of 12 systems reviewed as follows:    GENERAL: no fever or chills   HEENT: no sinus congestion / hearing or vision changes  NECK: No pain or stiffness. PULMONARY: + Shortness of breath with movement, + cough with hemoptysis;   Cardiovascular: denies palpitations, chest pain; no lower extremity edema  GASTROINTESTINAL: + Denies abdominal pain, +constipation, + nausea earlier today which is now resolved, poor appetite with early satiety, denies vomiting vomiting or melena / bloody stools  GENITOURINARY: No urinary complaints including dysuria or discomfort with Ayala catheter  MUSCULOSKELETAL: + Left ankle pain; + chest discomfort to right side with inspiration or movement  DERMATOLOGIC: denies pruritis, rashes or open areas   ENDOCRINE: No polyuria, polydipsia, no heat or cold intolerance. HEMATOLOGICAL: No anemia or easy bruising or bleeding.    NEUROLOGIC:  no focal weakness,  no speech changes     Objective:   VITALS:    Visit Vitals  /67   Pulse 87   Temp 98.9 °F (37.2 °C)   Resp 28 SpO2 94%     Temp (24hrs), Av.7 °F (37.1 °C), Min:98.4 °F (36.9 °C), Max:98.9 °F (37.2 °C)    PHYSICAL EXAM:   General:          Alert, in NAD, fatigued appearing  HEENT:         Sclera anicteric. Conjunctiva pink. Mucous membranes                          Moist  Neck:               Supple. Trachea midline. No accessory muscle use. No jugular venous distention, no carotid bruit  CV:                  Regular rate and rhythm. S1S2+  Lungs:             Clear to auscultation bilaterally. No Wheezing or Rhonchi. No rales. Abdomen:        Soft, + generalized tenderness. Not distended. Bowel sounds normal.   :   Ayala draining clear yellow urine  Extremities:     + pain to left ankle with palpation and mov't, no joint inflammation or erythema; No cyanosis. No edema. Pulses 2+ b/l  Neurologic:      Alert and oriented X 4. Follows commands, responds appropriately. No focal neurological deficit was noted  Skin:                Warm and dry. No rashes. LAB DATA REVIEWED:    No components found for: [place]  Recent Labs     21  0215 06/10/21  2205 06/10/21  0430 21  0410   NA  --  131* 131* 131*   K  --  4.0 4.2 3.9   CL  --  98* 98* 98*   CO2  --  30 29 29   BUN  --  12 12 14   CREA  --  0.66 0.64 0.64   GLU  --  102* 100* 94   CA  --  10.0 9.8 9.5   ALB  --  2.8*  --   --    WBC  --  8.0 7.0 6.8   HGB 12.3* 13.2 12.7* 12.2*   HCT 35.8* 38.3 38.0 36.7   PLT  --  343 305 279     IMAGING RESULTS:     [x]  I have personally reviewed the actual   []CXR  [x]CT scan    Assessment/Plan:      Active Problems:    Hemoptysis (2021)     ___________________________________________________  PLAN:    1. Hemoptysis -hold Eliquis, SCDs only; await pulmonary input, serial H&H, n.p.o. for now until further discussion with pulmonary   2. Acute PE / DVT -follow later today for further input, hold Eliquis for now ? IVC ? Thrombectomy although not recommended by IR last admission.   Will discuss with pulmonary shortly  3. Acute urinary retention - continue Ayala catheter, urology following last admission, continue Flomax  4. Constipation -continue MiraLAX, add Dulcolax suppository  5. Left ankle pain -no evidence of fracture on x-rays yesterday, no recent falls or injury; pain control  6. Severe protein calorie malnutrition - nutritionist re consulted  7.  History of diverticulitis - levaquin x 1 to complete tx course    Patient affirms full CODE STATUS  Will update daughter later today after further discussion with pulmonary    Consults called / follow up - pulmonary     Prophylaxis:  []Lovenox  []Coumadin  []Hep SQ  []SCDs  []H2B/PPI    Discussed Code Status:    [x]Full Code      []DNR     ___________________________________________________  Admitting Provider: Walt Robledo NP

## 2021-06-11 NOTE — PERIOP NOTES
TRANSFER - OUT REPORT:    Telephone report given to Kevin(name) on Valentin Shah  being transferred to [facility](unit) for routine post - op       Report consisted of patients Situation, Background, Assessment and   Recommendations(SBAR). Information from the following report(s) SBAR and OR Summary was reviewed with the receiving nurse. Lines:   Peripheral IV 06/10/21 (Active)   Site Assessment Clean, dry, & intact 06/11/21 1400   Phlebitis Assessment 0 06/11/21 1400   Infiltration Assessment 0 06/11/21 1400   Dressing Status Clean, dry, & intact 06/11/21 1400   Dressing Type Tape;Transparent 06/11/21 1400   Hub Color/Line Status Pink; Infusing;Capped;Flushed;Patent 06/11/21 1400   Action Taken Open ports on tubing capped 06/11/21 1125   Alcohol Cap Used Yes 06/11/21 1125        Opportunity for questions and clarification was provided.       Patient transported with:   Registered Nurse  Tech

## 2021-06-11 NOTE — PROGRESS NOTES
Follow up discussion with pulmonary, nursing (cont pelayo - patient with acute urinary retention) and spoke with daughter Elodia [facility]  [phone] informed of hopeful ability to get a bronchoscopy today by pulmonary.      Signed By: Catalina Block NP     June 11, 2021

## 2021-06-11 NOTE — PROGRESS NOTES
completed the initial Spiritual Assessment of the patient, and offered Pastoral Care  Support to the patient who was very weak and tired looking. Patient does not have an advance directive form completed. . Patient does not have any Scientologist/cultural needs that will affect patients preferences in health care. Chaplains will continue to follow and will provide pastoral care on an as needed/requested basis.     [facility] Care Department  [phone]

## 2021-06-11 NOTE — PROGRESS NOTES
Bronchoscopy completed uneventfully. No active bleeding noted  Old clots noted coming from right lower lobe superior segment-suctioned and lavaged clear. Recommendations    Hold anticoagulation 24 hours and then resume. Continue to monitor closely for further hemoptysis  Will need imaging follow-up to complete clearing of the pulmonary infarct/consolidation/masslike area noted in right lower lobe.   We will follow    Meghna Tracy MD

## 2021-06-11 NOTE — CONSULTS
[facility]  Pulmonary, Critical Care, and Sleep Medicine  Consult note    Name: Karla Byrne MRN: [id number]   : 1945 Hospital: [address] [place] [address]   Date: 2021        IMPRESSION:   · Hemoptysis- acute in setting of anticoagulants- ?  Endobronchial pathology causing bleeding  · R sided Pulmonary Embolism- extensive clot burden with improved-of upper and lower branches, likely chronic.  No hypoxia, no signs of RV strain on echo or CTA very likely chronic  · Pleuritic chest pain -possibly 2/2 lung infarction?, not evident on CT.  Possible aspiration event/?? Mass  · DVT  · Hx of falls - may be poor long term AC candidate  · Possible aspiration   · Dsphagia  · Hyponatremia  · Hx of esophageal varices  · Hx of EtoH abuse  · Recent Diverticulitis- now resolved.   · Hx of tobacco abuse - over 50 pack years, quit 5 years ago            Patient Active Problem List   Diagnosis Code    Unsteady gait R26.81    Gait instability R26.81    Vertigo R42    Radiculopathy of lumbar region M54.16    ED (erectile dysfunction) of organic origin N52.9    Peripheral vascular disease ([facility] I73.9    Overactive bladder N32.81    Nocturia R35.1    Leg pain, right M79.604    Hypercholesterolemia E78.00    HTN (hypertension) I10    H/O spinal cord injury Z87.828    Erectile disorder due to medical 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 N52.1    Bladder outlet obstruction N32.0    Injury of lumbar spine ([facility] S34.109A    Frequency of urination R35.0    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    History of rheumatic fever Z86.79    Chest pain, moderate coronary artery risk R07.9    Chest pain R07.9    CAD (coronary artery disease) I25.10    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 Consents to procedure under moderate conscious sedation  · Supplemental O2 as needed for SOB  · Aspiration precautions  · Recommend age appropriate cancer screening if not done- colonoscopy, PSA etc.  · ABX per primary team,  · Assess home Oxygen needs at discharge  · OT, PT, OOB and ambulate  · Will Follow     Subjective/Interval History:        77year old male with PMHx of HTN, esophageal varices, prior heavy EtOH use and recently Dx diverticulitis admitted on 6/5/21 admitted for right sided PE with extensive clot burden but no right heart strain. Also noted to have DVT- LLE  He presents  from [address] [place] [address] where he was discharged to yesterday. He reports he had a small amount of coughing up blood prior at time of discharge and now seen in ED states he has coughed up approximately 4 to 5 tablespoons of blood. Patient denies chest pain at rest but does state he continues to have pain in her right side of chest with deep breathing or movement. He is without significant shortness of breath but states he gets easily fatigued. He needs to have some generalized abdominal discomfort and bloated feeling, last bowel movement 3 days ago, reports some nausea which improved following antiemetic administration in ED. He also reports significant pain to left ankle which she states has been present for the last 2 days and that he has been unable to weight-bear on this foot.    06/11/21     · Patient alert and oriented x 3, in NAD  · SpO2 > 92% on 3L NC  ·  right sided pleuritic chest pain improving, no worsening SOB.   · Coughing up bloody mucus 30 ml  · CT chest completed with findings of RLL consolidation/mass/infact and smaller PE      ROS:A comprehensive review of systems was negative except for that written in the HPI.       Past Medical History:   Diagnosis Date    Bladder outlet obstruction      Erectile disorder due to medical 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      Frequency of urination      H/O spinal cord injury      lumbar spine injury secondary to fall    HTN (hypertension)      Hypercholesterolemia      Illiterate      Injury of lumbar spine ([address]     Leg pain, right      Nocturia      Overactive bladder      Peripheral vascular disease (HCC)                 Past Surgical History:   Procedure Laterality Date    COLONOSCOPY N/A 2019     COLONOSCOPY performed by Maurice Birmingham MD at [facility] ENDOSCOPY    HAND/FINGER SURGERY UNLISTED Right       carpal tunnel    HX HEENT Left       lens implant    HX ORTHOPAEDIC         finger amputation left hand    HX TONSILLECTOMY        UPPER ARM/ELBOW SURGERY UNLISTED Right      Social History            Tobacco Use    Smoking status: Former Smoker       Quit date: 2015       Years since quittin.9    Smokeless tobacco: Never Used   Substance Use Topics    Alcohol use: Not Currently       Alcohol/week: 0.0 standard drinks       Comment: former stopped             Family History   Problem Relation Age of Onset    Diabetes Mother      Hypertension Mother      Diabetes Maternal Grandmother      Hypertension Maternal Grandmother      Cancer Sister           brain    Cancer Sister           brain        Objective:   Vital Signs:    Visit Vitals  /67 (BP 1 Location: Left lower arm, BP Patient Position: At rest)   Pulse 85   Temp 97.1 °F (36.2 °C)   Resp 18   SpO2 97%       O2 Device: None (Room air)   O2 Flow Rate (L/min): 2 l/min   Temp (24hrs), Av.1 °F (36.7 °C), Min:97.1 °F (36.2 °C), Max:98.9 °F (37.2 °C)       Intake/Output:   Last shift:      No intake/output data recorded. Last 3 shifts: No intake/output data recorded. No intake or output data in the 24 hours ending 06/11/21 1250     Physical Exam:   General:  Alert, cooperative, no distress, appears stated age. Head:  Normocephalic, without obvious abnormality, atraumatic. Lungs:   Bilateral auscultation clear, no rales or wheezing.    Chest wall:  No tenderness or deformity. NO CREPITUS   Heart:  Regular rate and rhythm, S1, S2 normal, no murmur, click, rub or gallop. Abdomen:   Soft,mild tenderness to palpation in the center. Bowel sounds normal. No masses,  No organomegaly. No paradox   Extremities: normal, atraumatic, no cyanosis or edema. Pulses: 1-2+ and symmetric all extremities. Neurologic: Grossly nonfocal             DATA:  CT Results (most recent):  Results from Hospital Encounter encounter on 06/10/21    CTA CHEST W OR W WO CONT    Narrative  EXAMINATION: CTA chest pulmonary    INDICATION: Hemoptysis, right chest pain    COMPARISON: CT 6/5/2021    TECHNIQUE: CT angiography of the pulmonary arteries performed following 100 cc  IV Isovue-370 with 3-D MIP reformations. All CT scans at this facility are  performed using dose optimization technique as appropriate to a performed exam,  to include automated exposure control, adjustment of the mA and/or kV according  to patient size (including appropriate matching first site specific  examinations), or use of iterative reconstruction technique. FINDINGS:    Pulmonary arteries: Right-sided pulmonary emboli extending from the main  pulmonary artery branch point into lobar and proximal segmental arteries. No  definite left-sided pulmonary emboli identified. Motion slightly limits  evaluation of small pulmonary arteries. No specific evidence of right heart  strain. Cardiovascular: Pulmonary arteries as above. Thoracic aorta unremarkable. Heart  size normal.    Mediastinum: Imaged thyroid unremarkable. No adenopathy by size criteria.   Esophagus nondistended. Pleura: Small right pleural effusion. No evidence of pneumothorax. Lungs/airways: Bronchial lesions. Mild emphysema. Bibasilar lower lobe bandlike  consolidation opacities. Upper abdomen: Colonic diverticulosis. Ill-defined edema/stranding between the  left kidney upper pole and pancreatic tail. Miscellaneous: Superficial soft tissues unremarkable. Bones: No acute osseous findings. Impression  Right-sided pulmonary emboli demonstrated similar to prior, but slightly  decreased comparison particularly in the right lower lobe. Left-sided smaller  emboli seen on prior CT is not as conspicuous on current exam.    Bibasilar bandlike consolidation, increased since prior, may represent any  combination of infarcts, infiltrates, or atelectasis. Small right pleural effusion. Nonspecific edema/stranding insinuating between the left kidney upper pole and  pancreatic tail. Recommend correlation with urinalysis and lipase levels. Labs:  Recent Labs     06/11/21  0812 06/11/21 0215 06/10/21  2205 06/10/21  0430 06/09/21  0410   WBC  --   --  8.0 7.0 6.8   HGB 11.9* 12.3* 13.2 12.7* 12.2*   HCT 36.0 35.8* 38.3 38.0 36.7   PLT  --   --  343 305 279     Recent Labs     06/10/21  2205 06/10/21  0430 06/09/21  0410   * 131* 131*   K 4.0 4.2 3.9   CL 98* 98* 98*   CO2 30 29 29   * 100* 94   BUN 12 12 14   CREA 0.66 0.64 0.64   CA 10.0 9.8 9.5   ALB 2.8*  --   --    ALT 13*  --   --    INR 2.0*  --   --      No results for input(s): PH, PCO2, PO2, HCO3, FIO2 in the last 72 hours.       High complexity decision making was performed during the evaluation of this patient at high risk for decompensation with multiple organ involvement     Above mentioned total time spent on reviewing the case/medical record/data/notes/EMR/patient examination/documentation/coordinating care with nurse/consultants, exclusive of procedures with complex decision making performed and > 50% time spent in face to face evaluation.     Isabel Scott MD  Pulmonary, Critical Care Medicine  [facility]

## 2021-06-11 NOTE — ED NOTES
Pt resting in bed with eyes closed. No acute distress noted. Symmetrical rise and fall of chest. Pt awaiting CT. Call light in reach.

## 2021-06-11 NOTE — PROCEDURES
[facility]  Pulmonary, Critical Care, and Sleep Medicine    Name: Nathan Block MRN: [id number]   : 1945 Hospital: [address] [place] [address]   Date: 2021        Bronchoscopy Report    Procedure: Diagnostic bronchoscopy. Indication: Hemoptysis    Consent/Treatment: Informed consent was obtained from the  patient after risks, benefits and alternatives were explained. Timeout verified the correct patient and correct procedure. Anesthesia:   General anesthesia was performed by anesthesiology    Procedure Details:   -- The bronchoscope was introduced through the  right nare. -- The vocal cords were found to be normal.  -- The trachea and joann were completely inspected and were found to be normal.  -- The right-sided endobronchial anatomy was completely inspected and was found to be normal except for an area of mucosal bump in the right lower lobe medial basal segment-see images. Blood clots were noted coming from the right lower lobe superior segment-no active bleeding all clots removed and cleared with saline lavage  -- The left-sided endobronchial anatomy was completely inspected and was found to be normal.          Specimens:   Bronchial washings were sent for  microbiology, cytology, AFB smear and culture and fungal culture    Rapid On-Site Evaluation: A preliminary diagnosis of Hemoptysis emanating from right lower lobe superior segment-old blood clot with no active bleeding. All of the existing blood removed with suctioning and lavage. Remaining segment lavaged with saline.     Complications: none    Estimated Blood Loss: Minimal    Christina Ferrell MD  2021  3:15 PM

## 2021-06-11 NOTE — CONSULTS
Comprehensive Nutrition Assessment    Type and Reason for Visit: Initial, Consult, NPO/clear liquid    Nutrition Recommendations/Plan:   -Continue NPO per MD, initiate PO intake when medically able   -Continue to monitor and replace electrolytes as needed  -Pt w/o BM in 5 days, would start bowel regimen     Nutrition Assessment:  77 yr old MALE admitted d/t coughing up blood. Per MD note pt recently Dx diverticulitis admitted on 6/5/21 admitted for right sided PE with extensive clot burden but no right heart strain. Per chart review pt wt 175 lb (5/28/21), 160 lb (6/6/21) with 15 lb, 8.6% weight loss x month. Per chart review pts UBW is 175#. Pt states last full meal was over a week ago, and per chart review pt w/ poor PO intake since last week. Pt currently NPO, last admission 6/7/21 pt on dysphagia pureed diet, low sodium. Pt states has no teeth, prefers soft foods. Pt states having nausea still. Malnutrition Assessment:  Malnutrition Status:  Severe malnutrition    Context:  Chronic illness     Findings of the 6 clinical characteristics of malnutrition:   Energy Intake:  7 - 75% or less est energy requirements for 1 month or longer  Weight Loss:  7 - Greater than 5% over 1 month     Body Fat Loss:  7 - Severe body fat loss, Buccal region, Orbital   Muscle Mass Loss:  7 - Severe muscle mass loss, Clavicles (pectoralis &deltoids), Hand (interosseous), Temples (temporalis)  Fluid Accumulation:  No significant fluid accumulation,     Strength:  Not performed     Nutrition History and Allergies: PMHx: tobacco abuse, peptic ulcer disease, H. pylori, hypertension, hyperlipidemia. Per MD note pt has coughed up approximately 4 to 5 tablespoons of blood poa. Per MD notes pt has some generalized abdominal discomfort and bloated feeling, last bowel movement 5 days ago, reports some nausea which improved following antiemetic administration in ED.   Pt has significant pain to left ankle which has been present for the last 2 days and that he has been unable to weight-bear on this foot. Shellfish and crab allergy. Estimated Daily Nutrient Needs:  Energy (kcal): 5099-3057 (1.3-1.4); Weight Used for Energy Requirements: Current  Protein (g):  (1-1.4); Weight Used for Protein Requirements: Current  Fluid (ml/day): 2204-2618; Method Used for Fluid Requirements: 1 ml/kcal      Nutrition Related Findings:  Last BM 5 days ago; Na 131, all other labs WNL; meds held      Wounds:    None       Current Nutrition Therapies:  DIET NPO    Anthropometric Measures:  · Height:  5' 7\" (170.2 cm)  · Current Body Wt:  73 kg (161 lb)   · Admission Body Wt:  160 lb    · Ideal Body Wt:  148 lbs:  108.8 %   · Adjusted Body Weight:   ; Weight Adjustment for: No adjustment   · BMI Category: Overweight (BMI 25.0-29. 9)       Nutrition Diagnosis:   · Predicted inadequate energy intake related to acute injury/trauma as evidenced by poor intake prior to admission, weight loss      Nutrition Interventions:   Food and/or Nutrient Delivery: Continue NPO  Nutrition Education and Counseling: No recommendations at this time  Coordination of Nutrition Care: Continue to monitor while inpatient    Goals:  Nutritional needs will be met through adequate oral intake or nutrition support within the next 7 days. Nutrition Monitoring and Evaluation:   Behavioral-Environmental Outcomes: None identified  Food/Nutrient Intake Outcomes: Diet advancement/tolerance  Physical Signs/Symptoms Outcomes: Nausea/vomiting, Constipation    Discharge Planning:     Too soon to determine     Electronically signed by Maddy Sims RD on 6/11/2021 at 1:59 PM    Contact: [phone]

## 2021-06-11 NOTE — ED TRIAGE NOTES
Pt recently discharged from hospital. \[facility] [place] [facility]\"" nurse went to change him and he vomited blood, thus they called 911. Pt was 88% on RA, now 100% on 2L.

## 2021-06-11 NOTE — ED PROVIDER NOTES
EMERGENCY DEPARTMENT HISTORY AND PHYSICAL EXAM    10:23 PM  Date: 6/10/2021  Patient Name: Chery Vaughn    History of Presenting Illness     Chief Complaint   Patient presents with    Hemoptysis        History Provided By: Patient    HPI: Chery Vaughn is a 77 y.o. male with patient multiple medical problems as below including recent diagnosis of PE on Eliquis. Patient was admitted last week and discharged today to a nursing facility. He had few episodes of hemoptysis slightly about a month. Patient is complaining of pleuritic right-sided chest pain which has been stable since his admission. Denies fever or chills. History of abdominal pain, nausea vomiting or diarrhea. He reports that he has been constipated and it is difficult for him to have a bowel movement. Location:  Severity:  Timing/course:    Onset/Duration:     PCP: Helena Freed MD    Past History     Past Medical History:  Past Medical History:   Diagnosis Date    Bladder outlet obstruction     Erectile disorder due to medical 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     Frequency of urination     H/O spinal cord injury 1974    lumbar spine injury secondary to fall    HTN (hypertension)     Hypercholesterolemia     Illiterate     Injury of lumbar spine ([facility] [address] 1974    Leg pain, right     Nocturia     Overactive bladder     Peripheral vascular disease ([facility] [address]        Past Surgical History:  Past Surgical History:   Procedure Laterality Date    COLONOSCOPY N/A 12/4/2019    COLONOSCOPY performed by Hortencia Black MD at [facility] ENDOSCOPY    HAND/FINGER SURGERY UNLISTED Right     carpal tunnel    HX HEENT Left     lens implant    HX ORTHOPAEDIC      finger amputation left hand    HX TONSILLECTOMY      UPPER ARM/ELBOW SURGERY UNLISTED Right        Family History:  Family History   Problem Relation Age of Onset    Diabetes Mother     Hypertension Mother     Diabetes Maternal Grandmother     Hypertension Maternal Grandmother     Cancer Sister brain    Cancer Sister         brain       Social History:  Social History     Tobacco Use    Smoking status: Former Smoker     Quit date: 2015     Years since quittin.9    Smokeless tobacco: Never Used   Substance Use Topics    Alcohol use: Not Currently     Alcohol/week: 0.0 standard drinks     Comment: former stopped     Drug use: No       Allergies: Allergies   Allergen Reactions    Latex Rash    Ampicillin Angioedema    Asa-Acetaminophen-Caff-Buffers Rash    Penicillins Angioedema    Crab Hives    Shellfish Derived Rash    Aspirin Other (comments)     Stomach upset    Atorvastatin Other (comments)     Muscle cramps       Review of Systems   Review of Systems   Respiratory: Positive for cough. Cardiovascular: Positive for chest pain. All other systems reviewed and are negative. Physical Exam     Patient Vitals for the past 12 hrs:   Temp Pulse Resp BP SpO2   06/10/21 2159     95 %   06/10/21 2155 98.9 °F (37.2 °C) 93 20 131/72 95 %       Physical Exam  Vitals and nursing note reviewed. Constitutional:       Appearance: Normal appearance. HENT:      Head: Normocephalic and atraumatic. Eyes:      Extraocular Movements: Extraocular movements intact. Cardiovascular:      Rate and Rhythm: Normal rate. Pulses: Normal pulses. Pulmonary:      Effort: Pulmonary effort is normal. No respiratory distress. Abdominal:      Palpations: Abdomen is soft. Tenderness: There is no abdominal tenderness. Musculoskeletal:         General: No deformity. Normal range of motion. Cervical back: Normal range of motion and neck supple. Skin:     General: Skin is warm and dry. Neurological:      General: No focal deficit present. Mental Status: He is alert.    Psychiatric:         Mood and Affect: Mood normal.         Behavior: Behavior normal.         Diagnostic Study Results     Labs -  Recent Results (from the past 12 hour(s))   CBC WITH AUTOMATED DIFF Collection Time: 06/10/21 10:05 PM   Result Value Ref Range    WBC 8.0 4.6 - 13.2 K/uL    RBC 4.34 (L) 4.35 - 5.65 M/uL    HGB 13.2 13.0 - 16.0 g/dL    HCT 38.3 36.0 - 48.0 %    MCV 88.2 74.0 - 97.0 FL    MCH 30.4 24.0 - 34.0 PG    MCHC 34.5 31.0 - 37.0 g/dL    RDW 14.2 11.6 - 14.5 %    PLATELET 828 977 - 389 K/uL    MPV 9.0 (L) 9.2 - 11.8 FL    NEUTROPHILS 70 40 - 73 %    LYMPHOCYTES 14 (L) 21 - 52 %    MONOCYTES 15 (H) 3 - 10 %    EOSINOPHILS 1 0 - 5 %    BASOPHILS 0 0 - 2 %    ABS. NEUTROPHILS 5.6 1.8 - 8.0 K/UL    ABS. LYMPHOCYTES 1.1 0.9 - 3.6 K/UL    ABS. MONOCYTES 1.2 0.05 - 1.2 K/UL    ABS. EOSINOPHILS 0.1 0.0 - 0.4 K/UL    ABS. BASOPHILS 0.0 0.0 - 0.1 K/UL    DF AUTOMATED     POC LACTIC ACID    Collection Time: 06/10/21 10:20 PM   Result Value Ref Range    Lactic Acid (POC) 1.06 0.40 - 2.00 mmol/L       Radiologic Studies -   XR FOOT LT MIN 3 V    Result Date: 6/10/2021  No acute abnormality of the left foot. Medical Decision Making     ED Course: Progress Notes, Reevaluation, and Consults:    10:23 PM Initial assessment performed. The patients presenting problems have been discussed, and they/their family are in agreement with the care plan formulated and outlined with them. I have encouraged them to ask questions as they arise throughout their visit. 2:49 AM  Spoke with Dr. Rickey Guerra, recommended admission for observation. Holding anticoagulation until tomorrow. Pulmonary team will assess him tomorrow and probably will need an IVC filter. Will talk to the hospitalist.    Provider Notes (Medical Decision Making): 71-year-old male brought in from nursing facility after few episodes of hemoptysis today. Discharge today from the hospital on Eliquis for newly diagnosed PE. Pleuritic chest pain stable. Patient is comfortable and not in distress. Abdomen is benign. He is holding an emesis bag with small amount of heriberto hemoptysis, dark blood.   Likely related to his PE and being on Eliquis however will need to assess for active bleeding versus infarct. Obtain screening labs to evaluate his H&H as well as coags. CTA of his chest to evaluate for any active bleeding causes the hemoptysis. Procedures:     Critical Care Time:     Vital Signs-Reviewed the patient's vital signs. Reviewed pt's pulse ox reading. EKG: Interpreted by the EP. Time Interpreted:    Rate:    Rhythm:    Interpretation:   Comparison:     Records Reviewed: Nursing Notes, Old Medical Records, Previous electrocardiograms, Previous Radiology Studies and Previous Laboratory Studies (Time of Review: 10:23 PM)  -I am the first provider for this patient.  -I reviewed the vital signs, available nursing notes, past medical history, past surgical history, family history and social history. Current Outpatient Medications   Medication Sig Dispense Refill    [START ON 6/11/2021] levoFLOXacin (LEVAQUIN) 750 mg tablet Take 1 Tablet by mouth daily for 14 days. Indications: Diverticulitis 1 Tablet 0    [START ON 6/11/2021] metroNIDAZOLE (FlagyL) 500 mg tablet Take 1 Tablet by mouth three (3) times daily for 1 day. Indications: diverticulitis 3 Tablet 0    apixaban (ELIQUIS) 5 mg tablet Take 2 Tablets by mouth two (2) times a day for 6 days. 24 Tablet 0    [START ON 6/16/2021] apixaban (ELIQUIS) 5 mg tablet Take 1 Tablet by mouth two (2) times a day for 14 days. 28 Tablet 1    polyethylene glycol (MIRALAX) 17 gram packet Take 1 Packet by mouth daily. 30 Packet 0    [START ON 6/11/2021] therapeutic multivitamin (THERAGRAN) tablet Take 1 Tablet by mouth daily for 30 days. 30 Tablet 0    BismatroL 262 mg chew       diclofenac (VOLTAREN) 1 % gel Apply 2 g to affected area four (4) times daily. 100 g 2    famotidine (PEPCID) 20 mg tablet Take 20 mg by mouth two (2) times a day.  tamsulosin (FLOMAX) 0.4 mg capsule Take 1 Cap by mouth daily (after dinner). 90 Cap 0    DULoxetine (CYMBALTA) 60 mg capsule Take 1 Cap by mouth daily. Indications: neuropathic pain 60 Cap 5    finasteride (PROSCAR) 5 mg tablet Take 5 mg by mouth daily.  albuterol sulfate 90 mcg/actuation aebs Take  by inhalation as needed.  ammonium lactate (AMLACTIN) 12 % topical cream Apply  to affected area two (2) times a day. rub in to affected area well 280 g 0    pantoprazole (PROTONIX) 40 mg tablet Take 1 Tab by mouth daily. 30 Tab 0    amLODIPine (NORVASC) 10 mg tablet Take 1 Tab by mouth daily. 30 Tab 0        Clinical Impression     Clinical Impression: No diagnosis found. Disposition: This note was dictated utilizing voice recognition software which may lead to typographical errors. I apologize in advance if the situation occurs. If questions arise please do not hesitate to contact me or call our department.     Bryson May MD  10:23 PM

## 2021-06-12 LAB
ANION GAP SERPL CALC-SCNC: 3 MMOL/L (ref 3–18)
APTT PPP: 37.5 SEC (ref 23–36.4)
BASOPHILS # BLD: 0 K/UL (ref 0–0.1)
BASOPHILS # BLD: 0 K/UL (ref 0–0.1)
BASOPHILS NFR BLD: 0 % (ref 0–2)
BASOPHILS NFR BLD: 0 % (ref 0–2)
BUN SERPL-MCNC: 14 MG/DL (ref 7–18)
BUN/CREAT SERPL: 17 (ref 12–20)
CALCIUM SERPL-MCNC: 9.7 MG/DL (ref 8.5–10.1)
CHLORIDE SERPL-SCNC: 101 MMOL/L (ref 100–111)
CO2 SERPL-SCNC: 29 MMOL/L (ref 21–32)
CREAT SERPL-MCNC: 0.84 MG/DL (ref 0.6–1.3)
DIFFERENTIAL METHOD BLD: ABNORMAL
DIFFERENTIAL METHOD BLD: ABNORMAL
EOSINOPHIL # BLD: 0.1 K/UL (ref 0–0.4)
EOSINOPHIL # BLD: 0.1 K/UL (ref 0–0.4)
EOSINOPHIL NFR BLD: 1 % (ref 0–5)
EOSINOPHIL NFR BLD: 2 % (ref 0–5)
ERYTHROCYTE [DISTWIDTH] IN BLOOD BY AUTOMATED COUNT: 14.4 % (ref 11.6–14.5)
ERYTHROCYTE [DISTWIDTH] IN BLOOD BY AUTOMATED COUNT: 14.5 % (ref 11.6–14.5)
GLUCOSE SERPL-MCNC: 106 MG/DL (ref 74–99)
HCT VFR BLD AUTO: 40 % (ref 36–48)
HCT VFR BLD AUTO: 42.2 % (ref 36–48)
HGB BLD-MCNC: 13.6 G/DL (ref 13–16)
HGB BLD-MCNC: 13.6 G/DL (ref 13–16)
LYMPHOCYTES # BLD: 1.1 K/UL (ref 0.9–3.6)
LYMPHOCYTES # BLD: 1.3 K/UL (ref 0.9–3.6)
LYMPHOCYTES NFR BLD: 16 % (ref 21–52)
LYMPHOCYTES NFR BLD: 18 % (ref 21–52)
MCH RBC QN AUTO: 29 PG (ref 24–34)
MCH RBC QN AUTO: 30.3 PG (ref 24–34)
MCHC RBC AUTO-ENTMCNC: 32.2 G/DL (ref 31–37)
MCHC RBC AUTO-ENTMCNC: 34 G/DL (ref 31–37)
MCV RBC AUTO: 89.1 FL (ref 74–97)
MCV RBC AUTO: 90 FL (ref 74–97)
MONOCYTES # BLD: 1 K/UL (ref 0.05–1.2)
MONOCYTES # BLD: 1.1 K/UL (ref 0.05–1.2)
MONOCYTES NFR BLD: 14 % (ref 3–10)
MONOCYTES NFR BLD: 15 % (ref 3–10)
NEUTS SEG # BLD: 4.6 K/UL (ref 1.8–8)
NEUTS SEG # BLD: 4.8 K/UL (ref 1.8–8)
NEUTS SEG NFR BLD: 66 % (ref 40–73)
NEUTS SEG NFR BLD: 67 % (ref 40–73)
PLATELET # BLD AUTO: 384 K/UL (ref 135–420)
PLATELET # BLD AUTO: 406 K/UL (ref 135–420)
PMV BLD AUTO: 8.9 FL (ref 9.2–11.8)
PMV BLD AUTO: 9.2 FL (ref 9.2–11.8)
POTASSIUM SERPL-SCNC: 4.3 MMOL/L (ref 3.5–5.5)
RBC # BLD AUTO: 4.49 M/UL (ref 4.35–5.65)
RBC # BLD AUTO: 4.69 M/UL (ref 4.35–5.65)
SODIUM SERPL-SCNC: 133 MMOL/L (ref 136–145)
WBC # BLD AUTO: 7 K/UL (ref 4.6–13.2)
WBC # BLD AUTO: 7.2 K/UL (ref 4.6–13.2)

## 2021-06-12 PROCEDURE — 85025 COMPLETE CBC W/AUTO DIFF WBC: CPT

## 2021-06-12 PROCEDURE — 96375 TX/PRO/DX INJ NEW DRUG ADDON: CPT

## 2021-06-12 PROCEDURE — 74011250636 HC RX REV CODE- 250/636: Performed by: INTERNAL MEDICINE

## 2021-06-12 PROCEDURE — 99218 HC RM OBSERVATION: CPT

## 2021-06-12 PROCEDURE — 74011250637 HC RX REV CODE- 250/637: Performed by: NURSE PRACTITIONER

## 2021-06-12 PROCEDURE — 85730 THROMBOPLASTIN TIME PARTIAL: CPT

## 2021-06-12 PROCEDURE — 77010033678 HC OXYGEN DAILY

## 2021-06-12 PROCEDURE — 80048 BASIC METABOLIC PNL TOTAL CA: CPT

## 2021-06-12 PROCEDURE — 99233 SBSQ HOSP IP/OBS HIGH 50: CPT | Performed by: INTERNAL MEDICINE

## 2021-06-12 PROCEDURE — 2709999900 HC NON-CHARGEABLE SUPPLY

## 2021-06-12 PROCEDURE — 74011250636 HC RX REV CODE- 250/636: Performed by: NURSE PRACTITIONER

## 2021-06-12 PROCEDURE — 99232 SBSQ HOSP IP/OBS MODERATE 35: CPT | Performed by: INTERNAL MEDICINE

## 2021-06-12 PROCEDURE — 36415 COLL VENOUS BLD VENIPUNCTURE: CPT

## 2021-06-12 RX ORDER — HEPARIN SODIUM 10000 [USP'U]/100ML
18-36 INJECTION, SOLUTION INTRAVENOUS
Status: DISCONTINUED | OUTPATIENT
Start: 2021-06-12 | End: 2021-06-13

## 2021-06-12 RX ADMIN — DULOXETINE HYDROCHLORIDE 60 MG: 60 CAPSULE, DELAYED RELEASE ORAL at 11:26

## 2021-06-12 RX ADMIN — POLYETHYLENE GLYCOL 3350 17 G: 17 POWDER, FOR SOLUTION ORAL at 11:26

## 2021-06-12 RX ADMIN — HEPARIN SODIUM 18 UNITS/KG/HR: 10000 INJECTION, SOLUTION INTRAVENOUS at 17:58

## 2021-06-12 RX ADMIN — MORPHINE SULFATE 2 MG: 2 INJECTION, SOLUTION INTRAMUSCULAR; INTRAVENOUS at 22:03

## 2021-06-12 RX ADMIN — FINASTERIDE 5 MG: 5 TABLET, FILM COATED ORAL at 11:26

## 2021-06-12 RX ADMIN — PANTOPRAZOLE SODIUM 40 MG: 40 TABLET, DELAYED RELEASE ORAL at 11:26

## 2021-06-12 RX ADMIN — Medication 10 ML: at 06:14

## 2021-06-12 RX ADMIN — Medication 10 ML: at 21:44

## 2021-06-12 RX ADMIN — Medication 10 ML: at 17:58

## 2021-06-12 RX ADMIN — TAMSULOSIN HYDROCHLORIDE 0.4 MG: 0.4 CAPSULE ORAL at 17:58

## 2021-06-12 RX ADMIN — THERA TABS 1 TABLET: TAB at 11:26

## 2021-06-12 RX ADMIN — AMLODIPINE BESYLATE 10 MG: 10 TABLET ORAL at 11:26

## 2021-06-12 NOTE — PROGRESS NOTES
[facility] Hospitalist Group  Progress Note    Patient: Lobito Abebe Age: 77 y.o. : 1945 MR#: [id number] SSN: [id number]  Date/Time: 2021    Subjective:   Pt c/o pleuritic right sided cp.   Assessment/Plan:     -Hemoptysis in this pt with PE recently dic'd on Eliquis, s/p bronch w/ no evidence of active bleeding source  -Right sided pulmonary embolism  -Right lower lobe consolidation/infarct  -Acute non occlusive thrombus in right distal femoral vein      PLAN:  -Will start heparin gtt and if continues w/ no hemoptysis, transition to [address] [place] [address]  -Cont rest of care        Additional Notes:      Case discussed with:  [x]Patient  []Family  []Nursing  []Case Management  DVT Prophylaxis:  []Lovenox  []Hep gtt[]SCDs  []Coumadin   []On Heparin gtt    Objective:   VS:   Visit Vitals  BP (!) 151/71 (BP 1 Location: Left upper arm, BP Patient Position: At rest) Comment: Reported to MIKE Sheridan   Pulse 91   Temp 97.5 °F (36.4 °C)   Resp 18   Ht 5' 7\" (1.702 m)   SpO2 95%   BMI 25.22 kg/m²      Tmax/24hrs: Temp (24hrs), Av.2 °F (36.8 °C), Min:97.5 °F (36.4 °C), Max:98.7 °F (37.1 °C)    Input/Output:     Intake/Output Summary (Last 24 hours) at 2021 1502  Last data filed at 2021  Gross per 24 hour   Intake    Output 800 ml   Net -800 ml       General:  Alert, awake, in nad  Cardiovascular:  Rrr, no murmurs  Pulmonary:  ctab  GI:  Soft, nt, nd  Extremities:  No edema  Additional:      Labs:    Recent Results (from the past 24 hour(s))   CBC WITH AUTOMATED DIFF    Collection Time: 21  4:44 AM   Result Value Ref Range    WBC 7.0 4.6 - 13.2 K/uL    RBC 4.69 4.35 - 5.65 M/uL    HGB 13.6 13.0 - 16.0 g/dL    HCT 42.2 36.0 - 48.0 %    MCV 90.0 74.0 - 97.0 FL    MCH 29.0 24.0 - 34.0 PG    MCHC 32.2 31.0 - 37.0 g/dL    RDW 14.5 11.6 - 14.5 %    PLATELET 264 193 - 114 K/uL    MPV 9.2 9.2 - 11.8 FL    NEUTROPHILS 66 40 - 73 %    LYMPHOCYTES 18 (L) 21 - 52 %    MONOCYTES 14 (H) 3 - 10 % EOSINOPHILS 1 0 - 5 %    BASOPHILS 0 0 - 2 %    ABS. NEUTROPHILS 4.6 1.8 - 8.0 K/UL    ABS. LYMPHOCYTES 1.3 0.9 - 3.6 K/UL    ABS. MONOCYTES 1.0 0.05 - 1.2 K/UL    ABS. EOSINOPHILS 0.1 0.0 - 0.4 K/UL    ABS.  BASOPHILS 0.0 0.0 - 0.1 K/UL    DF AUTOMATED     METABOLIC PANEL, BASIC    Collection Time: 06/12/21  4:44 AM   Result Value Ref Range    Sodium 133 (L) 136 - 145 mmol/L    Potassium 4.3 3.5 - 5.5 mmol/L    Chloride 101 100 - 111 mmol/L    CO2 29 21 - 32 mmol/L    Anion gap 3 3.0 - 18 mmol/L    Glucose 106 (H) 74 - 99 mg/dL    BUN 14 7.0 - 18 MG/DL    Creatinine 0.84 0.6 - 1.3 MG/DL    BUN/Creatinine ratio 17 12 - 20      GFR est AA >60 >60 ml/min/1.73m2    GFR est non-AA >60 >60 ml/min/1.73m2    Calcium 9.7 8.5 - 10.1 MG/DL     Additional Data Reviewed:      Signed By: Ian Murillo MD     June 12, 2021 3:02 PM

## 2021-06-12 NOTE — PROGRESS NOTES
[facility]  Pulmonary, Critical Care, and Sleep Medicine  Progress note    Name: Abdoulaye Alcala MRN: [id number]   : 1945 Hospital: [address] [place] [address]   Date: 2021        IMPRESSION:   · Hemoptysis- acute in setting of anticoagulants-likely related to left lower lobe pneumonia/infarct. Old blood noted on bronchoscopic evaluation without any active bleeding. Further resolved today. · R sided Pulmonary Embolism- extensive clot burden with improved-of upper and lower branches, likely chronic.  No hypoxia, no signs of RV strain on echo or CTA very likely chronic  · Right lower lobe consolidation/infarct-will need follow-up to complete clearing.   No endobronchial pathology noted on bronchoscopy but cannot completely exclude peripheral malignancy  · Pleuritic chest pain -possibly 2/2 lung infarction?, not evident on CT.   · DVT-left popliteal  · Hx of falls - may be poor long term AC candidate  · Possible aspiration   · Dsphagia  · Hyponatremia  · Hx of esophageal varices  · Hx of EtoH abuse  · Recent Diverticulitis- now resolved.   · Hx of tobacco abuse - over 50 pack years, quit 5 years ago            Patient Active Problem List   Diagnosis Code    Unsteady gait R26.81    Gait instability R26.81    Vertigo R42    Radiculopathy of lumbar region M54.16    ED (erectile dysfunction) of organic origin N52.9    Peripheral vascular disease ([address] I73.9    Overactive bladder N32.81    Nocturia R35.1    Leg pain, right M79.604    Hypercholesterolemia E78.00    HTN (hypertension) I10    H/O spinal cord injury Z87.828    Erectile disorder due to medical 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 N52.1    Bladder outlet obstruction N32.0    Injury of lumbar spine ([facility] [address] S34.109A    Frequency of urination R35.0    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    History of rheumatic fever Z86.79    Chest pain, moderate coronary artery risk R07.9    Chest pain R07.9    CAD (coronary artery disease) I25.10  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 follow-up CT scan in 8 to 12 weeks to document clearing of right lower lobe consolidation  · ABX per primary team,  · Assess home Oxygen needs at discharge  · OT, PT, OOB and ambulate  · Will Follow     Subjective/Interval History:        77year old male with PMHx of HTN, esophageal varices, prior heavy EtOH use and recently Dx diverticulitis admitted on 6/5/21 admitted for right sided PE with extensive clot burden but no right heart strain. Also noted to have DVT- LLE  He presents  from [address] [place] [address] where he was discharged to yesterday. He reports he had a small amount of coughing up blood prior at time of discharge and now seen in ED states he has coughed up approximately 4 to 5 tablespoons of blood. Patient denies chest pain at rest but does state he continues to have pain in her right side of chest with deep breathing or movement. He is without significant shortness of breath but states he gets easily fatigued. He needs to have some generalized abdominal discomfort and bloated feeling, last bowel movement 3 days ago, reports some nausea which improved following antiemetic administration in ED. He also reports significant pain to left ankle which she states has been present for the last 2 days and that he has been unable to weight-bear on this foot.    06/12/21     · Patient alert and oriented x 3, in NAD  · SpO2 > 92% on 3L NC  ·  Right sided pleuritic chest pain improving, no worsening SOB.   · Not coughing up any more blood  · Anticoagulation has been on hold since yesterday  · Was constipated and feels better after bowel movement      ROS:A comprehensive review of systems was negative except for that written in the HPI. Objective:   Vital Signs:    Visit Vitals  BP (!) 151/71 (BP 1 Location: Left upper arm, BP Patient Position: At rest) Comment: Reported to RN Fatimah   Pulse 91   Temp 97.5 °F (36.4 °C)   Resp 18   Ht 5' 7\" (1.702 m)   SpO2 95%   BMI 25.22 kg/m²       O2 Device: Nasal cannula   O2 Flow Rate (L/min): 2 l/min   Temp (24hrs), Av.1 °F (36.7 °C), Min:97.5 °F (36.4 °C), Max:98.7 °F (37.1 °C)       Intake/Output:   Last shift:      No intake/output data recorded. Last 3 shifts: 06/10 1901 -  0700  In: -   Out: 800 [Urine:800]    Intake/Output Summary (Last 24 hours) at 2021 1205  Last data filed at 2021  Gross per 24 hour   Intake    Output 800 ml   Net -800 ml        Physical Exam:   General:  Alert, cooperative, no distress, appears stated age. Head:  Normocephalic, without obvious abnormality, atraumatic. Lungs:   Bilateral auscultation clear, no rales or wheezing.    Chest wall:  No tenderness or deformity. NO CREPITUS   Heart:  Regular rate and rhythm, S1, S2 normal, no murmur, click, rub or gallop. Abdomen:   Soft,mild tenderness to palpation in the center. Bowel sounds normal. No masses,  No organomegaly. No paradox   Extremities: normal, atraumatic, no cyanosis or edema. Pulses: 1-2+ and symmetric all extremities. Neurologic: Grossly nonfocal       DATA:  CT Results (most recent):  Results from Hospital Encounter encounter on 06/10/21    CTA CHEST W OR W WO CONT    Narrative  EXAMINATION: CTA chest pulmonary    INDICATION: Hemoptysis, right chest pain    COMPARISON: CT 2021    TECHNIQUE: CT angiography of the pulmonary arteries performed following 100 cc  IV Isovue-[id number] with 3-D MIP reformations.  All CT scans at this facility are  performed using dose optimization technique as appropriate to a performed exam,  to include automated exposure control, adjustment of the mA and/or kV according  to patient size (including appropriate matching first site specific  examinations), or use of iterative reconstruction technique. FINDINGS:    Pulmonary arteries: Right-sided pulmonary emboli extending from the main  pulmonary artery branch point into lobar and proximal segmental arteries. No  definite left-sided pulmonary emboli identified. Motion slightly limits  evaluation of small pulmonary arteries. No specific evidence of right heart  strain. Cardiovascular: Pulmonary arteries as above. Thoracic aorta unremarkable. Heart  size normal.    Mediastinum: Imaged thyroid unremarkable. No adenopathy by size criteria. Esophagus nondistended. Pleura: Small right pleural effusion. No evidence of pneumothorax. Lungs/airways: Bronchial lesions. Mild emphysema. Bibasilar lower lobe bandlike  consolidation opacities. Upper abdomen: Colonic diverticulosis. Ill-defined edema/stranding between the  left kidney upper pole and pancreatic tail. Miscellaneous: Superficial soft tissues unremarkable. Bones: No acute osseous findings. Impression  Right-sided pulmonary emboli demonstrated similar to prior, but slightly  decreased comparison particularly in the right lower lobe. Left-sided smaller  emboli seen on prior CT is not as conspicuous on current exam.    Bibasilar bandlike consolidation, increased since prior, may represent any  combination of infarcts, infiltrates, or atelectasis. Small right pleural effusion. Nonspecific edema/stranding insinuating between the left kidney upper pole and  pancreatic tail. Recommend correlation with urinalysis and lipase levels.         Labs:  Recent Labs     06/12/21  0444 06/11/21  0812 06/11/21  0215 06/10/21  2205 06/10/21  0430   WBC 7.0  --   --  8.0 7.0   HGB 13.6 11.9* 12.3* 13.2 12.7*   HCT 42.2 36.0 35.8* 38.3 38.0     -- --  343 305     Recent Labs     06/12/21  0444 06/10/21  2205 06/10/21  0430   * 131* 131*   K 4.3 4.0 4.2    98* 98*   CO2 29 30 29   * 102* 100*   BUN 14 12 12   CREA 0.84 0.66 0.64   CA 9.7 10.0 9.8   ALB  --  2.8*  --    ALT  --  13*  --    INR  --  2.0*  --      No results for input(s): PH, PCO2, PO2, HCO3, FIO2 in the last 72 hours. High complexity decision making was performed during the evaluation of this patient at high risk for decompensation with multiple organ involvement     Above mentioned total time spent on reviewing the case/medical record/data/notes/EMR/patient examination/documentation/coordinating care with nurse/consultants, exclusive of procedures with complex decision making performed and > 50% time spent in face to face evaluation.     Jerel Dougherty MD  Pulmonary, Critical Care Medicine  [facility]

## 2021-06-12 NOTE — PROGRESS NOTES
Report given to Palomo Benavidez, [facility].

## 2021-06-13 VITALS
HEART RATE: 77 BPM | BODY MASS INDEX: 24.28 KG/M2 | HEIGHT: 67 IN | OXYGEN SATURATION: 97 % | WEIGHT: 154.7 LBS | SYSTOLIC BLOOD PRESSURE: 131 MMHG | DIASTOLIC BLOOD PRESSURE: 92 MMHG | TEMPERATURE: 98.1 F | RESPIRATION RATE: 18 BRPM

## 2021-06-13 LAB
APTT PPP: >180 SEC (ref 23–36.4)
BACTERIA SPEC CULT: NORMAL
BASOPHILS # BLD: 0.1 K/UL (ref 0–0.1)
BASOPHILS NFR BLD: 1 % (ref 0–2)
DIFFERENTIAL METHOD BLD: ABNORMAL
EOSINOPHIL # BLD: 0.2 K/UL (ref 0–0.4)
EOSINOPHIL NFR BLD: 3 % (ref 0–5)
ERYTHROCYTE [DISTWIDTH] IN BLOOD BY AUTOMATED COUNT: 14.5 % (ref 11.6–14.5)
GRAM STN SPEC: NORMAL
HCT VFR BLD AUTO: 39.7 % (ref 36–48)
HGB BLD-MCNC: 13.3 G/DL (ref 13–16)
LYMPHOCYTES # BLD: 1.5 K/UL (ref 0.9–3.6)
LYMPHOCYTES NFR BLD: 24 % (ref 21–52)
MCH RBC QN AUTO: 30 PG (ref 24–34)
MCHC RBC AUTO-ENTMCNC: 33.5 G/DL (ref 31–37)
MCV RBC AUTO: 89.6 FL (ref 74–97)
MONOCYTES # BLD: 0.9 K/UL (ref 0.05–1.2)
MONOCYTES NFR BLD: 14 % (ref 3–10)
NEUTS SEG # BLD: 3.6 K/UL (ref 1.8–8)
NEUTS SEG NFR BLD: 58 % (ref 40–73)
PLATELET # BLD AUTO: 427 K/UL (ref 135–420)
PMV BLD AUTO: 9.4 FL (ref 9.2–11.8)
RBC # BLD AUTO: 4.43 M/UL (ref 4.35–5.65)
SERVICE CMNT-IMP: NORMAL
WBC # BLD AUTO: 6.3 K/UL (ref 4.6–13.2)

## 2021-06-13 PROCEDURE — 99218 HC RM OBSERVATION: CPT

## 2021-06-13 PROCEDURE — 85730 THROMBOPLASTIN TIME PARTIAL: CPT

## 2021-06-13 PROCEDURE — 99239 HOSP IP/OBS DSCHRG MGMT >30: CPT | Performed by: INTERNAL MEDICINE

## 2021-06-13 PROCEDURE — 74011250637 HC RX REV CODE- 250/637: Performed by: INTERNAL MEDICINE

## 2021-06-13 PROCEDURE — 74011250637 HC RX REV CODE- 250/637: Performed by: NURSE PRACTITIONER

## 2021-06-13 PROCEDURE — 99232 SBSQ HOSP IP/OBS MODERATE 35: CPT | Performed by: INTERNAL MEDICINE

## 2021-06-13 PROCEDURE — 36415 COLL VENOUS BLD VENIPUNCTURE: CPT

## 2021-06-13 PROCEDURE — 85025 COMPLETE CBC W/AUTO DIFF WBC: CPT

## 2021-06-13 RX ADMIN — POLYETHYLENE GLYCOL 3350 17 G: 17 POWDER, FOR SOLUTION ORAL at 10:42

## 2021-06-13 RX ADMIN — Medication 10 ML: at 07:00

## 2021-06-13 RX ADMIN — FINASTERIDE 5 MG: 5 TABLET, FILM COATED ORAL at 10:41

## 2021-06-13 RX ADMIN — AMLODIPINE BESYLATE 10 MG: 10 TABLET ORAL at 10:41

## 2021-06-13 RX ADMIN — THERA TABS 1 TABLET: TAB at 10:41

## 2021-06-13 RX ADMIN — APIXABAN 10 MG: 5 TABLET, FILM COATED ORAL at 10:54

## 2021-06-13 RX ADMIN — PANTOPRAZOLE SODIUM 40 MG: 40 TABLET, DELAYED RELEASE ORAL at 10:41

## 2021-06-13 RX ADMIN — DULOXETINE HYDROCHLORIDE 60 MG: 60 CAPSULE, DELAYED RELEASE ORAL at 10:41

## 2021-06-13 NOTE — PROGRESS NOTES
Transition of Care Plan to SNF/Rehab    SNF/Rehab Transition:  Patient has been accepted to [address] [place] [address] meets criteria for admission. Patient will transported by [facility] and expected to leave at 3:15pm.    Communication to Patient/Family:  Met with patient (identified care giver) and they are agreeable to the transition plan. Communication to SNF/Rehab:  Bedside RN, , has been notified to update the transition plan to the facility and call report (phone number [phone]). Discharge information has been updated on the AVS.     Nursing Please include all hard scripts for controlled substances, med rec and dc summary, and AVS in packet. Reviewed and confirmed with Eloise Bruce of facility, that they, can manage the patient care needs for the following:     SNF/Rehab Transition:    [facility] with (X) only those applicable:    Medication:  [x]  Medications will be available at the facility  []  IV Antibiotics   []  Controlled Substance - hard copy to be sent with patient   []  Weekly Labs   Documents:  [] Hard RX  [] MAR  [] Kardex  [] AVS  [x]Transfer Summary  [x]Discharge   Equipment:  []  CPAP/BiPAP  []  Wound Vacuum  []  Ayala or Urinary Device  []  PICC/Central Line  []  Nebulizer  []  Ventilator   Treatment:  []Isolation (for MRSA, VRE, etc.)  []Surgical Drain Management  []Tracheostomy Care  []Dressing Changes  []Dialysis with transportation and chair time   []PEG Care  []Oxygen  []Daily Weights for Heart Failure   Dietary:  []Any diet limitations  []Tube Feedings   []Total Parenteral Management (TPN)   Eligible for Medicaid Long Term Services and Supports  Yes:  [] Eligible for medical assistance or will become eligible within 180 days and UAI completed. [] Provider/Patient and/or support system has requested screening. [] UAI copy provided to patient or responsible party,   [] UAI unavailable at discharge will send once processed to SNF provider.   [] UAI unavailable at discharged mailed to patient  No:   [] Private pay and is not financially eligible for Medicaid within the next 180 days. [] Reside out-of-state.   [] A residents of a state owned/operated facility that is licensed  by [address] [facility] or [facility]  [] Enrollment in [facility] services  [] [address] [place] [address]  [] Patient /Family declines to have screening completed or provide financial information for screening     Financial Resources:  Medicaid    [] Initiated and application pending   [x] Full coverage     Advanced Care Plan:  []Surrogate Decision Maker of Care  []POA  [x]Communicated Code Status full (DDNR\", \"Full\")    Other       GIOVANNI Brown RN  Care Management  Pager: [phone]

## 2021-06-13 NOTE — PROGRESS NOTES
[facility]  Pulmonary, Critical Care, and Sleep Medicine  Progress note    Name: Kristina Hammonds MRN: [id number]   : 1945 Hospital: [address] [place] [address] Dr   Date: 2021        IMPRESSION:   · Hemoptysis- acute in setting of anticoagulants-likely related to right lower lobe pneumonia/infarct. Old blood noted on bronchoscopic evaluation without any active bleeding. Heparin resumed and patient has not had any hemoptysis  · R sided Pulmonary Embolism- extensive clot burden with improved-of upper and lower branches, likely chronic.  No hypoxia, no signs of RV strain on echo or CTA very likely chronic  · Right lower lobe consolidation/infarct-will need follow-up to complete clearing.   No endobronchial pathology noted on bronchoscopy but cannot completely exclude peripheral malignancy  · Pleuritic chest pain -possibly 2/2 lung infarction?, not evident on CT.   · DVT-left popliteal  · Hx of falls - may be poor long term AC candidate  · Possible aspiration   · Dsphagia  · Hyponatremia  · Hx of esophageal varices  · Hx of EtoH abuse  · Recent Diverticulitis- now resolved.   · Hx of tobacco abuse - over 50 pack years, quit 5 years ago            Patient Active Problem List   Diagnosis Code    Unsteady gait R26.81    Gait instability R26.81    Vertigo R42    Radiculopathy of lumbar region M54.16    ED (erectile dysfunction) of organic origin N52.9    Peripheral vascular disease ([address] I73.9    Overactive bladder N32.81    Nocturia R35.1    Leg pain, right M79.604    Hypercholesterolemia E78.00    HTN (hypertension) I10    H/O spinal cord injury Z87.828    Erectile disorder due to medical 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 N52.1    Bladder outlet obstruction N32.0    Injury of lumbar spine ([facility] [address] S34.109A    Frequency of urination R35.0    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    History of rheumatic fever Z86.79    Chest pain, moderate coronary artery risk R07.9    Chest pain R07.9    CAD (coronary artery disease) I25.10    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 follow-up CT scan in 8 to 12 weeks to document clearing of right lower lobe consolidation  · ABX per primary team,  · Assess home Oxygen needs at discharge  · OT, PT, OOB and ambulate  · Will Follow     Subjective/Interval History:        77year old male with PMHx of HTN, esophageal varices, prior heavy EtOH use and recently Dx diverticulitis admitted on 6/5/21 admitted for right sided PE with extensive clot burden but no right heart strain. Also noted to have DVT- LLE  He presents  from [address] [place] [address] where he was discharged to yesterday. He reports he had a small amount of coughing up blood prior at time of discharge and now seen in ED states he has coughed up approximately 4 to 5 tablespoons of blood. Patient denies chest pain at rest but does state he continues to have pain in her right side of chest with deep breathing or movement. He is without significant shortness of breath but states he gets easily fatigued. He needs to have some generalized abdominal discomfort and bloated feeling, last bowel movement 3 days ago, reports some nausea which improved following antiemetic administration in ED.     He also reports significant pain to left ankle which she states has been present for the last 2 days and that he has been unable to weight-bear on this foot.    06/13/21     · Patient alert and oriented x 3, in NAD  · Denies any hemoptysis  ·  Improved right sided pleuritic chest pain  · no worsening SOB. · Anticoagulation resumed-on IV heparin  · Appetite improving      ROS:A comprehensive review of systems was negative except for that written in the HPI. Objective:   Vital Signs:    Visit Vitals  /66 (BP 1 Location: Right upper arm, BP Patient Position: At rest)   Pulse 79   Temp 98.3 °F (36.8 °C)   Resp 18   Ht 5' 7\" (1.702 m)   Wt 70.2 kg (154 lb 11.2 oz)   SpO2 (!) 79%   BMI 24.23 kg/m²       O2 Device: Nasal cannula   O2 Flow Rate (L/min): 2 l/min   Temp (24hrs), Av °F (36.7 °C), Min:97.5 °F (36.4 °C), Max:98.6 °F (37 °C)       Intake/Output:   Last shift:      No intake/output data recorded. Last 3 shifts:  1901 -  0700  In: -   Out: 800 [Urine:800]  No intake or output data in the 24 hours ending 21 0934     Physical Exam:   General:  Alert, cooperative, no distress, appears stated age. Head:  Normocephalic, without obvious abnormality, atraumatic. Lungs:   Bilateral auscultation clear, no rales or wheezing.    Chest wall:  No tenderness or deformity. NO CREPITUS   Heart:  Regular rate and rhythm, S1, S2 normal, no murmur, click, rub or gallop. Abdomen:   Soft,mild tenderness to palpation in the center. Bowel sounds normal. No masses,  No organomegaly. No paradox   Extremities: normal, atraumatic, no cyanosis or edema. Pulses: 1-2+ and symmetric all extremities. Neurologic: Grossly nonfocal       DATA:  CT Results (most recent):  Results from Hospital Encounter encounter on 06/10/21    CTA CHEST W OR W WO CONT    Narrative  EXAMINATION: CTA chest pulmonary    INDICATION: Hemoptysis, right chest pain    COMPARISON: CT 2021    TECHNIQUE: CT angiography of the pulmonary arteries performed following 100 cc  IV Isovue-[id number] with 3-D MIP reformations.  All CT scans at this facility are  performed using dose optimization technique as appropriate to a performed exam,  to include automated exposure control, adjustment of the mA and/or kV according  to patient size (including appropriate matching first site specific  examinations), or use of iterative reconstruction technique. FINDINGS:    Pulmonary arteries: Right-sided pulmonary emboli extending from the main  pulmonary artery branch point into lobar and proximal segmental arteries. No  definite left-sided pulmonary emboli identified. Motion slightly limits  evaluation of small pulmonary arteries. No specific evidence of right heart  strain. Cardiovascular: Pulmonary arteries as above. Thoracic aorta unremarkable. Heart  size normal.    Mediastinum: Imaged thyroid unremarkable. No adenopathy by size criteria. Esophagus nondistended. Pleura: Small right pleural effusion. No evidence of pneumothorax. Lungs/airways: Bronchial lesions. Mild emphysema. Bibasilar lower lobe bandlike  consolidation opacities. Upper abdomen: Colonic diverticulosis. Ill-defined edema/stranding between the  left kidney upper pole and pancreatic tail. Miscellaneous: Superficial soft tissues unremarkable. Bones: No acute osseous findings. Impression  Right-sided pulmonary emboli demonstrated similar to prior, but slightly  decreased comparison particularly in the right lower lobe. Left-sided smaller  emboli seen on prior CT is not as conspicuous on current exam.    Bibasilar bandlike consolidation, increased since prior, may represent any  combination of infarcts, infiltrates, or atelectasis. Small right pleural effusion. Nonspecific edema/stranding insinuating between the left kidney upper pole and  pancreatic tail. Recommend correlation with urinalysis and lipase levels.         Labs:  Recent Labs     06/13/21  0421 06/12/21  1645 06/12/21  0444   WBC 6.3 7.2 7.0   HGB 13.3 13.6 13.6   HCT 39.7 40.0 42.2   * 406 384     Recent Labs     06/12/21  0444 06/10/21  2205   * 131*   K 4.3 4.0    98*   CO2 29 30   * 102*   BUN 14 12   CREA 0.84 0.66   CA 9.7 10.0   ALB  --  2.8*   ALT  --  13*   INR --  2.0*     No results for input(s): PH, PCO2, PO2, HCO3, FIO2 in the last 72 hours. High complexity decision making was performed during the evaluation of this patient at high risk for decompensation with multiple organ involvement     Above mentioned total time spent on reviewing the case/medical record/data/notes/EMR/patient examination/documentation/coordinating care with nurse/consultants, exclusive of procedures with complex decision making performed and > 50% time spent in face to face evaluation.     Narciso Gilliam MD  Pulmonary, Critical Care Medicine  [facility]

## 2021-06-13 NOTE — DISCHARGE SUMMARY
[facility]  Discharge Summary       Patient: Nick Rain Age: 77 y.o. : 1945 MR#: [id number] SSN: [id number]  PCP on record: Neto Haq MD  Admit date: 6/10/2021  Discharge date: 2021    Consults:    -   Procedures: Bronchoscopy on 2021    Procedure Details:   -- The bronchoscope was introduced through the  right nare. -- The vocal cords were found to be normal.  -- The trachea and joann were completely inspected and were found to be normal.  -- The right-sided endobronchial anatomy was completely inspected and was found to be normal except for an area of mucosal bump in the right lower lobe medial basal segment-see images. Blood clots were noted coming from the right lower lobe superior segment-no active bleeding all clots removed and cleared with saline lavage  -- The left-sided endobronchial anatomy was completely inspected and was found to be normal.   -   Rapid On-Site Evaluation: A preliminary diagnosis of Hemoptysis emanating from right lower lobe superior segment-old blood clot with no active bleeding. All of the existing blood removed with suctioning and lavage. Remaining segment lavaged with saline. Significant Diagnostic Studies: CTA chest 21:  IMPRESSION     Right-sided pulmonary emboli demonstrated similar to prior, but slightly  decreased comparison particularly in the right lower lobe. Left-sided smaller  emboli seen on prior CT is not as conspicuous on current exam.     Bibasilar bandlike consolidation, increased since prior, may represent any  combination of infarcts, infiltrates, or atelectasis.     Small right pleural effusion.     Nonspecific edema/stranding insinuating between the left kidney upper pole and  pancreatic tail. Recommend correlation with urinalysis and lipase levels.   -    Discharge Diagnoses:     -Hemoptysis  -Acute PE  -Acute non-occlusive  -Falls at home  -Possible aspiration pneumonia  -BPH w/ acute urinary retention  -Unintentional weight loss  -Constipation  -Recent diverticulitis                                              Patient Active Problem List   Diagnosis Code    Unsteady gait R26.81    Gait instability R26.81    Vertigo R42    Radiculopathy of lumbar region M54.16    ED (erectile dysfunction) of organic origin N52.9    Peripheral vascular disease (HCC) I73.9    Overactive bladder N32.81    Nocturia R35.1    Leg pain, right M79.604    Hypercholesterolemia E78.00    HTN (hypertension) I10    H/O spinal cord injury Z87.828    Erectile disorder due to medical 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 N52.1    Bladder outlet obstruction N32.0    Injury of lumbar spine ([address] S34.109A    Frequency of urination R35.0    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    History of rheumatic fever Z86.79    Chest pain, moderate coronary artery risk R07.9    Chest pain R07.9    CAD (coronary artery disease) I25.10    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 His Eliquis was held and patient underwent bronchoscopy which showed blood clots in the right lower lobe superior segment. Blood clots were removed. No active bleeding was noted at the time. He was initially treated with heparin drip and did not have any significant increase in hemoptysis. He has been transitioned to the 10 mg twice a day Eliquis starting/loading dose. This is to be changed to 5 mg twice a day maintenance dose after a 7-day course of the 10 mg twice a day regimen.  He is to take the Eliquis for at least 3 months with continued assessment of risk versus benefit given his ongoing high risk for fall and bleeding complications. Patient continues to complain of coughing up blood tinged sputum. However this time it is reassuring and that he does not have any findings of active bleeding on bronchoscopy. It is expected that he might have some blood-tinged sputum maybe once or twice a day for a period of time. Howevere so far he has not had copious sputum production that is blood tinged. Recommendation is to keep a very close eye on the amount of sputum he coughs up that is blood-tinged. If there is a trend noted that he has increasing amount of blood-tinged sputum, hold his Eliquis and have him be seen emergently in the hospital. He should have follow-up with a pulmonologist in the next 2 to 3 weeks, sooner if his blood tinged sputum lingers for more than a week or so. Monitor his hemoglobin on a regular basis to evaluate for any trend downwards. Here his hemoglobin has been stable. He has remained afebrile and stable overall. Still requiring some oxygen and complaining of right-sided pleuritic chest pain likely due to pulmonary infarct. He did have a repeat CTA chest here which showed decreased clot burden compared to his recent CTA that led to the diagnosis of pulmonary embolism. He remains at high risk for bleeding complications from a fall given his debilitated state and fall risk while on anticoagulation. The hope is that patient will get adequate physical therapy while at the nursing facility to decrease his fall risk. His fall risk and the risk/benefit ratio of anticoagulation should be reevaluated at time of discharge from rehab facility. Also see below to the cut-and-paste from his recent discharge summary for the ongoing issues and other recommendations. Discharge summary dictated on 6/10/2021 by me:    Acute non-occlusive thrombus present in the right distal femoral vein.  Age indeterminate non-occlusive thrombus present in the left posterior tibial vein. -Falls at home - Fall precautions.  PT / OT. Pt need cont'd physical therapy to address fall risk. -? Aspiration pna -status post levaquin and flagyl, asp precautions and modified diet.  -Unintentional weight loss, in the setting of edentulous state.  Consulted nutritionist, SLP rec pureed diet -if patient dislikes food can transition to \"minced and moist\" per SLP:       Diet as recommended by SLP:  Rec:     Mech-soft diet with thin liquids  Aspiration precautions  HOB >45 during po intake, remain >30 for 30-45 minutes after po   Small bites/sips; alternate liquid/solid with slow feeding rate   Oral care TID  Meds per pt preference     -BPH w/ acute urinary retention - cont flomax / proscar / pelayo catheter for now for acute retention. Urology evaluated patient during his last recent admission. . Outpt f/u, maintain pelayo. Please ensure follow up with urologist Dr Zonia Chin within one week for outpt follow up and voiding trial. (daughter prefers not to have pt see dr Sabra Pickard) Please make sure patient does not remain with Pelayo chronically without this initial voiding trial to be done in the near future.    -Constipation -per nursing pt now having bm's  -Recent diverticulitis - improved, continue Levaquin and Flagyl, last day 6/11     HISTORY OF:  -EtOH use d/o, quit 5 years ago.  Multivitamin, thiamine, folic acid. -Bipolar d/o per patient. Not on treatment per home med review.   -Neuropathy right foot, continue home cymbalta  -Hypertension - cont norvasc w/ hold parameters   -Hx esophageal varices. US 6/3/2021 Unremarkable right upper quadrant ultrasound.  No obvious varices noted around the liver.   S/p EGD with bx 4/23/21, Gisela Gmoez., MD [facility].   [facility] [place], [place]:     -[facility]- continue ppi.    -   HELICOBACTER PYLORI IDENTIFIED - patient states he completed course of antibiotics.     Dispo: SNF   Daughter has been updated by me in terms of the plans for discharge/transfer to SNF and his overall clinical status. Today's examination of the patient revealed:     Subjective:   Patient complains of bloated feeling in his abdomen. He states he is having bowel movements. He has no other complaints  Objective:   VS:   Visit Vitals  BP (!) 131/92 (BP 1 Location: Right upper arm, BP Patient Position: At rest)   Pulse 77   Temp 98.1 °F (36.7 °C)   Resp 18   Ht 5' 7\" (1.702 m)   Wt 70.2 kg (154 lb 11.2 oz)   SpO2 97%   BMI 24.23 kg/m²      Tmax/24hrs: Temp (24hrs), Av.1 °F (36.7 °C), Min:97.5 °F (36.4 °C), Max:98.6 °F (37 °C)     Input/Output:     Intake/Output Summary (Last 24 hours) at 2021 1253  Last data filed at 2021 1215  Gross per 24 hour   Intake    Output 700 ml   Net -700 ml       General:  Alert, awake, in nad  Cardiovascular:  Rrr, no murmurs  Pulmonary:  ctab  GI:  Soft, nt, nd  Extremities:  No edema      Labs:    Recent Results (from the past 24 hour(s))   CBC WITH AUTOMATED DIFF    Collection Time: 21  4:45 PM   Result Value Ref Range    WBC 7.2 4.6 - 13.2 K/uL    RBC 4.49 4.35 - 5.65 M/uL    HGB 13.6 13.0 - 16.0 g/dL    HCT 40.0 36.0 - 48.0 %    MCV 89.1 74.0 - 97.0 FL    MCH 30.3 24.0 - 34.0 PG    MCHC 34.0 31.0 - 37.0 g/dL    RDW 14.4 11.6 - 14.5 %    PLATELET 307 197 - 957 K/uL    MPV 8.9 (L) 9.2 - 11.8 FL    NEUTROPHILS 67 40 - 73 %    LYMPHOCYTES 16 (L) 21 - 52 %    MONOCYTES 15 (H) 3 - 10 %    EOSINOPHILS 2 0 - 5 %    BASOPHILS 0 0 - 2 %    ABS. NEUTROPHILS 4.8 1.8 - 8.0 K/UL    ABS. LYMPHOCYTES 1.1 0.9 - 3.6 K/UL    ABS. MONOCYTES 1.1 0.05 - 1.2 K/UL    ABS. EOSINOPHILS 0.1 0.0 - 0.4 K/UL    ABS.  BASOPHILS 0.0 0.0 - 0.1 K/UL    DF AUTOMATED     PTT    Collection Time: 21  4:45 PM   Result Value Ref Range    aPTT 37.5 (H) 23.0 - 36.4 SEC   PTT    Collection Time: 21  4:21 AM   Result Value Ref Range    aPTT >180.0 (HH) 23.0 - 36.4 SEC   CBC WITH AUTOMATED DIFF    Collection Time: 21  4:21 AM Result Value Ref Range    WBC 6.3 4.6 - 13.2 K/uL    RBC 4.43 4.35 - 5.65 M/uL    HGB 13.3 13.0 - 16.0 g/dL    HCT 39.7 36.0 - 48.0 %    MCV 89.6 74.0 - 97.0 FL    MCH 30.0 24.0 - 34.0 PG    MCHC 33.5 31.0 - 37.0 g/dL    RDW 14.5 11.6 - 14.5 %    PLATELET 683 (H) 537 - 420 K/uL    MPV 9.4 9.2 - 11.8 FL    NEUTROPHILS 58 40 - 73 %    LYMPHOCYTES 24 21 - 52 %    MONOCYTES 14 (H) 3 - 10 %    EOSINOPHILS 3 0 - 5 %    BASOPHILS 1 0 - 2 %    ABS. NEUTROPHILS 3.6 1.8 - 8.0 K/UL    ABS. LYMPHOCYTES 1.5 0.9 - 3.6 K/UL    ABS. MONOCYTES 0.9 0.05 - 1.2 K/UL    ABS. EOSINOPHILS 0.2 0.0 - 0.4 K/UL    ABS. BASOPHILS 0.1 0.0 - 0.1 K/UL    DF AUTOMATED       Additional Data Reviewed:     Condition on discharge:stable   Disposition:    []Home   []Home with Home Health   [x]SNF/NH   []Rehab   []Home with family   []Alternate Facility:____________________      Discharge Medications:     Current Discharge Medication List      CONTINUE these medications which have CHANGED    Details   !! apixaban (ELIQUIS) 5 mg tablet Take 2 Tablets by mouth two (2) times a day for 6 days. Qty: 24 Tablet, Refills: 0      !! apixaban (ELIQUIS) 5 mg tablet Take 1 Tablet by mouth two (2) times a day for 14 days. Start taking this regimen after finishing the 10 mg bid for 6 days. Qty: 28 Tablet, Refills: 1       !! - Potential duplicate medications found. Please discuss with provider. CONTINUE these medications which have NOT CHANGED    Details   polyethylene glycol (MIRALAX) 17 gram packet Take 1 Packet by mouth daily. Qty: 30 Packet, Refills: 0      therapeutic multivitamin (THERAGRAN) tablet Take 1 Tablet by mouth daily for 30 days. Qty: 30 Tablet, Refills: 0      diclofenac (VOLTAREN) 1 % gel Apply 2 g to affected area four (4) times daily. Qty: 100 g, Refills: 2    Associated Diagnoses: Rotator cuff arthropathy of both shoulders      tamsulosin (FLOMAX) 0.4 mg capsule Take 1 Cap by mouth daily (after dinner).   Qty: 90 Cap, Refills: 0 DULoxetine (CYMBALTA) 60 mg capsule Take 1 Cap by mouth daily. Indications: neuropathic pain  Qty: 60 Cap, Refills: 5    Associated Diagnoses: Chronic midline low back pain, unspecified whether sciatica present      finasteride (PROSCAR) 5 mg tablet Take 5 mg by mouth daily. ammonium lactate (AMLACTIN) 12 % topical cream Apply  to affected area two (2) times a day. rub in to affected area well  Qty: 280 g, Refills: 0      pantoprazole (PROTONIX) 40 mg tablet Take 1 Tab by mouth daily. Qty: 30 Tab, Refills: 0      amLODIPine (NORVASC) 10 mg tablet Take 1 Tab by mouth daily. Qty: 30 Tab, Refills: 0      albuterol sulfate 90 mcg/actuation aebs Take  by inhalation as needed. STOP taking these medications       levoFLOXacin (LEVAQUIN) 750 mg tablet Comments:   Reason for Stopping:         metroNIDAZOLE (FlagyL) 500 mg tablet Comments:   Reason for Stopping:         BismatroL 262 mg chew Comments:   Reason for Stopping:         Wheat Dextrin (Benefiber Clear) 3 gram/3.5 gram pwpk Comments:   Reason for Stopping:         triamcinolone acetonide (KENALOG) 0.1 % ointment Comments:   Reason for Stopping:         ammonium lactate (Lac-Hydrin Five) 5 % lotion Comments:   Reason for Stopping:         spironolactone (ALDACTONE) 25 mg tablet Comments:   Reason for Stopping: Follow-up Appointments:   1. Your PCP: Lisa Varma MD, within 7-10days  2. Pulmonologist Dr. Anton Mejia in 2 to 3 weeks  3. Urologist Dr. Aidee Magana in 1 week for voiding trial and removal of Ayala. Please make sure patient does not remain with Ayala chronically.       >30 minutes spent coordinating this discharge (review instructions/follow-up, prescriptions, preparing report for sign off)    Signed:  Estefani Weaver MD  6/13/2021  12:53 PM

## 2021-06-13 NOTE — PROGRESS NOTES
Attempted to call report to [facility] x2, went to voicemail and mailbox full.

## 2021-06-13 NOTE — PROGRESS NOTES
Per Dr. Anson Duvall, pt is ready for d/c today. Pt is from [facility]. Called [facility] and she stated pt can return when d/c'd  Notified Dr. Anson Duvall. Pt's clinicals sent to \[facility]\"" in [place].      transportation [facility]. Patient will require BLS transport. Pt requires Stretcher If stretcher, reason: spinal cord injury, lumber spine injury  Patient is currently requiring oxygen yes 2L  Height:5'7''   Weight: 154 Ib  Pt is on isolation: No    Is the pt ready now? No  Requested time: Next Available  PCS Faxed: no  Insurance verified on face sheet: yes  Auth needed for transport: yes  CM completed PCS/ Envelope and placed on chart. [facility] [employer] [phone] and spoke with Monico Aparicio. Trip number is [id number] and  time will be 3:15 pm today. She stated when they find a provider, they will call the nurses station for ETA. Called [facility] and they can  pt.   Sent text message to Dr. Anson Duvall  Updated pt's nurse Renan Washington and for her to call family    DAVINA AlcantaraN RN  Care Management  Pager: [phone]

## 2021-06-13 NOTE — PROGRESS NOTES
Problem: Pressure Injury - Risk of  Goal: *Prevention of pressure injury  Description: Document Aristeo Scale and appropriate interventions in the flowsheet. Outcome: Resolved/Met     Problem: Patient Education: Go to Patient Education Activity  Goal: Patient/Family Education  Outcome: Resolved/Met     Problem: Falls - Risk of  Goal: *Absence of Falls  Description: Document Martha Fall Risk and appropriate interventions in the flowsheet.   Outcome: Resolved/Met     Problem: Patient Education: Go to Patient Education Activity  Goal: Patient/Family Education  Outcome: Resolved/Met     Problem: Nutrition Deficit  Goal: *Optimize nutritional status  Outcome: Resolved/Met

## 2021-06-13 NOTE — DISCHARGE INSTRUCTIONS
Patient Education        8 Things You Can Do to Help Prevent Blood Clots  A blood clot in a deep vein, usually in the legs, is called a deep vein thrombosis (DVT). A DVT can break loose and travel through the bloodstream to the lungs. Then the clot can block blood flow in the lungs (pulmonary embolism). This can be life-threatening. That's why it's important to take steps to prevent a clot. Take a blood-thinning medicine (called an anticoagulant), if prescribed. If your doctor prescribes medicine, take it as directed. Exercise your lower leg muscles. This helps keep the blood moving through your legs. Point your toes up toward your head so the calves of your legs are stretched, then relax. Repeat. This is a good exercise to do when you are sitting for long periods of time. Get up out of bed as soon as your doctor says it's okay. Being active is one of the best things you can do to prevent clots. Take plenty of breaks when you travel. On long car trips, stop the car and walk around every hour or so. On the bus, plane, or train, get out of your seat and walk up and down the aisle every hour, if you can. Be active. Try to get 30 minutes or more of activity on most days of the week. Don't smoke. Smoking can increase your risk of blood clots. If you need help quitting, talk to your doctor about stop-smoking programs and medicines. Check with your doctor about whether you should use hormone forms of birth control or hormone therapy. These may increase your risk of blood clots. Use compression stockings if your doctor prescribes them. These are specially fitted stockings that may prevent blood clots by keeping blood from pooling in your legs. Current as of: May 27, 2020               Content Version: 12.8  © 2006-2021 [employer]. Care instructions adapted under license by [employer] (which disclaims liability or warranty for this information).  If you have questions about a medical condition or this instruction, always ask your healthcare professional. [place] [address] any warranty or liability for your use of this information.

## 2021-06-13 NOTE — PROGRESS NOTES
Pt left unit in no distress with stable vital signs. Ayala, IV's and tele box d/c'd and removed from pt.

## 2021-06-16 NOTE — PROGRESS NOTES
[facility] - [facility]  [address] [facility]  [phone] fax    Occupational Therapy Discharge Summary    Patient name: Ruby Chavira Start of Care: 3/4/21   Referral source: Gabriel Narvaez DO : 1945   Medical/Treatment Diagnosis: Pain in left hand [M79.642]  Left elbow pain [M25.522]  Carpal tunnel syndrome, left upper limb [G56.02]  Lesion of ulnar nerve, left upper limb [G56.22]  Payor: [employer] / Plan: [address] / Product Type: Managed Care Medicaid /  Onset Date:21     Prior Hospitalization: see medical history Provider#: [id number]   Medications: Verified on Patient Summary List    Comorbidities: Right shoulder pain pending replacement, right CTS, asthma, HTN, blood cancer  Prior Level of Function: ,fish, cut grass, I self care, home care, driving  Visits from Start of Care: 8    Missed Visits: 1  Reporting Period : 21 to 21    Summary of Care: Patient was seen for modalities, therapeutic exercises and activities to improve hand function. Patient has HEP. Patient chose to self discharge and was not seen since previous progress note was written. Goal: 1.  Patient will be familiar with proper positioning for left UE to reduce episodes of numbness.   Status at last note/certification:  reports implementing positioning techniques, continued numbness and pain at 4/10    Status at discharge: not met, unable to reassess/address due to patient self discharge    Goal: 1.  Patient will tolerate pressure on palm to complete ADLs/IADLs successfully  without discomfort    Status at last note/certification: Progressing, some discomfort still present with palmar pressure   Status at discharge:  not met, unable to reassess/address due to patient self discharge    Goal: 3.  Patient  will be able to lift and carry groceries in left hand due to adequate  strength and reduced pain.    Status at last note/certification: Progressing some improvement noted   Status at discharge:  not met, unable to reassess/address due to patient self discharge    Goal: 4.  Patient will be able to cut food with left assist.  Status at last note/certification: Ongoing Difficulty  Status at discharge: not met, unable to reassess/address due to patient self discharge    Goal:6. Patient will improve Left  strength by an additional 10# for improved ability to hold tools. Status at last note/certification: 74  Status at discharge:  not met, unable to reassess/address due to patient self discharge    ASSESSMENT/Changes in Function: Patient demonstrated improvement with Fine Motor skills/speed. Patient was making progress towards palmar pressure tolerance and Left  strength prior to self discharge. ASSESSMENT/RECOMMENDATIONS:  [x]Discontinue therapy: [x]Patient has reached or is progressing toward set goals      [x]Patient is non-compliant or has abdicated      []Due to lack of appreciable progress towards set goals    ROMELIA Naik  6/16/2021 1:00 PM    NOTE TO PHYSICIAN:  Please complete the following and fax to: [facility] at [facility] at [phone]  . Retain this original for your records. If you are unable to process this request in   24 hours, please contact our office.      [] I have read the above report and request that my patient continue therapy with the following changes/special instructions:  [] I have read the above report and request that my patient be discharged from therapy    Physician's Signature:____________Date:_________TIME:________     Zee Quiñonez DO  ** Signature, Date and Time must be completed for valid certification **

## 2021-06-28 NOTE — PROGRESS NOTES
[facility]  [address] [facility]  [phone] fax    Physical Therapy Discharge Summary    Patient name: Gianluca Fajardo Start of Care: 2021   Referral source: Camila Raphael* : 1945   Medical/Treatment Diagnosis: Low back pain [M54.5]  Lack of coordination [R27.9]  [facility] [place] Plan: [address] / Product Type: Managed Care Medicaid /  Onset Date:      Prior Hospitalization: see medical history Provider#: [id number]   Medications: Verified on Patient Summary List    Comorbidities: Hearing, Asthma, HTN,CAD,PVD, In 's, pt fell off a ladder and was in Traction for a month, Previous left shoulder and wrist surgery and pain. Ulnar Transposition 2021. Prior Level of Function: Lives Alone on the 3rd floor. Independent with ADL's. Visits from Start of Care: 1    Missed Visits: 1    Reporting Period : 21 to 21    Summary of Care:  Goal: Pt will be compliant with a HEP to improve function. Status at last note/certification: New goal-established at evaluation  Status at discharge: Unable to formally assess      Goal: Pt will increase FOTO score by 7  pts to improve function. Status at last note/certification: New goal-FOTO not complete  Status at discharge: Unable to formally assess    Goal: Pt will report at least 40% improvement in sx to ease with ADL's. Status at last note/certification: New goal  Status at discharge: Unable to formally assess    Goal: Pt will ambulate 4 stairs x 4 with HR  to ease with being able to get to/from his home. Status at last note/certification: New goal-patient lives on third floor  Status at discharge: Unable to formally assess      Goal: Pt will  Report pain <4/10 greater than 50% of the time to be able to perform ADL's and dressing activities.   Status at last note/certification: New goal-pain 7-8/10 at evaluation  Status at discharge: Unable to formally assess      ASSESSMENT/RECOMMENDATIONS: Patient did not return to skilled outpatient PT following initial evaluation. Unable to formally assess progress towards goals. Patient provided HEP at initial evaluation. At this time patient is appropriate for discharge from skilled outpatient PT.    [x]Discontinue therapy: []Patient has reached or is progressing toward set goals      [x]Patient is non-compliant or has abdicated      []Due to lack of appreciable progress towards set goals    Shannan Renee, PT 6/28/2021 1:29 PM    NOTE TO PHYSICIAN:  Please complete the following and fax to: [facility] at [phone]  Retain this original for your records. If you are unable to process this request in   24 hours, please contact our office.      [] I have read the above report and request that my patient continue therapy with the following changes/special instructions:  [] I have read the above report and request that my patient be discharged from therapy    Physician's Signature:____________Date:_________TIME:________     Sirena Pacheco*  ** Signature, Date and Time must be completed for valid certification **

## 2021-07-12 LAB
BACTERIA SPEC CULT: NORMAL
SERVICE CMNT-IMP: NORMAL

## 2021-07-22 ENCOUNTER — OFFICE VISIT (OUTPATIENT)
Dept: CARDIOLOGY CLINIC | Age: 76
End: 2021-07-22
Payer: COMMERCIAL

## 2021-07-22 VITALS
SYSTOLIC BLOOD PRESSURE: 116 MMHG | BODY MASS INDEX: 24.3 KG/M2 | HEIGHT: 67 IN | DIASTOLIC BLOOD PRESSURE: 66 MMHG | WEIGHT: 154.8 LBS | OXYGEN SATURATION: 97 % | HEART RATE: 96 BPM

## 2021-07-22 DIAGNOSIS — E78.00 HYPERCHOLESTEROLEMIA: ICD-10-CM

## 2021-07-22 DIAGNOSIS — I26.99 OTHER ACUTE PULMONARY EMBOLISM WITHOUT ACUTE COR PULMONALE (HCC): ICD-10-CM

## 2021-07-22 DIAGNOSIS — I82.4Y3 ACUTE DEEP VEIN THROMBOSIS (DVT) OF PROXIMAL VEIN OF BOTH LOWER EXTREMITIES (HCC): ICD-10-CM

## 2021-07-22 DIAGNOSIS — I10 ESSENTIAL HYPERTENSION: ICD-10-CM

## 2021-07-22 DIAGNOSIS — Q24.5 CORONARY-MYOCARDIAL BRIDGE: Primary | ICD-10-CM

## 2021-07-22 PROCEDURE — 99214 OFFICE O/P EST MOD 30 MIN: CPT | Performed by: INTERNAL MEDICINE

## 2021-07-22 RX ORDER — EZETIMIBE 10 MG/1
10 TABLET ORAL DAILY
COMMUNITY

## 2021-07-22 RX ORDER — FAMOTIDINE 20 MG/1
20 TABLET, FILM COATED ORAL 2 TIMES DAILY
COMMUNITY

## 2021-07-22 RX ORDER — SPIRONOLACTONE 25 MG/1
25 TABLET ORAL DAILY
COMMUNITY

## 2021-07-22 NOTE — PROGRESS NOTES
HISTORY OF PRESENT ILLNESS  Yeison Oakley is a 77 y.o. male. 2//2020  Patient is here for follow-up after recent evaluation in emergency room for chest pain. Has he was painting all day and subsequently started having pain under her armpit on both side. Pain worse on movement. Worse on sitting and moving around radiates to the back. He has short of breath on exertion which does not appear changed. 1/2021  Patient here for follow-up. His stable pattern of chest pain or shortness of breath. Denies any significant change at present. He is here for preoperative assessment    7/2021  Patient is here for follow-up. He was admitted 6/2021 with  Discharge Diagnoses:     -Hemoptysis  -Acute PE  -Acute non-occlusive DVT  -Falls at home  -Possible aspiration pneumonia  -BPH w/ acute urinary retention  -Unintentional weight loss  -Constipation  -Recent diverticulitis      Since discharge he still remains short of breath. No hemoptysis    Chest Pain (Angina)   The history is provided by the patient. This is a new problem. The current episode started more than 1 week ago. The problem has been gradually worsening. The problem occurs daily. The pain is associated with exertion and rest. The pain is present in the substernal region, right side and left side. The pain is mild. The quality of the pain is described as pressure-like and heavy. The pain does not radiate. Pertinent negatives include no abdominal pain, no claudication, no cough, no dizziness, no fever, no headaches, no hemoptysis, no nausea, no orthopnea, no palpitations, no PND, no shortness of breath, no sputum production, no vomiting and no weakness. Shortness of Breath  The history is provided by the patient. This is a new problem. The problem occurs frequently. The current episode started more than 1 week ago. The problem has been gradually worsening.  Pertinent negatives include no fever, no headaches, no cough, no sputum production, no hemoptysis, no wheezing, no PND, no orthopnea, no chest pain, no vomiting, no abdominal pain, no rash, no leg swelling and no claudication. Precipitated by: walking,exertion. Follow-up  Pertinent negatives include no chest pain, no abdominal pain, no headaches and no shortness of breath. Review of Systems   Constitutional: Negative for chills and fever. HENT: Negative for nosebleeds. Eyes: Negative for blurred vision and double vision. Respiratory: Negative for cough, hemoptysis, sputum production, shortness of breath and wheezing. Cardiovascular: Negative for chest pain, palpitations, orthopnea, claudication, leg swelling and PND. Gastrointestinal: Negative for abdominal pain, heartburn, nausea and vomiting. Musculoskeletal: Negative for myalgias. Skin: Negative for rash. Neurological: Negative for dizziness, weakness and headaches. Endo/Heme/Allergies: Does not bruise/bleed easily.      Family History   Problem Relation Age of Onset    Diabetes Mother     Hypertension Mother     Diabetes Maternal Grandmother     Hypertension Maternal Grandmother     Cancer Sister         brain    Cancer Sister         brain       Past Medical History:   Diagnosis Date    Bladder outlet obstruction     Erectile disorder due to medical 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     Frequency of urination     H/O spinal cord injury 1974    lumbar spine injury secondary to fall    HTN (hypertension)     Hypercholesterolemia     Illiterate     Injury of lumbar spine ([address] 1974    Leg pain, right     Nocturia     Overactive bladder     Peripheral vascular disease ([facility] [address]        Past Surgical History:   Procedure Laterality Date    COLONOSCOPY N/A 12/4/2019    COLONOSCOPY performed by Nallely Cordoba MD at [facility] ENDOSCOPY    HAND/FINGER SURGERY UNLISTED Right     carpal tunnel    HX HEENT Left     lens implant    HX ORTHOPAEDIC      finger amputation left hand    HX TONSILLECTOMY      UPPER ARM/ELBOW SURGERY UNLISTED Right        Social History     Tobacco Use    Smoking status: Former Smoker     Quit date: 2015     Years since quittin.0    Smokeless tobacco: Never Used   Substance Use Topics    Alcohol use: Not Currently     Alcohol/week: 0.0 standard drinks     Comment: former stopped        Allergies   Allergen Reactions    Latex Rash     Other reaction(s): Unknown    Ampicillin Angioedema    Asa-Acetaminophen-Caff-Buffers Rash    Penicillins Angioedema     Other reaction(s): anaphylaxis    Crab Hives    Shellfish Derived Rash    Aspirin Other (comments)     Stomach upset  Other reaction(s): Unknown    Atorvastatin Other (comments)     Muscle cramps       Prior to Admission medications    Medication Sig Start Date End Date Taking? Authorizing Provider   famotidine (PEPCID) 20 mg tablet Take 20 mg by mouth two (2) times a day. Yes Provider, Historical   ezetimibe (ZETIA) 10 mg tablet Take 10 mg by mouth daily. Yes Provider, Historical   apixaban (Eliquis) 5 mg tablet Take 5 mg by mouth two (2) times a day. Yes Provider, Historical   spironolactone (Aldactone) 25 mg tablet Take 25 mg by mouth daily. Yes Provider, Historical   tamsulosin (FLOMAX) 0.4 mg capsule Take 2 Capsules by mouth daily (after dinner). 21  Yes KENIA Sánchez   finasteride (PROSCAR) 5 mg tablet Take 1 Tablet by mouth daily. 21  Yes KENIA Sánchez   polyethylene glycol (MIRALAX) 17 gram packet Take 1 Packet by mouth daily. 6/10/21  Yes Marcel Robins MD   diclofenac (VOLTAREN) 1 % gel Apply 2 g to affected area four (4) times daily. 21  Yes Imelda Ni MD   DULoxetine (CYMBALTA) 60 mg capsule Take 1 Cap by mouth daily. Indications: neuropathic pain 20  Yes Kanchan ALFARO MD   albuterol sulfate 90 mcg/actuation aebs Take  by inhalation as needed. Yes Provider, Historical   pantoprazole (PROTONIX) 40 mg tablet Take 1 Tab by mouth daily.  3/28/19  Yes Ceci Solano MD amLODIPine (NORVASC) 10 mg tablet Take 1 Tab by mouth daily. 3/28/19  Yes Marcelle Del Cid MD   tuberculin 5 tub. unit /0.1 mL injection 5 Units by IntraDERMal route Once. Patient not taking: Reported on 7/22/2021    Provider, Historical         Visit Vitals  /66 (BP 1 Location: Left upper arm, BP Patient Position: Sitting, BP Cuff Size: Large adult)   Pulse 96   Ht 5' 7\" (1.702 m)   Wt 70.2 kg (154 lb 12.8 oz)   SpO2 97%   BMI 24.25 kg/m²     Physical Exam  Constitutional:       Appearance: He is well-developed and well-nourished. HENT:      Head: Normocephalic and atraumatic. Eyes:      Conjunctiva/sclera: Conjunctivae normal.   Neck:      Musculoskeletal: Neck supple. Thyroid: No thyromegaly. Vascular: No JVD. Trachea: No tracheal deviation. Cardiovascular:      Rate and Rhythm: Normal rate and regular rhythm. Heart sounds: Normal heart sounds. No murmur. No friction rub. No gallop. Pulmonary:      Effort: No respiratory distress. Breath sounds: Normal breath sounds. No wheezing or rales. Chest:      Chest wall: No tenderness. Abdominal:      Palpations: Abdomen is soft. Tenderness: There is no abdominal tenderness. Musculoskeletal:         General: No edema. Skin:     General: Skin is warm and dry. Neurological:      Mental Status: He is alert and oriented to person, place, and time. Psychiatric:         Mood and Affect: Mood and affect normal.   Interpretation Summary 10/2018    Normal right lower extremity arterial findings. Moderate PAD left lower extremity. Disease noted at tibial level. 10/2018 EKG  DiagnosisFinal Sinus bradycardia   Moderate voltage criteria for LVH, may be normal variant   Possible Acute pericarditis   Abnormal ECG  2015stress echo  Impressions: Normal study after maximal exercise without reproduction of  symptoms   ECG conclusions:  The stress ECG was normal. Based on [employer]mill  Scoring, this patient was at low risk for cardiac events. Mr. Aurelia Philip has a reminder for a \"due or due soon\" health maintenance. I have asked that he contact his primary care provider for follow-up on this health maintenance. I have personally reviewed patient's records available from hospital and other providers and incorporated findings in patient care. I have personally reviewed patients ekg done at other facility. I Have personally reviewed recent relevant labs available and discussed with patient    Conclusion cardiac cath 3/2019    Non obstructive epicardial arteries with moderate mid LAD myocardial bridging noted. Will optimize CCB dose. Continue risk factor modification. Complications                      Coronary Findings     Diagnostic   Dominance: Right   Left Main   The vessel was visualized by angiography. The vessel is angiographically normal.   Left Anterior Descending   The vessel was visualized by angiography. The vessel is angiographically normal. Moderate myocardial bridging noted in mid LAD   Left Circumflex   The vessel was visualized by angiography. The vessel is angiographically normal.   Right Coronary Artery   The vessel was visualized by angiography. The vessel is angiographically normal.   Intervention     No interventions have been documented. Procedure Conclusion     Nuclear Stress Test 3/2019    Abnormal myocardial perfusion imaging. Reversible defect consistent with myocardial ischemia. Myocardial perfusion imaging supports an intermediate risk stress test.   There is no prior study available for comparison. .   Interpretation Summary     · Baseline ECG: Sinus bradycardia, ST elevation, consider early repolarization, pericarditis or injury Minimal voltage criteria for LVH maybe normal variant. · Gated SPECT: Left ventricular function post-stress was normal. Calculated ejection fraction is 71%. There is no evidence of transient ischemic dilation (TID). The TID ratio is 0.95.   · Negative stress test.  · Left ventricular perfusion is probably abnormal.  · Myocardial perfusion imaging defect 1: There is a defect that is small to moderate in size with a mild reduction in uptake present in the mid to basal inferior location(s) that is partially reversible. There is normal wall motion in the defect area. Viability in the area is good. The possibility of ischemia cannot be excluded. Perfusion defect was visually present without quantitative evidence. · Abnormal myocardial perfusion imaging. Reversible defect consistent with myocardial ischemia. Myocardial perfusion imaging supports an intermediate risk stress test.        Interpretation Summary 11/2019    · Left Ventricle: Normal cavity size, systolic function (ejection fraction normal) and diastolic function. Mildly increased wall thickness. Estimated left ventricular ejection fraction is 56 - 60%. Visually measured ejection fraction. No regional wall motion abnormality noted. Interpretation Summary PVL6/2021       · Acute non-occlusive thrombus present in the right distal femoral vein. · Age indeterminate non-occlusive thrombus present in the left posterior tibial vein. Acute non-occlusive deep vein thrombosis noted in the right distal femoral vein. Sub-Acute non-occlusive deep vein thrombosis noted in the left posterior tibial veins. Interpretation Summary echo6/2021    · LV: Estimated LVEF is 55 - 60%. Visually measured ejection fraction. Normal cavity size and systolic function (ejection fraction normal). Mild concentric hypertrophy. Mild (grade 1) left ventricular diastolic dysfunction. · TV: Pulmonary hypertension not suggested by Doppler findings. IMPRESSION CTA 6/2021     Right-sided pulmonary emboli demonstrated similar to prior, but slightly  decreased comparison particularly in the right lower lobe.  Left-sided smaller  emboli seen on prior CT is not as conspicuous on current exam.     Bibasilar bandlike consolidation, increased since prior, may represent any  combination of infarcts, infiltrates, or atelectasis.     Small right pleural effusion.     Nonspecific edema/stranding insinuating between the left kidney upper pole and  pancreatic tail. Recommend correlation with urinalysis and lipase levels  Assessment         ICD-10-CM ICD-9-CM    1. Coronary-myocardial bridge  Q24.5 746.85     Stable symptom monitor   2. Essential hypertension  I10 401.9     Stable continue treatment   3. Acute deep vein thrombosis (DVT) of proximal vein of both lower extremities ([facility])  I82.4Y3 453.41     Bilateral DVT noted. 1 subacute and another 1 acute   4. Other acute pulmonary embolism without acute cor pulmonale ([facility])  I26.99 415.19     Recent admission with acute PE right lung. Has bilateral DVT. 6/2021. Hemoptysis is resolved still short of breath   5. Hypercholesterolemia  E78.00 272.0     Continue treatment lab with PCP      3/2019  New patient with increased chest pain and shortness of breath. Possible angina. Rule out CHF cardiomyopathy. Patient was intolerant to atorvastatin in past due to muscle spasm. Recheck lipids and decide on alternate statin. Patient had peptic ulcer many years ago and was told not to take aspirin as it upsets his stomach. Consider Plavix if needed    4/2019  Continues to have pain atypical chest pain. No significant CAD. Currently on Protonix. Will use Mylanta plus for gas. He has GI appointment next week. Cardiac status stable  2/2020  Seen for atypical chest pain clinically musculoskeletal on 2 sides and back. Started after painting. Use Aleve 1 tablet twice a day for 1 week    9/2020 virtual visit  On and off episode of chest tightness. We will continue medical management. Emergency room if symptoms are severe. GI work-up is in progress and okay to do work-up as needed  1/ 2021  Cardiac status stable with stable angina. No significant epicardial coronary disease. Has myocardial bridge.   Normal ejection fraction okay to proceed with orthopedic surgery as planned. Medium cardiac risk  7/2021  Recent admission with acute PE. Has bilateral DVT. Now on Eliquis. No pulmonary hypertension normal ejection fraction. Still short of breath but no hemoptysis. Cardiac status with angina is stable. Blood pressure controlled  Medications Discontinued During This Encounter   Medication Reason    ammonium lactate (AMLACTIN) 12 % topical cream Not A Current Medication       No orders of the defined types were placed in this encounter.

## 2021-07-22 NOTE — PROGRESS NOTES
1. Have you been to the ER, urgent care clinic since your last visit? Hospitalized since your last visit? Yes     When: 6-5-2021 Where: [facility] Reason for visit: PE    2. Have you seen or consulted any other health care providers outside of the [address] [place] [address] since your last visit? Include any pap smears or colon screening. Yes with GI        3. Do you need any refills today?    No

## 2021-07-27 LAB
ACID FAST STN SPEC: NEGATIVE
MYCOBACTERIUM SPEC QL CULT: NEGATIVE
SPECIMEN PREPARATION: NORMAL
SPECIMEN SOURCE: NORMAL

## 2021-08-01 ENCOUNTER — HOSPITAL ENCOUNTER (EMERGENCY)
Age: 76
Discharge: HOME | End: 2021-08-02
Attending: EMERGENCY MEDICINE
Payer: MEDICAID

## 2021-08-01 DIAGNOSIS — R07.9 ACUTE CHEST PAIN: Primary | ICD-10-CM

## 2021-08-01 PROCEDURE — 85025 COMPLETE CBC W/AUTO DIFF WBC: CPT

## 2021-08-01 PROCEDURE — 80053 COMPREHEN METABOLIC PANEL: CPT

## 2021-08-01 PROCEDURE — 81003 URINALYSIS AUTO W/O SCOPE: CPT

## 2021-08-01 PROCEDURE — 99284 EMERGENCY DEPT VISIT MOD MDM: CPT

## 2021-08-01 PROCEDURE — 82550 ASSAY OF CK (CPK): CPT

## 2021-08-01 PROCEDURE — 83880 ASSAY OF NATRIURETIC PEPTIDE: CPT

## 2021-08-01 PROCEDURE — 93005 ELECTROCARDIOGRAM TRACING: CPT

## 2021-08-02 ENCOUNTER — APPOINTMENT (OUTPATIENT)
Dept: CT IMAGING | Age: 76
End: 2021-08-02
Attending: EMERGENCY MEDICINE
Payer: MEDICAID

## 2021-08-02 ENCOUNTER — APPOINTMENT (OUTPATIENT)
Dept: GENERAL RADIOLOGY | Age: 76
End: 2021-08-02
Attending: EMERGENCY MEDICINE
Payer: MEDICAID

## 2021-08-02 VITALS
HEART RATE: 81 BPM | DIASTOLIC BLOOD PRESSURE: 65 MMHG | OXYGEN SATURATION: 98 % | SYSTOLIC BLOOD PRESSURE: 131 MMHG | RESPIRATION RATE: 27 BRPM | TEMPERATURE: 97.8 F

## 2021-08-02 LAB
ALBUMIN SERPL-MCNC: 3.4 G/DL (ref 3.4–5)
ALBUMIN/GLOB SERPL: 0.9 {RATIO} (ref 0.8–1.7)
ALP SERPL-CCNC: 67 U/L (ref 45–117)
ALT SERPL-CCNC: 23 U/L (ref 16–61)
ANION GAP SERPL CALC-SCNC: 4 MMOL/L (ref 3–18)
APPEARANCE UR: CLEAR
AST SERPL-CCNC: 13 U/L (ref 10–38)
ATRIAL RATE: 75 BPM
BASOPHILS # BLD: 0 K/UL (ref 0–0.1)
BASOPHILS NFR BLD: 0 % (ref 0–2)
BILIRUB SERPL-MCNC: 0.2 MG/DL (ref 0.2–1)
BILIRUB UR QL: NEGATIVE
BNP SERPL-MCNC: 21 PG/ML (ref 0–900)
BUN SERPL-MCNC: 9 MG/DL (ref 7–18)
BUN/CREAT SERPL: 11 (ref 12–20)
CALCIUM SERPL-MCNC: 9 MG/DL (ref 8.5–10.1)
CALCULATED P AXIS, ECG09: 52 DEGREES
CALCULATED R AXIS, ECG10: -30 DEGREES
CALCULATED T AXIS, ECG11: 28 DEGREES
CHLORIDE SERPL-SCNC: 108 MMOL/L (ref 100–111)
CK MB CFR SERPL CALC: NORMAL % (ref 0–4)
CK MB SERPL-MCNC: <1 NG/ML (ref 5–25)
CK SERPL-CCNC: 97 U/L (ref 39–308)
CO2 SERPL-SCNC: 27 MMOL/L (ref 21–32)
COLOR UR: YELLOW
CREAT SERPL-MCNC: 0.8 MG/DL (ref 0.6–1.3)
DIAGNOSIS, 93000: NORMAL
DIFFERENTIAL METHOD BLD: ABNORMAL
EOSINOPHIL # BLD: 0.2 K/UL (ref 0–0.4)
EOSINOPHIL NFR BLD: 4 % (ref 0–5)
ERYTHROCYTE [DISTWIDTH] IN BLOOD BY AUTOMATED COUNT: 14.9 % (ref 11.6–14.5)
GLOBULIN SER CALC-MCNC: 4 G/DL (ref 2–4)
GLUCOSE SERPL-MCNC: 85 MG/DL (ref 74–99)
GLUCOSE UR STRIP.AUTO-MCNC: NEGATIVE MG/DL
HCT VFR BLD AUTO: 37.4 % (ref 36–48)
HGB BLD-MCNC: 12.4 G/DL (ref 13–16)
HGB UR QL STRIP: NEGATIVE
KETONES UR QL STRIP.AUTO: NEGATIVE MG/DL
LEUKOCYTE ESTERASE UR QL STRIP.AUTO: NEGATIVE
LYMPHOCYTES # BLD: 1.8 K/UL (ref 0.9–3.6)
LYMPHOCYTES NFR BLD: 40 % (ref 21–52)
MCH RBC QN AUTO: 30 PG (ref 24–34)
MCHC RBC AUTO-ENTMCNC: 33.2 G/DL (ref 31–37)
MCV RBC AUTO: 90.3 FL (ref 74–97)
MONOCYTES # BLD: 0.6 K/UL (ref 0.05–1.2)
MONOCYTES NFR BLD: 13 % (ref 3–10)
NEUTS SEG # BLD: 1.9 K/UL (ref 1.8–8)
NEUTS SEG NFR BLD: 43 % (ref 40–73)
NITRITE UR QL STRIP.AUTO: NEGATIVE
P-R INTERVAL, ECG05: 170 MS
PH UR STRIP: 6 [PH] (ref 5–8)
PLATELET # BLD AUTO: 252 K/UL (ref 135–420)
PMV BLD AUTO: 9 FL (ref 9.2–11.8)
POTASSIUM SERPL-SCNC: 3.9 MMOL/L (ref 3.5–5.5)
PROT SERPL-MCNC: 7.4 G/DL (ref 6.4–8.2)
PROT UR STRIP-MCNC: NEGATIVE MG/DL
Q-T INTERVAL, ECG07: 390 MS
QRS DURATION, ECG06: 90 MS
QTC CALCULATION (BEZET), ECG08: 435 MS
RBC # BLD AUTO: 4.14 M/UL (ref 4.35–5.65)
SODIUM SERPL-SCNC: 139 MMOL/L (ref 136–145)
SP GR UR REFRACTOMETRY: 1 (ref 1–1.03)
TROPONIN I SERPL-MCNC: <0.02 NG/ML (ref 0–0.04)
TROPONIN I SERPL-MCNC: <0.02 NG/ML (ref 0–0.04)
UROBILINOGEN UR QL STRIP.AUTO: 0.2 EU/DL (ref 0.2–1)
VENTRICULAR RATE, ECG03: 75 BPM
WBC # BLD AUTO: 4.5 K/UL (ref 4.6–13.2)

## 2021-08-02 PROCEDURE — 74011000636 HC RX REV CODE- 636: Performed by: EMERGENCY MEDICINE

## 2021-08-02 PROCEDURE — 71045 X-RAY EXAM CHEST 1 VIEW: CPT

## 2021-08-02 PROCEDURE — 74174 CTA ABD&PLVS W/CONTRAST: CPT

## 2021-08-02 RX ADMIN — IOPAMIDOL 100 ML: 755 INJECTION, SOLUTION INTRAVENOUS at 02:57

## 2021-08-02 NOTE — ED NOTES
Patient for discharge home in no acute distress at this time, given follow up instructions patient verbalized understanding and will follow up as directed. Left to catch the bus.

## 2021-08-02 NOTE — ED PROVIDER NOTES
EMERGENCY DEPARTMENT HISTORY AND PHYSICAL EXAM      Date: 8/1/2021  Patient Name: Juanito Maria    History of Presenting Illness     Chief Complaint   Patient presents with    Chest Pain       History (Context): Juanito Maria is a 77 y.o. gentleman with a complicated set of active comorbid conditions as noted below in the past medical history, who presents with subacute onset, intermittent, mild to moderate chest pain which is localized. Pain started hours ago. No clear exacerbating features  On review of systems, the patient denies fever, chills, trauma, back pain, abdominal pain, nausea, vomiting, diaphoresis. PCP: Shana Liang MD    Current Outpatient Medications   Medication Sig Dispense Refill    apixaban (Eliquis) 5 mg tablet Take 1 Tablet by mouth two (2) times a day. 60 Tablet 5    famotidine (PEPCID) 20 mg tablet Take 20 mg by mouth two (2) times a day.  ezetimibe (ZETIA) 10 mg tablet Take 10 mg by mouth daily.  spironolactone (Aldactone) 25 mg tablet Take 25 mg by mouth daily.  tuberculin 5 tub. unit /0.1 mL injection 5 Units by IntraDERMal route Once. (Patient not taking: Reported on 7/22/2021)      tamsulosin (FLOMAX) 0.4 mg capsule Take 2 Capsules by mouth daily (after dinner). 180 Capsule 3    finasteride (PROSCAR) 5 mg tablet Take 1 Tablet by mouth daily. 90 Tablet 3    polyethylene glycol (MIRALAX) 17 gram packet Take 1 Packet by mouth daily. 30 Packet 0    diclofenac (VOLTAREN) 1 % gel Apply 2 g to affected area four (4) times daily. 100 g 2    DULoxetine (CYMBALTA) 60 mg capsule Take 1 Cap by mouth daily. Indications: neuropathic pain 60 Cap 5    albuterol sulfate 90 mcg/actuation aebs Take  by inhalation as needed.  pantoprazole (PROTONIX) 40 mg tablet Take 1 Tab by mouth daily. 30 Tab 0    amLODIPine (NORVASC) 10 mg tablet Take 1 Tab by mouth daily.  30 Tab 0       Past History     Past Medical History:  Past Medical History:   Diagnosis Date    Bladder outlet obstruction     Erectile disorder due to medical 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     Frequency of urination     H/O spinal cord injury     lumbar spine injury secondary to fall    HTN (hypertension)     Hypercholesterolemia     Illiterate     Injury of lumbar spine ([address]     Leg pain, right     Nocturia     Overactive bladder     Peripheral vascular disease (HCC)        Past Surgical History:  Past Surgical History:   Procedure Laterality Date    COLONOSCOPY N/A 2019    COLONOSCOPY performed by Bobbi Wright MD at [facility] ENDOSCOPY    HAND/FINGER SURGERY UNLISTED Right     carpal tunnel    HX HEENT Left     lens implant    HX ORTHOPAEDIC      finger amputation left hand    HX TONSILLECTOMY      UPPER ARM/ELBOW SURGERY UNLISTED Right        Family History:  Family History   Problem Relation Age of Onset    Diabetes Mother     Hypertension Mother     Diabetes Maternal Grandmother     Hypertension Maternal Grandmother     Cancer Sister         brain    Cancer Sister         brain       Social History:  Social History     Tobacco Use    Smoking status: Former Smoker     Quit date: 2015     Years since quittin.1    Smokeless tobacco: Never Used   Vaping Use    Vaping Use: Never used   Substance Use Topics    Alcohol use: Not Currently     Alcohol/week: 0.0 standard drinks     Comment: former stopped     Drug use: No       Allergies: Allergies   Allergen Reactions    Latex Rash     Other reaction(s): Unknown    Ampicillin Angioedema    Asa-Acetaminophen-Caff-Buffers Rash    Penicillins Angioedema     Other reaction(s): anaphylaxis    Crab Hives    Shellfish Derived Rash    Aspirin Other (comments)     Stomach upset  Other reaction(s): Unknown    Atorvastatin Other (comments)     Muscle cramps       PMH, PSH, family history, social history, allergies reviewed with the patient with significant items noted above.   Review of Systems   As per HPI, otherwise reviewed and negative. Physical Exam     Vitals:    08/02/21 0630 08/02/21 0645 08/02/21 0700 08/02/21 0715   BP: 132/63 (!) 124/53 124/65 131/65   Pulse: 70 (!) 115 77 81   Resp: 21 19 22 27   Temp:    97.8 °F (36.6 °C)   SpO2: 95% 93% 95% 98%       Gen: Well-appearing, in no acute distress   HEENT: Normocephalic, sclera anicteric  Cardiovascular: Tachycardic, regular rhythm, no murmurs, rubs, gallops. Pulses intact and equal distally. Pulmonary: No respiratory distress. No stridor. Clear lungs. ABD: Soft, nontender, nondistended. Neuro: Alert. Normal speech. Normal mentation. Psych: Normal thought content and thought processes. : No CVA tenderness  EXT: No edema. Moves all extremities well. No cyanosis or clubbing. Skin: Warm and well-perfused. Other:        Diagnostic Study Results     Labs -     No results found for this or any previous visit (from the past 12 hour(s)). Radiologic Studies -   CTA CHEST ABD PELV W CONT   Final Result   Addendum 1 of 1   Addendum: Addendum:      HISTORY: Chest pain   COMPARISON: 6/11/21      As mentioned in the body report is a 5 mm nodule in right lower lobe    abutting   the fissure which was small linear opacity on prior CT, probably loculated   pleural fluid. Recommend follow-up CT for interval resolution. Final      1. No aortic dissection or other acute abnormality. Moderate atherosclerotic   aortic iliac disease. 2. Small aneurysm at the very distal abdominal aorta extending to the right   common iliac artery as above. 3. No other significant vascular abnormality seen. 4. Significantly improved consolidation, intraparenchymal abscess and fluid   collection in posterior right lower lobe and left lower lung atelectasis as   outlined above. 5. Stable bilateral adrenal hyperenhancing nodules, probably hyperplasia. 6. Diverticulosis coli. Thank you for your referral.       XR CHEST PORT   Final Result      No definite acute findings. Likely bibasilar streaky atelectasis. CT Results  (Last 48 hours)    None        CXR Results  (Last 48 hours)    None            Medical Decision Making   I am the first provider for this patient. I reviewed the vital signs, available nursing notes, past medical history, past surgical history, family history and social history. Vital Signs-Reviewed the patient's vital signs. EKG: Interpreted by myself. Records Reviewed: Personally, on initial evaluation    MDM:   Patient presents with substernal chest pain. Exam significant for normal exam with exception of hypertension. DDX considered: ACS, unstable angina, pneumothorax, GERD, MSK chest pain, anxiety, aortic dissection  DDX thought to be less likely but also considered due to high risk condition:  PE, Boerhaave's, pericarditis, mediastinitis    Patient condition on initial evaluation: Stable    Plan:   Pain Control  Close Observation  Cardiac monitoring  As per orders noted below:  Orders Placed This Encounter    XR CHEST PORT    CTA CHEST ABD PELV W CONT    CBC WITH AUTOMATED DIFF    METABOLIC PANEL, COMPREHENSIVE    CARDIAC PANEL,(CK, CKMB & TROPONIN)    PRO-BNP    TROPONIN I    URINALYSIS W/ RFLX MICROSCOPIC    TROPONIN I    EKG, 12 LEAD, INITIAL    iopamidoL (ISOVUE-370) 76 % injection  mL        HEART Score 5    Management  Scores 0-3: 0.9-1.7% risk of adverse cardiac event. In the HEART Score study, these patients were discharged (0.99% in the retrospective study, 1.7% in the prospective study)  Scores 4-6: 12-16.6% risk of adverse cardiac event. In the HEART Score study, these patients were admitted to the hospital. (11.6% retrospective, 16.6% prospective)  Scores ? 7: 50-65% risk of adverse cardiac event.  In the HEART Score study, these patients were candidates for early invasive measures. (65.2% retrospective, 50.1% prospective)  A MACE (Major Adverse Cardiac Event) was defined as all-cause mortality, myocardial infarction, or coronary revascularization. Critical Actions  Do not use if new ST-segment elevation requiring immediate intervention or clinically unstable patients. A prospective validation of the HEART score for chest pain patients at the emergency department. Taya Tellez, [facility], M. A. DUTCH Mcclain A. Mosterd, Ofilia Skiff, SUSAN Del Toro R. Braam, et al.   Int J Cardiol. 2013 Oct 3; 168(3): 5536-1743. Published online 2013 Mar 7. doi: 10.1016/j.ijcard. 2013.01.255    ED Course:      Work-up as above is negative for acute pathology. Patient has been alerted to CT abnormalities and will follow up with PCP. Patient condition at time of disposition: Stable  DISCHARGE NOTE:   Pt has been reexamined. Patient has no new complaints, changes, or physical findings. Care plan outlined and precautions discussed. Results were reviewed with the patient. All medications were reviewed with the patient; will d/c home with no changes to meds. All of pt's questions and concerns were addressed. Alarm symptoms and return precautions associated with chief complaint and evaluation were reviewed with the patient in detail. The patient demonstrated adequate understanding. Patient was instructed and agrees to follow up with PCP, as well as to return to the ED upon further deterioration. Patient is ready to go home. The patient is happy with this plan    Follow-up Information     Follow up With Specialties Details Why Contact Info    Nilsa Lew MD Family Medicine Go to  As needed, If symptoms worsen [address] [place] [facility] EMERGENCY DEPT Emergency Medicine Go to  As needed, If symptoms worsen [address] [facility] [id number]  [phone]          Discharge Medication List as of 8/2/2021  6:20 AM          Procedures:  Procedures      Critical Care Time:       Diagnosis     Clinical Impression:   1.  Acute chest pain Adali Morrison MD  Emergency Physician  [facility]  [facility]    As a voice dictation software was utilized to dictate this note, minor word transpositions can occur. I apologize for confusing wording and typographic errors. Please feel free to contact me for clarification.

## 2021-08-03 PROBLEM — R07.9 CHEST PAIN: Status: RESOLVED | Noted: 2019-03-27 | Resolved: 2021-08-03

## 2021-08-16 ENCOUNTER — DOCUMENTATION ONLY (OUTPATIENT)
Dept: ONCOLOGY | Age: 76
End: 2021-08-16

## 2021-08-16 DIAGNOSIS — D47.2 MGUS (MONOCLONAL GAMMOPATHY OF UNKNOWN SIGNIFICANCE): Primary | ICD-10-CM

## 2021-08-16 DIAGNOSIS — D64.9 CHRONIC ANEMIA: ICD-10-CM

## 2021-08-22 NOTE — ANESTHESIA PREPROCEDURE EVALUATION
Relevant Problems   RESPIRATORY SYSTEM   (+) History of rheumatic fever      CARDIOVASCULAR   (+) CAD (coronary artery disease)   (+) HTN (hypertension)       Anesthetic History   No history of anesthetic complications            Review of Systems / Medical History  Patient summary reviewed and pertinent labs reviewed    Pulmonary  Within defined limits                 Neuro/Psych   Within defined limits           Cardiovascular    Hypertension              Exercise tolerance: >4 METS     GI/Hepatic/Renal  Within defined limits              Endo/Other  Within defined limits           Other Findings   Comments:                  Physical Exam    Airway  Mallampati: II  TM Distance: 4 - 6 cm  Neck ROM: normal range of motion   Mouth opening: Normal     Cardiovascular  Regular rate and rhythm,  S1 and S2 normal,  no murmur, click, rub, or gallop  Rhythm: regular  Rate: normal         Dental    Dentition: Poor dentition  Comments: The patient has very poor dentition. Loose, broken, and or missing teeth. I explained the risk of dental damage and or loss. The patient understands and accepts these risks.      Pulmonary  Breath sounds clear to auscultation               Abdominal  GI exam deferred       Other Findings            Anesthetic Plan    ASA: 2  Anesthesia type: general          Induction: Intravenous  Anesthetic plan and risks discussed with: Patient No

## 2021-08-23 ENCOUNTER — OFFICE VISIT (OUTPATIENT)
Dept: PULMONOLOGY | Age: 76
End: 2021-08-23
Payer: MEDICAID

## 2021-08-23 VITALS
WEIGHT: 164.4 LBS | HEIGHT: 67 IN | TEMPERATURE: 98.6 F | HEART RATE: 95 BPM | BODY MASS INDEX: 25.8 KG/M2 | RESPIRATION RATE: 16 BRPM | OXYGEN SATURATION: 96 % | SYSTOLIC BLOOD PRESSURE: 144 MMHG | DIASTOLIC BLOOD PRESSURE: 71 MMHG

## 2021-08-23 DIAGNOSIS — R04.2 HEMOPTYSIS: ICD-10-CM

## 2021-08-23 DIAGNOSIS — R93.89 ABNORMAL CHEST CT: ICD-10-CM

## 2021-08-23 DIAGNOSIS — I27.82 CHRONIC PULMONARY EMBOLISM WITHOUT ACUTE COR PULMONALE, UNSPECIFIED PULMONARY EMBOLISM TYPE (HCC): Primary | ICD-10-CM

## 2021-08-23 DIAGNOSIS — I82.4Y3 ACUTE DEEP VEIN THROMBOSIS (DVT) OF PROXIMAL VEIN OF BOTH LOWER EXTREMITIES (HCC): ICD-10-CM

## 2021-08-23 PROCEDURE — 99215 OFFICE O/P EST HI 40 MIN: CPT | Performed by: INTERNAL MEDICINE

## 2021-08-23 NOTE — PROGRESS NOTES
Noa Palmer presents today for   Chief Complaint   Patient presents with   [facility] Follow Up       Is someone accompanying this pt? NNo    Is the patient using any DME equipment during OV? No    -DME Company NA    Depression Screening:  3 most recent PHQ Screens 2/17/2021   PHQ Not Done -   Little interest or pleasure in doing things Not at all   Feeling down, depressed, irritable, or hopeless Not at all   Total Score PHQ 2 0       Learning Assessment:  Learning Assessment 4/30/2019   PRIMARY LEARNER Patient   PRIMARY LANGUAGE ENGLISH   LEARNER PREFERENCE PRIMARY DEMONSTRATION     VIDEOS   ANSWERED BY patient   RELATIONSHIP SELF       Abuse Screening:  Abuse Screening Questionnaire 4/30/2019   Do you ever feel afraid of your partner? N   Are you in a relationship with someone who physically or mentally threatens you? N   Is it safe for you to go home? Y       Fall Risk  Fall Risk Assessment, last 12 mths 1/21/2021   Able to walk? Yes   Fall in past 12 months? 1   Do you feel unsteady? 1   Are you worried about falling 0   Is TUG test greater than 12 seconds? 0   Is the gait abnormal? 0   Number of falls in past 12 months 1   Fall with injury? 0         Coordination of Care:  1. Have you been to the ER, urgent care clinic since your last visit? Hospitalized since your last visit? Yes; Name: [facility]  6/21 Hemoptysis     2. Have you seen or consulted any other health care providers outside of the [address] [place] [address] since your last visit? Include any pap smears or colon screening.  No

## 2021-08-23 NOTE — PROGRESS NOTES
[facility]  Pulmonary, Critical Care, and Sleep Medicine  Progress note    Name: Yoli Meehan MRN: [id number]   : 1945 Hospital:    Date: 2021        IMPRESSION:   · Shortness of breath-multifactorial with recent PE  · Hemoptysis- acute in setting of anticoagulants-likely related to right lower lobe pneumonia/infarct. Old blood noted on bronchoscopic evaluation without any active bleeding. Heparin resumed and patient has not had any hemoptysis. Resolved  · R sided Pulmonary Embolism- extensive clot burden with improved-of upper and lower branches, likely chronic.  No hypoxia, no signs of RV strain on echo or CTA very likely chronic  Right lower lobe consolidation/infarct- No endobronchial pathology noted on bronchoscopy but cannot completely exclude . CT chest 2021-Significantly improved consolidation, intraparenchymal abscess and fluid  collection in posterior right lower lobe and left lower lung atelectasis as outlined above. Will follow to complete clearing  · DVT-left popliteal-now with complains of discomfort and swelling of calf as well as thigh  · Hx of esophageal varices  · Hx of EtoH abuse  · Recent Diverticulitis- now resolved.   · Hx of tobacco abuse - over 50 pack years, quit 5 years ago            Patient Active Problem List   Diagnosis Code    Unsteady gait R26.81    Gait instability R26.81    Vertigo R42    Radiculopathy of lumbar region M54.16    ED (erectile dysfunction) of organic origin N52.9    Peripheral vascular disease ([address] I73.9    Overactive bladder N32.81    Nocturia R35.1    Leg pain, right M79.604    Hypercholesterolemia E78.00    HTN (hypertension) I10    H/O spinal cord injury Z87.828    Erectile disorder due to medical 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 N52.1    Bladder outlet obstruction N32.0    Injury of lumbar spine ([address] S34.109A    Frequency of urination R35.0    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    History of rheumatic fever Z86.79  Chest pain, moderate coronary artery risk R07.9    CAD (coronary artery disease) I25.10    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    Cubital tunnel syndrome, left G56.22    Left carpal tunnel syndrome G56.02    Acute pulmonary embolism (HCC) I26.99    Severe protein-calorie malnutrition (HCC) E43    Hemoptysis R04.2    Acute deep vein thrombosis (DVT) of proximal vein of both lower extremities (HCC) I82.4Y3      PLAN:   · Discussed need for continued anticoagulation-Eliquis long-term  · Discussed significant clearing of previously noted CT abnormalities but still needs follow-up to complete clearing  · Chest CT ordered -3 months from now  · Will refer to vascular surgery-multiple vascular issues  · Discussed any further questions concerns  · Will Follow in 3 months     Subjective/Interval History:   HPI during recent hospitalization  77year old male with PMHx of HTN, esophageal varices, prior heavy EtOH use and recently Dx diverticulitis admitted on 6/5/21 admitted for right sided PE with extensive clot burden but no right heart strain. Also noted to have DVT- LLE  He presents  from [address] [place] [address] where he was discharged . He reports he had a small amount of coughing up blood prior at time of discharge and now seen in ED states he has coughed up approximately 4 to 5 tablespoons of blood. Patient denies chest pain at rest but does state he continues to have pain in her right side of chest with deep breathing or movement. He is without significant shortness of breath but states he gets easily fatigued.     He needs to have some generalized abdominal discomfort and bloated feeling, last bowel movement 3 days ago, reports some nausea which improved following antiemetic administration in ED.        08/23/21  Here for follow-up  Complains of continued symptoms of some shortness of breath and some chest tightness off and on but overall slowly improving  Has cough but no further hemoptysis  Denies chest pain  Complains of swelling of the left calf and a feeling of a knot up his inner thigh  He has been taking his Eliquis-has refills x5  Denies any new complaints-has not noticed any change in color of stools, nausea vomiting  Patient went to the emergency room earlier in the month complaining of shortness of breath and fevers-had evaluation including chest CT. work-up was negative for any acute pathology and patient was instructed to follow-up with PCP      ROS:A comprehensive review of systems was negative except for that written in the HPI. Objective:   Vital Signs:    Visit Vitals  BP (!) 144/71 (BP 1 Location: Left upper arm, BP Patient Position: Sitting, BP Cuff Size: Large adult)   Pulse 95   Temp 98.6 °F (37 °C) (Oral)   Resp 16   Ht 5' 7\" (1.702 m)   Wt 74.6 kg (164 lb 6.4 oz)   SpO2 96% Comment: RA Rest   BMI 25.75 kg/m²                   Physical Exam:   General:  Alert, cooperative, no distress, appears stated age. Head:  Normocephalic, without obvious abnormality, atraumatic. Lungs:   Bilateral auscultation clear, no rales or wheezing.    Chest wall:  No tenderness or deformity. NO CREPITUS   Heart:  Regular rate and rhythm, S1, S2 normal, no murmur, click, rub or gallop. Abdomen:   Soft,mild tenderness to palpation in the center. Bowel sounds normal. No masses,  No organomegaly. No paradox   Extremities:  Mildly swollen left lower extremity, cord felt along inner right thigh   Pulses: 1-2+ and symmetric all extremities. Neurologic: Grossly nonfocal       DATA:  CT Results (most recent):  Results from Hospital Encounter encounter on 08/01/21    CTA CHEST ABD PELV W CONT    Addendum 8/2/2021  4:37 PM  Addendum: Addendum:    HISTORY: Chest pain  COMPARISON: 6/11/21    As mentioned in the body report is a 5 mm nodule in right lower lobe abutting  the fissure which was small linear opacity on prior CT, probably loculated  pleural fluid.  Recommend follow-up CT for interval resolution. Narrative  CT without contrast and CT angiogram of the chest, abdomen and pelvis    HISTORY: CT 6/11/21    COMPARISON: None    TECHNIQUE: Multidetector helical CT is carried out from the thoracic inlet to  the lesser trochanters before and after nonionic IV contrast administration. 3D postprocessed images, including surface shaded display, will produce for this  exam, permanently archived, and interpreted. Parenchymal organ imaging will be  limited by arterial phase contrast administration. All CT scans at this facility performed using dose optimization techniques as  appreciated to a performed exam, to include automated exposure control,  adjustment of the mA and or KU according to patient size (including appropriate  matching for site specific examination), or use of iterative reconstruction  technique. FINDINGS:    CT ANGIOGRAM:    THORACIC AORTA: Normal caliber with no evidence of aneurysm or aortic dissection  identified. Moderate atherosclerotic disease identified. THE GREAT VESSELS IN THE ARCH: Patent. THE PULMONARY ARTERIES: Patent. No PE. ABDOMINAL AORTA: Moderate atherosclerotic plaques and mild intramural thrombosis  identified. No dissection. 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 ILIAC ARTERIES: Moderate atherosclerotic disease bilaterally. Aneurysmal  dilatation extending to the right common iliac artery, most pronounced at distal  portion, measuring 1.8 cm in diameter. Mild left common iliac artery is mildly  ectatic, measuring 1.4 cm throughout. Patent bilateral internal and external  iliac arteries with moderate atherosclerotic disease. CELIAC ARTERY:Patent. SMA: Patent. RENAL ARTERIES: Patent. Solitary bilateral renal arteries noted. TIO: Patent.     CT CHEST:    LUNG PARENCHYMA: Flat consolidation at posterior right lower lobe appears much  improved with also interval resolved intraparenchymal fluid collections since  the prior CT. The left basilar atelectasis is also improved. There is 5 mm  nodular density anterior right lower lobe abutting the major fissure on #69/3  which was more linear on prior study and could represent loculated small fluid. No new pulmonary finding. Moderate emphysema and chronic bronchitis with  bronchiectasis appear overall stable. THYROID: Unremarkable. MEDIASTINUM:  No adenopathy. THE HEART AND THE PERICARDIUM: . Unremarkable. PLEURAL SPACES AND CHEST WALL: Minimal right pleural effusion is much improved. The pseudotumor with loculated pleural fluid at posterior right lower lobe is no  longer seen. CT ABDOMEN AND PELVIS:    ABDOMINAL VISCERA: The pancreas appears homogeneous in enhancement. No  pancreatic ductal dilatation. There is accessory pancreatic duct in uncinate  process noted. No focal lesion seen. The liver, spleen and gallbladder appear  unremarkable. The kidneys are normally perfused without abnormality. The  bilateral adrenal nodules with hypoenhancement appears stable since the CT in  3/20. The small and large bowel are nondilated. Moderate fecal impaction  throughout the colon. Numerous diverticula in the left-sided colon. PERITONEUM/RETROPERITONEUM: No significant adenopathy. LOWER GENITOURINARY ORGANS: The bladder is poorly distended. The prostate is  mildly prominent in size. OTHERS: No free air or free fluid. CT OSSEOUS STRUCTURES:  Spondylosis in thoracic and lumbar spine. There is  minimal anterolisthesis of L5 on S1 along with moderate DDD. Impression  1. No aortic dissection or other acute abnormality. Moderate atherosclerotic  aortic iliac disease. 2. Small aneurysm at the very distal abdominal aorta extending to the right  common iliac artery as above. 3. No other significant vascular abnormality seen.   4. Significantly improved consolidation, intraparenchymal abscess and fluid  collection in posterior right lower lobe and left lower lung atelectasis as  outlined above. 5. Stable bilateral adrenal hyperenhancing nodules, probably hyperplasia. 6. Diverticulosis coli. Thank you for your referral.        Labs:  No results for input(s): WBC, HGB, HCT, PLT, HGBEXT, HCTEXT, PLTEXT, HGBEXT, HCTEXT, PLTEXT in the last 72 hours. No results for input(s): NA, K, CL, CO2, GLU, BUN, CREA, CA, MG, PHOS, ALB, TBIL, ALT, INR, INREXT, INREXT in the last 72 hours. No lab exists for component: SGOT  No results for input(s): PH, PCO2, PO2, HCO3, FIO2 in the last 72 hours. High complexity decision making was performed during the evaluation of this patient at high risk for decompensation with multiple organ involvement     Above mentioned total time spent on reviewing the case/medical record/data/notes/EMR/patient examination/documentation/coordinating care with nurse/consultants, exclusive of procedures with complex decision making performed and > 50% time spent in face to face evaluation.     Abraham Perez MD  Pulmonary, Critical Care Medicine  [address] [place] [address] [facility]

## 2021-08-23 NOTE — LETTER
8/23/2021    Patient: Esvin Olea   YOB: 1945   Date of Visit: 8/23/2021     Amaury Dawkins MD  [address] [facility] [address] [facility] [address]  Via Fax: [phone]    Dear Amaury Dawkins MD,      Thank you for referring Mr. Esvin Olea to [address] [place] [address] for evaluation. My notes for this consultation are attached. If you have questions, please do not hesitate to call me. I look forward to following your patient along with you.       Sincerely,    Oneil Mittal MD

## 2021-08-27 ENCOUNTER — HOSPITAL ENCOUNTER (OUTPATIENT)
Dept: LAB | Age: 76
Discharge: HOME | End: 2021-08-27
Payer: MEDICAID

## 2021-08-27 DIAGNOSIS — D47.2 MGUS (MONOCLONAL GAMMOPATHY OF UNKNOWN SIGNIFICANCE): ICD-10-CM

## 2021-08-27 DIAGNOSIS — D64.9 CHRONIC ANEMIA: ICD-10-CM

## 2021-08-27 LAB
ALBUMIN SERPL-MCNC: 3.7 G/DL (ref 3.4–5)
ALBUMIN/GLOB SERPL: 1 {RATIO} (ref 0.8–1.7)
ALP SERPL-CCNC: 70 U/L (ref 45–117)
ALT SERPL-CCNC: 22 U/L (ref 16–61)
ANION GAP SERPL CALC-SCNC: 4 MMOL/L (ref 3–18)
AST SERPL-CCNC: 18 U/L (ref 10–38)
BASOPHILS # BLD: 0 K/UL (ref 0–0.1)
BASOPHILS NFR BLD: 1 % (ref 0–2)
BILIRUB SERPL-MCNC: 0.3 MG/DL (ref 0.2–1)
BUN SERPL-MCNC: 13 MG/DL (ref 7–18)
BUN/CREAT SERPL: 14 (ref 12–20)
CALCIUM SERPL-MCNC: 9.5 MG/DL (ref 8.5–10.1)
CHLORIDE SERPL-SCNC: 105 MMOL/L (ref 100–111)
CO2 SERPL-SCNC: 28 MMOL/L (ref 21–32)
CREAT SERPL-MCNC: 0.92 MG/DL (ref 0.6–1.3)
DIFFERENTIAL METHOD BLD: ABNORMAL
EOSINOPHIL # BLD: 0.2 K/UL (ref 0–0.4)
EOSINOPHIL NFR BLD: 3 % (ref 0–5)
ERYTHROCYTE [DISTWIDTH] IN BLOOD BY AUTOMATED COUNT: 15.3 % (ref 11.6–14.5)
FERRITIN SERPL-MCNC: 790 NG/ML (ref 8–388)
GLOBULIN SER CALC-MCNC: 3.7 G/DL (ref 2–4)
GLUCOSE SERPL-MCNC: 94 MG/DL (ref 74–99)
HCT VFR BLD AUTO: 41.3 % (ref 36–48)
HGB BLD-MCNC: 13.4 G/DL (ref 13–16)
IRON SATN MFR SERPL: 29 % (ref 20–50)
IRON SERPL-MCNC: 82 UG/DL (ref 50–175)
LYMPHOCYTES # BLD: 1.7 K/UL (ref 0.9–3.6)
LYMPHOCYTES NFR BLD: 35 % (ref 21–52)
MCH RBC QN AUTO: 30.3 PG (ref 24–34)
MCHC RBC AUTO-ENTMCNC: 32.4 G/DL (ref 31–37)
MCV RBC AUTO: 93.4 FL (ref 78–100)
MONOCYTES # BLD: 0.6 K/UL (ref 0.05–1.2)
MONOCYTES NFR BLD: 12 % (ref 3–10)
NEUTS SEG # BLD: 2.4 K/UL (ref 1.8–8)
NEUTS SEG NFR BLD: 49 % (ref 40–73)
PLATELET # BLD AUTO: 259 K/UL (ref 135–420)
PMV BLD AUTO: 8.8 FL (ref 9.2–11.8)
POTASSIUM SERPL-SCNC: 4.7 MMOL/L (ref 3.5–5.5)
PROT SERPL-MCNC: 7.4 G/DL (ref 6.4–8.2)
RBC # BLD AUTO: 4.42 M/UL (ref 4.35–5.65)
SODIUM SERPL-SCNC: 137 MMOL/L (ref 136–145)
TIBC SERPL-MCNC: 282 UG/DL (ref 250–450)
WBC # BLD AUTO: 4.9 K/UL (ref 4.6–13.2)

## 2021-08-27 PROCEDURE — 80053 COMPREHEN METABOLIC PANEL: CPT

## 2021-08-27 PROCEDURE — 85025 COMPLETE CBC W/AUTO DIFF WBC: CPT

## 2021-08-27 PROCEDURE — 82728 ASSAY OF FERRITIN: CPT

## 2021-08-27 PROCEDURE — 83540 ASSAY OF IRON: CPT

## 2021-08-27 PROCEDURE — 36415 COLL VENOUS BLD VENIPUNCTURE: CPT

## 2021-08-27 PROCEDURE — 82784 ASSAY IGA/IGD/IGG/IGM EACH: CPT

## 2021-08-30 LAB
ALBUMIN SERPL ELPH-MCNC: 3.8 G/DL (ref 2.9–4.4)
ALBUMIN/GLOB SERPL: 1.2 {RATIO} (ref 0.7–1.7)
ALPHA1 GLOB SERPL ELPH-MCNC: 0.2 G/DL (ref 0–0.4)
ALPHA2 GLOB SERPL ELPH-MCNC: 0.7 G/DL (ref 0.4–1)
B-GLOBULIN SERPL ELPH-MCNC: 1.1 G/DL (ref 0.7–1.3)
GAMMA GLOB SERPL ELPH-MCNC: 1.3 G/DL (ref 0.4–1.8)
GLOBULIN SER-MCNC: 3.3 G/DL (ref 2.2–3.9)
IGA SERPL-MCNC: 203 MG/DL (ref 61–437)
IGG SERPL-MCNC: 1234 MG/DL (ref 603–1613)
IGM SERPL-MCNC: 51 MG/DL (ref 15–143)
INTERPRETATION SERPL IEP-IMP: ABNORMAL
KAPPA LC FREE SER-MCNC: 30.8 MG/L (ref 3.3–19.4)
KAPPA LC FREE/LAMBDA FREE SER: 1.99 {RATIO} (ref 0.26–1.65)
LAMBDA LC FREE SERPL-MCNC: 15.5 MG/L (ref 5.7–26.3)
M PROTEIN SERPL ELPH-MCNC: 0.6 G/DL
PROT SERPL-MCNC: 7.1 G/DL (ref 6–8.5)

## 2021-08-31 ENCOUNTER — APPOINTMENT (OUTPATIENT)
Dept: CT IMAGING | Age: 76
End: 2021-08-31
Attending: EMERGENCY MEDICINE
Payer: MEDICAID

## 2021-08-31 ENCOUNTER — APPOINTMENT (OUTPATIENT)
Dept: GENERAL RADIOLOGY | Age: 76
End: 2021-08-31
Attending: STUDENT IN AN ORGANIZED HEALTH CARE EDUCATION/TRAINING PROGRAM
Payer: MEDICAID

## 2021-08-31 ENCOUNTER — HOSPITAL ENCOUNTER (EMERGENCY)
Age: 76
Discharge: HOME | End: 2021-08-31
Attending: EMERGENCY MEDICINE
Payer: MEDICAID

## 2021-08-31 VITALS
TEMPERATURE: 98.1 F | SYSTOLIC BLOOD PRESSURE: 125 MMHG | RESPIRATION RATE: 19 BRPM | DIASTOLIC BLOOD PRESSURE: 73 MMHG | HEART RATE: 74 BPM | OXYGEN SATURATION: 94 %

## 2021-08-31 DIAGNOSIS — R07.9 ACUTE CHEST PAIN: Primary | ICD-10-CM

## 2021-08-31 LAB
ALBUMIN SERPL-MCNC: 3 G/DL (ref 3.4–5)
ALBUMIN/GLOB SERPL: 0.8 {RATIO} (ref 0.8–1.7)
ALP SERPL-CCNC: 59 U/L (ref 45–117)
ALT SERPL-CCNC: 25 U/L (ref 16–61)
ANION GAP SERPL CALC-SCNC: 6 MMOL/L (ref 3–18)
APPEARANCE UR: CLEAR
APTT PPP: 28.4 SEC (ref 23–36.4)
AST SERPL-CCNC: 15 U/L (ref 10–38)
ATRIAL RATE: 79 BPM
B PERT DNA SPEC QL NAA+PROBE: NOT DETECTED
BASOPHILS # BLD: 0 K/UL (ref 0–0.1)
BASOPHILS NFR BLD: 0 % (ref 0–2)
BILIRUB SERPL-MCNC: 0.2 MG/DL (ref 0.2–1)
BILIRUB UR QL: NEGATIVE
BNP SERPL-MCNC: 36 PG/ML (ref 0–900)
BORDETELLA PARAPERTUSSIS PCR, BORPAR: NOT DETECTED
BUN SERPL-MCNC: 7 MG/DL (ref 7–18)
BUN/CREAT SERPL: 8 (ref 12–20)
C PNEUM DNA SPEC QL NAA+PROBE: NOT DETECTED
CALCIUM SERPL-MCNC: 8.6 MG/DL (ref 8.5–10.1)
CALCULATED P AXIS, ECG09: 62 DEGREES
CALCULATED R AXIS, ECG10: -6 DEGREES
CALCULATED T AXIS, ECG11: 55 DEGREES
CHLORIDE SERPL-SCNC: 108 MMOL/L (ref 100–111)
CO2 SERPL-SCNC: 26 MMOL/L (ref 21–32)
COLOR UR: YELLOW
CREAT SERPL-MCNC: 0.85 MG/DL (ref 0.6–1.3)
D DIMER PPP FEU-MCNC: 0.32 UG/ML(FEU)
DIAGNOSIS, 93000: NORMAL
DIFFERENTIAL METHOD BLD: ABNORMAL
EOSINOPHIL # BLD: 0.2 K/UL (ref 0–0.4)
EOSINOPHIL NFR BLD: 4 % (ref 0–5)
ERYTHROCYTE [DISTWIDTH] IN BLOOD BY AUTOMATED COUNT: 15.2 % (ref 11.6–14.5)
FLUAV SUBTYP SPEC NAA+PROBE: NOT DETECTED
FLUBV RNA SPEC QL NAA+PROBE: NOT DETECTED
GLOBULIN SER CALC-MCNC: 3.9 G/DL (ref 2–4)
GLUCOSE SERPL-MCNC: 98 MG/DL (ref 74–99)
GLUCOSE UR STRIP.AUTO-MCNC: NEGATIVE MG/DL
HADV DNA SPEC QL NAA+PROBE: NOT DETECTED
HCOV 229E RNA SPEC QL NAA+PROBE: NOT DETECTED
HCOV HKU1 RNA SPEC QL NAA+PROBE: NOT DETECTED
HCOV NL63 RNA SPEC QL NAA+PROBE: NOT DETECTED
HCOV OC43 RNA SPEC QL NAA+PROBE: NOT DETECTED
HCT VFR BLD AUTO: 37.4 % (ref 36–48)
HGB BLD-MCNC: 12.3 G/DL (ref 13–16)
HGB UR QL STRIP: NEGATIVE
HMPV RNA SPEC QL NAA+PROBE: NOT DETECTED
HPIV1 RNA SPEC QL NAA+PROBE: NOT DETECTED
HPIV2 RNA SPEC QL NAA+PROBE: NOT DETECTED
HPIV3 RNA SPEC QL NAA+PROBE: NOT DETECTED
HPIV4 RNA SPEC QL NAA+PROBE: NOT DETECTED
INR PPP: 1 (ref 0.8–1.2)
KETONES UR QL STRIP.AUTO: NEGATIVE MG/DL
LEUKOCYTE ESTERASE UR QL STRIP.AUTO: NEGATIVE
LIPASE SERPL-CCNC: 152 U/L (ref 73–393)
LYMPHOCYTES # BLD: 1.8 K/UL (ref 0.9–3.6)
LYMPHOCYTES NFR BLD: 39 % (ref 21–52)
M PNEUMO DNA SPEC QL NAA+PROBE: NOT DETECTED
MAGNESIUM SERPL-MCNC: 2 MG/DL (ref 1.6–2.6)
MCH RBC QN AUTO: 29.9 PG (ref 24–34)
MCHC RBC AUTO-ENTMCNC: 32.9 G/DL (ref 31–37)
MCV RBC AUTO: 91 FL (ref 78–100)
MONOCYTES # BLD: 0.6 K/UL (ref 0.05–1.2)
MONOCYTES NFR BLD: 13 % (ref 3–10)
NEUTS SEG # BLD: 2 K/UL (ref 1.8–8)
NEUTS SEG NFR BLD: 43 % (ref 40–73)
NITRITE UR QL STRIP.AUTO: NEGATIVE
P-R INTERVAL, ECG05: 174 MS
PH UR STRIP: 5.5 [PH] (ref 5–8)
PLATELET # BLD AUTO: 243 K/UL (ref 135–420)
PMV BLD AUTO: 8.7 FL (ref 9.2–11.8)
POTASSIUM SERPL-SCNC: 3.7 MMOL/L (ref 3.5–5.5)
PROT SERPL-MCNC: 6.9 G/DL (ref 6.4–8.2)
PROT UR STRIP-MCNC: NEGATIVE MG/DL
PROTHROMBIN TIME: 13.4 SEC (ref 11.5–15.2)
Q-T INTERVAL, ECG07: 380 MS
QRS DURATION, ECG06: 76 MS
QTC CALCULATION (BEZET), ECG08: 435 MS
RBC # BLD AUTO: 4.11 M/UL (ref 4.35–5.65)
RSV RNA SPEC QL NAA+PROBE: NOT DETECTED
RV+EV RNA SPEC QL NAA+PROBE: NOT DETECTED
SARS-COV-2 PCR, COVPCR: NOT DETECTED
SODIUM SERPL-SCNC: 140 MMOL/L (ref 136–145)
SP GR UR REFRACTOMETRY: 1.01 (ref 1–1.03)
TROPONIN I SERPL-MCNC: <0.02 NG/ML (ref 0–0.04)
TROPONIN I SERPL-MCNC: <0.02 NG/ML (ref 0–0.04)
UROBILINOGEN UR QL STRIP.AUTO: 0.2 EU/DL (ref 0.2–1)
VENTRICULAR RATE, ECG03: 79 BPM
WBC # BLD AUTO: 4.7 K/UL (ref 4.6–13.2)

## 2021-08-31 PROCEDURE — 71045 X-RAY EXAM CHEST 1 VIEW: CPT

## 2021-08-31 PROCEDURE — 81003 URINALYSIS AUTO W/O SCOPE: CPT

## 2021-08-31 PROCEDURE — 0202U NFCT DS 22 TRGT SARS-COV-2: CPT

## 2021-08-31 PROCEDURE — 74011000636 HC RX REV CODE- 636: Performed by: EMERGENCY MEDICINE

## 2021-08-31 PROCEDURE — 83880 ASSAY OF NATRIURETIC PEPTIDE: CPT

## 2021-08-31 PROCEDURE — 93005 ELECTROCARDIOGRAM TRACING: CPT

## 2021-08-31 PROCEDURE — 85025 COMPLETE CBC W/AUTO DIFF WBC: CPT

## 2021-08-31 PROCEDURE — 71275 CT ANGIOGRAPHY CHEST: CPT

## 2021-08-31 PROCEDURE — 85610 PROTHROMBIN TIME: CPT

## 2021-08-31 PROCEDURE — 84484 ASSAY OF TROPONIN QUANT: CPT

## 2021-08-31 PROCEDURE — 83735 ASSAY OF MAGNESIUM: CPT

## 2021-08-31 PROCEDURE — 83690 ASSAY OF LIPASE: CPT

## 2021-08-31 PROCEDURE — 99284 EMERGENCY DEPT VISIT MOD MDM: CPT

## 2021-08-31 PROCEDURE — 80053 COMPREHEN METABOLIC PANEL: CPT

## 2021-08-31 PROCEDURE — 85730 THROMBOPLASTIN TIME PARTIAL: CPT

## 2021-08-31 PROCEDURE — 85379 FIBRIN DEGRADATION QUANT: CPT

## 2021-08-31 RX ADMIN — IOPAMIDOL 100 ML: 755 INJECTION, SOLUTION INTRAVENOUS at 03:55

## 2021-08-31 NOTE — ED PROVIDER NOTES
HPI   30-year-old male history of chronic pulmonary embolism, history of DVT on chronic anticoagulation with Eliquis, history of COPD, hypertension, bladder outlet obstruction presents with central and right-sided chest pain that started an hour or 2 prior to arrival.  Patient is located centrally. He describes his symptoms as moderate and as if someone was pinching his chest.  He said at the same time he developed medial thigh pain in his right leg. He says the symptoms are somewhat similar to the last time that he had a blood clot. He says that he is illiterate however he says that he is taking all of his medications as prescribed to his knowledge. Patient was just discharged from rehab. He says he was on the way to the store today but he had a stop several times and rest due to shortness of breath and sit in a chair. He said this is somewhat abnormal for him however he said it is been ongoing since he was recently discharged from rehab.       Past Medical History:   Diagnosis Date    Bladder outlet obstruction     Erectile disorder due to medical 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     Frequency of urination     H/O spinal cord injury 1974    lumbar spine injury secondary to fall    HTN (hypertension)     Hypercholesterolemia     Illiterate     Injury of lumbar spine ([facility] [address] [facility]    Leg pain, right     Nocturia     Overactive bladder     Peripheral vascular disease ([facility] [address]        Past Surgical History:   Procedure Laterality Date    COLONOSCOPY N/A 12/4/2019    COLONOSCOPY performed by Geovanna Martinez MD at [facility]OSCOPY    HAND/FINGER SURGERY UNLISTED Right     carpal tunnel    HX HEENT Left     lens implant    HX ORTHOPAEDIC      finger amputation left hand    HX TONSILLECTOMY      UPPER ARM/ELBOW SURGERY UNLISTED Right          Family History:   Problem Relation Age of Onset    Diabetes Mother     Hypertension Mother     Diabetes Maternal Grandmother     Hypertension Maternal Grandmother     Cancer Sister         brain    Cancer Sister         brain       Social History     Socioeconomic History    Marital status:      Spouse name: Not on file    Number of children: Not on file    Years of education: Not on file    Highest education level: Not on file   Occupational History    Not on file   Tobacco Use    Smoking status: Former Smoker     Quit date: 2015     Years since quittin.1    Smokeless tobacco: Never Used   Vaping Use    Vaping Use: Never used   Substance and Sexual Activity    Alcohol use: Not Currently     Alcohol/week: 0.0 standard drinks     Comment: former stopped     Drug use: No    Sexual activity: Yes   Other Topics Concern    Not on file   Social History Narrative    Not on file     Social Determinants of Health     Financial Resource Strain:     Difficulty of Paying Living Expenses:    Food Insecurity:     Worried About Running Out of Food in the Last Year:     [address] in the Last Year:    Transportation Needs:     Lack of Transportation (Medical):  Lack of Transportation (Non-Medical):    Physical Activity:     Days of Exercise per Week:     Minutes of Exercise per Session:    Stress:     Feeling of Stress :    Social Connections:     Frequency of Communication with Friends and Family:     Frequency of Social Gatherings with Friends and Family:     Attends [employer] Services:     Active Member of Clubs or Organizations:     Attends Club or Organization Meetings:     Marital Status:    Intimate Partner Violence:     Fear of Current or Ex-Partner:     Emotionally Abused:     Physically Abused:     Sexually Abused:           ALLERGIES: Latex, Ampicillin, Asa-acetaminophen-caff-buffers, Penicillins, Crab, Shellfish derived, Aspirin, and Atorvastatin    Review of Systems  Constitutional: No fever  Head: No headache  ENT: No sore throat  Eyes: No eye pain  Cardiovascular: Positive for chest pain  Respiratory: Positive for shortness of breath  Gastrointestinal: No abdominal pain  Genitourinary: No hematuria  MSK: Positive for right thigh pain  Skin: No sores    There were no vitals filed for this visit. Physical Exam   General: Alert, no apparent distress  HENT: Normocephalic and atraumatic  Eyes: Conjunctivae are normal  Pulmonary: No respiratory distress, satting well on room air, crackles in the bases of both lungs, no rhonchi or wheezing. Cardiovascular: Regular rate and rhythm  Abdominal: Soft, nondistended nontender  Extremities: Right medial thigh slightly tender to palpation, no tenderness to palpation of calfs, no bilateral pitting edema. Skin: Warm and dry  Neurological: Alert and oriented  Psychiatric: Cooperative, normal behavior      MDM  Patient has chest pain and leg pain and he has a known history of chronic DVT for which he is taking Eliquis. He says his symptoms are similar to the last time that he had a blood clot. He says that he has been compliant on Eliquis however he does say that he is illiterate. We will order a CTA. Also considered on the differential is ACS, bacterial pneumonia, viral pneumonia such as coronavirus. DVT of lower extremities is also possible. Ordering labs and imaging to evaluate for the above. Labs: Labs reviewed D-dimer within normal limits, initial troponin within normal limits, BNP within normal limits. CMP is unremarkable, UA unremarkable, CBC with hemoglobin 12.3 otherwise unremarkable. Patient's PT, INR and PTT are all within normal limits. EKG reviewed normal sinus rhythm, probable left axis deviation, normal intervals. 1 mm of ST elevation in V3 and V4 with no reciprocal changes. Thisappears to be the same as his prior EKG on August 1. CTA chest is pending. At 0 525 current vital signs reviewed unremarkable. CTA read is pending. CTA without PE. Patient turned over to Dr. Yarelis Gustafson, attending, pending delta troponin and will likely go home after. Procedures

## 2021-08-31 NOTE — ED TRIAGE NOTES
Pt arrived via medic from home with chest pain and right leg pain. Pt says he recently had blood clots and was on Eliquis.

## 2021-08-31 NOTE — DISCHARGE INSTRUCTIONS
Return to the emergency department if you start to have worsening chest pain or shortness of breath, if you pass out, if you start to have heart palpitations, fever, or any other concerning symptoms.

## 2021-09-10 ENCOUNTER — VIRTUAL VISIT (OUTPATIENT)
Dept: ONCOLOGY | Age: 76
End: 2021-09-10
Payer: MEDICAID

## 2021-09-10 ENCOUNTER — DOCUMENTATION ONLY (OUTPATIENT)
Dept: ONCOLOGY | Age: 76
End: 2021-09-10

## 2021-09-10 DIAGNOSIS — D47.2 MGUS (MONOCLONAL GAMMOPATHY OF UNKNOWN SIGNIFICANCE): Primary | ICD-10-CM

## 2021-09-10 DIAGNOSIS — M54.50 CHRONIC LOW BACK PAIN, UNSPECIFIED BACK PAIN LATERALITY, UNSPECIFIED WHETHER SCIATICA PRESENT: ICD-10-CM

## 2021-09-10 DIAGNOSIS — G89.29 CHRONIC LOW BACK PAIN, UNSPECIFIED BACK PAIN LATERALITY, UNSPECIFIED WHETHER SCIATICA PRESENT: ICD-10-CM

## 2021-09-10 DIAGNOSIS — D64.9 CHRONIC ANEMIA: ICD-10-CM

## 2021-09-10 DIAGNOSIS — D50.8 IRON DEFICIENCY ANEMIA SECONDARY TO INADEQUATE DIETARY IRON INTAKE: ICD-10-CM

## 2021-09-10 PROCEDURE — 99442 PR PHYS/QHP TELEPHONE EVALUATION 11-20 MIN: CPT | Performed by: INTERNAL MEDICINE

## 2021-09-10 NOTE — PROGRESS NOTES
Jere Castle is a 77 y.o. male, evaluated via audio-only technology on 9/10/2021 for No chief complaint on file. MGUS. Assessment & Plan:   Monoclonal gammopathy of undetermined significance  Chronic anemia, improved:  -- Patient was being followed by Dr. Chris Fontanez who retired. -- Clinically the patient is doing stable. No CRAB criteria. -- Today I have reviewed with the patient about recent lab reports. 8/27/2021 CBC reported hemoglobin 13.4, hematocrit 41.3%, WBC 4.9, platelet 187,310. Chemistry reviewed  Protein electrophoresis reported M spike 0.6, Immunofixation shows IgG monoclonal protein with kappa light chain   Specificity; serum free kappa/lambda ratio 1.99      Plan:  -- Continue lab check CBC, CMP, SPEP/SFLC/JENS  -- The patient was advised to notify us if any skin lumps or bumps, swollen lymph nodes, night sweat, unintentional weight loss, worsen fatigue, new bone pain or back pain, or any concerns. -- I have advised the patient to follow up PCP to continue age-appropriate cancer screening. DVT/PE  -- 6/5/21 admitted for right sided PE with extensive clot burden. Also noted to have DVT- LLE  -- Presently on Eliquis. Has followup with PCP and Vascular Surgery      Chronic low back pain:   -- Patient will continue pain medications as prescribed by his PCP. -- We will see the patient back in clinic in about 6 months. Always sooner if required. The patient can have lab done prior to our next clinic visit. 12  Subjective:   Mr. Segundo Robins is a 67-year-old male with a past medical history of CAD, HTN, PVD, radiculopathy of the lumbar region, chronic low back pain, osteoarthritis, and monoclonal gammopathy of unknown significance. He was diagnosed on October 5, 2018. The bone survey on 10/18/2018 showed no evidence of lytic lesions in the skeleton. Patient was being followed by Dr. Chris Fontanez who retired.   Today the patient reports that he is doing well other than chronic pain related to his arthritis. He has f/u with Orthopedics s.p Left carpal tunnel syndrome surgery recently. He denies shortness of breath, dizziness, and chest pain. The patient otherwise has no other complaints. Denied fever, chills, night sweat, unintentional weight loss, skin lumps or bumps, acute bleeding. Denied headache, acute vision change, dizziness, chest pain, worsen shortness of breath, palpitation, productive cough, nausea, vomiting, abdominal pain, altered bowel habits, dysuria, new back pain, worsening focal weakness. Prior to Admission medications    Medication Sig Start Date End Date Taking? Authorizing Provider   apixaban (Eliquis) 5 mg tablet Take 1 Tablet by mouth two (2) times a day. 8/5/21   Sarkis Jakcman NP   famotidine (PEPCID) 20 mg tablet Take 20 mg by mouth two (2) times a day. Provider, Historical   ezetimibe (ZETIA) 10 mg tablet Take 10 mg by mouth daily. Provider, Historical   spironolactone (Aldactone) 25 mg tablet Take 25 mg by mouth daily. Provider, Historical   tuberculin 5 tub. unit /0.1 mL injection 5 Units by IntraDERMal route Once. Patient not taking: Reported on 7/22/2021    Provider, Historical   tamsulosin (FLOMAX) 0.4 mg capsule Take 2 Capsules by mouth daily (after dinner). 6/17/21   KENIA Roblero   finasteride (PROSCAR) 5 mg tablet Take 1 Tablet by mouth daily. 6/17/21   KENIA Roblero   polyethylene glycol (MIRALAX) 17 gram packet Take 1 Packet by mouth daily. 6/10/21   Farrukh Winters MD   diclofenac (VOLTAREN) 1 % gel Apply 2 g to affected area four (4) times daily. 5/25/21   Michael Thomas MD   DULoxetine (CYMBALTA) 60 mg capsule Take 1 Cap by mouth daily. Indications: neuropathic pain 9/11/20   Willy ALFARO MD   albuterol sulfate 90 mcg/actuation aebs Take  by inhalation as needed. Provider, Historical   pantoprazole (PROTONIX) 40 mg tablet Take 1 Tab by mouth daily.  3/28/19   Kirk Lane MD   amLODIPine (NORVASC) 10 mg tablet Take 1 Tab by mouth daily. 3/28/19   Lizette Guallpa MD         Review of Systems   Constitutional: Negative for chills, diaphoresis, fever, malaise/fatigue and weight loss. Respiratory: Negative for cough, hemoptysis, shortness of breath and wheezing. Cardiovascular: Negative for chest pain, palpitations and leg swelling. Gastrointestinal: Negative for abdominal pain, diarrhea, heartburn, nausea and vomiting. Genitourinary: Negative for dysuria, frequency, hematuria and urgency. Musculoskeletal: Positive for back pain. Negative for joint pain and myalgias. Skin: Negative for itching and rash. Neurological: Negative for dizziness, seizures, weakness and headaches. Psychiatric/Behavioral: Negative for depression. The patient does not have insomnia. Patient-Reported Vitals 6/8/2020   Patient-Reported Weight 173 lbs   Patient-Reported Height 5f 7.5       Esvin Olea, who was evaluated through a patient-initiated, synchronous (real-time) audio only encounter, and/or her healthcare decision maker, is aware that it is a billable service, with coverage as determined by his insurance carrier. He provided verbal consent to proceed: Yes. He has not had a related appointment within my department in the past 7 days or scheduled within the next 24 hours. On this date 09/10/2021 I have spent 20 minutes reviewing previous notes, test results and face to face (virtual) with the patient discussing the diagnosis and importance of compliance with the treatment plan as well as documenting on the day of the visit.     Gilford Crumble, MD

## 2021-09-10 NOTE — PROGRESS NOTES
Labs need to be entered for future appt 03/14/2022 to be done at [facility].

## 2021-10-18 ENCOUNTER — OFFICE VISIT (OUTPATIENT)
Dept: ORTHOPEDIC SURGERY | Age: 76
End: 2021-10-18
Payer: MEDICAID

## 2021-10-18 VITALS
BODY MASS INDEX: 27.62 KG/M2 | HEIGHT: 67 IN | WEIGHT: 176 LBS | OXYGEN SATURATION: 98 % | TEMPERATURE: 98 F | HEART RATE: 96 BPM

## 2021-10-18 DIAGNOSIS — M47.816 LUMBAR SPONDYLOSIS: Primary | ICD-10-CM

## 2021-10-18 PROCEDURE — 99213 OFFICE O/P EST LOW 20 MIN: CPT | Performed by: PHYSICAL MEDICINE & REHABILITATION

## 2021-10-18 RX ORDER — LIDOCAINE 50 MG/G
1 PATCH TOPICAL EVERY 24 HOURS
Qty: 30 PATCH | Refills: 0 | Status: SHIPPED | OUTPATIENT
Start: 2021-10-18 | End: 2021-11-17

## 2021-10-18 NOTE — PROGRESS NOTES
Mary Holder presents today for   Chief Complaint   Patient presents with    Back Pain       Is someone accompanying this pt? no    Is the patient using any DME equipment during OV? no    Depression Screening:  3 most recent PHQ Screens 2/17/2021   PHQ Not Done -   Little interest or pleasure in doing things Not at all   Feeling down, depressed, irritable, or hopeless Not at all   Total Score PHQ 2 0       Learning Assessment:  Learning Assessment 4/30/2019   PRIMARY LEARNER Patient   PRIMARY LANGUAGE ENGLISH   LEARNER PREFERENCE PRIMARY DEMONSTRATION     VIDEOS   ANSWERED BY patient   RELATIONSHIP SELF       Abuse Screening:  Abuse Screening Questionnaire 4/30/2019   Do you ever feel afraid of your partner? N   Are you in a relationship with someone who physically or mentally threatens you? N   Is it safe for you to go home? Y       Fall Risk  Fall Risk Assessment, last 12 mths 9/10/2021   Able to walk? Yes   Fall in past 12 months? -   Do you feel unsteady? -   Are you worried about falling -   Is TUG test greater than 12 seconds? -   Is the gait abnormal? -   Number of falls in past 12 months -   Fall with injury? -       Coordination of Care:  1. Have you been to the ER, urgent care clinic since your last visit? no  Hospitalized since your last visit? no    2. Have you seen or consulted any other health care providers outside of the [address] [place] [address] since your last visit? no Include any pap smears or colon screening.  no

## 2021-10-18 NOTE — PROGRESS NOTES
[address]  [place], [address] [place] [address] Street  Phone: [phone]  Fax: [phone]      Patient: Chinedu Harman                                                                              MRN: [id number]        YOB: 1945          AGE: 77 y.o. PCP: Edgardo Yusuf MD  Date:  10/18/21    Reason for Consultation: Back Pain      HPI:  Chinedu Harman is a 77 y.o. male with relevant PMH of HTN, CAD, PVD who presented with chronic low back pain radiating into b/l buttocks. Pain is worse from sit to stand. He is taking currently Cymbalta 60mg  and tried gabapentin but stopped because he thought it would make him too drowsy. Recent MRI lumbar spine was relatively unchanged from prior MRI in 2018 with degenerative changes mild  central stenosis L4-5  and mild foraminal narrowing at several levels. Since his last visit he was not able to start PT but he is interested in starting PT for his lumbar spine     Denies any recent precipitating incident or trauma. In 1970s justo was hospitalized for over 1 month in traction    Neurologic symptoms: No numbness, tingling, weakness, bowel or bladder changes. No recent falls      Location: The pain is located in the low back   Radiation: The pain does radiate bilateral buttock L>R. Pain Score: Currently: 7/10    Quality: Pain is of a Achy and Pulling quality. Aggravating: Pain is exacerbated by sit to stand  Alleviating: The pain is alleviated by walking    Prior Treatments: occupational therapy for ulnar transposition 2/2021  PT for the knee  Lumbar brace  Previous Medications: gabapentin 300mg stopped because he was worried it would make him drowsy  Current Medications: cymbalta 60mg, lidocaine patch  Previous work-up has included:   MRI Results (most recent):  MRI lumbar spine 3/3/2021  L2-3: Broad-based disc bulge asymmetric to the right.  Mild bilateral facet arthropathy and ligamentum flavum hypertrophy. No significant central canal or foraminal narrowing.     L3-4: Broad-based disc bulge superimposed on mild bilateral facet arthropathy with ligamentum flavum hypertrophy. Mild/moderate central canal narrowing. Mild bilateral foraminal narrowing     L4-5: Broad-based disc bulge superimposed on mild bilateral facet arthropathy and ligamentum flavum hypertrophy, right greater than left. No significant central canal or foraminal narrowing.     L5-S1: Grade 1 anterolisthesis of L5 with respect S1 on the basis of chronic bilateral L5 pars defects. Mild broad-based disc bulge with uncovering of the posterior disc. Mild/moderate bilateral foraminal narrowing, left greater than right. Past Medical History:   Past Medical History:   Diagnosis Date    Bladder outlet obstruction     Erectile disorder due to medical 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     Frequency of urination     H/O spinal cord injury     lumbar spine injury secondary to fall    HTN (hypertension)     Hypercholesterolemia     Illiterate     [facility] of lumbar spine ([facility] [address]     Leg pain, right     Nocturia     Overactive bladder     Peripheral vascular disease ([facility] [address]       Past Surgical History:   Past Surgical History:   Procedure Laterality Date    COLONOSCOPY N/A 2019    COL[facility] Major Desir [facility] ENDOSCOPY    HAND/FINGER SURGERY UNLISTED Right     carpal tunnel    HX HEENT Left     lens implant    HX ORTHOPAEDIC      finger amputation left hand    HX TONSILLECTOMY      UPPER ARM/ELBOW SURGERY UNLISTED Right       SocHx:   Social History     Tobacco Use    Smoking status: Former Smoker     Quit date: 2015     Years since quittin.2    Smokeless tobacco: Never Used   Substance Use Topics    Alcohol use: Not Currently     Alcohol/week: 0.0 standard drinks     Comment: former stopped       FamHx:?    Family History   Problem Relation Age of Onset    Diabetes Mother  Hypertension Mother     Diabetes Maternal Grandmother     Hypertension Maternal Grandmother     Cancer Sister         brain    Cancer Sister         brain       Current Medications:    Current Outpatient Medications   Medication Sig Dispense Refill    apixaban (Eliquis) 5 mg tablet Take 1 Tablet by mouth two (2) times a day. 60 Tablet 5    famotidine (PEPCID) 20 mg tablet Take 20 mg by mouth two (2) times a day.  ezetimibe (ZETIA) 10 mg tablet Take 10 mg by mouth daily.  spironolactone (Aldactone) 25 mg tablet Take 25 mg by mouth daily.  tamsulosin (FLOMAX) 0.4 mg capsule Take 2 Capsules by mouth daily (after dinner). 180 Capsule 3    finasteride (PROSCAR) 5 mg tablet Take 1 Tablet by mouth daily. 90 Tablet 3    polyethylene glycol (MIRALAX) 17 gram packet Take 1 Packet by mouth daily. 30 Packet 0    diclofenac (VOLTAREN) 1 % gel Apply 2 g to affected area four (4) times daily. 100 g 2    DULoxetine (CYMBALTA) 60 mg capsule Take 1 Cap by mouth daily. Indications: neuropathic pain 60 Cap 5    albuterol sulfate 90 mcg/actuation aebs Take  by inhalation as needed.  pantoprazole (PROTONIX) 40 mg tablet Take 1 Tab by mouth daily. 30 Tab 0    amLODIPine (NORVASC) 10 mg tablet Take 1 Tab by mouth daily. 30 Tab 0    tuberculin 5 tub. unit /0.1 mL injection 5 Units by IntraDERMal route Once. Allergies:     Allergies   Allergen Reactions    Latex Rash     Other reaction(s): Unknown    Ampicillin Angioedema    Asa-Acetaminophen-Caff-Buffers Rash    Penicillins Angioedema     Other reaction(s): anaphylaxis    Crab Hives    Shellfish Derived Rash    Aspirin Other (comments)     Stomach upset  Other reaction(s): Unknown    Atorvastatin Other (comments)     Muscle cramps        Review of Systems:   Gen:    Denied fevers, chills, malaise, fatigue, weight changes   Resp: Denied shortness of breath, cough, wheezing   CVS: Denied chest pain, palpitations   : Denied urinary urgency, frequency, incontinence   GI: Denied nausea, vomiting, constipation, diarrhea   Skin: Denied rashes, wounds   Psych: Denied anxiety, depression   Vasc: Denied claudication, ulcers   Hem: Denied easy bruising/bleeding   MSK: See HPI   Neuro: See HPI         Physical Exam     Vital Signs:   Visit Vitals  Pulse 96   Temp 98 °F (36.7 °C) (Skin)   Ht 5' 7\" (1.702 m)   Wt 176 lb (79.8 kg)   SpO2 98% Comment: RA   BMI 27.57 kg/m²      General: ??????? Well nourished and well developed male without any acute distress   Psychiatric: ?  Alert and oriented x 3 with normal mood    HEENT: ???????? Atraumatic   Respiratory:   Breathing non-labored and non dyspneic   CV: ???????????????? Peripheral pulses intact, no peripheral edema   Skin: ????????????? No rashes       Neurologic: ??       Sensation: normal and grossly intact thebilateral, lower extremity(s)   Strength: 5/5 in the bilateral, lower extremity(s)   Reflexes: reveals 2+ symmetric DTRs throughout LE  Gait: normal    Musculoskeletal: Lumbar Exam     Inspection:   Alignment: Abnormal decreased lumbar lordosis  Atrophy: None     Tenderness to Palpation:   Lumbar paraspinals Positive b/l  Lumbar spinous processes Negative  SI Joint:  Positive b/l  Gluteal:Negative   Greater trochanter: Negative  IT Band:Negative    ROM:   Lumbar ROM: Abnormal limited extension  Lumbar facet loading: Negative  Hip ROM: No reproduction of pain with movement -seated    Special Tests      Slump test: Negative  SLR: Negative  GARCÍA: Positive ++ low back pain  FADIR: Negative  [place]: Positive ++ low back pain  Log Roll: Negative        Medical Decision Making:    Images: MRI lumbar spine 3/3/21  Relatively unchanged from 2018  L2-3 disc bulge, mild facet arthropathy  L3/4 disc bulge, mild facet arthropathy, mild to moderate central stenosis, mild /l foraminal narrowing  L4-5 disc bulge , mild facet arthropathy,   L5/S1 anteriolisthesis, mild L>R foraminal narrowing         Assessment: 1. Lumbar spondylosis  Plan:      -Physical therapy -  Referral to physical therapy for lumbar motion, balance training, LE flexibility  -Medications -lidoderm patch if covered by insurance if not would use OTC salonpas with lidocaine. .  Counseled regarding side effects and appropriate administration of medications.    -Diagnostics/Imaging - Reviewed MRI lumbar spine  -Injections -patient is not interested in any spine injection at his point. Consider SI joint injection or possible TPIs  -DME- patient is interested in more supportive brace, will see how he feels after PT and consider TLSO but worry about causing weakness  -Education - The patient's diagnosis, prognosis and treatment options were discussed today. All questions were answered. F/U - in 6 month(s) or sooner if needed.          [address] [facility]

## 2021-10-28 ENCOUNTER — OFFICE VISIT (OUTPATIENT)
Dept: CARDIOLOGY CLINIC | Age: 76
End: 2021-10-28
Payer: MEDICAID

## 2021-10-28 VITALS
HEART RATE: 100 BPM | OXYGEN SATURATION: 97 % | WEIGHT: 175 LBS | SYSTOLIC BLOOD PRESSURE: 139 MMHG | HEIGHT: 67 IN | BODY MASS INDEX: 27.47 KG/M2 | DIASTOLIC BLOOD PRESSURE: 66 MMHG

## 2021-10-28 DIAGNOSIS — J44.9 CHRONIC OBSTRUCTIVE PULMONARY DISEASE, UNSPECIFIED COPD TYPE (HCC): ICD-10-CM

## 2021-10-28 DIAGNOSIS — Q24.5 CORONARY-MYOCARDIAL BRIDGE: Primary | ICD-10-CM

## 2021-10-28 DIAGNOSIS — I71.40 ABDOMINAL AORTIC ANEURYSM (AAA) WITHOUT RUPTURE: ICD-10-CM

## 2021-10-28 DIAGNOSIS — I26.99 OTHER ACUTE PULMONARY EMBOLISM WITHOUT ACUTE COR PULMONALE (HCC): ICD-10-CM

## 2021-10-28 DIAGNOSIS — I10 ESSENTIAL HYPERTENSION: ICD-10-CM

## 2021-10-28 DIAGNOSIS — E78.00 HYPERCHOLESTEROLEMIA: ICD-10-CM

## 2021-10-28 DIAGNOSIS — Z01.810 PREOP CARDIOVASCULAR EXAM: ICD-10-CM

## 2021-10-28 DIAGNOSIS — I82.4Y3 ACUTE DEEP VEIN THROMBOSIS (DVT) OF PROXIMAL VEIN OF BOTH LOWER EXTREMITIES (HCC): ICD-10-CM

## 2021-10-28 PROCEDURE — 99214 OFFICE O/P EST MOD 30 MIN: CPT | Performed by: INTERNAL MEDICINE

## 2021-10-28 RX ORDER — METOPROLOL SUCCINATE 50 MG/1
50 TABLET, EXTENDED RELEASE ORAL DAILY
Qty: 90 TABLET | Refills: 3 | Status: SHIPPED | OUTPATIENT
Start: 2021-10-28 | End: 2022-10-28

## 2021-10-28 NOTE — PROGRESS NOTES
1. Have you been to the ER, urgent care clinic since your last visit? Hospitalized since your last visit? No    2. Have you seen or consulted any other health care providers outside of the [address] [place] [address] since your last visit? Include any pap smears or colon screening.  Yes Where: ENT for [facility] visit

## 2021-10-28 NOTE — PROGRESS NOTES
HISTORY OF PRESENT ILLNESS  See Le is a 77 y.o. male. 2//2020  Patient is here for follow-up after recent evaluation in emergency room for chest pain. Has he was painting all day and subsequently started having pain under her armpit on both side. Pain worse on movement. Worse on sitting and moving around radiates to the back. He has short of breath on exertion which does not appear changed. 1/2021  Patient here for follow-up. His stable pattern of chest pain or shortness of breath. Denies any significant change at present. He is here for preoperative assessment    7/2021  Patient is here for follow-up. He was admitted 6/2021 with  Discharge Diagnoses:     -Hemoptysis  -Acute PE  -Acute non-occlusive DVT  -Falls at home  -Possible aspiration pneumonia  -BPH w/ acute urinary retention  -Unintentional weight loss  -Constipation  -Recent diverticulitis      Since discharge he still remains short of breath. No hemoptysis  10/2021  Patient is here for follow-up. Since last visit was in emergency room with atypical chest pain on 2 occasions. On last evaluation CTA chest was negative for any recurrent PE. Since then patient has left and right-sided chest pain that are not related to activity or exertion. Follow-up  Pertinent negatives include no chest pain, no abdominal pain, no headaches and no shortness of breath. Chest Pain (Angina)   The history is provided by the patient. This is a new problem. The current episode started more than 1 week ago. The problem has been gradually worsening. The problem occurs daily. The pain is associated with exertion and rest. The pain is present in the substernal region, right side and left side. The pain is mild. The quality of the pain is described as pressure-like and heavy. The pain does not radiate.  Pertinent negatives include no abdominal pain, no claudication, no cough, no dizziness, no fever, no headaches, no hemoptysis, no nausea, no orthopnea, no palpitations, no PND, no shortness of breath, no sputum production, no vomiting and no weakness. Shortness of Breath  The history is provided by the patient. This is a new problem. The problem occurs frequently. The current episode started more than 1 week ago. The problem has been gradually worsening. Pertinent negatives include no fever, no headaches, no cough, no sputum production, no hemoptysis, no wheezing, no PND, no orthopnea, no chest pain, no vomiting, no abdominal pain, no rash, no leg swelling and no claudication. Precipitated by: walking,exertion. Review of Systems   Constitutional: Negative for chills and fever. HENT: Negative for nosebleeds. Eyes: Negative for blurred vision and double vision. Respiratory: Negative for cough, hemoptysis, sputum production, shortness of breath and wheezing. Cardiovascular: Negative for chest pain, palpitations, orthopnea, claudication, leg swelling and PND. Gastrointestinal: Negative for abdominal pain, heartburn, nausea and vomiting. Musculoskeletal: Negative for myalgias. Skin: Negative for rash. Neurological: Negative for dizziness, weakness and headaches. Endo/Heme/Allergies: Does not bruise/bleed easily.      Family History   Problem Relation Age of Onset    Diabetes Mother     Hypertension Mother     Diabetes Maternal Grandmother     Hypertension Maternal Grandmother     Cancer Sister         brain    Cancer Sister         brain       Past Medical History:   Diagnosis Date    Bladder outlet obstruction     Erectile disorder due to medical 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     Frequency of urination     H/O spinal cord injury 1974    lumbar spine injury secondary to fall    HTN (hypertension)     Hypercholesterolemia     Illiterate     Injury of lumbar spine ([facility] [address] 1974    Leg pain, right     Nocturia     Overactive bladder     Peripheral vascular disease ([facility] [address]        Past Surgical History:   Procedure Laterality Date    COLONOSCOPY N/A 2019    COLONOS[facility] performed [facility]ia [facility] at [facility] ENDOSCOPY    HAND/FINGER SURGERY UNLISTED Right     carpal tunnel    HX HEENT Left     lens implant    HX ORTHOPAEDIC      finger amputation left hand    HX TONSILLECTOMY      UPPER ARM/ELBOW SURGERY UNLISTED Right        Social History     Tobacco Use    Smoking status: Former Smoker     Quit date: 2015     Years since quittin.2    Smokeless tobacco: Never Used   Substance Use Topics    Alcohol use: Not Currently     Alcohol/week: 0.0 standard drinks     Comment: former stopped        Allergies   Allergen Reactions    Latex Rash     Other reaction(s): Unknown    Ampicillin Angioedema    Asa-Acetaminophen-Caff-Buffers Rash    Penicillins Angioedema     Other reaction(s): anaphylaxis    Crab Hives    Shellfish Derived Rash    Aspirin Other (comments)     Stomach upset  Other reaction(s): Unknown    Atorvastatin Other (comments)     Muscle cramps       Prior to Admission medications    Medication Sig Start Date End Date Taking? Authorizing Provider   metoprolol succinate (TOPROL-XL) 50 mg XL tablet Take 1 Tablet by mouth daily. 10/28/21  Yes Corrine Awad MD   lidocaine (LIDODERM) 5 % 1 Patch by TransDERmal route every twenty-four (24) hours for 30 days. Apply patch to the affected area for 12 hours a day and remove for 12 hours a day. 10/18/21 11/17/21 Yes Pam Case MD   apixaban (Eliquis) 5 mg tablet Take 1 Tablet by mouth two (2) times a day. 21  Yes Sarkis Jackman NP   famotidine (PEPCID) 20 mg tablet Take 20 mg by mouth two (2) times a day. Yes Provider, Historical   ezetimibe (ZETIA) 10 mg tablet Take 10 mg by mouth daily. Yes Provider, Historical   spironolactone (Aldactone) 25 mg tablet Take 25 mg by mouth daily. Yes Provider, Historical   tamsulosin (FLOMAX) 0.4 mg capsule Take 2 Capsules by mouth daily (after dinner).  21  Yes KENIA Ssoa   finasteride (PROSCAR) 5 mg tablet Take 1 Tablet by mouth daily. 6/17/21  Yes KENIA King   polyethylene glycol (MIRALAX) 17 gram packet Take 1 Packet by mouth daily. 6/10/21  Yes Tirso Cedillo MD   diclofenac (VOLTAREN) 1 % gel Apply 2 g to affected area four (4) times daily. 5/25/21  Yes Nayeli Owusu MD   DULoxetine (CYMBALTA) 60 mg capsule Take 1 Cap by mouth daily. Indications: neuropathic pain 9/11/20  Yes Lucila ALFARO MD   albuterol sulfate 90 mcg/actuation aebs Take  by inhalation as needed. Yes Provider, Historical   pantoprazole (PROTONIX) 40 mg tablet Take 1 Tab by mouth daily. 3/28/19  Yes Tracy Phoenix MD   amLODIPine (NORVASC) 10 mg tablet Take 1 Tab by mouth daily. 3/28/19  Yes Tracy Phoenix MD   ciprofloxacin HCl (CIPRO) 500 mg tablet Take 1 Tablet by mouth once for 1 dose. 10/27/21 10/27/21  Anjana Rachel MD         Visit Vitals  /66 (BP 1 Location: Left upper arm, BP Patient Position: Sitting, BP Cuff Size: Adult)   Pulse 100   Ht 5' 7\" (1.702 m)   Wt 79.4 kg (175 lb)   SpO2 97%   BMI 27.41 kg/m²     Physical Exam  Constitutional:       Appearance: He is well-developed and well-nourished. HENT:      Head: Normocephalic and atraumatic. Eyes:      Conjunctiva/sclera: Conjunctivae normal.   Neck:      Musculoskeletal: Neck supple. Thyroid: No thyromegaly. Vascular: No JVD. Trachea: No tracheal deviation. Cardiovascular:      Rate and Rhythm: Normal rate and regular rhythm. Heart sounds: Normal heart sounds. No murmur. No friction rub. No gallop. Pulmonary:      Effort: No respiratory distress. Breath sounds: Normal breath sounds. No wheezing or rales. Chest:      Chest wall: No tenderness. Abdominal:      Palpations: Abdomen is soft. Tenderness: There is no abdominal tenderness. Musculoskeletal:         General: No edema. Skin:     General: Skin is warm and dry.    Neurological:      Mental Status: He is alert and oriented to person, place, and time. Psychiatric:         Mood and Affect: Mood and affect normal.   Interpretation Summary 10/2018    Normal right lower extremity arterial findings. Moderate PAD left lower extremity. Disease noted at tibial level. 10/2018 EKG  DiagnosisFinal Sinus bradycardia   Moderate voltage criteria for LVH, may be normal variant   Possible Acute pericarditis   Abnormal ECG  2015-stress echo  Impressions: Normal study after maximal exercise without reproduction of  symptoms   ECG conclusions: The stress ECG was normal. Based on [employer]  Scoring, this patient was at low risk for cardiac events. Mr. Shayne Milian has a reminder for a \"due or due soon\" health maintenance. I have asked that he contact his primary care provider for follow-up on this health maintenance. I have personally reviewed patient's records available from hospital and other providers and incorporated findings in patient care. I have personally reviewed patients ekg done at other facility. I Have personally reviewed recent relevant labs available and discussed with patient    Conclusion cardiac cath 3/2019    Non obstructive epicardial arteries with moderate mid LAD myocardial bridging noted. Will optimize CCB dose. Continue risk factor modification. Complications                      Coronary Findings     Diagnostic   Dominance: Right   Left Main   The vessel was visualized by angiography. The vessel is angiographically normal.   Left Anterior Descending   The vessel was visualized by angiography. The vessel is angiographically normal. Moderate myocardial bridging noted in mid LAD   Left Circumflex   The vessel was visualized by angiography. The vessel is angiographically normal.   Right Coronary Artery   The vessel was visualized by angiography. The vessel is angiographically normal.   Intervention     No interventions have been documented.    Procedure Conclusion     Nuclear Stress Test 3/2019    Abnormal myocardial perfusion imaging. Reversible defect consistent with myocardial ischemia. Myocardial perfusion imaging supports an intermediate risk stress test.   There is no prior study available for comparison. .   Interpretation Summary     · Baseline ECG: Sinus bradycardia, ST elevation, consider early repolarization, pericarditis or injury Minimal voltage criteria for LVH maybe normal variant. · Gated SPECT: Left ventricular function post-stress was normal. Calculated ejection fraction is 71%. There is no evidence of transient ischemic dilation (TID). The TID ratio is 0.95. · Negative stress test.  · Left ventricular perfusion is probably abnormal.  · Myocardial perfusion imaging defect 1: There is a defect that is small to moderate in size with a mild reduction in uptake present in the mid to basal inferior location(s) that is partially reversible. There is normal wall motion in the defect area. Viability in the area is good. The possibility of ischemia cannot be excluded. Perfusion defect was visually present without quantitative evidence. · Abnormal myocardial perfusion imaging. Reversible defect consistent with myocardial ischemia. Myocardial perfusion imaging supports an intermediate risk stress test.        Interpretation Summary 11/2019    · Left Ventricle: Normal cavity size, systolic function (ejection fraction normal) and diastolic function. Mildly increased wall thickness. Estimated left ventricular ejection fraction is 56 - 60%. Visually measured ejection fraction. No regional wall motion abnormality noted. Interpretation Summary PVL-6/2021       · Acute non-occlusive thrombus present in the right distal femoral vein. · Age indeterminate non-occlusive thrombus present in the left posterior tibial vein. Acute non-occlusive deep vein thrombosis noted in the right distal femoral vein. Sub-Acute non-occlusive deep vein thrombosis noted in the left posterior tibial veins.      Interpretation Summary echo-6/2021    · LV: Estimated LVEF is 55 - 60%. Visually measured ejection fraction. Normal cavity size and systolic function (ejection fraction normal). Mild concentric hypertrophy. Mild (grade 1) left ventricular diastolic dysfunction. · TV: Pulmonary hypertension not suggested by Doppler findings. IMPRESSION CTA 6/2021     Right-sided pulmonary emboli demonstrated similar to prior, but slightly  decreased comparison particularly in the right lower lobe. Left-sided smaller  emboli seen on prior CT is not as conspicuous on current exam.     Bibasilar bandlike consolidation, increased since prior, may represent any  combination of infarcts, infiltrates, or atelectasis.     Small right pleural effusion.     Nonspecific edema/stranding insinuating between the left kidney upper pole and  pancreatic tail. Recommend correlation with urinalysis and lipase levels    IMPRESSION 8/2021     1. No CT evidence of acute pulmonary embolism.     2. Moderate emphysema and findings of chronic bronchitis. Bibasilar dependent  atelectasis.     3. Other stable chronic incidental findings, as described, including:  Diverticulosis coli, bilateral adrenal hyperplasia. CT abdomen 8/2021  ABDOMINAL AORTA: Moderate atherosclerotic plaques and mild intramural thrombosis  identified. No dissection. 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 Assessment         ICD-10-CM ICD-9-CM    1. Coronary-myocardial bridge  Q24.5 746.85     Stable symptoms continue treatment   2. Essential hypertension  I10 401.9     Stable monitor   3. Other acute pulmonary embolism without acute cor pulmonale (HCC)  I26.99 415.19 REFERRAL TO PULMONARY DISEASE    7/2021. PA pressure normal.  Continue anticoagulation. Follow-up with PCP   4. Hypercholesterolemia  E78.00 272.0     Continue current treatment. Monitor lab with PCP   5. Acute deep vein thrombosis (DVT) of proximal vein of both lower extremities (HCC)  I82.4Y3 453.41     7/2021. On treatment. Follow-up with PCP   6. 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 Chronic obstructive pulmonary disease, unspecified COPD type ([facility] [address]  J44.9 496     Stable symptoms continue treatment monitor   8. Preop cardiovascular exam  Z01.810 V72.81     Patient is scheduled for colonoscopy and urological procedure okay to hold Eliquis prior to procedure resume once stable     3/2019  New patient with increased chest pain and shortness of breath. Possible angina. Rule out CHF cardiomyopathy. Patient was intolerant to atorvastatin in past due to muscle spasm. Recheck lipids and decide on alternate statin. Patient had peptic ulcer many years ago and was told not to take aspirin as it upsets his stomach. Consider Plavix if needed    4/2019  Continues to have pain atypical chest pain. No significant CAD. Currently on Protonix. Will use Mylanta plus for gas. He has GI appointment next week. Cardiac status stable  2/2020  Seen for atypical chest pain clinically musculoskeletal on 2 sides and back. Started after painting. Use Aleve 1 tablet twice a day for 1 week    9/2020 virtual visit  On and off episode of chest tightness. We will continue medical management. Emergency room if symptoms are severe. GI work-up is in progress and okay to do work-up as needed  1/ 2021  Cardiac status stable with stable angina. No significant epicardial coronary disease. Has myocardial bridge. Normal ejection fraction okay to proceed with orthopedic surgery as planned. Medium cardiac risk  7/2021  Recent admission with acute PE. Has bilateral DVT. Now on Eliquis. No pulmonary hypertension normal ejection fraction. Still short of breath but no hemoptysis.   Cardiac status with angina is stable. Blood pressure controlled  10/2021  Recurrent atypical chest pain clinically appears noncardiac. We will add Toprol. Recent CT with clearing of PE. As no clear etiology or precipitating factor for DVT and PE we will continue anticoagulation. At this point okay to interrupt anticoagulation for surgical procedures  Patient has appointment with vascular surgeon for evaluation of AAA  There are no discontinued medications. Orders Placed This Encounter    REFERRAL TO PULMONARY DISEASE     Referral Priority:   Routine     Referral Type:   Consultation     Referral Reason:   Specialty Services Required     Referred to Provider:   Pau Shipley MD    metoprolol succinate (TOPROL-XL) 50 mg XL tablet     Sig: Take 1 Tablet by mouth daily. Dispense:  90 Tablet     Refill:  3       Follow-up and Dispositions    · Return in about 6 months (around 4/28/2022) for Cleared for planned surgery, moderate risk.

## 2021-10-29 ENCOUNTER — OFFICE VISIT (OUTPATIENT)
Dept: VASCULAR SURGERY | Age: 76
End: 2021-10-29
Payer: MEDICAID

## 2021-10-29 VITALS
DIASTOLIC BLOOD PRESSURE: 86 MMHG | HEART RATE: 83 BPM | SYSTOLIC BLOOD PRESSURE: 132 MMHG | HEIGHT: 67 IN | WEIGHT: 175 LBS | OXYGEN SATURATION: 98 % | BODY MASS INDEX: 27.47 KG/M2

## 2021-10-29 DIAGNOSIS — I73.9 PAD (PERIPHERAL ARTERY DISEASE) (HCC): Primary | ICD-10-CM

## 2021-10-29 PROCEDURE — 99203 OFFICE O/P NEW LOW 30 MIN: CPT | Performed by: NURSE PRACTITIONER

## 2021-10-29 RX ORDER — WHEAT DEXTRIN 5 G/7.4 G
POWDER (GRAM) ORAL
COMMUNITY

## 2021-10-29 NOTE — PROGRESS NOTES
Chief Complaint   Patient presents with    New Patient    Leg Pain     Burning, pain when I walk, pins & needle feeling. Right side worse. Impression and Plan:  77 y.o. male with leg pain. His previous arterial studies in 2018  have been normal and the CTA in august of this year  Indicated moderate atherosclerosis. His pain while he does have claudication is most likely neurogenic. To err on the side of caution in the event there have been changes in his vasculature in the last few months I will order an KAVITA. History and Physical    Caroline Mccormick is a 77y.o. 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 The patient states that he has burning, pins-and-needles along his legs all the time and that his leg pain increases in severity when he ambulates. He states this has been going on for quite some time now. He was last seen in our office in 2018 with similar complaints. He had a normal KAVITA. Of recent he had a CTA of the chest abdomen and pelvis 8/2/2021 which indicated no significant vascular abnormality. There are patent bilateral internal and external iliac arteries and moderate atherosclerotic disease. His feet are warm and well-perfused. He does have a palpable pedal pulse on the right. The right leg is his worst leg. He states that it is burning the whole entire time he has been sitting. The patient does have a history of a spinal injury in the 1970s. His MRI indicates mild wide-based disc bulge L3-L5. He had a slew of tests during this time for DVT and PE. He is currently anticoagulated.       Past Medical History:   Diagnosis Date    Bladder outlet obstruction     Erectile disorder due to medical 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     Frequency of urination     H/O spinal cord injury 1974    lumbar spine injury secondary to fall    HTN (hypertension)     Hypercholesterolemia     Illiterate     Injury of lumbar spine ([facility] 1974    Leg pain, right     Nocturia     Overactive bladder     Peripheral vascular disease ([facility]      Patient Active Problem List   Diagnosis Code    Unsteady gait R26.81    Gait instability R26.81    Vertigo R42    Radiculopathy of lumbar region M54.16    ED (erectile dysfunction) of organic origin N52.9    Peripheral vascular disease (HCC) I73.9    Overactive bladder N32.81    Nocturia R35.1    Leg pain, right M79.604    Hypercholesterolemia E78.00    HTN (hypertension) I10    H/O spinal cord injury Z87.828    Erectile disorder due to medical 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 N52.1    Bladder outlet obstruction N32.0    Injury of lumbar spine ([facility] S34.109A    Frequency of urination R35.0    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    History of rheumatic fever Z86.79    Chest pain, moderate coronary artery risk R07.9    CAD (coronary artery disease) I25.10    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    Cubital tunnel syndrome, left G56.22    Left carpal tunnel syndrome G56.02    Acute pulmonary embolism (HCC) I26.99    Severe protein-calorie malnutrition (HCC) E43    Hemoptysis R04.2    Acute deep vein thrombosis (DVT) of proximal vein of both lower extremities ([facility]) I82.4Y3     Past Surgical History:   Procedure Laterality Date    COLONOSCOPY N/A 12/4/2019    COLONOSCOPY performed [facility] Jameel Cuellar [facility] ENDOSCOPY    HAND/FINGER SURGERY UNLISTED Right     carpal tunnel    HX HEENT Left     lens implant    HX ORTHOPAEDIC      finger amputation left hand    HX TONSILLECTOMY      UPPER ARM/ELBOW SURGERY UNLISTED Right      Current Outpatient Medications   Medication Sig Dispense Refill    wheat dextrin (Benefiber Healthy Shape) 5 gram/7.4 gram powd Take  by mouth.  metoprolol succinate (TOPROL-XL) 50 mg XL tablet Take 1 Tablet by mouth daily.  90 Tablet 3    lidocaine (LIDODERM) 5 % 1 Patch by TransDERmal route every twenty-four (24) hours for 30 days. Apply patch to the affected area for 12 hours a day and remove for 12 hours a day. 30 Patch 0    apixaban (Eliquis) 5 mg tablet Take 1 Tablet by mouth two (2) times a day. 60 Tablet 5    famotidine (PEPCID) 20 mg tablet Take 20 mg by mouth two (2) times a day.  ezetimibe (ZETIA) 10 mg tablet Take 10 mg by mouth daily.  spironolactone (Aldactone) 25 mg tablet Take 25 mg by mouth daily.  tamsulosin (FLOMAX) 0.4 mg capsule Take 2 Capsules by mouth daily (after dinner). 180 Capsule 3    finasteride (PROSCAR) 5 mg tablet Take 1 Tablet by mouth daily. 90 Tablet 3    polyethylene glycol (MIRALAX) 17 gram packet Take 1 Packet by mouth daily. 30 Packet 0    diclofenac (VOLTAREN) 1 % gel Apply 2 g to affected area four (4) times daily. 100 g 2    DULoxetine (CYMBALTA) 60 mg capsule Take 1 Cap by mouth daily. Indications: neuropathic pain 60 Cap 5    albuterol sulfate 90 mcg/actuation aebs Take  by inhalation as needed.  pantoprazole (PROTONIX) 40 mg tablet Take 1 Tab by mouth daily. 30 Tab 0    amLODIPine (NORVASC) 10 mg tablet Take 1 Tab by mouth daily.  30 Tab 0     Allergies   Allergen Reactions    Latex Rash     Other reaction(s): Unknown    Ampicillin Angioedema    Asa-Acetaminophen-Caff-Buffers Rash    Penicillins Angioedema     Other reaction(s): anaphylaxis    Crab Hives    Shellfish Derived Rash    Aspirin Other (comments)     Stomach upset  Other reaction(s): Unknown    Atorvastatin Other (comments)     Muscle cramps     Social History     Socioeconomic History    Marital status:      Spouse name: Not on file    Number of children: Not on file    Years of education: Not on file    Highest education level: Not on file   Occupational History    Not on file   Tobacco Use    Smoking status: Former Smoker     Quit date: 2015     Years since quittin.3    Smokeless tobacco: Never Used   Vaping Use  Vaping Use: Never used   Substance and Sexual Activity    Alcohol use: Not Currently     Alcohol/week: 0.0 standard drinks     Comment: former stopped 2015    Drug use: No    Sexual activity: Yes   Other Topics Concern    Not on file   Social History Narrative    Not on file     Social Determinants of Health     Financial Resource Strain:     Difficulty of Paying Living Expenses:    Food Insecurity:     Worried About Running Out of Food in the Last Year:     [address] in the Last Year:    Transportation Needs:     Lack of Transportation (Medical):      Lack of Transportation (Non-Medical):    Physical Activity:     Days of Exercise per Week:     Minutes of Exercise per Session:    Stress:     Feeling of Stress :    Social Connections:     Frequency of Communication with Friends and Family:     Frequency of Social Gatherings with Friends and Family:     Attends Orthodox Services:     Active Member of Clubs or Organizations:     Attends Club or Organization Meetings:     Marital Status:    Intimate Partner Violence:     Fear of Current or Ex-Partner:     Emotionally Abused:     Physically Abused:     Sexually Abused:       Family History   Problem Relation Age of Onset    Diabetes Mother     Hypertension Mother     Diabetes Maternal Grandmother     Hypertension Maternal Grandmother     Cancer Sister         brain    Cancer Sister         brain       Review of Systems    General: negative for fever   Eyes: negative for vision loss   HENT: negative for cold symptoms   Respiratory negative for shortness of breath   Cardiac: negative for chest pain   Vascular negative for foot pain at night    Gastrointestinal: negative for abdominal pain   Genitourinary: negative for dysuria    Endocrine: negative for excessive thirst   Skin: negative for rash   Neurological: negative for paralysis   Psychiatric: negative for depression          Physical Exam:    Visit Vitals  /86 (BP 1 Location: Left upper arm, BP Patient Position: Sitting)   Pulse 83   Ht 5' 7\" (1.702 m)   Wt 175 lb (79.4 kg)   SpO2 98%   BMI 27.41 kg/m²      Constitutional:  Patient is well developed, well nourished, and not distressed. HEENT: atraumatic, normocephalic, wearing a mask. Eyes:   Cunjunctivae clear, no scleral icterus  Neck:   No JVD present. Cardiovascular:  Normal rate, regular rhythm, normal heart sounds. No murmur heard. Pulmonary/Chest: Effort normal .  Extremities: Normal range of motion. Neurological:  he  is alert and oriented x3 . Gait normal. Motor & sensory grossly intact in all 4 limbs. Psych: Appropriate mood and affect. Skin:  Skin is warm and dry. No ulcerations        The treatment plan was reviewed with the patient in detail. The patient voiced understanding of this plan and all questions and concerns were addressed. The patient agrees with this plan. We discussed the signs and symptoms that would require earlier attention or intervention. I appreciate the opportunity to participate in the care of your patient. I will be sure to keep you informed of any subsequent changes in the treatment plan. If you have any questions or concerns, please feel free to contact me.       Larry Ruth [facility]  Vascular Nurse [address]  [phone]

## 2021-10-29 NOTE — PROGRESS NOTES
1. Have you been to an emergency room or urgent care clinic since your last visit? No    Hospitalized since your last visit? If yes, where, when, and reason for visit? No  2. Have you seen or consulted any other health care providers outside of the [facility] since your last visit including any procedures, health maintenance items. If yes, where, when and reason for visit?  No

## 2021-11-10 ENCOUNTER — HOSPITAL ENCOUNTER (OUTPATIENT)
Dept: PHYSICAL THERAPY | Age: 76
Discharge: HOME | End: 2021-11-10
Payer: MEDICAID

## 2021-11-10 PROCEDURE — 97162 PT EVAL MOD COMPLEX 30 MIN: CPT

## 2021-11-10 NOTE — PROGRESS NOTES
[facility] [place] [address]  [address], [address] [phone] fax    Plan of Care/ Statement of Necessity for Physical Therapy Services           Patient name: Caroline Mccormick Start of Care: 11/10/2021   Referral source: Kathy Villarreal* : 1945    Medical Diagnosis: Low back pain, unspecified [M54.50]  Payor: [facility] MEDICAID / Plan: [address] [place] [address] Avenue / Product Type: Managed Care Medicaid /  Onset Date:chronic with exacerbation 10/18   Treatment Diagnosis: LBP   Prior Hospitalization: see medical history Provider#: [id number]   Medications: Verified on Patient summary List    Comorbidities: history of PE, grade 1 anteriolisthesis    Prior Level of Function: functionally (I); lives alone on 3rd floor in senior living     The Plan of Care and following information is based on the information from the initial evaluation. Assessment/ key information:   Mr. Savita Villela is a 69yo male who presents to PT with radiographic evidence of central stenosis, disc disease, and grade 1 L5 anterolisthesis. Neuro screen was negative for dural tension today, however, subjective reports are consistent with stenosis findings. Pt demonstrates decreased bilateral hip strength and significantly impaired LE flexibility. Pt deficits impair his ability to tolerate sitting and walking at PLOF. In addition, pt is considered a falls risk as evidenced by history of falls and decreased amount of sit to stands pt can perform in 30seconds. Recommended to pt SPC or RW in order to reduce risk of falls; will gait train at subsequent treatments for most appropriate AD.  Pt will benefit from skilled PT in order to address listed deficits, decrease pain, and maximize functional potential.     Evaluation Complexity History MEDIUM  Complexity : 1-2 comorbidities / personal factors will impact the outcome/ POC ; Examination MEDIUM Complexity : 3 Standardized tests and measures addressing body structure, function, activity limitation and / or participation in recreation  ;Presentation MEDIUM Complexity : Evolving with changing characteristics  ; Clinical Decision Making MEDIUM Complexity : FOTO score of 26-74  Overall Complexity Rating: MEDIUM  Problem List: pain affecting function, decrease ROM, decrease strength, impaired gait/ balance, decrease ADL/ functional abilitiies, decrease activity tolerance, decrease flexibility/ joint mobility and decrease transfer abilities   Treatment Plan may include any combination of the following: Therapeutic exercise, Therapeutic activities, Neuromuscular re-education, Physical agent/modality, Gait/balance training, Manual therapy, Aquatic therapy, Patient education, Self Care training, Functional mobility training, Home safety training and Stair training  Patient / Family readiness to learn indicated by: asking questions, trying to perform skills and interest  Persons(s) to be included in education: patient (P)  Barriers to Learning/Limitations: None  Patient Goal (s): I really want to stop hurting  Patient Self Reported Health Status: poor  Rehabilitation Potential: fair  Short Term Goals: To be accomplished in 2 weeks:  1. Pt will report compliance with HEP in order to supplement PT treatment   Eval = established  2. Pt will report max pain 8/10 in order to increase participation in therapy   Eval = 10/10    Long Term Goals: To be accomplished in 10 treatments:  1. Pt will score at least 45 on FOTO in order to improve overall function, decrease pain, and facilitate return to PLOF. Eval = 37  2. Pt will demonstrate bilateral hip flex/abd strength 4+/5 in order to increased ambulation distances in the community   Eval = bilateral flex = 4/5, bilateral abd = 3/5  3.    Pt will demonstrate bilateral supine PROM hip ER/IR to 25degrees in order to improve his ability to tolerate sitting and standing    Eval: bilateral ER = 10 degrees, bilateral IR = 15degrees  4. Pt will perform 5 sit to stands in 30seconds in order to reduce risk of falls   Eval = 2x in 30seconds no UE    Frequency / Duration: Patient to be seen 2 times per week for 10 treatments. Patient/ Caregiver education and instruction: Diagnosis, prognosis, self care, activity modification and exercises   [x]  Plan of care has been reviewed with YURIY Willis, PT 11/10/2021 8:41 AM    ________________________________________________________________________    I certify that the above Therapy Services are being furnished while the patient is under my care. I agree with the treatment plan and certify that this therapy is necessary.     Physician's Signature:____________Date:_________TIME:________     Sirena Buitrago*  ** Signature, Date and Time must be completed for valid certification **    Please sign and return to [facility] [address]  [address] [place], [address]  [phone] fax

## 2021-11-10 NOTE — PROGRESS NOTES
PT DAILY TREATMENT NOTE     Patient Name: Prosper Giang  Date:11/10/2021  : 1945  [x]  Patient  Verified  Payor: [facility] MEDICAID / Plan: [address] / Product Type: Managed Care Medicaid /    In time:915  Out time:947  Total Treatment Time (min): 32  Visit #: 1 of 10    Treatment Area: Low back pain, unspecified [M54.50]    SUBJECTIVE  Pain Level (0-10 scale): 4  Any medication changes, allergies to medications, adverse drug reactions, diagnosis change, or new procedure performed?: [x] No    [] Yes (see summary sheet for update)  Subjective functional status/changes:   [] No changes reported  See evaluation    OBJECTIVE    32 min [x]Eval                  []Re-Eval      With   [] TE   [] TA   [] neuro   [] other: Patient Education: [x] Review HEP    [] Progressed/Changed HEP based on:   [] positioning   [] body mechanics   [] transfers   [] heat/ice application    [] other:      Other Objective/Functional Measures: see evaluation     Pain Level (0-10 scale) post treatment: 6    ASSESSMENT/Changes in Function: see POC    Patient will continue to benefit from skilled PT services to modify and progress therapeutic interventions, address functional mobility deficits, address ROM deficits, address strength deficits, analyze and address soft tissue restrictions, analyze and cue movement patterns, analyze and modify body mechanics/ergonomics, assess and modify postural abnormalities, address imbalance/dizziness and instruct in home and community integration to attain remaining goals. [x]  See Plan of Care  []  See progress note/recertification  []  See Discharge Summary         Progress towards goals / Updated goals:  Short Term Goals: To be accomplished in 2 weeks:  1. Pt will report compliance with HEP in order to supplement PT treatment   Eval = established  2. Pt will report max pain 8/10 in order to increase participation in therapy   Eval = 10/10    Long Term Goals:  To be accomplished in 10 treatments:  1. Pt will score at least 45 on FOTO in order to improve overall function, decrease pain, and facilitate return to PLOF. Eval = 37  2. Pt will demonstrate bilateral hip flex/abd strength 4+/5 in order to increased ambulation distances in the community   Eval = bilateral flex = 4/5, bilateral abd = 3/5  3. Pt will demonstrate bilateral supine PROM hip ER/IR to 25degrees in order to improve his ability to tolerate sitting and standing    Eval: bilateral ER = 10 degrees, bilateral IR = 15degrees  4.    Pt will perform 5 sit to stands in 30seconds in order to reduce risk of falls   Eval = 2x in 30seconds no UE    PLAN  [x]  Upgrade activities as tolerated     [x]  Continue plan of care  []  Update interventions per flow sheet       []  Discharge due to:_  []  Other:_      Amelia King, PT 11/10/2021  8:41 AM    Future Appointments   Date Time Provider Raúl Matilde   11/10/2021  9:00 AM [facility]   11/17/2021 11:00 AM [facility] NONIMAGING [facility] BS AMB   11/22/2021 10:45 AM Ravin Parker NP [facility] BS AMB   2/21/2022  9:45 AM Paulette Squires MD [facility] BS AMB   3/14/2022  9:00 AM Javi Martinez MD [facility] BS AMB   4/28/2022 10:30 AM Ross Yao MD [facility] BS AMB

## 2021-11-22 ENCOUNTER — OFFICE VISIT (OUTPATIENT)
Dept: VASCULAR SURGERY | Age: 76
End: 2021-11-22
Payer: MEDICAID

## 2021-11-22 VITALS
OXYGEN SATURATION: 98 % | HEIGHT: 67 IN | DIASTOLIC BLOOD PRESSURE: 80 MMHG | SYSTOLIC BLOOD PRESSURE: 132 MMHG | BODY MASS INDEX: 27.47 KG/M2 | WEIGHT: 175 LBS | HEART RATE: 91 BPM

## 2021-11-22 DIAGNOSIS — M79.604 PAIN IN BOTH LOWER EXTREMITIES: ICD-10-CM

## 2021-11-22 DIAGNOSIS — M79.605 PAIN IN BOTH LOWER EXTREMITIES: ICD-10-CM

## 2021-11-22 DIAGNOSIS — I73.9 PAD (PERIPHERAL ARTERY DISEASE) (HCC): Primary | ICD-10-CM

## 2021-11-22 PROCEDURE — 99213 OFFICE O/P EST LOW 20 MIN: CPT | Performed by: NURSE PRACTITIONER

## 2021-11-22 NOTE — PROGRESS NOTES
1. Have you been to an emergency room or urgent care clinic since your last visit? No    Hospitalized since your last visit? If yes, where, when, and reason for visit? No  2. Have you seen or consulted any other health care providers outside of the [facility] since your last visit including any procedures, health maintenance items. If yes, where, when and reason for visit?  No

## 2021-12-03 NOTE — PROGRESS NOTES
Bedside and Verbal shift change report given to this RN (oncoming nurse) by Lenore Darby RN (offgoing nurse). Report included the following information SBAR, Kardex and Cardiac Rhythm NSR Pt has been to get a nuclear stress test today and is scheduled for a cardiac catherization tomorrow morning. He will be NPO at midnight. Bedside and Verbal shift change report given to [address] (oncoming nurse) by this RN (offgoing nurse). Report included the following information SBAR, Kardex and Cardiac Rhythm NSR. [Obese, well nourished, in no acute distress] : obese, well nourished, in no acute distress [Obese] : obese [Normal] : no peripheral adenopathy appreciated [de-identified] : normal respiration [de-identified] : left inguinal hernia

## 2021-12-08 ENCOUNTER — HOSPITAL ENCOUNTER (EMERGENCY)
Age: 76
Discharge: HOME | End: 2021-12-08
Attending: STUDENT IN AN ORGANIZED HEALTH CARE EDUCATION/TRAINING PROGRAM
Payer: MEDICAID

## 2021-12-08 VITALS
TEMPERATURE: 98.1 F | HEART RATE: 106 BPM | SYSTOLIC BLOOD PRESSURE: 158 MMHG | HEIGHT: 67 IN | RESPIRATION RATE: 16 BRPM | WEIGHT: 176 LBS | OXYGEN SATURATION: 98 % | DIASTOLIC BLOOD PRESSURE: 76 MMHG | BODY MASS INDEX: 27.62 KG/M2

## 2021-12-08 DIAGNOSIS — T14.8XXA BLOOD BLISTER: Primary | ICD-10-CM

## 2021-12-08 LAB
ALBUMIN SERPL-MCNC: 3.6 G/DL (ref 3.4–5)
ALBUMIN/GLOB SERPL: 0.9 {RATIO} (ref 0.8–1.7)
ALP SERPL-CCNC: 64 U/L (ref 45–117)
ALT SERPL-CCNC: 33 U/L (ref 16–61)
ANION GAP SERPL CALC-SCNC: 3 MMOL/L (ref 3–18)
AST SERPL-CCNC: 26 U/L (ref 10–38)
BASOPHILS # BLD: 0 K/UL (ref 0–0.1)
BASOPHILS NFR BLD: 1 % (ref 0–2)
BILIRUB SERPL-MCNC: 0.3 MG/DL (ref 0.2–1)
BUN SERPL-MCNC: 18 MG/DL (ref 7–18)
BUN/CREAT SERPL: 15 (ref 12–20)
CALCIUM SERPL-MCNC: 8.9 MG/DL (ref 8.5–10.1)
CHLORIDE SERPL-SCNC: 107 MMOL/L (ref 100–111)
CO2 SERPL-SCNC: 26 MMOL/L (ref 21–32)
CREAT SERPL-MCNC: 1.19 MG/DL (ref 0.6–1.3)
DIFFERENTIAL METHOD BLD: ABNORMAL
EOSINOPHIL # BLD: 0.2 K/UL (ref 0–0.4)
EOSINOPHIL NFR BLD: 4 % (ref 0–5)
ERYTHROCYTE [DISTWIDTH] IN BLOOD BY AUTOMATED COUNT: 14.1 % (ref 11.6–14.5)
GLOBULIN SER CALC-MCNC: 4.2 G/DL (ref 2–4)
GLUCOSE SERPL-MCNC: 125 MG/DL (ref 74–99)
HCT VFR BLD AUTO: 40.5 % (ref 36–48)
HGB BLD-MCNC: 13.2 G/DL (ref 13–16)
IMM GRANULOCYTES # BLD AUTO: 0 K/UL (ref 0–0.04)
IMM GRANULOCYTES NFR BLD AUTO: 0 % (ref 0–0.5)
INR PPP: 1.1 (ref 0.8–1.2)
LYMPHOCYTES # BLD: 1.7 K/UL (ref 0.9–3.6)
LYMPHOCYTES NFR BLD: 30 % (ref 21–52)
MCH RBC QN AUTO: 30 PG (ref 24–34)
MCHC RBC AUTO-ENTMCNC: 32.6 G/DL (ref 31–37)
MCV RBC AUTO: 92 FL (ref 78–100)
MONOCYTES # BLD: 0.6 K/UL (ref 0.05–1.2)
MONOCYTES NFR BLD: 11 % (ref 3–10)
NEUTS SEG # BLD: 3.2 K/UL (ref 1.8–8)
NEUTS SEG NFR BLD: 55 % (ref 40–73)
NRBC # BLD: 0 K/UL (ref 0–0.01)
NRBC BLD-RTO: 0 PER 100 WBC
PLATELET # BLD AUTO: 260 K/UL (ref 135–420)
PMV BLD AUTO: 8.6 FL (ref 9.2–11.8)
POTASSIUM SERPL-SCNC: 4 MMOL/L (ref 3.5–5.5)
PROT SERPL-MCNC: 7.8 G/DL (ref 6.4–8.2)
PROTHROMBIN TIME: 14.1 SEC (ref 11.5–15.2)
RBC # BLD AUTO: 4.4 M/UL (ref 4.35–5.65)
SODIUM SERPL-SCNC: 136 MMOL/L (ref 136–145)
WBC # BLD AUTO: 5.7 K/UL (ref 4.6–13.2)

## 2021-12-08 PROCEDURE — 85610 PROTHROMBIN TIME: CPT

## 2021-12-08 PROCEDURE — 99281 EMR DPT VST MAYX REQ PHY/QHP: CPT

## 2021-12-08 PROCEDURE — 85025 COMPLETE CBC W/AUTO DIFF WBC: CPT

## 2021-12-08 PROCEDURE — 80053 COMPREHEN METABOLIC PANEL: CPT

## 2021-12-08 NOTE — DISCHARGE INSTRUCTIONS
Follow-up with your primary care physician within 2 days for reassessment. Bring the results from this visit with you for their review. Return to the ED immediately for any new, worsening, or persistent symptoms, including fever, increased swelling, bleeding, or any other medical concerns.

## 2021-12-08 NOTE — ED TRIAGE NOTES
Pt comes in with complaints of blood blister to the roof of his mouth that started 1 hour ago. Pt complains of a nagging pain, pt on eliquis.

## 2021-12-08 NOTE — ED PROVIDER NOTES
EMERGENCY DEPARTMENT HISTORY AND PHYSICAL EXAM    12:54 PM      Date: 12/8/2021  Patient Name: Nancy John    History of Presenting Illness     Chief Complaint   Patient presents with    Mouth Pain     History Provided By: Patient    Additional History (Context): Nancy John is a 77 y.o. male with PAD on Eliquis, and other noted PMH who presents with complaint of blood blister to the roof of his mouth that he noticed about an hour ago. Patient notes mild ache. Denies fever or chills, drooling or stridor, injury or trauma, nausea or vomiting, active bleeding. Patient notes he has no teeth. PCP: Peggy Elder MD    Current Outpatient Medications   Medication Sig Dispense Refill    wheat dextrin (Benefiber Healthy Shape) 5 gram/7.4 gram powd Take  by mouth.  metoprolol succinate (TOPROL-XL) 50 mg XL tablet Take 1 Tablet by mouth daily. 90 Tablet 3    apixaban (Eliquis) 5 mg tablet Take 1 Tablet by mouth two (2) times a day. 60 Tablet 5    famotidine (PEPCID) 20 mg tablet Take 20 mg by mouth two (2) times a day.  ezetimibe (ZETIA) 10 mg tablet Take 10 mg by mouth daily.  spironolactone (Aldactone) 25 mg tablet Take 25 mg by mouth daily.  tamsulosin (FLOMAX) 0.4 mg capsule Take 2 Capsules by mouth daily (after dinner). 180 Capsule 3    finasteride (PROSCAR) 5 mg tablet Take 1 Tablet by mouth daily. 90 Tablet 3    polyethylene glycol (MIRALAX) 17 gram packet Take 1 Packet by mouth daily. 30 Packet 0    diclofenac (VOLTAREN) 1 % gel Apply 2 g to affected area four (4) times daily. 100 g 2    DULoxetine (CYMBALTA) 60 mg capsule Take 1 Cap by mouth daily. Indications: neuropathic pain 60 Cap 5    albuterol sulfate 90 mcg/actuation aebs Take  by inhalation as needed.  pantoprazole (PROTONIX) 40 mg tablet Take 1 Tab by mouth daily. 30 Tab 0    amLODIPine (NORVASC) 10 mg tablet Take 1 Tab by mouth daily.  30 Tab 0       Past History     Past Medical History:  Past Medical History:   Diagnosis Date    Bladder outlet obstruction     Erectile disorder due to medical 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     Frequency of urination     H/O spinal cord injury     lumbar spine injury secondary to fall    HTN (hypertension)     Hypercholesterolemia     Illiterate     Injury of lumbar spine ([address]     Leg pain, right     Nocturia     Overactive bladder     Peripheral vascular disease (HCC)        Past Surgical History:  Past Surgical History:   Procedure Laterality Date    COLONOSCOPY N/A 2019    COL[facility]OS[facility] at [facility] ENDOSCOPY    HAND/FINGER SURGERY UNLISTED Right     carpal tunnel    HX HEENT Left     lens implant    HX ORTHOPAEDIC      finger amputation left hand    HX TONSILLECTOMY      UPPER ARM/ELBOW SURGERY UNLISTED Right        Family History:  Family History   Problem Relation Age of Onset    Diabetes Mother     Hypertension Mother     Diabetes Maternal Grandmother     Hypertension Maternal Grandmother     Cancer Sister         brain    Cancer Sister         brain       Social History:  Social History     Tobacco Use    Smoking status: Former Smoker     Quit date: 2015     Years since quittin.4    Smokeless tobacco: Never Used   Vaping Use    Vaping Use: Never used   Substance Use Topics    Alcohol use: Not Currently     Alcohol/week: 0.0 standard drinks     Comment: former stopped     Drug use: No       Allergies: Allergies   Allergen Reactions    Latex Rash     Other reaction(s): Unknown    Ampicillin Angioedema    Asa-Acetaminophen-Caff-Buffers Rash    Penicillins Angioedema     Other reaction(s): anaphylaxis    Crab Hives    Shellfish Derived Rash    Aspirin Other (comments)     Stomach upset  Other reaction(s): Unknown    Atorvastatin Other (comments)     Muscle cramps         Review of Systems       Review of Systems   Constitutional: Negative for chills and fever.    Respiratory: Negative for shortness of breath. Cardiovascular: Negative for chest pain. Gastrointestinal: Negative for abdominal pain, nausea and vomiting. Skin: Negative for rash. Neurological: Negative for weakness. Hematological: Bruises/bleeds easily. All other systems reviewed and are negative. Physical Exam     Visit Vitals  BP (!) 158/76 (BP 1 Location: Left upper arm, BP Patient Position: At rest)   Pulse (!) 106   Temp 98.1 °F (36.7 °C)   Resp 16   Ht 5' 7\" (1.702 m)   Wt 79.8 kg (176 lb)   SpO2 98%   BMI 27.57 kg/m²         Physical Exam  Vitals and nursing note reviewed. Constitutional:       General: He is not in acute distress. Appearance: Normal appearance. He is well-developed. He is not ill-appearing, toxic-appearing or diaphoretic. HENT:      Head: Normocephalic and atraumatic. Mouth/Throat:      Mouth: No lacerations. Dentition: Abnormal dentition (no teeth). Pharynx: Oropharynx is clear. Uvula midline. Cardiovascular:      Rate and Rhythm: Normal rate and regular rhythm. Heart sounds: Normal heart sounds. No murmur heard. No friction rub. No gallop. Pulmonary:      Effort: Pulmonary effort is normal. No respiratory distress. Breath sounds: Normal breath sounds. No wheezing or rales. Musculoskeletal:         General: Normal range of motion. Cervical back: Normal range of motion and neck supple. Skin:     General: Skin is warm. Findings: No rash. Neurological:      Mental Status: He is alert.            Diagnostic Study Results     Labs -  Recent Results (from the past 12 hour(s))   CBC WITH AUTOMATED DIFF    Collection Time: 12/08/21 12:45 PM   Result Value Ref Range    WBC 5.7 4.6 - 13.2 K/uL    RBC 4.40 4.35 - 5.65 M/uL    HGB 13.2 13.0 - 16.0 g/dL    HCT 40.5 36.0 - 48.0 %    MCV 92.0 78.0 - 100.0 FL    MCH 30.0 24.0 - 34.0 PG    MCHC 32.6 31.0 - 37.0 g/dL    RDW 14.1 11.6 - 14.5 %    PLATELET 941 846 - 797 K/uL    MPV 8.6 (L) 9.2 - 11.8 FL NRBC 0.0 0  WBC    ABSOLUTE NRBC 0.00 0.00 - 0.01 K/uL    NEUTROPHILS 55 40 - 73 %    LYMPHOCYTES 30 21 - 52 %    MONOCYTES 11 (H) 3 - 10 %    EOSINOPHILS 4 0 - 5 %    BASOPHILS 1 0 - 2 %    IMMATURE GRANULOCYTES 0 0.0 - 0.5 %    ABS. NEUTROPHILS 3.2 1.8 - 8.0 K/UL    ABS. LYMPHOCYTES 1.7 0.9 - 3.6 K/UL    ABS. MONOCYTES 0.6 0.05 - 1.2 K/UL    ABS. EOSINOPHILS 0.2 0.0 - 0.4 K/UL    ABS. BASOPHILS 0.0 0.0 - 0.1 K/UL    ABS. IMM. GRANS. 0.0 0.00 - 0.04 K/UL    DF AUTOMATED     METABOLIC PANEL, COMPREHENSIVE    Collection Time: 12/08/21 12:45 PM   Result Value Ref Range    Sodium 136 136 - 145 mmol/L    Potassium 4.0 3.5 - 5.5 mmol/L    Chloride 107 100 - 111 mmol/L    CO2 26 21 - 32 mmol/L    Anion gap 3 3.0 - 18 mmol/L    Glucose 125 (H) 74 - 99 mg/dL    BUN 18 7.0 - 18 MG/DL    Creatinine 1.19 0.6 - 1.3 MG/DL    BUN/Creatinine ratio 15 12 - 20      GFR est AA >60 >60 ml/min/1.73m2    GFR est non-AA 60 (L) >60 ml/min/1.73m2    Calcium 8.9 8.5 - 10.1 MG/DL    Bilirubin, total 0.3 0.2 - 1.0 MG/DL    ALT (SGPT) 33 16 - 61 U/L    AST (SGOT) 26 10 - 38 U/L    Alk. phosphatase 64 45 - 117 U/L    Protein, total 7.8 6.4 - 8.2 g/dL    Albumin 3.6 3.4 - 5.0 g/dL    Globulin 4.2 (H) 2.0 - 4.0 g/dL    A-G Ratio 0.9 0.8 - 1.7     PROTHROMBIN TIME + INR    Collection Time: 12/08/21 12:45 PM   Result Value Ref Range    Prothrombin time 14.1 11.5 - 15.2 sec    INR 1.1 0.8 - 1.2         Radiologic Studies -   No orders to display         Medical Decision Making   I am the first provider for this patient. I reviewed the vital signs, available nursing notes, past medical history, past surgical history, family history and social history. Vital Signs-Reviewed the patient's vital signs. Records Reviewed: Nursing Notes and Old Medical Records (Time of Review: 12:54 PM)    ED Course: Progress Notes, Reevaluation, and Consults:  1:30 PM  Reviewed results and plan with patient.  Discussed need for close outpatient follow-up this week for reassessment. Discussed strict return precautions, including fever, increased swelling, bleeding, or any other medical concerns. Patient in agreement with plan. Provider Notes (Medical Decision Making): 29-year-old male who presents to the ED due to blood blister on the roof of his mouth. Afebrile, nontoxic-appearing, looks well. Small blister noted without evidence of active bleeding. Labs essentially unremarkable. Do not feel further labs or imaging are warranted. Stable for discharge with close outpatient follow-up for further assessment. Strict return precautions provided. Diagnosis     Clinical Impression:   1. Blood blister        Disposition: home     Follow-up Information     Follow up With Specialties Details Why [address] [facility]    [facility] EMERGENCY DEPT Emergency Medicine  If symptoms worsen [address] [facility] [address] [facility]    Soraya Medellin MD Family Medicine Schedule an appointment as soon as possible for a visit   [id number]  27  [phone]             Patient's Medications   Start Taking    No medications on file   Continue Taking    ALBUTEROL SULFATE 90 MCG/ACTUATION AEBS    Take  by inhalation as needed. AMLODIPINE (NORVASC) 10 MG TABLET    Take 1 Tab by mouth daily. APIXABAN (ELIQUIS) 5 MG TABLET    Take 1 Tablet by mouth two (2) times a day. DICLOFENAC (VOLTAREN) 1 % GEL    Apply 2 g to affected area four (4) times daily. DULOXETINE (CYMBALTA) 60 MG CAPSULE    Take 1 Cap by mouth daily. Indications: neuropathic pain    EZETIMIBE (ZETIA) 10 MG TABLET    Take 10 mg by mouth daily. FAMOTIDINE (PEPCID) 20 MG TABLET    Take 20 mg by mouth two (2) times a day. FINASTERIDE (PROSCAR) 5 MG TABLET    Take 1 Tablet by mouth daily. METOPROLOL SUCCINATE (TOPROL-XL) 50 MG XL TABLET    Take 1 Tablet by mouth daily. PANTOPRAZOLE (PROTONIX) 40 MG TABLET    Take 1 Tab by mouth daily.     POLYETHYLENE GLYCOL (MIRALAX) 17 GRAM PACKET    Take 1 Packet by mouth daily. SPIRONOLACTONE (ALDACTONE) 25 MG TABLET    Take 25 mg by mouth daily. TAMSULOSIN (FLOMAX) 0.4 MG CAPSULE    Take 2 Capsules by mouth daily (after dinner). WHEAT DEXTRIN (BENEFIBER HEALTHY SHAPE) 5 GRAM/7.4 GRAM POWD    Take  by mouth. These Medications have changed    No medications on file   Stop Taking    No medications on file       Dictation disclaimer:  Please note that this dictation was completed with [employer], the computer voice recognition software. Quite often unanticipated grammatical, syntax, homophones, and other interpretive errors are inadvertently transcribed by the computer software. Please disregard these errors. Please excuse any errors that have escaped final proofreading.

## 2021-12-15 NOTE — PROGRESS NOTES
[facility] - [place] [address]  [address], [address] [phone] fax    Discharge Summary  Patient name: Chinedu Harman Start of Care: 11/10/2021   Referral source: Fred Nunez* : 1945                Medical Diagnosis: Low back pain, unspecified [M54.50]  Payor: [facility] MEDICAID / Plan: [address] [place] [address] / Product Type: Managed Care Medicaid /  Onset Date:chronic with exacerbation 10/18   Treatment Diagnosis: LBP   Prior Hospitalization: see medical history Provider#: [id number]   Medications: Verified on Patient summary List    Comorbidities: history of PE, grade 1 anteriolisthesis    Prior Level of Function: functionally (I); lives alone on 3rd floor in senior living      Visits from Start of Care: 1    Missed Visits: 0    Reporting Period : 11/10/21 to 11/10/21    Short Term Goals: To be accomplished in 2 weeks:  1. Pt will report compliance with HEP in order to supplement PT treatment               Eval = established    Not met - unable to reassess due to unplanned DC  2. Pt will report max pain 8/10 in order to increase participation in therapy               Eval = 10/10    Not met - unable to reassess due to unplanned DC     Long Term Goals: To be accomplished in 10 treatments:  1. Pt will score at least 45 on FOTO in order to improve overall function, decrease pain, and facilitate return to PLOF. Eval = 37    Not met - unable to reassess due to unplanned DC  2. Pt will demonstrate bilateral hip flex/abd strength 4+/5 in order to increased ambulation distances in the community               Eval = bilateral flex = 4/5, bilateral abd = 3/5    Not met - unable to reassess due to unplanned DC  3.    Pt will demonstrate bilateral supine PROM hip ER/IR to 25degrees in order to improve his ability to tolerate sitting and standing                Eval: bilateral ER = 10 degrees, bilateral IR = 15degrees    Not met - unable to reassess due to unplanned DC  4. Pt will perform 5 sit to stands in 30seconds in order to reduce risk of falls               Eval = 2x in 30seconds no UE    Not met - unable to reassess due to unplanned DC    Assessment/Summary of care: Mr. Serena Sanchez was evaluated on 11/10/21 and has not returned to clinic since. Pt was unreachable by phone; Due to unknown pt status and attendance policy, pt is DC at this time. Should pt require treatment in the future, we would be happy to continue with an updated referral from the physician. RECOMMENDATIONS:  [x]Discontinue therapy: []Patient has reached or is progressing toward set goals      [x]Patient is non-compliant or has abdicated      []Due to lack of appreciable progress towards set goals    Shahida Gutierrez, PT 12/15/2021 5:09 PM    NOTE TO PHYSICIAN:  Please complete the following and fax to: [facility] at [facility] at [phone]  . Retain this original for your records. If you are unable to process this request in   24 hours, please contact our office.      [] I have read the above report and request that my patient continue therapy with the following changes/special instructions:  [] I have read the above report and request that my patient be discharged from therapy    Physician's Signature:____________Date:_________TIME:________     Sirena Conner*  ** Signature, Date and Time must be completed for valid certification **

## 2022-01-03 ENCOUNTER — HOSPITAL ENCOUNTER (OUTPATIENT)
Dept: LAB | Age: 77
Discharge: HOME | End: 2022-01-03
Payer: MEDICAID

## 2022-01-03 PROCEDURE — U0003 INFECTIOUS AGENT DETECTION BY NUCLEIC ACID (DNA OR RNA); SEVERE ACUTE RESPIRATORY SYNDROME CORONAVIRUS 2 (SARS-COV-2) (CORONAVIRUS DISEASE [COVID-19]), AMPLIFIED PROBE TECHNIQUE, MAKING USE OF HIGH THROUGHPUT TECHNOLOGIES AS DESCRIBED BY CMS-2020-01-R: HCPCS

## 2022-01-04 LAB — SARS-COV-2, NAA: NOT DETECTED

## 2022-02-03 ENCOUNTER — APPOINTMENT (OUTPATIENT)
Dept: GENERAL RADIOLOGY | Age: 77
End: 2022-02-03
Attending: STUDENT IN AN ORGANIZED HEALTH CARE EDUCATION/TRAINING PROGRAM
Payer: MEDICARE

## 2022-02-03 ENCOUNTER — HOSPITAL ENCOUNTER (EMERGENCY)
Age: 77
Discharge: HOME | End: 2022-02-04
Attending: STUDENT IN AN ORGANIZED HEALTH CARE EDUCATION/TRAINING PROGRAM
Payer: MEDICARE

## 2022-02-03 ENCOUNTER — APPOINTMENT (OUTPATIENT)
Dept: CT IMAGING | Age: 77
End: 2022-02-03
Attending: STUDENT IN AN ORGANIZED HEALTH CARE EDUCATION/TRAINING PROGRAM
Payer: MEDICARE

## 2022-02-03 DIAGNOSIS — R06.02 SHORTNESS OF BREATH: ICD-10-CM

## 2022-02-03 DIAGNOSIS — Z79.01 ON CONTINUOUS ORAL ANTICOAGULATION: ICD-10-CM

## 2022-02-03 DIAGNOSIS — R07.9 ACUTE CHEST PAIN: Primary | ICD-10-CM

## 2022-02-03 LAB
ALBUMIN SERPL-MCNC: 3.7 G/DL (ref 3.4–5)
ALBUMIN/GLOB SERPL: 1.1 {RATIO} (ref 0.8–1.7)
ALP SERPL-CCNC: 59 U/L (ref 45–117)
ALT SERPL-CCNC: 28 U/L (ref 16–61)
ANION GAP SERPL CALC-SCNC: 5 MMOL/L (ref 3–18)
AST SERPL-CCNC: 23 U/L (ref 10–38)
BASOPHILS # BLD: 0 K/UL (ref 0–0.1)
BASOPHILS NFR BLD: 1 % (ref 0–2)
BILIRUB SERPL-MCNC: 0.4 MG/DL (ref 0.2–1)
BNP SERPL-MCNC: 23 PG/ML (ref 0–1800)
BUN SERPL-MCNC: 14 MG/DL (ref 7–18)
BUN/CREAT SERPL: 13 (ref 12–20)
CALCIUM SERPL-MCNC: 9.3 MG/DL (ref 8.5–10.1)
CHLORIDE SERPL-SCNC: 108 MMOL/L (ref 100–111)
CO2 SERPL-SCNC: 26 MMOL/L (ref 21–32)
CREAT SERPL-MCNC: 1.04 MG/DL (ref 0.6–1.3)
DIFFERENTIAL METHOD BLD: ABNORMAL
EOSINOPHIL # BLD: 0.2 K/UL (ref 0–0.4)
EOSINOPHIL NFR BLD: 4 % (ref 0–5)
ERYTHROCYTE [DISTWIDTH] IN BLOOD BY AUTOMATED COUNT: 14.6 % (ref 11.6–14.5)
GLOBULIN SER CALC-MCNC: 3.5 G/DL (ref 2–4)
GLUCOSE SERPL-MCNC: 102 MG/DL (ref 74–99)
HCT VFR BLD AUTO: 38.1 % (ref 36–48)
HGB BLD-MCNC: 12.5 G/DL (ref 13–16)
IMM GRANULOCYTES # BLD AUTO: 0 K/UL (ref 0–0.04)
IMM GRANULOCYTES NFR BLD AUTO: 0 % (ref 0–0.5)
INR PPP: 1.1 (ref 0.8–1.2)
LYMPHOCYTES # BLD: 1.9 K/UL (ref 0.9–3.6)
LYMPHOCYTES NFR BLD: 32 % (ref 21–52)
MCH RBC QN AUTO: 29.7 PG (ref 24–34)
MCHC RBC AUTO-ENTMCNC: 32.8 G/DL (ref 31–37)
MCV RBC AUTO: 90.5 FL (ref 78–100)
MONOCYTES # BLD: 0.6 K/UL (ref 0.05–1.2)
MONOCYTES NFR BLD: 11 % (ref 3–10)
NEUTS SEG # BLD: 3.1 K/UL (ref 1.8–8)
NEUTS SEG NFR BLD: 53 % (ref 40–73)
NRBC # BLD: 0 K/UL (ref 0–0.01)
NRBC BLD-RTO: 0 PER 100 WBC
PLATELET # BLD AUTO: 279 K/UL (ref 135–420)
PMV BLD AUTO: 8.4 FL (ref 9.2–11.8)
POTASSIUM SERPL-SCNC: 4.1 MMOL/L (ref 3.5–5.5)
PROT SERPL-MCNC: 7.2 G/DL (ref 6.4–8.2)
PROTHROMBIN TIME: 14.5 SEC (ref 11.5–15.2)
RBC # BLD AUTO: 4.21 M/UL (ref 4.35–5.65)
SODIUM SERPL-SCNC: 139 MMOL/L (ref 136–145)
TROPONIN-HIGH SENSITIVITY: 7 NG/L (ref 0–78)
WBC # BLD AUTO: 5.9 K/UL (ref 4.6–13.2)

## 2022-02-03 PROCEDURE — 99285 EMERGENCY DEPT VISIT HI MDM: CPT

## 2022-02-03 PROCEDURE — 85025 COMPLETE CBC W/AUTO DIFF WBC: CPT

## 2022-02-03 PROCEDURE — 85610 PROTHROMBIN TIME: CPT

## 2022-02-03 PROCEDURE — 83735 ASSAY OF MAGNESIUM: CPT

## 2022-02-03 PROCEDURE — 83880 ASSAY OF NATRIURETIC PEPTIDE: CPT

## 2022-02-03 PROCEDURE — 84484 ASSAY OF TROPONIN QUANT: CPT

## 2022-02-03 PROCEDURE — 80053 COMPREHEN METABOLIC PANEL: CPT

## 2022-02-03 PROCEDURE — 93005 ELECTROCARDIOGRAM TRACING: CPT

## 2022-02-03 PROCEDURE — 71045 X-RAY EXAM CHEST 1 VIEW: CPT

## 2022-02-03 PROCEDURE — 74011000636 HC RX REV CODE- 636: Performed by: STUDENT IN AN ORGANIZED HEALTH CARE EDUCATION/TRAINING PROGRAM

## 2022-02-03 PROCEDURE — 71275 CT ANGIOGRAPHY CHEST: CPT

## 2022-02-03 RX ORDER — GUAIFENESIN 100 MG/5ML
324 LIQUID (ML) ORAL
Status: DISCONTINUED | OUTPATIENT
Start: 2022-02-03 | End: 2022-02-03

## 2022-02-03 RX ADMIN — IOPAMIDOL 100 ML: 755 INJECTION, SOLUTION INTRAVENOUS at 22:53

## 2022-02-04 VITALS
HEART RATE: 70 BPM | TEMPERATURE: 98 F | DIASTOLIC BLOOD PRESSURE: 69 MMHG | RESPIRATION RATE: 17 BRPM | OXYGEN SATURATION: 96 % | SYSTOLIC BLOOD PRESSURE: 130 MMHG

## 2022-02-04 LAB
ATRIAL RATE: 92 BPM
CALCULATED P AXIS, ECG09: 50 DEGREES
CALCULATED R AXIS, ECG10: -46 DEGREES
CALCULATED T AXIS, ECG11: 54 DEGREES
DIAGNOSIS, 93000: NORMAL
MAGNESIUM SERPL-MCNC: 2 MG/DL (ref 1.6–2.6)
P-R INTERVAL, ECG05: 162 MS
Q-T INTERVAL, ECG07: 354 MS
QRS DURATION, ECG06: 76 MS
QTC CALCULATION (BEZET), ECG08: 437 MS
TROPONIN-HIGH SENSITIVITY: 6 NG/L (ref 0–78)
VENTRICULAR RATE, ECG03: 92 BPM

## 2022-02-04 NOTE — ED PROVIDER NOTES
EMERGENCY DEPARTMENT HISTORY AND PHYSICAL EXAM      Date: 2/3/2022  Patient Name: Dipak Dong    History of Presenting Illness     Chief Complaint   Patient presents with    Chest Pain    Shortness of Breath       History (Context): Dipak Dong is a 77 y.o. 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 Patient states chest pain began approximately 1 to 2 hours prior to arrival.  He locates the pain to the left side of the chest and substernal region, described as tight, nonradiating, and without any alleviating or exacerbating factors. Patient states he did not take any medication for treatment of symptoms prior to arrival.  He does admit to compliance with his anticoagulation. Patient denies previous ACS or MI in the past.  He states that he not had symptoms like this previously. He otherwise denies any fever, chills, abdominal pain, nausea, vomiting, diarrhea, or diaphoresis. He does admit to associated dyspnea with his symptoms. Patient states his symptoms are severe in quality      PCP: Dae Warren MD    Current Facility-Administered Medications   Medication Dose Route Frequency Provider Last Rate Last Admin    aspirin chewable tablet 324 mg  324 mg Oral NOW Darroll Hand, DO         Current Outpatient Medications   Medication Sig Dispense Refill    wheat dextrin (Benefiber Healthy Shape) 5 gram/7.4 gram powd Take  by mouth.  metoprolol succinate (TOPROL-XL) 50 mg XL tablet Take 1 Tablet by mouth daily. 90 Tablet 3    apixaban (Eliquis) 5 mg tablet Take 1 Tablet by mouth two (2) times a day. 60 Tablet 5    famotidine (PEPCID) 20 mg tablet Take 20 mg by mouth two (2) times a day.  ezetimibe (ZETIA) 10 mg tablet Take 10 mg by mouth daily.  spironolactone (Aldactone) 25 mg tablet Take 25 mg by mouth daily.       tamsulosin (FLOMAX) 0.4 mg capsule Take 2 Capsules by mouth daily (after dinner). 180 Capsule 3    finasteride (PROSCAR) 5 mg tablet Take 1 Tablet by mouth daily. 90 Tablet 3    polyethylene glycol (MIRALAX) 17 gram packet Take 1 Packet by mouth daily. 30 Packet 0    diclofenac (VOLTAREN) 1 % gel Apply 2 g to affected area four (4) times daily. 100 g 2    DULoxetine (CYMBALTA) 60 mg capsule Take 1 Cap by mouth daily. Indications: neuropathic pain 60 Cap 5    albuterol sulfate 90 mcg/actuation aebs Take  by inhalation as needed.  pantoprazole (PROTONIX) 40 mg tablet Take 1 Tab by mouth daily. 30 Tab 0    amLODIPine (NORVASC) 10 mg tablet Take 1 Tab by mouth daily.  27 Tab 0       Past History     Past Medical History:   Past Medical History:   Diagnosis Date    Bladder outlet obstruction     Erectile disorder due to medical 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     Frequency of urination     H/O spinal cord injury     lumbar spine injury secondary to fall    HTN (hypertension)     Hypercholesterolemia     Illiterate     Injury of lumbar spine ([facility] [address]     Leg pain, right     Nocturia     Overactive bladder     Peripheral vascular disease ([facility] [address]        Past Surgical History:  Past Surgical History:   Procedure Laterality Date    COLONOSCOPY N/A 2019    [facility]    HAND/FINGER SURGERY UNLISTED Right     carpal tunnel    HX HEENT Left     lens implant    HX ORTHOPAEDIC      finger amputation left hand    HX TONSILLECTOMY      UPPER ARM/ELBOW SURGERY UNLISTED Right        Family History:  Family History   Problem Relation Age of Onset    Diabetes Mother     Hypertension Mother     Diabetes Maternal Grandmother     Hypertension Maternal Grandmother     Cancer Sister         brain    Cancer Sister         brain       Social History:   Social History     Tobacco Use    Smoking status: Former Smoker     Quit date: 2015     Years since quittin.5    Smokeless tobacco: Never Used   Vaping Use    Vaping Use: Never used   Substance Use Topics    Alcohol use: Not Currently     Alcohol/week: 0.0 standard drinks     Comment: former stopped 2015    Drug use: No       Allergies: Allergies   Allergen Reactions    Latex Rash     Other reaction(s): Unknown    Ampicillin Angioedema    Asa-Acetaminophen-Caff-Buffers Rash    Penicillins Angioedema     Other reaction(s): anaphylaxis    Crab Hives    Shellfish Derived Rash    Aspirin Other (comments)     Stomach upset - patient said he had peptic ulcers and cannot take  Other reaction(s): Unknown      Atorvastatin Other (comments)     Muscle cramps       PMH, PSH, family history, social history, allergies reviewed with the patient with significant items noted above. Review of Systems   Review of Systems   Constitutional: Negative for chills and fever. HENT: Negative for sore throat. Eyes: Negative for visual disturbance. Negative recent vision problems   Respiratory: Positive for shortness of breath. Cardiovascular: Positive for chest pain. Gastrointestinal: Negative for abdominal pain, diarrhea and nausea. Genitourinary: Negative for difficulty urinating. Musculoskeletal: Negative for myalgias. Skin: Negative for rash. Neurological: Negative for headaches. Negative altered level of consciousness   All other systems reviewed and are negative. Physical Exam     Vitals:    02/03/22 1939   BP: 125/70   Pulse: (!) 105   Resp: 19   Temp: 98 °F (36.7 °C)   SpO2: 95%       Physical Exam  Vitals and nursing note reviewed. Constitutional:       General: He is not in acute distress. Appearance: Normal appearance. He is not ill-appearing or toxic-appearing. HENT:      Head: Normocephalic and atraumatic. Mouth/Throat:      Mouth: Mucous membranes are moist.   Eyes:      General: No scleral icterus.      Conjunctiva/sclera: Conjunctivae normal.   Cardiovascular:      Rate and Rhythm: Regular rhythm. Tachycardia present. Pulses: Normal pulses. Pulmonary:      Effort: Pulmonary effort is normal. No respiratory distress. Comments: On NC   Abdominal:      General: There is no distension. Palpations: Abdomen is soft. Tenderness: There is no abdominal tenderness. There is no guarding or rebound. Musculoskeletal:         General: No deformity. Normal range of motion. Cervical back: Normal range of motion and neck supple. Right lower leg: No edema. Left lower leg: No edema. Skin:     General: Skin is warm and dry. Findings: No rash. Neurological:      General: No focal deficit present. Mental Status: He is alert and oriented to person, place, and time. Mental status is at baseline. Psychiatric:         Mood and Affect: Mood normal.         Behavior: Behavior normal.         Diagnostic Study Results     Labs -     Recent Results (from the past 12 hour(s))   CBC WITH AUTOMATED DIFF    Collection Time: 02/03/22  7:40 PM   Result Value Ref Range    WBC 5.9 4.6 - 13.2 K/uL    RBC 4.21 (L) 4.35 - 5.65 M/uL    HGB 12.5 (L) 13.0 - 16.0 g/dL    HCT 38.1 36.0 - 48.0 %    MCV 90.5 78.0 - 100.0 FL    MCH 29.7 24.0 - 34.0 PG    MCHC 32.8 31.0 - 37.0 g/dL    RDW 14.6 (H) 11.6 - 14.5 %    PLATELET 526 953 - 185 K/uL    MPV 8.4 (L) 9.2 - 11.8 FL    NRBC 0.0 0  WBC    ABSOLUTE NRBC 0.00 0.00 - 0.01 K/uL    NEUTROPHILS 53 40 - 73 %    LYMPHOCYTES 32 21 - 52 %    MONOCYTES 11 (H) 3 - 10 %    EOSINOPHILS 4 0 - 5 %    BASOPHILS 1 0 - 2 %    IMMATURE GRANULOCYTES 0 0.0 - 0.5 %    ABS. NEUTROPHILS 3.1 1.8 - 8.0 K/UL    ABS. LYMPHOCYTES 1.9 0.9 - 3.6 K/UL    ABS. MONOCYTES 0.6 0.05 - 1.2 K/UL    ABS. EOSINOPHILS 0.2 0.0 - 0.4 K/UL    ABS. BASOPHILS 0.0 0.0 - 0.1 K/UL    ABS. IMM.  GRANS. 0.0 0.00 - 0.04 K/UL    DF AUTOMATED     PROTHROMBIN TIME + INR    Collection Time: 02/03/22  7:40 PM   Result Value Ref Range    Prothrombin time 14.5 11.5 - 15.2 sec    INR 1.1 0.8 - 1.2     METABOLIC PANEL, COMPREHENSIVE    Collection Time: 02/03/22  7:40 PM   Result Value Ref Range    Sodium 139 136 - 145 mmol/L    Potassium 4.1 3.5 - 5.5 mmol/L    Chloride 108 100 - 111 mmol/L    CO2 26 21 - 32 mmol/L    Anion gap 5 3.0 - 18 mmol/L    Glucose 102 (H) 74 - 99 mg/dL    BUN 14 7.0 - 18 MG/DL    Creatinine 1.04 0.6 - 1.3 MG/DL    BUN/Creatinine ratio 13 12 - 20      GFR est AA >60 >60 ml/min/1.73m2    GFR est non-AA >60 >60 ml/min/1.73m2    Calcium 9.3 8.5 - 10.1 MG/DL    Bilirubin, total 0.4 0.2 - 1.0 MG/DL    ALT (SGPT) 28 16 - 61 U/L    AST (SGOT) 23 10 - 38 U/L    Alk. phosphatase 59 45 - 117 U/L    Protein, total 7.2 6.4 - 8.2 g/dL    Albumin 3.7 3.4 - 5.0 g/dL    Globulin 3.5 2.0 - 4.0 g/dL    A-G Ratio 1.1 0.8 - 1.7     NT-PRO BNP    Collection Time: 02/03/22  7:40 PM   Result Value Ref Range    NT pro-BNP 23 0 - 1,800 PG/ML   TROPONIN-HIGH SENSITIVITY    Collection Time: 02/03/22  7:40 PM   Result Value Ref Range    Troponin-High Sensitivity 7 0 - 78 ng/L      Labs Reviewed   CBC WITH AUTOMATED DIFF - Abnormal; Notable for the following components:       Result Value    RBC 4.21 (*)     HGB 12.5 (*)     RDW 14.6 (*)     MPV 8.4 (*)     MONOCYTES 11 (*)     All other components within normal limits   METABOLIC PANEL, COMPREHENSIVE - Abnormal; Notable for the following components:    Glucose 102 (*)     All other components within normal limits   PROTHROMBIN TIME + INR   NT-PRO BNP   TROPONIN-HIGH SENSITIVITY   MAGNESIUM       Radiologic Studies -   XR CHEST PORT    (Results Pending)   CTA CHEST W OR W WO CONT    (Results Pending)     CT Results  (Last 48 hours)    None        CXR Results  (Last 48 hours)    None          The laboratory results, imaging results, and other diagnostic exams were reviewed in the EMR. Medical Decision Making   I am the first provider for this patient.     I reviewed the vital signs, available nursing notes, past medical history, past surgical history, family history and social history. Vital Signs-Reviewed the patient's vital signs. ED EKG interpretation:  Rhythm: normal sinus rhythm; and regular . Rate (approx.): 92; Axis: left axis deviation; P wave: normal; QRS interval: normal ; ST/T wave: non-specific changes; Other findings: abnormal ekg. This EKG was interpreted by Kelly Bingham D.O. Records Reviewed: Personally, on initial evaluation    MDM:   Mirza Hallman presents with complaint of chest pain and dyspnea  DDX includes but is not limited to: ACS, MI, PE    Patient overall in no acute distress, tachycardic but otherwise vitals grossly within normal limits. Will obtain lab work and imaging for further evaluation of patients complaint. Will continue to monitor and evaluate patient while in the ED. Orders as below:  Orders Placed This Encounter    XR CHEST PORT    CTA CHEST W OR W WO CONT    CBC WITH AUTOMATED DIFF    PROTHROMBIN TIME + INR    COMPREHENSIVE METABOLIC PANEL    PRO-BNP    TROPONIN-HIGH SENSITIVITY    MAGNESIUM    EKG, 12 LEAD, INITIAL    aspirin chewable tablet 324 mg        ED Course:   ED Course as of 02/04/22 0603   Thu Feb 03, 2022   2013 Patient declining aspirin as he states previous PUD 2/2 administration. Explained to patient importance of taking aspirin however continues to decline. Patient's lab work significant for hemoglobin 12.5 otherwise labs gross within normal notes. We will continue to monitor patient. [DV]   5948 Patient CT scan shows a no evidence of PE with mild central venous congestion and evidence of bronchitis/emphysema. Obtain repeat troponin this time. [DV]   Fri Feb 04, 2022   8677 Patient's repeat troponin downtrending/stable. Will discharge patient home with return precautions and follow-up recommendations. Patients vitals grossly within normal limits and have normalized. Patient verbalized understanding is without any further questions.  [DV]      ED Course User Index  [DV] Tere DO [place]           Procedures:  Procedures        Diagnosis and Disposition     CLINICAL IMPRESSION:  No diagnosis found. Current Discharge Medication List          Disposition: Home    Patient condition at time of disposition: Stable    DISCHARGE NOTE:   Pt has been reexamined. Patient has no new complaints, changes, or physical findings. Care plan outlined and precautions discussed. Results were reviewed with the patient. All medications were reviewed with the patient. All of pt's questions and concerns were addressed. Alarm symptoms and return precautions associated with chief complaint and evaluation were reviewed with the patient in detail. The patient demonstrated adequate understanding. Patient was instructed to follow up with PCP, as well as strict return precautions to the ED upon further deterioration. Patient is ready to go home. The patient is happy with this plan            Dragon Disclaimer     Please note that this dictation was completed with goodideazs, the computer voice recognition software. Quite often unanticipated grammatical, syntax, homophones, and other interpretive errors are inadvertently transcribed by the computer software. Please disregard these errors. Please excuse any errors that have escaped final proofreading. Elver MA.

## 2022-02-04 NOTE — ED NOTES
Pt returned from CT. Pt states he feels \"good. \" Pt denies chest pain and SOB. Blankets given for warmth.

## 2022-02-04 NOTE — ED TRIAGE NOTES
Pt arrived via EMS c/o chest pain and SOB since 3pm today. Pt states he was painting his door when the pain began. Non-radiating and non-tender. EMS gave 2 nitro  Pain went from 8/10 to 4/10. Pt also states he has been SOB and has left leg pain.

## 2022-02-04 NOTE — ED NOTES
I have reviewed discharge instructions with the patient. The patient verbalized understanding. Pt left in stable ambulatory condition.

## 2022-02-04 NOTE — ED NOTES
Pt states pain has gone away. Pt resting on stretcher with eyes closed, observed rise and fall of chest.  Pt easily aroused by name calling. NAD noted, no new complaints voiced at this time. Will continue to monitor.

## 2022-02-21 ENCOUNTER — OFFICE VISIT (OUTPATIENT)
Dept: ORTHOPEDIC SURGERY | Age: 77
End: 2022-02-21
Payer: MEDICARE

## 2022-02-21 VITALS
OXYGEN SATURATION: 97 % | WEIGHT: 176 LBS | TEMPERATURE: 97.1 F | BODY MASS INDEX: 27.62 KG/M2 | HEART RATE: 75 BPM | HEIGHT: 67 IN

## 2022-02-21 DIAGNOSIS — M47.816 LUMBAR SPONDYLOSIS: Primary | ICD-10-CM

## 2022-02-21 PROCEDURE — G8432 DEP SCR NOT DOC, RNG: HCPCS | Performed by: PHYSICAL MEDICINE & REHABILITATION

## 2022-02-21 PROCEDURE — G8536 NO DOC ELDER MAL SCRN: HCPCS | Performed by: PHYSICAL MEDICINE & REHABILITATION

## 2022-02-21 PROCEDURE — G8756 NO BP MEASURE DOC: HCPCS | Performed by: PHYSICAL MEDICINE & REHABILITATION

## 2022-02-21 PROCEDURE — 3017F COLORECTAL CA SCREEN DOC REV: CPT | Performed by: PHYSICAL MEDICINE & REHABILITATION

## 2022-02-21 PROCEDURE — G8427 DOCREV CUR MEDS BY ELIG CLIN: HCPCS | Performed by: PHYSICAL MEDICINE & REHABILITATION

## 2022-02-21 PROCEDURE — G8419 CALC BMI OUT NRM PARAM NOF/U: HCPCS | Performed by: PHYSICAL MEDICINE & REHABILITATION

## 2022-02-21 PROCEDURE — 1101F PT FALLS ASSESS-DOCD LE1/YR: CPT | Performed by: PHYSICAL MEDICINE & REHABILITATION

## 2022-02-21 PROCEDURE — 99212 OFFICE O/P EST SF 10 MIN: CPT | Performed by: PHYSICAL MEDICINE & REHABILITATION

## 2022-02-21 NOTE — PROGRESS NOTES
Leida Pickering presents today for   Chief Complaint   Patient presents with    Back Pain       Is someone accompanying this pt? no    Is the patient using any DME equipment during OV? no    Depression Screening:  3 most recent PHQ Screens 11/22/2021   PHQ Not Done -   Little interest or pleasure in doing things Not at all   Feeling down, depressed, irritable, or hopeless Not at all   Total Score PHQ 2 0       Learning Assessment:  Learning Assessment 11/22/2021   PRIMARY LEARNER Patient   PRIMARY LANGUAGE ENGLISH   LEARNER PREFERENCE PRIMARY DEMONSTRATION     -   ANSWERED BY Patient   RELATIONSHIP SELF       Abuse Screening:  Abuse Screening Questionnaire 4/30/2019   Do you ever feel afraid of your partner? N   Are you in a relationship with someone who physically or mentally threatens you? N   Is it safe for you to go home? Y       Fall Risk  Fall Risk Assessment, last 12 mths 11/22/2021   Able to walk? Yes   Fall in past 12 months? 1   Do you feel unsteady? 1   Are you worried about falling 0   Is TUG test greater than 12 seconds? 1   Is the gait abnormal? 0   Number of falls in past 12 months 2   Fall with injury? 0       OPIOID RISK TOOL  No flowsheet data found. Coordination of Care:  1. Have you been to the ER, urgent care clinic since your last visit? Yes January 2022-  He thought he  Was having a heart Attack   Hospitalized since your last visit? no    2. Have you seen or consulted any other health care providers outside of the [address] [place] [address] since your last visit? no Include any pap smears or colon screening.  no

## 2022-02-21 NOTE — PROGRESS NOTES
[address]  [address], [address]  [facility], [address]  Phone: [phone]  Fax: [phone]      Patient: Lalit Hope                                                                              MRN: [id number]        YOB: 1945          AGE: 77 y.o. PCP: Radha Morrow MD  Date:  02/21/22    Reason for Consultation: Back Pain      HPI:  Lalit Hope is a 77 y.o. male with relevant PMH of HTN, CAD, PVD who presented with chronic low back pain radiating into b/l buttocks. Pain is worse from sit to stand. He is taking currently Cymbalta 60mg  and tried gabapentin but stopped because he thought it would make him too drowsy. Recent MRI lumbar spine was relatively unchanged from prior MRI in 2018 with degenerative changes mild  central stenosis L4-5  and mild foraminal narrowing at several levels. Since his last visit he had one session of PT but for some reason states he was not scheduled for other sessions, but per PT dischrage patient did not schedule visit and was discharged. Denies any recent precipitating incident or trauma. In 1970s justo was hospitalized for over 1 month in traction    Neurologic symptoms: No numbness, tingling, weakness, bowel or bladder changes. No recent falls      Location: The pain is located in the low back   Radiation: The pain does radiate bilateral buttock L>R. Pain Score: Currently: 5/10    Quality: Pain is of a Achy and Pulling quality. Aggravating: Pain is exacerbated by sit to stand  Alleviating:  The pain is alleviated by walking    Prior Treatments: occupational therapy for ulnar transposition 2/2021  PT for the knee  Lumbar brace  Previous Medications: gabapentin 300mg stopped because he was worried it would make him drowsy  Current Medications: cymbalta 60mg, lidocaine patch  Previous work-up has included:   MRI Results (most recent):  MRI lumbar spine 3/3/2021  L2-3: Broad-based disc bulge asymmetric to the right. Mild bilateral facet arthropathy and ligamentum flavum hypertrophy. No significant central canal or foraminal narrowing.     L3-4: Broad-based disc bulge superimposed on mild bilateral facet arthropathy with ligamentum flavum hypertrophy. Mild/moderate central canal narrowing. Mild bilateral foraminal narrowing     L4-5: Broad-based disc bulge superimposed on mild bilateral facet arthropathy and ligamentum flavum hypertrophy, right greater than left. No significant central canal or foraminal narrowing.     L5-S1: Grade 1 anterolisthesis of L5 with respect S1 on the basis of chronic bilateral L5 pars defects. Mild broad-based disc bulge with uncovering of the posterior disc. Mild/moderate bilateral foraminal narrowing, left greater than right.     Past Medical History:   Past Medical History:   Diagnosis Date    Bladder outlet obstruction     Erectile disorder due to medical 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     Frequency of urination     H/O spinal cord injury     lumbar spine injury secondary to fall    HTN (hypertension)     Hypercholesterolemia     Illiterate     Injury of lumbar spine ([facility] [address]     Leg pain, right     Nocturia     Overactive bladder     Peripheral vascular disease ([facility] [address]       Past Surgical History:   Past Surgical History:   Procedure Laterality Date    COLONOSCOPY N/A 2019    [facility]    HAND/FINGER SURGERY UNLISTED Right     carpal tunnel    HX HEENT Left     lens implant    HX ORTHOPAEDIC      finger amputation left hand    HX TONSILLECTOMY      UPPER ARM/ELBOW SURGERY UNLISTED Right       SocHx:   Social History     Tobacco Use    Smoking status: Former Smoker     Quit date: 2015     Years since quittin.6    Smokeless tobacco: Never Used   Substance Use Topics    Alcohol use: Not Currently     Alcohol/week: 0.0 standard drinks     Comment: former stopped 2015      FamHx:? Family History   Problem Relation Age of Onset    Diabetes Mother     Hypertension Mother     Diabetes Maternal Grandmother     Hypertension Maternal Grandmother     Cancer Sister         brain    Cancer Sister         brain       Current Medications:    Current Outpatient Medications   Medication Sig Dispense Refill    wheat dextrin (Benefiber Healthy Shape) 5 gram/7.4 gram powd Take  by mouth.  metoprolol succinate (TOPROL-XL) 50 mg XL tablet Take 1 Tablet by mouth daily. 90 Tablet 3    apixaban (Eliquis) 5 mg tablet Take 1 Tablet by mouth two (2) times a day. 60 Tablet 5    famotidine (PEPCID) 20 mg tablet Take 20 mg by mouth two (2) times a day.  ezetimibe (ZETIA) 10 mg tablet Take 10 mg by mouth daily.  spironolactone (Aldactone) 25 mg tablet Take 25 mg by mouth daily.  tamsulosin (FLOMAX) 0.4 mg capsule Take 2 Capsules by mouth daily (after dinner). 180 Capsule 3    finasteride (PROSCAR) 5 mg tablet Take 1 Tablet by mouth daily. 90 Tablet 3    polyethylene glycol (MIRALAX) 17 gram packet Take 1 Packet by mouth daily. 30 Packet 0    diclofenac (VOLTAREN) 1 % gel Apply 2 g to affected area four (4) times daily. 100 g 2    DULoxetine (CYMBALTA) 60 mg capsule Take 1 Cap by mouth daily. Indications: neuropathic pain 60 Cap 5    albuterol sulfate 90 mcg/actuation aebs Take  by inhalation as needed.  pantoprazole (PROTONIX) 40 mg tablet Take 1 Tab by mouth daily. 30 Tab 0    amLODIPine (NORVASC) 10 mg tablet Take 1 Tab by mouth daily. 30 Tab 0      Allergies:     Allergies   Allergen Reactions    Latex Rash     Other reaction(s): Unknown    Ampicillin Angioedema    Asa-Acetaminophen-Caff-Buffers Rash    Penicillins Angioedema     Other reaction(s): anaphylaxis    Crab Hives    Shellfish Derived Rash    Aspirin Other (comments)     Stomach upset - patient said he had peptic ulcers and cannot take  Other reaction(s): Unknown      Atorvastatin Other (comments)     Muscle cramps        Review of Systems:   Gen:    Denied fevers, chills, malaise, fatigue, weight changes   Resp: Denied shortness of breath, cough, wheezing   CVS: Denied chest pain, palpitations   : Denied urinary urgency, frequency, incontinence   GI: Denied nausea, vomiting, constipation, diarrhea   Skin: Denied rashes, wounds   Psych: Denied anxiety, depression   Vasc: Denied claudication, ulcers   Hem: Denied easy bruising/bleeding   MSK: See HPI   Neuro: See HPI         Physical Exam     Vital Signs:   Visit Vitals  Pulse 75   Temp 97.1 °F (36.2 °C) (Temporal)   Ht 5' 7\" (1.702 m)   Wt 176 lb (79.8 kg)   SpO2 97%   BMI 27.57 kg/m²      General: ??????? Well nourished and well developed male without any acute distress   Psychiatric: ?  Alert and oriented x 3 with normal mood    HEENT: ???????? Atraumatic   Respiratory:   Breathing non-labored and non dyspneic   CV: ???????????????? Peripheral pulses intact, no peripheral edema   Skin: ????????????? No rashes       Neurologic: ?? Sensation: normal and grossly intact thebilateral, lower extremity(s)   Strength: 5/5 in the bilateral, lower extremity(s)   Reflexes: reveals 2+ symmetric DTRs throughout LE  Gait: normal    Musculoskeletal: Lumbar Exam     Inspection:   Alignment: Abnormal decreased lumbar lordosis  Atrophy: None     Tenderness to Palpation:   Lumbar paraspinals Positive b/l  Lumbar spinous processes Negative  SI Joint:  Positive b/l  Gluteal:Negative   Greater trochanter: Negative  IT Band:Negative    ROM:   Lumbar ROM: Abnormal limited extension  Lumbar facet loading: Negative  Hip ROM: No reproduction of pain with movement -seated    Special Tests      Slump test: Negative  SLR: Negative  GARCÍA: Positive ++ low back pain  FADIR: Negative  Stinchfield: Positive ++ low back pain  Log Roll: Negative        Medical Decision Making:    Images:        Assessment:   1.  Lumbar spondylosis    Plan:      -Physical therapy -  Referral to physical therapy for lumbar motion, balance training, LE flexibility  -Medications -Continue Cymbalta 60mg daily. .  Counseled regarding side effects and appropriate administration of medications.    -Diagnostics/Imaging - Reviewed MRI lumbar spine  -Injections -patient is not interested in any spine injection at his point. Consider SI joint injection or possible TPIs  -DME- patient is interested in more supportive brace, will see how he feels after PT and consider TLSO but worry about causing weakness  -Education - The patient's diagnosis, prognosis and treatment options were discussed today. All questions were answered. F/U - in3 month(s) or sooner if needed.          [address] [facility]

## 2022-02-27 ENCOUNTER — HOSPITAL ENCOUNTER (EMERGENCY)
Age: 77
Discharge: HOME | End: 2022-02-28
Attending: EMERGENCY MEDICINE
Payer: MEDICAID

## 2022-02-27 ENCOUNTER — APPOINTMENT (OUTPATIENT)
Dept: GENERAL RADIOLOGY | Age: 77
End: 2022-02-27
Attending: EMERGENCY MEDICINE
Payer: MEDICAID

## 2022-02-27 DIAGNOSIS — M54.2 NECK PAIN: ICD-10-CM

## 2022-02-27 DIAGNOSIS — R07.9 CHEST PAIN, UNSPECIFIED TYPE: Primary | ICD-10-CM

## 2022-02-27 LAB
ALBUMIN SERPL-MCNC: 3.1 G/DL (ref 3.4–5)
ALBUMIN/GLOB SERPL: 0.9 {RATIO} (ref 0.8–1.7)
ALP SERPL-CCNC: 57 U/L (ref 45–117)
ALT SERPL-CCNC: 21 U/L (ref 16–61)
ANION GAP SERPL CALC-SCNC: 5 MMOL/L (ref 3–18)
AST SERPL-CCNC: 16 U/L (ref 10–38)
BASE EXCESS BLD CALC-SCNC: 0.3 MMOL/L
BASOPHILS # BLD: 0 K/UL (ref 0–0.1)
BASOPHILS NFR BLD: 0 % (ref 0–2)
BILIRUB SERPL-MCNC: 0.2 MG/DL (ref 0.2–1)
BUN SERPL-MCNC: 8 MG/DL (ref 7–18)
BUN/CREAT SERPL: 9 (ref 12–20)
CALCIUM SERPL-MCNC: 8.9 MG/DL (ref 8.5–10.1)
CHLORIDE SERPL-SCNC: 107 MMOL/L (ref 100–111)
CO2 SERPL-SCNC: 27 MMOL/L (ref 21–32)
CREAT SERPL-MCNC: 0.89 MG/DL (ref 0.6–1.3)
DIFFERENTIAL METHOD BLD: ABNORMAL
EOSINOPHIL # BLD: 0.3 K/UL (ref 0–0.4)
EOSINOPHIL NFR BLD: 6 % (ref 0–5)
ERYTHROCYTE [DISTWIDTH] IN BLOOD BY AUTOMATED COUNT: 14.7 % (ref 11.6–14.5)
GLOBULIN SER CALC-MCNC: 3.4 G/DL (ref 2–4)
GLUCOSE SERPL-MCNC: 93 MG/DL (ref 74–99)
HCO3 BLD-SCNC: 25.5 MMOL/L (ref 22–26)
HCT VFR BLD AUTO: 35.5 % (ref 36–48)
HGB BLD-MCNC: 11.4 G/DL (ref 13–16)
IMM GRANULOCYTES # BLD AUTO: 0 K/UL (ref 0–0.04)
IMM GRANULOCYTES NFR BLD AUTO: 1 % (ref 0–0.5)
LACTATE BLD-SCNC: 1.67 MMOL/L (ref 0.4–2)
LIPASE SERPL-CCNC: 152 U/L (ref 73–393)
LYMPHOCYTES # BLD: 1.8 K/UL (ref 0.9–3.6)
LYMPHOCYTES NFR BLD: 36 % (ref 21–52)
MCH RBC QN AUTO: 29.2 PG (ref 24–34)
MCHC RBC AUTO-ENTMCNC: 32.1 G/DL (ref 31–37)
MCV RBC AUTO: 91 FL (ref 78–100)
MONOCYTES # BLD: 0.7 K/UL (ref 0.05–1.2)
MONOCYTES NFR BLD: 14 % (ref 3–10)
NEUTS SEG # BLD: 2.1 K/UL (ref 1.8–8)
NEUTS SEG NFR BLD: 43 % (ref 40–73)
NRBC # BLD: 0 K/UL (ref 0–0.01)
NRBC BLD-RTO: 0 PER 100 WBC
PCO2 BLD: 41.9 MMHG (ref 35–45)
PH BLD: 7.39 [PH] (ref 7.35–7.45)
PLATELET # BLD AUTO: 269 K/UL (ref 135–420)
PMV BLD AUTO: 8.4 FL (ref 9.2–11.8)
PO2 BLD: 76 MMHG (ref 80–100)
POTASSIUM SERPL-SCNC: 4 MMOL/L (ref 3.5–5.5)
PROT SERPL-MCNC: 6.5 G/DL (ref 6.4–8.2)
RBC # BLD AUTO: 3.9 M/UL (ref 4.35–5.65)
SAO2 % BLD: 95 % (ref 92–97)
SERVICE CMNT-IMP: ABNORMAL
SODIUM SERPL-SCNC: 139 MMOL/L (ref 136–145)
SPECIMEN TYPE: ABNORMAL
TROPONIN-HIGH SENSITIVITY: 5 NG/L (ref 0–78)
WBC # BLD AUTO: 5 K/UL (ref 4.6–13.2)

## 2022-02-27 PROCEDURE — 71045 X-RAY EXAM CHEST 1 VIEW: CPT

## 2022-02-27 PROCEDURE — 84484 ASSAY OF TROPONIN QUANT: CPT

## 2022-02-27 PROCEDURE — 80053 COMPREHEN METABOLIC PANEL: CPT

## 2022-02-27 PROCEDURE — 83605 ASSAY OF LACTIC ACID: CPT

## 2022-02-27 PROCEDURE — 85025 COMPLETE CBC W/AUTO DIFF WBC: CPT

## 2022-02-27 PROCEDURE — 93005 ELECTROCARDIOGRAM TRACING: CPT

## 2022-02-27 PROCEDURE — 83690 ASSAY OF LIPASE: CPT

## 2022-02-27 PROCEDURE — 99285 EMERGENCY DEPT VISIT HI MDM: CPT

## 2022-02-28 ENCOUNTER — HOSPITAL ENCOUNTER (OUTPATIENT)
Dept: LAB | Age: 77
Discharge: HOME | End: 2022-02-28

## 2022-02-28 VITALS
HEART RATE: 64 BPM | OXYGEN SATURATION: 96 % | RESPIRATION RATE: 21 BRPM | DIASTOLIC BLOOD PRESSURE: 58 MMHG | SYSTOLIC BLOOD PRESSURE: 137 MMHG | TEMPERATURE: 98.4 F

## 2022-02-28 LAB
ATRIAL RATE: 80 BPM
CALCULATED P AXIS, ECG09: 31 DEGREES
CALCULATED R AXIS, ECG10: -34 DEGREES
CALCULATED T AXIS, ECG11: 52 DEGREES
DIAGNOSIS, 93000: NORMAL
P-R INTERVAL, ECG05: 166 MS
Q-T INTERVAL, ECG07: 366 MS
QRS DURATION, ECG06: 76 MS
QTC CALCULATION (BEZET), ECG08: 422 MS
TROPONIN-HIGH SENSITIVITY: 4 NG/L (ref 0–78)
VENTRICULAR RATE, ECG03: 80 BPM
XX-LABCORP SPECIMEN COL,LCBCF: NORMAL

## 2022-02-28 PROCEDURE — 84484 ASSAY OF TROPONIN QUANT: CPT

## 2022-02-28 PROCEDURE — 99001 SPECIMEN HANDLING PT-LAB: CPT

## 2022-02-28 NOTE — ED NOTES
I have reviewed discharge instructions with the patient. The patient verbalized understanding. No further questions at this time. Patient taken to the front via wheelchair to the main lobby for lab work from his PCP.

## 2022-02-28 NOTE — ED TRIAGE NOTES
Patient c/o chest pain and shortness of breath with exertion with intermittent dizziness. A couple of days the patient states he started coughing up yellowish/green mucus. Patient had pneumonia early February and a history of PE. Oxygen saturation is 97%on room air.

## 2022-02-28 NOTE — ED PROVIDER NOTES
EMERGENCY DEPARTMENT HISTORY AND PHYSICAL EXAM    9:30 PM patient seen at this time on EMS stretcher on arrival      Date: 2/27/2022  Patient Name: Tg Prather    History of Presenting Illness     Chief Complaint   Patient presents with    Chest Pain    Shortness of Breath         History Provided By: patient    Additional History (Context): Tg Prather is a 77 y.o. male presents with history of a PE and tachycardia, comes in with 2 hours of left-sided chest pain under his breast central sternal chest pain and goes up into the right side of his neck. He says it is worse with exertion. No problems prior to tonight as far as chest pain syndrome goes. Not lightheaded not short of breath. Does have a history of pulmonary embolism. Does see a cardiologist reportedly for the tachycardia. Pain is moderate. Prehospital 12-lead EKG no STEMI. PCP: Ajith Vaz MD    Chief Complaint:   Duration:    Timing:    Location:   Quality:   Severity:   Modifying Factors:   Associated Symptoms:       Current Outpatient Medications   Medication Sig Dispense Refill    wheat dextrin (Benefiber Healthy Shape) 5 gram/7.4 gram powd Take  by mouth.  metoprolol succinate (TOPROL-XL) 50 mg XL tablet Take 1 Tablet by mouth daily. 90 Tablet 3    apixaban (Eliquis) 5 mg tablet Take 1 Tablet by mouth two (2) times a day. 60 Tablet 5    famotidine (PEPCID) 20 mg tablet Take 20 mg by mouth two (2) times a day.  ezetimibe (ZETIA) 10 mg tablet Take 10 mg by mouth daily.  spironolactone (Aldactone) 25 mg tablet Take 25 mg by mouth daily.  tamsulosin (FLOMAX) 0.4 mg capsule Take 2 Capsules by mouth daily (after dinner). 180 Capsule 3    finasteride (PROSCAR) 5 mg tablet Take 1 Tablet by mouth daily. 90 Tablet 3    polyethylene glycol (MIRALAX) 17 gram packet Take 1 Packet by mouth daily. 30 Packet 0    diclofenac (VOLTAREN) 1 % gel Apply 2 g to affected area four (4) times daily.  100 g 2    DULoxetine (CYMBALTA) 60 mg capsule Take 1 Cap by mouth daily. Indications: neuropathic pain 60 Cap 5    albuterol sulfate 90 mcg/actuation aebs Take  by inhalation as needed.  pantoprazole (PROTONIX) 40 mg tablet Take 1 Tab by mouth daily. 30 Tab 0    amLODIPine (NORVASC) 10 mg tablet Take 1 Tab by mouth daily. 27 Tab 0       Past History     Past Medical History:  Past Medical History:   Diagnosis Date    Bladder outlet obstruction     Erectile disorder due to medical 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     Frequency of urination     H/O spinal cord injury     lumbar spine injury secondary to fall    HTN (hypertension)     Hypercholesterolemia     Illiterate     Injury of lumbar spine [facility] [address]     Leg pain, right     Nocturia     Overactive bladder     Peripheral vascular disease ([facility] [address]        Past Surgical History:  Past Surgical History:   Procedure Laterality Date    COLONOSCOPY N/A 2019    COLONOSCOPY performed by Darcie Sorensen [facility] at [facility] ENDOSCOPY    HAND/FINGER SURGERY UNLISTED Right     carpal tunnel    HX HEENT Left     lens implant    HX ORTHOPAEDIC      finger amputation left hand    HX TONSILLECTOMY      UPPER ARM/ELBOW SURGERY UNLISTED Right        Family History:  Family History   Problem Relation Age of Onset    Diabetes Mother     Hypertension Mother     Diabetes Maternal Grandmother     Hypertension Maternal Grandmother     Cancer Sister         brain    Cancer Sister         brain       Social History:  Social History     Tobacco Use    Smoking status: Former Smoker     Quit date: 2015     Years since quittin.6    Smokeless tobacco: Never Used   Vaping Use    Vaping Use: Never used   Substance Use Topics    Alcohol use: Not Currently     Alcohol/week: 0.0 standard drinks     Comment: former stopped     Drug use: No       Allergies:   Allergies   Allergen Reactions    Latex Rash     Other reaction(s): Unknown    Ampicillin Angioedema    Asa-Acetaminophen-Caff-Buffers Rash    Penicillins Angioedema     Other reaction(s): anaphylaxis    Crab Hives    Shellfish Derived Rash    Aspirin Other (comments)     Stomach upset - patient said he had peptic ulcers and cannot take  Other reaction(s): Unknown      Atorvastatin Other (comments)     Muscle cramps         Review of Systems     Review of Systems   Constitutional: Negative for diaphoresis and fever. HENT: Negative for congestion and sore throat. Eyes: Negative for pain and itching. Respiratory: Negative for cough and shortness of breath. Cardiovascular: Positive for chest pain. Negative for palpitations. Gastrointestinal: Negative for abdominal pain and diarrhea. Endocrine: Negative for polydipsia and polyuria. Genitourinary: Negative for dysuria and hematuria. Musculoskeletal: Negative for arthralgias and myalgias. Skin: Negative for rash and wound. Neurological: Negative for seizures and syncope. Hematological: Does not bruise/bleed easily. Psychiatric/Behavioral: Negative for agitation and hallucinations. Physical Exam       Patient Vitals for the past 12 hrs:   Temp Pulse Resp BP SpO2   02/27/22 2357 98.4 °F (36.9 °C) 64 21 (!) 137/58 96 %   02/27/22 2348 -- 81 15 138/68 95 %       IPVITALS  Patient Vitals for the past 24 hrs:   BP Temp Pulse Resp SpO2   02/27/22 2357 (!) 137/58 98.4 °F (36.9 °C) 64 21 96 %   02/27/22 2348 138/68 -- 81 15 95 %       Physical Exam  Vitals and nursing note reviewed. Constitutional:       General: He is in acute distress (Moderate discomfort). Appearance: He is well-developed. He is not toxic-appearing or diaphoretic. HENT:      Head: Normocephalic and atraumatic. Eyes:      General: No scleral icterus. Conjunctiva/sclera: Conjunctivae normal.   Neck:      Vascular: No JVD. Cardiovascular:      Rate and Rhythm: Normal rate and regular rhythm. Heart sounds: Normal heart sounds.       Comments: 4 intact extremity pulses  Pulmonary:      Effort: Pulmonary effort is normal.      Breath sounds: Normal breath sounds. Abdominal:      Palpations: Abdomen is soft. There is no mass. Tenderness: There is no abdominal tenderness. Musculoskeletal:         General: Normal range of motion. Cervical back: Normal range of motion and neck supple. Lymphadenopathy:      Cervical: No cervical adenopathy. Skin:     General: Skin is warm and dry. Neurological:      Mental Status: He is alert.            Diagnostic Study Results   Labs -  Recent Results (from the past 24 hour(s))   EKG, 12 LEAD, INITIAL    Collection Time: 02/27/22  9:31 PM   Result Value Ref Range    Ventricular Rate 80 BPM    Atrial Rate 80 BPM    P-R Interval 166 ms    QRS Duration 76 ms    Q-T Interval 366 ms    QTC Calculation (Bezet) 422 ms    Calculated P Axis 31 degrees    Calculated R Axis -34 degrees    Calculated T Axis 52 degrees    Diagnosis       Sinus rhythm with fusion complexes  Left axis deviation  Voltage criteria for left ventricular hypertrophy  Abnormal ECG  When compared with ECG of 03-FEB-2022 19:48,  fusion complexes are now present     BLOOD GAS,LACTIC ACID, POC    Collection Time: 02/27/22  9:51 PM   Result Value Ref Range    pH (POC) 7.39 7.35 - 7.45      pCO2 (POC) 41.9 35.0 - 45.0 MMHG    pO2 (POC) 76 (L) 80 - 100 MMHG    HCO3 (POC) 25.5 22 - 26 MMOL/L    sO2 (POC) 95.0 92 - 97 %    Base excess (POC) 0.3 mmol/L    Specimen type (POC) VENOUS BLOOD      Performed by Rocco Irizarry     Lactic Acid (POC) 1.67 0.40 - 2.00 mmol/L   CBC WITH AUTOMATED DIFF    Collection Time: 02/27/22 10:50 PM   Result Value Ref Range    WBC 5.0 4.6 - 13.2 K/uL    RBC 3.90 (L) 4.35 - 5.65 M/uL    HGB 11.4 (L) 13.0 - 16.0 g/dL    HCT 35.5 (L) 36.0 - 48.0 %    MCV 91.0 78.0 - 100.0 FL    MCH 29.2 24.0 - 34.0 PG    MCHC 32.1 31.0 - 37.0 g/dL    RDW 14.7 (H) 11.6 - 14.5 %    PLATELET 784 465 - 311 K/uL    MPV 8.4 (L) 9.2 - 11.8 FL    NRBC 0.0 0  WBC    ABSOLUTE NRBC 0.00 0.00 - 0.01 K/uL    NEUTROPHILS 43 40 - 73 %    LYMPHOCYTES 36 21 - 52 %    MONOCYTES 14 (H) 3 - 10 %    EOSINOPHILS 6 (H) 0 - 5 %    BASOPHILS 0 0 - 2 %    IMMATURE GRANULOCYTES 1 (H) 0.0 - 0.5 %    ABS. NEUTROPHILS 2.1 1.8 - 8.0 K/UL    ABS. LYMPHOCYTES 1.8 0.9 - 3.6 K/UL    ABS. MONOCYTES 0.7 0.05 - 1.2 K/UL    ABS. EOSINOPHILS 0.3 0.0 - 0.4 K/UL    ABS. BASOPHILS 0.0 0.0 - 0.1 K/UL    ABS. IMM. GRANS. 0.0 0.00 - 0.04 K/UL    DF AUTOMATED     METABOLIC PANEL, COMPREHENSIVE    Collection Time: 02/27/22 10:50 PM   Result Value Ref Range    Sodium 139 136 - 145 mmol/L    Potassium 4.0 3.5 - 5.5 mmol/L    Chloride 107 100 - 111 mmol/L    CO2 27 21 - 32 mmol/L    Anion gap 5 3.0 - 18 mmol/L    Glucose 93 74 - 99 mg/dL    BUN 8 7.0 - 18 MG/DL    Creatinine 0.89 0.6 - 1.3 MG/DL    BUN/Creatinine ratio 9 (L) 12 - 20      GFR est AA >60 >60 ml/min/1.73m2    GFR est non-AA >60 >60 ml/min/1.73m2    Calcium 8.9 8.5 - 10.1 MG/DL    Bilirubin, total 0.2 0.2 - 1.0 MG/DL    ALT (SGPT) 21 16 - 61 U/L    AST (SGOT) 16 10 - 38 U/L    Alk. phosphatase 57 45 - 117 U/L    Protein, total 6.5 6.4 - 8.2 g/dL    Albumin 3.1 (L) 3.4 - 5.0 g/dL    Globulin 3.4 2.0 - 4.0 g/dL    A-G Ratio 0.9 0.8 - 1.7     TROPONIN-HIGH SENSITIVITY    Collection Time: 02/27/22 10:50 PM   Result Value Ref Range    Troponin-High Sensitivity 5 0 - 78 ng/L   LIPASE    Collection Time: 02/27/22 10:50 PM   Result Value Ref Range    Lipase 152 73 - 393 U/L   TROPONIN-HIGH SENSITIVITY    Collection Time: 02/28/22  5:02 AM   Result Value Ref Range    Troponin-High Sensitivity 4 0 - 78 ng/L       Radiologic Studies -   XR CHEST PORT   Final Result      No clearly acute findings. Likely left basilar streaky atelectasis, less likely   infiltrate. XR CHEST PORT    Result Date: 2/28/2022  EXAMINATION: Chest single view INDICATION: Chest pain, shortness of breath COMPARISON: 2/3/2022 FINDINGS: Single frontal view the chest obtained. Mediastinal silhouette and pulmonary vasculature unremarkable. Minimal aortic arch calcifications. Left basilar minimal streaky density. No confluent consolidation. No evidence of pneumothorax. No obvious acute osseous findings. No clearly acute findings. Likely left basilar streaky atelectasis, less likely infiltrate. Medications ordered:   Medications - No data to display      Medical Decision Making   Initial Medical Decision Making and DDx:  ACS pneumonia PE doubt pneumothorax. Gastric causes possible twelve-lead EKG sinus rhythm at 80 no acute process    ED Course: Progress Notes, Reevaluation, and Consults:  ED Course as of 02/28/22 0601   Sun Feb 27, 2022 2222 Twelve-lead EKG sinus rhythm at 80 no acute process [CB]      ED Course User Index  [CB] Norma Finch MD     Second troponin negative. I did take a look at him for his neck pain, no obvious focal source of tenderness it is markedly tender somewhere between the sternocleidomastoid and the thyroid cartilage. He is edentulous no suspicion for odontogenic origin trachea is midline no lymphadenopathy no masses to suggest abscess. Follow-up with ENT. I am the first provider for this patient. I reviewed the vital signs, available nursing notes, past medical history, past surgical history, family history and social history. Patient Vitals for the past 12 hrs:   Temp Pulse Resp BP SpO2   02/27/22 2357 98.4 °F (36.9 °C) 64 21 (!) 137/58 96 %   02/27/22 2348 -- 81 15 138/68 95 %       Vital Signs-Reviewed the patient's vital signs. Pulse Oximetry Analysis, Cardiac Monitor, 12 lead ekg:      Interpreted by the EP. Records Reviewed: Nursing notes reviewed (Time of Review: 9:30 PM)    Procedures:   Critical Care Time:   Aspirin: (was aspirin given for stroke?)    Diagnosis     Clinical Impression:   1. Chest pain, unspecified type    2.  Neck pain        Disposition: Discharged      Follow-up Information     Follow up With Specialties Details Why Contact Info    Ben Green MD Family Medicine In 2 days  [address] [facility]      Maeve Aldridge MD Otolaryngology, Surgery   [address] [place] [address] [address]  [phone]             Patient's Medications   Start Taking    No medications on file   Continue Taking    ALBUTEROL SULFATE 90 MCG/ACTUATION AEBS    Take  by inhalation as needed. AMLODIPINE (NORVASC) 10 MG TABLET    Take 1 Tab by mouth daily. APIXABAN (ELIQUIS) 5 MG TABLET    Take 1 Tablet by mouth two (2) times a day. DICLOFENAC (VOLTAREN) 1 % GEL    Apply 2 g to affected area four (4) times daily. DULOXETINE (CYMBALTA) 60 MG CAPSULE    Take 1 Cap by mouth daily. Indications: neuropathic pain    EZETIMIBE (ZETIA) 10 MG TABLET    Take 10 mg by mouth daily. FAMOTIDINE (PEPCID) 20 MG TABLET    Take 20 mg by mouth two (2) times a day. FINASTERIDE (PROSCAR) 5 MG TABLET    Take 1 Tablet by mouth daily. METOPROLOL SUCCINATE (TOPROL-XL) 50 MG XL TABLET    Take 1 Tablet by mouth daily. PANTOPRAZOLE (PROTONIX) 40 MG TABLET    Take 1 Tab by mouth daily. POLYETHYLENE GLYCOL (MIRALAX) 17 GRAM PACKET    Take 1 Packet by mouth daily. SPIRONOLACTONE (ALDACTONE) 25 MG TABLET    Take 25 mg by mouth daily. TAMSULOSIN (FLOMAX) 0.4 MG CAPSULE    Take 2 Capsules by mouth daily (after dinner). WHEAT DEXTRIN (BENEFIBER HEALTHY SHAPE) 5 GRAM/7.4 GRAM POWD    Take  by mouth.    These Medications have changed    No medications on file   Stop Taking    No medications on file     _______________________________    Notes:    Jesse Pathak MD using Dragon dictation      _______________________________

## 2022-03-05 LAB
ALBUMIN SERPL ELPH-MCNC: 4 G/DL (ref 2.9–4.4)
ALBUMIN SERPL-MCNC: 4.2 G/DL (ref 3.7–4.7)
ALBUMIN/GLOB SERPL: 1.3 {RATIO} (ref 0.7–1.7)
ALBUMIN/GLOB SERPL: 1.4 {RATIO} (ref 1.2–2.2)
ALP SERPL-CCNC: 72 IU/L (ref 44–121)
ALPHA1 GLOB SERPL ELPH-MCNC: 0.2 G/DL (ref 0–0.4)
ALPHA2 GLOB SERPL ELPH-MCNC: 0.7 G/DL (ref 0.4–1)
ALT SERPL-CCNC: 15 IU/L (ref 0–44)
AST SERPL-CCNC: 18 IU/L (ref 0–40)
B-GLOBULIN SERPL ELPH-MCNC: 1.2 G/DL (ref 0.7–1.3)
BASOPHILS # BLD AUTO: 0 X10E3/UL (ref 0–0.2)
BASOPHILS NFR BLD AUTO: 1 %
BILIRUB SERPL-MCNC: 0.3 MG/DL (ref 0–1.2)
BUN SERPL-MCNC: 8 MG/DL (ref 8–27)
BUN/CREAT SERPL: 9 (ref 10–24)
CALCIUM SERPL-MCNC: 9.7 MG/DL (ref 8.6–10.2)
CHLORIDE SERPL-SCNC: 102 MMOL/L (ref 96–106)
CO2 SERPL-SCNC: 22 MMOL/L (ref 20–29)
CREAT SERPL-MCNC: 0.91 MG/DL (ref 0.76–1.27)
EGFR: 88 ML/MIN/1.73
EOSINOPHIL # BLD AUTO: 0.2 X10E3/UL (ref 0–0.4)
EOSINOPHIL NFR BLD AUTO: 5 %
ERYTHROCYTE [DISTWIDTH] IN BLOOD BY AUTOMATED COUNT: 13.1 % (ref 11.6–15.4)
FERRITIN SERPL-MCNC: 686 NG/ML (ref 30–400)
GAMMA GLOB SERPL ELPH-MCNC: 1.1 G/DL (ref 0.4–1.8)
GLOBULIN SER CALC-MCNC: 3 G/DL (ref 1.5–4.5)
GLOBULIN SER-MCNC: 3.2 G/DL (ref 2.2–3.9)
GLUCOSE SERPL-MCNC: 91 MG/DL (ref 65–99)
HCT VFR BLD AUTO: 43.7 % (ref 37.5–51)
HGB BLD-MCNC: 14 G/DL (ref 13–17.7)
IGA SERPL-MCNC: 198 MG/DL (ref 61–437)
IGG SERPL-MCNC: 1318 MG/DL (ref 603–1613)
IGM SERPL-MCNC: 47 MG/DL (ref 15–143)
IMM GRANULOCYTES # BLD AUTO: 0 X10E3/UL (ref 0–0.1)
IMM GRANULOCYTES NFR BLD AUTO: 0 %
INTERPRETATION SERPL IEP-IMP: ABNORMAL
IRON SATN MFR SERPL: 26 % (ref 15–55)
IRON SERPL-MCNC: 68 UG/DL (ref 38–169)
KAPPA LC FREE SER-MCNC: 26.6 MG/L (ref 3.3–19.4)
KAPPA LC FREE/LAMBDA FREE SER: 1.66 {RATIO} (ref 0.26–1.65)
LAMBDA LC FREE SERPL-MCNC: 16 MG/L (ref 5.7–26.3)
LYMPHOCYTES # BLD AUTO: 1.6 X10E3/UL (ref 0.7–3.1)
LYMPHOCYTES NFR BLD AUTO: 32 %
M PROTEIN SERPL ELPH-MCNC: 0.4 G/DL
MCH RBC QN AUTO: 30 PG (ref 26.6–33)
MCHC RBC AUTO-ENTMCNC: 32 G/DL (ref 31.5–35.7)
MCV RBC AUTO: 94 FL (ref 79–97)
MONOCYTES # BLD AUTO: 0.5 X10E3/UL (ref 0.1–0.9)
MONOCYTES NFR BLD AUTO: 10 %
NEUTROPHILS # BLD AUTO: 2.7 X10E3/UL (ref 1.4–7)
NEUTROPHILS NFR BLD AUTO: 52 %
PLATELET # BLD AUTO: 312 X10E3/UL (ref 150–450)
PLEASE NOTE:, 149534: ABNORMAL
POTASSIUM SERPL-SCNC: 4.6 MMOL/L (ref 3.5–5.2)
PROT SERPL-MCNC: 7.2 G/DL (ref 6–8.5)
RBC # BLD AUTO: 4.66 X10E6/UL (ref 4.14–5.8)
SODIUM SERPL-SCNC: 138 MMOL/L (ref 134–144)
TIBC SERPL-MCNC: 266 UG/DL (ref 250–450)
UIBC SERPL-MCNC: 198 UG/DL (ref 111–343)
WBC # BLD AUTO: 5.1 X10E3/UL (ref 3.4–10.8)

## 2022-03-14 ENCOUNTER — VIRTUAL VISIT (OUTPATIENT)
Dept: ONCOLOGY | Age: 77
End: 2022-03-14
Payer: MEDICARE

## 2022-03-14 DIAGNOSIS — D64.9 CHRONIC ANEMIA: ICD-10-CM

## 2022-03-14 DIAGNOSIS — I82.4Y3 ACUTE DEEP VEIN THROMBOSIS (DVT) OF PROXIMAL VEIN OF BOTH LOWER EXTREMITIES (HCC): ICD-10-CM

## 2022-03-14 DIAGNOSIS — D47.2 MGUS (MONOCLONAL GAMMOPATHY OF UNKNOWN SIGNIFICANCE): Primary | ICD-10-CM

## 2022-03-14 DIAGNOSIS — I26.99 OTHER ACUTE PULMONARY EMBOLISM, UNSPECIFIED WHETHER ACUTE COR PULMONALE PRESENT (HCC): ICD-10-CM

## 2022-03-14 PROCEDURE — 99443 PR PHYS/QHP TELEPHONE EVALUATION 21-30 MIN: CPT | Performed by: INTERNAL MEDICINE

## 2022-03-14 NOTE — PROGRESS NOTES
Meli Barron is a 77 y.o. male, evaluated via audio-only technology on 3/14/2022 for No chief complaint on file. .    Assessment & Plan:   Diagnoses and all orders for this visit:    1. MGUS (monoclonal gammopathy of unknown significance)    2. Chronic anemia    3. Acute deep vein thrombosis (DVT) of proximal vein of both lower extremities (HCC)    4. Other acute pulmonary embolism, unspecified whether acute cor pulmonale present [facility])      The complexity of medical decision making for this visit is moderate. Monoclonal gammopathy of undetermined significance  Chronic anemia, improved:  -- Patient was being followed by Dr. Cintia Vivas who retired. -- 8/27/2021 CBC reported hemoglobin 13.4, hematocrit 41.3%, WBC 4.9, platelet 813,536. Protein electrophoresis reported M spike 0.6, Immunofixation shows IgG monoclonal protein with kappa light chain   Specificity; serum free kappa/lambda ratio 1.99  -- Clinically the patient is doing stable. No CRAB criteria. -- Today I have reviewed with the patient about recent lab reports. 2/28/2022 SPEP reported M spike 0.4, immunofixation shows IgG monoclonal protein with kappa light chain specificity. Plan:  -- Continue lab check CBC, CMP, SPEP/SFLC/JENS  -- The patient was advised to notify us if any skin lumps or bumps, swollen lymph nodes, night sweat, unintentional weight loss, worsen fatigue, new bone pain or back pain, or any concerns. -- I have advised the patient to follow up PCP to continue age-appropriate cancer screening. DVT/PE  -- 6/5/21 admitted for right sided PE with extensive clot burden. Also noted to have DVT- LLE  -- Presently on Eliquis. Has followup with PCP and Vascular Surgery      Chronic low back pain:   -- Patient will continue pain medications as prescribed by his PCP. -- We will see the patient back in clinic in about 6 months. Always sooner if required.   The patient can have lab done prior to our next clinic visit.        12  Subjective:   Mr. Nemesio Domingo is a 28-year-old male with a past medical history of CAD, HTN, PVD, radiculopathy of the lumbar region, chronic low back pain, osteoarthritis, and monoclonal gammopathy of unknown significance. He was diagnosed on October 5, 2018. The bone survey on 10/18/2018 showed no evidence of lytic lesions in the skeleton. Patient was being followed by Dr. Joshua Laguna who retired. Today the patient reports that he is doing well other than chronic pain related to his arthritis. He has f/u with Orthopedics s.p Left carpal tunnel syndrome surgery. He denied any new complaints or issues. He denies shortness of breath, dizziness, and chest pain. The patient otherwise has no other complaints. Denied fever, chills, night sweat, unintentional weight loss, skin lumps or bumps, acute bleeding. Denied headache, acute vision change, dizziness, chest pain, worsen shortness of breath, palpitation, productive cough, nausea, vomiting, abdominal pain, altered bowel habits, dysuria, new back pain, worsening focal weakness. Prior to Admission medications    Medication Sig Start Date End Date Taking? Authorizing Provider   wheat dextrin (Benefiber Healthy Shape) 5 gram/7.4 gram powd Take  by mouth. Provider, Historical   metoprolol succinate (TOPROL-XL) 50 mg XL tablet Take 1 Tablet by mouth daily. 10/28/21   Janeen Hess MD   apixaban (Eliquis) 5 mg tablet Take 1 Tablet by mouth two (2) times a day. 8/5/21   Sarkis Jackman NP   famotidine (PEPCID) 20 mg tablet Take 20 mg by mouth two (2) times a day. Provider, Historical   ezetimibe (ZETIA) 10 mg tablet Take 10 mg by mouth daily. Provider, Historical   spironolactone (Aldactone) 25 mg tablet Take 25 mg by mouth daily. Provider, Historical   tamsulosin (FLOMAX) 0.4 mg capsule Take 2 Capsules by mouth daily (after dinner). 6/17/21   KENIA Hurley   finasteride (PROSCAR) 5 mg tablet Take 1 Tablet by mouth daily.  6/17/21   Sena Baptiste KENIA Acosta   polyethylene glycol (MIRALAX) 17 gram packet Take 1 Packet by mouth daily. 6/10/21   Robert Sanchez MD   diclofenac (VOLTAREN) 1 % gel Apply 2 g to affected area four (4) times daily. 5/25/21   Bryan Griffin MD   DULoxetine (CYMBALTA) 60 mg capsule Take 1 Cap by mouth daily. Indications: neuropathic pain 9/11/20   Yesica ALFARO MD   albuterol sulfate 90 mcg/actuation aebs Take  by inhalation as needed. Provider, Historical   pantoprazole (PROTONIX) 40 mg tablet Take 1 Tab by mouth daily. 3/28/19   Kylie Stone MD   amLODIPine (NORVASC) 10 mg tablet Take 1 Tab by mouth daily. 3/28/19   Kylie Stone MD     Patient Active Problem List   Diagnosis Code    Unsteady gait R26.81    Gait instability R26.81    Vertigo R42    Radiculopathy of lumbar region M54.16    ED (erectile dysfunction) of organic origin N52.9    Peripheral vascular disease ([facility] [address] I73.9    Overactive bladder N32.81    Nocturia R35.1    Leg pain, right M79.604    Hypercholesterolemia E78.00    HTN (hypertension) I10    H/O spinal cord injury Z87.828    Erectile disorder due to medical 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 N52.1    Bladder outlet obstruction N32.0    Injury of lumbar spine ([facility] [address] S34.109A    Frequency of urination R35.0    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 Respiratory: Negative for cough, hemoptysis, shortness of breath and wheezing. Cardiovascular: Negative for chest pain, palpitations and leg swelling. Gastrointestinal: Negative for abdominal pain, diarrhea, heartburn, nausea and vomiting. Genitourinary: Negative for dysuria, frequency, hematuria and urgency. Musculoskeletal: Negative for joint pain and myalgias. Skin: Negative for itching and rash. Neurological: Negative for dizziness, seizures, weakness and headaches. Psychiatric/Behavioral: Negative for depression. The patient does not have insomnia. Patient-Reported Vitals 6/8/2020   Patient-Reported Weight 173 lbs   Patient-Reported Height 5f 7.5         The patient was advised that our priority at this time is to keep the patients safe, and therefore we are reaching out to patients virtually to prevent them coming into the office unless necessarily. Patient verbalized understanding and was agreeable for virtual visit. [place], who was evaluated through a patient-initiated, synchronous (real-time) audio only encounter, and/or her healthcare decision maker, is aware that it is a billable service, with coverage as determined by his insurance carrier. He provided verbal consent to proceed: Yes. He has not had a related appointment within my department in the past 7 days or scheduled within the next 24 hours. I have spent 25 minutes reviewing previous notes, test results and discussing the diagnosis and importance of compliance with the treatment plan as well as documenting on the day of the visit.     Mat Caicedo MD      CC: Tatyana Martinez MD

## 2022-03-16 ENCOUNTER — HOSPITAL ENCOUNTER (OUTPATIENT)
Dept: PHYSICAL THERAPY | Age: 77
Discharge: HOME | End: 2022-03-16
Attending: PHYSICAL MEDICINE & REHABILITATION
Payer: MEDICAID

## 2022-03-16 PROCEDURE — 97162 PT EVAL MOD COMPLEX 30 MIN: CPT

## 2022-03-16 NOTE — PROGRESS NOTES
PT DAILY TREATMENT NOTE  10-18    Patient Name: Cm Sanchez  Date:3/16/2022  : 1945  [x]  Patient  Verified  Payor: [facility] MEDICAID / Plan: MARELY RAINEY [facility] / Product Type: Managed Care Medicaid /    In time: 11:18  Out time:11:51  Total Treatment Time (min): 33  Visit #: 1 of 10    Medicare/BCBS Only   Total Timed Codes (min):  18 1:1 Treatment Time:  33     Treatment Area: Low back pain [M54.50]    SUBJECTIVE  Pain Level (0-10 scale): 6  Any medication changes, allergies to medications, adverse drug reactions, diagnosis change, or new procedure performed?: [x] No    [] Yes (see summary sheet for update)  Subjective functional status/changes:   [] No changes reported  See POC    OBJECTIVE    15 min [x]Eval                  []Re-Eval     10 min Therapeutic Exercise:  [] See flow sheet : HEP instruction and demonstration   Rationale: increase ROM and increase strength to improve the patients ability to tolerate ADLs    8 min Self Care/Home Management: []  See flow sheet : pt education regarding anatomy and physiology of the spine/LEs and how it relates to the pt's condition. Rationale: increase ROM, increase strength and decrease pain/symptoms  to improve the patients ability to tolerate functional tasks. With   [x] TE   [] TA   [] neuro   [x] Other: Self Care/Home management Patient Education: [x] Review HEP    [] Progressed/Changed HEP based on:   [] positioning   [] body mechanics   [] transfers   [] heat/ice application    [] other:      Other Objective/Functional Measures: See evaluation. Pain Level (0-10 scale) post treatment: 6    ASSESSMENT/Changes in Function: Pt given HEP handout to perform. Pt understood exercises in HEP handout. Pt demonstrated decreased AROM, decreased strength, muscle tightness, and impaired mobility. Pt would benefit from physical therapy to improve the above impairments to help the pt return to performing ADLs and functional activities.      Patient will continue to benefit from skilled PT services to modify and progress therapeutic interventions, address functional mobility deficits, address ROM deficits, address strength deficits, analyze and address soft tissue restrictions, analyze and cue movement patterns, analyze and modify body mechanics/ergonomics, assess and modify postural abnormalities, address imbalance/dizziness and instruct in home and community integration to attain remaining goals. [x]  See Plan of Care  []  See progress note/recertification  []  See Discharge Summary         Progress towards goals / Updated goals:  Short Term Goals: To be accomplished in 2 treatments:  1. Pt will report compliance and independence to [facility] to help the pt manage their pain and symptoms. Eval: established  Long Term Goals: To be accomplished in 10 treatments:  1. Pt will increase FOTO score to 50 points to improve ability to perform ADLs. Eval: 37 points  2. Pt will report an improvement in at best pain to 4/10 to improve ability to tolerate standing. Eval: 6/10 at best  3. Pt will report being able to find a comfortable position with sleeping at night secondary to his symptoms to improve sleep quality. Eval: reports having discomfort with sleeping because of pain and unable to find a comfortable position. 4. Pt will be able to perform a sit to stand transfer with mild to no increased pain of difficulty to improve ease of transfers with household activities. Eval: significant pain with sit to stands and unable to perform with good posture and body mechanics because of pain.      PLAN  [x]  Upgrade activities as tolerated     [x]  Continue plan of care  [x]  Update interventions per flow sheet       []  Discharge due to:_  []  Other:_      Grecia Shen PT 3/16/2022  12:55 PM    Future Appointments   Date Time Provider Raúl Clayton   3/16/2022 11:15 AM Rosezena Prader, PT [facility]   3/21/2022 10:15 AM Linda Robert MD [facility] BS AMB 4/27/2022  2:20 PM JERROD Thacker   4/28/2022 10:30 AM Promise Ross MD [facility] BS AMB   5/13/2022 10:20 AM Sam Walters NURSE [facility]   8/12/2022  1:00 PM JERROD Thacker

## 2022-03-16 NOTE — PROGRESS NOTES
[facility] - [facility]  [address], [address] [phone] fax    Plan of Care/ Statement of Necessity for Physical Therapy Services     Patient name: Soledad Prasad Start of Care: 3/16/2022   Referral source: Mere Medrano* : 1945    Medical Diagnosis: Low back pain [M54.50]  Payor: [facility] MEDICAID / Plan: [facility] / Product Type: Managed Care Medicaid /  Onset Date: initial , 2022    Treatment Diagnosis: LBP   Prior Hospitalization: see medical history Provider#: [id number]   Medications: Verified on Patient summary List    Comorbidities: heart disease, arthritis, HTN, visual impaired, hearing impaired, asthma, 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 The Plan of Care and following information is based on the information from the initial evaluation. Assessment/ key information:   Pt is a 77year old male who presents to therapy today with LBP. Pt states that his initial symptoms began in  when he fell off a ladder and was hospitalized for over 1 month. Pt denies any new injury or trauma and states his injury in  \"never fully healed\". He states his right LE \"feels like it is on fire\" from his knee down to his foot. He reports having increased pain with sit to stands, gait, and with bed mobility/sleeping. Pt demonstrated decreased AROM, decreased strength, muscle tightness, and impaired mobility. Pt would benefit from physical therapy to improve the above impairments to help the pt return to performing ADLs and functional activities.      Evaluation Complexity History HIGH Complexity :3+ comorbidities / personal factors will impact the outcome/ POC ; Examination MEDIUM Complexity : 3 Standardized tests and measures addressing body structure, function, activity limitation and / or participation in recreation  ;Presentation MEDIUM Complexity : Evolving with changing characteristics  ; Clinical Decision Making MEDIUM Complexity : FOTO score of 26-74  Overall Complexity Rating: MEDIUM  Problem List: pain affecting function, decrease ROM, decrease strength, impaired gait/ balance, decrease ADL/ functional abilitiies, decrease activity tolerance, decrease flexibility/ joint mobility and decrease transfer abilities   Treatment Plan may include any combination of the following: Therapeutic exercise, Therapeutic activities, Neuromuscular re-education, Physical agent/modality, Gait/balance training, Manual therapy, Patient education, Self Care training, Functional mobility training, Home safety training and Stair training  Patient / Family readiness to learn indicated by: asking questions, trying to perform skills and interest  Persons(s) to be included in education: patient (P)  Barriers to Learning/Limitations: None  Patient Goal (s): just want the pain to go away  Patient Self Reported Health Status: poor  Rehabilitation Potential: fair    Short Term Goals: To be accomplished in 2 treatments:  1. Pt will report compliance and independence to [facility] to help the pt manage their pain and symptoms. Eval: established  Long Term Goals: To be accomplished in 10 treatments:  1. Pt will increase FOTO score to 50 points to improve ability to perform ADLs. Eval: 37 points  2. Pt will report an improvement in at best pain to 4/10 to improve ability to tolerate standing. Eval: 6/10 at best  3. Pt will report being able to find a comfortable position with sleeping at night secondary to his symptoms to improve sleep quality. Eval: reports having discomfort with sleeping because of pain and unable to find a comfortable position. 4. Pt will be able to perform a sit to stand transfer with mild to no increased pain of difficulty to improve ease of transfers with household activities.    Eval: significant pain with sit to stands and unable to perform with good posture and body mechanics because of pain. Frequency / Duration: Patient to be seen 1-2 times per week for 10 treatments. Patient/ Caregiver education and instruction: Diagnosis, prognosis, self care, activity modification and exercises   [x]  Plan of care has been reviewed with PTA    Certification Period: 3/16/2022-4/14/2022  Aravind Nicholsonmirlande, PT 3/16/2022 12:49 PM  _____________________________________________________________________  I certify that the above Therapy Services are being furnished while the patient is under my care. I agree with the treatment plan and certify that this therapy is necessary.     Physician's Signature:____________Date:_________TIME:________    ** Signature, Date and Time must be completed for valid certification **    Please sign and return to [facility] - [address]   [facility], [address] [phone] fax

## 2022-03-18 PROBLEM — E88.09 PLASMA CELL DYSCRASIA: Status: ACTIVE | Noted: 2018-10-05

## 2022-03-18 PROBLEM — I82.4Y3 ACUTE DEEP VEIN THROMBOSIS (DVT) OF PROXIMAL VEIN OF BOTH LOWER EXTREMITIES (HCC): Status: ACTIVE | Noted: 2021-07-22

## 2022-03-19 PROBLEM — G56.22 CUBITAL TUNNEL SYNDROME, LEFT: Status: ACTIVE | Noted: 2021-02-02

## 2022-03-19 PROBLEM — R04.2 HEMOPTYSIS: Status: ACTIVE | Noted: 2021-06-11

## 2022-03-19 PROBLEM — G56.02 LEFT CARPAL TUNNEL SYNDROME: Status: ACTIVE | Noted: 2021-02-02

## 2022-03-19 PROBLEM — N52.9 ED (ERECTILE DYSFUNCTION) OF ORGANIC ORIGIN: Status: ACTIVE | Noted: 2017-06-21

## 2022-03-19 PROBLEM — R07.9 CHEST PAIN, MODERATE CORONARY ARTERY RISK: Status: ACTIVE | Noted: 2019-03-27

## 2022-03-19 PROBLEM — E43 SEVERE PROTEIN-CALORIE MALNUTRITION (HCC): Status: ACTIVE | Noted: 2021-06-07

## 2022-03-19 PROBLEM — I25.10 CAD (CORONARY ARTERY DISEASE): Status: ACTIVE | Noted: 2019-03-26

## 2022-03-19 PROBLEM — Q24.5 CORONARY-MYOCARDIAL BRIDGE: Status: ACTIVE | Noted: 2019-10-03

## 2022-03-20 PROBLEM — Z86.79 HISTORY OF RHEUMATIC FEVER: Status: ACTIVE | Noted: 2019-03-22

## 2022-03-20 PROBLEM — I26.99 ACUTE PULMONARY EMBOLISM (HCC): Status: ACTIVE | Noted: 2021-06-05

## 2022-03-21 ENCOUNTER — OFFICE VISIT (OUTPATIENT)
Dept: PULMONOLOGY | Age: 77
End: 2022-03-21
Payer: MEDICAID

## 2022-03-21 VITALS
HEIGHT: 67 IN | TEMPERATURE: 97.6 F | HEART RATE: 90 BPM | WEIGHT: 180.5 LBS | DIASTOLIC BLOOD PRESSURE: 88 MMHG | BODY MASS INDEX: 28.33 KG/M2 | SYSTOLIC BLOOD PRESSURE: 143 MMHG | RESPIRATION RATE: 16 BRPM | OXYGEN SATURATION: 97 %

## 2022-03-21 DIAGNOSIS — R07.9 CHEST PAIN, MODERATE CORONARY ARTERY RISK: ICD-10-CM

## 2022-03-21 DIAGNOSIS — I26.94 MULTIPLE SUBSEGMENTAL PULMONARY EMBOLI WITHOUT ACUTE COR PULMONALE (HCC): Primary | ICD-10-CM

## 2022-03-21 DIAGNOSIS — E88.09 PLASMA CELL DYSCRASIA: ICD-10-CM

## 2022-03-21 PROCEDURE — G8753 SYS BP > OR = 140: HCPCS | Performed by: INTERNAL MEDICINE

## 2022-03-21 PROCEDURE — G8432 DEP SCR NOT DOC, RNG: HCPCS | Performed by: INTERNAL MEDICINE

## 2022-03-21 PROCEDURE — 1101F PT FALLS ASSESS-DOCD LE1/YR: CPT | Performed by: INTERNAL MEDICINE

## 2022-03-21 PROCEDURE — 99214 OFFICE O/P EST MOD 30 MIN: CPT | Performed by: INTERNAL MEDICINE

## 2022-03-21 PROCEDURE — G8536 NO DOC ELDER MAL SCRN: HCPCS | Performed by: INTERNAL MEDICINE

## 2022-03-21 PROCEDURE — G8427 DOCREV CUR MEDS BY ELIG CLIN: HCPCS | Performed by: INTERNAL MEDICINE

## 2022-03-21 PROCEDURE — G8754 DIAS BP LESS 90: HCPCS | Performed by: INTERNAL MEDICINE

## 2022-03-21 PROCEDURE — G8419 CALC BMI OUT NRM PARAM NOF/U: HCPCS | Performed by: INTERNAL MEDICINE

## 2022-03-21 PROCEDURE — 3017F COLORECTAL CA SCREEN DOC REV: CPT | Performed by: INTERNAL MEDICINE

## 2022-03-21 NOTE — PROGRESS NOTES
[facility]  Pulmonary, Critical Care, and Sleep Medicine  Progress note    Name: Gabriela Saldana MRN: [id number]   : 1945 Hospital:    Date: 3/21/2022        IMPRESSION:     · Chest pain-CTA chest 2/3/22 -Negative for pulmonary emboli. ,Mild central venous congestion. ,Mild bronchitis and emphysema. Diverticulosis. Likely atypical with component of esophagogastritis. · Preop clearance for prostate surgery-scheduled in May. Patient can stop anticoagulation 2 days prior to surgery and resume immediately postop. If longer duration of holding Eliquis planned then would recommend bridging with heparin/Lovenox  · Shortness of breath-multifactorial with h/o PE 2021-improving  · Hemoptysis- 2021 acute in setting of anticoagulants-likely related to right lower lobe pneumonia/infarct. Old blood noted on bronchoscopic evaluation without any active bleeding. Heparin resumed and patient has not had any hemoptysis. Resolved  · R sided Pulmonary Embolism- extensive clot burden with improved-of upper and lower branches, likely chronic.  No hypoxia, no signs of RV strain on echo or CTA very likely chronic,No endobronchial pathology noted on bronchoscopy but cannot completely exclude . CT chest 2021-Significantly improved consolidation, intraparenchymal abscess and fluid collection in posterior right lower lobe and left lower lung atelectasis as outlined above.   Follow-up imaging CT 2/3/2022-complete clearing noted  · DVT-left popliteal-now with complains of discomfort and swelling of calf as well as thigh-improved  · Hx of esophageal varices  · Hx of EtoH abuse  · Recent Diverticulitis- now resolved.   · Hx of tobacco abuse - over 50 pack years, quit 5 years ago            Patient Active Problem List   Diagnosis Code    Unsteady gait R26.81    Gait instability R26.81    Vertigo R42    Radiculopathy of lumbar region M54.16    ED (erectile dysfunction) of organic origin N52.9    Peripheral vascular disease ([facility] [address] I73.9    Overactive bladder N32.81    Nocturia R35.1    Leg pain, right M79.604    Hypercholesterolemia E78.00    HTN (hypertension) I10    H/O spinal cord injury Z87.828    Erectile disorder due to medical 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 N52.1    Bladder outlet obstruction N32.0    Injury of lumbar spine ([facility] [address] S34.109A    Frequency of urination R35.0    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    History of rheumatic fever Z86.79    Chest pain, moderate coronary artery risk R07.9    CAD (coronary artery disease) I25.10    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 Eliquis should be held 48-72 hours prior to surgery and resume postop  · Discussed results of CT scan with patient-reassured of improved findings including complete resolution of right lower lobe consolidation. No new blood clots  · Continue airway clearance measures  · Needs COVID booster vaccine  · Maintain complete cessation of cigarette smoking  · Follow-up with cardiology  · Discussed any further questions concerns  · Will Follow in 4 months     Subjective/Interval History:       03/21/22  Here for follow-up  Complains of continued symptoms of some shortness of breath and some chest tightness off and on but overall slowly improving  Has cough but no further hemoptysis.   He describes his cough as a need to clear his throat which he is able to after he drinks water  He went to the emergency room on February 3 with complaints of left-sided chest pain -was worked up including had a CT of the chest and discharge. Denies chest pain at present  Complains of swelling of the left calf and a feeling of a knot up his inner thigh-improves if he keeps leg elevated  He has been taking his Eliquis-  Patient states that he has prostate surgery scheduled in mid May and was told that he will have to hold anticoagulation for 10 days  Denies any new complaints-has not noticed any change in color of stools, nausea vomiting  He has received 2 doses of COVID vaccine but has not yet gotten his booster dose      ROS:A comprehensive review of systems was negative except for that written in the HPI. Objective:   Vital Signs:    Visit Vitals  BP (!) 143/88 (BP 1 Location: Left upper arm, BP Patient Position: Sitting, BP Cuff Size: Small adult)   Pulse 90   Temp 97.6 °F (36.4 °C)   Resp 16   Ht 5' 7\" (1.702 m)   Wt 81.9 kg (180 lb 8 oz)   SpO2 97% Comment: RA Rest   BMI 28.27 kg/m²           Physical Exam:   General:  Alert, cooperative, no distress, appears stated age. Head:  Normocephalic, without obvious abnormality, atraumatic. Lungs:   Bilateral auscultation clear, no rales or wheezing.    Chest wall:  No tenderness or deformity. NO CREPITUS   Heart:  Regular rate and rhythm, S1, S2 normal, no murmur, click, rub or gallop. Abdomen:   Soft,mild tenderness to palpation in the center. Bowel sounds normal. No masses,  No organomegaly. No paradox   Extremities:  Mildly swollen left lower extremity, cord felt along inner right thigh   Pulses: 1-2+ and symmetric all extremities. Neurologic: Grossly nonfocal       DATA:  CT Results (most recent):  Results from Hospital Encounter encounter on 02/03/22    CTA CHEST W OR W WO CONT    Narrative  CTA CHEST PULMONARY EMBOLISM PROTOCOL    INDICATION: Chest pain, shortness of breath since 3:00 PM, left leg pain. Question pulmonary embolism.     TECHNIQUE: Thin collimation axial images obtained through the level of the  pulmonary arteries with additional imaging through the chest following the  uneventful administration of nonionic intravenous contrast.  Images  reconstructed into three dimensional coronal and sagittal projections for  complete evaluation of the tortuous and overlapping pulmonary vascular  structures and to reduce patient radiation dose. All CT scans at this facility are performed using dose optimization technique as  appropriate to a performed exam, to include automated exposure control,  adjustment of the mA and/or kV according to patient size (including appropriate  matching first site-specific examinations), or use of iterative reconstruction  technique. COMPARISON: 8/31/2021. FINDINGS:    No filling defects are appreciated within the main, left, right, lobar or  visualized segmental pulmonary arteries to suggest embolism. Thyroid: Unremarkable in its visualized aspects. Pericardium/ Heart: No significant effusion. Heart size is normal.  Aorta/ Vessels: Mild aortic atherosclerosis. No acute aortic pathology or  aneurysm. Lymph Nodes: Unremarkable. .    Lungs: Mild central venous congestion. Bilateral subpleural linear densities in  the dependent lower lobes. Mild emphysema. Mild bronchial wall thickening. There  are a few apical bullae/blebs which measure up to 2.7 cm medially on the right. Pleura: No effusion. Upper Abdomen: Adenomatous hypertrophy of the adrenal glands bilaterally. Diverticulosis. Bones/soft tissues: Mild symmetric bilateral gynecomastia. Narrowing bilateral  glenohumeral joints. Degenerative changes acromioclavicular joints. Degenerative  disc disease. Impression  Negative for pulmonary emboli. Mild central venous congestion. Mild bronchitis and emphysema. Diverticulosis.       High complexity decision making was performed during the evaluation of this patient at high risk for decompensation with multiple organ involvement     Above mentioned total time spent on reviewing the case/medical record/data/notes/EMR/patient examination/documentation/coordinating care with nurse/consultants, exclusive of procedures with complex decision making performed and > 50% time spent in face to face evaluation.     Merissa Medrano MD  Pulmonary, Critical Care Medicine  [facility]

## 2022-03-21 NOTE — PATIENT INSTRUCTIONS
Apixaban (By mouth)   Apixaban (a-PIX-a-ban)  Treats and prevents blood clots. This medicine is a blood thinner. Brand Name(s): Eliquis   There may be other brand names for this medicine. When This Medicine Should Not Be Used: This medicine is not right for everyone. Do not use it if you had an allergic reaction to apixaban or you have active bleeding. How to Use This Medicine:   Tablet  · Your doctor will tell you how much medicine to use. Do not use more than directed. · If you are not able to swallow the tablets whole, they may be crushed and mixed in water, 5% dextrose in water (D5W), apple juice, or applesauce. The crushed tablets may be mixed with 60 mL of water or D5W dose and given through a nasogastric tube (NGT). · This medicine should come with a Medication Guide. Ask your pharmacist for a copy if you do not have one. · Missed dose: Take a dose as soon as you remember. If it is almost time for your next dose, wait until then and take a regular dose. Do not take extra medicine to make up for a missed dose. · Store the medicine in a closed container at room temperature, away from heat, moisture, and direct light. Drugs and Foods to Avoid:   Ask your doctor or pharmacist before using any other medicine, including over-the-counter medicines, vitamins, and herbal products. · Some medicines can affect how apixaban works. Tell your doctor if you are using any of the following:   ¨ Carbamazepine, clarithromycin, itraconazole, ketoconazole, phenytoin, rifampin, ritonavir, Kristopher's wort  ¨ Blood thinner (including clopidogrel, heparin, prasugrel, warfarin)  ¨ Medicine to treat depression  ¨ NSAID pain or arthritis medicine (including aspirin, celecoxib, diclofenac, ibuprofen, naproxen)  Warnings While Using This Medicine:   · Tell your doctor if you are pregnant or breastfeeding, or if you have kidney disease, liver disease, bleeding problems, or an artificial heart valve.   · Do not stop using this medicine suddenly without asking your doctor. You might have a higher risk of stroke for a short time after you stop using this medicine. · This medicine increases your risk for bleeding that can become serious if not controlled. You may also bruise easily, and it may take longer than usual for bleeding to stop. · This medicine may increase your risk for blood clots in your spine or back if you undergo an epidural or spinal puncture. This could lead to paralysis. Tell your doctor if you ever had spine problems or back surgery. · Tell any doctor or dentist who treats you that you are using this medicine. With your doctor's supervision, you may need to stop using this medicine several days before you have surgery or medical tests. · Your doctor will do lab tests at regular visits to check on the effects of this medicine. Keep all appointments. · Keep all medicine out of the reach of children. Never share your medicine with anyone. Possible Side Effects While Using This Medicine:   Call your doctor right away if you notice any of these side effects:  · Allergic reaction: Itching or hives, swelling in your face or hands, swelling or tingling in your mouth or throat, chest tightness, trouble breathing  · Change in how much or how often you urinate, red or pink urine  · Chest pain, trouble breathing  · Coughing up blood, vomiting blood or material that looks like coffee grounds  · Numbness, tingling, or muscle weakness in your legs or feet  · Red or black, tarry stools  · Unusual bleeding, bruising, or weakness  If you notice other side effects that you think are caused by this medicine, tell your doctor. Call your doctor for medical advice about side effects. You may report side effects to FDA at 5-844-FDA-8260  © 2017 [facility] Information is for End User's use only and may not be sold, redistributed or otherwise used for commercial purposes. The above information is an  only. It is not intended as medical advice for individual conditions or treatments. Talk to your doctor, nurse or pharmacist before following any medical regimen to see if it is safe and effective for you.

## 2022-03-21 NOTE — LETTER
3/21/2022    Patient: Mery Finley   YOB: 1945   Date of Visit: 3/21/2022     Radha Nicholson MD  [address] [place] [facility] [address]  Via Fax: [phone]    Dear Radha Nicholson MD,      Thank you for referring Mr. Mery Finley to [facility] [place] for evaluation. My notes for this consultation are attached. If you have questions, please do not hesitate to call me. I look forward to following your patient along with you.       Sincerely,    Emily Brooks MD

## 2022-03-21 NOTE — PROGRESS NOTES
Gabriela Saldana presents today for No chief complaint on file. Is someone accompanying this pt? No    Is the patient using any DME equipment during OV? No   -DME Company NA    Depression Screening:  3 most recent PHQ Screens 11/22/2021   PHQ Not Done -   Little interest or pleasure in doing things Not at all   Feeling down, depressed, irritable, or hopeless Not at all   Total Score PHQ 2 0       Learning Assessment:  Learning Assessment 11/22/2021   PRIMARY LEARNER Patient   PRIMARY LANGUAGE ENGLISH   LEARNER PREFERENCE PRIMARY DEMONSTRATION     -   ANSWERED BY Patient   RELATIONSHIP SELF       Abuse Screening:  Abuse Screening Questionnaire 4/30/2019   Do you ever feel afraid of your partner? N   Are you in a relationship with someone who physically or mentally threatens you? N   Is it safe for you to go home? Y       Fall Risk  Fall Risk Assessment, last 12 mths 3/14/2022   Able to walk? Yes   Fall in past 12 months? 1   Do you feel unsteady? -   Are you worried about falling -   Is TUG test greater than 12 seconds? -   Is the gait abnormal? -   Number of falls in past 12 months 2   Fall with injury? 1         Coordination of Care:  1. Have you been to the ER, urgent care clinic since your last visit? Hospitalized since your last visit? Yes [facility] @ [facility] 02/28/22 for (SOB/Chest pain)    2. Have you seen or consulted any other health care providers outside of the [address] [place] [address] [place] since your last visit? Include any pap smears or colon screening.  No

## 2022-03-24 PROBLEM — I26.94 MULTIPLE SUBSEGMENTAL PULMONARY EMBOLI WITHOUT ACUTE COR PULMONALE (HCC): Status: ACTIVE | Noted: 2021-06-05

## 2022-04-04 ENCOUNTER — HOSPITAL ENCOUNTER (OUTPATIENT)
Dept: PHYSICAL THERAPY | Age: 77
Discharge: HOME | End: 2022-04-04
Attending: PHYSICAL MEDICINE & REHABILITATION
Payer: MEDICAID

## 2022-04-04 PROCEDURE — 97530 THERAPEUTIC ACTIVITIES: CPT

## 2022-04-04 PROCEDURE — 97110 THERAPEUTIC EXERCISES: CPT

## 2022-04-04 PROCEDURE — 97112 NEUROMUSCULAR REEDUCATION: CPT

## 2022-04-04 NOTE — PROGRESS NOTES
PT DAILY TREATMENT NOTE  10-18    Patient Name: Stephanie Alba  Date:2022  : 1945  [x]  Patient  Verified  Payor: [facility] MEDICAID / Plan: MARELY RAINEY [facility] / Product Type: Managed Care Medicaid /    In time: 11:17  Out time: 11:57  Total Treatment Time (min): 40  Visit #: 2 of 10    Treatment Area: Other low back pain [M54.59]    SUBJECTIVE  Pain Level (0-10 scale): 5  Any medication changes, allergies to medications, adverse drug reactions, diagnosis change, or new procedure performed?: [x] No    [] Yes (see summary sheet for update)  Subjective functional status/changes:   [] No changes reported  Pt reports initial compliance with HEP and the pain is more on the left low back today. OBJECTIVE    8 min Therapeutic Exercise:  [x] See flow sheet :   Rationale: increase ROM and increase strength to improve the patients ability to tolerate ADLs    12 min Therapeutic Activity:  [x]  See flow sheet : pelvic align assess, functional standing exercises   Rationale: increase ROM and increase strength  to improve the patients ability to tolerate ADLs. 20 min Neuromuscular Re-education:  [x]  See flow sheet : glute/core stability exercises   Rationale: increase strength, improve coordination and increase proprioception  to improve the patients ability to perform functional tasks. With   [x] TE   [x] TA   [x] neuro   [] Other:  Patient Education: [x] Review HEP    [] Progressed/Changed HEP based on:   [x] positioning   [x] body mechanics   [] transfers   [] heat/ice application    [] other:      Other Objective/Functional Measures: Initiated exercises/interventions per flow sheet. Pain Level (0-10 scale) post treatment: 3    ASSESSMENT/Changes in Function: Reported improvement in pain post session today. Pelvic align WNL today. Fair TA contraction noted with PPT. Needed cues for proper form of TRX squats to improve glute and quad contraction.  He continues to have referred pain into the right lower LE and limps with gait because of the right LE pain. Continue POC as tolerated to pain and mobility. Patient will continue to benefit from skilled PT services to modify and progress therapeutic interventions, address functional mobility deficits, address ROM deficits, address strength deficits, analyze and address soft tissue restrictions, analyze and cue movement patterns, analyze and modify body mechanics/ergonomics, assess and modify postural abnormalities, address imbalance/dizziness and instruct in home and community integration to attain remaining goals. []  See Plan of Care  []  See progress note/recertification  []  See Discharge Summary         Progress towards goals / Updated goals:  Short Term Goals: To be accomplished in 2 treatments:  1. Pt will report compliance and independence to [facility] to help the pt manage their pain and symptoms. Eval: established  Reports initial compliance   Long Term Goals: To be accomplished in 10 treatments:  1. Pt will increase FOTO score to 50 points to improve ability to perform ADLs. Eval: 37 points  2. Pt will report an improvement in at best pain to 4/10 to improve ability to tolerate standing. Eval: 6/10 at best  3. Pt will report being able to find a comfortable position with sleeping at night secondary to his symptoms to improve sleep quality. Eval: reports having discomfort with sleeping because of pain and unable to find a comfortable position. 4. Pt will be able to perform a sit to stand transfer with mild to no increased pain of difficulty to improve ease of transfers with household activities. Eval: significant pain with sit to stands and unable to perform with good posture and body mechanics because of pain.      PLAN  [x]  Upgrade activities as tolerated     [x]  Continue plan of care  [x]  Update interventions per flow sheet       []  Discharge due to:_  []  Other:_      Martin Martel PT 4/4/2022  11:25 AM    Future Appointments Date Time Provider Raúl Clayton   4/11/2022  9:00 AM [facility]   4/18/2022  9:00 AM Kathy [facility]erales[facility]   4/25/2022  9:00 AM Kathy [facility]erales, PT [facility]   4/27/2022  2:20 PM JERROD Agrawal   4/28/2022 10:30 AM Dre Hernandez MD [facility] AM[facility]   5/13/2022 10:20 AM [facility]   8/12/2022  1:00 PM JERROD Agrawal

## 2022-04-05 ENCOUNTER — HOSPITAL ENCOUNTER (EMERGENCY)
Age: 77
Discharge: HOME | End: 2022-04-05
Attending: STUDENT IN AN ORGANIZED HEALTH CARE EDUCATION/TRAINING PROGRAM
Payer: MEDICAID

## 2022-04-05 ENCOUNTER — APPOINTMENT (OUTPATIENT)
Dept: GENERAL RADIOLOGY | Age: 77
End: 2022-04-05
Attending: EMERGENCY MEDICINE
Payer: MEDICAID

## 2022-04-05 VITALS
HEART RATE: 89 BPM | OXYGEN SATURATION: 96 % | RESPIRATION RATE: 18 BRPM | DIASTOLIC BLOOD PRESSURE: 80 MMHG | TEMPERATURE: 98 F | SYSTOLIC BLOOD PRESSURE: 145 MMHG

## 2022-04-05 DIAGNOSIS — M25.511 ACUTE PAIN OF BOTH SHOULDERS: ICD-10-CM

## 2022-04-05 DIAGNOSIS — M25.512 ACUTE PAIN OF BOTH SHOULDERS: ICD-10-CM
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
## 2022-04-05 DIAGNOSIS — S80.01XA CONTUSION OF RIGHT KNEE, INITIAL ENCOUNTER: ICD-10-CM

## 2022-04-05 PROCEDURE — 73564 X-RAY EXAM KNEE 4 OR MORE: CPT

## 2022-04-05 PROCEDURE — 73030 X-RAY EXAM OF SHOULDER: CPT

## 2022-04-05 PROCEDURE — 99283 EMERGENCY DEPT VISIT LOW MDM: CPT

## 2022-04-05 RX ORDER — CYCLOBENZAPRINE HCL 5 MG
5 TABLET ORAL 2 TIMES DAILY
Qty: 4 TABLET | Refills: 0 | Status: SHIPPED | OUTPATIENT
Start: 2022-04-05 | End: 2022-04-07

## 2022-04-05 RX ORDER — SENNOSIDES 25 MG/1
TABLET, FILM COATED ORAL
Qty: 15 G | Refills: 0 | Status: SHIPPED | OUTPATIENT
Start: 2022-04-05
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
## 2022-04-12 ENCOUNTER — HOSPITAL ENCOUNTER (OUTPATIENT)
Dept: PHYSICAL THERAPY | Age: 77
Discharge: HOME | End: 2022-04-12
Attending: PHYSICAL MEDICINE & REHABILITATION
Payer: MEDICAID

## 2022-04-12 PROCEDURE — 97530 THERAPEUTIC ACTIVITIES: CPT

## 2022-04-12 PROCEDURE — 97112 NEUROMUSCULAR REEDUCATION: CPT

## 2022-04-12 PROCEDURE — 97110 THERAPEUTIC EXERCISES: CPT
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
## 2022-04-18 ENCOUNTER — HOSPITAL ENCOUNTER (OUTPATIENT)
Dept: PHYSICAL THERAPY | Age: 77
Discharge: HOME | End: 2022-04-18
Attending: PHYSICAL MEDICINE & REHABILITATION
Payer: MEDICAID

## 2022-04-18 PROCEDURE — 97110 THERAPEUTIC EXERCISES: CPT

## 2022-04-18 PROCEDURE — 97112 NEUROMUSCULAR REEDUCATION: CPT

## 2022-04-18 PROCEDURE — 97530 THERAPEUTIC ACTIVITIES: CPT

## 2022-04-18 NOTE — PROGRESS NOTES
PT DAILY TREATMENT NOTE     Patient Name: Harrison Mcintosh  Date:2022  : 1945  [x]  Patient  Verified  Payor: [facility] MEDICAID / [facility] / Product Type: Managed Care Medicaid /    In time:904  Out time:943  Total Treatment Time (min): 39  Visit #: 1 of 10    Treatment Area: Other low back pain [M54.59]    SUBJECTIVE  Pain Level (0-10 scale): 10  Any medication changes, allergies to medications, adverse drug reactions, diagnosis change, or new procedure performed?: [x] No    [] Yes (see summary sheet for update)  Subjective functional status/changes:   [] No changes reported  Pt reports his pain is high today. OBJECTIVE    14 min Therapeutic Exercise:  [x] See flow sheet :   Rationale: increase ROM and increase strength to improve the patients ability to perform ADLs    10 min Therapeutic Activity:  []  See flow sheet : vitals assessment and edu    Rationale: educatoin  to improve the patients ability to understand symptoms     15 min Neuromuscular Re-education:  [x]  See flow sheet : glut bailey   Rationale: increase strength, improve coordination and increase proprioception  to improve the patients ability to tolerate sustained postures          With   [] TE   [] TA   [] neuro   [] other: Patient Education: [x] Review HEP    [] Progressed/Changed HEP based on:   [] positioning   [] body mechanics   [] transfers   [] heat/ice application    [] other:      Other Objective/Functional Measures:   BP = 138/76  HR = 100-103bpm  SpO2 = 99%     Pain Level (0-10 scale) post treatment: 9    ASSESSMENT/Changes in Function: Pt presenting with a single axillary crutch under right UE today. Pt stated that he has had his crutch for years. Pt attempted x2 steps with axillary crutch under left UE before stating he prefers it on the right. Crutch was adjusted to appropriate height.  Upon laying supine, pt reported sharp abdominal pain - this subsided after several minutes, however, his HR was elevated to 100-103bpm. Pt's BP was WNL; only performed supine exercises. Pt's heart rate did not subside with supine exercises; advised pt to contact PCP for course of action and educated pt to seek immediate medical attention if his symptoms changed/progressed. Patient will continue to benefit from skilled PT services to modify and progress therapeutic interventions, address functional mobility deficits, address ROM deficits, address strength deficits, analyze and address soft tissue restrictions, analyze and cue movement patterns, analyze and modify body mechanics/ergonomics, assess and modify postural abnormalities, address imbalance/dizziness and instruct in home and community integration to attain remaining goals. []  See Plan of Care  []  See progress note/recertification  []  See Discharge Summary         Progress towards goals / Updated goals:  1. Pt will increase FOTO score to 50 points to improve ability to perform ADLs. PN: Regressed: 29, potentially due to increased knee pain   To be reassessed at end of POC  2. Pt will report being able to find a comfortable position with sleeping at night secondary to his symptoms to improve sleep quality. PN: Still having difficulty sleeping   Pt states elevated pain for the last couple of weeks  3. Pt will be able to perform a sit to stand transfer with mild to no increased pain of difficulty to improve ease of transfers with household activities. PN: Continues to have pain with transfers   Continued pain noted today  4. Pt will report 50% improvement in overall pain and symptoms to improve activity tolerance.     PN: 0% improvement   No change to note    PLAN  [x]  Upgrade activities as tolerated     [x]  Continue plan of care  []  Update interventions per flow sheet       []  Discharge due to:_  []  Other:_      Tesha Gunderson, PT 4/18/2022  8:59 AM    Future Appointments   Date Time Provider Raúl Clayton   4/18/2022  9:00 AM Stone Farrell A, PT [facility]   4/20/2022  7:30 AM Alley Garza MD [facility] BS AMB   4/25/2022  9:00 AM Brittanie Douglas, PTA [facility]   4/27/2022  2:20 PM Chu Ricardo PA-C [id number]   4/28/2022 10:30 AM Ziyad Dumont MD [facility] DAVINA AMB   5/13/2022 10:20 AM [facility] NURSE Count includes the [facility]   8/12/2022  1:00 PM Chu Ricardo PA-C [id number]

## 2022-04-20 ENCOUNTER — OFFICE VISIT (OUTPATIENT)
Dept: ORTHOPEDIC SURGERY | Age: 77
End: 2022-04-20
Payer: MEDICAID

## 2022-04-20 VITALS
HEIGHT: 67 IN | WEIGHT: 180 LBS | OXYGEN SATURATION: 98 % | TEMPERATURE: 97.2 F | HEART RATE: 98 BPM | BODY MASS INDEX: 28.25 KG/M2

## 2022-04-20 DIAGNOSIS — M25.561 CHRONIC PAIN OF RIGHT KNEE: ICD-10-CM

## 2022-04-20 DIAGNOSIS — M25.461 SWELLING OF RIGHT KNEE JOINT: ICD-10-CM

## 2022-04-20 DIAGNOSIS — G89.29 CHRONIC PAIN OF RIGHT KNEE: Primary | ICD-10-CM

## 2022-04-20 DIAGNOSIS — M17.10 PATELLOFEMORAL ARTHRITIS: ICD-10-CM

## 2022-04-20 DIAGNOSIS — G89.29 CHRONIC PAIN OF RIGHT KNEE: ICD-10-CM

## 2022-04-20 DIAGNOSIS — Z87.828 HISTORY OF RECENT TRAUMA: ICD-10-CM

## 2022-04-20 DIAGNOSIS — M17.11 ARTHRITIS OF RIGHT KNEE: ICD-10-CM

## 2022-04-20 DIAGNOSIS — M25.561 CHRONIC PAIN OF RIGHT KNEE: Primary | ICD-10-CM

## 2022-04-20 PROCEDURE — 99214 OFFICE O/P EST MOD 30 MIN: CPT | Performed by: ORTHOPAEDIC SURGERY

## 2022-04-20 PROCEDURE — 20610 DRAIN/INJ JOINT/BURSA W/O US: CPT | Performed by: ORTHOPAEDIC SURGERY

## 2022-04-20 PROCEDURE — 73564 X-RAY EXAM KNEE 4 OR MORE: CPT | Performed by: ORTHOPAEDIC SURGERY

## 2022-04-20 RX ORDER — BETAMETHASONE SODIUM PHOSPHATE AND BETAMETHASONE ACETATE 3; 3 MG/ML; MG/ML
6 INJECTION, SUSPENSION INTRA-ARTICULAR; INTRALESIONAL; INTRAMUSCULAR; SOFT TISSUE ONCE
Status: COMPLETED | OUTPATIENT
Start: 2022-04-20 | End: 2022-04-20

## 2022-04-20 RX ADMIN — BETAMETHASONE SODIUM PHOSPHATE AND BETAMETHASONE ACETATE 6 MG: 3; 3 INJECTION, SUSPENSION INTRA-ARTICULAR; INTRALESIONAL; INTRAMUSCULAR; SOFT TISSUE at 08:13
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
## 2022-04-25 ENCOUNTER — HOSPITAL ENCOUNTER (OUTPATIENT)
Dept: PHYSICAL THERAPY | Age: 77
Discharge: HOME | End: 2022-04-25
Attending: PHYSICAL MEDICINE & REHABILITATION
Payer: MEDICAID

## 2022-04-25 PROCEDURE — 97112 NEUROMUSCULAR REEDUCATION: CPT

## 2022-04-25 PROCEDURE — 97530 THERAPEUTIC ACTIVITIES: CPT

## 2022-04-25 PROCEDURE — 97110 THERAPEUTIC EXERCISES: CPT

## 2022-04-25 NOTE — PROGRESS NOTES
PT DAILY TREATMENT NOTE     Patient Name: Mary Lou Joel  Date:2022  : 1945  [x]  Patient  Verified  Payor: [facility] MEDICAID / [facility] / Product Type: Managed Care Medicaid /    In time:900  Out time:938  Total Treatment Time (min): 38  Visit #: 2 of 10    Medicare/BCBS Only   Total Timed Codes (min):  38 1:1 Treatment Time:  38       Treatment Area: Other low back pain [M54.59]    SUBJECTIVE  Pain Level (0-10 scale): 0  Any medication changes, allergies to medications, adverse drug reactions, diagnosis change, or new procedure performed?: [x] No    [] Yes (see summary sheet for update)  Subjective functional status/changes:   [] No changes reported  Patient states he's feeling pretty good today. He has no pain. OBJECTIVE    20 min Therapeutic Exercise:  [x] See flow sheet :   Rationale: increase ROM and increase strength to improve the patients ability to increase activity tolerance    8 min Therapeutic Activity:  [x]  See flow sheet : standing functional LE strength, step ups   Rationale: increase ROM, increase strength and improve coordination  to improve the patients ability to improve standing and ambulation tolerance     10 min Neuromuscular Re-education:  [x]  See flow sheet : core stabilization, glut re-ed   Rationale: increase strength, improve coordination, improve balance and increase proprioception  to improve the patients ability to tolerate sustained postures          With   [] TE   [] TA   [] neuro   [] other: Patient Education: [x] Review HEP    [] Progressed/Changed HEP based on:   [] positioning   [] body mechanics   [] transfers   [] heat/ice application    [] other:      Other Objective/Functional Measures: progressed standing activities     Pain Level (0-10 scale) post treatment: 0    ASSESSMENT/Changes in Function: Patient reports improvement in symptoms and had no pain today. Presents to session without crutch.  Able to progress standing exercises without increase of pain. Cuing provided for appropriate form with exercises. He reports no pain following session. Patient will continue to benefit from skilled PT services to modify and progress therapeutic interventions, address functional mobility deficits, address ROM deficits, address strength deficits, analyze and address soft tissue restrictions, analyze and cue movement patterns, analyze and modify body mechanics/ergonomics, assess and modify postural abnormalities, address imbalance/dizziness and instruct in home and community integration to attain remaining goals. []  See Plan of Care  []  See progress note/recertification  []  See Discharge Summary         Progress towards goals / Updated goals:  1. Pt will increase FOTO score to 50 points to improve ability to perform ADLs. PN: Regressed: 34, potentially due to increased knee pain              To be reassessed at end of POC  2. Pt will report being able to find a comfortable position with sleeping at night secondary to his symptoms to improve sleep quality. PN: Still having difficulty sleeping              Pt states elevated pain for the last couple of weeks   3. Pt will be able to perform a sit to stand transfer with mild to no increased pain of difficulty to improve ease of transfers with household activities.               PN: Continues to have pain with transfers              Initiate STS NV  4. Pt will report 50% improvement in overall pain and symptoms to improve activity tolerance.                PN: 0% improvement              No change to note     PLAN  [x]  Upgrade activities as tolerated     [x]  Continue plan of care  []  Update interventions per flow sheet       []  Discharge due to:_  []  Other:_      Luis Wayne PTA 4/25/2022  8:54 AM    Future Appointments   Date Time Provider Raúl Clayton   4/25/2022  9:00 AM Vickie Allen PTA [facility]   4/27/2022  2:20 PM Kojo Barton PA-C [facility] SCHED   4/28/2022 10:30 AM Jon Shelton MD [facility] BS AMB   5/10/2022  2:30 PM Rah Perez MD [facility] BS AMB   5/13/2022 10:20 AM [facility]   6/1/2022  8:30 AM Leann Garzon MD [facility] BS AMB   8/12/2022  1:00 PM Jacklyn Yadav PA-C [id number] [facility]

## 2022-04-28 ENCOUNTER — OFFICE VISIT (OUTPATIENT)
Dept: CARDIOLOGY CLINIC | Age: 77
End: 2022-04-28
Payer: MEDICAID

## 2022-04-28 VITALS
DIASTOLIC BLOOD PRESSURE: 74 MMHG | WEIGHT: 176 LBS | HEIGHT: 67 IN | HEART RATE: 90 BPM | OXYGEN SATURATION: 97 % | BODY MASS INDEX: 27.62 KG/M2 | SYSTOLIC BLOOD PRESSURE: 143 MMHG

## 2022-04-28 DIAGNOSIS — Q24.5 CORONARY-MYOCARDIAL BRIDGE: Primary | ICD-10-CM

## 2022-04-28 DIAGNOSIS — J44.9 CHRONIC OBSTRUCTIVE PULMONARY DISEASE, UNSPECIFIED COPD TYPE (HCC): ICD-10-CM

## 2022-04-28 DIAGNOSIS — E78.00 HYPERCHOLESTEROLEMIA: ICD-10-CM

## 2022-04-28 DIAGNOSIS — Z01.810 PREOP CARDIOVASCULAR EXAM: ICD-10-CM

## 2022-04-28 DIAGNOSIS — I10 ESSENTIAL HYPERTENSION: ICD-10-CM

## 2022-04-28 PROCEDURE — 99214 OFFICE O/P EST MOD 30 MIN: CPT | Performed by: INTERNAL MEDICINE

## 2022-04-28 NOTE — PROGRESS NOTES
HISTORY OF PRESENT ILLNESS  Mahendra Spann is a 77 y.o. male. 2//2020  Patient is here for follow-up after recent evaluation in emergency room for chest pain. Has he was painting all day and subsequently started having pain under her armpit on both side. Pain worse on movement. Worse on sitting and moving around radiates to the back. He has short of breath on exertion which does not appear changed. 1/2021  Patient here for follow-up. His stable pattern of chest pain or shortness of breath. Denies any significant change at present. He is here for preoperative assessment    7/2021  Patient is here for follow-up. He was admitted 6/2021 with  Discharge Diagnoses:     -Hemoptysis  -Acute PE  -Acute non-occlusive DVT  -Falls at home  -Possible aspiration pneumonia  -BPH w/ acute urinary retention  -Unintentional weight loss  -Constipation  -Recent diverticulitis      Since discharge he still remains short of breath. No hemoptysis  10/2021  Patient is here for follow-up. Since last visit was in emergency room with atypical chest pain on 2 occasions. On last evaluation CTA chest was negative for any recurrent PE. Since then patient has left and right-sided chest pain that are not related to activity or exertion. Follow-up  Pertinent negatives include no chest pain, no abdominal pain, no headaches and no shortness of breath. Chest Pain (Angina)   The history is provided by the patient. This is a new problem. The current episode started more than 1 week ago. The problem has been gradually worsening. The problem occurs daily. The pain is associated with exertion and rest. The pain is present in the substernal region, right side and left side. The pain is mild. The quality of the pain is described as pressure-like and heavy. The pain does not radiate.  Pertinent negatives include no abdominal pain, no claudication, no cough, no dizziness, no fever, no headaches, no hemoptysis, no nausea, no orthopnea, no palpitations, no PND, no shortness of breath, no sputum production, no vomiting and no weakness. Shortness of Breath  The history is provided by the patient. This is a new problem. The problem occurs frequently. The current episode started more than 1 week ago. The problem has been gradually worsening. Pertinent negatives include no fever, no headaches, no cough, no sputum production, no hemoptysis, no wheezing, no PND, no orthopnea, no chest pain, no vomiting, no abdominal pain, no rash, no leg swelling and no claudication. Precipitated by: walking,exertion. Review of Systems   Constitutional: Negative for chills and fever. HENT: Negative for nosebleeds. Eyes: Negative for blurred vision and double vision. Respiratory: Negative for cough, hemoptysis, sputum production, shortness of breath and wheezing. Cardiovascular: Negative for chest pain, palpitations, orthopnea, claudication, leg swelling and PND. Gastrointestinal: Negative for abdominal pain, heartburn, nausea and vomiting. Musculoskeletal: Negative for myalgias. Skin: Negative for rash. Neurological: Negative for dizziness, weakness and headaches. Endo/Heme/Allergies: Does not bruise/bleed easily.      Family History   Problem Relation Age of Onset    Diabetes Mother     Hypertension Mother     Diabetes Maternal Grandmother     Hypertension Maternal Grandmother     Cancer Sister         brain    Cancer Sister         brain       Past Medical History:   Diagnosis Date    Bladder outlet obstruction     Erectile disorder due to medical 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     Frequency of urination     H/O spinal cord injury 1974    lumbar spine injury secondary to fall    HTN (hypertension)     Hypercholesterolemia     Illiterate     Injury of lumbar spine [facility] [address] 1974    Leg pain, right     Nocturia     Overactive bladder     Peripheral vascular disease ([facility] [address]        Past Surgical History:   Procedure Laterality Date    COLONOSCOPY N/A 2019    COLONOSCOPY performed by Tyna Phoenix, MD at [facility] ENDOSCOPY    HAND/FINGER SURGERY UNLISTED Right     carpal tunnel    HX HEENT Left     lens implant    HX ORTHOPAEDIC      finger amputation left hand    HX TONSILLECTOMY      UPPER ARM/ELBOW SURGERY UNLISTED Right        Social History     Tobacco Use    Smoking status: Former Smoker     Quit date: 2015     Years since quittin.7    Smokeless tobacco: Never Used   Substance Use Topics    Alcohol use: Not Currently     Alcohol/week: 0.0 standard drinks     Comment: former stopped        Allergies   Allergen Reactions    Latex Rash     Other reaction(s): Unknown    Ampicillin Angioedema    Asa-Acetaminophen-Caff-Buffers Rash    Penicillins Angioedema     Other reaction(s): anaphylaxis    Crab Hives    Shellfish Derived Rash    Aspirin Other (comments)     Stomach upset - patient said he had peptic ulcers and cannot take  Other reaction(s): Unknown      Atorvastatin Other (comments)     Muscle cramps       Prior to Admission medications    Medication Sig Start Date End Date Taking? Authorizing Provider   lidocaine 5 % topical cream Apply  to affected area two (2) times daily as needed for Pain. 22  Yes Rebecca Chavez PA   wheat dextrin (Benefiber Healthy Shape) 5 gram/7.4 gram powd Take  by mouth. Yes Provider, Historical   metoprolol succinate (TOPROL-XL) 50 mg XL tablet Take 1 Tablet by mouth daily. 10/28/21  Yes Christiano Samaniego MD   apixaban (Eliquis) 5 mg tablet Take 1 Tablet by mouth two (2) times a day. 21  Yes Sarkis Jackman NP   famotidine (PEPCID) 20 mg tablet Take 20 mg by mouth two (2) times a day. Yes Provider, Historical   ezetimibe (ZETIA) 10 mg tablet Take 10 mg by mouth daily. Yes Provider, Historical   spironolactone (Aldactone) 25 mg tablet Take 25 mg by mouth daily. Yes Provider, Historical   tamsulosin (FLOMAX) 0.4 mg capsule Take 2 Capsules by mouth daily (after dinner). 6/17/21  Yes KENIA Pendleton   finasteride (PROSCAR) 5 mg tablet Take 1 Tablet by mouth daily. 6/17/21  Yes KENIA Pendleton   polyethylene glycol (MIRALAX) 17 gram packet Take 1 Packet by mouth daily. 6/10/21  Yes Brock Hendricks MD   diclofenac (VOLTAREN) 1 % gel Apply 2 g to affected area four (4) times daily. 5/25/21  Yes Cesilia Corrales MD   DULoxetine (CYMBALTA) 60 mg capsule Take 1 Cap by mouth daily. Indications: neuropathic pain 9/11/20  Yes Nba ALFARO MD   albuterol sulfate 90 mcg/actuation aebs Take  by inhalation as needed. Yes Provider, Historical   pantoprazole (PROTONIX) 40 mg tablet Take 1 Tab by mouth daily. 3/28/19  Yes Margarito Meng MD   amLODIPine (NORVASC) 10 mg tablet Take 1 Tab by mouth daily. 3/28/19  Yes Margarito Meng MD         Visit Vitals  BP (!) 143/74   Pulse 90   Ht 5' 7\" (1.702 m)   Wt 79.8 kg (176 lb)   SpO2 97%   BMI 27.57 kg/m²     Physical Exam  Constitutional:       Appearance: He is well-developed and well-nourished. HENT:      Head: Normocephalic and atraumatic. Eyes:      Conjunctiva/sclera: Conjunctivae normal.   Neck:      Musculoskeletal: Neck supple. Thyroid: No thyromegaly. Vascular: No JVD. Trachea: No tracheal deviation. Cardiovascular:      Rate and Rhythm: Normal rate and regular rhythm. Heart sounds: Normal heart sounds. No murmur. No friction rub. No gallop. Pulmonary:      Effort: No respiratory distress. Breath sounds: Normal breath sounds. No wheezing or rales. Chest:      Chest wall: No tenderness. Abdominal:      Palpations: Abdomen is soft. Tenderness: There is no abdominal tenderness. Musculoskeletal:         General: No edema. Skin:     General: Skin is warm and dry. Neurological:      Mental Status: He is alert and oriented to person, place, and time.    Psychiatric:         Mood and Affect: Mood and affect normal.   Interpretation Summary 10/2018    Normal right lower extremity arterial findings. Moderate PAD left lower extremity. Disease noted at tibial level. 10/2018 EKG  DiagnosisFinal Sinus bradycardia   Moderate voltage criteria for LVH, may be normal variant   Possible Acute pericarditis   Abnormal ECG  2015-stress echo  Impressions: Normal study after maximal exercise without reproduction of  symptoms   ECG conclusions: The stress ECG was normal. Based on [employer]  Scoring, this patient was at low risk for cardiac events. Mr. Bernadine Das has a reminder for a \"due or due soon\" health maintenance. I have asked that he contact his primary care provider for follow-up on this health maintenance. I have personally reviewed patient's records available from hospital and other providers and incorporated findings in patient care. I have personally reviewed patients ekg done at other facility. I Have personally reviewed recent relevant labs available and discussed with patient    Conclusion cardiac cath 3/2019    Non obstructive epicardial arteries with moderate mid LAD myocardial bridging noted. Will optimize CCB dose. Continue risk factor modification. Complications                      Coronary Findings     Diagnostic   Dominance: Right   Left Main   The vessel was visualized by angiography. The vessel is angiographically normal.   Left Anterior Descending   The vessel was visualized by angiography. The vessel is angiographically normal. Moderate myocardial bridging noted in mid LAD   Left Circumflex   The vessel was visualized by angiography. The vessel is angiographically normal.   Right Coronary Artery   The vessel was visualized by angiography. The vessel is angiographically normal.   Intervention     No interventions have been documented. Procedure Conclusion     Nuclear Stress Test 3/2019    Abnormal myocardial perfusion imaging. Reversible defect consistent with myocardial ischemia.  Myocardial perfusion imaging supports an intermediate risk stress test. There is no prior study available for comparison. .   Interpretation Summary     · Baseline ECG: Sinus bradycardia, ST elevation, consider early repolarization, pericarditis or injury Minimal voltage criteria for LVH maybe normal variant. · Gated SPECT: Left ventricular function post-stress was normal. Calculated ejection fraction is 71%. There is no evidence of transient ischemic dilation (TID). The TID ratio is 0.95. · Negative stress test.  · Left ventricular perfusion is probably abnormal.  · Myocardial perfusion imaging defect 1: There is a defect that is small to moderate in size with a mild reduction in uptake present in the mid to basal inferior location(s) that is partially reversible. There is normal wall motion in the defect area. Viability in the area is good. The possibility of ischemia cannot be excluded. Perfusion defect was visually present without quantitative evidence. · Abnormal myocardial perfusion imaging. Reversible defect consistent with myocardial ischemia. Myocardial perfusion imaging supports an intermediate risk stress test.        Interpretation Summary 11/2019    · Left Ventricle: Normal cavity size, systolic function (ejection fraction normal) and diastolic function. Mildly increased wall thickness. Estimated left ventricular ejection fraction is 56 - 60%. Visually measured ejection fraction. No regional wall motion abnormality noted. Interpretation Summary PVL-6/2021       · Acute non-occlusive thrombus present in the right distal femoral vein. · Age indeterminate non-occlusive thrombus present in the left posterior tibial vein. Acute non-occlusive deep vein thrombosis noted in the right distal femoral vein. Sub-Acute non-occlusive deep vein thrombosis noted in the left posterior tibial veins. Interpretation Summary echo-6/2021    · LV: Estimated LVEF is 55 - 60%. Visually measured ejection fraction. Normal cavity size and systolic function (ejection fraction normal). Mild concentric hypertrophy. Mild (grade 1) left ventricular diastolic dysfunction. · TV: Pulmonary hypertension not suggested by Doppler findings. IMPRESSION CTA 6/2021     Right-sided pulmonary emboli demonstrated similar to prior, but slightly  decreased comparison particularly in the right lower lobe. Left-sided smaller  emboli seen on prior CT is not as conspicuous on current exam.     Bibasilar bandlike consolidation, increased since prior, may represent any  combination of infarcts, infiltrates, or atelectasis.     Small right pleural effusion.     Nonspecific edema/stranding insinuating between the left kidney upper pole and  pancreatic tail. Recommend correlation with urinalysis and lipase levels    IMPRESSION 8/2021     1. No CT evidence of acute pulmonary embolism.     2. Moderate emphysema and findings of chronic bronchitis. Bibasilar dependent  atelectasis.     3. Other stable chronic incidental findings, as described, including:  Diverticulosis coli, bilateral adrenal hyperplasia. CT abdomen 8/2021  ABDOMINAL AORTA: Moderate atherosclerotic plaques and mild intramural thrombosis  identified. No dissection. 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 Assessment         ICD-10-CM ICD-9-CM    1. 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 Essential hypertension  I10 401.9     Stable continue treatment   3. Hypercholesterolemia  E78.00 272.0     Continue treatment lab with PCP   4. Chronic obstructive pulmonary disease, unspecified COPD type ([facility]  J44.9 496     Stable continue therapy follow-up with pulmonary noted   5. Preop cardiovascular exam  Z01.810 V72.81     Stable. Okay to proceed with prostate surgery as planned medium cardiac risk.   For anticoagulation recommended per pulmonary recommendation     3/2019  New patient with increased chest pain and shortness of breath. Possible angina. Rule out CHF cardiomyopathy. Patient was intolerant to atorvastatin in past due to muscle spasm. Recheck lipids and decide on alternate statin. Patient had peptic ulcer many years ago and was told not to take aspirin as it upsets his stomach. Consider Plavix if needed    4/2019  Continues to have pain atypical chest pain. No significant CAD. Currently on Protonix. Will use Mylanta plus for gas. He has GI appointment next week. Cardiac status stable  2/2020  Seen for atypical chest pain clinically musculoskeletal on 2 sides and back. Started after painting. Use Aleve 1 tablet twice a day for 1 week    9/2020 virtual visit  On and off episode of chest tightness. We will continue medical management. Emergency room if symptoms are severe. GI work-up is in progress and okay to do work-up as needed  1/ 2021  Cardiac status stable with stable angina. No significant epicardial coronary disease. Has myocardial bridge. Normal ejection fraction okay to proceed with orthopedic surgery as planned. Medium cardiac risk  7/2021  Recent admission with acute PE. Has bilateral DVT. Now on Eliquis. No pulmonary hypertension normal ejection fraction. Still short of breath but no hemoptysis. Cardiac status with angina is stable. Blood pressure controlled  10/2021  Recurrent atypical chest pain clinically appears noncardiac. We will add Toprol. Recent CT with clearing of PE. As no clear etiology or precipitating factor for DVT and PE we will continue anticoagulation. At this point okay to interrupt anticoagulation for surgical procedures  Patient has appointment with vascular surgeon for evaluation of AAA  4/2022  Stable cardiac status continue current medical management. Okay to proceed with prostate surgery. Medium cardiac risk. Follow-up pulmonary recommendation for anticoagulation interruption.   Medications Discontinued During This Encounter   Medication Reason    cyclobenzaprine (FLEXERIL) 5 mg tablet Not A Current Medication       No orders of the defined types were placed in this encounter. Follow-up and Dispositions    · Return in about 6 months (around 10/28/2022).
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
## 2022-05-02 ENCOUNTER — TELEPHONE (OUTPATIENT)
Dept: PHYSICAL THERAPY | Age: 77
End: 2022-05-02

## 2022-05-04 ENCOUNTER — TELEPHONE (OUTPATIENT)
Dept: PHYSICAL THERAPY | Age: 77
End: 2022-05-04

## 2022-05-05 ENCOUNTER — HOSPITAL ENCOUNTER (EMERGENCY)
Age: 77
Discharge: HOME | End: 2022-05-05
Attending: STUDENT IN AN ORGANIZED HEALTH CARE EDUCATION/TRAINING PROGRAM
Payer: MEDICAID

## 2022-05-05 ENCOUNTER — APPOINTMENT (OUTPATIENT)
Dept: CT IMAGING | Age: 77
End: 2022-05-05
Attending: STUDENT IN AN ORGANIZED HEALTH CARE EDUCATION/TRAINING PROGRAM
Payer: MEDICAID

## 2022-05-05 VITALS
HEART RATE: 81 BPM | BODY MASS INDEX: 27.62 KG/M2 | WEIGHT: 176 LBS | RESPIRATION RATE: 18 BRPM | SYSTOLIC BLOOD PRESSURE: 152 MMHG | OXYGEN SATURATION: 96 % | TEMPERATURE: 98.1 F | HEIGHT: 67 IN | DIASTOLIC BLOOD PRESSURE: 84 MMHG

## 2022-05-05 DIAGNOSIS — R09.89 GLOBUS SENSATION: Primary | ICD-10-CM

## 2022-05-05 LAB
ANION GAP SERPL CALC-SCNC: 6 MMOL/L (ref 3–18)
BUN SERPL-MCNC: 15 MG/DL (ref 7–18)
BUN/CREAT SERPL: 15 (ref 12–20)
CALCIUM SERPL-MCNC: 9.5 MG/DL (ref 8.5–10.1)
CHLORIDE SERPL-SCNC: 105 MMOL/L (ref 100–111)
CO2 SERPL-SCNC: 26 MMOL/L (ref 21–32)
CREAT SERPL-MCNC: 1.03 MG/DL (ref 0.6–1.3)
GLUCOSE SERPL-MCNC: 94 MG/DL (ref 74–99)
POTASSIUM SERPL-SCNC: 5 MMOL/L (ref 3.5–5.5)
SODIUM SERPL-SCNC: 137 MMOL/L (ref 136–145)

## 2022-05-05 PROCEDURE — C9113 INJ PANTOPRAZOLE SODIUM, VIA: HCPCS | Performed by: STUDENT IN AN ORGANIZED HEALTH CARE EDUCATION/TRAINING PROGRAM

## 2022-05-05 PROCEDURE — 70491 CT SOFT TISSUE NECK W/DYE: CPT

## 2022-05-05 PROCEDURE — 74011000636 HC RX REV CODE- 636: Performed by: STUDENT IN AN ORGANIZED HEALTH CARE EDUCATION/TRAINING PROGRAM

## 2022-05-05 PROCEDURE — 99285 EMERGENCY DEPT VISIT HI MDM: CPT

## 2022-05-05 PROCEDURE — 74011250636 HC RX REV CODE- 250/636: Performed by: STUDENT IN AN ORGANIZED HEALTH CARE EDUCATION/TRAINING PROGRAM

## 2022-05-05 PROCEDURE — 96374 THER/PROPH/DIAG INJ IV PUSH: CPT

## 2022-05-05 PROCEDURE — 80048 BASIC METABOLIC PNL TOTAL CA: CPT

## 2022-05-05 PROCEDURE — 74011000250 HC RX REV CODE- 250: Performed by: STUDENT IN AN ORGANIZED HEALTH CARE EDUCATION/TRAINING PROGRAM

## 2022-05-05 RX ORDER — LIDOCAINE HYDROCHLORIDE 20 MG/ML
15 SOLUTION OROPHARYNGEAL ONCE
Status: COMPLETED | OUTPATIENT
Start: 2022-05-05 | End: 2022-05-05

## 2022-05-05 RX ORDER — LIDOCAINE HYDROCHLORIDE 20 MG/ML
15 SOLUTION OROPHARYNGEAL
Status: DISCONTINUED | OUTPATIENT
Start: 2022-05-05 | End: 2022-05-05

## 2022-05-05 RX ADMIN — IOPAMIDOL 80 ML: 612 INJECTION, SOLUTION INTRAVENOUS at 08:10

## 2022-05-05 RX ADMIN — LIDOCAINE HYDROCHLORIDE 15 ML: 20 SOLUTION ORAL; TOPICAL at 06:16

## 2022-05-05 RX ADMIN — SODIUM CHLORIDE 40 MG: 9 INJECTION, SOLUTION INTRAMUSCULAR; INTRAVENOUS; SUBCUTANEOUS at 06:44

## 2022-05-05 NOTE — ED PROVIDER NOTES
EMERGENCY DEPARTMENT HISTORY AND PHYSICAL EXAM      Date: 5/5/2022  Patient Name: Anali Poole    History of Presenting Illness     Chief Complaint   Patient presents with    Dysphagia       History (Context): Anali Poole is a 77 y.o. male with a past medical history significant for hypertension, hyperlipidemia, BPH, peripheral vascular disease, comes into the ED today due to throat pain. Patient states that upon waking up this morning at around 3 AM he drank a lukewarm cup of coffee and since then has had a \"knot to the middle of his throat. \"  Patient states symptoms have not improved or worsened since onset. He denies taking medication for treatment of symptoms prior to arrival.  He states symptoms are exacerbated with swallowing. He denies any drooling, trismus, fever, chills, or difficulty tolerating p.o. intake outside of increased pain. Patient denies having symptoms similar to this previously. He states symptoms are severe in quality. PCP: Davis Barrett MD    Current Facility-Administered Medications   Medication Dose Route Frequency Provider Last Rate Last Admin    pantoprazole (PROTONIX) 40 mg in 0.9% sodium chloride 10 mL injection  40 mg IntraVENous ONCE Lisa Degroot DO         Current Outpatient Medications   Medication Sig Dispense Refill    nitrofurantoin, macrocrystal-monohydrate, (MACROBID) 100 mg capsule Take 1 Capsule by mouth two (2) times a day. Start taking 7 days prior to procedure 14 Capsule 0    lidocaine 5 % topical cream Apply  to affected area two (2) times daily as needed for Pain. 15 g 0    wheat dextrin (Benefiber Healthy Shape) 5 gram/7.4 gram powd Take  by mouth.  metoprolol succinate (TOPROL-XL) 50 mg XL tablet Take 1 Tablet by mouth daily. 90 Tablet 3    apixaban (Eliquis) 5 mg tablet Take 1 Tablet by mouth two (2) times a day. 60 Tablet 5    famotidine (PEPCID) 20 mg tablet Take 20 mg by mouth two (2) times a day.       ezetimibe (ZETIA) 10 mg tablet Take 10 mg by mouth daily.  spironolactone (Aldactone) 25 mg tablet Take 25 mg by mouth daily.  tamsulosin (FLOMAX) 0.4 mg capsule Take 2 Capsules by mouth daily (after dinner). 180 Capsule 3    finasteride (PROSCAR) 5 mg tablet Take 1 Tablet by mouth daily. 90 Tablet 3    polyethylene glycol (MIRALAX) 17 gram packet Take 1 Packet by mouth daily. 30 Packet 0    diclofenac (VOLTAREN) 1 % gel Apply 2 g to affected area four (4) times daily. 100 g 2    DULoxetine (CYMBALTA) 60 mg capsule Take 1 Cap by mouth daily. Indications: neuropathic pain 60 Cap 5    albuterol sulfate 90 mcg/actuation aebs Take  by inhalation as needed.  pantoprazole (PROTONIX) 40 mg tablet Take 1 Tab by mouth daily. 30 Tab 0    amLODIPine (NORVASC) 10 mg tablet Take 1 Tab by mouth daily.  27 Tab 0       Past History     Past Medical History:   Past Medical History:   Diagnosis Date    Bladder outlet obstruction     Erectile disorder due to medical 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     Frequency of urination     H/O spinal cord injury 1974    lumbar spine injury secondary to fall    HTN (hypertension)     Hypercholesterolemia     Illiterate     Injury of lumbar spine ([address] 1974    Leg pain, right     Nocturia     Overactive bladder     Peripheral vascular disease ([address]        Past Surgical History:  Past Surgical History:   Procedure Laterality Date    COLONOSCOPY N/A 12/4/2019    COLONOSCOPY [facility] by [facility]linda Loosen[facility] at [facility]    HAND/FINGER SURGERY UNLISTED Right     carpal tunnel    HX HEENT Left     lens implant    HX ORTHOPAEDIC      finger amputation left hand    HX TONSILLECTOMY      UPPER ARM/ELBOW SURGERY UNLISTED Right        Family History:  Family History   Problem Relation Age of Onset    Diabetes Mother     Hypertension Mother     Diabetes Maternal Grandmother     Hypertension Maternal Grandmother     Cancer Sister         brain    Cancer Sister         brain       Social History:   Social History     Tobacco Use    Smoking status: Former Smoker     Quit date: 2015     Years since quittin.8    Smokeless tobacco: Never Used   Vaping Use    Vaping Use: Never used   Substance Use Topics    Alcohol use: Not Currently     Alcohol/week: 0.0 standard drinks     Comment: former stopped     Drug use: No       Allergies: Allergies   Allergen Reactions    Latex Rash     Other reaction(s): Unknown    Ampicillin Angioedema    Asa-Acetaminophen-Caff-Buffers Rash    Penicillins Angioedema     Other reaction(s): anaphylaxis    Crab Hives     Denies allergy to crabs    Shellfish Derived Rash    Aspirin Other (comments)     Stomach upset - patient said he had peptic ulcers and cannot take  Other reaction(s): Unknown      Atorvastatin Other (comments)     Muscle cramps       PMH, PSH, family history, social history, allergies reviewed with the patient with significant items noted above. Review of Systems   Review of Systems   Constitutional: Negative for chills and fever. HENT: Positive for sore throat. Globus sensation   Eyes: Negative for visual disturbance. Negative recent vision problems   Respiratory: Negative for shortness of breath. Cardiovascular: Negative for chest pain. Gastrointestinal: Negative for abdominal pain, diarrhea and nausea. Genitourinary: Negative for difficulty urinating. Musculoskeletal: Negative for myalgias. Skin: Negative for rash. Neurological: Negative for headaches. Negative altered level of consciousness   All other systems reviewed and are negative. Physical Exam     Vitals:    22 0537   BP: (!) 152/84   Pulse: 81   Resp: 18   Temp: 98.1 °F (36.7 °C)   SpO2: 96%   Weight: 79.8 kg (176 lb)   Height: 5' 7\" (1.702 m)       Physical Exam  Vitals and nursing note reviewed. Constitutional:       General: He is not in acute distress. Appearance: Normal appearance.    HENT:      Head: Normocephalic and atraumatic. Mouth/Throat:      Mouth: Mucous membranes are moist.      Pharynx: Oropharynx is clear. No oropharyngeal exudate or posterior oropharyngeal erythema. Comments: Adentulous, no trismus appreciated, no change in voice, tolerating oral secretions appropriately  Eyes:      General: No scleral icterus. Conjunctiva/sclera: Conjunctivae normal.   Cardiovascular:      Rate and Rhythm: Normal rate and regular rhythm. Pulmonary:      Effort: Pulmonary effort is normal. No respiratory distress. Abdominal:      General: There is no distension. Palpations: Abdomen is soft. Tenderness: There is no abdominal tenderness. Musculoskeletal:         General: No deformity. Normal range of motion. Cervical back: Normal range of motion and neck supple. No rigidity or tenderness. Lymphadenopathy:      Cervical: No cervical adenopathy. Skin:     General: Skin is warm and dry. Findings: No erythema or rash. Neurological:      General: No focal deficit present. Mental Status: He is alert and oriented to person, place, and time. Mental status is at baseline. Psychiatric:         Mood and Affect: Mood normal.         Behavior: Behavior normal.         Diagnostic Study Results     Labs -   No results found for this or any previous visit (from the past 12 hour(s)). Labs Reviewed   METABOLIC PANEL, BASIC       Radiologic Studies -   No orders to display     CT Results  (Last 48 hours)    None        CXR Results  (Last 48 hours)    None          The laboratory results, imaging results, and other diagnostic exams were reviewed in the EMR. Medical Decision Making   I am the first provider for this patient. I reviewed the vital signs, available nursing notes, past medical history, past surgical history, family history and social history. Vital Signs-Reviewed the patient's vital signs.      Records Reviewed: Personally, on initial evaluation    MDM:   Noa Palmer presents with complaint of throat pain  DDX includes but is not limited to: globus sensation, sore throat, medical screening exam      Patient overall well-appearing, no acute distress, and vital signs grossly within normal limits. Patient without any concerning signs or symptoms for PTA, RPA, or Ludwigs angina. Explained to patient that there is no overt reason for globus sensation at this time. Provided shared decision-making with patient about treatment versus further imaging. Patient concerned that something \"terrible\" may be occurring. Will obtain lab work and imaging for further evaluation of patients complaint. Will continue to monitor and evaluate patient while in the ED. Orders as below:  Orders Placed This Encounter    METABOLIC PANEL, BASIC    DISCONTD: lidocaine (XYLOCAINE) 2 % viscous solution 15 mL    lidocaine (XYLOCAINE) 2 % viscous solution 15 mL    pantoprazole (PROTONIX) 40 mg in 0.9% sodium chloride 10 mL injection        ED Course:   ED Course as of 05/05/22 1449   Thu May 05, 2022   0952 Patient CT scan shows similar prominence of bilateral cricopharyngeus and false vocal cords no focal enhancing lesions. Will discharge patient home with return precautions and follow-up recommendations. Will recommend patient follow-up with ENT for further evaluation of his symptoms. Patient verbalized understanding is without any further questions. [DV]      ED Course User Index  [DV] Wayna Sever, DO           Procedures:  Procedures        Diagnosis and Disposition     CLINICAL IMPRESSION:  No diagnosis found. Current Discharge Medication List          Disposition:Home    Patient condition at time of disposition: Stable    DISCHARGE NOTE:   Pt has been reexamined. Patient has no new complaints, changes, or physical findings. Care plan outlined and precautions discussed. Results were reviewed with the patient. All medications were reviewed with the patient.  All of pt's questions and concerns were addressed. Alarm symptoms and return precautions associated with chief complaint and evaluation were reviewed with the patient in detail. The patient demonstrated adequate understanding. Patient was instructed to follow up with PCP, ENT, as well as strict return precautions to the ED upon further deterioration. Patient is ready to go home. The patient is happy with this plan          Dragon Disclaimer     Please note that this dictation was completed with [employer], the computer voice recognition software. Quite often unanticipated grammatical, syntax, homophones, and other interpretive errors are inadvertently transcribed by the computer software. Please disregard these errors. Please excuse any errors that have escaped final proofreading. Martha MA.

## 2022-05-05 NOTE — PROGRESS NOTES
[facility] - [place] [address]  [address]  [employer], [address] [phone] fax    Discharge Summary  Patient name: Deejay SPEARS [facility] CTR of Care: 3/16/2022   Referral source: Sirena Case* LJL: [id number]                Medical Diagnosis: Low back pain [M54.50]  Payor: [facility] MEDICAID / Plan: [facility] CCC[facility] / Product Type: Managed Care Medicaid /  Onset Date: initial 1975, 2/21/2022                Treatment Diagnosis: LBP   Prior Hospitalization: see medical history Provider#: [id number]   Medications: Verified on Patient summary List    Comorbidities: heart disease, arthritis, HTN, visual impaired, hearing impaired, asthma, hx SCI in low back   Prior Level of Function: Independent with ADLs and functional tasks with progressively worsening LBP since his injury in 1975. Visits from Start of Care: 5    Missed Visits: 3  Reporting Period : 4/12/2022 to 4/25/2022    Goal: Pt will increase FOTO score to 50 points to improve ability to perform ADLs. Status at last note/certification: unable to reassess  Status at discharge: not met    Goal: Pt will report being able to find a comfortable position with sleeping at night secondary to his symptoms to improve sleep quality. Status at last note/certification: unable to reassess  Status at discharge: not met    Goal: Pt will be able to perform a sit to stand transfer with mild to no increased pain of difficulty to improve ease of transfers with household activities.   Status at last note/certification: unable to reassess  Status at discharge: not met     Goal: Pt will report 50% improvement in overall pain and symptoms to improve activity tolerance. Status at last note/certification: unable to reassess  Status at discharge: not met    Assessment/Summary of care:   Pt was seen for 5 therapy sessions. The pt's last therapy appointment was on 4/25/2022. Per pt's chart, the pt NS/cancelled three therapy appointments. Per his chart, the pt was contacted and requested d/c secondary to feeling better as well. Pt is d/c'ed from therapy secondary to non-compliance/cancellation policy, and feeling better, and unable to reassess goals at this time. RECOMMENDATIONS:  [x]Discontinue therapy: []Patient has reached or is progressing toward set goals      [x]Patient is non-compliant or has abdicated      []Due to lack of appreciable progress towards set goals    Sara Jaeger, PT 5/5/2022 12:33 PM    NOTE TO PHYSICIAN:  Please complete the following and fax to: [facility] at [facility] at [phone]  . Retain this original for your records. If you are unable to process this request in   24 hours, please contact our office.      [] I have read the above report and request that my patient continue therapy with the following changes/special instructions:  [] I have read the above report and request that my patient be discharged from therapy    Physician's Signature:____________Date:_________TIME:________     Sirena Carrillo*  ** Signature, Date and Time must be completed for valid certification **

## 2022-05-05 NOTE — ED TRIAGE NOTES
78 y/o M presents to ED with c/o throat pain that began 3 hours ago. Was coughing, went to swallow coffee and felt a big knot in his throat. Denies injury to throat.  No pain medication taken prior to ED arrival.

## 2022-05-09 ENCOUNTER — APPOINTMENT (OUTPATIENT)
Dept: PHYSICAL THERAPY | Age: 77
End: 2022-05-09
Attending: PHYSICAL MEDICINE & REHABILITATION

## 2022-05-11 ENCOUNTER — APPOINTMENT (OUTPATIENT)
Dept: PHYSICAL THERAPY | Age: 77
End: 2022-05-11
Attending: PHYSICAL MEDICINE & REHABILITATION

## 2022-06-08 ENCOUNTER — TRANSCRIBE ORDER (OUTPATIENT)
Dept: SCHEDULING | Age: 77
End: 2022-06-08

## 2022-06-08 DIAGNOSIS — R07.0 PAIN IN THROAT: Primary | ICD-10-CM

## 2022-06-08 DIAGNOSIS — R13.10 DYSPHAGIA: ICD-10-CM

## 2022-06-17 ENCOUNTER — HOSPITAL ENCOUNTER (OUTPATIENT)
Dept: LAB | Age: 77
Discharge: HOME | End: 2022-06-17

## 2022-06-17 LAB — XX-LABCORP SPECIMEN COL,LCBCF: NORMAL

## 2022-06-17 PROCEDURE — 99001 SPECIMEN HANDLING PT-LAB: CPT

## 2022-06-30 ENCOUNTER — OFFICE VISIT (OUTPATIENT)
Dept: ORTHOPEDIC SURGERY | Age: 77
End: 2022-06-30
Payer: MEDICAID

## 2022-06-30 VITALS
OXYGEN SATURATION: 97 % | BODY MASS INDEX: 28.82 KG/M2 | WEIGHT: 183.6 LBS | HEIGHT: 67 IN | RESPIRATION RATE: 18 BRPM | HEART RATE: 97 BPM | TEMPERATURE: 97.1 F

## 2022-06-30 DIAGNOSIS — E79.0 ELEVATED URIC ACID IN BLOOD: ICD-10-CM

## 2022-06-30 DIAGNOSIS — R20.0 NUMBNESS AND TINGLING OF RIGHT LEG: ICD-10-CM

## 2022-06-30 DIAGNOSIS — G95.19 NEUROGENIC CLAUDICATION (HCC): ICD-10-CM

## 2022-06-30 DIAGNOSIS — R20.2 NUMBNESS AND TINGLING OF RIGHT LEG: ICD-10-CM

## 2022-06-30 DIAGNOSIS — M17.11 PRIMARY OSTEOARTHRITIS OF RIGHT KNEE: ICD-10-CM

## 2022-06-30 DIAGNOSIS — Z87.39 HISTORY OF GOUT: Primary | ICD-10-CM

## 2022-06-30 DIAGNOSIS — M25.561 RIGHT KNEE PAIN, UNSPECIFIED CHRONICITY: ICD-10-CM

## 2022-06-30 PROCEDURE — 99214 OFFICE O/P EST MOD 30 MIN: CPT | Performed by: ORTHOPAEDIC SURGERY

## 2022-06-30 PROCEDURE — 1123F ACP DISCUSS/DSCN MKR DOCD: CPT | Performed by: ORTHOPAEDIC SURGERY

## 2022-06-30 RX ORDER — DICLOFENAC SODIUM 10 MG/G
2 GEL TOPICAL 4 TIMES DAILY
Qty: 100 G | Refills: 4 | Status: SHIPPED | OUTPATIENT
Start: 2022-06-30

## 2022-06-30 RX ORDER — ALLOPURINOL 100 MG/1
100 TABLET ORAL 2 TIMES DAILY
Qty: 60 TABLET | Refills: 0 | Status: SHIPPED | OUTPATIENT
Start: 2022-06-30 | End: 2022-10-12 | Stop reason: SDUPTHER

## 2022-06-30 NOTE — PROGRESS NOTES
Patient: Clarita Stokes                MRN: [id number]       SSN: [id number]  YOB: 1945        AGE: 77 y.o. SEX: male  Body mass index is 28.76 kg/m². PCP: Delfin Bonilla MD  06/30/22    Priscilla Pulido presents today reevaluation of right-sided leg pain the injection helped considerably still gets achiness and he gets radicular pain that goes down the leg to the toes he can be standing or sitting and the injection was helpful we sent him for uric acid lab and he is at 6.8 I think he is symptomatic with that we can start him with some allopurinol as it is subacute he had apparently an abdominal peptic ulcer and bleeding in the past so we will avoid NSAIDs for him for the time being the examination the hips rotate adequately got good calf musculature he has distinct numbness and tingling involving L4 and L5 EHLs of 5-5 tib ant 5 out of 5 straight leg raise is equivocal with some radiculopathy and hips are noncontributory good pulses present distally no cyanosis or clubbing    The knee itself and minimal effusion and some tenderness involving the lateral hamstrings the joint lines are not particularly tender.     Previous x-rays confirm moderately advanced arthritis of the right knee    I think this gentleman is got symptomatic radiculopathy down the right leg and such were going to obtain MRI evaluation and start him on some allopurinol with usual precautions its been a pleasure to share in his care question regarding surgery of the knee and this decided that is not currently recommended thank you    REVIEW OF SYSTEMS:      CON: negative  EYE: negative   ENT: negative  RESP: negative  GI:    negative   :  negative  MSK: Positive  A twelve point review of systems was completed, positives noted and all other systems were reviewed and are negative          Past Medical History:   Diagnosis Date    Bladder outlet obstruction     Erectile disorder due to medical 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  Frequency of urination     H/O spinal cord injury 1974    lumbar spine injury secondary to fall    HTN (hypertension)     Hypercholesterolemia     Illiterate     Injury of lumbar spine ([facility]    Leg pain, right     Nocturia     Overactive bladder     Peripheral vascular disease ([facility]        Family History   Problem Relation Age of Onset    Diabetes Mother     Hypertension Mother     Diabetes Maternal Grandmother     Hypertension Maternal Grandmother     Cancer Sister         brain    Cancer Sister         brain       Current Outpatient Medications   Medication Sig Dispense Refill    apixaban (Eliquis) 5 mg tablet Take 1 Tablet by mouth two (2) times a day. 60 Tablet 5    famotidine (PEPCID) 20 mg tablet Take 20 mg by mouth two (2) times a day.  ezetimibe (ZETIA) 10 mg tablet Take 10 mg by mouth daily.  spironolactone (Aldactone) 25 mg tablet Take 25 mg by mouth daily.  DULoxetine (CYMBALTA) 60 mg capsule Take 1 Cap by mouth daily. Indications: neuropathic pain 60 Cap 5    albuterol sulfate 90 mcg/actuation aebs Take  by inhalation as needed.  pantoprazole (PROTONIX) 40 mg tablet Take 1 Tab by mouth daily. 30 Tab 0    amLODIPine (NORVASC) 10 mg tablet Take 1 Tab by mouth daily. 30 Tab 0    nitrofurantoin (MACRODANTIN) 100 mg capsule Take 1 Capsule by mouth two (2) times a day. Start taking 7 days prior to procedure (Patient not taking: Reported on 6/29/2022)      traMADoL (ULTRAM) 50 mg tablet TAKE ONE TABLET BY MOUTH EVERY 6 HOURS AS NEEDED FOR UP TO FIVE DAYS (Patient not taking: Reported on 6/29/2022)      nitrofurantoin, macrocrystal-monohydrate, (MACROBID) 100 mg capsule Take 1 Capsule by mouth two (2) times a day. Start taking 7 days prior to procedure (Patient not taking: Reported on 6/29/2022) 14 Capsule 0    lidocaine 5 % topical cream Apply  to affected area two (2) times daily as needed for Pain.  (Patient not taking: Reported on 6/29/2022) 15 g 0    wheat dextrin (Benefiber Healthy Shape) 5 gram/7.4 gram powd Take  by mouth. (Patient not taking: Reported on 2022)      metoprolol succinate (TOPROL-XL) 50 mg XL tablet Take 1 Tablet by mouth daily. (Patient not taking: Reported on 2022) 90 Tablet 3    tamsulosin (FLOMAX) 0.4 mg capsule Take 2 Capsules by mouth daily (after dinner). (Patient not taking: Reported on 2022) 180 Capsule 3    finasteride (PROSCAR) 5 mg tablet Take 1 Tablet by mouth daily. (Patient not taking: Reported on 2022) 90 Tablet 3    polyethylene glycol (MIRALAX) 17 gram packet Take 1 Packet by mouth daily.  (Patient not taking: Reported on 2022) 30 Packet 0       Allergies   Allergen Reactions    Latex Rash     Other reaction(s): Unknown    Ampicillin Angioedema    Asa-Acetaminophen-Caff-Buffers Rash    Penicillins Angioedema     Other reaction(s): anaphylaxis    Crab Hives     Denies allergy to crabs    Shellfish Derived Rash    Adhesive Tape-Silicones Itching    Aspirin Other (comments)     Stomach upset - patient said he had peptic ulcers and cannot take  Other reaction(s): Unknown      Atorvastatin Other (comments)     Muscle cramps       Past Surgical History:   Procedure Laterality Date    COLONOSCOPY N/A 2019    COLONOSCOPY performed by Malena Contreras MD at [facility] ENDOSCOPY    HAND/FINGER SURGERY UNLISTED Right     carpal tunnel    HX HEENT Left     lens implant    HX ORTHOPAEDIC      finger amputation left hand    HX TONSILLECTOMY      UPPER ARM/ELBOW SURGERY UNLISTED Right        Social History     Socioeconomic History    Marital status:      Spouse name: Not on file    Number of children: Not on file    Years of education: Not on file    Highest education level: Not on file   Occupational History    Not on file   Tobacco Use    Smoking status: Former Smoker     Quit date: 2015     Years since quittin.9    Smokeless tobacco: Never Used   Vaping Use    Vaping Use: Never used   Substance and Sexual Activity    Alcohol use: Not Currently     Alcohol/week: 0.0 standard drinks     Comment: former stopped 2015    Drug use: No    Sexual activity: Yes   Other Topics Concern    Not on file   Social History Narrative    Not on file     Social Determinants of Health     Financial Resource Strain:     Difficulty of Paying Living Expenses: Not on file   Food Insecurity:     Worried About Running Out of Food in the Last Year: Not on file    Keira of Food in the Last Year: Not on file   Transportation Needs:     Lack of Transportation (Medical): Not on file    Lack of Transportation (Non-Medical): Not on file   Physical Activity:     Days of Exercise per Week: Not on file    Minutes of Exercise per Session: Not on file   Stress:     Feeling of Stress : Not on file   Social Connections:     Frequency of Communication with Friends and Family: Not on file    Frequency of Social Gatherings with Friends and Family: Not on file    Attends Islam Services: Not on file    Active Member of [address] [place] [address] or Organizations: Not on file    Attends Club or Organization Meetings: Not on file    Marital Status: Not on file   Intimate Partner Violence:     Fear of Current or Ex-Partner: Not on file    Emotionally Abused: Not on file    Physically Abused: Not on file    Sexually Abused: Not on file   Housing Stability:     Unable to Pay for Housing in the Last Year: Not on file    Number of [place] in the Last Year: Not on file    Unstable Housing in the Last Year: Not on file       Visit Vitals  Pulse 97   Temp 97.1 °F (36.2 °C) (Temporal)   Resp 18   Ht 5' 7\" (1.702 m)   Wt 83.3 kg (183 lb 9.6 oz)   SpO2 97%   BMI 28.76 kg/m²         PHYSICAL EXAMINATION:  GENERAL: Alert and oriented x3, in no acute distress, well-developed, well-nourished, afebrile. HEART: No JVD.   EYES: No scleral icterus   NECK: No significant lymphadenopathy   LUNGS: No respiratory compromise or indrawing  ABDOMEN: Soft, non-tender, non-distended. Note: This note was completed using voice recognition software. Any typographical/name errors or mistakes are unintentional.    Electronically signed by:  Gela Esquivel MD

## 2022-07-07 DIAGNOSIS — R20.2 NUMBNESS AND TINGLING OF RIGHT LEG: ICD-10-CM

## 2022-07-07 DIAGNOSIS — R20.0 NUMBNESS AND TINGLING OF RIGHT LEG: ICD-10-CM

## 2022-07-07 DIAGNOSIS — G95.19 NEUROGENIC CLAUDICATION (HCC): ICD-10-CM

## 2022-07-29 ENCOUNTER — HOSPITAL ENCOUNTER (OUTPATIENT)
Age: 77
Discharge: HOME | End: 2022-07-29
Attending: ORTHOPAEDIC SURGERY
Payer: MEDICAID

## 2022-07-29 PROCEDURE — 72148 MRI LUMBAR SPINE W/O DYE: CPT

## 2022-09-21 ENCOUNTER — APPOINTMENT (OUTPATIENT)
Dept: CT IMAGING | Age: 77
End: 2022-09-21
Attending: STUDENT IN AN ORGANIZED HEALTH CARE EDUCATION/TRAINING PROGRAM
Payer: MEDICAID

## 2022-09-21 ENCOUNTER — APPOINTMENT (OUTPATIENT)
Dept: GENERAL RADIOLOGY | Age: 77
End: 2022-09-21
Attending: PHYSICIAN ASSISTANT
Payer: MEDICAID

## 2022-09-21 ENCOUNTER — HOSPITAL ENCOUNTER (EMERGENCY)
Age: 77
Discharge: HOME | End: 2022-09-21
Attending: EMERGENCY MEDICINE
Payer: MEDICAID

## 2022-09-21 VITALS
SYSTOLIC BLOOD PRESSURE: 160 MMHG | DIASTOLIC BLOOD PRESSURE: 71 MMHG | OXYGEN SATURATION: 95 % | RESPIRATION RATE: 16 BRPM | TEMPERATURE: 97.7 F | HEART RATE: 74 BPM

## 2022-09-21 DIAGNOSIS — R07.9 CHEST PAIN, UNSPECIFIED TYPE: Primary | ICD-10-CM

## 2022-09-21 LAB
ALBUMIN SERPL-MCNC: 3.4 G/DL (ref 3.4–5)
ALBUMIN/GLOB SERPL: 1.1 {RATIO} (ref 0.8–1.7)
ALP SERPL-CCNC: 61 U/L (ref 45–117)
ALT SERPL-CCNC: 25 U/L (ref 16–61)
ANION GAP SERPL CALC-SCNC: 6 MMOL/L (ref 3–18)
AST SERPL-CCNC: 21 U/L (ref 10–38)
ATRIAL RATE: 75 BPM
BASOPHILS # BLD: 0 K/UL (ref 0–0.1)
BASOPHILS NFR BLD: 1 % (ref 0–2)
BILIRUB SERPL-MCNC: 0.3 MG/DL (ref 0.2–1)
BNP SERPL-MCNC: 25 PG/ML (ref 0–1800)
BUN SERPL-MCNC: 11 MG/DL (ref 7–18)
BUN/CREAT SERPL: 13 (ref 12–20)
CALCIUM SERPL-MCNC: 8.8 MG/DL (ref 8.5–10.1)
CALCULATED P AXIS, ECG09: 51 DEGREES
CALCULATED R AXIS, ECG10: -13 DEGREES
CALCULATED T AXIS, ECG11: 36 DEGREES
CHLORIDE SERPL-SCNC: 107 MMOL/L (ref 100–111)
CO2 SERPL-SCNC: 27 MMOL/L (ref 21–32)
CREAT SERPL-MCNC: 0.88 MG/DL (ref 0.6–1.3)
DIAGNOSIS, 93000: NORMAL
DIFFERENTIAL METHOD BLD: ABNORMAL
EOSINOPHIL # BLD: 0.3 K/UL (ref 0–0.4)
EOSINOPHIL NFR BLD: 5 % (ref 0–5)
ERYTHROCYTE [DISTWIDTH] IN BLOOD BY AUTOMATED COUNT: 15 % (ref 11.6–14.5)
GLOBULIN SER CALC-MCNC: 3.2 G/DL (ref 2–4)
GLUCOSE SERPL-MCNC: 91 MG/DL (ref 74–99)
HCT VFR BLD AUTO: 41.1 % (ref 36–48)
HGB BLD-MCNC: 13.6 G/DL (ref 13–16)
IMM GRANULOCYTES # BLD AUTO: 0 K/UL (ref 0–0.04)
IMM GRANULOCYTES NFR BLD AUTO: 0 % (ref 0–0.5)
INR PPP: 1 (ref 0.8–1.2)
LYMPHOCYTES # BLD: 1.5 K/UL (ref 0.9–3.6)
LYMPHOCYTES NFR BLD: 29 % (ref 21–52)
MCH RBC QN AUTO: 30 PG (ref 24–34)
MCHC RBC AUTO-ENTMCNC: 33.1 G/DL (ref 31–37)
MCV RBC AUTO: 90.5 FL (ref 78–100)
MONOCYTES # BLD: 0.6 K/UL (ref 0.05–1.2)
MONOCYTES NFR BLD: 12 % (ref 3–10)
NEUTS SEG # BLD: 2.9 K/UL (ref 1.8–8)
NEUTS SEG NFR BLD: 54 % (ref 40–73)
NRBC # BLD: 0 K/UL (ref 0–0.01)
NRBC BLD-RTO: 0 PER 100 WBC
P-R INTERVAL, ECG05: 174 MS
PLATELET # BLD AUTO: 256 K/UL (ref 135–420)
PMV BLD AUTO: 8.9 FL (ref 9.2–11.8)
POTASSIUM SERPL-SCNC: 4 MMOL/L (ref 3.5–5.5)
PROT SERPL-MCNC: 6.6 G/DL (ref 6.4–8.2)
PROTHROMBIN TIME: 13.6 SEC (ref 11.5–15.2)
Q-T INTERVAL, ECG07: 386 MS
QRS DURATION, ECG06: 76 MS
QTC CALCULATION (BEZET), ECG08: 431 MS
RBC # BLD AUTO: 4.54 M/UL (ref 4.35–5.65)
SODIUM SERPL-SCNC: 140 MMOL/L (ref 136–145)
TROPONIN-HIGH SENSITIVITY: 6 NG/L (ref 0–78)
TROPONIN-HIGH SENSITIVITY: 6 NG/L (ref 0–78)
VENTRICULAR RATE, ECG03: 75 BPM
WBC # BLD AUTO: 5.4 K/UL (ref 4.6–13.2)

## 2022-09-21 PROCEDURE — 85610 PROTHROMBIN TIME: CPT

## 2022-09-21 PROCEDURE — 84484 ASSAY OF TROPONIN QUANT: CPT

## 2022-09-21 PROCEDURE — 93005 ELECTROCARDIOGRAM TRACING: CPT

## 2022-09-21 PROCEDURE — 71275 CT ANGIOGRAPHY CHEST: CPT

## 2022-09-21 PROCEDURE — 71045 X-RAY EXAM CHEST 1 VIEW: CPT

## 2022-09-21 PROCEDURE — 74011000636 HC RX REV CODE- 636: Performed by: EMERGENCY MEDICINE

## 2022-09-21 PROCEDURE — 99285 EMERGENCY DEPT VISIT HI MDM: CPT

## 2022-09-21 PROCEDURE — 83880 ASSAY OF NATRIURETIC PEPTIDE: CPT

## 2022-09-21 PROCEDURE — 85025 COMPLETE CBC W/AUTO DIFF WBC: CPT

## 2022-09-21 PROCEDURE — 80053 COMPREHEN METABOLIC PANEL: CPT

## 2022-09-21 RX ADMIN — IOPAMIDOL 80 ML: 755 INJECTION, SOLUTION INTRAVENOUS at 13:05

## 2022-09-21 NOTE — ED NOTES
Pt. Arrives to the ED via EMS endorsing left sided chest pain that started earlier today. Pt. Rates pain 3/10. Pt. Appears to be in no acute distress. NIH negative due to patient complaining of some left facial numbness. Pt. Speaking in clear complete sentences at this time. No facial droop appreciated or slurring of speech.

## 2022-09-21 NOTE — DISCHARGE INSTRUCTIONS
You were seen in the emergency department today for chest pain. We did a full work-up all of which was reassuring. We additionally spoke to cardiology and recommend that you follow-up with Dr. Shun Kwan in his office within the next week. Please return to the emergency department if you develop any new or concerning symptoms to you.

## 2022-09-21 NOTE — ED PROVIDER NOTES
EMERGENCY DEPARTMENT HISTORY AND PHYSICAL EXAM    12:20 PM      Date: 9/21/2022  Patient Name: Martin Jarquin    History of Presenting Illness     Chief Complaint   Patient presents with    Chest Pain         History Provided By: Patient  Location/Duration/Severity/Modifying factors   HPI 61-year-old male with past medical history of hypertension, peripheral vascular disease, coronary artery disease, DVT of bilateral lower extremities with questionable medication compliance does supposedly on Eliquis who presents via EMS for substernal chest pain that started approximately 30 minutes prior to EMS arrival.  Patient describes the pain as sharp and radiates to his right shoulder. He reports that last week he tripped and fell and hit his lower leg and now he has pain behind his knee and in his right thigh. Additionally reports 2 weeks of worsening shortness of breath and dyspnea on exertion. PCP: Soraya Medellin MD    Current Outpatient Medications   Medication Sig Dispense Refill    apixaban (Eliquis) 5 mg tablet Take 1 Tablet by mouth two (2) times a day. 60 Tablet 5    allopurinoL (ZYLOPRIM) 100 mg tablet Take 1 Tablet by mouth two (2) times a day. 60 Tablet 0    diclofenac (VOLTAREN) 1 % gel Apply 2 g to affected area four (4) times daily. 100 g 4    nitrofurantoin (MACRODANTIN) 100 mg capsule Take 1 Capsule by mouth two (2) times a day. Start taking 7 days prior to procedure (Patient not taking: Reported on 6/29/2022)      traMADoL (ULTRAM) 50 mg tablet TAKE ONE TABLET BY MOUTH EVERY 6 HOURS AS NEEDED FOR UP TO FIVE DAYS (Patient not taking: Reported on 6/29/2022)      nitrofurantoin, macrocrystal-monohydrate, (MACROBID) 100 mg capsule Take 1 Capsule by mouth two (2) times a day. Start taking 7 days prior to procedure (Patient not taking: Reported on 6/29/2022) 14 Capsule 0    lidocaine 5 % topical cream Apply  to affected area two (2) times daily as needed for Pain.  (Patient not taking: Reported on 6/29/2022) 15 g 0    wheat dextrin (Benefiber Healthy Shape) 5 gram/7.4 gram powd Take  by mouth. (Patient not taking: Reported on 6/29/2022)      metoprolol succinate (TOPROL-XL) 50 mg XL tablet Take 1 Tablet by mouth daily. (Patient not taking: Reported on 6/29/2022) 90 Tablet 3    famotidine (PEPCID) 20 mg tablet Take 20 mg by mouth two (2) times a day.      ezetimibe (ZETIA) 10 mg tablet Take 10 mg by mouth daily. spironolactone (Aldactone) 25 mg tablet Take 25 mg by mouth daily. tamsulosin (FLOMAX) 0.4 mg capsule Take 2 Capsules by mouth daily (after dinner). (Patient not taking: Reported on 6/29/2022) 180 Capsule 3    finasteride (PROSCAR) 5 mg tablet Take 1 Tablet by mouth daily. (Patient not taking: Reported on 6/29/2022) 90 Tablet 3    polyethylene glycol (MIRALAX) 17 gram packet Take 1 Packet by mouth daily. (Patient not taking: Reported on 6/29/2022) 30 Packet 0    DULoxetine (CYMBALTA) 60 mg capsule Take 1 Cap by mouth daily. Indications: neuropathic pain 60 Cap 5    albuterol sulfate 90 mcg/actuation aebs Take  by inhalation as needed. pantoprazole (PROTONIX) 40 mg tablet Take 1 Tab by mouth daily. 30 Tab 0    amLODIPine (NORVASC) 10 mg tablet Take 1 Tab by mouth daily.  27 Tab 0       Past History     Past Medical History:  Past Medical History:   Diagnosis Date    Bladder outlet obstruction     Erectile disorder due to medical 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     Frequency of urination     H/O spinal cord injury 1974    lumbar spine injury secondary to fall    HTN (hypertension)     Hypercholesterolemia     Illiterate     Injury of lumbar spine ([address] 1974    Leg pain, right     Nocturia     Overactive bladder     Peripheral vascular disease ([facility] [address]        Past Surgical History:  Past Surgical History:   Procedure Laterality Date    COLONOSCOPY N/A 12/4/2019    COLONOSCOPY performed by Agueda Edwards MD at [facility] ENDOSCOPY    HAND/FINGER SURGERY UNLISTED Right     carpal tunnel    HX HEENT Left lens implant    HX ORTHOPAEDIC      finger amputation left hand    HX TONSILLECTOMY      UPPER ARM/ELBOW SURGERY UNLISTED Right        Family History:  Family History   Problem Relation Age of Onset    Diabetes Mother     Hypertension Mother     Diabetes Maternal Grandmother     Hypertension Maternal Grandmother     Cancer Sister         brain    Cancer Sister         brain       Social History:  Social History     Tobacco Use    Smoking status: Former     Types: Cigarettes     Quit date: 2015     Years since quittin.2    Smokeless tobacco: Never   Vaping Use    Vaping Use: Never used   Substance Use Topics    Alcohol use: Not Currently     Alcohol/week: 0.0 standard drinks     Comment: former stopped     Drug use: No       Allergies: Allergies   Allergen Reactions    Latex Rash     Other reaction(s): Unknown    Ampicillin Angioedema    Asa-Acetaminophen-Caff-Buffers Rash    Penicillins Angioedema     Other reaction(s): anaphylaxis    Crab Hives     Denies allergy to crabs    Shellfish Derived Rash    Adhesive Tape-Silicones Itching    Aspirin Other (comments)     Stomach upset - patient said he had peptic ulcers and cannot take  Other reaction(s): Unknown      Atorvastatin Other (comments)     Muscle cramps         Review of Systems       Review of Systems   Constitutional:  Negative for chills and fever. HENT:  Negative for sore throat. Eyes:  Negative for visual disturbance. Negative recent vision problems   Respiratory:  Positive for cough and shortness of breath. Cardiovascular:  Positive for chest pain. Gastrointestinal:  Negative for abdominal pain, diarrhea, nausea and vomiting. Genitourinary:  Negative for difficulty urinating, frequency and hematuria. Musculoskeletal:  Negative for myalgias. Right lower extremity pain   Skin:  Negative for rash. Neurological:  Negative for headaches.         Negative altered level of consciousness   All other systems reviewed and are negative. Physical Exam   Visit Vitals  BP (!) 160/71   Pulse 74   Temp 97.7 °F (36.5 °C)   Resp 16   SpO2 95%         Physical Exam  Vitals reviewed. Constitutional:       Appearance: He is well-developed. HENT:      Head: Normocephalic and atraumatic. Eyes:      Extraocular Movements: Extraocular movements intact. Pupils: Pupils are equal, round, and reactive to light. Cardiovascular:      Rate and Rhythm: Normal rate and regular rhythm. Heart sounds: Normal heart sounds. Pulmonary:      Effort: Pulmonary effort is normal.      Breath sounds: Normal breath sounds. Abdominal:      Palpations: Abdomen is soft. Tenderness: There is no abdominal tenderness. Skin:     General: Skin is warm and dry. Capillary Refill: Capillary refill takes less than 2 seconds. Neurological:      General: No focal deficit present. Mental Status: He is alert. Diagnostic Study Results     Labs -  No results found for this or any previous visit (from the past 12 hour(s)). Radiologic Studies -   CTA CHEST W OR W WO CONT   Final Result   1. No convincing CT evidence of pulmonary embolism. 2.  No acute pulmonary finding. Improved mild bibasilar atelectasis. Mild COPD   and chronic interstitial lung disease. 3. Stable bilateral adrenal hyperplasia. Thank you for your referral.         XR CHEST PORT   Final Result      No clearly acute findings. Likely left greater than right basilar streaky   atelectasis versus scar. Medical Decision Making   I am the first provider for this patient. I reviewed the vital signs, available nursing notes, past medical history, past surgical history, family history and social history. Vital Signs-Reviewed the patient's vital signs.       EKG: Normal sinus rhythm rate of 75 normal axis, normal intervals no evidence of ischemia or infarction    Records Reviewed: Nursing Notes, Old Medical Records, and Previous electrocardiograms (Time of Review: 12:20 PM)    ED Course: Progress Notes, Reevaluation, and Consults:         Provider Notes (Medical Decision Making):   MDM  Number of Diagnoses or Management Options  Chest pain, unspecified type  Diagnosis management comments: 71-year-old male with past medical history of hypertension, peripheral vascular disease, coronary artery disease, DVT bilateral lower extremities with PE and questionable compliance of his Eliquis who presents with 1 week of right lower extremity pain and shortness of breath with sharp substernal chest pain that started today. Differential diagnosis includes: PE, MI, pneumonia,    Patient CBC is largely unremarkable, CMP largely unremarkable, initial troponin is 6, proBNP is 25. Patient's repeat troponin showed no evidence of pulmonary embolism, there is mild chronic stable COPD. Repeat troponin was stable as well. These findings were explained and the patient and I consulted cardiology because there is note of a LAD bridge on his previous cath. Cardiology recommended outpatient follow-up in their clinic. Findings were explained to the patient and he was discharged home with follow-up with cardiology. Procedures    Critical Care Time: None      Diagnosis     Clinical Impression:   1.  Chest pain, unspecified type        Disposition: Discharged    Follow-up Information       Follow up With Specialties Details Why Jerrica Quiros MD Family Medicine Call  As needed [facility]      Abeba Flowers MD Cardiovascular Disease Physician, Internal Medicine Physician   [address] [place] [id number]  [phone]               Discharge Medication List as of 9/21/2022  5:06 PM        CONTINUE these medications which have NOT CHANGED    Details   apixaban (Eliquis) 5 mg tablet Take 1 Tablet by mouth two (2) times a day., Normal, Disp-60 Tablet, R-5 allopurinoL (ZYLOPRIM) 100 mg tablet Take 1 Tablet by mouth two (2) times a day., Normal, Disp-60 Tablet, R-0      diclofenac (VOLTAREN) 1 % gel Apply 2 g to affected area four (4) times daily. , Normal, Disp-100 g, R-4      nitrofurantoin (MACRODANTIN) 100 mg capsule Take 1 Capsule by mouth two (2) times a day. Start taking 7 days prior to procedure, Historical Med      traMADoL (ULTRAM) 50 mg tablet TAKE ONE TABLET BY MOUTH EVERY 6 HOURS AS NEEDED FOR UP TO FIVE DAYS, Historical Med      nitrofurantoin, macrocrystal-monohydrate, (MACROBID) 100 mg capsule Take 1 Capsule by mouth two (2) times a day. Start taking 7 days prior to procedure, Normal, Disp-14 Capsule, R-0      lidocaine 5 % topical cream Apply  to affected area two (2) times daily as needed for Pain., Normal, Disp-15 g, R-0      wheat dextrin (Benefiber Healthy Shape) 5 gram/7.4 gram powd Take  by mouth., Historical Med      metoprolol succinate (TOPROL-XL) 50 mg XL tablet Take 1 Tablet by mouth daily. , Normal, Disp-90 Tablet, R-3      famotidine (PEPCID) 20 mg tablet Take 20 mg by mouth two (2) times a day., Historical Med      ezetimibe (ZETIA) 10 mg tablet Take 10 mg by mouth daily. , Historical Med      spironolactone (Aldactone) 25 mg tablet Take 25 mg by mouth daily. , Historical Med      tamsulosin (FLOMAX) 0.4 mg capsule Take 2 Capsules by mouth daily (after dinner). , Print, Disp-180 Capsule, R-3      finasteride (PROSCAR) 5 mg tablet Take 1 Tablet by mouth daily. , Print, Disp-90 Tablet, R-3      polyethylene glycol (MIRALAX) 17 gram packet Take 1 Packet by mouth daily. , No Print, Disp-30 Packet, R-0      DULoxetine (CYMBALTA) 60 mg capsule Take 1 Cap by mouth daily. Indications: neuropathic pain, Normal, Disp-60 Cap,R-5      albuterol sulfate 90 mcg/actuation aebs Take  by inhalation as needed., Historical Med      pantoprazole (PROTONIX) 40 mg tablet Take 1 Tab by mouth daily. , Print, Disp-30 Tab, R-0      amLODIPine (NORVASC) 10 mg tablet Take 1 Tab by mouth daily. , Print, Disp-30 Tab, R-0           Disclaimer: Sections of this note are dictated using utilizing voice recognition software. Minor typographical errors may be present. If questions arise, please do not hesitate to contact me or call our department.

## 2022-10-12 ENCOUNTER — OFFICE VISIT (OUTPATIENT)
Dept: ORTHOPEDIC SURGERY | Age: 77
End: 2022-10-12
Payer: MEDICAID

## 2022-10-12 VITALS — WEIGHT: 197 LBS | HEIGHT: 67 IN | BODY MASS INDEX: 30.92 KG/M2

## 2022-10-12 DIAGNOSIS — M19.072 PRIMARY OSTEOARTHRITIS OF BOTH FEET: ICD-10-CM

## 2022-10-12 DIAGNOSIS — M43.17 SPONDYLOLISTHESIS OF LUMBOSACRAL REGION: ICD-10-CM

## 2022-10-12 DIAGNOSIS — S92.504A CLOSED NONDISPLACED FRACTURE OF PHALANX OF LESSER TOE OF RIGHT FOOT, UNSPECIFIED PHALANX, INITIAL ENCOUNTER: ICD-10-CM

## 2022-10-12 DIAGNOSIS — E79.0 ELEVATED URIC ACID IN BLOOD: ICD-10-CM

## 2022-10-12 DIAGNOSIS — M79.672 BILATERAL FOOT PAIN: Primary | ICD-10-CM

## 2022-10-12 DIAGNOSIS — M79.671 BILATERAL FOOT PAIN: Primary | ICD-10-CM

## 2022-10-12 DIAGNOSIS — M19.071 PRIMARY OSTEOARTHRITIS OF BOTH FEET: ICD-10-CM

## 2022-10-12 DIAGNOSIS — Z87.39 HISTORY OF GOUT: ICD-10-CM

## 2022-10-12 PROCEDURE — 1123F ACP DISCUSS/DSCN MKR DOCD: CPT | Performed by: ORTHOPAEDIC SURGERY

## 2022-10-12 PROCEDURE — 99214 OFFICE O/P EST MOD 30 MIN: CPT | Performed by: ORTHOPAEDIC SURGERY

## 2022-10-12 PROCEDURE — 73630 X-RAY EXAM OF FOOT: CPT | Performed by: ORTHOPAEDIC SURGERY

## 2022-10-12 RX ORDER — ALLOPURINOL 100 MG/1
100 TABLET ORAL 2 TIMES DAILY
Qty: 60 TABLET | Refills: 0 | Status: SHIPPED | OUTPATIENT
Start: 2022-10-12

## 2022-10-12 NOTE — PROGRESS NOTES
Patient: Diogo Kim                MRN: [id number]       SSN: [id number]  YOB: 1945        AGE: 77 y.o. SEX: male  Body mass index is 30.85 kg/m². PCP: Margaret Greene MD  10/12/22    Dalia Steward returns for reevaluation of low back pain and bilateral foot pain radiculopathy we sent him for an MRI it turns out he does have right L4 and L5 nerve root impingement and will make referral to spine center I had diagnosed him clinically is having gout at the last time we prescribed him allopurinol as it was not an acute attack he did not get it filled we will get it filled again for him he has a previous uric acid level at 6.8 and 1 prior to that was higher    He also had some trauma to his right fifth digit of his toe looks straight is tender at the IP joint he has pes cavus and the left foot he is got a fair bit of 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     X-rays today with 3 views of both feet on 10/12/2022 he is got midfoot arthritis that he has had a fracture involving his fifth toe age-indeterminate and not particularly angulated or displaced he is got this fairly advanced arthritis at the metatarsal phalangeal joints bilaterally and a lot of midfoot arthritis little worse on the left and on the right    I can have him see Dr. Sara Kuo for his feet I am going to have him seen at the spine center for his back and his radiculopathy get a start him again on some allopurinol with usual precautions and I will see him back in a few weeks time to see how is getting along discussion regarding surgery for his feet none acutely is indicated but he may require eventually I will have Dr. Sara Kuo assess him for this    REVIEW OF SYSTEMS:      CON: negative  EYE: negative   ENT: negative  RESP: negative  GI:    negative   :  negative  MSK: Positive  A twelve point review of systems was completed, positives noted and all other systems were reviewed and are negative          Past Medical History:   Diagnosis Date    Bladder outlet obstruction     Erectile disorder due to medical 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     Frequency of urination     H/O spinal cord injury 1974    lumbar spine injury secondary to fall    HTN (hypertension)     Hypercholesterolemia     Illiterate     Injury of lumbar spine ([facility] [address] 1974    Leg pain, right     Nocturia     Overactive bladder     Peripheral vascular disease ([facility] [address]        Family History   Problem Relation Age of Onset    Diabetes Mother     Hypertension Mother     Diabetes Maternal Grandmother     Hypertension Maternal Grandmother     Cancer Sister         brain    Cancer Sister         brain       Current Outpatient Medications   Medication Sig Dispense Refill    apixaban (Eliquis) 5 mg tablet Take 1 Tablet by mouth two (2) times a day. 60 Tablet 5    allopurinoL (ZYLOPRIM) 100 mg tablet Take 1 Tablet by mouth two (2) times a day. 60 Tablet 0    diclofenac (VOLTAREN) 1 % gel Apply 2 g to affected area four (4) times daily. 100 g 4    nitrofurantoin (MACRODANTIN) 100 mg capsule Take 1 Capsule by mouth two (2) times a day. Start taking 7 days prior to procedure (Patient not taking: Reported on 6/29/2022)      traMADoL (ULTRAM) 50 mg tablet TAKE ONE TABLET BY MOUTH EVERY 6 HOURS AS NEEDED FOR UP TO FIVE DAYS (Patient not taking: Reported on 6/29/2022)      nitrofurantoin, macrocrystal-monohydrate, (MACROBID) 100 mg capsule Take 1 Capsule by mouth two (2) times a day. Start taking 7 days prior to procedure (Patient not taking: Reported on 6/29/2022) 14 Capsule 0    lidocaine 5 % topical cream Apply  to affected area two (2) times daily as needed for Pain.  (Patient not taking: Reported on 6/29/2022) 15 g 0    wheat dextrin (Benefiber Healthy Shape) 5 gram/7.4 gram powd Take  by mouth. (Patient not taking: Reported on 6/29/2022)      metoprolol succinate (TOPROL-XL) 50 mg XL tablet Take 1 Tablet by mouth daily. (Patient not taking: Reported on 6/29/2022) 90 Tablet 3    famotidine (PEPCID) 20 mg tablet Take 20 mg by mouth two (2) times a day.      ezetimibe (ZETIA) 10 mg tablet Take 10 mg by mouth daily. spironolactone (Aldactone) 25 mg tablet Take 25 mg by mouth daily. tamsulosin (FLOMAX) 0.4 mg capsule Take 2 Capsules by mouth daily (after dinner). (Patient not taking: Reported on 6/29/2022) 180 Capsule 3    finasteride (PROSCAR) 5 mg tablet Take 1 Tablet by mouth daily. (Patient not taking: Reported on 6/29/2022) 90 Tablet 3    polyethylene glycol (MIRALAX) 17 gram packet Take 1 Packet by mouth daily. (Patient not taking: Reported on 6/29/2022) 30 Packet 0    DULoxetine (CYMBALTA) 60 mg capsule Take 1 Cap by mouth daily. Indications: neuropathic pain 60 Cap 5    albuterol sulfate 90 mcg/actuation aebs Take  by inhalation as needed. pantoprazole (PROTONIX) 40 mg tablet Take 1 Tab by mouth daily. 30 Tab 0    amLODIPine (NORVASC) 10 mg tablet Take 1 Tab by mouth daily.  30 Tab 0       Allergies   Allergen Reactions    Latex Rash     Other reaction(s): Unknown    Ampicillin Angioedema    Asa-Acetaminophen-Caff-Buffers Rash    Penicillins Angioedema     Other reaction(s): anaphylaxis    Crab Hives     Denies allergy to crabs    Shellfish Derived Rash    Adhesive Tape-Silicones Itching    Aspirin Other (comments)     Stomach upset - patient said he had peptic ulcers and cannot take  Other reaction(s): Unknown      Atorvastatin Other (comments)     Muscle cramps       Past Surgical History:   Procedure Laterality Date    COLONOSCOPY N/A 12/4/2019    COLONOSCOPY performed by Colt Carmona MD at [facility] ENDOSCOPY    HAND/FINGER SURGERY UNLISTED Right     carpal tunnel    HX HEENT Left     lens implant    HX ORTHOPAEDIC      finger amputation left hand    HX TONSILLECTOMY      UPPER ARM/ELBOW SURGERY UNLISTED Right        Social History     Socioeconomic History    Marital status:      Spouse name: Not on file    Number of children: Not on file    Years of education: Not on file    Highest education level: Not on file   Occupational History    Not on file   Tobacco Use    Smoking status: Former     Types: Cigarettes     Quit date: 2015     Years since quittin.2    Smokeless tobacco: Never   Vaping Use    Vaping Use: Never used   Substance and Sexual Activity    Alcohol use: Not Currently     Alcohol/week: 0.0 standard drinks     Comment: former stopped     Drug use: No    Sexual activity: Yes   Other Topics Concern    Not on file   Social History Narrative    Not on file     Social Determinants of Health     Financial Resource Strain: Not on file   Food Insecurity: Not on file   Transportation Needs: Not on file   Physical Activity: Not on file   Stress: Not on file   Social Connections: Not on file   Intimate Partner Violence: Not on file   Housing Stability: Not on file       Visit Vitals  Ht 5' 7\" (1.702 m)   Wt 89.4 kg (197 lb)   BMI 30.85 kg/m²         PHYSICAL EXAMINATION:  GENERAL: Alert and oriented x3, in no acute distress, well-developed, well-nourished, afebrile. HEART: No JVD. EYES: No scleral icterus   NECK: No significant lymphadenopathy   LUNGS: No respiratory compromise or indrawing  ABDOMEN: Soft, non-tender, non-distended. Note: This note was completed using voice recognition software. Any typographical/name errors or mistakes are unintentional.    Electronically signed by:  Evan Begum MD

## 2022-10-18 ENCOUNTER — OFFICE VISIT (OUTPATIENT)
Dept: ORTHOPEDIC SURGERY | Age: 77
End: 2022-10-18
Payer: MEDICAID

## 2022-10-18 VITALS — WEIGHT: 194 LBS | BODY MASS INDEX: 30.38 KG/M2

## 2022-10-18 DIAGNOSIS — S92.501A CLOSED FRACTURE OF PHALANX OF RIGHT FIFTH TOE, INITIAL ENCOUNTER: Primary | ICD-10-CM

## 2022-10-18 DIAGNOSIS — S92.351A CLOSED FRACTURE OF BASE OF FIFTH METATARSAL BONE, RIGHT, INITIAL ENCOUNTER: ICD-10-CM

## 2022-10-18 DIAGNOSIS — M79.672 BILATERAL FOOT PAIN: ICD-10-CM

## 2022-10-18 DIAGNOSIS — M19.071 OSTEOARTHRITIS OF FIRST METATARSOPHALANGEAL (MTP) JOINT OF BOTH FEET: ICD-10-CM

## 2022-10-18 DIAGNOSIS — M79.671 BILATERAL FOOT PAIN: ICD-10-CM

## 2022-10-18 DIAGNOSIS — M62.838 MUSCLE SPASMS OF BOTH LOWER EXTREMITIES: ICD-10-CM

## 2022-10-18 DIAGNOSIS — M19.072 OSTEOARTHRITIS OF FIRST METATARSOPHALANGEAL (MTP) JOINT OF BOTH FEET: ICD-10-CM

## 2022-10-18 PROCEDURE — 28470 CLTX METATARSAL FX WO MNP EA: CPT | Performed by: ORTHOPAEDIC SURGERY

## 2022-10-18 PROCEDURE — 1123F ACP DISCUSS/DSCN MKR DOCD: CPT | Performed by: ORTHOPAEDIC SURGERY

## 2022-10-18 PROCEDURE — 28510 TREATMENT OF TOE FRACTURE: CPT | Performed by: ORTHOPAEDIC SURGERY

## 2022-10-18 PROCEDURE — 99214 OFFICE O/P EST MOD 30 MIN: CPT | Performed by: ORTHOPAEDIC SURGERY

## 2022-10-18 NOTE — PROGRESS NOTES
AMBULATORY PROGRESS NOTE      Patient: Romeo Bumpers             MRN: [id number]     SSN: [id number] Body mass index is 30.38 kg/m². YOB: 1945     AGE: 77 y.o. EX: male    PCP: Mary Crews MD       IMPRESSION //  DIAGNOSIS AND TREATMENT PLAN      Romeo Bumpers has a diagnosis of:        X-rays, right foot, as well by Dr. Mery Melchor, on October 12, 2022: There appears to be a fracture, to the right fifth metatarsal head neck junction, some impaction, as well as an nondisplaced fracture right fifth toe proximal phalanx, with acceptable alignment. There are associated OA, to the right #2 3 TMT joint articulations, as well as mild bunion deformity, with some OA of the right great toe first MTP region. X-rays of the left foot, 3 views, AP lateral bleak, nonweightbearing, shows some OA changes in the left #2 3 TMT joint relation, incidental bone spur seen the inferior surface of left calcaneus, but no discernible fracture sublease or dislocation, to the left foot AP, oblique images. There is a bunion deformity some OA changes in the left great toe first MTP. He describes having blood work, done in the past, but I cannot find any blood work that describes really had a uric acid level. He has had a few other diagnostic tests. He had a CTA, on September 21, 2022, there is no convincing CT evidence of pulmonary embolus. There is improved mild bilateral bibasilar atelectasis, mild COPD and chronic interstitial lung disease. Stable bilateral adrenal hyperplasia. Lumbar MRI, July 27, 2022:  IMPRESSION     Stable grade 1 spondylolisthesis of L5-S1 with degenerative changes  Moderate compression exiting right L5 root  Marked compression exiting left L5 nerve root      DIAGNOSES    1. Closed fracture of phalanx of right fifth toe, initial encounter    2. Bilateral foot pain    3. Muscle spasms of both lower extremities    4.  Closed fracture of base of fifth metatarsal bone, right, initial encounter    5. 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 Generic Supply Order     bilateral  custom trilaminar orthotic, medially posted, with goal to offload the medial columns    ND CLOSED TX METATARSAL FX    ND CLOSED TX TOE FX            PLAN:    1. DME: bilateral custom trilaminar orthotic, medially posted, with goal to offload the medial columns   2. DME: A right hard sole shoe will be given and placed on the patient. RTO 5 weeks    Patient Instructions   [facility] and [address] [place] [address], [address]  Phone: [phone]         Please follow up with your PCP for any health maintenance as recommended         Stephania Pelaez  expresses understanding of the diagnosis, treatment plan, and all of their proposed questions were answered to their satisfaction. Patient education has been provided re the diagnoses. HPI //  Marcia Davis IS A 77 y.o. male who is a/an  established patient, presenting to my outpatient office for evaluation of  the following chief complaint(s):     Chief Complaint   Patient presents with    Foot Pain     bilat       At [address] [place] [address] (12/10/2020), Stephania Pelaez presented with a foot callus and primary osteoarthritis of the right foot. I obtained bilateral foot 3V XR in the office. I prescribed Lac-Hydrin 12% lotion for his foot calluses. I Instructed the patient to look into OTC Dr. Kiet Louie full-length memory foam insert. Since LOV, Stephania Pelaez complains of swelling in the medial columns of the bilateral feet and burning pain diffusely in the bilateral, exacerbated with ambulation. He admits his pain frequently disrupts his sleep and he has difficulty ambulating stairs.  He denies any redness or warmth in the feet during pain episodes or any drainage. He also reports an injury to the right 5th toe when he struck it on furniture about 3 weeks ago. He also notes significant swelling over the 5th toe after the injury. He mentions that he previously used [employer] and they caused pain in the bilateral arches. He denies diabetes mellitus but states Dr. Jacqueline Valencia suspects he has pinched spinal nerves. He is using Voltaren gel on his feet as needed with some relief. He notes that he is not a candidate of NSAID's due to heart or kidney issues. He also mentions he previously underwent  bilateral lower extremity EMGs and has previously been prescribed Neurontin. Visit Vitals  Wt 194 lb (88 kg)   BMI 30.38 kg/m²       Appearance: Alert, well appearing and pleasant patient who is in no distress, oriented to person, place/time, and who follows commands. Normal dress/motor activity/thought processes/memory. This patient is accompanied in the examination room by his  self. Patient arrives to office via: with assistive device: single point cane. Psychiatric:  Normal Affect/mood. Judgement, behavior, and conduct are appropriate. Speech normal in context and clarity, memory intact grossly, no involuntary movements - tremors. H EENT (2): Head normocephalic & atraumatic. Eye: pupils are round// EOM are intact // Neck: ROM WNL  // Hearings Intact   Respiratory: Breathing non labored     ANKLE/FOOT bilateral     Gait: uses assistive device - single point cane. Tenderness: Left: exquisite over the 1st MTP joint with palpation and distraction. Right: 5th   Cutaneous: Left: No reddening or unusual warmth. Well-healed remote scar over the medial midfoot. Right: bruising over the 5th proximal phalanx  Joint Motion: Left: Limited active inversion. Bilateral: full passive inversion and eversion. Gastrocnemius contracture. Joint / Tendon Stability:  No Ankle or Subtalar instability or joint laxity.                        No peroneal sublux ability or dislocation  Alignment: neutral Hindfoot,    Neuro Motor/Sensory: NL/Diminished monofilament over the dorsal forefeet bilaterally. Vascular: 1+ DP pulse bilaterally. Lymphatics: No extremity lymphedema, No calf swelling, no tenderness to calf muscles. CHART REVIEW     Cm Sanchez has been experiencing pain and discomfort confirmed as outlined in the pain assessment outlined below.  was reviewed by Ivania Diaz MD, on 10/18/2022. Pain Assessment  6/30/2022   Location of Pain Knee   Pain Location Comment -   Location Modifiers Right   Severity of Pain 8   Quality of Pain Throbbing;Aching   Quality of Pain Comment -   Duration of Pain Persistent   Duration of Pain Comment -   Frequency of Pain Constant   Frequency of Pain Comment -   Date Pain First Started -   Date Pain First Started Comment -   Aggravating Factors Walking;Standing   Aggravating Factors Comment -   Limiting Behavior -   Relieving Factors Rest   Relieving Factors Comment -   Result of Injury No   Work-Related Injury -   How long out of work? -   Type of Injury -   Type of Injury Comment -        Cm Sanchez  has a past medical history of Bladder outlet obstruction, Erectile disorder due to medical 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, Frequency of urination, H/O spinal cord injury (1974), HTN (hypertension), Hypercholesterolemia, Illiterate, Injury of lumbar spine ([address] (1974), Leg pain, right, Nocturia, Overactive bladder, and Peripheral vascular disease ([address] Patients is employed at:          has a past medical history of Bladder outlet obstruction, Erectile disorder due to medical 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, Frequency of urination, H/O spinal cord injury (1974), HTN (hypertension), Hypercholesterolemia, Illiterate, Injury of lumbar spine ([address] (1974), Leg pain, right, Nocturia, Overactive bladder, and Peripheral vascular disease ([address]     has a past surgical history that includes hx orthopaedic; hx tonsillectomy; hx heent (Left); colonoscopy (N/A, 12/4/2019); upper arm/elbow surgery unlisted (Right); and hand/finger surgery unlisted (Right). family history includes Cancer in his sister and sister; Diabetes in his maternal grandmother and mother; Hypertension in his maternal grandmother and mother. Current Outpatient Medications   Medication Sig    allopurinoL (ZYLOPRIM) 100 mg tablet Take 1 Tablet by mouth two (2) times a day. apixaban (Eliquis) 5 mg tablet Take 1 Tablet by mouth two (2) times a day. diclofenac (VOLTAREN) 1 % gel Apply 2 g to affected area four (4) times daily. nitrofurantoin, macrocrystal-monohydrate, (MACROBID) 100 mg capsule Take 1 Capsule by mouth two (2) times a day. Start taking 7 days prior to procedure    famotidine (PEPCID) 20 mg tablet Take 20 mg by mouth two (2) times a day.    ezetimibe (ZETIA) 10 mg tablet Take 10 mg by mouth daily. spironolactone (ALDACTONE) 25 mg tablet Take 25 mg by mouth daily. DULoxetine (CYMBALTA) 60 mg capsule Take 1 Cap by mouth daily. Indications: neuropathic pain    albuterol sulfate 90 mcg/actuation aebs Take  by inhalation as needed. pantoprazole (PROTONIX) 40 mg tablet Take 1 Tab by mouth daily. amLODIPine (NORVASC) 10 mg tablet Take 1 Tab by mouth daily. nitrofurantoin (MACRODANTIN) 100 mg capsule Take 1 Capsule by mouth two (2) times a day. Start taking 7 days prior to procedure (Patient not taking: No sig reported)    traMADoL (ULTRAM) 50 mg tablet TAKE ONE TABLET BY MOUTH EVERY 6 HOURS AS NEEDED FOR UP TO FIVE DAYS (Patient not taking: No sig reported)    lidocaine 5 % topical cream Apply  to affected area two (2) times daily as needed for Pain. (Patient not taking: No sig reported)    wheat dextrin (Benefiber Healthy Shape) 5 gram/7.4 gram powd Take  by mouth. (Patient not taking: No sig reported)    metoprolol succinate (TOPROL-XL) 50 mg XL tablet Take 1 Tablet by mouth daily.  (Patient not taking: No sig reported)    tamsulosin (FLOMAX) 0.4 mg capsule Take 2 Capsules by mouth daily (after dinner). (Patient not taking: No sig reported)    finasteride (PROSCAR) 5 mg tablet Take 1 Tablet by mouth daily. (Patient not taking: No sig reported)    polyethylene glycol (MIRALAX) 17 gram packet Take 1 Packet by mouth daily. (Patient not taking: No sig reported)     No current facility-administered medications for this visit. Allergies   Allergen Reactions    Latex Rash     Other reaction(s): Unknown    Ampicillin Angioedema    Asa-Acetaminophen-Caff-Buffers Rash    Penicillins Angioedema     Other reaction(s): anaphylaxis    Crab Hives     Denies allergy to crabs    Shellfish Derived Rash    Adhesive Tape-Silicones Itching    Aspirin Other (comments)     Stomach upset - patient said he had peptic ulcers and cannot take  Other reaction(s): Unknown      Atorvastatin Other (comments)     Muscle cramps     Social History     Occupational History    Not on file   Tobacco Use    Smoking status: Former     Types: Cigarettes     Quit date: 2015     Years since quittin.2    Smokeless tobacco: Never   Vaping Use    Vaping Use: Never used   Substance and Sexual Activity    Alcohol use: Not Currently     Alcohol/week: 0.0 standard drinks     Comment: former stopped     Drug use: No    Sexual activity: Yes       reports that he quit smoking about 7 years ago. His smoking use included cigarettes. He has never used smokeless tobacco. He reports that he does not currently use alcohol. He reports that he does not use drugs.       DIAGNOSTIC LAB DATA      Lab Results   Component Value Date/Time    Hemoglobin A1c 5.6 2020 12:40 PM    Hemoglobin A1c 5.8 (A) 10/09/2019 12:00 AM    //   Lab Results   Component Value Date/Time    Glucose 91 2022 10:56 AM    Glucose (POC) 136 (H) 2021 11:19 AM        No results found for: RUO6HAWL, [id number]      Lab Results   Component Value Date/Time    Vitamin D 25-Hydroxy 18.2 (L) 2016 01:47 AM        Drug Screen Most Recent Result Date    No resulted procedures found. REVIEW OF SYSTEMS : 10/18/2022  ALL BELOW ARE Negative except : SEE HPI     All other systems reviewed and are negative. 12 point review of systems otherwise negative unless noted in HPI. RADIOGRAPHS// IMAGING//DIAGNOSTIC DATA     Orders Placed This Encounter    Generic Supply Order    Generic Supply Order    AK CLOSED TX METATARSAL FX    AK CLOSED TX TOE FX        ANKLE BRACHIAL INDEX 11/17/2022         I have personally reviewed the images of the above study. The interpretation of this study is my professional opinion           I have spent 35 minutes reviewing the previous notes, reviewing diagnostic studies [Advanced  Imaging, Diagnostic test results (x-rays)] and had a direct face to face with the patient discussing the diagnosis and importance of compliance with the treatment and plan. There is  discussion for the potential for surgery, answering all questions, as well as documenting patient care coordination for this individual on the day of the visit. Disclaimer:     Sections of this note are dictated using utilizing voice recognition software, which may have resulted in some phonetic based errors in grammar and contents. Even though attempts were made to correct all the mistakes, some may have been missed, and remained in the body of the document. If questions arise, please contact our department. An electronic signature was used to authenticate this note. Leida Pickering may have a reminder for a \"due or due soon\" health maintenance. I have asked that he contact his primary care provider for follow-up on this health maintenance. Patient Instructions    Prosthetics and [address] [place] [address], [address]  Phone: [phone]         Please follow up with your PCP for any health maintenance as recommended.                 Scribed by Rachel Wong, as dictated by Wilbur Cisneros MD.   10/18/2022  8:08 AM

## 2022-10-19 ENCOUNTER — OFFICE VISIT (OUTPATIENT)
Dept: ORTHOPEDIC SURGERY | Age: 77
End: 2022-10-19
Payer: MEDICAID

## 2022-10-19 VITALS — HEIGHT: 67 IN | WEIGHT: 194 LBS | TEMPERATURE: 97.8 F | BODY MASS INDEX: 30.45 KG/M2

## 2022-10-19 DIAGNOSIS — Z98.890 S/P CUBITAL TUNNEL RELEASE: ICD-10-CM

## 2022-10-19 DIAGNOSIS — G56.22 CUBITAL TUNNEL SYNDROME, LEFT: Primary | ICD-10-CM

## 2022-10-19 DIAGNOSIS — Z98.890 S/P CARPAL TUNNEL RELEASE: ICD-10-CM

## 2022-10-19 PROCEDURE — 1123F ACP DISCUSS/DSCN MKR DOCD: CPT | Performed by: ORTHOPAEDIC SURGERY

## 2022-10-19 PROCEDURE — 99213 OFFICE O/P EST LOW 20 MIN: CPT | Performed by: ORTHOPAEDIC SURGERY

## 2022-10-19 NOTE — PROGRESS NOTES
Lynsey Avila is a 77 y.o. male right handed retiree. Worker's Compensation and legal considerations: none filed. Vitals:    10/19/22 0828   Temp: 97.8 °F (36.6 °C)   TempSrc: Temporal   Weight: 194 lb (88 kg)   Height: 5' 7\" (1.702 m)   PainSc:   6   PainLoc: Hand           Chief Complaint   Patient presents with    Hand Pain     Lt        HPI: Patient presents today due to recurrence of the numbness and tingling in the little and ring finger that he was having prior to surgery. Initially after his left cubital tunnel release and carpal tunnel release, he had improvement but says over the past few months he has had increasing numbness in the little and ring finger. 5/19/2021 HPI: Patient presents today more than 3 months status post left carpal tunnel release and cubital tunnel release. He started therapy but had to have an interruption but he is planning to resume in a couple days. He reports some continued pain around the elbow. He says the right side has not been bothering him. EMG/preop HPI: Patient returns today for follow-up of bilateral upper extremity EMGs. He is recently discussed with a shoulder surgeon having a shoulder replacement. This is on the right side. He says he would like to have the left hand numbness taken care of first.    12/2020 HPI: Patient comes in today regarding left worse than right upper extremity numbness and pain. He reports little finger and the ring finger on the left to be numb most of the time of pain radiating up the arm. He reports occasional pain and swelling on the right side. Initial HPI: Patient comes in today regarding concerns of having a metal foreign body in his on something metallic right middle finger tip in his left thumb tip. He says that he scratched both areas last year and has since had issues with it. He thinks he may have gotten something metallic out of the right middle finger and then it bled after that.   He reports some continued tenderness to palpation. Date of onset: Early to mid 2022 recurrence    Injury: No    Prior Treatment:  Yes: Comment:  left carpal tunnel and cubital tunnel releases    Numbness/ Tingling: Yes: Comment:  left little and ring    ROS: Review of Systems - General ROS: negative  Respiratory ROS: no cough, shortness of breath, or wheezing  Cardiovascular ROS: no chest pain or dyspnea on exertion  Musculoskeletal ROS: positive for - pain in finger - right and thumb - left  Neurological ROS: Positive for numbness and tingling  Dermatological ROS: negative    Past Medical History:   Diagnosis Date    Bladder outlet obstruction     Erectile disorder due to medical 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     Frequency of urination     H/O spinal cord injury 1974    lumbar spine injury secondary to fall    HTN (hypertension)     Hypercholesterolemia     Illiterate     Injury of lumbar spine ([address] 1974    Leg pain, right     Nocturia     Overactive bladder     Peripheral vascular disease ([address]        Past Surgical History:   Procedure Laterality Date    COLONOSCOPY N/A 12/4/2019    COLONOSCOPY performed by Pietro Shepard MD at [facility]    HAND/FINGER SURGERY UNLISTED Right     carpal tunnel    HX HEENT Left     lens implant    HX ORTHOPAEDIC      finger amputation left hand    HX TONSILLECTOMY      UPPER ARM/ELBOW SURGERY UNLISTED Right        Current Outpatient Medications   Medication Sig Dispense Refill    allopurinoL (ZYLOPRIM) 100 mg tablet Take 1 Tablet by mouth two (2) times a day. 60 Tablet 0    apixaban (Eliquis) 5 mg tablet Take 1 Tablet by mouth two (2) times a day. 60 Tablet 5    diclofenac (VOLTAREN) 1 % gel Apply 2 g to affected area four (4) times daily. 100 g 4    nitrofurantoin (MACRODANTIN) 100 mg capsule Take 1 Capsule by mouth two (2) times a day.  Start taking 7 days prior to procedure      traMADoL (ULTRAM) 50 mg tablet TAKE ONE TABLET BY MOUTH EVERY 6 HOURS AS NEEDED FOR UP TO FIVE DAYS      nitrofurantoin, macrocrystal-monohydrate, (MACROBID) 100 mg capsule Take 1 Capsule by mouth two (2) times a day. Start taking 7 days prior to procedure 14 Capsule 0    lidocaine 5 % topical cream Apply  to affected area two (2) times daily as needed for Pain. 15 g 0    wheat dextrin (Benefiber Healthy Shape) 5 gram/7.4 gram powd Take  by mouth.      metoprolol succinate (TOPROL-XL) 50 mg XL tablet Take 1 Tablet by mouth daily. 90 Tablet 3    famotidine (PEPCID) 20 mg tablet Take 20 mg by mouth two (2) times a day.      ezetimibe (ZETIA) 10 mg tablet Take 10 mg by mouth daily. spironolactone (ALDACTONE) 25 mg tablet Take 25 mg by mouth daily. tamsulosin (FLOMAX) 0.4 mg capsule Take 2 Capsules by mouth daily (after dinner). 180 Capsule 3    finasteride (PROSCAR) 5 mg tablet Take 1 Tablet by mouth daily. 90 Tablet 3    polyethylene glycol (MIRALAX) 17 gram packet Take 1 Packet by mouth daily. 30 Packet 0    DULoxetine (CYMBALTA) 60 mg capsule Take 1 Cap by mouth daily. Indications: neuropathic pain 60 Cap 5    albuterol sulfate 90 mcg/actuation aebs Take  by inhalation as needed. pantoprazole (PROTONIX) 40 mg tablet Take 1 Tab by mouth daily. 30 Tab 0    amLODIPine (NORVASC) 10 mg tablet Take 1 Tab by mouth daily. 30 Tab 0       Allergies   Allergen Reactions    Latex Rash     Other reaction(s): Unknown    Ampicillin Angioedema    Asa-Acetaminophen-Caff-Buffers Rash    Penicillins Angioedema     Other reaction(s): anaphylaxis    Crab Hives     Denies allergy to crabs    Shellfish Derived Rash    Adhesive Tape-Silicones Itching    Aspirin Other (comments)     Stomach upset - patient said he had peptic ulcers and cannot take  Other reaction(s): Unknown      Atorvastatin Other (comments)     Muscle cramps         PE:     Physical Exam  Vitals and nursing note reviewed. Constitutional:       General: He is not in acute distress. Appearance: Normal appearance. He is not ill-appearing.    Cardiovascular:      Pulses: Normal pulses. Pulmonary:      Effort: Pulmonary effort is normal. No respiratory distress. Musculoskeletal:         General: No swelling, tenderness, deformity or signs of injury. Normal range of motion. Cervical back: Normal range of motion and neck supple. Right lower leg: No edema. Left lower leg: No edema. Skin:     General: Skin is warm and dry. Capillary Refill: Capillary refill takes less than 2 seconds. Findings: No bruising or erythema. Neurological:      General: No focal deficit present. Mental Status: He is alert and oriented to person, place, and time. Cranial Nerves: No cranial nerve deficit. Sensory: No sensory deficit. Psychiatric:         Mood and Affect: Mood normal.         Behavior: Behavior normal.         NEUROVASCULAR    Examination L R Examination L R   Carpal Comp.  - Pronator Comp. - -   Carpal Tinel  - Pronator Tinel - -   Phalen's  - Pronator Stress - -   Cubital Comp.  +- Guyon Comp. - -   Cubital Tinel  + Guyon Tinel - -   Elbow Hyperflexion  - Adson's - -   Spurling's - - SC Comp. - -   PCB Median abn - - SC Tinel - -   Radial Tinel - - IC Comp. - -   Digital Tinel - - IC Tinel - -   Radial 2-Pt WNL WNL Ulnar 2-Pt WNL WNL     Radial Pulse: 2+  Capillary Refill: < 2 sec  [employer]: Not Performed  [employer]: Not Performed    NCV & EMG Findings:  Evaluation of the right median motor nerve showed prolonged distal onset latency (4.7 ms). The left ulnar motor and the right ulnar motor nerves showed decreased conduction velocity (A Elbow-B Elbow, L36, R43 m/s). The left median sensory and the right median sensory nerves showed prolonged distal peak latency (L3.8, R4.4 ms) and decreased conduction velocity (Wrist-2nd Digit, L37, R32 m/s).   The left ulnar sensory and the right ulnar sensory nerves showed prolonged distal peak latency (L7.3, R4.2 ms), reduced amplitude (L8.3, R7.6 µV), and decreased conduction velocity (Wrist-5th Digit, L19, R33 m/s). All remaining nerves (as indicated in the following tables) were within normal limits. Left vs. Right side comparison data for the median sensory nerve indicates abnormal L-R latency difference (0.6 ms). The ulnar sensory nerve indicates abnormal L-R latency difference (3.1 ms). All remaining left vs. right side differences were within normal limits. All examined muscles (as indicated in the following table) showed no evidence of electrical instability. INTERPRETATION     This is an abnormal electrodiagnostic examination. These findings may be consistent with:   1. Sensorimotor polyneuropathy - this is based on mild unexpected abnormalities throughout the NCS unrelated to other entrapment syndromes. There is possibility that the severity of the other entrapment neuropathies is worsened by this polyneuropathy. 2. Moderate ulnar mononeuropathy at the right elbow (cubital tunnel syndrome)   3. Moderate median mononeuropathy at the right wrist (carpal tunnel syndrome)   4. Moderate ulnar mononeuropathy at the left elbow (cubital tunnel syndrome)   5. Mild median mononeuropathy at the left wrist (carpal tunnel syndrome)     There is no electrodiagnostic evidence of any cervical radiculopathy, brachial plexopathy,  or any other mononeuropathy. CLINICAL INTERPRETATION  His electrodiagnostic findings of carpal tunnel and cubital tunnel syndromes bilaterally are consistent with his bilateral hand symptoms. Imagin2020 plain films of the left thumb as well as the right middle finger does not show any evidence of radiopaque foreign body any fracture dislocation or other osseous abnormality. Of note on the left thumb view a index finger metallic foreign body is noted on the radial aspect of the P1. ICD-10-CM ICD-9-CM    1.  Cubital tunnel syndrome, left  G56.22 354.2 EMG ONE EXTREMITY UPPER RT      NCV/LAT MOTOR PER NERVE UP/LT      2. S/P cubital tunnel release  Z98.890 V45.89 EMG ONE EXTREMITY UPPER RT      NCV/LAT MOTOR PER NERVE UP/LT      3. S/P carpal tunnel release  Z98.890 V45.89 EMG ONE EXTREMITY UPPER RT      NCV/LAT MOTOR PER NERVE UP/LT          Plan:     Repeat left upper extremity EMG to look for signs of improvement of the ulnar nerve across the elbow versus worsening electrodiagnostic findings. Follow-up and Dispositions    Return for EMG review.          Plan was reviewed with patient, who verbalized agreement and understanding of the plan

## 2022-10-20 DIAGNOSIS — Z98.890 S/P CARPAL TUNNEL RELEASE: ICD-10-CM

## 2022-10-20 DIAGNOSIS — Z98.890 S/P CUBITAL TUNNEL RELEASE: ICD-10-CM

## 2022-10-20 DIAGNOSIS — G56.22 CUBITAL TUNNEL SYNDROME, LEFT: ICD-10-CM

## 2022-10-28 ENCOUNTER — OFFICE VISIT (OUTPATIENT)
Dept: CARDIOLOGY CLINIC | Age: 77
End: 2022-10-28
Payer: MEDICAID

## 2022-10-28 VITALS
HEART RATE: 89 BPM | DIASTOLIC BLOOD PRESSURE: 64 MMHG | WEIGHT: 195 LBS | SYSTOLIC BLOOD PRESSURE: 118 MMHG | OXYGEN SATURATION: 97 % | HEIGHT: 67 IN | BODY MASS INDEX: 30.61 KG/M2

## 2022-10-28 DIAGNOSIS — Q24.5 CORONARY-MYOCARDIAL BRIDGE: Primary | ICD-10-CM

## 2022-10-28 DIAGNOSIS — E78.00 HYPERCHOLESTEROLEMIA: ICD-10-CM

## 2022-10-28 DIAGNOSIS — J44.9 CHRONIC OBSTRUCTIVE PULMONARY DISEASE, UNSPECIFIED COPD TYPE (HCC): ICD-10-CM

## 2022-10-28 DIAGNOSIS — I10 ESSENTIAL HYPERTENSION: ICD-10-CM

## 2022-10-28 PROCEDURE — 3078F DIAST BP <80 MM HG: CPT | Performed by: INTERNAL MEDICINE

## 2022-10-28 PROCEDURE — 3074F SYST BP LT 130 MM HG: CPT | Performed by: INTERNAL MEDICINE

## 2022-10-28 PROCEDURE — 99214 OFFICE O/P EST MOD 30 MIN: CPT | Performed by: INTERNAL MEDICINE

## 2022-10-28 PROCEDURE — 1123F ACP DISCUSS/DSCN MKR DOCD: CPT | Performed by: INTERNAL MEDICINE

## 2022-10-28 NOTE — PROGRESS NOTES
1. Have you been to the ER, urgent care clinic since your last visit? Hospitalized since your last visit? Yes When: 9/2022 Where: [facility] Reason for visit: chest pain    2. Have you seen or consulted any other health care providers outside of the [address] [place] [address] since your last visit? Include any pap smears or colon screening.       No

## 2022-10-28 NOTE — PROGRESS NOTES
HISTORY OF PRESENT ILLNESS  Shyam Ewing is a 77 y.o. male. 2//2020  Patient is here for follow-up after recent evaluation in emergency room for chest pain. Has he was painting all day and subsequently started having pain under her armpit on both side. Pain worse on movement. Worse on sitting and moving around radiates to the back. He has short of breath on exertion which does not appear changed. 1/2021  Patient here for follow-up. His stable pattern of chest pain or shortness of breath. Denies any significant change at present. He is here for preoperative assessment    7/2021  Patient is here for follow-up. He was admitted 6/2021 with  Discharge Diagnoses:     -Hemoptysis  -Acute PE  -Acute non-occlusive DVT  -Falls at home  -Possible aspiration pneumonia  -BPH w/ acute urinary retention  -Unintentional weight loss  -Constipation  -Recent diverticulitis      Since discharge he still remains short of breath. No hemoptysis  10/2021  Patient is here for follow-up. Since last visit was in emergency room with atypical chest pain on 2 occasions. On last evaluation CTA chest was negative for any recurrent PE. Since then patient has left and right-sided chest pain that are not related to activity or exertion. Follow-up  Pertinent negatives include no chest pain, no abdominal pain, no headaches and no shortness of breath. Chest Pain (Angina)   The history is provided by the Patient. This is a new problem. The current episode started more than 1 week ago. The problem has been gradually worsening. The problem occurs every several days. The pain is associated with exertion and rest. The pain is present in the substernal region, right side and left side. The pain is mild. The quality of the pain is described as pressure-like and heavy. The pain does not radiate.  Pertinent negatives include no abdominal pain, no claudication, no cough, no dizziness, no fever, no headaches, no hemoptysis, no nausea, no orthopnea, no palpitations, no PND, no shortness of breath, no sputum production, no vomiting and no weakness. Shortness of Breath  The history is provided by the Patient. This is a new problem. The problem occurs frequently. The current episode started more than 1 week ago. The problem has been gradually worsening. Pertinent negatives include no fever, no headaches, no cough, no sputum production, no hemoptysis, no wheezing, no PND, no orthopnea, no chest pain, no vomiting, no abdominal pain, no rash, no leg swelling and no claudication. Precipitated by: walking,exertion. Review of Systems   Constitutional:  Negative for chills and fever. HENT:  Negative for nosebleeds. Eyes:  Negative for blurred vision and double vision. Respiratory:  Negative for cough, hemoptysis, sputum production, shortness of breath and wheezing. Cardiovascular:  Negative for chest pain, palpitations, orthopnea, claudication, leg swelling and PND. Gastrointestinal:  Negative for abdominal pain, heartburn, nausea and vomiting. Musculoskeletal:  Negative for myalgias. Skin:  Negative for rash. Neurological:  Negative for dizziness, weakness and headaches. Endo/Heme/Allergies:  Does not bruise/bleed easily.    Family History   Problem Relation Age of Onset    Diabetes Mother     Hypertension Mother     Diabetes Maternal Grandmother     Hypertension Maternal Grandmother     Cancer Sister         brain    Cancer Sister         brain       Past Medical History:   Diagnosis Date    Bladder outlet obstruction     Erectile disorder due to medical 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     Frequency of urination     H/O spinal cord injury 1974    lumbar spine injury secondary to fall    HTN (hypertension)     Hypercholesterolemia     Illiterate     Injury of lumbar spine ([facility] [address] 1974    Leg pain, right     Nocturia     Overactive bladder     Peripheral vascular disease ([facility] [address]        Past Surgical History:   Procedure Laterality Date COLONOSCOPY N/A 2019    COLONOSCOPY performed by Bill Matos MD at [facility] ENDOSCOPY    HAND/FINGER SURGERY UNLISTED Right     carpal tunnel    HX HEENT Left     lens implant    HX ORTHOPAEDIC      finger amputation left hand    HX TONSILLECTOMY      UPPER ARM/ELBOW SURGERY UNLISTED Right        Social History     Tobacco Use    Smoking status: Former     Types: Cigarettes     Quit date: 2015     Years since quittin.2    Smokeless tobacco: Never   Substance Use Topics    Alcohol use: Not Currently     Alcohol/week: 0.0 standard drinks     Comment: former stopped        Allergies   Allergen Reactions    Latex Rash     Other reaction(s): Unknown    Ampicillin Angioedema    Asa-Acetaminophen-Caff-Buffers Rash    Penicillins Angioedema     Other reaction(s): anaphylaxis    Crab Hives     Denies allergy to crabs    Shellfish Derived Rash    Adhesive Tape-Silicones Itching    Aspirin Other (comments)     Stomach upset - patient said he had peptic ulcers and cannot take  Other reaction(s): Unknown      Atorvastatin Other (comments)     Muscle cramps       Prior to Admission medications    Medication Sig Start Date End Date Taking? Authorizing Provider   allopurinoL (ZYLOPRIM) 100 mg tablet Take 1 Tablet by mouth two (2) times a day. 10/12/22  Yes Cody Boykin MD   apixaban (Eliquis) 5 mg tablet Take 1 Tablet by mouth two (2) times a day. 22  Yes Sarkis Jackman NP   diclofenac (VOLTAREN) 1 % gel Apply 2 g to affected area four (4) times daily. 22  Yes Cody Boykin MD   lidocaine 5 % topical cream Apply  to affected area two (2) times daily as needed for Pain. 22  Yes Rebecca Chavez PA   wheat dextrin (Benefiber Healthy Shape) 5 gram/7.4 gram powd Take  by mouth. Yes Provider, Historical   famotidine (PEPCID) 20 mg tablet Take 20 mg by mouth two (2) times a day. Yes Provider, Historical   ezetimibe (ZETIA) 10 mg tablet Take 10 mg by mouth daily.    Yes Provider, Historical spironolactone (ALDACTONE) 25 mg tablet Take 25 mg by mouth daily. Yes Provider, Historical   polyethylene glycol (MIRALAX) 17 gram packet Take 1 Packet by mouth daily. 6/10/21  Yes Jacek Marques MD   DULoxetine (CYMBALTA) 60 mg capsule Take 1 Cap by mouth daily. Indications: neuropathic pain 9/11/20  Yes Neeru ALFARO MD   albuterol sulfate 90 mcg/actuation aebs Take  by inhalation as needed. Yes Provider, Historical   pantoprazole (PROTONIX) 40 mg tablet Take 1 Tab by mouth daily. 3/28/19  Yes Jessica Holt MD   amLODIPine (NORVASC) 10 mg tablet Take 1 Tab by mouth daily. 3/28/19  Yes Jessica Holt MD         Visit Vitals  /64   Pulse 89   Ht 5' 7\" (1.702 m)   Wt 88.5 kg (195 lb)   SpO2 97%   BMI 30.54 kg/m²     Physical Exam  Constitutional:       Appearance: He is well-developed and well-nourished. HENT:      Head: Normocephalic and atraumatic. Eyes:      Conjunctiva/sclera: Conjunctivae normal.   Neck:      Musculoskeletal: Neck supple. Thyroid: No thyromegaly. Vascular: No JVD. Trachea: No tracheal deviation. Cardiovascular:      Rate and Rhythm: Normal rate and regular rhythm. Heart sounds: Normal heart sounds. No murmur. No friction rub. No gallop. Pulmonary:      Effort: No respiratory distress. Breath sounds: Normal breath sounds. No wheezing or rales. Chest:      Chest wall: No tenderness. Abdominal:      Palpations: Abdomen is soft. Tenderness: There is no abdominal tenderness. Musculoskeletal:         General: No edema. Skin:     General: Skin is warm and dry. Neurological:      Mental Status: He is alert and oriented to person, place, and time. Psychiatric:         Mood and Affect: Mood and affect normal.   Interpretation Summary 10/2018    Normal right lower extremity arterial findings. Moderate PAD left lower extremity. Disease noted at tibial level.    10/2018 EKG  DiagnosisFinal Sinus bradycardia   Moderate voltage criteria for LVH, may be normal variant   Possible Acute pericarditis   Abnormal ECG  2015-stress echo  Impressions: Normal study after maximal exercise without reproduction of  symptoms   ECG conclusions: The stress ECG was normal. Based on [employer]  Scoring, this patient was at low risk for cardiac events. Mr. Dairusz Reza has a reminder for a \"due or due soon\" health maintenance. I have asked that he contact his primary care provider for follow-up on this health maintenance. I have personally reviewed patient's records available from hospital and other providers and incorporated findings in patient care. I have personally reviewed patients ekg done at other facility. I Have personally reviewed recent relevant labs available and discussed with patient    Conclusion cardiac cath 3/2019    Non obstructive epicardial arteries with moderate mid LAD myocardial bridging noted. Will optimize CCB dose. Continue risk factor modification. Complications                      Coronary Findings     Diagnostic   Dominance: Right   Left Main   The vessel was visualized by angiography. The vessel is angiographically normal.   Left Anterior Descending   The vessel was visualized by angiography. The vessel is angiographically normal. Moderate myocardial bridging noted in mid LAD   Left Circumflex   The vessel was visualized by angiography. The vessel is angiographically normal.   Right Coronary Artery   The vessel was visualized by angiography. The vessel is angiographically normal.   Intervention     No interventions have been documented. Procedure Conclusion     Nuclear Stress Test 3/2019    Abnormal myocardial perfusion imaging. Reversible defect consistent with myocardial ischemia. Myocardial perfusion imaging supports an intermediate risk stress test.   There is no prior study available for comparison.  .   Interpretation Summary     Baseline ECG: Sinus bradycardia, ST elevation, consider early repolarization, pericarditis or injury Minimal voltage criteria for LVH maybe normal variant. Gated SPECT: Left ventricular function post-stress was normal. Calculated ejection fraction is 71%. There is no evidence of transient ischemic dilation (TID). The TID ratio is 0.95. Negative stress test.  Left ventricular perfusion is probably abnormal.  Myocardial perfusion imaging defect 1: There is a defect that is small to moderate in size with a mild reduction in uptake present in the mid to basal inferior location(s) that is partially reversible. There is normal wall motion in the defect area. Viability in the area is good. The possibility of ischemia cannot be excluded. Perfusion defect was visually present without quantitative evidence. Abnormal myocardial perfusion imaging. Reversible defect consistent with myocardial ischemia. Myocardial perfusion imaging supports an intermediate risk stress test.        Interpretation Summary 11/2019    Left Ventricle: Normal cavity size, systolic function (ejection fraction normal) and diastolic function. Mildly increased wall thickness. Estimated left ventricular ejection fraction is 56 - 60%. Visually measured ejection fraction. No regional wall motion abnormality noted. Interpretation Summary PVL-6/2021       Acute non-occlusive thrombus present in the right distal femoral vein. Age indeterminate non-occlusive thrombus present in the left posterior tibial vein. Acute non-occlusive deep vein thrombosis noted in the right distal femoral vein. Sub-Acute non-occlusive deep vein thrombosis noted in the left posterior tibial veins. Interpretation Summary echo-6/2021    LV: Estimated LVEF is 55 - 60%. Visually measured ejection fraction. Normal cavity size and systolic function (ejection fraction normal). Mild concentric hypertrophy. Mild (grade 1) left ventricular diastolic dysfunction. TV: Pulmonary hypertension not suggested by Doppler findings.        IMPRESSION CTA 6/2021 Right-sided pulmonary emboli demonstrated similar to prior, but slightly  decreased comparison particularly in the right lower lobe. Left-sided smaller  emboli seen on prior CT is not as conspicuous on current exam.     Bibasilar bandlike consolidation, increased since prior, may represent any  combination of infarcts, infiltrates, or atelectasis. Small right pleural effusion. Nonspecific edema/stranding insinuating between the left kidney upper pole and  pancreatic tail. Recommend correlation with urinalysis and lipase levels    IMPRESSION 8/2021     1. No CT evidence of acute pulmonary embolism. 2. Moderate emphysema and findings of chronic bronchitis. Bibasilar dependent  atelectasis. 3. Other stable chronic incidental findings, as described, including:  Diverticulosis coli, bilateral adrenal hyperplasia. CT abdomen 8/2021  ABDOMINAL AORTA: Moderate atherosclerotic plaques and mild intramural thrombosis  identified. No dissection. 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 IMPRESSION 9/2022  1. No convincing CT evidence of pulmonary embolism. 2.  No acute pulmonary finding. Improved mild bibasilar atelectasis. Mild COPD  and chronic interstitial lung disease. 3. Stable bilateral adrenal hyperplasia. 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 Essential hypertension  I10 401.9     Stable monitor      3. Hypercholesterolemia  E78.00 272.0     Continue treatment lab with PCP      4. Chronic obstructive pulmonary disease, unspecified COPD type ([facility] [address]  J44.9 496     Continue treatment. Monitor follow-up with PCP        3/2019  New patient with increased chest pain and shortness of breath. Possible angina. Rule out CHF cardiomyopathy.   Patient was intolerant to atorvastatin in past due to muscle spasm. Recheck lipids and decide on alternate statin. Patient had peptic ulcer many years ago and was told not to take aspirin as it upsets his stomach. Consider Plavix if needed    4/2019  Continues to have pain atypical chest pain. No significant CAD. Currently on Protonix. Will use Mylanta plus for gas. He has GI appointment next week. Cardiac status stable  2/2020  Seen for atypical chest pain clinically musculoskeletal on 2 sides and back. Started after painting. Use Aleve 1 tablet twice a day for 1 week    9/2020 virtual visit  On and off episode of chest tightness. We will continue medical management. Emergency room if symptoms are severe. GI work-up is in progress and okay to do work-up as needed  1/ 2021  Cardiac status stable with stable angina. No significant epicardial coronary disease. Has myocardial bridge. Normal ejection fraction okay to proceed with orthopedic surgery as planned. Medium cardiac risk  7/2021  Recent admission with acute PE. Has bilateral DVT. Now on Eliquis. No pulmonary hypertension normal ejection fraction. Still short of breath but no hemoptysis. Cardiac status with angina is stable. Blood pressure controlled  10/2021  Recurrent atypical chest pain clinically appears noncardiac. We will add Toprol. Recent CT with clearing of PE. As no clear etiology or precipitating factor for DVT and PE we will continue anticoagulation. At this point okay to interrupt anticoagulation for surgical procedures  Patient has appointment with vascular surgeon for evaluation of AAA  4/2022  Stable cardiac status continue current medical management. Okay to proceed with prostate surgery. Medium cardiac risk. Follow-up pulmonary recommendation for anticoagulation interruption. 10/2022  Stable cardiac status. Recent ER visit with atypical chest pain. Negative enzymes and no evidence of PE.   Continue current treatment monitor  Medications Discontinued During This Encounter   Medication Reason    nitrofurantoin (MACRODANTIN) 100 mg capsule Not A Current Medication    traMADoL (ULTRAM) 50 mg tablet Not A Current Medication    nitrofurantoin, macrocrystal-monohydrate, (MACROBID) 100 mg capsule Not A Current Medication    metoprolol succinate (TOPROL-XL) 50 mg XL tablet Not A Current Medication    tamsulosin (FLOMAX) 0.4 mg capsule Not A Current Medication    finasteride (PROSCAR) 5 mg tablet Not A Current Medication         No orders of the defined types were placed in this encounter. Follow-up and Dispositions    Return in about 6 months (around 4/28/2023).

## 2022-10-29 NOTE — ED PROVIDER NOTES
EMERGENCY DEPARTMENT HISTORY AND PHYSICAL EXAM    11:58 AM      Date: 2/18/2020  Patient Name: Michelle Cedeno    History of Presenting Illness     Chief Complaint   Patient presents with    Chest Pain         History Provided By: Patient    Additional History (Context): Michelle Cedeno is a 69 y.o. male with Past medical history of hypertension, hypercholesterolemia, peripheral vascular disease who presents with chief complaint of chest pain that started this morning prior to arrival to the emergency department. Patient states that he was leaning over and painting when the pain started on the left side of his chest wall. He states that the pain seemed to be also in the middle of his chest.  He does report that if he bends or moves a certain way it exacerbates the pain. He denies any shortness of breath, dizziness, sweating, nausea, abdominal pain, back pain, numbness, weakness and no other complaint.     PCP: Geraldina Epley, MD        Past History     Past Medical History:  Past Medical History:   Diagnosis Date    Bladder outlet obstruction     Erectile disorder due to medical 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     Frequency of urination     H/O spinal cord injury 1974    lumbar spine injury secondary to fall    HTN (hypertension)     Hypercholesterolemia     Illiterate     Injury of lumbar spine ([facility] [address] [facility]    Leg pain, right     Nocturia     Overactive bladder     Peripheral vascular disease ([address]        Past Surgical History:  Past Surgical History:   Procedure Laterality Date    COLONOSCOPY N/A 12/4/2019    COLONOSCOPY performed by Aleksandar Mckeon [facility] at [facility] ENDOSCOPY    HX HEENT Left     lens implant    HX ORTHOPAEDIC      finger amputation left hand    HX TONSILLECTOMY         Family History:  Family History   Problem Relation Age of Onset    Diabetes Mother     Hypertension Mother     Diabetes Maternal Grandmother     Hypertension Maternal Grandmother     Cancer Sister         brain    Cancer Sister         brain       Social History:  Social History     Tobacco Use    Smoking status: Former Smoker     Last attempt to quit: 2015     Years since quittin.6    Smokeless tobacco: Never Used   Substance Use Topics    Alcohol use: Not Currently     Alcohol/week: 0.0 standard drinks     Comment: former stopped     Drug use: No       Allergies: Allergies   Allergen Reactions    Ampicillin Angioedema    Asa-Acetaminophen-Caff-Buffers Rash    Penicillins Angioedema    Aspirin Other (comments)     Stomach upset    Atorvastatin Other (comments)     Muscle cramps         Review of Systems       Review of Systems   Constitutional: Negative for chills and fever. HENT: Negative for congestion, rhinorrhea, sore throat and trouble swallowing. Eyes: Negative for visual disturbance. Respiratory: Negative for cough, shortness of breath and wheezing. Cardiovascular: Positive for chest pain. Negative for palpitations and leg swelling. Gastrointestinal: Negative for abdominal pain, nausea and vomiting. Endocrine: Negative for polyuria. Genitourinary: Negative for difficulty urinating and dysuria. Musculoskeletal: Negative for arthralgias, back pain, myalgias, neck pain and neck stiffness. Skin: Negative for rash. Neurological: Negative for dizziness, weakness, numbness and headaches. Hematological: Does not bruise/bleed easily. Psychiatric/Behavioral: Negative for confusion and dysphoric mood. All other systems reviewed and are negative. Physical Exam     Visit Vitals  /74 (BP 1 Location: Left arm, BP Patient Position: Sitting)   Pulse 77   Temp 98.3 °F (36.8 °C)   Resp 16   SpO2 96%         Physical Exam  Vitals signs and nursing note reviewed. Constitutional:       General: He is not in acute distress. Appearance: He is well-developed. He is not ill-appearing, toxic-appearing or diaphoretic. HENT:      Head: Normocephalic and atraumatic. Mouth/Throat:      Mouth: Mucous membranes are moist.   Eyes:      General: No scleral icterus. Conjunctiva/sclera: Conjunctivae normal.      Pupils: Pupils are equal, round, and reactive to light. Neck:      Musculoskeletal: Normal range of motion and neck supple. Cardiovascular:      Rate and Rhythm: Normal rate. Heart sounds: Normal heart sounds. No friction rub. No gallop. Comments: Capillary refill < 3 seconds  Pulmonary:      Effort: Pulmonary effort is normal. No respiratory distress. Breath sounds: Normal breath sounds. No stridor. No wheezing or rales. Comments: Chest wall does hurt he states, if he moves a certain way as I am examining him  Chest:      Chest wall: No tenderness. Abdominal:      General: Bowel sounds are normal. There is no distension. Palpations: Abdomen is soft. Tenderness: There is no abdominal tenderness. Musculoskeletal: Normal range of motion. Lymphadenopathy:      Cervical: No cervical adenopathy. Skin:     General: Skin is warm and dry. Coloration: Skin is not pale. Neurological:      Mental Status: He is alert and oriented to person, place, and time. Cranial Nerves: No cranial nerve deficit. Sensory: No sensory deficit. Motor: No weakness. Coordination: Coordination normal.   Psychiatric:         Mood and Affect: Mood normal.         Thought Content:  Thought content normal.           Diagnostic Study Results     Labs -  Recent Results (from the past 12 hour(s))   EKG, 12 LEAD, INITIAL    Collection Time: 02/18/20 11:32 AM   Result Value Ref Range    Ventricular Rate 80 BPM    Atrial Rate 80 BPM    P-R Interval 162 ms    QRS Duration 78 ms    Q-T Interval 386 ms    QTC Calculation (Bezet) 445 ms    Calculated P Axis 37 degrees    Calculated R Axis -15 degrees    Calculated T Axis 54 degrees    Diagnosis       Normal sinus rhythm  Possible Left atrial enlargement  Left ventricular hypertrophy  Abnormal ECG  When compared with ECG of 13-AUG-2019 12:37,  No significant change was found  Confirmed by Malina Brooks MD, Nathanael Klein ([id number]) on 2/18/2020 4:02:42 PM     CBC WITH AUTOMATED DIFF    Collection Time: 02/18/20 11:49 AM   Result Value Ref Range    WBC 4.3 (L) 4.6 - 13.2 K/uL    RBC 4.62 (L) 4.70 - 5.50 M/uL    HGB 13.7 13.0 - 16.0 g/dL    HCT 40.8 36.0 - 48.0 %    MCV 88.3 74.0 - 97.0 FL    MCH 29.7 24.0 - 34.0 PG    MCHC 33.6 31.0 - 37.0 g/dL    RDW 14.7 (H) 11.6 - 14.5 %    PLATELET 961 610 - 573 K/uL    MPV 9.0 (L) 9.2 - 11.8 FL    NEUTROPHILS 45 40 - 73 %    LYMPHOCYTES 41 21 - 52 %    MONOCYTES 9 3 - 10 %    EOSINOPHILS 4 0 - 5 %    BASOPHILS 1 0 - 2 %    ABS. NEUTROPHILS 2.0 1.8 - 8.0 K/UL    ABS. LYMPHOCYTES 1.8 0.9 - 3.6 K/UL    ABS. MONOCYTES 0.4 0.05 - 1.2 K/UL    ABS. EOSINOPHILS 0.2 0.0 - 0.4 K/UL    ABS. BASOPHILS 0.0 0.0 - 0.1 K/UL    DF AUTOMATED     METABOLIC PANEL, COMPREHENSIVE    Collection Time: 02/18/20 11:49 AM   Result Value Ref Range    Sodium 138 136 - 145 mmol/L    Potassium 3.7 3.5 - 5.5 mmol/L    Chloride 106 100 - 111 mmol/L    CO2 27 21 - 32 mmol/L    Anion gap 5 3.0 - 18 mmol/L    Glucose 104 (H) 74 - 99 mg/dL    BUN 10 7.0 - 18 MG/DL    Creatinine 0.96 0.6 - 1.3 MG/DL    BUN/Creatinine ratio 10 (L) 12 - 20      GFR est AA >60 >60 ml/min/1.73m2    GFR est non-AA >60 >60 ml/min/1.73m2    Calcium 9.4 8.5 - 10.1 MG/DL    Bilirubin, total 0.3 0.2 - 1.0 MG/DL    ALT (SGPT) 24 16 - 61 U/L    AST (SGOT) 22 10 - 38 U/L    Alk.  phosphatase 80 45 - 117 U/L    Protein, total 8.3 (H) 6.4 - 8.2 g/dL    Albumin 3.8 3.4 - 5.0 g/dL    Globulin 4.5 (H) 2.0 - 4.0 g/dL    A-G Ratio 0.8 0.8 - 1.7     MAGNESIUM    Collection Time: 02/18/20 11:49 AM   Result Value Ref Range    Magnesium 2.2 1.6 - 2.6 mg/dL   TROPONIN I    Collection Time: 02/18/20 11:49 AM   Result Value Ref Range    Troponin-I, QT <0.02 0.0 - 0.045 NG/ML   NT-PRO BNP    Collection Time: 02/18/20 11:49 AM   Result Value Ref Range    NT pro-BNP 21 0 - 900 PG/ML   TROPONIN I    Collection Time: 02/18/20  2:45 PM   Result Value Ref Range    Troponin-I, QT <0.02 0.0 - 0.045 NG/ML       Radiologic Studies -   XR CHEST PA LAT   Final Result   IMPRESSION:      No definite acute cardiopulmonary process. New 8 mm density which projects at the intersection between the left anterior   fifth rib and posterior ninth rib. There is suggestion of similar density on the   right side between the anterior fifth and sixth ribs. These may represent   summation of shadow artifacts or pulmonary nodules or nipple shadows, and the   left-sided density might be osseous, further evaluation with CT chest would be   helpful for further characterization. May consider initially repeat radiographs   with nipple markers and oblique views. Medical Decision Making   I am the first provider for this patient. I reviewed the vital signs, available nursing notes, past medical history, past surgical history, family history and social history. Vital Signs-Reviewed the patient's vital signs. EKG: Interpreted by the EP Dr. Emily Simmons   Rate: 80   Rhythm: Normal Sinus Rhythm    Interpretation: Normal QRS duration, normal QTC, no ST elevation, no ST depression       Records Reviewed: Nursing Notes and Old Medical Records (Time of Review: 4:27 PM)    Provider Notes (Medical Decision Making): DDX: Cardiac, musculoskeletal chest wall strain    Labs, EKG, chest x-ray      MDM    Medications - No data to display        ED Course: Progress Notes, Reevaluation, and Consults:  Labs reassuring    Reassessed patient he is in no distress, chest pain is basically resolved, only there if he moves a certain way. Likely muscular    Repeat troponin remains negative as initial.    Patient does have possible lung nodule. Discussed this with patient and need for follow-up CT scan by PCP. I have reassessed the patient.  I have discussed the workup, results and plan with the patient and patient is in agreement. Patient is feeling better. Patient was discharge in stable condition. Patient was given outpatient follow up. Patient is to return to emergency department if any new or worsening condition. Diagnosis     Clinical Impression:   1. Chest pain, unspecified type    2. Chest wall muscle strain, initial encounter    3. Pulmonary nodule        Disposition: Discharged    Follow-up Information     Follow up With Specialties Details Why Kandy Hatch MD Cardiology, Internal Medicine Schedule an appointment as soon as possible for a visit in 2 days  [address] [facility]      Henny Nails MD Family Practice Schedule an appointment as soon as possible for a visit in 1 week Get further evaluation of possible lung nodule with CT chest.  Discuss with your primary care provider about the possible lung nodule  [phone]      [facility] EMERGENCY DEPT Emergency Medicine  As needed, If symptoms worsen [address] [facility] [address]  [phone]           Patient's Medications   Start Taking    No medications on file   Continue Taking    ALBUTEROL (PROVENTIL HFA, VENTOLIN HFA, PROAIR HFA) 90 MCG/ACTUATION INHALER    Take 2 Puffs by inhalation every four (4) hours as needed for Wheezing or Shortness of Breath. Indications: BRONCHOSPASM PREVENTION    AMLODIPINE (NORVASC) 10 MG TABLET    Take 1 Tab by mouth daily. AMMONIUM LACTATE (LAC-HYDRIN FIVE) 5 % LOTION    Apply  to affected area two (2) times a day. DULOXETINE (CYMBALTA) 60 MG CAPSULE    Take 1 Cap by mouth daily. Indications: Neuropathic Pain    FINASTERIDE (PROSCAR) 5 MG TABLET    Take 1 Tab by mouth daily. FUROSEMIDE (LASIX) 20 MG TABLET        GABAPENTIN (NEURONTIN) 300 MG CAPSULE    Take 2 Caps by mouth four (4) times daily.     IBUPROFEN (MOTRIN) 800 MG TABLET    1 tablet as needed    OXYBUTYNIN CHLORIDE XL (Yady Holes XL) 5 MG CR TABLET    Take 1 Tab by mouth daily. PANTOPRAZOLE (PROTONIX) 40 MG TABLET    Take 1 Tab by mouth daily. POLYETHYLENE GLYCOL (MIRALAX) 17 GRAM PACKET    Take 1 Packet by mouth daily. SIMETHICONE 125 MG CHEWABLE TABLET    1 tablet after meals and at bedtime as needed    SPIRONOLACTONE (ALDACTONE) 25 MG TABLET    Take  by mouth daily. TAMSULOSIN (FLOMAX) 0.4 MG CAPSULE    Take 1 Cap by mouth daily (after dinner). These Medications have changed    No medications on file   Stop Taking    No medications on file         DO Ponce Malik medical dictation software was used for portions of this report. Unintended transcription errors may occur. My signature above authenticates this document and my orders, the final    diagnosis (es), discharge prescription (s), and instructions in the Epic    record. 5

## 2022-11-04 ENCOUNTER — OFFICE VISIT (OUTPATIENT)
Dept: ORTHOPEDIC SURGERY | Age: 77
End: 2022-11-04
Payer: MEDICAID

## 2022-11-04 VITALS
BODY MASS INDEX: 30.64 KG/M2 | OXYGEN SATURATION: 96 % | HEART RATE: 96 BPM | DIASTOLIC BLOOD PRESSURE: 69 MMHG | SYSTOLIC BLOOD PRESSURE: 136 MMHG | RESPIRATION RATE: 18 BRPM | WEIGHT: 195.2 LBS | TEMPERATURE: 97.6 F | HEIGHT: 67 IN

## 2022-11-04 DIAGNOSIS — R20.2 NUMBNESS AND TINGLING IN LEFT HAND: Primary | ICD-10-CM

## 2022-11-04 DIAGNOSIS — R20.0 NUMBNESS AND TINGLING IN LEFT HAND: Primary | ICD-10-CM

## 2022-11-04 DIAGNOSIS — R20.2 NUMBNESS AND TINGLING IN LEFT HAND: ICD-10-CM

## 2022-11-04 DIAGNOSIS — R20.0 NUMBNESS AND TINGLING IN LEFT HAND: ICD-10-CM

## 2022-11-04 PROCEDURE — 95909 NRV CNDJ TST 5-6 STUDIES: CPT | Performed by: PHYSICAL MEDICINE & REHABILITATION

## 2022-11-04 PROCEDURE — 95886 MUSC TEST DONE W/N TEST COMP: CPT | Performed by: PHYSICAL MEDICINE & REHABILITATION

## 2022-11-04 RX ORDER — OMEPRAZOLE 40 MG/1
CAPSULE, DELAYED RELEASE ORAL
COMMUNITY
Start: 2022-11-01

## 2022-11-04 RX ORDER — ALBUTEROL SULFATE 90 UG/1
AEROSOL, METERED RESPIRATORY (INHALATION)
COMMUNITY
Start: 2022-10-27

## 2022-11-04 NOTE — PROGRESS NOTES
[address]  [address], [address]  [facility], [address]  Phone: [phone]  Fax: [phone]        Ricky Chua  : 1945  PCP: Tatyana Martinez MD  2022    ELECTROMYOGRAPHY AND NERVE CONDUCTION STUDIES    New Ureña was referred by Dr. Citlalli Thomas for electrodiagnostic evaluation of numbness and tingling of left upper extremity. NCV & EMG Findings:  Evaluation of the left median (APB) motor nerve showed decreased conduction velocity (49 m/s). The left ulnar (ADM) motor nerve showed decreased conduction velocity (Abv Elbow-Bel Elbow, 42 m/s). The left median sensory nerve showed reduced amplitude (6 µV). All remaining nerves (as indicated in the following tables) were within normal limits. INTERPRETATION  This is an abnormal electrodiagnostic examination. 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 CLINICAL INTERPRETATION  His electrodiagnostic findings appear consistent with his residual left hand symptoms. His median nerve studies have improved as well since his last study. HISTORY OF PRESENT ILLNESS  New U[place]ña is a 77 y.o. male. Pt presents today with LUE EMG evaluation for numbness and tingling of left hand, especially in the pinky and ring finger. Notes that even resting arm on chair will cause sxs. Denies similar sxs in [address]. He has previously had left cubital tunnel release and carpal tunnel release in 2021.     PAST MEDICAL HISTORY   Past Medical History:   Diagnosis Date    Bladder outlet obstruction     Erectile disorder due to medical 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     Frequency of urination     H/O spinal cord injury 1974    lumbar spine injury secondary to fall    HTN (hypertension)     Hypercholesterolemia     Illiterate     Injury of lumbar spine ([address] 1974    Leg pain, right     Nocturia     Overactive bladder     Peripheral vascular disease [facility])        Past Surgical History:   Procedure Laterality Date    COLONOSCOPY N/A 12/4/2019    COLONOSCOPY performed by Rebecca Gaona MD at [facility] ENDOSCOPY    HAND/FINGER SURGERY UNLISTED Right     carpal tunnel    HX HEENT Left     lens implant    HX ORTHOPAEDIC      finger amputation left hand    HX TONSILLECTOMY      UPPER ARM/ELBOW SURGERY UNLISTED Right    . MEDICATIONS      Current Outpatient Medications   Medication Sig Dispense Refill    omeprazole (PRILOSEC) 40 mg capsule       albuterol (PROVENTIL HFA, VENTOLIN HFA, PROAIR HFA) 90 mcg/actuation inhaler INHALE 2 PUFFS BY MOUTH EVERY 6 HOURS AS NEEDED      allopurinoL (ZYLOPRIM) 100 mg tablet Take 1 Tablet by mouth two (2) times a day. 60 Tablet 0    apixaban (Eliquis) 5 mg tablet Take 1 Tablet by mouth two (2) times a day. 60 Tablet 5    diclofenac (VOLTAREN) 1 % gel Apply 2 g to affected area four (4) times daily. 100 g 4    lidocaine 5 % topical cream Apply  to affected area two (2) times daily as needed for Pain. 15 g 0    wheat dextrin (Benefiber Healthy Shape) 5 gram/7.4 gram powd Take  by mouth. famotidine (PEPCID) 20 mg tablet Take 20 mg by mouth two (2) times a day.      ezetimibe (ZETIA) 10 mg tablet Take 10 mg by mouth daily. spironolactone (ALDACTONE) 25 mg tablet Take 25 mg by mouth daily. polyethylene glycol (MIRALAX) 17 gram packet Take 1 Packet by mouth daily. 30 Packet 0    DULoxetine (CYMBALTA) 60 mg capsule Take 1 Cap by mouth daily. Indications: neuropathic pain 60 Cap 5    albuterol sulfate 90 mcg/actuation aebs Take  by inhalation as needed.       pantoprazole (PROTONIX) 40 mg tablet Take 1 Tab by mouth daily. 30 Tab 0    amLODIPine (NORVASC) 10 mg tablet Take 1 Tab by mouth daily. 30 Tab 0        ALLERGIES    Allergies   Allergen Reactions    Latex Rash     Other reaction(s): Unknown    Ampicillin Angioedema    Asa-Acetaminophen-Caff-Buffers Rash    Penicillins Angioedema     Other reaction(s): anaphylaxis    Crab Hives     Denies allergy to crabs    Shellfish Derived Rash    Adhesive Tape-Silicones Itching    Aspirin Other (comments)     Stomach upset - patient said he had peptic ulcers and cannot take  Other reaction(s): Unknown      Atorvastatin Other (comments)     Muscle cramps          SOCIAL HISTORY    Social History     Socioeconomic History    Marital status:    Tobacco Use    Smoking status: Former     Types: Cigarettes     Quit date: 2015     Years since quittin.3    Smokeless tobacco: Never   Vaping Use    Vaping Use: Never used   Substance and Sexual Activity    Alcohol use: Not Currently     Alcohol/week: 0.0 standard drinks     Comment: former stopped     Drug use: No    Sexual activity: Yes       FAMILY HISTORY    Family History   Problem Relation Age of Onset    Diabetes Mother     Hypertension Mother     Diabetes Maternal Grandmother     Hypertension Maternal Grandmother     Cancer Sister         brain    Cancer Sister         brain         PHYSICAL EXAMINATION  Visit Vitals  /69 (BP 1 Location: Right arm, BP Patient Position: Sitting, BP Cuff Size: Adult)   Pulse 96   Temp 97.6 °F (36.4 °C) (Temporal)   Resp 18   Ht 5' 7\" (1.702 m)   Wt 195 lb 3.2 oz (88.5 kg)   SpO2 96% Comment: RA   BMI 30.57 kg/m²       Pain Assessment  2022   Location of Pain Arm   Pain Location Comment -   Location Modifiers -   Severity of Pain 7   Quality of Pain Dull;Burning; Sharp   Quality of Pain Comment -   Duration of Pain Persistent   Duration of Pain Comment -   Frequency of Pain Intermittent   Frequency of Pain Comment -   Date Pain First Started -   Date Pain First Started Comment -   Aggravating Factors Bending;Stretching;Straightening;Exercise;Kneeling;Squatting;Standing;Walking;Stairs   Aggravating Factors Comment -   Limiting Behavior Yes   Relieving Factors Rest   Relieving Factors Comment -   Result of Injury No   Work-Related Injury -   How long out of work? -   Type of Injury -   Type of Injury Comment -           Constitutional:  Well developed, well nourished, in no acute distress. Psychiatric: Affect and mood are appropriate. Integumentary: No rashes or abrasions noted on exposed areas.         SPINE/MUSCULOSKELETAL EXAM    On brief examination: None      NCV & EMG Findings:  NCS+  Motor Nerve Results      Latency Amplitude F-Lat Segment Distance CV Comment   Site (ms) Norm (mV) Norm (ms)  (cm) (m/s) Norm    Left Median (APB) Motor   Wrist 4.0  < 4.4 10.9  > 3.8  Wrist-APB 8      Elbow 9.1 - 10.6 -  Elbow-Wrist 25 49  > 51    Left Ulnar (ADM) Motor   Wrist 3.2  < 3.7 9.1  > 7.9  Wrist-ADM 8      Bel Elbow 7.3 - 8.2 -  Bel Elbow-Wrist 21.5 52  > 52    Abv Elbow 9.7 - 7.8 -  Abv Elbow-Bel Elbow 10 42  > 43      Sensory Sites       Latency (Onset) Latency (Peak)  Amplitude (O-P) Segment Distance CV (Onset) Comment   Site ms Norm (ms) Norm µV Norm  mm m/s Norm    Left Median Sensory   Wrist-Dig II 2.9  < 3.3 3.7  < 4.0 6  > 7 Wrist-Dig II 95.5 329 -    Left Radial Sensory   Forearm-Wrist 1.98  < 2.2 2.6  < 2.8 19  > 7 Forearm-Wrist 10 51 -    Left Ulnar Sensory   Wrist-Dig V 2.6  < 3.1 3.5  < 4.0 7  > 7 Wrist-Dig V 14 54 -      EMG+     Side Muscle Nerve Root Ins Act Fibs Psw Fascics Other Amp Dur Poly Recrt Activation Comment Misc   Left Biceps Musculocut C5-C6 Nml Nml Nml Nml 0 Nml Nml 0 Nml Nml     Left Triceps Radial C6-C8 Nml Nml Nml Nml 0 Nml Nml 0 Nml Nml     Left Pronator Teres Median C6-C7 Nml Nml Nml Nml 0 Nml Nml 0 Nml Nml     Left FDI Median,  Ulnar C8-T1 Nml Nml Nml Nml 0 Nml Nml 0 Nml Nml     Left APB Median C8-T1 Nml Nml Nml Nml 0 Nml Nml 0 Nml Nml Waveforms:    Motor         Sensory                   [facility]  OFFICE PROCEDURE PROGRESS NOTE        Chart reviewed for the following:   IMayo, have reviewed the History, Physical and updated the Allergic reactions for [address] performed immediately prior to start of procedure:   Derik Santos, have performed the following reviews on Mery Finley prior to the start of the procedure:            * Patient was identified by name and date of birth   * Agreement on procedure being performed was verified  * Risks and Benefits explained to the patient  * Procedure site verified and marked as necessary  * Patient was positioned for comfort  * Consent was signed and verified     Time: 1:34 PM     Date of procedure: 11/4/2022    Procedure performed by:  Judi Lemus MD    Provider accompanied by: Doris.     Patient accompanied by another individual: No    How tolerated by patient: tolerated the procedure well with no complications    Post Procedural Pain Scale: 0 - No Hurt    Comments: none    Written by Conception Fabry as dictated by Mayo Tellez MD

## 2022-11-09 ENCOUNTER — OFFICE VISIT (OUTPATIENT)
Dept: ORTHOPEDIC SURGERY | Age: 77
End: 2022-11-09
Payer: MEDICAID

## 2022-11-09 VITALS — BODY MASS INDEX: 30.61 KG/M2 | HEIGHT: 67 IN | WEIGHT: 195 LBS | TEMPERATURE: 98.2 F

## 2022-11-09 DIAGNOSIS — Z98.890 S/P CARPAL TUNNEL RELEASE: ICD-10-CM

## 2022-11-09 DIAGNOSIS — Z98.890 S/P CUBITAL TUNNEL RELEASE: ICD-10-CM

## 2022-11-09 DIAGNOSIS — G56.22 CUBITAL TUNNEL SYNDROME, LEFT: Primary | ICD-10-CM

## 2022-11-09 PROCEDURE — 99214 OFFICE O/P EST MOD 30 MIN: CPT | Performed by: ORTHOPAEDIC SURGERY

## 2022-11-09 PROCEDURE — 1123F ACP DISCUSS/DSCN MKR DOCD: CPT | Performed by: ORTHOPAEDIC SURGERY

## 2022-11-09 NOTE — PROGRESS NOTES
Neelam Albert is a 77 y.o. male right handed retiree. Worker's Compensation and legal considerations: none filed. Vitals:    11/09/22 0859   Temp: 98.2 °F (36.8 °C)   TempSrc: Temporal   Weight: 195 lb (88.5 kg)   Height: 5' 7\" (1.702 m)   PainSc:   3   PainLoc: Hand           Chief Complaint   Patient presents with    Hand Pain     Left hand pain       HPI: Patient presents today for follow-up of left upper extremity EMG, repeat. He reports some continued numbness on the left side. 10/19/2022 HPI: Patient presents today due to recurrence of the numbness and tingling in the little and ring finger that he was having prior to surgery. Initially after his left cubital tunnel release and carpal tunnel release, he had improvement but says over the past few months he has had increasing numbness in the little and ring finger. 5/19/2021 HPI: Patient presents today more than 3 months status post left carpal tunnel release and cubital tunnel release. He started therapy but had to have an interruption but he is planning to resume in a couple days. He reports some continued pain around the elbow. He says the right side has not been bothering him. EMG/preop HPI: Patient returns today for follow-up of bilateral upper extremity EMGs. He is recently discussed with a shoulder surgeon having a shoulder replacement. This is on the right side. He says he would like to have the left hand numbness taken care of first.    12/2020 HPI: Patient comes in today regarding left worse than right upper extremity numbness and pain. He reports little finger and the ring finger on the left to be numb most of the time of pain radiating up the arm. He reports occasional pain and swelling on the right side. Initial HPI: Patient comes in today regarding concerns of having a metal foreign body in his on something metallic right middle finger tip in his left thumb tip.   He says that he scratched both areas last year and has since had issues with it. He thinks he may have gotten something metallic out of the right middle finger and then it bled after that. He reports some continued tenderness to palpation. Date of onset: Early to mid 2022 recurrence    Injury: No    Prior Treatment:  Yes: Comment:  left carpal tunnel and cubital tunnel releases    Numbness/ Tingling: Yes: Comment:  left little and ring    ROS: Review of Systems - General ROS: negative  Respiratory ROS: no cough, shortness of breath, or wheezing  Cardiovascular ROS: no chest pain or dyspnea on exertion  Musculoskeletal ROS: positive for - pain in finger - right and thumb - left  Neurological ROS: Positive for numbness and tingling  Dermatological ROS: negative    Past Medical History:   Diagnosis Date    Bladder outlet obstruction     Erectile disorder due to medical 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     Frequency of urination     H/O spinal cord injury 1974    lumbar spine injury secondary to fall    HTN (hypertension)     Hypercholesterolemia     Illiterate     Injury of lumbar spine ([facility] [address] 1974    Leg pain, right     Nocturia     Overactive bladder     Peripheral vascular disease ([facility] [address]        Past Surgical History:   Procedure Laterality Date    COLONOSCOPY N/A 12/4/2019    COLONOSCOPY performed by Kisha Tamayo MD at [facility] ENDOSCOPY    HAND/FINGER SURGERY UNLISTED Right     carpal tunnel    HX HEENT Left     lens implant    HX ORTHOPAEDIC      finger amputation left hand    HX TONSILLECTOMY      UPPER ARM/ELBOW SURGERY UNLISTED Right        Current Outpatient Medications   Medication Sig Dispense Refill    omeprazole (PRILOSEC) 40 mg capsule       albuterol (PROVENTIL HFA, VENTOLIN HFA, PROAIR HFA) 90 mcg/actuation inhaler INHALE 2 PUFFS BY MOUTH EVERY 6 HOURS AS NEEDED      allopurinoL (ZYLOPRIM) 100 mg tablet Take 1 Tablet by mouth two (2) times a day. 60 Tablet 0    apixaban (Eliquis) 5 mg tablet Take 1 Tablet by mouth two (2) times a day.  60 Tablet 5    diclofenac (VOLTAREN) 1 % gel Apply 2 g to affected area four (4) times daily. 100 g 4    lidocaine 5 % topical cream Apply  to affected area two (2) times daily as needed for Pain. 15 g 0    wheat dextrin (Benefiber Healthy Shape) 5 gram/7.4 gram powd Take  by mouth. famotidine (PEPCID) 20 mg tablet Take 20 mg by mouth two (2) times a day.      ezetimibe (ZETIA) 10 mg tablet Take 10 mg by mouth daily. spironolactone (ALDACTONE) 25 mg tablet Take 25 mg by mouth daily. polyethylene glycol (MIRALAX) 17 gram packet Take 1 Packet by mouth daily. 30 Packet 0    DULoxetine (CYMBALTA) 60 mg capsule Take 1 Cap by mouth daily. Indications: neuropathic pain 60 Cap 5    albuterol sulfate 90 mcg/actuation aebs Take  by inhalation as needed. pantoprazole (PROTONIX) 40 mg tablet Take 1 Tab by mouth daily. 30 Tab 0    amLODIPine (NORVASC) 10 mg tablet Take 1 Tab by mouth daily. 30 Tab 0       Allergies   Allergen Reactions    Latex Rash     Other reaction(s): Unknown    Ampicillin Angioedema    Asa-Acetaminophen-Caff-Buffers Rash    Penicillins Angioedema     Other reaction(s): anaphylaxis    Crab Hives     Denies allergy to crabs    Shellfish Derived Rash    Adhesive Tape-Silicones Itching    Aspirin Other (comments)     Stomach upset - patient said he had peptic ulcers and cannot take  Other reaction(s): Unknown      Atorvastatin Other (comments)     Muscle cramps         PE:     Physical Exam  Vitals and nursing note reviewed. Constitutional:       General: He is not in acute distress. Appearance: Normal appearance. He is not ill-appearing. Cardiovascular:      Pulses: Normal pulses. Pulmonary:      Effort: Pulmonary effort is normal. No respiratory distress. Musculoskeletal:         General: No swelling, tenderness, deformity or signs of injury. Normal range of motion. Cervical back: Normal range of motion and neck supple. Right lower leg: No edema. Left lower leg: No edema.    Skin: General: Skin is warm and dry. Capillary Refill: Capillary refill takes less than 2 seconds. Findings: No bruising or erythema. Neurological:      General: No focal deficit present. Mental Status: He is alert and oriented to person, place, and time. Cranial Nerves: No cranial nerve deficit. Sensory: No sensory deficit. Psychiatric:         Mood and Affect: Mood normal.         Behavior: Behavior normal.         NEUROVASCULAR    Examination L R Examination L R   Carpal Comp.  - Pronator Comp. - -   Carpal Tinel  - Pronator Tinel - -   Phalen's  - Pronator Stress - -   Cubital Comp.  +- Guyon Comp. - -   Cubital Tinel  + Guyon Tinel - -   Elbow Hyperflexion  - Adson's - -   Spurling's - - SC Comp. - -   PCB Median abn - - SC Tinel - -   Radial Tinel - - IC Comp. - -   Digital Tinel - - IC Tinel - -   Radial 2-Pt WNL WNL Ulnar 2-Pt WNL WNL     Radial Pulse: 2+  Capillary Refill: < 2 sec  Narayan: Not Performed  Digital Narayan: Not Performed      11/4/2022 LEFT  NCV & EMG Findings:  Evaluation of the left median (APB) motor nerve showed decreased conduction velocity (49 m/s). The left ulnar (ADM) motor nerve showed decreased conduction velocity (Abv Elbow-Bel Elbow, 42 m/s). The left median sensory nerve showed reduced amplitude (6 µV). All remaining nerves (as indicated in the following tables) were within normal limits. INTERPRETATION  This is an abnormal electrodiagnostic examination. These findings may be consistent with:   Sensorimotor peripheral polyneuropathy - this is based on relatively borderline findings throughout the NCS  Mild ulnar neuropathy at the left elbow (cubital tunnel syndrome) - significantly improved since previous study from 1/11/2021     There are no electrodiagnostic findings consistent with cervical radiculopathy, brachial plexopathy, myopathy, or any other mononeuropathy.        CLINICAL INTERPRETATION  His electrodiagnostic findings appear consistent with his residual left hand symptoms. His median nerve studies have improved as well since his last study. 1/11/2021 B/L  NCV & EMG Findings:  Evaluation of the right median motor nerve showed prolonged distal onset latency (4.7 ms). The left ulnar motor and the right ulnar motor nerves showed decreased conduction velocity (A Elbow-B Elbow, L36, R43 m/s). The left median sensory and the right median sensory nerves showed prolonged distal peak latency (L3.8, R4.4 ms) and decreased conduction velocity (Wrist-2nd Digit, L37, R32 m/s). The left ulnar sensory and the right ulnar sensory nerves showed prolonged distal peak latency (L7.3, R4.2 ms), reduced amplitude (L8.3, R7.6 µV), and decreased conduction velocity (Wrist-5th Digit, L19, R33 m/s). All remaining nerves (as indicated in the following tables) were within normal limits. Left vs. Right side comparison data for the median sensory nerve indicates abnormal L-R latency difference (0.6 ms). The ulnar sensory nerve indicates abnormal L-R latency difference (3.1 ms). All remaining left vs. right side differences were within normal limits. All examined muscles (as indicated in the following table) showed no evidence of electrical instability. INTERPRETATION     This is an abnormal electrodiagnostic examination. These findings may be consistent with:   1. Sensorimotor polyneuropathy - this is based on mild unexpected abnormalities throughout the NCS unrelated to other entrapment syndromes. There is possibility that the severity of the other entrapment neuropathies is worsened by this polyneuropathy. 2. Moderate ulnar mononeuropathy at the right elbow (cubital tunnel syndrome)   3. Moderate median mononeuropathy at the right wrist (carpal tunnel syndrome)   4. Moderate ulnar mononeuropathy at the left elbow (cubital tunnel syndrome)   5.  Mild median mononeuropathy at the left wrist (carpal tunnel syndrome)     There is no electrodiagnostic evidence of any cervical radiculopathy, brachial plexopathy,  or any other mononeuropathy. CLINICAL INTERPRETATION  His electrodiagnostic findings of carpal tunnel and cubital tunnel syndromes bilaterally are consistent with his bilateral hand symptoms. Imagin2020 plain films of the left thumb as well as the right middle finger does not show any evidence of radiopaque foreign body any fracture dislocation or other osseous abnormality. Of note on the left thumb view a index finger metallic foreign body is noted on the radial aspect of the P1. ICD-10-CM ICD-9-CM    1. Cubital tunnel syndrome, left  G56.22 354.2       2. S/P cubital tunnel release  Z98.890 V45.89       3. S/P carpal tunnel release  Z98.890 V45.89             Plan:     Given the fact that there has been improvement on the patient's EMG, we will observe for now. I discussed the patient that he can continue to have improvements up to 5 years after surgery. Follow-up and Dispositions    Return if symptoms worsen or fail to improve.          Plan was reviewed with patient, who verbalized agreement and understanding of the plan

## 2022-11-09 NOTE — LETTER
11/9/2022    Patient: Shyam Ewing   YOB: 1945   Date of Visit: 11/9/2022     Deepak Iraheta MD  [address] [address]  Via Fax: [phone]    Dear Deepak Iraheta MD,      Thank you for referring Mr. Shyam Ewing to [address] [place] [address] for evaluation. My notes for this consultation are attached. If you have questions, please do not hesitate to call me. I look forward to following your patient along with you.       Sincerely,    Zina Arita, DO

## 2022-11-29 ENCOUNTER — OFFICE VISIT (OUTPATIENT)
Dept: ORTHOPEDIC SURGERY | Age: 77
End: 2022-11-29
Payer: MEDICAID

## 2022-11-29 VITALS — OXYGEN SATURATION: 91 % | HEART RATE: 102 BPM | BODY MASS INDEX: 31.64 KG/M2 | TEMPERATURE: 97.6 F | WEIGHT: 202 LBS

## 2022-11-29 DIAGNOSIS — M75.101 RIGHT ROTATOR CUFF TEAR ARTHROPATHY: ICD-10-CM

## 2022-11-29 DIAGNOSIS — M25.512 BILATERAL SHOULDER PAIN, UNSPECIFIED CHRONICITY: Primary | ICD-10-CM

## 2022-11-29 DIAGNOSIS — M12.811 RIGHT ROTATOR CUFF TEAR ARTHROPATHY: ICD-10-CM

## 2022-11-29 DIAGNOSIS — M12.812 LEFT ROTATOR CUFF TEAR ARTHROPATHY: ICD-10-CM

## 2022-11-29 DIAGNOSIS — M75.102 LEFT ROTATOR CUFF TEAR ARTHROPATHY: ICD-10-CM

## 2022-11-29 DIAGNOSIS — M25.511 BILATERAL SHOULDER PAIN, UNSPECIFIED CHRONICITY: Primary | ICD-10-CM

## 2022-11-29 NOTE — PATIENT INSTRUCTIONS
If we order a Diagnostic test (such as MRI or CT) during your office visit please see below:     Coordination of Care will be calling you to schedule your diagnostic test. If you have not heard from Coordination of Care within 3 business days, please call [phone]. Once you have a date scheduled for your diagnostic test, you will need to contact our office to schedule a follow up appointment, as this is when the physician will review your diagnostic test results with you. You can contact our office to schedule appointment by phone at [phone], or you can send a message via [facility] Real-Time to request an appointment.     Right Shoulder CT scan ordered today, 11/29/2022

## 2022-11-29 NOTE — PROGRESS NOTES
Patient: Cm Sanchez                MRN: [id number]       SSN: [id number]  YOB: 1945        AGE: 77 y.o. SEX: male  Body mass index is 31.64 kg/m². PCP: Flavio Jeffrey MD  11/29/22    CHIEF COMPLAINT: Bilateral shoulder pain right worse than left    HPI: Cm Sanchez is a 77 y.o. male patient who returns to the office today for the first time in over a year. He reports bilateral shoulder pain the right significantly worse than the left. He continues to have pain and dysfunction in both shoulders worse with use and activity. Past Medical History:   Diagnosis Date    Bladder outlet obstruction     Erectile disorder due to medical 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     Frequency of urination     H/O spinal cord injury 1974    lumbar spine injury secondary to fall    HTN (hypertension)     Hypercholesterolemia     Illiterate     Injury of lumbar spine ([facility] [address] 1974    Leg pain, right     Nocturia     Overactive bladder     Peripheral vascular disease ([facility] [address]        Family History   Problem Relation Age of Onset    Diabetes Mother     Hypertension Mother     Diabetes Maternal Grandmother     Hypertension Maternal Grandmother     Cancer Sister         brain    Cancer Sister         brain       Current Outpatient Medications   Medication Sig Dispense Refill    omeprazole (PRILOSEC) 40 mg capsule       albuterol (PROVENTIL HFA, VENTOLIN HFA, PROAIR HFA) 90 mcg/actuation inhaler INHALE 2 PUFFS BY MOUTH EVERY 6 HOURS AS NEEDED      allopurinoL (ZYLOPRIM) 100 mg tablet Take 1 Tablet by mouth two (2) times a day. 60 Tablet 0    apixaban (Eliquis) 5 mg tablet Take 1 Tablet by mouth two (2) times a day. 60 Tablet 5    diclofenac (VOLTAREN) 1 % gel Apply 2 g to affected area four (4) times daily. 100 g 4    lidocaine 5 % topical cream Apply  to affected area two (2) times daily as needed for Pain. 15 g 0    wheat dextrin (Benefiber Healthy Shape) 5 gram/7.4 gram powd Take  by mouth.       famotidine (PEPCID) 20 mg tablet Take 20 mg by mouth two (2) times a day.      ezetimibe (ZETIA) 10 mg tablet Take 10 mg by mouth daily. spironolactone (ALDACTONE) 25 mg tablet Take 25 mg by mouth daily. polyethylene glycol (MIRALAX) 17 gram packet Take 1 Packet by mouth daily. 30 Packet 0    DULoxetine (CYMBALTA) 60 mg capsule Take 1 Cap by mouth daily. Indications: neuropathic pain 60 Cap 5    albuterol sulfate 90 mcg/actuation aebs Take  by inhalation as needed. pantoprazole (PROTONIX) 40 mg tablet Take 1 Tab by mouth daily. 30 Tab 0    amLODIPine (NORVASC) 10 mg tablet Take 1 Tab by mouth daily.  30 Tab 0       Allergies   Allergen Reactions    Latex Rash     Other reaction(s): Unknown    Ampicillin Angioedema    Asa-Acetaminophen-Caff-Buffers Rash    Penicillins Angioedema     Other reaction(s): anaphylaxis    Crab Hives     Denies allergy to crabs    Shellfish Derived Rash    Adhesive Tape-Silicones Itching    Aspirin Other (comments)     Stomach upset - patient said he had peptic ulcers and cannot take  Other reaction(s): Unknown      Atorvastatin Other (comments)     Muscle cramps       Past Surgical History:   Procedure Laterality Date    COLONOSCOPY N/A 2019    COLONOSCOPY performed by Karla Montoya MD at [facility] ENDOSCOPY    HAND/FINGER SURGERY UNLISTED Right     carpal tunnel    HX HEENT Left     lens implant    HX ORTHOPAEDIC      finger amputation left hand    HX TONSILLECTOMY      UPPER ARM/ELBOW SURGERY UNLISTED Right        Social History     Socioeconomic History    Marital status:      Spouse name: Not on file    Number of children: Not on file    Years of education: Not on file    Highest education level: Not on file   Occupational History    Not on file   Tobacco Use    Smoking status: Former     Types: Cigarettes     Quit date: 2015     Years since quittin.3    Smokeless tobacco: Never   Vaping Use    Vaping Use: Never used   Substance and Sexual Activity    Alcohol use: Not Currently     Alcohol/week: 0.0 standard drinks     Comment: former stopped 2015    Drug use: No    Sexual activity: Yes   Other Topics Concern    Not on file   Social History Narrative    Not on file     Social Determinants of Health     Financial Resource Strain: Not on file   Food Insecurity: Not on file   Transportation Needs: Not on file   Physical Activity: Not on file   Stress: Not on file   Social Connections: Not on file   Intimate Partner Violence: Not on file   Housing Stability: Not on file       REVIEW OF SYSTEMS:    14 point review of systems on the intake form is negative except as noted in the HPI    PHYSICAL EXAMINATION:  Visit Vitals  Pulse (!) 102   Temp 97.6 °F (36.4 °C) (Temporal)   Wt 202 lb (91.6 kg)   SpO2 91%   BMI 31.64 kg/m²     Body mass index is 31.64 kg/m². GENERAL: Alert and oriented x3, in no acute distress, well-developed, well-nourished. HEENT: Normocephalic, atraumatic. Shoulder Examination     R   L  ROM   FF  90/130   110/140  ER  20/40   20/40   IR  Full   Full  Rotator Cuff Pain   Supra  +   +   Infra  +   +   Subscap -   -  Crepitus  +   +  Effusion  -   -  Warmth  -   -   Erythema  -   -  Instability  -   -  AC Joint TTP  -   -  Clavicle   Deformity -   -   TTP  -   -  Proximal Humerus   Deformity -   -   TTP  -   -  Deltoid Strength 5   5  Biceps Strength 5   5  Biceps Deformity -   -  Biceps Groove Pain -   -  Impingement Sign -   -       IMAGING:  Imaging read by myself and interpreted as follows:  X-rays 4 views of both shoulders were taken in the office today. These show bilateral rotator cuff arthropathy with high riding humeral head, acetabular rotation of the acromion, and slight superior wear of the glenoid. ASSESSMENT & PLAN  Diagnosis: Bilateral rotator cuff arthropathy    Buddy Sánchez has bilateral rotator cuff arthropathy. The right is more symptomatic than the left.   As I have recommended in the past I recommend he consider surgery for management of this in the form of reverse shoulder arthroplasty. He says his right shoulder pain is bad enough that he would not consider it. Therefore I have ordered an CT scan of the right shoulder to further evaluate for any bony deformity and for surgical planning purposes. I would like to see him back after the CT scan is completed to schedule surgery. Prescription medication management discussed. Electronically signed by: Malcolm Pettit MD    Note: This note was completed using voice recognition software.   Any typographical/name errors or mistakes are unintentional.

## 2022-12-12 ENCOUNTER — TELEPHONE (OUTPATIENT)
Dept: ORTHOPEDIC SURGERY | Age: 77
End: 2022-12-12

## 2022-12-13 DIAGNOSIS — M12.811 RIGHT ROTATOR CUFF TEAR ARTHROPATHY: ICD-10-CM

## 2022-12-13 DIAGNOSIS — M75.101 RIGHT ROTATOR CUFF TEAR ARTHROPATHY: ICD-10-CM

## 2022-12-14 ENCOUNTER — HOSPITAL ENCOUNTER (OUTPATIENT)
Dept: CT IMAGING | Age: 77
Discharge: HOME | End: 2022-12-14
Attending: ORTHOPAEDIC SURGERY
Payer: MEDICAID

## 2022-12-14 PROCEDURE — 73200 CT UPPER EXTREMITY W/O DYE: CPT

## 2022-12-20 ENCOUNTER — OFFICE VISIT (OUTPATIENT)
Dept: ORTHOPEDIC SURGERY | Age: 77
End: 2022-12-20
Payer: MEDICAID

## 2022-12-20 VITALS — BODY MASS INDEX: 31.23 KG/M2 | WEIGHT: 199 LBS | RESPIRATION RATE: 18 BRPM | HEART RATE: 104 BPM | HEIGHT: 67 IN

## 2022-12-20 DIAGNOSIS — M75.101 RIGHT ROTATOR CUFF TEAR ARTHROPATHY: Primary | ICD-10-CM

## 2022-12-20 DIAGNOSIS — M12.811 RIGHT ROTATOR CUFF TEAR ARTHROPATHY: Primary | ICD-10-CM

## 2022-12-20 PROCEDURE — 1123F ACP DISCUSS/DSCN MKR DOCD: CPT | Performed by: ORTHOPAEDIC SURGERY

## 2022-12-20 PROCEDURE — 99214 OFFICE O/P EST MOD 30 MIN: CPT | Performed by: ORTHOPAEDIC SURGERY

## 2022-12-20 NOTE — PROGRESS NOTES
Patient: Debra Infante                MRN: [id number]       SSN: [id number]  YOB: 1945        AGE: 69 y.o. SEX: male  Body mass index is 31.17 kg/m². PCP: Pepe Lane MD  12/20/22    Chief Complaint: Right shoulder CT scan follow-up    HPI: Debra Infante is a 69 y.o. male patient who returns to the office today for his right shoulder. He continues to have right shoulder pain he rates as 8 out of 10 on the pain scale. He has difficulty with range of motion use of the right arm due to his underlying rotator cuff arthropathy. He has had a CT scan done and he brings it in today for review. Past Medical History:   Diagnosis Date    Bladder outlet obstruction     Erectile disorder due to medical 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     Frequency of urination     H/O spinal cord injury 1974    lumbar spine injury secondary to fall    HTN (hypertension)     Hypercholesterolemia     Illiterate     Injury of lumbar spine ([address] 1974    Leg pain, right     Nocturia     Overactive bladder     Peripheral vascular disease ([facility] [address]        Family History   Problem Relation Age of Onset    Diabetes Mother     Hypertension Mother     Diabetes Maternal Grandmother     Hypertension Maternal Grandmother     Cancer Sister         brain    Cancer Sister         brain       Current Outpatient Medications   Medication Sig Dispense Refill    omeprazole (PRILOSEC) 40 mg capsule       albuterol (PROVENTIL HFA, VENTOLIN HFA, PROAIR HFA) 90 mcg/actuation inhaler INHALE 2 PUFFS BY MOUTH EVERY 6 HOURS AS NEEDED      allopurinoL (ZYLOPRIM) 100 mg tablet Take 1 Tablet by mouth two (2) times a day. 60 Tablet 0    apixaban (Eliquis) 5 mg tablet Take 1 Tablet by mouth two (2) times a day. 60 Tablet 5    diclofenac (VOLTAREN) 1 % gel Apply 2 g to affected area four (4) times daily. 100 g 4    lidocaine 5 % topical cream Apply  to affected area two (2) times daily as needed for Pain.  15 g 0    wheat dextrin (Benefiber Healthy Shape) 5 gram/7.4 gram powd Take  by mouth. famotidine (PEPCID) 20 mg tablet Take 20 mg by mouth two (2) times a day.      ezetimibe (ZETIA) 10 mg tablet Take 10 mg by mouth daily. spironolactone (ALDACTONE) 25 mg tablet Take 25 mg by mouth daily. polyethylene glycol (MIRALAX) 17 gram packet Take 1 Packet by mouth daily. 30 Packet 0    DULoxetine (CYMBALTA) 60 mg capsule Take 1 Cap by mouth daily. Indications: neuropathic pain 60 Cap 5    albuterol sulfate 90 mcg/actuation aebs Take  by inhalation as needed. pantoprazole (PROTONIX) 40 mg tablet Take 1 Tab by mouth daily. 30 Tab 0    amLODIPine (NORVASC) 10 mg tablet Take 1 Tab by mouth daily.  30 Tab 0       Allergies   Allergen Reactions    Latex Rash     Other reaction(s): Unknown    Ampicillin Angioedema    Asa-Acetaminophen-Caff-Buffers Rash    Penicillins Angioedema     Other reaction(s): anaphylaxis    Crab Hives     Denies allergy to crabs    Shellfish Derived Rash    Adhesive Tape-Silicones Itching    Aspirin Other (comments)     Stomach upset - patient said he had peptic ulcers and cannot take  Other reaction(s): Unknown      Atorvastatin Other (comments)     Muscle cramps       Past Surgical History:   Procedure Laterality Date    COLONOSCOPY N/A 2019    COLONOSCOPY performed by Pietro Shepard MD at [facility] ENDOSCOPY    HAND/FINGER SURGERY UNLISTED Right     carpal tunnel    HX HEENT Left     lens implant    HX ORTHOPAEDIC      finger amputation left hand    HX TONSILLECTOMY      UPPER ARM/ELBOW SURGERY UNLISTED Right        Social History     Socioeconomic History    Marital status:      Spouse name: Not on file    Number of children: Not on file    Years of education: Not on file    Highest education level: Not on file   Occupational History    Not on file   Tobacco Use    Smoking status: Former     Types: Cigarettes     Quit date: 2015     Years since quittin.4    Smokeless tobacco: Never   Vaping Use Vaping Use: Never used   Substance and Sexual Activity    Alcohol use: Not Currently     Alcohol/week: 0.0 standard drinks     Comment: former stopped 2015    Drug use: No    Sexual activity: Yes   Other Topics Concern    Not on file   Social History Narrative    Not on file     Social Determinants of Health     Financial Resource Strain: Not on file   Food Insecurity: Not on file   Transportation Needs: Not on file   Physical Activity: Not on file   Stress: Not on file   Social Connections: Not on file   Intimate Partner Violence: Not on file   Housing Stability: Not on file       REVIEW OF SYSTEMS:      No changes from previous review of systems unless noted. PHYSICAL EXAMINATION:  Visit Vitals  Pulse (!) 104   Resp 18   Ht 5' 7\" (1.702 m)   Wt 199 lb (90.3 kg)   BMI 31.17 kg/m²     Body mass index is 31.17 kg/m². GENERAL: Alert and oriented x3, in no acute distress. HEENT: Normocephalic, atraumatic. Shoulder Examination     R   L  ROM   FF  120   Full  ER  Full   Full   IR  Full   Full  Rotator Cuff Pain   Supra  +   -   Infra  +   -   Subscap +   -  Crepitus  +   -  Effusion  -   -  Warmth  -   -   Erythema  -   -  Instability  -   -  AC Joint TTP  -   -  Clavicle   Deformity -   -   TTP  -   -  Proximal Humerus   Deformity -   -   TTP  -   -  Deltoid Strength 5   5  Biceps Strength 5   5  Biceps Deformity -   -  Biceps Groove Pain -   -  Impingement Sign -   -       IMAGING:  Imaging read by myself and interpreted as follows:  CT scan of the right shoulder was reviewed in the office today. This again shows rotator cuff arthropathy. There is some slight posterior wear and retroversion of the glenoid appreciated as well as superior wear. ASSESSMENT & PLAN  Diagnosis: Right rotator cuff arthropathy    Bam Bose continues to have symptomatic right rotator cuff arthropathy. We discussed the treatment options today again at length and I recommended a reverse shoulder arthroplasty.   The CT scan was reviewed with him in the office today. He would like to move forward with surgery. We will start the process of setting it up. All of his questions were answered. The CT scan will be used for preoperative planning and templating purposes for surgery as a portion of this visit. Prescription medication management discussed with patient. Electronically signed by: Tyshawn Gaytan MD    Note: This note was completed using voice recognition software.   Any typographical/name errors or mistakes are unintentional.

## 2023-01-06 ENCOUNTER — TELEPHONE (OUTPATIENT)
Dept: CARDIOLOGY CLINIC | Age: 78
End: 2023-01-06

## 2023-01-06 NOTE — LETTER
2023 1:36 PM    Mr. Kota Stewart  [address] [address]              Dear Dr. Pickard Reusing:    Re: Kota Stewart   : 1945     Mr. Jaden Grissom is cleared from a cardiac standpoint with medium risk for shoulder surgery scheduled on 2023. If you have any questions or any further assistance is needed please contact our office. Sincerely,              Signed By: Ray Cali. Harrison Hinkle MD    2023            cc:   Ward Estes MD

## 2023-01-06 NOTE — TELEPHONE ENCOUNTER
Pt wants to know if he can be cleared from his 10/22/22 appt for shoulder surgery on 1/30/23 by Dr Patrick Willis

## 2023-01-06 NOTE — TELEPHONE ENCOUNTER
Spoke with patient per Dr. Raúl Cardoso cleared with medium risk for surgery. How may days to hold Eliquis.

## 2023-01-09 DIAGNOSIS — M12.811 RIGHT ROTATOR CUFF TEAR ARTHROPATHY: Primary | ICD-10-CM

## 2023-01-09 DIAGNOSIS — Z01.811 PRE-OP CHEST EXAM: ICD-10-CM

## 2023-01-09 DIAGNOSIS — M75.101 RIGHT ROTATOR CUFF TEAR ARTHROPATHY: ICD-10-CM

## 2023-01-09 DIAGNOSIS — Z01.818 PREOPERATIVE TESTING: ICD-10-CM

## 2023-01-09 DIAGNOSIS — Z01.810 PREOP CARDIOVASCULAR EXAM: ICD-10-CM

## 2023-01-09 DIAGNOSIS — M75.101 RIGHT ROTATOR CUFF TEAR ARTHROPATHY: Primary | ICD-10-CM

## 2023-01-09 DIAGNOSIS — M12.811 RIGHT ROTATOR CUFF TEAR ARTHROPATHY: ICD-10-CM

## 2023-01-10 ENCOUNTER — OFFICE VISIT (OUTPATIENT)
Dept: ORTHOPEDIC SURGERY | Age: 78
End: 2023-01-10
Payer: MEDICAID

## 2023-01-10 VITALS — WEIGHT: 192.4 LBS | TEMPERATURE: 98.2 F | BODY MASS INDEX: 30.13 KG/M2

## 2023-01-10 DIAGNOSIS — M75.101 RIGHT ROTATOR CUFF TEAR ARTHROPATHY: Primary | ICD-10-CM

## 2023-01-10 DIAGNOSIS — M75.102 LEFT ROTATOR CUFF TEAR ARTHROPATHY: ICD-10-CM

## 2023-01-10 DIAGNOSIS — M12.811 RIGHT ROTATOR CUFF TEAR ARTHROPATHY: Primary | ICD-10-CM

## 2023-01-10 DIAGNOSIS — M12.812 LEFT ROTATOR CUFF TEAR ARTHROPATHY: ICD-10-CM

## 2023-01-10 NOTE — PROGRESS NOTES
Patient: Debra Infante                MRN: [id number]       SSN: [id number]  YOB: 1945        AGE: 69 y.o. SEX: male  Body mass index is 30.13 kg/m². PCP: Pepe Lane MD  01/10/23    Chief Complaint: Bilateral shoulder pain     HPI: Debra Infante is a 69 y.o. male patient who returns today for bilateral shoulder pain. He continues to have pain despite conservative treatment. Past Medical History:   Diagnosis Date    Bladder outlet obstruction     Erectile disorder due to medical 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     Frequency of urination     H/O spinal cord injury 1974    lumbar spine injury secondary to fall    HTN (hypertension)     Hypercholesterolemia     Illiterate     Injury of lumbar spine ([facility] [address] 1974    Leg pain, right     Nocturia     Overactive bladder     Peripheral vascular disease ([facility] [address]        Family History   Problem Relation Age of Onset    Diabetes Mother     Hypertension Mother     Diabetes Maternal Grandmother     Hypertension Maternal Grandmother     Cancer Sister         brain    Cancer Sister         brain       Current Outpatient Medications   Medication Sig Dispense Refill    omeprazole (PRILOSEC) 40 mg capsule       albuterol (PROVENTIL HFA, VENTOLIN HFA, PROAIR HFA) 90 mcg/actuation inhaler INHALE 2 PUFFS BY MOUTH EVERY 6 HOURS AS NEEDED      allopurinoL (ZYLOPRIM) 100 mg tablet Take 1 Tablet by mouth two (2) times a day. 60 Tablet 0    apixaban (Eliquis) 5 mg tablet Take 1 Tablet by mouth two (2) times a day. 60 Tablet 5    diclofenac (VOLTAREN) 1 % gel Apply 2 g to affected area four (4) times daily. 100 g 4    lidocaine 5 % topical cream Apply  to affected area two (2) times daily as needed for Pain. 15 g 0    wheat dextrin (Benefiber Healthy Shape) 5 gram/7.4 gram powd Take  by mouth. famotidine (PEPCID) 20 mg tablet Take 20 mg by mouth two (2) times a day.      ezetimibe (ZETIA) 10 mg tablet Take 10 mg by mouth daily.       spironolactone (ALDACTONE) 25 mg tablet Take 25 mg by mouth daily. polyethylene glycol (MIRALAX) 17 gram packet Take 1 Packet by mouth daily. 30 Packet 0    DULoxetine (CYMBALTA) 60 mg capsule Take 1 Cap by mouth daily. Indications: neuropathic pain 60 Cap 5    albuterol sulfate 90 mcg/actuation aebs Take  by inhalation as needed. pantoprazole (PROTONIX) 40 mg tablet Take 1 Tab by mouth daily. 30 Tab 0    amLODIPine (NORVASC) 10 mg tablet Take 1 Tab by mouth daily.  30 Tab 0       Allergies   Allergen Reactions    Latex Rash     Other reaction(s): Unknown    Ampicillin Angioedema    Asa-Acetaminophen-Caff-Buffers Rash    Penicillins Angioedema     Other reaction(s): anaphylaxis    Crab Hives     Denies allergy to crabs    Shellfish Derived Rash    Adhesive Tape-Silicones Itching    Aspirin Other (comments)     Stomach upset - patient said he had peptic ulcers and cannot take  Other reaction(s): Unknown      Atorvastatin Other (comments)     Muscle cramps       Past Surgical History:   Procedure Laterality Date    COLONOSCOPY N/A 2019    COLONOSCOPY performed by Ramírez Kuo MD at [facility] ENDOSCOPY    HAND/FINGER SURGERY UNLISTED Right     carpal tunnel    HX HEENT Left     lens implant    HX ORTHOPAEDIC      finger amputation left hand    HX TONSILLECTOMY      UPPER ARM/ELBOW SURGERY UNLISTED Right        Social History     Socioeconomic History    Marital status:      Spouse name: Not on file    Number of children: Not on file    Years of education: Not on file    Highest education level: Not on file   Occupational History    Not on file   Tobacco Use    Smoking status: Former     Types: Cigarettes     Quit date: 2015     Years since quittin.5    Smokeless tobacco: Never   Vaping Use    Vaping Use: Never used   Substance and Sexual Activity    Alcohol use: Not Currently     Alcohol/week: 0.0 standard drinks     Comment: former stopped     Drug use: No    Sexual activity: Yes   Other Topics Concern    Not on file   Social History Narrative    Not on file     Social Determinants of Health     Financial Resource Strain: Not on file   Food Insecurity: Not on file   Transportation Needs: Not on file   Physical Activity: Not on file   Stress: Not on file   Social Connections: Not on file   Intimate Partner Violence: Not on file   Housing Stability: Not on file       REVIEW OF SYSTEMS:      No changes from previous review of systems unless noted. PHYSICAL EXAMINATION:  Visit Vitals  Temp 98.2 °F (36.8 °C) (Temporal)   Wt 192 lb 6.4 oz (87.3 kg)   BMI 30.13 kg/m²     Body mass index is 30.13 kg/m². GENERAL: Alert and oriented x3, in no acute distress. HEENT: Normocephalic, atraumatic. Shoulder examination is unchanged. He has significant pain with rotator cuff testing on the right with crepitus. Some pain with rotator cuff testing on the left. IMAGING:  Imaging read by myself and interpreted as follows:      ASSESSMENT & PLAN  Diagnosis: Bilateral rotator cuff arthropathy right worse than left    Carlo Martinez continues to have bilateral rotator cuff arthropathy. We are getting him set up for surgery on the right soon. For the left we discussed multiple treatment options today. He declined an injection as well as pain medication. He says he has a history of addiction to medications and he would like to avoid that. He cannot take anti-inflammatories due to stomach issues. I told him I did not have much else to offer him. My hope is that we can get his right shoulder feeling better with surgery and then potentially address the left shoulder as well. Prescription medication management discussed with patient. Electronically signed by: Sheridan Tolentino MD    Note: This note was completed using voice recognition software.   Any typographical/name errors or mistakes are unintentional.

## 2023-01-13 ENCOUNTER — OFFICE VISIT (OUTPATIENT)
Dept: PULMONOLOGY | Age: 78
End: 2023-01-13
Payer: MEDICAID

## 2023-01-13 VITALS
BODY MASS INDEX: 30.1 KG/M2 | HEIGHT: 67 IN | HEART RATE: 88 BPM | DIASTOLIC BLOOD PRESSURE: 72 MMHG | OXYGEN SATURATION: 96 % | SYSTOLIC BLOOD PRESSURE: 134 MMHG | WEIGHT: 191.8 LBS | RESPIRATION RATE: 18 BRPM | TEMPERATURE: 97.8 F

## 2023-01-13 DIAGNOSIS — I26.94 MULTIPLE SUBSEGMENTAL PULMONARY EMBOLI WITHOUT ACUTE COR PULMONALE (HCC): ICD-10-CM

## 2023-01-13 DIAGNOSIS — J41.8 MIXED SIMPLE AND MUCOPURULENT CHRONIC BRONCHITIS (HCC): ICD-10-CM

## 2023-01-13 DIAGNOSIS — Z01.818 PREOPERATIVE CLEARANCE: Primary | ICD-10-CM

## 2023-01-13 RX ORDER — UMECLIDINIUM BROMIDE AND VILANTEROL TRIFENATATE 62.5; 25 UG/1; UG/1
1 POWDER RESPIRATORY (INHALATION) DAILY
Qty: 1 EACH | Refills: 0 | Status: SHIPPED | COMMUNITY
Start: 2023-01-13

## 2023-01-13 NOTE — PROGRESS NOTES
[facility] presents today for   Chief Complaint   Patient presents with    Surgical Clearance    Cough with sputum    Shortness of Breath     With exertion        Is someone accompanying this pt? No    Is the patient using any DME equipment during OV? No    -DME Company NA    Depression Screening:  3 most recent PHQ Screens 11/22/2021   PHQ Not Done -   Little interest or pleasure in doing things Not at all   Feeling down, depressed, irritable, or hopeless Not at all   Total Score PHQ 2 0       Learning Assessment:  Learning Assessment 11/22/2021   PRIMARY LEARNER Patient   PRIMARY LANGUAGE ENGLISH   LEARNER PREFERENCE PRIMARY DEMONSTRATION     -   ANSWERED BY Patient   RELATIONSHIP SELF       Abuse Screening:  Abuse Screening Questionnaire 4/30/2019   Do you ever feel afraid of your partner? N   Are you in a relationship with someone who physically or mentally threatens you? N   Is it safe for you to go home? Y       Fall Risk  Fall Risk Assessment, last 12 mths 11/4/2022   Able to walk? Yes   Fall in past 12 months? 1   Do you feel unsteady? 1   Are you worried about falling 1   Is TUG test greater than 12 seconds? -   Is the gait abnormal? -   Number of falls in past 12 months 2   Fall with injury? 1         Coordination of Care:  1. Have you been to the ER, urgent care clinic since your last visit? Hospitalized since your last visit? No    2. Have you seen or consulted any other health care providers outside of the [address] [place] [address] since your last visit? Include any pap smears or colon screening.  No

## 2023-01-13 NOTE — LETTER
1/13/2023    Patient: Lalit Hope   YOB: 1945   Date of Visit: 1/13/2023     Sid Keller MD  [address] [facility] [address] [facility] [address]  Via Fax: [phone]    Dear Sid Keller MD,      Thank you for referring Mr. Lalit Hope to [facility] for evaluation. My notes for this consultation are attached. If you have questions, please do not hesitate to call me. I look forward to following your patient along with you.       Sincerely,    Rashad Block MD

## 2023-01-13 NOTE — PROGRESS NOTES
[facility]  Pulmonary, Critical Care, and Sleep Medicine  Progress note    Name: Carlos Fall MRN: [id number]   : 1945 Hospital:    Date: 2023        IMPRESSION:       Preop clearance for shoulder surgery- Patient can stop anticoagulation 2 days prior to surgery and resume immediately postop. If longer duration of holding Eliquis planned then would recommend bridging with heparin/Lovenox  History of pulmonary embolism right upper and lower PA branches with extensive clot burden. Follow-up CTA chest 2/3/22 -Negative for pulmonary emboli. ,Mild central venous congestion. ,Mild bronchitis and emphysema. Shortness of breath-multifactorial with h/o PE 2021-improving. In addition has chronic cough-mucus plugging symptoms and would benefit from addition of bronchodilators. Left-sided chest discomfort-we will check chest x-ray  S/p hemoptysis- 2021 acute in setting of anticoagulants-likely related to right lower lobe pneumonia/infarct. Old blood noted on bronchoscopic evaluation without any active bleeding. Heparin resumed and patient has not had any hemoptysis. Resolved  DVT-left popliteal-now with complains of discomfort and swelling of calf as well as thigh-improved  Hx of esophageal varices  Hx of EtoH abuse  Recent Diverticulitis- now resolved.    Hx of tobacco abuse - over 50 pack years, quit 5 years ago            PERIOPERATIVE PULMONARY PHYSIOLOGY  Extrathoracic-shoulder surgery    PATIENT-RELATED RISK FACTORS       Age , 51-80=+3     Preop SpO2  >96%=0    Resp Infection in last 1 month  no=0    Preop Anemia <10 No=0   Surgical incision Peripheral=0    Duration of Surgery  <2 hrs    Emergency Procedure No=0     ARISCAT preop Pulmonary risk index:    0-25 points:      Low risk: 1.5% Pulmonary complication rate  11-62 points:    Intermediate risk: 35.4% Pulmonary complication rate   points:  High risk: 35.0% Pulmonary complication rate      Other contributors  History of pulmonary embolism and emphysema     PLAN:   Discussed need for continued anticoagulation-Eliquis long-term  Will provide guidance for holding Eliquis preoperatively. Eliquis should be held 48-72 hours prior to surgery and resume postop  Based on ArisCAT preop pulmonary risk index-patient has low risk of developing pulmonary complications from planned procedure  Will add Anoro 1 puff daily  Check PFTs and chest x-ray  Continue airway clearance measures  Maintain complete cessation of cigarette smoking  Follow-up with cardiology  Discussed any further questions concerns  Completed surgical clearance forms  Will Follow in 4 months     Subjective/Interval History:       01/13/23    Here for follow-up and requesting preop clearance for upcoming shoulder surgery  Complains of continued symptoms of some shortness of breath and some chest tightness off and on but overall slowly improving  He states that he has been having pain under the left rib cage, gets worse when he needs to cough. He states that he is coughing all the time bringing up mucus-described as globs  Has cough but no further hemoptysis. H he continues to remain on Eliquis  He has right shoulder surgery scheduled for 1/30/2023  He plans to get his preop labs, EKG and chest x-ray on 1/17/2023  Denies any abnormal bleeding      ROS:A comprehensive review of systems was negative except for that written in the HPI. Objective:   Vital Signs:    Visit Vitals  /72 (BP 1 Location: Left upper arm, BP Patient Position: Sitting, BP Cuff Size: Large adult)   Pulse 88   Temp 97.8 °F (36.6 °C) (Oral)   Resp 18   Ht 5' 7\" (1.702 m)   Wt 87 kg (191 lb 12.8 oz)   SpO2 96% Comment: RA Rest   BMI 30.04 kg/m²           Physical Exam:   General:  Alert, cooperative, no distress, appears stated age. Head:  Normocephalic, without obvious abnormality, atraumatic. Lungs:   Bilateral auscultation clear, no rales or wheezing.     Chest wall:  No tenderness or deformity. NO CREPITUS   Heart:  Regular rate and rhythm, S1, S2 normal, no murmur, click, rub or gallop. Abdomen:   Soft,mild tenderness to palpation in the center. Bowel sounds normal. No masses,  No organomegaly. No paradox   Extremities:  Mildly swollen left lower extremity, cord felt along inner right thigh   Pulses: 1-2+ and symmetric all extremities. Neurologic: Grossly nonfocal       DATA:  CT Results (most recent):  Results from Orders Only encounter on 12/13/22    CT SHOULDER RT WO CONT    Narrative  EXAM: CT of the right shoulder without contrast.    INDICATION:  Right rotator cuff arthropathy. TECHNIQUE: CT of  the right shoulder without contrast. Sagittal and coronal  reformations obtained. All CT scans at this facility are performed using dose optimization technique as  appropriate to a performed exam, to include automated exposure control,  adjustment of the mA and/or kV according to patient size (including appropriate  matching for site specific examination) or use of iterative reconstruction  technique. COMPARISON: Plain film dated 4/5/2022 and MRI dated 2/8/2017    FINDINGS:  There is near bone-on-bone contact between the humeral head and the undersurface  of the acromion. An os acromiale is noted with secondary degenerative changes  between the acromion and the os. Multiple loose bodies are noted. The largest  which is just posterior to the posterior aspect of the glenoid is 9 mm. There is  retroversion of the glenoid by 15 degrees. Moderate humeral head and glenoid  osteophyte formation is noted. Asymmetric joint space narrowing and subchondral  sclerosis are present. Severe atrophy of the supraspinatus is noted. Moderate atrophy of the  infraspinatus is noted consistent with severe full-thickness rotator cuff tear. There is narrowing of the subcoracoid space suggestive of subcoracoid  impingement.     Moderate AC joint osteoarthritis are noted with joint space, osteophyte  formation and subchondral sclerosis. No axillary adenopathy identified. The visualized lung fields demonstrates COPD  changes. Impression  1. Findings consistent with massive rotator cuff tear and severe secondary  degenerative changes in the humeral head and the acromion. Severe atrophy of the  supraspinatus and infraspinatus. 2.  Moderate to severe glenohumeral joint osteoarthritis with retroversion of  the glenoid. Numerous loose bodies are present. 3.  An os acromiale is present. 4.  Moderate AC joint osteoarthritis. High complexity decision making was performed during the evaluation of this patient at high risk for decompensation with multiple organ involvement     Above mentioned total time spent on reviewing the case/medical record/data/notes/EMR/patient examination/documentation/coordinating care with nurse/consultants, exclusive of procedures with complex decision making performed and > 50% time spent in face to face evaluation.     Justin Jackson MD  Pulmonary, Critical Care Medicine  [facility]

## 2023-01-17 ENCOUNTER — HOSPITAL ENCOUNTER (OUTPATIENT)
Dept: PREADMISSION TESTING | Age: 78
Discharge: HOME | End: 2023-01-17
Payer: MEDICAID

## 2023-01-17 ENCOUNTER — HOSPITAL ENCOUNTER (OUTPATIENT)
Dept: GENERAL RADIOLOGY | Age: 78
Discharge: HOME | End: 2023-01-17
Payer: MEDICAID

## 2023-01-17 LAB
ABO + RH BLD: NORMAL
ALBUMIN SERPL-MCNC: 3.9 G/DL (ref 3.4–5)
ALBUMIN/GLOB SERPL: 1.1 (ref 0.8–1.7)
ALP SERPL-CCNC: 68 U/L (ref 45–117)
ALT SERPL-CCNC: 29 U/L (ref 16–61)
ANION GAP SERPL CALC-SCNC: 6 MMOL/L (ref 3–18)
APPEARANCE UR: CLEAR
APTT PPP: 28.8 SEC (ref 23–36.4)
AST SERPL-CCNC: 17 U/L (ref 10–38)
ATRIAL RATE: 86 BPM
BASOPHILS # BLD: 0 K/UL (ref 0–0.1)
BASOPHILS NFR BLD: 1 % (ref 0–2)
BILIRUB SERPL-MCNC: 0.3 MG/DL (ref 0.2–1)
BILIRUB UR QL: NEGATIVE
BLOOD GROUP ANTIBODIES SERPL: NORMAL
BUN SERPL-MCNC: 14 MG/DL (ref 7–18)
BUN/CREAT SERPL: 13 (ref 12–20)
CALCIUM SERPL-MCNC: 9.5 MG/DL (ref 8.5–10.1)
CALCULATED P AXIS, ECG09: 50 DEGREES
CALCULATED R AXIS, ECG10: -23 DEGREES
CALCULATED T AXIS, ECG11: 61 DEGREES
CHLORIDE SERPL-SCNC: 106 MMOL/L (ref 100–111)
CO2 SERPL-SCNC: 25 MMOL/L (ref 21–32)
COLOR UR: YELLOW
CREAT SERPL-MCNC: 1.07 MG/DL (ref 0.6–1.3)
DIAGNOSIS, 93000: NORMAL
DIFFERENTIAL METHOD BLD: ABNORMAL
EOSINOPHIL # BLD: 0.2 K/UL (ref 0–0.4)
EOSINOPHIL NFR BLD: 3 % (ref 0–5)
ERYTHROCYTE [DISTWIDTH] IN BLOOD BY AUTOMATED COUNT: 15.1 % (ref 11.6–14.5)
GLOBULIN SER CALC-MCNC: 3.5 G/DL (ref 2–4)
GLUCOSE SERPL-MCNC: 109 MG/DL (ref 74–99)
GLUCOSE UR STRIP.AUTO-MCNC: NEGATIVE MG/DL
HCT VFR BLD AUTO: 43.7 % (ref 36–48)
HGB BLD-MCNC: 14.4 G/DL (ref 13–16)
HGB UR QL STRIP: NEGATIVE
IMM GRANULOCYTES # BLD AUTO: 0 K/UL (ref 0–0.04)
IMM GRANULOCYTES NFR BLD AUTO: 0 % (ref 0–0.5)
INR PPP: 1.1 (ref 0.8–1.2)
KETONES UR QL STRIP.AUTO: ABNORMAL MG/DL
LEUKOCYTE ESTERASE UR QL STRIP.AUTO: NEGATIVE
LYMPHOCYTES # BLD: 1.2 K/UL (ref 0.9–3.6)
LYMPHOCYTES NFR BLD: 20 % (ref 21–52)
MCH RBC QN AUTO: 30.3 PG (ref 24–34)
MCHC RBC AUTO-ENTMCNC: 33 G/DL (ref 31–37)
MCV RBC AUTO: 91.8 FL (ref 78–100)
MONOCYTES # BLD: 0.6 K/UL (ref 0.05–1.2)
MONOCYTES NFR BLD: 10 % (ref 3–10)
NEUTS SEG # BLD: 3.8 K/UL (ref 1.8–8)
NEUTS SEG NFR BLD: 66 % (ref 40–73)
NITRITE UR QL STRIP.AUTO: NEGATIVE
NRBC # BLD: 0 K/UL (ref 0–0.01)
NRBC BLD-RTO: 0 PER 100 WBC
P-R INTERVAL, ECG05: 170 MS
PH UR STRIP: 5.5 (ref 5–8)
PLATELET # BLD AUTO: 294 K/UL (ref 135–420)
PMV BLD AUTO: 9.2 FL (ref 9.2–11.8)
POTASSIUM SERPL-SCNC: 4.5 MMOL/L (ref 3.5–5.5)
PROT SERPL-MCNC: 7.4 G/DL (ref 6.4–8.2)
PROT UR STRIP-MCNC: NEGATIVE MG/DL
PROTHROMBIN TIME: 14.5 SEC (ref 11.5–15.2)
Q-T INTERVAL, ECG07: 362 MS
QRS DURATION, ECG06: 76 MS
QTC CALCULATION (BEZET), ECG08: 433 MS
RBC # BLD AUTO: 4.76 M/UL (ref 4.35–5.65)
SODIUM SERPL-SCNC: 137 MMOL/L (ref 136–145)
SP GR UR REFRACTOMETRY: 1.02 (ref 1–1.03)
SPECIMEN EXP DATE BLD: NORMAL
UROBILINOGEN UR QL STRIP.AUTO: 0.2 EU/DL (ref 0.2–1)
VENTRICULAR RATE, ECG03: 86 BPM
WBC # BLD AUTO: 5.8 K/UL (ref 4.6–13.2)

## 2023-01-17 PROCEDURE — 80053 COMPREHEN METABOLIC PANEL: CPT

## 2023-01-17 PROCEDURE — 85025 COMPLETE CBC W/AUTO DIFF WBC: CPT

## 2023-01-17 PROCEDURE — 85610 PROTHROMBIN TIME: CPT

## 2023-01-17 PROCEDURE — 93005 ELECTROCARDIOGRAM TRACING: CPT

## 2023-01-17 PROCEDURE — 71046 X-RAY EXAM CHEST 2 VIEWS: CPT

## 2023-01-17 PROCEDURE — 86900 BLOOD TYPING SEROLOGIC ABO: CPT

## 2023-01-17 PROCEDURE — 85730 THROMBOPLASTIN TIME PARTIAL: CPT

## 2023-01-17 PROCEDURE — 36415 COLL VENOUS BLD VENIPUNCTURE: CPT

## 2023-01-17 PROCEDURE — 81003 URINALYSIS AUTO W/O SCOPE: CPT

## 2023-01-23 NOTE — PERIOP NOTES
PRE-SURGICAL INSTRUCTIONS        Patient's Name:  Dennis Carmona      Today's Date:  1/23/2023            Covid Testing Date and Time:  vaccinated     Surgery Date:  1/30/2023                Do NOT eat or drink anything, including candy, gum, or ice chips after midnight on 1/30, unless you have specific instructions from your surgeon or anesthesia provider to do so. You may brush your teeth before coming to the hospital.  No smoking 24 hours prior to the day of surgery. No alcohol 24 hours prior to the day of surgery. No recreational drugs for one week prior to the day of surgery. Leave all valuables, including money/purse, at home. Remove all jewelry, nail polish, acrylic nails, and makeup (including mascara); no lotions powders, deodorant, or perfume/cologne/after shave on the skin. Follow instruction for Hibiclens washes and CHG wipes from surgeon's office. Glasses/contact lenses and dentures may be worn to the hospital.  They will be removed prior to surgery. Call your doctor if symptoms of a cold or illness develop within 24-48 hours prior to your surgery. 11.  If you are having an outpatient procedure, please make arrangements for a responsible ADULT TO [address] [place] [address] and stay with you for 24 hours after your surgery. 12. ONE VISITOR in the hospital at this time for outpatient procedures. Exceptions may be made for surgical admissions, per nursing unit guidelines      Special Instructions:      Bring list of CURRENT medications. Bring inhaler. Bring any pertinent legal medical records. Take these medications the morning of surgery with a sip of water:  as directed by   MD  Follow physician instructions about stopping anticoagulants. Complete bowel prep per MD instructions. On the day of surgery, come in the main entrance of [facility]. Let the  at the desk know you are there for surgery.   A staff member will come escort you to the surgical area on the second floor. If you have any questions or concerns, please do not hesitate to call:     (Prior to the day of surgery) [facility] department:  [phone]   (Day of surgery) Pre-Op department:  [phone]    These surgical instructions were reviewed with Garcia Blanca, cheyanne during the [facility] phone call.

## 2023-01-24 ENCOUNTER — OFFICE VISIT (OUTPATIENT)
Dept: ORTHOPEDIC SURGERY | Age: 78
End: 2023-01-24
Payer: MEDICAID

## 2023-01-24 VITALS
SYSTOLIC BLOOD PRESSURE: 138 MMHG | BODY MASS INDEX: 30.45 KG/M2 | HEIGHT: 67 IN | DIASTOLIC BLOOD PRESSURE: 82 MMHG | WEIGHT: 194 LBS | RESPIRATION RATE: 18 BRPM

## 2023-01-24 DIAGNOSIS — M75.101 RIGHT ROTATOR CUFF TEAR ARTHROPATHY: Primary | ICD-10-CM

## 2023-01-24 DIAGNOSIS — M12.811 RIGHT ROTATOR CUFF TEAR ARTHROPATHY: Primary | ICD-10-CM

## 2023-01-24 PROCEDURE — 99024 POSTOP FOLLOW-UP VISIT: CPT | Performed by: ORTHOPAEDIC SURGERY

## 2023-01-24 RX ORDER — OXYCODONE HYDROCHLORIDE 5 MG/1
5 TABLET ORAL
Qty: 30 TABLET | Refills: 0 | Status: SHIPPED | OUTPATIENT
Start: 2023-01-24 | End: 2023-01-31

## 2023-01-24 NOTE — PROGRESS NOTES
Patient: Jose Luis Angelo                MRN: [id number]       SSN: [id number]  YOB: 1945        AGE: 69 y.o. SEX: male  Body mass index is 30.38 kg/m². PCP: Leata Lennox, MD  01/24/23    H&P    CHIEF COMPLAINT: Right shoulder preoperative appointment    HPI: Jose Luis Angelo is a 69 y.o. male patient who returns today for his right shoulder preop appointment. He continues to have right shoulder pain and dysfunction. He is set up for surgery next week. Past Medical History:   Diagnosis Date    Atypical chest pain     Bladder outlet obstruction     DVT (deep venous thrombosis) ([facility] [place] [address] 06/2021    Erectile disorder due to medical 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     Frequency of urination     H/O spinal cord injury 1974    lumbar spine injury secondary to fall, no residual    HTN (hypertension)     Hypercholesterolemia     Illiterate     Injury of lumbar spine ([facility] [place] [address] 1974    Leg pain, right     MGUS (monoclonal gammopathy of unknown significance)     Nocturia     Overactive bladder     Peripheral vascular disease (HCC)     Prostate cancer ([facility] [place] [address]     Pulmonary embolism ([facility] [place] [address] 06/2021    Shortness of breath        Family History   Problem Relation Age of Onset    Diabetes Mother     Hypertension Mother     Diabetes Maternal Grandmother     Hypertension Maternal Grandmother     Cancer Sister         brain    Cancer Sister         brain       Current Outpatient Medications   Medication Sig Dispense Refill    umeclidinium-vilanteroL (Anoro Ellipta) 62.5-25 mcg/actuation inhaler Take 1 Puff by inhalation daily. 1 Each 0    omeprazole (PRILOSEC) 40 mg capsule       albuterol (PROVENTIL HFA, VENTOLIN HFA, PROAIR HFA) 90 mcg/actuation inhaler INHALE 2 PUFFS BY MOUTH EVERY 6 HOURS AS NEEDED      apixaban (Eliquis) 5 mg tablet Take 1 Tablet by mouth two (2) times a day. 60 Tablet 5    lidocaine 5 % topical cream Apply  to affected area two (2) times daily as needed for Pain.  15 g 0    famotidine (PEPCID) 20 mg tablet Take 20 mg by mouth two (2) times a day.      ezetimibe (ZETIA) 10 mg tablet Take 10 mg by mouth daily. spironolactone (ALDACTONE) 25 mg tablet Take 25 mg by mouth daily. polyethylene glycol (MIRALAX) 17 gram packet Take 1 Packet by mouth daily. 30 Packet 0    DULoxetine (CYMBALTA) 60 mg capsule Take 1 Cap by mouth daily. Indications: neuropathic pain 60 Cap 5    amLODIPine (NORVASC) 10 mg tablet Take 1 Tab by mouth daily. 30 Tab 0       Allergies   Allergen Reactions    Latex Rash     Other reaction(s): Unknown    Ampicillin Angioedema    Asa-Acetaminophen-Caff-Buffers Rash    Penicillins Angioedema     Other reaction(s): anaphylaxis    Crab Hives     Denies allergy to crabs    Shellfish Derived Rash    Adhesive Tape-Silicones Itching    Aspirin Other (comments)     Stomach upset - patient said he had peptic ulcers and cannot take  Other reaction(s): Unknown      Atorvastatin Other (comments)     Muscle cramps       Past Surgical History:   Procedure Laterality Date    COLONOSCOPY N/A 2019    COLONOSCOPY performed by Elizabeth Haider MD at [facility] ENDOSCOPY    HX HEENT Left     lens implant    HX ORTHOPAEDIC      finger amputation left hand    HX TONSILLECTOMY      ME CYSTOURETHROSCOPY  10/2022    ME UNLISTED PROCEDURE HANDS/FINGERS Right     carpal tunnel    ME UNLISTED PROCEDURE HUMERUS/ELBOW Right        Social History     Socioeconomic History    Marital status:      Spouse name: Not on file    Number of children: Not on file    Years of education: Not on file    Highest education level: Not on file   Occupational History    Not on file   Tobacco Use    Smoking status: Former     Types: Cigarettes     Quit date: 2015     Years since quittin.5    Smokeless tobacco: Never   Vaping Use    Vaping Use: Never used   Substance and Sexual Activity    Alcohol use: Not Currently     Alcohol/week: 0.0 standard drinks     Comment: former stopped     Drug use:  No Sexual activity: Yes   Other Topics Concern    Not on file   Social History Narrative    Not on file     Social Determinants of Health     Financial Resource Strain: Not on file   Food Insecurity: Not on file   Transportation Needs: Not on file   Physical Activity: Not on file   Stress: Not on file   Social Connections: Not on file   Intimate Partner Violence: Not on file   Housing Stability: Not on file       REVIEW OF SYSTEMS:    14 point review of systems on the intake form is negative except as noted in the HPI    PHYSICAL EXAMINATION:  Visit Vitals  /82 (BP 1 Location: Left upper arm, BP Patient Position: Sitting, BP Cuff Size: Large adult long)   Resp 18   Ht 5' 7\" (1.702 m)   Wt 194 lb (88 kg)   BMI 30.38 kg/m²     Body mass index is 30.38 kg/m². GENERAL: Alert and oriented x3, in no acute distress, well-developed, well-nourished. HEENT: Normocephalic, atraumatic.     Shoulder Examination                                      R                                  L  ROM              FF                    120                              Full  ER                   Full                              Full              IR                     Full                              Full  Rotator Cuff Pain              Supra               +                                  -              Infra                 +                                  -              Subscap          +                                  -  Crepitus                       +                                  -  Effusion                       -                                   -  Warmth                       -                                   -             Erythema                     -                                   -  Instability                     -                                   -  AC Joint TTP               -                                   -  Clavicle              Deformity         -                                   - TTP                 -                                   -  Proximal Humerus              Deformity         -                                   -              TTP                 -                                   -  Deltoid Strength          5                                  5  Biceps Strength          5                                  5  Biceps Deformity         -                                   -  Biceps Groove Pain    -                                   -  Impingement Sign       -                                   -    IMAGING:  Imaging read by myself and interpreted as follows:  Right rotator cuff arthropathy    ASSESSMENT & PLAN  Diagnosis: Right rotator cuff arthropathy    Anila Watson continues to have symptomatic right rotator cuff arthropathy. He set up for surgery next week. We discussed surgery at length. All of his questions were answered. Pain medication was prescribed. Risks and benefits were discussed and informed consent was obtained. Follow-up postoperatively. Prescription medication management discussed. Electronically signed by: Alpa Church MD    Note: This note was completed using voice recognition software.   Any typographical/name errors or mistakes are unintentional.

## 2023-01-24 NOTE — H&P (VIEW-ONLY)
Patient: Tg Prather                MRN: [id number]       SSN: [id number]  YOB: 1945        AGE: 69 y.o. SEX: male  Body mass index is 30.38 kg/m². PCP: Ajith Vaz MD  01/24/23    H&P    CHIEF COMPLAINT: Right shoulder preoperative appointment    HPI: Tg Prather is a 69 y.o. male patient who returns today for his right shoulder preop appointment. He continues to have right shoulder pain and dysfunction. He is set up for surgery next week. Past Medical History:   Diagnosis Date    Atypical chest pain     Bladder outlet obstruction     DVT (deep venous thrombosis) ([facility] [address] 06/2021    Erectile disorder due to medical 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     Frequency of urination     H/O spinal cord injury 1974    lumbar spine injury secondary to fall, no residual    HTN (hypertension)     Hypercholesterolemia     Illiterate     Injury of lumbar spine ([facility] [address] 1974    Leg pain, right     MGUS (monoclonal gammopathy of unknown significance)     Nocturia     Overactive bladder     Peripheral vascular disease (HCC)     Prostate cancer ([facility] [address]     Pulmonary embolism ([facility] [address] 06/2021    Shortness of breath        Family History   Problem Relation Age of Onset    Diabetes Mother     Hypertension Mother     Diabetes Maternal Grandmother     Hypertension Maternal Grandmother     Cancer Sister         brain    Cancer Sister         brain       Current Outpatient Medications   Medication Sig Dispense Refill    umeclidinium-vilanteroL (Anoro Ellipta) 62.5-25 mcg/actuation inhaler Take 1 Puff by inhalation daily. 1 Each 0    omeprazole (PRILOSEC) 40 mg capsule       albuterol (PROVENTIL HFA, VENTOLIN HFA, PROAIR HFA) 90 mcg/actuation inhaler INHALE 2 PUFFS BY MOUTH EVERY 6 HOURS AS NEEDED      apixaban (Eliquis) 5 mg tablet Take 1 Tablet by mouth two (2) times a day. 60 Tablet 5    lidocaine 5 % topical cream Apply  to affected area two (2) times daily as needed for Pain.  15 g 0    famotidine (PEPCID) 20 mg tablet Take 20 mg by mouth two (2) times a day.      ezetimibe (ZETIA) 10 mg tablet Take 10 mg by mouth daily. spironolactone (ALDACTONE) 25 mg tablet Take 25 mg by mouth daily. polyethylene glycol (MIRALAX) 17 gram packet Take 1 Packet by mouth daily. 30 Packet 0    DULoxetine (CYMBALTA) 60 mg capsule Take 1 Cap by mouth daily. Indications: neuropathic pain 60 Cap 5    amLODIPine (NORVASC) 10 mg tablet Take 1 Tab by mouth daily. 30 Tab 0       Allergies   Allergen Reactions    Latex Rash     Other reaction(s): Unknown    Ampicillin Angioedema    Asa-Acetaminophen-Caff-Buffers Rash    Penicillins Angioedema     Other reaction(s): anaphylaxis    Crab Hives     Denies allergy to crabs    Shellfish Derived Rash    Adhesive Tape-Silicones Itching    Aspirin Other (comments)     Stomach upset - patient said he had peptic ulcers and cannot take  Other reaction(s): Unknown      Atorvastatin Other (comments)     Muscle cramps       Past Surgical History:   Procedure Laterality Date    COLONOSCOPY N/A 2019    COLONOSCOPY performed by Stephen Morales MD at [facility] ENDOSCOPY    HX HEENT Left     lens implant    HX ORTHOPAEDIC      finger amputation left hand    HX TONSILLECTOMY      NE CYSTOURETHROSCOPY  10/2022    NE UNLISTED PROCEDURE HANDS/FINGERS Right     carpal tunnel    NE UNLISTED PROCEDURE HUMERUS/ELBOW Right        Social History     Socioeconomic History    Marital status:      Spouse name: Not on file    Number of children: Not on file    Years of education: Not on file    Highest education level: Not on file   Occupational History    Not on file   Tobacco Use    Smoking status: Former     Types: Cigarettes     Quit date: 2015     Years since quittin.5    Smokeless tobacco: Never   Vaping Use    Vaping Use: Never used   Substance and Sexual Activity    Alcohol use: Not Currently     Alcohol/week: 0.0 standard drinks     Comment: former stopped     Drug use:  No Sexual activity: Yes   Other Topics Concern    Not on file   Social History Narrative    Not on file     Social Determinants of Health     Financial Resource Strain: Not on file   Food Insecurity: Not on file   Transportation Needs: Not on file   Physical Activity: Not on file   Stress: Not on file   Social Connections: Not on file   Intimate Partner Violence: Not on file   Housing Stability: Not on file       REVIEW OF SYSTEMS:    14 point review of systems on the intake form is negative except as noted in the HPI    PHYSICAL EXAMINATION:  Visit Vitals  /82 (BP 1 Location: Left upper arm, BP Patient Position: Sitting, BP Cuff Size: Large adult long)   Resp 18   Ht 5' 7\" (1.702 m)   Wt 194 lb (88 kg)   BMI 30.38 kg/m²     Body mass index is 30.38 kg/m². GENERAL: Alert and oriented x3, in no acute distress, well-developed, well-nourished. HEENT: Normocephalic, atraumatic.     Shoulder Examination                                      R                                  L  ROM              FF                    120                              Full  ER                   Full                              Full              IR                     Full                              Full  Rotator Cuff Pain              Supra               +                                  -              Infra                 +                                  -              Subscap          +                                  -  Crepitus                       +                                  -  Effusion                       -                                   -  Warmth                       -                                   -             Erythema                     -                                   -  Instability                     -                                   -  AC Joint TTP               -                                   -  Clavicle              Deformity         -                                   - TTP                 -                                   -  Proximal Humerus              Deformity         -                                   -              TTP                 -                                   -  Deltoid Strength          5                                  5  Biceps Strength          5                                  5  Biceps Deformity         -                                   -  Biceps Groove Pain    -                                   -  Impingement Sign       -                                   -    IMAGING:  Imaging read by myself and interpreted as follows:  Right rotator cuff arthropathy    ASSESSMENT & PLAN  Diagnosis: Right rotator cuff arthropathy    Janelle Sarmiento continues to have symptomatic right rotator cuff arthropathy. He set up for surgery next week. We discussed surgery at length. All of his questions were answered. Pain medication was prescribed. Risks and benefits were discussed and informed consent was obtained. Follow-up postoperatively. Prescription medication management discussed. Electronically signed by: Dunia Childress MD    Note: This note was completed using voice recognition software.   Any typographical/name errors or mistakes are unintentional.

## 2023-01-27 ENCOUNTER — ANESTHESIA EVENT (OUTPATIENT)
Dept: SURGERY | Age: 78
End: 2023-01-27
Payer: MEDICAID

## 2023-01-27 NOTE — NURSE NAVIGATOR
Call placed to patient, ID verified x 2. Patient  has decided with their surgeon to have a total shoulder replacement  to decrease  pain and improve mobility . Topics discussed included surgery preparation, what to expect the day of surgery, medications, physical and occupational therapy, and discharge planning. It was discussed that this is considered an elective surgery and that prior to the surgery  decisions such as arranging for help at home once they are discharged needs to be made. Patient agreed to get home ready for surgery and to have a ride arranged to go home. He identifies his daughter as his support system and will be spending his first night in the hospital.  Instructions were given for CHG bathing. Patient will complete the procedure the morning of surgery. Patient was reminded not to apply any deoderant,  or lotions to skin the morning of surgery. He  will remain NPO after midnight the night before surgery and will take only the medications as instructed to take by his surgeon the morning of surgery with a sip of water. Patient verbalized that he will take his blood pressure medications the morning of surgery with a sip of water. A total shoulder replacement education  book will be provided to patient post op prior to discharge. Education regarding the importance of early and frequent ambulation to avoid surgical was provided. Post op showering instructions and proper sling wear was reviewed. Recommended the use of ice to assist with pain and swelling post op for 20 minutes an hour, not to be placed directly on his skin. Patient verbalized understanding of all information provided. Opportunity was given to ask questions and phone number of the [facility]   was given for any questions or concerns that may arise later.

## 2023-01-30 ENCOUNTER — APPOINTMENT (OUTPATIENT)
Dept: GENERAL RADIOLOGY | Age: 78
End: 2023-01-30
Attending: ORTHOPAEDIC SURGERY
Payer: MEDICAID

## 2023-01-30 ENCOUNTER — HOSPITAL ENCOUNTER (OUTPATIENT)
Age: 78
Setting detail: OBSERVATION
Discharge: HOME | End: 2023-02-01
Attending: ORTHOPAEDIC SURGERY | Admitting: ORTHOPAEDIC SURGERY
Payer: MEDICAID

## 2023-01-30 ENCOUNTER — ANESTHESIA (OUTPATIENT)
Dept: SURGERY | Age: 78
End: 2023-01-30
Payer: MEDICAID

## 2023-01-30 DIAGNOSIS — Z96.611 STATUS POST REPLACEMENT OF RIGHT SHOULDER JOINT: Primary | ICD-10-CM

## 2023-01-30 DIAGNOSIS — G89.18 ACUTE POST-OPERATIVE PAIN: ICD-10-CM

## 2023-01-30 PROBLEM — Z96.619 S/P SHOULDER JOINT REPLACEMENT: Status: ACTIVE | Noted: 2023-01-30

## 2023-01-30 PROCEDURE — 23472 RECONSTRUCT SHOULDER JOINT: CPT | Performed by: ORTHOPAEDIC SURGERY

## 2023-01-30 PROCEDURE — G0378 HOSPITAL OBSERVATION PER HR: HCPCS

## 2023-01-30 PROCEDURE — 77030040922 HC BLNKT HYPOTHRM STRY -A: Performed by: ORTHOPAEDIC SURGERY

## 2023-01-30 PROCEDURE — 77030008683 HC TU ET CUF COVD -A: Performed by: STUDENT IN AN ORGANIZED HEALTH CARE EDUCATION/TRAINING PROGRAM

## 2023-01-30 PROCEDURE — 77030002933 HC SUT MCRYL J&J -A: Performed by: ORTHOPAEDIC SURGERY

## 2023-01-30 PROCEDURE — 74011000250 HC RX REV CODE- 250: Performed by: ORTHOPAEDIC SURGERY

## 2023-01-30 PROCEDURE — 74011000250 HC RX REV CODE- 250: Performed by: NURSE ANESTHETIST, CERTIFIED REGISTERED

## 2023-01-30 PROCEDURE — 01638 ANES OPN/ARTHR TOT SHO RPLCM: CPT | Performed by: STUDENT IN AN ORGANIZED HEALTH CARE EDUCATION/TRAINING PROGRAM

## 2023-01-30 PROCEDURE — 2709999900 HC NON-CHARGEABLE SUPPLY: Performed by: ORTHOPAEDIC SURGERY

## 2023-01-30 PROCEDURE — 77030003601 HC NDL NRV BLK BBMI -A: Performed by: ORTHOPAEDIC SURGERY

## 2023-01-30 PROCEDURE — 74011250636 HC RX REV CODE- 250/636: Performed by: NURSE ANESTHETIST, CERTIFIED REGISTERED

## 2023-01-30 PROCEDURE — 76210000017 HC OR PH I REC 1.5 TO 2 HR: Performed by: ORTHOPAEDIC SURGERY

## 2023-01-30 PROCEDURE — 77030018836 HC SOL IRR NACL ICUM -A: Performed by: ORTHOPAEDIC SURGERY

## 2023-01-30 PROCEDURE — 74011250636 HC RX REV CODE- 250/636: Performed by: STUDENT IN AN ORGANIZED HEALTH CARE EDUCATION/TRAINING PROGRAM

## 2023-01-30 PROCEDURE — 99100 ANES PT EXTEME AGE<1 YR&>70: CPT | Performed by: NURSE ANESTHETIST, CERTIFIED REGISTERED

## 2023-01-30 PROCEDURE — 77030010507 HC ADH SKN DERMBND J&J -B: Performed by: ORTHOPAEDIC SURGERY

## 2023-01-30 PROCEDURE — 97162 PT EVAL MOD COMPLEX 30 MIN: CPT

## 2023-01-30 PROCEDURE — 76060000035 HC ANESTHESIA 2 TO 2.5 HR: Performed by: ORTHOPAEDIC SURGERY

## 2023-01-30 PROCEDURE — 74011250637 HC RX REV CODE- 250/637: Performed by: NURSE ANESTHETIST, CERTIFIED REGISTERED

## 2023-01-30 PROCEDURE — 77030013708 HC HNDPC SUC IRR PULS STRY –B: Performed by: ORTHOPAEDIC SURGERY

## 2023-01-30 PROCEDURE — 64415 NJX AA&/STRD BRCH PLXS IMG: CPT | Performed by: STUDENT IN AN ORGANIZED HEALTH CARE EDUCATION/TRAINING PROGRAM

## 2023-01-30 PROCEDURE — 97116 GAIT TRAINING THERAPY: CPT

## 2023-01-30 PROCEDURE — 76942 ECHO GUIDE FOR BIOPSY: CPT | Performed by: STUDENT IN AN ORGANIZED HEALTH CARE EDUCATION/TRAINING PROGRAM

## 2023-01-30 PROCEDURE — 76010000131 HC OR TIME 2 TO 2.5 HR: Performed by: ORTHOPAEDIC SURGERY

## 2023-01-30 PROCEDURE — 74011000272 HC RX REV CODE- 272: Performed by: ORTHOPAEDIC SURGERY

## 2023-01-30 PROCEDURE — 74011250636 HC RX REV CODE- 250/636: Performed by: ORTHOPAEDIC SURGERY

## 2023-01-30 PROCEDURE — 74011000258 HC RX REV CODE- 258: Performed by: NURSE ANESTHETIST, CERTIFIED REGISTERED

## 2023-01-30 PROCEDURE — 99100 ANES PT EXTEME AGE<1 YR&>70: CPT | Performed by: STUDENT IN AN ORGANIZED HEALTH CARE EDUCATION/TRAINING PROGRAM

## 2023-01-30 PROCEDURE — 77030002912 HC SUT ETHBND J&J -A: Performed by: ORTHOPAEDIC SURGERY

## 2023-01-30 PROCEDURE — 01638 ANES OPN/ARTHR TOT SHO RPLCM: CPT | Performed by: NURSE ANESTHETIST, CERTIFIED REGISTERED

## 2023-01-30 PROCEDURE — 77030040361 HC SLV COMPR DVT MDII -B: Performed by: ORTHOPAEDIC SURGERY

## 2023-01-30 PROCEDURE — 77030031367: Performed by: ORTHOPAEDIC SURGERY

## 2023-01-30 PROCEDURE — 64450 NJX AA&/STRD OTHER PN/BRANCH: CPT | Performed by: STUDENT IN AN ORGANIZED HEALTH CARE EDUCATION/TRAINING PROGRAM

## 2023-01-30 PROCEDURE — 73020 X-RAY EXAM OF SHOULDER: CPT

## 2023-01-30 PROCEDURE — 77030042214: Performed by: ORTHOPAEDIC SURGERY

## 2023-01-30 PROCEDURE — 77030031139 HC SUT VCRL2 J&J -A: Performed by: ORTHOPAEDIC SURGERY

## 2023-01-30 PROCEDURE — 77030006835 HC BLD SAW SAG STRY -B: Performed by: ORTHOPAEDIC SURGERY

## 2023-01-30 PROCEDURE — 74011250637 HC RX REV CODE- 250/637: Performed by: ORTHOPAEDIC SURGERY

## 2023-01-30 PROCEDURE — C1776 JOINT DEVICE (IMPLANTABLE): HCPCS | Performed by: ORTHOPAEDIC SURGERY

## 2023-01-30 DEVICE — LINER HUM SM DIA36MM +3MM OFFSET SHLDR UHMWPE UNIVERS: Type: IMPLANTABLE DEVICE | Site: SHOULDER | Status: FUNCTIONAL

## 2023-01-30 DEVICE — SCREW BNE L36MM DIA5.5MM PERIPH LOK FOR MOD GLEN SYS: Type: IMPLANTABLE DEVICE | Site: SHOULDER | Status: FUNCTIONAL

## 2023-01-30 DEVICE — SCREW BNE L28MM DIA5.5MM PERIPH LOK FOR MOD GLEN SYS: Type: IMPLANTABLE DEVICE | Site: SHOULDER | Status: FUNCTIONAL

## 2023-01-30 DEVICE — IMPLANTABLE DEVICE: Type: IMPLANTABLE DEVICE | Site: SHOULDER | Status: FUNCTIONAL

## 2023-01-30 DEVICE — CUP HUM DIA36MM SHLDR NEUT SUT UNIVERS REVERS: Type: IMPLANTABLE DEVICE | Site: SHOULDER | Status: FUNCTIONAL

## 2023-01-30 DEVICE — SCREW BNE L24MM DIA4.5MM PERIPH NONLOCKING FOR MOD GLEN SYS: Type: IMPLANTABLE DEVICE | Site: SHOULDER | Status: FUNCTIONAL

## 2023-01-30 DEVICE — SPHERE GLEN DIA36MM +4 BASEPLT 24MM SHLDR LAT TAPR MOD UNIV: Type: IMPLANTABLE DEVICE | Site: SHOULDER | Status: FUNCTIONAL

## 2023-01-30 DEVICE — COMPONENT TOT SHLDR CAPPED S3ARTHREX] ARTHREX INC]: Type: IMPLANTABLE DEVICE | Site: SHOULDER | Status: FUNCTIONAL

## 2023-01-30 RX ORDER — ONDANSETRON 2 MG/ML
4 INJECTION INTRAMUSCULAR; INTRAVENOUS
Status: DISCONTINUED | OUTPATIENT
Start: 2023-01-30 | End: 2023-02-01 | Stop reason: HOSPADM

## 2023-01-30 RX ORDER — SUCCINYLCHOLINE CHLORIDE 20 MG/ML
INJECTION INTRAMUSCULAR; INTRAVENOUS AS NEEDED
Status: DISCONTINUED | OUTPATIENT
Start: 2023-01-30 | End: 2023-01-30 | Stop reason: HOSPADM

## 2023-01-30 RX ORDER — SODIUM CHLORIDE 0.9 % (FLUSH) 0.9 %
5-40 SYRINGE (ML) INJECTION EVERY 8 HOURS
Status: DISCONTINUED | OUTPATIENT
Start: 2023-01-30 | End: 2023-01-30 | Stop reason: HOSPADM

## 2023-01-30 RX ORDER — FENTANYL CITRATE 50 UG/ML
50 INJECTION, SOLUTION INTRAMUSCULAR; INTRAVENOUS
Status: DISCONTINUED | OUTPATIENT
Start: 2023-01-30 | End: 2023-01-30 | Stop reason: HOSPADM

## 2023-01-30 RX ORDER — AMLODIPINE BESYLATE 10 MG/1
10 TABLET ORAL DAILY
Status: DISCONTINUED | OUTPATIENT
Start: 2023-01-30 | End: 2023-02-01 | Stop reason: HOSPADM

## 2023-01-30 RX ORDER — NEOSTIGMINE METHYLSULFATE 1 MG/ML
INJECTION, SOLUTION INTRAVENOUS AS NEEDED
Status: DISCONTINUED | OUTPATIENT
Start: 2023-01-30 | End: 2023-01-30 | Stop reason: HOSPADM

## 2023-01-30 RX ORDER — OXYCODONE HYDROCHLORIDE 5 MG/1
10 TABLET ORAL
Status: DISCONTINUED | OUTPATIENT
Start: 2023-01-30 | End: 2023-02-01 | Stop reason: HOSPADM

## 2023-01-30 RX ORDER — DEXTROSE MONOHYDRATE 100 MG/ML
0-250 INJECTION, SOLUTION INTRAVENOUS AS NEEDED
Status: DISCONTINUED | OUTPATIENT
Start: 2023-01-30 | End: 2023-01-30 | Stop reason: HOSPADM

## 2023-01-30 RX ORDER — CLINDAMYCIN PHOSPHATE 900 MG/50ML
900 INJECTION, SOLUTION INTRAVENOUS ONCE
Status: COMPLETED | OUTPATIENT
Start: 2023-01-30 | End: 2023-01-30

## 2023-01-30 RX ORDER — LIDOCAINE HYDROCHLORIDE 20 MG/ML
INJECTION, SOLUTION EPIDURAL; INFILTRATION; INTRACAUDAL; PERINEURAL AS NEEDED
Status: DISCONTINUED | OUTPATIENT
Start: 2023-01-30 | End: 2023-01-30 | Stop reason: HOSPADM

## 2023-01-30 RX ORDER — FENTANYL CITRATE 50 UG/ML
INJECTION, SOLUTION INTRAMUSCULAR; INTRAVENOUS AS NEEDED
Status: DISCONTINUED | OUTPATIENT
Start: 2023-01-30 | End: 2023-01-30 | Stop reason: HOSPADM

## 2023-01-30 RX ORDER — EZETIMIBE 10 MG/1
10 TABLET ORAL DAILY
Status: DISCONTINUED | OUTPATIENT
Start: 2023-01-30 | End: 2023-02-01 | Stop reason: HOSPADM

## 2023-01-30 RX ORDER — DULOXETIN HYDROCHLORIDE 60 MG/1
60 CAPSULE, DELAYED RELEASE ORAL DAILY
Status: DISCONTINUED | OUTPATIENT
Start: 2023-01-30 | End: 2023-02-01 | Stop reason: HOSPADM

## 2023-01-30 RX ORDER — FAMOTIDINE 20 MG/1
20 TABLET, FILM COATED ORAL ONCE
Status: COMPLETED | OUTPATIENT
Start: 2023-01-30 | End: 2023-01-30

## 2023-01-30 RX ORDER — ROCURONIUM BROMIDE 10 MG/ML
INJECTION, SOLUTION INTRAVENOUS AS NEEDED
Status: DISCONTINUED | OUTPATIENT
Start: 2023-01-30 | End: 2023-01-30 | Stop reason: HOSPADM

## 2023-01-30 RX ORDER — OXYCODONE HYDROCHLORIDE 5 MG/1
5 TABLET ORAL
Status: DISCONTINUED | OUTPATIENT
Start: 2023-01-30 | End: 2023-02-01 | Stop reason: HOSPADM

## 2023-01-30 RX ORDER — SODIUM CHLORIDE 0.9 % (FLUSH) 0.9 %
5-40 SYRINGE (ML) INJECTION AS NEEDED
Status: DISCONTINUED | OUTPATIENT
Start: 2023-01-30 | End: 2023-02-01 | Stop reason: HOSPADM

## 2023-01-30 RX ORDER — ROPIVACAINE HYDROCHLORIDE 5 MG/ML
30 INJECTION, SOLUTION EPIDURAL; INFILTRATION; PERINEURAL
Status: DISCONTINUED | OUTPATIENT
Start: 2023-01-30 | End: 2023-01-30 | Stop reason: HOSPADM

## 2023-01-30 RX ORDER — SODIUM CHLORIDE 0.9 % (FLUSH) 0.9 %
5-40 SYRINGE (ML) INJECTION AS NEEDED
Status: DISCONTINUED | OUTPATIENT
Start: 2023-01-30 | End: 2023-01-30 | Stop reason: HOSPADM

## 2023-01-30 RX ORDER — MIDAZOLAM HYDROCHLORIDE 1 MG/ML
2 INJECTION, SOLUTION INTRAMUSCULAR; INTRAVENOUS ONCE
Status: COMPLETED | OUTPATIENT
Start: 2023-01-30 | End: 2023-01-30

## 2023-01-30 RX ORDER — GABAPENTIN 300 MG/1
300 CAPSULE ORAL ONCE
Status: COMPLETED | OUTPATIENT
Start: 2023-01-30 | End: 2023-01-30

## 2023-01-30 RX ORDER — SODIUM CHLORIDE, SODIUM LACTATE, POTASSIUM CHLORIDE, CALCIUM CHLORIDE 600; 310; 30; 20 MG/100ML; MG/100ML; MG/100ML; MG/100ML
25 INJECTION, SOLUTION INTRAVENOUS CONTINUOUS
Status: DISCONTINUED | OUTPATIENT
Start: 2023-01-30 | End: 2023-01-30 | Stop reason: HOSPADM

## 2023-01-30 RX ORDER — CLINDAMYCIN PHOSPHATE 900 MG/50ML
900 INJECTION, SOLUTION INTRAVENOUS EVERY 8 HOURS
Status: COMPLETED | OUTPATIENT
Start: 2023-01-30 | End: 2023-01-30

## 2023-01-30 RX ORDER — DIPHENHYDRAMINE HCL 25 MG
25 CAPSULE ORAL
Status: DISCONTINUED | OUTPATIENT
Start: 2023-01-30 | End: 2023-02-01 | Stop reason: HOSPADM

## 2023-01-30 RX ORDER — NALOXONE HYDROCHLORIDE 0.4 MG/ML
0.4 INJECTION, SOLUTION INTRAMUSCULAR; INTRAVENOUS; SUBCUTANEOUS AS NEEDED
Status: DISCONTINUED | OUTPATIENT
Start: 2023-01-30 | End: 2023-02-01 | Stop reason: HOSPADM

## 2023-01-30 RX ORDER — ROPIVACAINE HYDROCHLORIDE 5 MG/ML
INJECTION, SOLUTION EPIDURAL; INFILTRATION; PERINEURAL
Status: COMPLETED | OUTPATIENT
Start: 2023-01-30 | End: 2023-01-30

## 2023-01-30 RX ORDER — FENTANYL CITRATE 50 UG/ML
25 INJECTION, SOLUTION INTRAMUSCULAR; INTRAVENOUS AS NEEDED
Status: DISCONTINUED | OUTPATIENT
Start: 2023-01-30 | End: 2023-01-30 | Stop reason: HOSPADM

## 2023-01-30 RX ORDER — ONDANSETRON 2 MG/ML
INJECTION INTRAMUSCULAR; INTRAVENOUS AS NEEDED
Status: DISCONTINUED | OUTPATIENT
Start: 2023-01-30 | End: 2023-01-30 | Stop reason: HOSPADM

## 2023-01-30 RX ORDER — IBUPROFEN 200 MG
4 TABLET ORAL AS NEEDED
Status: DISCONTINUED | OUTPATIENT
Start: 2023-01-30 | End: 2023-01-30 | Stop reason: HOSPADM

## 2023-01-30 RX ORDER — FAMOTIDINE 20 MG/1
20 TABLET, FILM COATED ORAL 2 TIMES DAILY
Status: DISCONTINUED | OUTPATIENT
Start: 2023-01-30 | End: 2023-02-01 | Stop reason: HOSPADM

## 2023-01-30 RX ORDER — ALBUTEROL SULFATE 0.83 MG/ML
2.5 SOLUTION RESPIRATORY (INHALATION)
Status: DISCONTINUED | OUTPATIENT
Start: 2023-01-30 | End: 2023-02-01 | Stop reason: HOSPADM

## 2023-01-30 RX ORDER — GLYCOPYRROLATE 0.2 MG/ML
INJECTION INTRAMUSCULAR; INTRAVENOUS AS NEEDED
Status: DISCONTINUED | OUTPATIENT
Start: 2023-01-30 | End: 2023-01-30 | Stop reason: HOSPADM

## 2023-01-30 RX ORDER — SODIUM CHLORIDE 0.9 % (FLUSH) 0.9 %
5-40 SYRINGE (ML) INJECTION EVERY 8 HOURS
Status: DISCONTINUED | OUTPATIENT
Start: 2023-01-30 | End: 2023-02-01 | Stop reason: HOSPADM

## 2023-01-30 RX ORDER — SPIRONOLACTONE 25 MG/1
25 TABLET ORAL DAILY
Status: DISCONTINUED | OUTPATIENT
Start: 2023-01-30 | End: 2023-02-01 | Stop reason: HOSPADM

## 2023-01-30 RX ORDER — MORPHINE SULFATE 2 MG/ML
2 INJECTION, SOLUTION INTRAMUSCULAR; INTRAVENOUS
Status: DISCONTINUED | OUTPATIENT
Start: 2023-01-30 | End: 2023-02-01 | Stop reason: HOSPADM

## 2023-01-30 RX ORDER — PROPOFOL 10 MG/ML
INJECTION, EMULSION INTRAVENOUS AS NEEDED
Status: DISCONTINUED | OUTPATIENT
Start: 2023-01-30 | End: 2023-01-30 | Stop reason: HOSPADM

## 2023-01-30 RX ORDER — FENTANYL CITRATE 50 UG/ML
100 INJECTION, SOLUTION INTRAMUSCULAR; INTRAVENOUS ONCE
Status: COMPLETED | OUTPATIENT
Start: 2023-01-30 | End: 2023-01-30

## 2023-01-30 RX ORDER — LIDOCAINE HYDROCHLORIDE 10 MG/ML
0.1 INJECTION, SOLUTION EPIDURAL; INFILTRATION; INTRACAUDAL; PERINEURAL AS NEEDED
Status: DISCONTINUED | OUTPATIENT
Start: 2023-01-30 | End: 2023-01-30 | Stop reason: HOSPADM

## 2023-01-30 RX ADMIN — MIDAZOLAM 2 MG: 1 INJECTION INTRAMUSCULAR; INTRAVENOUS at 07:12

## 2023-01-30 RX ADMIN — CLINDAMYCIN PHOSPHATE 900 MG: 900 INJECTION, SOLUTION INTRAVENOUS at 22:56

## 2023-01-30 RX ADMIN — SUCCINYLCHOLINE CHLORIDE 100 MG: 20 INJECTION, SOLUTION INTRAMUSCULAR; INTRAVENOUS at 07:33

## 2023-01-30 RX ADMIN — FENTANYL CITRATE 25 MCG: 50 INJECTION, SOLUTION INTRAMUSCULAR; INTRAVENOUS at 10:09

## 2023-01-30 RX ADMIN — CLINDAMYCIN PHOSPHATE 900 MG: 900 INJECTION, SOLUTION INTRAVENOUS at 15:01

## 2023-01-30 RX ADMIN — ONDANSETRON 4 MG: 2 INJECTION INTRAMUSCULAR; INTRAVENOUS at 09:13

## 2023-01-30 RX ADMIN — GLYCOPYRROLATE 0.4 MG: 0.2 INJECTION INTRAMUSCULAR; INTRAVENOUS at 09:17

## 2023-01-30 RX ADMIN — FENTANYL CITRATE 50 MCG: 50 INJECTION, SOLUTION INTRAMUSCULAR; INTRAVENOUS at 07:33

## 2023-01-30 RX ADMIN — FAMOTIDINE 20 MG: 20 TABLET, FILM COATED ORAL at 17:31

## 2023-01-30 RX ADMIN — FAMOTIDINE 20 MG: 20 TABLET, FILM COATED ORAL at 06:56

## 2023-01-30 RX ADMIN — SODIUM CHLORIDE, PRESERVATIVE FREE 10 ML: 5 INJECTION INTRAVENOUS at 13:21

## 2023-01-30 RX ADMIN — OXYCODONE HYDROCHLORIDE 5 MG: 5 TABLET ORAL at 20:15

## 2023-01-30 RX ADMIN — PROPOFOL 100 MG: 10 INJECTION, EMULSION INTRAVENOUS at 07:33

## 2023-01-30 RX ADMIN — ONDANSETRON 4 MG: 2 INJECTION INTRAMUSCULAR; INTRAVENOUS at 15:01

## 2023-01-30 RX ADMIN — SPIRONOLACTONE 25 MG: 25 TABLET ORAL at 13:21

## 2023-01-30 RX ADMIN — FENTANYL CITRATE 50 MCG: 50 INJECTION, SOLUTION INTRAMUSCULAR; INTRAVENOUS at 07:12

## 2023-01-30 RX ADMIN — ROPIVACAINE HYDROCHLORIDE 15 ML: 5 INJECTION, SOLUTION EPIDURAL; INFILTRATION; PERINEURAL at 07:14

## 2023-01-30 RX ADMIN — ROCURONIUM BROMIDE 40 MG: 50 INJECTION INTRAVENOUS at 07:39

## 2023-01-30 RX ADMIN — EZETIMIBE 10 MG: 10 TABLET ORAL at 13:21

## 2023-01-30 RX ADMIN — SODIUM CHLORIDE, PRESERVATIVE FREE 10 ML: 5 INJECTION INTRAVENOUS at 22:56

## 2023-01-30 RX ADMIN — DULOXETINE HYDROCHLORIDE 60 MG: 60 CAPSULE, DELAYED RELEASE ORAL at 11:00

## 2023-01-30 RX ADMIN — SODIUM CHLORIDE, POTASSIUM CHLORIDE, SODIUM LACTATE AND CALCIUM CHLORIDE 25 ML/HR: 600; 310; 30; 20 INJECTION, SOLUTION INTRAVENOUS at 06:32

## 2023-01-30 RX ADMIN — NEOSTIGMINE METHYLSULFATE 3 MG: 1 INJECTION, SOLUTION INTRAVENOUS at 09:17

## 2023-01-30 RX ADMIN — LIDOCAINE HYDROCHLORIDE 40 MG: 20 INJECTION, SOLUTION EPIDURAL; INFILTRATION; INTRACAUDAL; PERINEURAL at 07:33

## 2023-01-30 RX ADMIN — CLINDAMYCIN PHOSPHATE 900 MG: 900 INJECTION, SOLUTION INTRAVENOUS at 07:39

## 2023-01-30 RX ADMIN — PHENYLEPHRINE HYDROCHLORIDE 100 MCG: 10 INJECTION INTRAVENOUS at 07:50

## 2023-01-30 RX ADMIN — GABAPENTIN 300 MG: 300 CAPSULE ORAL at 06:56

## 2023-01-30 RX ADMIN — LIDOCAINE HYDROCHLORIDE 2 ML: 10 INJECTION, SOLUTION EPIDURAL; INFILTRATION; INTRACAUDAL; PERINEURAL at 07:19

## 2023-01-30 RX ADMIN — OXYCODONE HYDROCHLORIDE 5 MG: 5 TABLET ORAL at 15:01

## 2023-01-30 RX ADMIN — OXYCODONE HYDROCHLORIDE 10 MG: 5 TABLET ORAL at 23:37

## 2023-01-30 NOTE — OP NOTES
[address] [place] [address]   OPERATIVE REPORT     Patient Name:  Liliya Swanson  Medical Record Number:   [id number]  YOB: 1945  ACCOUNT #:  [de-identified]  DATE OF SERVICE:  1/30/2023     PREOPERATIVE DIAGNOSES:  1. Right shoulder rotator cuff arthropathy with glenoid deformity     POSTOPERATIVE DIAGNOSES:  1. Right shoulder rotator cuff arthropathy with glenoid deformity     PROCEDURES PERFORMED:  1. Right reverse total shoulder arthroplasty with augmented baseplate, biceps tenodesis     SURGEON:  Mortimer Carney. Burton Cazares MD     ASSISTANT: Missy Galan SA     ANESTHESIA: General with nerve block     COMPLICATIONS: None     SPECIMENS REMOVED:  None. IMPLANTS: Arthrex reverse shoulder arthroplasty    Glenoid components size 24 mm 20 degree fully augmented baseplate with 35 mm modular post, 2 locking and 2 nonlocking screws, and size 36+4 lateralized glenosphere with central locking screw to the baseplate    Humeral components [place] size 6 humeral stem with 36 neutral suture cup and a 36+3 polyethylene     ESTIMATED BLOOD LOSS: 100 cc     IV FLUIDS: 750 cc LR     INDICATIONS FOR PROCEDURE: The patient is a 52-year-old male returns to the office with right rotator cuff arthropathy. After failing conservative management he elected undergo the procedure as described. PROCEDURE: The patient was seen in the preoperative holding area. The right shoulder was marked as the operative extremity after confirmation with the patient. After discussing risk and benefits of the procedure informed consent was confirmed. The patient underwent a nerve block in the preoperative holding area by anesthesia. The patient was then taken to the operating room. He was moved from the stretcher to the OR table. General anesthesia was induced and he was intubated. He was brought to the beachchair position. The right shoulder was prepped and draped in normal sterile fashion. A timeout was performed.   Antibiotics were given prior to skin incision. Skin was incised over the deltopectoral interval.  The skin bleeders were coagulated. The cephalic vein was identified and retracted medially. Deltopectoral was opened. The subdeltoid and subacromial space were opened. The upper 1 cm the pectoralis major was released. The lateral aspect of the conjoined tendon was incised. Retractors were placed deep to the conjoined tendon and the deltoid. Was palpated and protected throughout the case. Long head of the biceps tendon was tenodesed to the upper border the pectoralis major and proximally was released. A full-thickness tear of the supraspinatus infraspinatus and subscapularis was noted. The capsule was released off of the anterior humerus. Anterior humeral circumflex vessels were coagulated. Humeral head was dislocated. Opening and secondary reamer were used to access the humeral canal.  A 135 degree neck shaft angle humeral head cut was made in 20 degrees of retroversion relative to the forearm. Humeral cap was placed. Osteophytes were removed. Retractors were placed around the glenoid. Circumferential excision of the labrum as well as release of the anterior inferior capsule and excision of the anterior fat capsule was performed. Capsulotomy was performed circumferentially and the tendon stump of the biceps was also removed. This allowed for adequate exposure of the glenoid. Per template a targeting guide was used to place the center pin. Over this was reamed for a 24 mm 20 degree fully augmented baseplate. Excess bone was removed. The central hole was drilled with a 35 mm post.  The glenoid was copiously irrigated. The baseplate was then impacted into place. It was secured with 2 locking and 2 nonlocking screws. The glenosphere was impacted into place and secured with the central screw. Attention was then turned to the humerus. Humerus was broached up to a size 6 short stem.   The central metaphyseal reamer was used. This was trialed and found to be stable with a 3 mm polyethylene and found to have full range of motion. .  Shoulder was dislocated and the humeral components were removed. The humerus was copiously irrigated. Final component was then impacted into place. The 3 mm polyethylene was impacted into place. The shoulder joint was again reduced and taken through range of motion found to be stable. The surgical site was copiously irrigated with normal saline. Axillary was palpated and noted to be intact. Deltopectoral interval was reapproximated. Skin was closed in standard fashion. Sterile dressing was placed over the right shoulder. The right arm was placed in a sling. The patient was then awakened from anesthesia, extubated, and taken to PACU in stable condition with a plan for admission overnight.      Electronically Signed Up   Aleksandra Schwarz MD  01/30/23  9:29 AM

## 2023-01-30 NOTE — PROGRESS NOTES
Problem: Mobility Impaired (Adult and Pediatric)  Goal: *Acute Goals and Plan of Care (Insert Text)  Description: Physical Therapy Goals  Initiated 1/30/2023 and to be accomplished within 7 day(s)  1. Patient will move from supine to sit and sit to supine  in bed with modified independence. 2.  Patient will transfer from bed to chair and chair to bed with modified independence using the least restrictive device. 3.  Patient will perform sit to stand with modified independence. 4.  Patient will ambulate with supervision/set-up for 300 feet with the least restrictive device. 5.  Patient will ascend/descend 4 stairs with single handrail(s) with supervision/set-up. PLOF: pt lives alone in an apt with 4 TWILA. He was amb without an AD, however, daughters think he should be using a RW (pt does not have one). Reports falls at home. 2 daughters will be assisting him at home upon d/c. Outcome: Progressing Towards Goal     PHYSICAL THERAPY EVALUATION    Patient: Andreas Rodriguez (37 y.o. male)  Date: 1/30/2023  Primary Diagnosis: S/P shoulder joint replacement [Z96.619]  Procedure(s) (LRB):  RIGHT REVERSE TOTAL SHOULDER ARTHROPLASTY BICEPS TENODESIS (Right) Day of Surgery   Precautions:  Fall, NWB (R TSA)      ASSESSMENT :  Patient is 70 yo male admitted to hospital for R TSA and presents today POD 0 and agreeable to therapy and was sitting up in chair upon arrival. 2 daughters at bedside. Patient was educated on role of therapy and shoulder precautions including no active use of shoulder and sling wear. Patient educated on components of sling and on donning/doffing and demonstrated fair understanding. Patient educated on importance of continued elbow flex/ext, wrist flex/ext and ulnar and radial deviation, as well as active finger and hand movement to prevent edema and weakness. Sling adjusted for appropriate fit with education and objective LE assessment performed.  Patient then educated on sit <> stand transfer and gait training and stood with Ra. Slight unsteadiness upon standing. Patient ambulated 10 ft fwd/bwd with increased trunk sway and minaA for safety. At conclusion of session transferred back sitting up in the chair to eat his lunch. Patient was left resting with call bell by their side and denied need for further assist at this time. Patient encouraged to continue mobilization with staff assist and educated not to get up without assist; oriented to call bell use. RN notified. Pt is NOT cleared for d/c at this time. He may benefit from [facility] or quad cane trial next PT session as he has fallen at home. Patient will benefit from skilled intervention to address the above impairments. Patient's rehabilitation potential is considered to be Fair  Factors which may influence rehabilitation potential include:   []         None noted  []         Mental ability/status  [x]         Medical condition  [x]         Home/family situation and support systems  [x]         Safety awareness  [x]         Pain tolerance/management  []         Other:      PLAN :  Recommendations and Planned Interventions:   [x]           Bed Mobility Training             [x]    Neuromuscular Re-Education  [x]           Transfer Training                   []    Orthotic/Prosthetic Training  [x]           Gait Training                          []    Modalities  [x]           Therapeutic Exercises           []    Edema Management/Control  [x]           Therapeutic Activities            [x]    Family Training/Education  [x]           Patient Education  []           Other (comment):    Frequency/Duration: Patient will be followed by physical therapy 1-2 times per day/4-7 days per week to address goals. Further Equipment Recommendations for Discharge: quad cane or SPC    [facility]: Current research shows that an AM-PAC score of 17 (13 without stairs) or less is not associated with a discharge to the patient's home setting.  Based on an AM-PAC score of 17/24 (**/20 if omitting stairs) and their current functional mobility deficits, it is recommended that the patient have 3-5 sessions per week of Physical Therapy at d/c to increase the patient's independence. This [facility] score should be considered in conjunction with interdisciplinary team recommendations to determine the most appropriate discharge setting. Patient's social support, diagnosis, medical stability, and prior level of function should also be taken into consideration. SUBJECTIVE:   Patient stated I feel shakey. I need food.     OBJECTIVE DATA SUMMARY:     Past Medical History:   Diagnosis Date    Atypical chest pain     Bladder outlet obstruction     DVT (deep venous thrombosis) ([facility] 06/2021    Erectile disorder due to medical 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     Frequency of urination     H/O spinal cord injury 1974    lumbar spine injury secondary to fall, no residual    HTN (hypertension)     Hypercholesterolemia     Illiterate     Injury of lumbar spine ([facility] [address] 1974    Leg pain, right     MGUS (monoclonal gammopathy of unknown significance)     Nocturia     Overactive bladder     Peripheral vascular disease (HCC)     Prostate cancer ([facility] [address]     Pulmonary embolism ([facility] [address] 06/2021    Shortness of breath      Past Surgical History:   Procedure Laterality Date    COLONOSCOPY N/A 12/04/2019    COLONOSCOPY performed by Colt Carmona MD at [facility] ENDOSCOPY    HX HEENT Left     lens implant    HX ORTHOPAEDIC      finger amputation left hand    HX TONSILLECTOMY      CA CYSTOURETHROSCOPY  10/2022    CA UNLISTED PROCEDURE HANDS/FINGERS Right     carpal tunnel    CA UNLISTED PROCEDURE HUMERUS/ELBOW Right      Barriers to Learning/Limitations: None  Compensate with: N/A  Home Situation:  Home Situation  Home Environment: Apartment  # Steps to Enter: 4  One/Two Story Residence: One story (elevator)  Living Alone: Yes  Support Systems: Child(sam)  Patient Expects to be Discharged to[de-identified] Home with family assistance  Current DME Used/Available at Home: None  Critical Behavior:  Neurologic State: Alert  Orientation Level: Appropriate for age  Cognition: Follows commands  Safety/Judgement: Fall prevention;Home safety  Psychosocial  Patient Behaviors: Calm; Cooperative       Strength:    Strength: Generally decreased, functional    Tone & Sensation:   Tone: Normal          Sensation: Impaired (dec light touch LLE)    Range Of Motion:  AROM: Generally decreased, functional        Transfers:  Sit to Stand: Minimum assistance  Stand to Sit: Minimum assistance       Balance:   Sitting: Intact; With support  Standing: Impaired; With support  Standing - Static: Fair  Standing - Dynamic : Fair;Occasional        Ambulation/Gait Training:  Distance (ft): 10 Feet (ft)  Assistive Device: Other (comment) (HHA)  Ambulation - Level of Assistance: Minimal assistance    Base of Support: Center of gravity altered    Therapeutic Exercises:   Edu pt on frequent hand squeeze, elbow AROM  Pain:  Pain level pre-treatment: 7/10   Pain level post-treatment: 7/10   Pain Intervention(s) : Medication (see MAR); Rest, Ice, Repositioning  Response to intervention: Nurse notified, See doc flow    Activity Tolerance:   Pt tolerated mobility fair  Please refer to the flowsheet for vital signs taken during this treatment. After treatment:   [x]         Patient left in no apparent distress sitting up in chair  []         Patient left in no apparent distress in bed  [x]         Call bell left within reach  [x]         Nursing notified  []         Caregiver present  []         Bed alarm activated  []         SCDs applied    COMMUNICATION/EDUCATION:   [x]         Role of Physical Therapy in the acute care setting. [x]         Fall prevention education was provided and the patient/caregiver indicated understanding. [x]         Patient/family have participated as able in goal setting and plan of care.   []         Patient/family agree to work toward stated goals and plan of care. []         Patient understands intent and goals of therapy, but is neutral about his/her participation. []         Patient is unable to participate in goal setting/plan of care: ongoing with therapy staff.  []         Other: Thank you for this referral.  Sis Cristobal   Time Calculation: 17 mins      Eval Complexity: History: MEDIUM  Complexity : 1-2 comorbidities / personal factors will impact the outcome/ POC Exam:MEDIUM Complexity : 3 Standardized tests and measures addressing body structure, function, activity limitation and / or participation in recreation  Presentation: LOW Complexity : Stable, uncomplicated  Clinical Decision Making:Medium Complexity    Overall Complexity:MEDIUM    Brandi Mantilla AM-PAC® Basic Mobility Inpatient Short Form (6-Clicks) Version 2    How much HELP from another person does the patient currently need    (If the patient hasn't done an activity recently, how much help from another person do you think he/she would need if he/she tried?)   Total (Total A or Dep)   A Lot  (Mod to Max A)   A Little (Sup or Min A)   None (Mod I to I)   Turning from your back to your side while in a flat bed without using bedrails? [] 1 [] 2 [x] 3 [] 4   2. Moving from lying on your back to sitting on the side of a flat bed without using bedrails? [] 1 [] 2 [x] 3 [] 4   3. Moving to and from a bed to a chair (including a wheelchair)? [] 1 [] 2 [x] 3 [] 4   4. Standing up from a chair using your arms (e.g., wheelchair, or bedside chair)? [] 1 [] 2 [x] 3 [] 4   5. Walking in hospital room? [] 1 [] 2 [x] 3 [] 4   6. Climbing 3-5 steps with a railing?+   [] 1 [x] 2 [] 3 [] 4   +If stair climbing cannot be assessed, skip item #6. Sum responses from items 1-5. Current research shows that an AM-PAC score of 17 (13 without stairs) or less is not associated with a discharge to the patient's home setting.  Based on an AM-PAC score of 17/24 (**/20 if omitting stairs) and their current functional mobility deficits, it is recommended that the patient have 3-5 sessions per week of Physical Therapy at d/c to increase the patient's independence.

## 2023-01-30 NOTE — ANESTHESIA PREPROCEDURE EVALUATION
Relevant Problems   No relevant active problems       Anesthetic History   No history of anesthetic complications            Review of Systems / Medical History  Patient summary reviewed, nursing notes reviewed and pertinent labs reviewed    Pulmonary  Within defined limits                 Neuro/Psych   Within defined limits           Cardiovascular    Hypertension: well controlled          CAD         GI/Hepatic/Renal  Within defined limits              Endo/Other  Within defined limits           Other Findings              Physical Exam    Airway  Mallampati: II  TM Distance: 4 - 6 cm  Neck ROM: normal range of motion   Mouth opening: Normal     Cardiovascular    Rhythm: regular  Rate: normal         Dental    Dentition: Edentulous     Pulmonary      Decreased breath sounds: bilateral           Abdominal  GI exam deferred       Other Findings            Anesthetic Plan    ASA: 3  Anesthesia type: general and regional - interscalene block          Induction: Intravenous  Anesthetic plan and risks discussed with: Patient

## 2023-01-30 NOTE — INTERVAL H&P NOTE
Update History & Physical    The Patient's History and Physical of January 24, 2023 was reviewed with the patient and I examined the patient. There was no change. The surgical site was confirmed by the patient and me. Plan:  The risk, benefits, expected outcome, and alternative to the recommended procedure have been discussed with the patient. Patient understands and wants to proceed with the procedure.     Electronically signed by Raeann Peter MD on 1/30/2023 at 7:05 AM

## 2023-01-30 NOTE — ROUTINE PROCESS
Pt assisted to recliner, pt void 300 ml yellow odorless urine. Pt complained of being nervous and shaky. Pt visibly shaky, o2 86, reapplied oxygen 2 Liters, placed covers over patient head, shoulders and body. Pt is resting well with family at bedside, call bell within reach. Pt educated to notify nurse for assistance when he needs to get up. Pt verbalized understanding. Pt teaching completed by nurse of Incentive Spirometry. Pt tolerated well. Per patient he took Amlodopine Besylate 10 mg this morning.

## 2023-01-30 NOTE — ANESTHESIA PROCEDURE NOTES
Peripheral Block    Start time: 1/30/2023 7:12 AM  End time: 1/30/2023 7:16 AM  Performed by: Dana Lindsey MD  Authorized by: Dana Lindsey MD       Pre-procedure: Indications: at surgeon's request and post-op pain management    Preanesthetic Checklist: patient identified, risks and benefits discussed, site marked, timeout performed, anesthesia consent given, patient being monitored and fire risk safety assessment completed and verbalized    Timeout Time: 07:12 EST      Block Type:   Block Type:   Interscalene  Laterality:  Right  Monitoring:  Standard ASA monitoring, continuous pulse ox, frequent vital sign checks, heart rate, oxygen and responsive to questions  Injection Technique:  Single shot  Procedures: ultrasound guided    Patient Position: seated  Prep: chlorhexidine    Location:  Interscalene  Needle Type:  Pencan  Needle Gauge:  20 G  Needle Localization:  Ultrasound guidance  Medication Injected:  Ropivacaine (PF) (NAROPIN)(0.5%) 5 mg/mL injection - Peripheral Nerve Block   15 mL - 1/30/2023 7:14:00 AM  Med Admin Time: 1/30/2023 7:14 AM    Assessment:  Number of attempts:  1  Injection Assessment:  Incremental injection every 5 mL, negative aspiration for CSF, no paresthesia, ultrasound image on chart, negative aspiration for blood, local visualized surrounding nerve on ultrasound and no intravascular symptoms  Patient tolerance:  Patient tolerated the procedure well with no immediate complications

## 2023-01-30 NOTE — ACP (ADVANCE CARE PLANNING)
Advance Care Planning   Advance Care Planning Inpatient Note  [address] [place] [address]   Spiritual Care Department    Today's Date: 1/30/2023  Unit: [facility] SURGICAL    Received request from patient. Upon review of chart and communication with care team, patient's decision making abilities are not in question. Patient and Child/children was/were present in the room during visit.     Goals of ACP Conversation:  Discuss Advance Care planning documents  Facilitate a discussion related to patient's goals of care as they align with the patient's values and beliefs    Health Care Decision Makers:      Primary Decision Maker: Doreen Quinones () - Child - [phone]    Secondary Decision Maker: Kenneyalex Jessica - Daughter - [phone]    Summary:  Completed [facility]    Advance Care Planning Documents (Patient Wishes) on file:  Healthcare Power of /Advance Directive appointment of Health care agent  Living Will/ Advance Directive     Interventions:  Assisted in the completion of documents according to patient's wishes at this time    Care Preferences Communicated:  No    Outcomes/Plan:  New Advance Directive completed  Returned original document(s) to patient, as well as copies for distribution to appointed agents  Copy of Advance Directive given to staff to scan into medical record    Arvilla Snellen, [facility] on 1/30/2023 at 1:58 PM

## 2023-01-30 NOTE — NURSE NAVIGATOR
Rounded on patient s/p right reverse total shoulder replacement with Dr. Tate Olsen 01/30/2023. Patient observed to be lying sideways in bed, sling not positioned properly. Per patient he is rating his pain at 7/10 at this time and he is very nauseas at this time. Repositioned patient in position of comfort and placed sling on patient correctly. Dressing observed to be clean, dry and intact . Ice pack applied for comfort. Pillow placed under arm for support. Patient denies chest pain, shortness of breath. He has full feeling in arm , hand and fingers on right arm. Nurse notified that patient is requesting pain medication Shoulder replacement book provided to patient along with education regarding the use of incentive spirometry. Patient was able to return demonstrate use. Encouraged patient to sit up in chair for meals and to ambulate to prevent complications such as DVT. Will continue to monitor.

## 2023-01-30 NOTE — PROGRESS NOTES
conducted an initial consultation and Spiritual Assessment for Romeo Bumpers, who is a 69 y.o.,male. Patients Primary Language is: [place]. According to the patients EMR Religion Affiliation is: [place]. The reason the Patient came to the hospital is:   Patient Active Problem List    Diagnosis Date Noted    S/P shoulder joint replacement 01/30/2023    Mixed simple and mucopurulent chronic bronchitis ([facility] [address] 01/13/2023    Preoperative clearance 01/13/2023    Acute deep vein thrombosis (DVT) of proximal vein of both lower extremities ([address] 07/22/2021    Hemoptysis 06/11/2021    Severe protein-calorie malnutrition ([address] 06/07/2021    Multiple subsegmental pulmonary emboli without acute cor pulmonale (HCC) 06/05/2021    Cubital tunnel syndrome, left 02/02/2021    Left carpal tunnel syndrome 02/02/2021    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    Chest pain, moderate coronary artery risk 03/27/2019    CAD (coronary artery disease) 03/26/2019    History of rheumatic fever 03/22/2019    Plasma cell dyscrasia 10/05/2018    Peripheral vascular disease (HCC)     Overactive bladder     Nocturia     Leg pain, right     Hypercholesterolemia     HTN (hypertension)     Erectile disorder due to medical 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     Bladder outlet obstruction     Frequency of urination     ED (erectile dysfunction) of organic origin 06/21/2017    Vertigo 05/17/2016    Radiculopathy of lumbar region 05/17/2016    Unsteady gait 04/11/2016    Gait instability 04/11/2016    H/O spinal cord injury 01/01/1974    Injury of lumbar spine ([facility] [address] 01/01/1974        The  provided the following Interventions:  Initiated a relationship of care and support. Provided information about Spiritual Care Services. Chart reviewed. The following outcomes where achieved:  Assisted patient with the execution of Advance Medical Directive. Placed the copy into patient's \"like paper chart. \"  See Flow Sheet for other pastoral interventions. Assessment:  Patient does not have any Yarsanism/cultural needs that will affect patients preferences in health care. There are no spiritual or Yarsanism issues which require intervention at this time. Plan:  Chaplains will continue to follow and will provide pastoral care on an as needed/requested basis.  recommends bedside caregivers page  on duty if patient shows signs of acute spiritual or emotional distress.     [address]  [phone])

## 2023-01-30 NOTE — ROUTINE PROCESS
TRANSFER - IN REPORT:    Verbal report received from [address] (name) on Neelam Albert  being received from [facility]) for routine post - op      Report consisted of patients Situation, Background, Assessment and   Recommendations(SBAR). Information from the following report(s) SBAR, Kardex, OR Summary, Intake/Output, and Recent Results was reviewed with the receiving nurse. Opportunity for questions and clarification was provided. Assessment completed upon patients arrival to unit and care assumed.

## 2023-01-30 NOTE — PROGRESS NOTES
Problem: Falls - Risk of  Goal: *Absence of Falls  Description: Document Usha Hernandez Fall Risk and appropriate interventions in the flowsheet.   Outcome: Progressing Towards Goal  Note: Fall Risk Interventions:                                Problem: Patient Education: Go to Patient Education Activity  Goal: Patient/Family Education  Outcome: Progressing Towards Goal     Problem: Pain  Goal: *Control of Pain  Outcome: Progressing Towards Goal     Problem: Patient Education: Go to Patient Education Activity  Goal: Patient/Family Education  Outcome: Progressing Towards Goal     Problem: Airway Clearance - Ineffective  Goal: *Patent airway  Outcome: Progressing Towards Goal  Goal: *Absence of airway secretions  Outcome: Progressing Towards Goal  Goal: *Able to cough effectively  Outcome: Progressing Towards Goal     Problem: Patient Education: Go to Patient Education Activity  Goal: Patient/Family Education  Outcome: Progressing Towards Goal

## 2023-01-30 NOTE — ANESTHESIA POSTPROCEDURE EVALUATION
Procedure(s):  RIGHT REVERSE TOTAL SHOULDER ARTHROPLASTY BICEPS TENODESIS. general, regional    Anesthesia Post Evaluation      Multimodal analgesia: multimodal analgesia used between 6 hours prior to anesthesia start to PACU discharge  Patient location during evaluation: PACU  Patient participation: complete - patient participated  Level of consciousness: sleepy but conscious  Pain management: adequate  Airway patency: patent  Anesthetic complications: no  Cardiovascular status: acceptable  Respiratory status: acceptable  Hydration status: acceptable  Post anesthesia nausea and vomiting:  controlled  Final Post Anesthesia Temperature Assessment:  Normothermia (36.0-37.5 degrees C)      INITIAL Post-op Vital signs:   Vitals Value Taken Time   /50 01/30/23 0932   Temp 36.3 °C (97.3 °F) 01/30/23 0932   Pulse 74 01/30/23 0933   Resp 17 01/30/23 0933   SpO2 100 % 01/30/23 0933   Vitals shown include unvalidated device data.

## 2023-01-30 NOTE — BRIEF OP NOTE
Brief Postoperative Note    Patient: Rohan Colvin  YOB: 1945  MRN: [id number]    Date of Procedure: 1/30/2023     Pre-Op Diagnosis: M75.101/M12.811 RIGHT ROTATOR CUFF ARTHROPATHY    Post-Op Diagnosis: Same as preoperative diagnosis. Procedure(s):  RIGHT REVERSE TOTAL SHOULDER ARTHROPLASTY BICEPS TENODESIS    Surgeon(s):  Travis Addison MD    Surgical Assistant: Surg Asst-1: Patricio Lazcano    Anesthesia: General     Estimated Blood Loss (mL): less than 733     Complications: None    Specimens: * No specimens in log *     Implants:   Implant Name Type Inv. Item Serial No.  Lot No. LRB No. Used Action   BASEPLATE GLENOID FULL AUGMENT 20 DEGREE 24 MM OBLIQUE - [id number]  BASEPLATE GLENOID FULL AUGMENT 20 DEGREE 24 MM OBLIQUE [id number] [employer] [id number] Right 1 Implanted   MODULAR POST 35MM - [id number]  MODULAR POST 35MM [id number] [employer] [id number] Right 1 Implanted   SPHERE RUTHANN [id number] +4 BASEPLT 24MM SHLDR LAT TAPR MOD UNIV - [id number]  SPHERE RUTHANN [id number] +4 BASEPLT 24MM SHLDR LAT TAPR MOD UNIV [id number] [employer] [id number] Right 1 Implanted   SCREW BNE L24MM DIA4. 5MM PERIPH NONLOCKING FOR MOD RUTHANN SYS - [id number]  SCREW BNE L24MM DIA4. 5MM PERIPH NONLOCKING FOR MOD RUTHANN SYS [id number] [employer] [id number] Right 1 Implanted   SCREW BNE L24MM DIA4. 5MM PERIPH NONLOCKING FOR MOD RUTHANN SYS - [id number]  SCREW BNE L24MM DIA4. 5MM PERIPH NONLOCKING FOR MOD RUTHANN SYS [id number] [employer] [id number] Right 1 Implanted   SCREW BNE L36MM DIA5. 5MM PERIPH CLEO FOR MOD RUTHANN SYS - [id number]  SCREW BNE L36MM DIA5. 5MM PERIPH CLEO FOR MOD RUTHANN SYS [id number] [employer] [id number] Right 1 Implanted   SCREW BNE L28MM DIA5. 5MM PERIPH CLEO FOR MOD RUTHANN SYS - [id number]  SCREW BNE L28MM DIA5. 5MM PERIPH CLEO FOR MOD RUTHANN SYS [id number] [employer] [id number] Right 1 Implanted   UNIVERS REVERS APEX STEM SIZE 6 - [id number]  UNIVERS REVERS APEX STEM SIZE 6 [id number] [employer] [id number] Right 1 Implanted   CUP HUM [id number] SHLDR NEUT SUT UNIVERS REVERS - [id number]  CUP HUM [id number] SHLDR NEUT SUT UNIVERS REVERS [id number] [employer] [id number] Right 1 Implanted   LINER HUM SM [id number] +3MM OFFSET SHLDR [facility] - [id number]  LINER HUM SM [id number] +3MM OFFSET SHLDR [facility] [id number] [employer] [id number] Right 1 Implanted       Drains: * No LDAs found *    Findings: Right rotator cuff arthropathy with glenoid deformity    Electronically Signed by Denis Galraza MD on 1/30/2023 at 9:28 AM

## 2023-01-30 NOTE — ROUTINE PROCESS
[id number] Nurse notified by charge nurse, pt used emesis bag, and wanted assistance to bathroom. Pt given zofran for nausea and roxicodone 5 mg for pain. Pt repositioned in bed and is resting comfortably with eyes open. Call bell within reach,pt educated to call nurse for assistance when he needs to get up. Pt verbalized understanding. [phone] pt reassessed, per patient his pain is \"easing off\", nurse verbalized understanding, no signs of distress noted, pt is resting with eyes closed.

## 2023-01-31 LAB
HCT VFR BLD AUTO: 40.2 % (ref 36–48)
HGB BLD-MCNC: 13.4 G/DL (ref 13–16)

## 2023-01-31 PROCEDURE — 97116 GAIT TRAINING THERAPY: CPT

## 2023-01-31 PROCEDURE — G0378 HOSPITAL OBSERVATION PER HR: HCPCS

## 2023-01-31 PROCEDURE — 85018 HEMOGLOBIN: CPT

## 2023-01-31 PROCEDURE — 97166 OT EVAL MOD COMPLEX 45 MIN: CPT

## 2023-01-31 PROCEDURE — 74011000250 HC RX REV CODE- 250: Performed by: ORTHOPAEDIC SURGERY

## 2023-01-31 PROCEDURE — 97535 SELF CARE MNGMENT TRAINING: CPT

## 2023-01-31 PROCEDURE — 36415 COLL VENOUS BLD VENIPUNCTURE: CPT

## 2023-01-31 PROCEDURE — 74011250637 HC RX REV CODE- 250/637: Performed by: ORTHOPAEDIC SURGERY

## 2023-01-31 RX ORDER — OXYCODONE HYDROCHLORIDE 5 MG/1
5 TABLET ORAL
Qty: 30 TABLET | Refills: 0 | Status: SHIPPED | OUTPATIENT
Start: 2023-01-31 | End: 2023-02-07

## 2023-01-31 RX ADMIN — FAMOTIDINE 20 MG: 20 TABLET, FILM COATED ORAL at 17:50

## 2023-01-31 RX ADMIN — FAMOTIDINE 20 MG: 20 TABLET, FILM COATED ORAL at 08:27

## 2023-01-31 RX ADMIN — APIXABAN 5 MG: 5 TABLET, FILM COATED ORAL at 08:27

## 2023-01-31 RX ADMIN — APIXABAN 5 MG: 5 TABLET, FILM COATED ORAL at 17:50

## 2023-01-31 RX ADMIN — AMLODIPINE BESYLATE 10 MG: 10 TABLET ORAL at 08:27

## 2023-01-31 RX ADMIN — EZETIMIBE 10 MG: 10 TABLET ORAL at 08:27

## 2023-01-31 RX ADMIN — OXYCODONE HYDROCHLORIDE 10 MG: 5 TABLET ORAL at 13:00

## 2023-01-31 RX ADMIN — SPIRONOLACTONE 25 MG: 25 TABLET ORAL at 08:27

## 2023-01-31 RX ADMIN — DULOXETINE HYDROCHLORIDE 60 MG: 60 CAPSULE, DELAYED RELEASE ORAL at 08:27

## 2023-01-31 RX ADMIN — OXYCODONE HYDROCHLORIDE 5 MG: 5 TABLET ORAL at 08:27

## 2023-01-31 RX ADMIN — SODIUM CHLORIDE, PRESERVATIVE FREE 10 ML: 5 INJECTION INTRAVENOUS at 22:00

## 2023-01-31 NOTE — ROUTINE PROCESS
Spoke with Dr. Trisha Barfield, it's okay to discharge patient if he has a ride home. Nurse spoke with patient, he does not have a ride home. Nurse notified Dr. Trisha Barfield via [employer].

## 2023-01-31 NOTE — PROGRESS NOTES
Problem: Self Care Deficits Care Plan (Adult)  Goal: *Acute Goals and Plan of Care (Insert Text)  Outcome: Resolved/Met     OCCUPATIONAL THERAPY EVALUATION/DISCHARGE    Patient: Rohan Colvin (2 y.o. 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 ASSESSMENT AND RECOMMENDATIONS:  Patient cleared to participate in OT evaluation by RN. Upon entering the room, patient was seated in chair, alert, and agreeable to participate in OT evaluation with daughter who will be assisting at home present. Patient educated on shoulder precautions including no active use of shoulder, proper sling application/wear and importance of wrist flex/ext and ulnar and radial deviation, as well as active finger and hand movement to prevent edema and maintain function for light ADLs. Patient is stand by assistance - minimal assistance with basic self-care this session. Patient educated on compensatory strategies for self care tasks including bathing and dressing and able to dress self for d/c home with assistance from daughter following demonstration/ family education. At this time pt and daughter report no further self-care concerns, OT to d/c from caseload. Skilled occupational therapy is not indicated at this time. Further Equipment Recommendations for Discharge: continue use of personal cane for further distances    AMPAC: Current research shows that an AM-PAC score of 18 or greater is associated with a discharge to the patient's home setting. Based on an AM-PAC score of 18/24 and their current ADL deficits; it is recommended that the patient have 2-3 sessions per week of Occupational Therapy at d/c to increase the patient's independence.       This AMPAC score should be considered in conjunction with interdisciplinary team recommendations to determine the most appropriate discharge setting. Patient's social support, diagnosis, medical stability, and prior level of function should also be taken into consideration.      SUBJECTIVE:   Patient stated Both my daughters used to be nurses    OBJECTIVE DATA SUMMARY:     Past Medical History:   Diagnosis Date    Atypical chest pain     Bladder outlet obstruction     DVT (deep venous thrombosis) ([facility] [address] 06/2021    Erectile disorder due to medical 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     Frequency of urination     H/O spinal cord injury 1974    lumbar spine injury secondary to fall, no residual    HTN (hypertension)     Hypercholesterolemia     Illiterate     Injury of lumbar spine ([facility] [address] 1974    Leg pain, right     MGUS (monoclonal gammopathy of unknown significance)     Nocturia     Overactive bladder     Peripheral vascular disease (HCC)     Prostate cancer ([facility] [address]     Pulmonary embolism ([facility] 06/2021    Shortness of breath      Past Surgical History:   Procedure Laterality Date    COLONOSCOPY N/A 12/04/2019    COLONOSCOPY performed by Kristen Ho MD at [facility] ENDOSCOPY    HX HEENT Left     lens implant    HX ORTHOPAEDIC      finger amputation left hand    HX TONSILLECTOMY      MO CYSTOURETHROSCOPY  10/2022    MO UNLISTED PROCEDURE HANDS/FINGERS Right     carpal tunnel    MO UNLISTED PROCEDURE HUMERUS/ELBOW Right      Barriers to Learning/Limitations: None  Compensate with: visual, verbal, tactile, kinesthetic cues/model    Home Situation:   Home Situation  Home Environment: Apartment  # Steps to Enter: 4  One/Two Story Residence: One story (elevator)  Living Alone: Yes  Support Systems: Child(sam)  Patient Expects to be Discharged to[de-identified] Home with family assistance  Current DME Used/Available at Home: None  [x]     Right hand dominant   []     Left hand dominant    Cognitive/Behavioral Status:  Neurologic State: Alert  Orientation Level: Oriented X4  Cognition: Follows commands  Safety/Judgement: Fall prevention    Skin: Intact  Edema: None noted    Vision/Perceptual:    Acuity: Within Defined Limits      Coordination: BUE  Fine Motor Skills-Upper: Left Intact; Right Intact    Gross Motor Skills-Upper: Left Intact; Right Impaired    Balance:  Sitting: Intact  Standing: Impaired; Without support  Standing - Static: Good  Standing - Dynamic : Fair    Strength: BUE  Strength: Generally decreased, functional (B )     Tone & Sensation: BUE  Tone: Normal  Sensation: Impaired (numbness in R thumb and index)    Range of Motion: BUE  AROM: Generally decreased, functional (LUE,  R hand/wrist)    Functional Mobility and Transfers for ADLs:  Bed Mobility:  Scooting: Stand-by assistance    Transfers:  Sit to Stand: Stand-by assistance  Stand to Sit: Stand-by assistance    ADL Assessment:  Feeding: Stand-by assistance    Oral Facial Hygiene/Grooming: Stand-by assistance    Bathing: Minimum assistance    Upper Body Dressing: Minimum assistance    Lower Body Dressing: Minimum assistance    Toileting: Stand by assistance       ADL Intervention:  Upper Body Dressing Assistance  Dressing Assistance: Minimum assistance  Orthotics(Brace): Minimum assistance (sling)  Pullover Shirt: Minimum assistance  Front Opened Shirt: Minimum assistance    Lower Body Dressing Assistance  Dressing Assistance: Minimum assistance  Underpants: Minimum assistance  Pants With Elastic Waist: Minimum assistance  Socks: Stand-by assistance  Leg Crossed Method Used: Yes  Position Performed: Seated in chair;Standing    Cognitive Retraining  Safety/Judgement: Fall prevention    Pain:  Pain level pre-treatment: 4/10   Pain level post-treatment: 4/10   Pain Intervention(s): Medication (see MAR); Rest, Ice, Repositioning   Response to intervention: Nurse notified, See doc flow    Activity Tolerance:   Good      Please refer to the flowsheet for vital signs taken during this treatment.   After treatment:   [x]  Patient left in no apparent distress sitting up in chair  []  Patient left in no apparent distress in bed  [x]  Call bell left within reach  [x]  Nursing notified  []  Caregiver present  []  Bed alarm activated    COMMUNICATION/EDUCATION:   [x]      Role of Occupational Therapy in the acute care setting  [x]      Home safety education was provided and the patient/caregiver indicated understanding. [x]      Patient/family have participated as able and agree with findings and recommendations. []      Patient is unable to participate in plan of care at this time. Thank you for this referral.  Chris Neves OTR/DAT  Time Calculation: 31 mins      Eval Complexity: History: LOW Complexity : Brief history review ; Examination: MEDIUM Complexity : 3-5 performance deficits relating to physical, cognitive , or psychosocial skils that result in activity limitations and / or participation restrictions; Decision Making:MEDIUM Complexity : Patient may present with comorbidities that affect occupational performnce. Miniml to moderate modification of tasks or assistance (eg, physical or verbal ) with assesment(s) is necessary to enable patient to complete evaluation     Elida Lewis -PAC® Daily Activity Inpatient Short Form (6-Clicks)*    How much HELP from another person does the patient currently need    (If the patient hasn't done an activity recently, how much help from another person do you think he/she would need if he/she tried?)   Total (Total A or Dep)   A Lot  (Mod to Max A)   A Little (Sup or Min A)   None (Mod I to I)   Putting on and taking off regular lower body clothing? [] 1 [] 2 [x] 3 [] 4   2. Bathing (including washing, rinsing,      drying)? [] 1 [] 2 [x] 3 [] 4   3. Toileting, which includes using toilet, bedpan or urinal?   [] 1 [] 2 [x] 3 [] 4   4. Putting on and taking off regular upper body clothing? [] 1 [] 2 [x] 3 [] 4   5.  Taking care of personal grooming such as brushing teeth? [] 1 [] 2 [x] 3 [] 4   6. Eating meals?    [] 1 [] 2 [x] 3 [] 4

## 2023-01-31 NOTE — DISCHARGE SUMMARY
DISCHARGE SUMMARY    Date of Admission: 1/30/23    Date of Discharge: 2/1/23    Admitting Diagnosis: s/p Rt RSA    Discharge Diagnosis: Same    Procedures Performed: Rt RSA 1/30/23    Description of Hospital Stay: The patient was admitted to the hospital on the above dates and underwent the above procedure(s) performed while admitted. During the hospital stay, the patient was seen by PT/OT and on the date of discharge the patient's vitals signs were stabilized and pain controlled on PO pain medication. Discharge Medications: Please see discharge medication reconcilliation form for new prescriptions. Discharge Disposition: Home    Discharge Condition: Stable    Follow up Visit: Please follow up in the office in 10-14 days. Call [phone] to schedule an appointment if it has not already been scheduled. Electronically Signed by     Ian Ramirez.  Valeri Alvarado MD

## 2023-01-31 NOTE — ROUTINE PROCESS
End of Shift    Patient: Efren Sánchez (58 y.o. male)  Date: 1/30/2023  Diagnosis: S/P shoulder joint replacement [Z96.619] <principal problem not specified>    Precautions: Fall, NWB (R TSA)    Bedside verbal report given to Margy Aguilar (oncoming nurse) from Oleksandr Bright RN (outgoing nurse)    Report consisted of patients Situation, Background, Assessment and   Recommendations(SBAR). Information from the following report(s): Kardex, MAR and Recent Results was reviewed with the oncoming nurse. Opportunity for questions and clarification was provided.         Wound Prevention Checklist    []  Heel prevention boots placed on patient    []  Patient turned q2h during shift    []  Lift team ordered    []  Patient on [place] bed/Specialty bed    []  Each Wound is documented during shift (Stage, Color, drainage, odor, measurements, and dressings)    [x]  Dual skin checks done at bedside during shift report with Parth Nagel RN

## 2023-01-31 NOTE — PROGRESS NOTES
Problem: Falls - Risk of  Goal: *Absence of Falls  Description: Document Yamile Litter Fall Risk and appropriate interventions in the flowsheet.   Outcome: Progressing Towards Goal  Note: Fall Risk Interventions:                                Problem: Patient Education: Go to Patient Education Activity  Goal: Patient/Family Education  Outcome: Progressing Towards Goal     Problem: Pain  Goal: *Control of Pain  Outcome: Progressing Towards Goal     Problem: Patient Education: Go to Patient Education Activity  Goal: Patient/Family Education  Outcome: Progressing Towards Goal     Problem: Airway Clearance - Ineffective  Goal: *Patent airway  Outcome: Progressing Towards Goal  Goal: *Absence of airway secretions  Outcome: Progressing Towards Goal  Goal: *Able to cough effectively  Outcome: Progressing Towards Goal     Problem: Patient Education: Go to Patient Education Activity  Goal: Patient/Family Education  Outcome: Progressing Towards Goal     Problem: Patient Education: Go to Patient Education Activity  Goal: Patient/Family Education  Outcome: Progressing Towards Goal     Problem: Patient Education: Go to Patient Education Activity  Goal: Patient/Family Education  Outcome: Progressing Towards Goal

## 2023-01-31 NOTE — PROGRESS NOTES
[facility]  Inpatient Progress Note      2023  7:45 AM     Chart reviewed.       Interval History/Events of Past 24 hours:   Rt RSA 2023    Subjective:  C/O right shoulder pain    Objective:  Vital signs:   Visit Vitals  BP (!) 150/75 (BP 1 Location: Left upper arm, BP Patient Position: At rest;Lying)   Pulse (!) 109   Temp 97.8 °F (36.6 °C)   Resp 18   Ht 5' 7\" (1.702 m)   Wt 195 lb (88.5 kg)   SpO2 92%   BMI 30.54 kg/m²     Patient Vitals for the past 24 hrs:   Temp Pulse Resp BP SpO2   23 0400 97.8 °F (36.6 °C) (!) 109 18 (!) 150/75 92 %   23 2335 99.9 °F (37.7 °C) (!) 106 20 (!) 145/80 93 %   23 2008 98 °F (36.7 °C) (!) 111 20 (!) 147/79 92 %   23 1705 98.9 °F (37.2 °C) 91 18 120/68 100 %   23 1214 -- -- -- -- 100 %   23 1213 98.3 °F (36.8 °C) 86 20 (!) 155/82 (!) 86 %   23 1114 -- 75 20 -- 100 %   23 1107 97.9 °F (36.6 °C) 71 20 134/69 97 %   23 1058 -- 71 17 133/60 97 %   23 1047 -- 71 17 138/62 97 %   23 1037 -- 70 17 132/64 97 %   23 1027 -- 70 15 129/64 96 %   23 1018 -- 70 20 (!) 136/57 91 %   23 1007 -- 70 21 (!) 147/64 92 %   23 0957 -- 71 20 (!) 145/65 94 %   23 0947 -- 69 18 137/63 98 %   23 0938 -- 71 16 (!) 148/66 98 %   23 0932 97.3 °F (36.3 °C) 78 17 (!) 139/50 100 %   23 0927 97.3 °F (36.3 °C) 78 17 (!) 139/50 100 %     Temp (24hrs), Av.2 °F (36.8 °C), Min:97.3 °F (36.3 °C), Max:99.9 °F (37.7 °C)    Admission Weight: Last Weight   Weight: 195 lb (88.5 kg) Weight: 195 lb (88.5 kg)     Physical Examination:     General:  Alert, oriented, NAD  MS Exam: Right arm in sling  SILT/NVI distally  Dressing intact          Labs:  Recent Results (from the past 24 hour(s))   HGB & HCT    Collection Time: 23  5:08 AM   Result Value Ref Range    HGB 13.4 13.0 - 16.0 g/dL    HCT 40.2 36.0 - 48.0 %       New Studies:   None    Assessment/Plan: POD 1 s/p Rt RSA  PT/OT  Sling  PO pain control  Dispo planning, home later today vs tomorrow    Signed by Lisa Burrows MD

## 2023-01-31 NOTE — PROGRESS NOTES
Problem: Mobility Impaired (Adult and Pediatric)  Goal: *Acute Goals and Plan of Care (Insert Text)  Description: Physical Therapy Goals  Initiated 1/30/2023 and to be accomplished within 7 day(s)  1. Patient will move from supine to sit and sit to supine  in bed with modified independence. 2.  Patient will transfer from bed to chair and chair to bed with modified independence using the least restrictive device. 3.  Patient will perform sit to stand with modified independence. 4.  Patient will ambulate with supervision/set-up for 300 feet with the least restrictive device. 5.  Patient will ascend/descend 4 stairs with single handrail(s) with supervision/set-up. PLOF: pt lives alone in an apt with 4 TWILA. He was amb without an AD, however, daughters think he should be using a RW (pt does not have one). Reports falls at home. 2 daughters will be assisting him at home upon d/c. Outcome: Resolved/Met   PHYSICAL THERAPY TREATMENT AND DISCHARGE    Patient: Rich Simons (89 y.o. male)  Date: 1/31/2023  Diagnosis: S/P shoulder joint replacement [Z96.619]   Procedure(s) (LRB):  RIGHT REVERSE TOTAL SHOULDER ARTHROPLASTY BICEPS TENODESIS (Right) 1 Day Post-Op  Precautions: Fall, NWB (RUE)  ASSESSMENT:  Seated in recliner. Daughter present at bedside. Mod I for transfers. Amb 100ft with straight cane; uses at baseline. No stairs in home set-up. Returned to seated in recliner. BLE elevated. Ice to right shoulder. Call bell in reach. Patient is cleared by PT for discharge with post acute rehab services and family support. PLAN:  Further skilled physical therapy is not indicated at this time.   Rationale for discharge:  [x]     Goals Achieved  []     Plateau Reached  []     Patient not participating in therapy  []     Other:    Further Equipment Recommendations for Discharge:  straight cane    AMPA Basic Mobility Inpatient Short Form: 20/20, with stairs omitted  This AMPAC score should be considered in conjunction with interdisciplinary team recommendations to determine the most appropriate discharge setting. Patient's social support, diagnosis, medical stability, and prior level of function should also be taken into consideration. At this time and based on an AM-PAC score of 20/20 if omitting stairs, no further PT is recommended upon discharge. SUBJECTIVE:   Patient stated I wish it was real fish.     OBJECTIVE DATA SUMMARY:   Critical Behavior:  Neurologic State: Alert  Orientation Level: Oriented X4  Cognition: Follows commands  Safety/Judgement: Fall prevention  Psychosocial  Patient Behaviors: Calm; Cooperative  Family  Behaviors: Supportive  Functional Mobility Training:  Transfers:  Sit to Stand: Modified independent  Stand to Sit: Modified independent  Balance:   Sitting: Intact  Standing: Impaired  Standing - Static: Good  Standing - Dynamic : Good  Ambulation/Gait Training:  Distance (ft): 100 Feet (ft)   Assistive Device: Cane, straight  Ambulation - Level of Assistance: Modified independent    Pain:  Pain level pre-treatment: 0/10   Pain level post-treatment: 0/10     Activity Tolerance:   Good    After treatment:   [x] Patient left in no apparent distress sitting up in chair  [] Patient left in no apparent distress in bed  [x] Call bell left within reach  [x] Nursing notified  [] Caregiver present  [] Bed alarm activated  [] SCDs applied      COMMUNICATION/EDUCATION:   [x]         Role of physical therapy in the acute care setting. [x]         Fall prevention education was provided and the patient/caregiver indicated understanding. [x]         Patient/family have participated as able and agree with findings and recommendations. []         Patient is unable to participate in plan of care at this time.        Martin Vyas, PT   Time Calculation: 9 mins    [facility] AM-PAC® Basic Mobility Inpatient Short Form (6-Clicks) Version 2    How much HELP from another person does the patient currently need    (If the patient hasn't done an activity recently, how much help from another person do you think he/she would need if he/she tried?)   Total (Total A or Dep)   A Lot  (Mod to Max A)   A Little (Sup or Min A)   None (Mod I to I)   Turning from your back to your side while in a flat bed without using bedrails? [] 1 [] 2 [] 3 [x] 4   2. Moving from lying on your back to sitting on the side of a flat bed without using bedrails? [] 1 [] 2 [] 3 [x] 4   3. Moving to and from a bed to a chair (including a wheelchair)? [] 1 [] 2 [] 3 [x] 4   4. Standing up from a chair using your arms (e.g., wheelchair, or bedside chair)? [] 1 [] 2 [] 3 [x] 4   5. Walking in hospital room? [] 1 [] 2 [] 3 [x] 4   6. Climbing 3-5 steps with a railing?+  Not tested d/t none in home set-up [] 1 [] 2 [] 3 [] 4   +If stair climbing cannot be assessed, skip item #6. Sum responses from items 1-5.

## 2023-02-01 VITALS
HEART RATE: 130 BPM | BODY MASS INDEX: 30.61 KG/M2 | OXYGEN SATURATION: 93 % | TEMPERATURE: 98.5 F | WEIGHT: 195 LBS | SYSTOLIC BLOOD PRESSURE: 142 MMHG | HEIGHT: 67 IN | RESPIRATION RATE: 18 BRPM | DIASTOLIC BLOOD PRESSURE: 78 MMHG

## 2023-02-01 PROCEDURE — G0378 HOSPITAL OBSERVATION PER HR: HCPCS

## 2023-02-01 PROCEDURE — 74011250637 HC RX REV CODE- 250/637: Performed by: ORTHOPAEDIC SURGERY

## 2023-02-01 RX ADMIN — FAMOTIDINE 20 MG: 20 TABLET, FILM COATED ORAL at 08:53

## 2023-02-01 RX ADMIN — SPIRONOLACTONE 25 MG: 25 TABLET ORAL at 08:53

## 2023-02-01 RX ADMIN — OXYCODONE HYDROCHLORIDE 5 MG: 5 TABLET ORAL at 03:58

## 2023-02-01 RX ADMIN — AMLODIPINE BESYLATE 10 MG: 10 TABLET ORAL at 08:53

## 2023-02-01 RX ADMIN — EZETIMIBE 10 MG: 10 TABLET ORAL at 08:53

## 2023-02-01 RX ADMIN — DULOXETINE HYDROCHLORIDE 60 MG: 60 CAPSULE, DELAYED RELEASE ORAL at 08:53

## 2023-02-01 RX ADMIN — APIXABAN 5 MG: 5 TABLET, FILM COATED ORAL at 08:53

## 2023-02-01 NOTE — PROGRESS NOTES
[facility]  Inpatient Progress Note      2023  8:09 AM     Chart reviewed. Interval History/Events of Past 24 hours:   No events     Subjective:  Right shoulder pain improved    Objective:  Vital signs:   Visit Vitals  BP (!) 142/78 (BP 1 Location: Left upper arm, BP Patient Position: At rest)   Pulse (!) 130   Temp 98.5 °F (36.9 °C)   Resp 18   Ht 5' 7\" (1.702 m)   Wt 195 lb (88.5 kg)   SpO2 93%   BMI 30.54 kg/m²     Patient Vitals for the past 24 hrs:   Temp Pulse Resp BP SpO2   23 0744 98.5 °F (36.9 °C) (!) 130 18 (!) 142/78 93 %   23 0356 99.4 °F (37.4 °C) (!) 122 18 132/80 93 %   23 2334 98.1 °F (36.7 °C) (!) 125 18 (!) 147/80 91 %   23 1927 98 °F (36.7 °C) (!) 121 18 (!) 146/83 91 %   23 1258 98.9 °F (37.2 °C) (!) 101 18 136/74 92 %     Temp (24hrs), Av.6 °F (37 °C), Min:98 °F (36.7 °C), Max:99.4 °F (37.4 °C)    Admission Weight: Last Weight   Weight: 195 lb (88.5 kg) Weight: 195 lb (88.5 kg)     Physical Examination:     General:  Alert, oriented, NAD  MS Exam: Right arm in sling  SILT/NVI distally  Incision/dressing clean and dry          Labs:  No results found for this or any previous visit (from the past 24 hour(s)).     New Studies:       Assessment/Plan:    S/p Rt shoulder reverse replacement POD 2  Sling  D/C home today    Signed by Corrie Fairchild MD

## 2023-02-01 NOTE — PROGRESS NOTES
Pt is alert and oriented and able to make needs known. No s/s of any pain or discomfort. Discharge instructions reviewed in full detail with the patient. Opportunity given for questions and answers provided. All belongings are with the patient. Pt was wheeled down in  the wheelchair. Pt is discharged in stable condition. IV Was removed and intact. Daughter was at the bedside. Pt refused pain meds.

## 2023-02-01 NOTE — ROUTINE PROCESS
End of Shift    Patient: Rich Simons (82 y.o. male)  Date: 1/31/2023  Diagnosis: S/P shoulder joint replacement [Z96.619] <principal problem not specified>    Precautions: Fall, NWB (RUE)    Bedside verbal report given to Kina Lucero (oncoming nurse) from Oleksandr Bright RN (outgoing nurse)    Report consisted of patients Situation, Background, Assessment and   Recommendations(SBAR). Information from the following report(s): Kardex, MAR and Recent Results was reviewed with the oncoming nurse. Opportunity for questions and clarification was provided.         Wound Prevention Checklist    []  Heel prevention boots placed on patient    []  Patient turned q2h during shift    []  Lift team ordered    []  Patient on [place] bed/Specialty bed    []  Each Wound is documented during shift (Stage, Color, drainage, odor, measurements, and dressings)    [x]  Dual skin checks done at bedside during shift report with Jason Sierra RN

## 2023-02-01 NOTE — PROGRESS NOTES
Discharge teaching completed at bedside with patient. Opportunity provided for clarifying questions. No questions offered. ID removed and shredded. Transport arrived, patient not in room,  daughter in room with patient.

## 2023-02-01 NOTE — PROGRESS NOTES
Called  Dr. Myla Plasencia to inform him of the pt's tachycardia and he is aware and pt is safe for discharge.

## 2023-02-03 NOTE — PROGRESS NOTES
Ray Wheeler is a 69 y.o. male in today for follow-up on polyneuropathy. 1. Have you been to the ER, urgent care clinic since your last visit? Hospitalized since your last visit? No    2. Have you seen or consulted any other health care providers outside of the [address] [place] [address] since your last visit? Include any pap smears or colon screening.  No [place] Counseling:  I discussed with the patient the risks of imiquimod including but not limited to erythema, scaling, itching, weeping, crusting, and pain.  Patient understands that the inflammatory response to imiquimod is variable from person to person and was educated regarded proper titration schedule.  If flu-like symptoms develop, patient knows to discontinue the medication and contact us.

## 2023-02-07 ENCOUNTER — OFFICE VISIT (OUTPATIENT)
Dept: ORTHOPEDIC SURGERY | Age: 78
End: 2023-02-07
Payer: MEDICAID

## 2023-02-07 DIAGNOSIS — Z98.890 POST-OPERATIVE STATE: Primary | ICD-10-CM

## 2023-02-07 PROCEDURE — 99024 POSTOP FOLLOW-UP VISIT: CPT | Performed by: ORTHOPAEDIC SURGERY

## 2023-02-07 PROCEDURE — 73030 X-RAY EXAM OF SHOULDER: CPT | Performed by: ORTHOPAEDIC SURGERY

## 2023-02-07 RX ORDER — ALLOPURINOL 100 MG/1
TABLET ORAL
COMMUNITY

## 2023-02-07 NOTE — PROGRESS NOTES
Patient: Arturo Montana                MRN: [id number]       SSN: [id number]  YOB: 1945        AGE: 69 y.o. SEX: male  There is no height or weight on file to calculate BMI. PCP: Preet Malik MD  02/07/23    Chief Complaint: Right shoulder follow-up    HPI: Arturo Montana is a 69 y.o. male patient who returns today for his right shoulder. 1 week out from his right reverse arthroplasty. Doing well. Past Medical History:   Diagnosis Date    Atypical chest pain     Bladder outlet obstruction     DVT (deep venous thrombosis) ([facility] 06/2021    Erectile disorder due to medical 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     Frequency of urination     H/O spinal cord injury 1974    lumbar spine injury secondary to fall, no residual    HTN (hypertension)     Hypercholesterolemia     Illiterate     Injury of lumbar spine ([facility] [address] 1974    Leg pain, right     MGUS (monoclonal gammopathy of unknown significance)     Nocturia     Overactive bladder     Peripheral vascular disease (HCC)     Prostate cancer ([facility] [address]     Pulmonary embolism ([facility] [address] 06/2021    Shortness of breath        Family History   Problem Relation Age of Onset    Diabetes Mother     Hypertension Mother     Diabetes Maternal Grandmother     Hypertension Maternal Grandmother     Cancer Sister         brain    Cancer Sister         brain       Current Outpatient Medications   Medication Sig Dispense Refill    allopurinoL (ZYLOPRIM) 100 mg tablet 1 tablet      oxyCODONE IR (ROXICODONE) 5 mg immediate release tablet Take 1 Tablet by mouth every four (4) hours as needed for Pain for up to 7 days. Max Daily Amount: 30 mg. 30 Tablet 0    omeprazole (PRILOSEC) 40 mg capsule       albuterol (PROVENTIL HFA, VENTOLIN HFA, PROAIR HFA) 90 mcg/actuation inhaler INHALE 2 PUFFS BY MOUTH EVERY 6 HOURS AS NEEDED      apixaban (Eliquis) 5 mg tablet Take 1 Tablet by mouth two (2) times a day.  60 Tablet 5    lidocaine 5 % topical cream Apply  to affected area two (2) times daily as needed for Pain. 15 g 0    famotidine (PEPCID) 20 mg tablet Take 20 mg by mouth two (2) times a day.      ezetimibe (ZETIA) 10 mg tablet Take 10 mg by mouth daily. spironolactone (ALDACTONE) 25 mg tablet Take 25 mg by mouth daily. polyethylene glycol (MIRALAX) 17 gram packet Take 1 Packet by mouth daily. 30 Packet 0    DULoxetine (CYMBALTA) 60 mg capsule Take 1 Cap by mouth daily. Indications: neuropathic pain 60 Cap 5    amLODIPine (NORVASC) 10 mg tablet Take 1 Tab by mouth daily. 30 Tab 0    umeclidinium-vilanteroL (Anoro Ellipta) 62.5-25 mcg/actuation inhaler Take 1 Puff by inhalation daily.  (Patient not taking: No sig reported) 1 Each 0       Allergies   Allergen Reactions    Latex Rash     Other reaction(s): Unknown    Ampicillin Angioedema    Asa-Acetaminophen-Caff-Buffers Rash    Penicillins Angioedema     Other reaction(s): anaphylaxis    Crab Hives     Denies allergy to crabs    Shellfish Derived Rash    Adhesive Tape-Silicones Itching    Aspirin Other (comments)     Stomach upset - patient said he had peptic ulcers and cannot take  Other reaction(s): Unknown      Atorvastatin Other (comments)     Muscle cramps       Past Surgical History:   Procedure Laterality Date    COLONOSCOPY N/A 2019    COLONOSCOPY performed by Tory Severance, MD at [facility] ENDOSCOPY    HX HEENT Left     lens implant    HX ORTHOPAEDIC      finger amputation left hand    HX TONSILLECTOMY      OK CYSTOURETHROSCOPY  10/2022    OK UNLISTED PROCEDURE HANDS/FINGERS Right     carpal tunnel    OK UNLISTED PROCEDURE HUMERUS/ELBOW Right        Social History     Socioeconomic History    Marital status:      Spouse name: Not on file    Number of children: Not on file    Years of education: Not on file    Highest education level: Not on file   Occupational History    Not on file   Tobacco Use    Smoking status: Former     Types: Cigarettes     Quit date: 2015     Years since quittin.5 Smokeless tobacco: Never   Vaping Use    Vaping Use: Never used   Substance and Sexual Activity    Alcohol use: Not Currently     Alcohol/week: 0.0 standard drinks     Comment: former stopped 2015    Drug use: No    Sexual activity: Yes   Other Topics Concern    Not on file   Social History Narrative    Not on file     Social Determinants of Health     Financial Resource Strain: Not on file   Food Insecurity: Not on file   Transportation Needs: Not on file   Physical Activity: Not on file   Stress: Not on file   Social Connections: Not on file   Intimate Partner Violence: Not on file   Housing Stability: Not on file       REVIEW OF SYSTEMS:      No changes from previous review of systems unless noted. PHYSICAL EXAMINATION:  There were no vitals taken for this visit. There is no height or weight on file to calculate BMI. GENERAL: Alert and oriented x3, in no acute distress. HEENT: Normocephalic, atraumatic. Right shoulder incision is healing well. No drainage. Neuro intact distally. Right arm in the sling. IMAGING:  Imaging read by myself and interpreted as follows:  2 view x-rays right shoulder taken today show hardware in good position from a reverse shoulder arthroplasty. ASSESSMENT & PLAN  Diagnosis: 1 week status post right reverse shoulder arthroplasty    Darlene Handley is doing well. He can wean out of the sling over the next week. Restrictions were discussed. Follow-up in 3 weeks. Prescription medication management discussed with patient. Electronically signed by: Juno Enriquez MD    Note: This note was completed using voice recognition software.   Any typographical/name errors or mistakes are unintentional.

## 2023-02-08 ENCOUNTER — HOSPITAL ENCOUNTER (EMERGENCY)
Age: 78
Discharge: HOME | End: 2023-02-08
Attending: EMERGENCY MEDICINE
Payer: MEDICAID

## 2023-02-08 ENCOUNTER — APPOINTMENT (OUTPATIENT)
Dept: CT IMAGING | Age: 78
End: 2023-02-08
Attending: EMERGENCY MEDICINE
Payer: MEDICAID

## 2023-02-08 ENCOUNTER — APPOINTMENT (OUTPATIENT)
Dept: GENERAL RADIOLOGY | Age: 78
End: 2023-02-08
Attending: EMERGENCY MEDICINE
Payer: MEDICAID

## 2023-02-08 VITALS
DIASTOLIC BLOOD PRESSURE: 95 MMHG | WEIGHT: 198 LBS | OXYGEN SATURATION: 98 % | SYSTOLIC BLOOD PRESSURE: 157 MMHG | HEIGHT: 67 IN | BODY MASS INDEX: 31.08 KG/M2 | RESPIRATION RATE: 11 BRPM | HEART RATE: 94 BPM

## 2023-02-08 DIAGNOSIS — R07.9 ACUTE CHEST PAIN: Primary | ICD-10-CM

## 2023-02-08 LAB
ALBUMIN SERPL-MCNC: 3.1 G/DL (ref 3.4–5)
ALBUMIN/GLOB SERPL: 0.6 (ref 0.8–1.7)
ALP SERPL-CCNC: 89 U/L (ref 45–117)
ALT SERPL-CCNC: 49 U/L (ref 16–61)
ANION GAP SERPL CALC-SCNC: 5 MMOL/L (ref 3–18)
AST SERPL-CCNC: 41 U/L (ref 10–38)
BASOPHILS # BLD: 0 K/UL (ref 0–0.1)
BASOPHILS NFR BLD: 1 % (ref 0–2)
BILIRUB SERPL-MCNC: 0.4 MG/DL (ref 0.2–1)
BUN SERPL-MCNC: 16 MG/DL (ref 7–18)
BUN/CREAT SERPL: 14 (ref 12–20)
CALCIUM SERPL-MCNC: 9.9 MG/DL (ref 8.5–10.1)
CHLORIDE SERPL-SCNC: 101 MMOL/L (ref 100–111)
CO2 SERPL-SCNC: 26 MMOL/L (ref 21–32)
CREAT SERPL-MCNC: 1.12 MG/DL (ref 0.6–1.3)
DIFFERENTIAL METHOD BLD: ABNORMAL
EOSINOPHIL # BLD: 0.2 K/UL (ref 0–0.4)
EOSINOPHIL NFR BLD: 2 % (ref 0–5)
ERYTHROCYTE [DISTWIDTH] IN BLOOD BY AUTOMATED COUNT: 15.1 % (ref 11.6–14.5)
GLOBULIN SER CALC-MCNC: 5.1 G/DL (ref 2–4)
GLUCOSE SERPL-MCNC: 100 MG/DL (ref 74–99)
HCT VFR BLD AUTO: 34.9 % (ref 36–48)
HGB BLD-MCNC: 11.7 G/DL (ref 13–16)
IMM GRANULOCYTES # BLD AUTO: 0.1 K/UL (ref 0–0.04)
IMM GRANULOCYTES NFR BLD AUTO: 1 % (ref 0–0.5)
LYMPHOCYTES # BLD: 1.7 K/UL (ref 0.9–3.6)
LYMPHOCYTES NFR BLD: 21 % (ref 21–52)
MCH RBC QN AUTO: 30.1 PG (ref 24–34)
MCHC RBC AUTO-ENTMCNC: 33.5 G/DL (ref 31–37)
MCV RBC AUTO: 89.7 FL (ref 78–100)
MONOCYTES # BLD: 1 K/UL (ref 0.05–1.2)
MONOCYTES NFR BLD: 12 % (ref 3–10)
NEUTS SEG # BLD: 5.1 K/UL (ref 1.8–8)
NEUTS SEG NFR BLD: 63 % (ref 40–73)
NRBC # BLD: 0 K/UL (ref 0–0.01)
NRBC BLD-RTO: 0 PER 100 WBC
PLATELET # BLD AUTO: 467 K/UL (ref 135–420)
PMV BLD AUTO: 9.3 FL (ref 9.2–11.8)
POTASSIUM SERPL-SCNC: 5.1 MMOL/L (ref 3.5–5.5)
PROT SERPL-MCNC: 8.2 G/DL (ref 6.4–8.2)
RBC # BLD AUTO: 3.89 M/UL (ref 4.35–5.65)
SODIUM SERPL-SCNC: 132 MMOL/L (ref 136–145)
TROPONIN I SERPL HS-MCNC: 6 NG/L (ref 0–78)
TROPONIN I SERPL HS-MCNC: 7 NG/L (ref 0–78)
WBC # BLD AUTO: 8 K/UL (ref 4.6–13.2)

## 2023-02-08 PROCEDURE — 99285 EMERGENCY DEPT VISIT HI MDM: CPT

## 2023-02-08 PROCEDURE — 94762 N-INVAS EAR/PLS OXIMTRY CONT: CPT

## 2023-02-08 PROCEDURE — 74011000636 HC RX REV CODE- 636: Performed by: EMERGENCY MEDICINE

## 2023-02-08 PROCEDURE — 85025 COMPLETE CBC W/AUTO DIFF WBC: CPT

## 2023-02-08 PROCEDURE — 71275 CT ANGIOGRAPHY CHEST: CPT

## 2023-02-08 PROCEDURE — 80053 COMPREHEN METABOLIC PANEL: CPT

## 2023-02-08 PROCEDURE — 93005 ELECTROCARDIOGRAM TRACING: CPT

## 2023-02-08 PROCEDURE — 84484 ASSAY OF TROPONIN QUANT: CPT

## 2023-02-08 PROCEDURE — 71045 X-RAY EXAM CHEST 1 VIEW: CPT

## 2023-02-08 RX ADMIN — IOPAMIDOL 80 ML: 755 INJECTION, SOLUTION INTRAVENOUS at 11:05

## 2023-02-08 NOTE — ED PROVIDER NOTES
EMERGENCY DEPARTMENT HISTORY AND PHYSICAL EXAM    Date: 2/8/2023  Patient Name: Yi Ramirez    History of Presenting Illness     Chief Complaint   Patient presents with    Chest Pain         History Provided By:       Additional History (Context): Yi Ramirez is a 69 y.o. male with a history of pulmonary embolism who presents to the emergency department with complaints of left-sided chest pain since early this morning. He states that the pain woke him from sleep, states he has pain with breathing, feels slightly short of breath as well as feeling sweaty. He had recent right shoulder surgery about 10 days ago, states that he is recovering well from that. He states he has not had any significant pain or swelling to his lower extremities, states he has not had any fevers. PCP: Nikita Bryson MD    Current Outpatient Medications   Medication Sig Dispense Refill    allopurinoL (ZYLOPRIM) 100 mg tablet 1 tablet      umeclidinium-vilanteroL (Anoro Ellipta) 62.5-25 mcg/actuation inhaler Take 1 Puff by inhalation daily. (Patient not taking: No sig reported) 1 Each 0    omeprazole (PRILOSEC) 40 mg capsule       albuterol (PROVENTIL HFA, VENTOLIN HFA, PROAIR HFA) 90 mcg/actuation inhaler INHALE 2 PUFFS BY MOUTH EVERY 6 HOURS AS NEEDED      apixaban (Eliquis) 5 mg tablet Take 1 Tablet by mouth two (2) times a day. 60 Tablet 5    lidocaine 5 % topical cream Apply  to affected area two (2) times daily as needed for Pain. 15 g 0    famotidine (PEPCID) 20 mg tablet Take 20 mg by mouth two (2) times a day.      ezetimibe (ZETIA) 10 mg tablet Take 10 mg by mouth daily. spironolactone (ALDACTONE) 25 mg tablet Take 25 mg by mouth daily. polyethylene glycol (MIRALAX) 17 gram packet Take 1 Packet by mouth daily. 30 Packet 0    DULoxetine (CYMBALTA) 60 mg capsule Take 1 Cap by mouth daily. Indications: neuropathic pain 60 Cap 5    amLODIPine (NORVASC) 10 mg tablet Take 1 Tab by mouth daily.  30 Tab 0       Past History     Past Medical History:  Past Medical History:   Diagnosis Date    Atypical chest pain     Bladder outlet obstruction     DVT (deep venous thrombosis) ([facility] 2021    Erectile disorder due to medical 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     Frequency of urination     H/O spinal cord injury     lumbar spine injury secondary to fall, no residual    HTN (hypertension)     Hypercholesterolemia     Illiterate     Injury of lumbar spine ([facility]     Leg pain, right     MGUS (monoclonal gammopathy of unknown significance)     Nocturia     Overactive bladder     Peripheral vascular disease (HCC)     Prostate cancer ([facility]     Pulmonary embolism ([facility] 2021    Shortness of breath        Past Surgical History:  Past Surgical History:   Procedure Laterality Date    COLONOSCOPY N/A 2019    COLONOSCOPY performed by Mary Kate Lentz MD at [facility] ENDOSCOPY    HX HEENT Left     lens implant    HX ORTHOPAEDIC      finger amputation left hand    HX TONSILLECTOMY      AL CYSTOURETHROSCOPY  10/2022    AL UNLISTED PROCEDURE HANDS/FINGERS Right     carpal tunnel    AL UNLISTED PROCEDURE HUMERUS/ELBOW Right        Family History:  Family History   Problem Relation Age of Onset    Diabetes Mother     Hypertension Mother     Diabetes Maternal Grandmother     Hypertension Maternal Grandmother     Cancer Sister         brain    Cancer Sister         brain       Social History:  Social History     Tobacco Use    Smoking status: Former     Types: Cigarettes     Quit date: 2015     Years since quittin.5    Smokeless tobacco: Never   Vaping Use    Vaping Use: Never used   Substance Use Topics    Alcohol use: Not Currently     Alcohol/week: 0.0 standard drinks     Comment: former stopped     Drug use: No       Allergies:   Allergies   Allergen Reactions    Latex Rash     Other reaction(s): Unknown    Ampicillin Angioedema    Asa-Acetaminophen-Caff-Buffers Rash    Penicillins Angioedema     Other reaction(s): anaphylaxis Ginette Calderón     Denies allergy to crabs    Shellfish Derived Rash    Adhesive Tape-Silicones Itching    Aspirin Other (comments)     Stomach upset - patient said he had peptic ulcers and cannot take  Other reaction(s): Unknown      Atorvastatin Other (comments)     Muscle cramps         Review of Systems   Review of Systems   Constitutional:  Negative for chills, fatigue and fever. Respiratory:  Positive for chest tightness and shortness of breath. Negative for wheezing. Cardiovascular:  Positive for chest pain. Gastrointestinal:  Negative for abdominal distention. All Other Systems Negative  Physical Exam     Vitals:    02/08/23 1455 02/08/23 1500 02/08/23 1514 02/08/23 1515   BP:  133/77  (!) 157/95   Pulse: 88 95 94 94   Resp: 26 16 20 11   SpO2: 94% 97% 93% 98%   Weight:       Height:         Physical Exam  Vitals and nursing note reviewed. Constitutional:       Appearance: Normal appearance. He is normal weight. HENT:      Head: Normocephalic and atraumatic. Right Ear: External ear normal.      Left Ear: External ear normal.      Nose: Nose normal.      Mouth/Throat:      Pharynx: Oropharynx is clear. Eyes:      Extraocular Movements: Extraocular movements intact. Conjunctiva/sclera: Conjunctivae normal.   Cardiovascular:      Rate and Rhythm: Normal rate and regular rhythm. Pulses: Normal pulses. Heart sounds: Normal heart sounds. Pulmonary:      Effort: Pulmonary effort is normal.      Breath sounds: Normal breath sounds. Abdominal:      General: Abdomen is flat. Bowel sounds are normal.      Palpations: Abdomen is soft. Musculoskeletal:      Right lower leg: No edema. Left lower leg: No edema. Skin:     General: Skin is warm and dry. Capillary Refill: Capillary refill takes less than 2 seconds. Neurological:      General: No focal deficit present. Mental Status: He is alert and oriented to person, place, and time. Mental status is at baseline. Psychiatric:         Mood and Affect: Mood normal.         Behavior: Behavior normal.         Thought Content: Thought content normal.         Judgment: Judgment normal.         Diagnostic Study Results     Labs -     Recent Results (from the past 12 hour(s))   TROPONIN-HIGH SENSITIVITY    Collection Time: 02/08/23 11:56 AM   Result Value Ref Range    Troponin-High Sensitivity 6 0 - 78 ng/L       Radiologic Studies -   CTA CHEST W OR W WO CONT   Final Result           1. No convincing evidence of pulmonary embolism. 2.  Two developing nodules in the right lower lobe. Too small for confident   characterization. Developing neoplastic process cannot be excluded. Recommend   short term followup CT in 3 months or PET-CT for further delineation. 3.  Mild hiatal hernia. 4.  Right shoulder joint replacement with adjacent air pockets related to recent   surgical intervention. XR CHEST PORT   Final Result      No clearly acute cardiopulmonary findings. Likely bibasilar streaky   atelectasis/scar. See additional details above. CT Results  (Last 48 hours)                 02/08/23 1058  CTA CHEST W OR W WO CONT Final result    Impression:          1. No convincing evidence of pulmonary embolism. 2.  Two developing nodules in the right lower lobe. Too small for confident   characterization. Developing neoplastic process cannot be excluded. Recommend   short term followup CT in 3 months or PET-CT for further delineation. 3.  Mild hiatal hernia. 4.  Right shoulder joint replacement with adjacent air pockets related to recent   surgical intervention. Narrative:  EXAM:  CTA Chest with Contrast (Study for PE). CLINICAL INDICATION:  Shortness of breath. Chest pain. History of PE.             COMPARISON:  09/21/22. TECHNIQUE:         - Helical volumetric sections of the chest are obtained with CT pulmonary   angiogram protocol.   Subsequently, sagittal and coronal multiplanar   reconstruction images are obtained. Maximum intensity projection images are   generated to better delineate the pulmonary vasculature, differentiate between   the pulmonary arteries and veins and to increase sensitivity to pulmonary   emboli.     - IV contrast dose of 80 mL Isovue-370.   - Radiation dose optimization techniques are utilized as appropriate to the   exam, with combination of automated exposure control, adjustment of the mA   and/or kV according to patient's size (including appropriate matching for   site-specific examinations), or use of iterative reconstruction technique. FINDINGS:        Pulmonary Arteries:         - Pulmonary artery opacification is diagnostic in quality.   - No pulmonary emboli are noted in the pulmonary arterial tree down to the level   of the segmental arteries. Lung, Pleura, Airways:         - 6 x 6 mm nodule in the right lower lobe (axial #37). 5 x 4 mm nodule in the   right lower lobe (axial #36). - Subsegmental atelectatic changes in the right lower lobe. - Emphysematous changes.   - No infiltrate or consolidation.   - No pleural effusion. Mediastinum:  No adenopathy. Aorta:  No evidence of aortic dissection or aneurysm. Base of Neck:  No acute findings. Chest Wall, Axillae:  S/P right shoulder joint replacement with adjacent air   pockets. Esophagus, Upper Abdomen:  Mild hiatal hernia. No acute abnormalities. Skeletal Structures:  No acute findings. CXR Results  (Last 48 hours)                 02/08/23 0834  XR CHEST PORT Final result    Impression:      No clearly acute cardiopulmonary findings. Likely bibasilar streaky   atelectasis/scar. See additional details above. Narrative:  EXAMINATION: Chest single view       INDICATION: Chest pain       COMPARISON: 1/17/2023       FINDINGS: Single frontal view.  Mediastinal silhouette and pulmonary vasculature unremarkable. Minimal aortic arch calcifications. Left greater than right   basilar coarse streaky densities. No confluent consolidation. No definite   pneumothorax. No obvious acute osseous findings. Interval right shoulder   arthroplasty with surrounding soft tissue gas presumably postoperative in   nature, but clinical correlation advised. Medical Decision Making   I am the first provider for this patient. I reviewed the vital signs, available nursing notes, past medical history, past surgical history, family history and social history. Vital Signs-Reviewed the patient's vital signs. Records Reviewed: Nursing Notes and Old Medical Records     Procedures: None   Procedures    Provider Notes (Medical Decision Making):   81-year-old man presenting with chest pain, shortness of breath. He has a history of PE, states that he has been compliant with his Eliquis, given his recent surgery however, will obtain CT of the chest to rule out PE. We will also check troponins x2, CBC, CMP. Patient states he has an allergy to aspirin    Troponins x2 negative, CBC unremarkable, CMP unremarkable as well. His CTA does not demonstrate any PE though there are couple of nodules that will need a repeat scan. Reviewed this with the patient at the bedside, he states understanding. He will need follow-up with his primary care doctor to arrange for repeat CT. MED RECONCILIATION:  No current facility-administered medications for this encounter. Current Outpatient Medications   Medication Sig    allopurinoL (ZYLOPRIM) 100 mg tablet 1 tablet    umeclidinium-vilanteroL (Anoro Ellipta) 62.5-25 mcg/actuation inhaler Take 1 Puff by inhalation daily.  (Patient not taking: No sig reported)    omeprazole (PRILOSEC) 40 mg capsule     albuterol (PROVENTIL HFA, VENTOLIN HFA, PROAIR HFA) 90 mcg/actuation inhaler INHALE 2 PUFFS BY MOUTH EVERY 6 HOURS AS NEEDED    apixaban (Eliquis) 5 mg tablet Take 1 Tablet by mouth two (2) times a day. lidocaine 5 % topical cream Apply  to affected area two (2) times daily as needed for Pain.    famotidine (PEPCID) 20 mg tablet Take 20 mg by mouth two (2) times a day.    ezetimibe (ZETIA) 10 mg tablet Take 10 mg by mouth daily. spironolactone (ALDACTONE) 25 mg tablet Take 25 mg by mouth daily. polyethylene glycol (MIRALAX) 17 gram packet Take 1 Packet by mouth daily. DULoxetine (CYMBALTA) 60 mg capsule Take 1 Cap by mouth daily. Indications: neuropathic pain    amLODIPine (NORVASC) 10 mg tablet Take 1 Tab by mouth daily. Disposition:  Home     DISCHARGE NOTE:   Pt has been reexamined. Patient has no new complaints, changes, or physical findings. Care plan outlined and precautions discussed. Results of workup were reviewed with the patient. All medications were reviewed with the patient. All of pt's questions and concerns were addressed. Patient was instructed and agrees to follow up with PCP as well as to return to the ED upon further deterioration. Patient is ready to go home. Follow-up Information       Follow up With Specialties Details Why Contact Info    Tammy Vora MD Family Medicine Schedule an appointment as soon as possible for a visit in 2 days  [address] [facility]              Discharge Medication List as of 2/8/2023  3:25 PM              Diagnosis     Clinical Impression:   1. Acute chest pain          \"Please note that this dictation was completed with [employer], the computer voice recognition software. Quite often unanticipated grammatical, syntax, homophones, and other interpretive errors are inadvertently transcribed by the computer software. Please disregard these errors. Please excuse any errors that have escaped final proofreading. \"

## 2023-02-08 NOTE — ED NOTES
Assumed care of pt in rm 1. Pt arrived via EMS for CP and SOB with right shoulder pain. Hx of recent surgery to the right shoulder on Jan 30. Pt is A Ox 4, lung sounds clear, abd soft non-tender, skin intact except incision to right upper shoulder, steri strips c,d,I. EKG completed attempted PIV to L upper arm, labs drawn but PIV did not work. Pt placed on full cardiac monitor.

## 2023-02-08 NOTE — DISCHARGE INSTRUCTIONS
There were 2 nodules seen on your CT scan. This will require a repeat scan in about 3 months. Your primary care doctor can set this up when the time comes. Continue taking your Eliquis as you have been.

## 2023-02-09 LAB
ATRIAL RATE: 83 BPM
CALCULATED P AXIS, ECG09: 51 DEGREES
CALCULATED R AXIS, ECG10: -21 DEGREES
CALCULATED T AXIS, ECG11: 32 DEGREES
DIAGNOSIS, 93000: NORMAL
P-R INTERVAL, ECG05: 158 MS
Q-T INTERVAL, ECG07: 370 MS
QRS DURATION, ECG06: 76 MS
QTC CALCULATION (BEZET), ECG08: 434 MS
VENTRICULAR RATE, ECG03: 83 BPM

## 2023-02-12 PROBLEM — Z01.818 PREOPERATIVE CLEARANCE: Status: RESOLVED | Noted: 2023-01-13 | Resolved: 2023-02-12

## 2023-02-21 ENCOUNTER — OFFICE VISIT (OUTPATIENT)
Age: 78
End: 2023-02-21
Payer: MEDICAID

## 2023-02-21 VITALS — RESPIRATION RATE: 18 BRPM | HEIGHT: 67 IN | BODY MASS INDEX: 31.01 KG/M2

## 2023-02-21 DIAGNOSIS — Z96.611 S/P REVERSE TOTAL SHOULDER ARTHROPLASTY, RIGHT: Primary | ICD-10-CM

## 2023-02-21 PROBLEM — K21.9 GASTROESOPHAGEAL REFLUX DISEASE: Status: ACTIVE | Noted: 2021-04-07

## 2023-02-21 PROBLEM — I85.00 IDIOPATHIC ESOPHAGEAL VARICES WITHOUT BLEEDING (HCC): Status: ACTIVE | Noted: 2021-05-13

## 2023-02-21 PROBLEM — R19.5 POSITIVE FIT (FECAL IMMUNOCHEMICAL TEST): Status: ACTIVE | Noted: 2021-10-21

## 2023-02-21 PROBLEM — K59.09 OTHER CONSTIPATION: Status: ACTIVE | Noted: 2020-09-10

## 2023-02-21 PROBLEM — R10.13 EPIGASTRIC PAIN: Status: ACTIVE | Noted: 2021-04-07

## 2023-02-21 PROBLEM — A04.8 HELICOBACTER PYLORI INFECTION: Status: ACTIVE | Noted: 2021-05-13

## 2023-02-21 PROBLEM — R13.12 OROPHARYNGEAL DYSPHAGIA: Status: ACTIVE | Noted: 2020-09-10

## 2023-02-21 PROCEDURE — 73030 X-RAY EXAM OF SHOULDER: CPT | Performed by: ORTHOPAEDIC SURGERY

## 2023-02-21 PROCEDURE — 99024 POSTOP FOLLOW-UP VISIT: CPT | Performed by: ORTHOPAEDIC SURGERY

## 2023-02-21 RX ORDER — FINASTERIDE 5 MG/1
5 TABLET, FILM COATED ORAL DAILY
COMMUNITY
Start: 2020-06-20

## 2023-02-21 RX ORDER — UMECLIDINIUM BROMIDE AND VILANTEROL TRIFENATATE 62.5; 25 UG/1; UG/1
1 POWDER RESPIRATORY (INHALATION) DAILY
COMMUNITY
Start: 2023-01-13

## 2023-02-21 RX ORDER — METOPROLOL SUCCINATE 50 MG/1
50 TABLET, EXTENDED RELEASE ORAL EVERY EVENING
COMMUNITY

## 2023-02-21 RX ORDER — POLYETHYLENE GLYCOL 3350 17 G/17G
POWDER, FOR SOLUTION ORAL
COMMUNITY
Start: 2023-01-06

## 2023-02-21 RX ORDER — TAMSULOSIN HYDROCHLORIDE 0.4 MG/1
0.4 CAPSULE ORAL DAILY
COMMUNITY
Start: 2020-06-22

## 2023-02-21 NOTE — PROGRESS NOTES
Patient: Santiago Hatfield                MRN: [id number]       SSN: [id number]  YOB: 1945        AGE: 69 y.o. SEX: male  Body mass index is 31.01 kg/m². PCP: Oscar Abarca MD  02/21/23    Chief Complaint: Right shoulder follow-up    HPI: Santiago Hatfield is a 69 y.o. male patient who returns today 3 weeks out from his right shoulder reverse arthroplasty. He is wearing a sling. He reports pain with use.  9/10.     Past Medical History:   Diagnosis Date    Atypical chest pain     Bladder outlet obstruction     DVT (deep venous thrombosis) ([facility] [address] 06/2021    Erectile disorder due to medical 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     Frequency of urination     H/O spinal cord injury 1974    lumbar spine injury secondary to fall, no residual    HTN (hypertension)     Hypercholesterolemia     Illiterate     Injury of lumbar spine ([facility] [address] 1974    Leg pain, right     MGUS (monoclonal gammopathy of unknown significance)     Nocturia     Overactive bladder     Peripheral vascular disease (HCC)     Prostate cancer (HCC)     Pulmonary embolism ([facility] 06/2021    Shortness of breath        Family History   Problem Relation Age of Onset    Diabetes Mother     Hypertension Mother     Diabetes Maternal Grandmother     Hypertension Maternal Grandmother     Cancer Sister         brain    Cancer Sister         brain       Current Outpatient Medications   Medication Sig Dispense Refill    finasteride (PROSCAR) 5 MG tablet Take 5 mg by mouth daily      tamsulosin (FLOMAX) 0.4 MG capsule 0.4 mg daily      umeclidinium-vilanterol (ANORO ELLIPTA) 62.5-25 MCG/ACT inhaler Inhale 1 puff into the lungs daily      metoprolol succinate (TOPROL XL) 50 MG extended release tablet Take 50 mg by mouth every evening      polyethylene glycol (GLYCOLAX) 17 g packet MIX ONE PACKET WITH 8 OZS OF LIQUID AND DRINK ONCE DAILY      albuterol sulfate HFA (PROVENTIL;VENTOLIN;PROAIR) 108 (90 Base) MCG/ACT inhaler INHALE 2 PUFFS BY MOUTH EVERY 6 HOURS AS NEEDED      Albuterol Sulfate, sensor, 108 (90 Base) MCG/ACT AEPB Inhale into the lungs as needed      allopurinol (ZYLOPRIM) 100 MG tablet Take 100 mg by mouth 2 times daily      amLODIPine (NORVASC) 10 MG tablet Take 10 mg by mouth daily      apixaban (ELIQUIS) 5 MG TABS tablet Take 5 mg by mouth 2 times daily      diclofenac sodium (VOLTAREN) 1 % GEL Apply 2 g topically 4 times daily      DULoxetine (CYMBALTA) 60 MG extended release capsule Take 60 mg by mouth daily      ezetimibe (ZETIA) 10 MG tablet Take 10 mg by mouth daily      famotidine (PEPCID) 20 MG tablet Take 20 mg by mouth 2 times daily      Lidocaine, Anorectal, 5 % CREA Apply topically 2 times daily as needed      omeprazole (PRILOSEC) 40 MG delayed release capsule ceived the following from [facility]: Outside name: omeprazole (PRILOSEC) 40 mg capsule      pantoprazole (PROTONIX) 40 MG tablet Take 40 mg by mouth daily      polyethylene glycol (GLYCOLAX) 17 GM/SCOOP powder Take 17 g by mouth daily      spironolactone (ALDACTONE) 25 MG tablet Take 25 mg by mouth daily       No current facility-administered medications for this visit.        Allergies   Allergen Reactions    Latex Rash     Other reaction(s): Unknown    Asa-Apap-Caff Buffered Rash    Penicillins Angioedema     Other reaction(s): anaphylaxis      Crab Extract Allergy Skin Test Hives     Denies allergy to crabs    Shellfish Allergy Rash    Adhesive Tape Itching     Other reaction(s): rash/itching    Aspirin Other (See Comments)     Stomach upset - patient said he had peptic ulcers and cannot take  Other reaction(s): Unknown    Atorvastatin Other (See Comments)     Muscle cramps       Past Surgical History:   Procedure Laterality Date    COLONOSCOPY N/A 12/04/2019    COLONOSCOPY performed by Reji Valdez MD at [facility] ENDOSCOPY    CYSTOURETHROSCOPY  10/2022    HAND/FINGER SURGERY UNLISTED Right     carpal tunnel    HEENT Left     lens implant    ORTHOPEDIC SURGERY finger amputation left hand    TONSILLECTOMY      UPPER ARM/ELBOW SURGERY UNLISTED Right        Social History     Socioeconomic History    Marital status:      Spouse name: Not on file    Number of children: Not on file    Years of education: Not on file    Highest education level: Not on file   Occupational History    Not on file   Tobacco Use    Smoking status: Former     Types: Cigarettes     Quit date: 2015     Years since quittin.6    Smokeless tobacco: Never   Substance and Sexual Activity    Alcohol use: Not Currently     Alcohol/week: 0.0 standard drinks    Drug use: No    Sexual activity: Not on file   Other Topics Concern    Not on file   Social History Narrative    Not on file     Social Determinants of Health     Financial Resource Strain: Not on file   Food Insecurity: Not on file   Transportation Needs: Not on file   Physical Activity: Not on file   Stress: Not on file   Social Connections: Not on file   Intimate Partner Violence: Not on file   Housing Stability: Not on file       REVIEW OF SYSTEMS:      No changes from previous review of systems unless noted. PHYSICAL EXAMINATION:  Resp 18   Ht 5' 7\" (1.702 m)   BMI 31.01 kg/m²   Body mass index is 31.01 kg/m². GENERAL: Alert and oriented x3, in no acute distress. HEENT: Normocephalic, atraumatic. Right shoulder incision is healing well. Shoulder is appropriately stiff. IMAGING:  Imaging read by myself and interpreted as follows:  2 view x-rays of the right shoulder taken in the office today which show hardware in good position. ASSESSMENT & PLAN  Diagnosis: Status post right reverse shoulder arthroplasty, 3 weeks    Lizzette Braun will discontinue the sling. I have put him in physical therapy due to the fact that he has some issues with his left shoulder as well. I would like to see him back in 4 to 6 weeks. Prescription medication management discussed with patient.      Electronically signed by: Davie Jansen MD     Note: This note was completed using voice recognition software.   Any typographical/name errors or mistakes are unintentional.

## 2023-02-23 ENCOUNTER — HOSPITAL ENCOUNTER (OUTPATIENT)
Facility: HOSPITAL | Age: 78
Setting detail: RECURRING SERIES
Discharge: HOME | End: 2023-02-26
Payer: MEDICAID

## 2023-02-23 PROCEDURE — 97162 PT EVAL MOD COMPLEX 30 MIN: CPT

## 2023-02-23 NOTE — PROGRESS NOTES
PHYSICAL / OCCUPATIONAL THERAPY - DAILY TREATMENT NOTE (updated )    Patient Name: Marjan Jamison    Date: 2023    : 1945  Insurance: Payor: Sierra Santaer / Plan: Leonardo Cords / Product Type: *No Product type* /      Patient  verified Yes     Visit #   Current / Total 1 10   Time   In / Out 245 325   Pain   In / Out 4 4   Subjective Functional Status/Changes: See paper eval   Changes to:  Meds, Allergies, Med Hx, Sx Hx? If yes, update Summary List yes       TREATMENT AREA =  S/P reverse total shoulder arthroplasty, right [Z96.611]    OBJECTIVE         40 min [x]Eval - untimed                        Therapeutic Procedures:   Tx Min Billable or 1:1 Min (if diff from [employer]) Procedure, Rationale, Specifics          Details if applicable:              Details if applicable:            Details if applicable:            Details if applicable:            Details if applicable:     0 36 MC BC Totals Reminder: bill using total billable min of TIMED therapeutic procedures (example: do not include dry needle or estim unattended, both untimed codes, in totals to left)  8-22 min = 1 unit; 23-37 min = 2 units; 38-52 min = 3 units; 53-67 min = 4 units; 68-82 min = 5 units   Total Total     [x]  Patient Education billed concurrently with other procedures   [x] Review HEP    [] Progressed/Changed HEP, detail:    [] Other detail:       Objective Information/Functional Measures/Assessment    See POC    Patient will continue to benefit from skilled PT / OT services to modify and progress therapeutic interventions, analyze and address functional mobility deficits, analyze and address ROM deficits, analyze and address strength deficits, analyze and address soft tissue restrictions, analyze and cue for proper movement patterns, analyze and modify for postural abnormalities, analyze and address imbalance/dizziness, and instruct in home and community integration to address functional deficits and attain remaining goals. Progress toward goals / Updated goals:  []  See Progress Note/Recertification    Short Term Goals: To be accomplished in 2 weeks  1. Pt will compliance HEP in order to supplement PT treatment   Eval = established  2. Pt will demonstrate right shoulder PROM flexion to 120degrees in order to improve ability to perform dressing   Eval = 90degrees    Long Term Goals: To be accomplished in 10 treatments  1. Pt will score at least 63 on FOTO in order to improve overall function, decrease pain, and facilitate return to OF. Eval = 44  2. Pt will demonstrate right shoulder strength to at least 5-/5 in order to improve ability to lift objects shoulder height and perform heavy ADLs   Eval = not yet cleared  3. Pt will demonstrate right shoulder AROM flexion to 120degrees in order to improve ability to perform dressing   Eval = 75degrees  4.    Pt will demonstrate right shoulder PROM ER to 45degrees in order to improve ability to dress   Eval = not cleared     PLAN  Yes  Continue plan of care  []  Upgrade activities as tolerated  []  Discharge due to :  []  Other:    Marry Hare, PT    2/23/2023    3:41 PM    Future Appointments   Date Time Provider Eli Marte   3/1/2023 11:00 AM Ana Damon, PTA [facility]   3/3/2023  2:30 PM Ashwini Hoang, PT [facility]   3/7/2023  2:00 PM Frank Langley, PT [facility]   3/9/2023  2:00 PM Frank Langley, PT [facility]   3/14/2023  2:00 PM Frank Langley, PT [facility]   3/16/2023  2:00 PM Ashwini Hoang, PT [facility]   3/28/2023 11:00 AM Alex Mane MD [facility] BS AMB   4/3/2023  2:30 PM Miriam Doshi MD [facility] BS AMB   4/11/2023  9:30 AM Audelia Green MD [facility] AMB

## 2023-02-23 NOTE — PROGRESS NOTES
[facility] - [facility]  [address] [facility] [address] [phone] fax    Plan of Care / Statement of Necessity for Physical Therapy Services     Patient Name: Cirilo Maria : 1945   Medical   Diagnosis: S/P reverse total shoulder arthroplasty, right [Z96.611] Treatment Diagnosis: S/P reverse total shoulder arthroplasty, right [Z96.611]   Onset Date: 23     Referral Source: Ellen Calvin MD [facility]): 2023   Prior Hospitalization: See medical history Provider #: [id number]   Prior Level of Function: Functionally (I) right hand dominant; lives alone   Comorbidities: Heart disease, OA, thyroid disorder, HTN, visual impairments with lens surgery, hearing impairments, latex allergy, asthma   Medications: Verified on Patient Summary List     Assessment / key information:    Ms. Leona Reyes is a 75yo male who presents to PT s/p right reverse total shoulder replacement on 23. Pt scar is dry and well healilng, but has a stitch sticking out at the superior and inferior ends. There are no signs of acute infection. Pt demonstrates decreased shoulder ROM and strength with increased pain that is consistent with post surgical status. Pt will benefit from skilled PT in order to address listed deficits, decrease pain, and maximize functional potential.     Evaluation Complexity:  History:  MEDIUM  Complexity : 1-2 comorbidities / personal factors will impact the outcome/ POC ; Examination:  MEDIUM Complexity : 3 Standardized tests and measures addressin body structure, function, activity limitation and / or participation in recreation  ;Presentation:  MEDIUM Complexity : Evolving with changing characteristics  ; Clinical Decision Making:  MEDIUM Complexity : FOTO score of 26-74 FOTO score = an established functional score where 100 = no disability  Overall Complexity Rating: MEDIUM  Problem List: pain affecting function, decrease ROM, decrease strength, decrease ADL/functional abilities, decrease activity tolerance, and decrease flexibility/joint mobility   Treatment Plan may include any combination of the followin Therapeutic Exercise, 26512 Neuromuscular Re-Education, 51324 Manual Therapy, 63512 Therapeutic Activity, 58621 Self Care/Home Management, 86617 Electrical Stim unattended, 69189 Electrical Stim attended, and Other: heat and cold modalities  Patient / Family readiness to learn indicated by: asking questions, trying to perform skills, interest, return verbalization , and return demonstration   Persons(s) to be included in education: patient (P)  Barriers to Learning/Limitations: none  Patient Goal (s): more movemetn/range and be less stiff  Patient Self Reported Health Status: poor  Rehabilitation Potential: good    Short Term Goals: To be accomplished in 2 weeks  1. Pt will compliance HEP in order to supplement PT treatment   Eval = established  2. Pt will demonstrate right shoulder PROM flexion to 120degrees in order to improve ability to perform dressing   Eval = 90degrees    Long Term Goals: To be accomplished in 10 treatments  1. Pt will score at least 63 on FOTO in order to improve overall function, decrease pain, and facilitate return to PLOF. Eval = 44  2. Pt will demonstrate right shoulder strength to at least 5-/5 in order to improve ability to lift objects shoulder height and perform heavy ADLs   Eval = not yet cleared  3. Pt will demonstrate right shoulder AROM flexion to 120degrees in order to improve ability to perform dressing   Eval = 75degrees  4.    Pt will demonstrate right shoulder PROM ER to 45degrees in order to improve ability to dress   Eval = not cleared     Frequency / Duration: Patient would benefit from skilled PT 2 times per week for 5 weeks     Patient/ Caregiver education and instruction: Diagnosis, prognosis, self care, activity modification, and exercises [x]  Plan of care has been reviewed with PTA      GILLES GRAFF, PT       2/23/2023       3:35 PM  ===================================================================  I certify that the above Therapy Services are being furnished while the patient is under my care. I agree with the treatment plan and certify that this therapy is necessary.     [de-identified] Signature:_________________________   DATE:_________   TIME:________                           Prabhjot Castanon MD    ** Signature, Date and Time must be completed for valid certification **  Please sign and return to [facility] - [address], [address] [phone] fax

## 2023-03-01 ENCOUNTER — HOSPITAL ENCOUNTER (OUTPATIENT)
Facility: HOSPITAL | Age: 78
Setting detail: RECURRING SERIES
Discharge: HOME | End: 2023-03-04
Payer: MEDICAID

## 2023-03-01 PROCEDURE — 97110 THERAPEUTIC EXERCISES: CPT

## 2023-03-01 PROCEDURE — 97530 THERAPEUTIC ACTIVITIES: CPT

## 2023-03-01 NOTE — PROGRESS NOTES
PHYSICAL / OCCUPATIONAL THERAPY - DAILY TREATMENT NOTE (updated )    Patient Name: Carolann Johnston    Date: 3/1/2023    : 1945  Insurance: Payor: Evgeny Gomez / Plan: Porsha Castrejon / Product Type: *No Product type* /      Patient  verified Yes     Visit #   Current / Total 2 10   Time   In / Out 1105 1130   Pain   In / Out 0 0   Subjective Functional Status/Changes: Patient reports he's not having any pain right now, it's just tight. Changes to:  Meds, Allergies, Med Hx, Sx Hx? If yes, update Summary List no       TREATMENT AREA =  Pain in right shoulder [M25.511]  S/P reverse total shoulder arthroplasty, right [Z96.611]    OBJECTIVE      Therapeutic Procedures: Tx Min Billable or 1:1 Min (if diff from Tx Min) Procedure, Rationale, Specifics   10 10 70518 Therapeutic Exercise (timed):  increase ROM, strength, coordination, balance, and proprioception to improve patient's ability to progress to PLOF and address remaining functional goals. (see flow sheet as applicable)     Details if applicable:  PROM right [facility]      15 15 23546 Therapeutic Activity (timed):  use of dynamic activities replicating functional movements to increase ROM, strength, coordination, balance, and proprioception in order to improve patient's ability to progress to PLOF and address remaining functional goals.   (see flow sheet as applicable)     Details if applicable:  reaching activities   25 22 [address] [facility] [address] Reminder: bill using total billable min of TIMED therapeutic procedures (example: do not include dry needle or estim unattended, both untimed codes, in totals to left)  8-22 min = 1 unit; 23-37 min = 2 units; 38-52 min = 3 units; 53-67 min = 4 units; 68-82 min = 5 units   Total Total     [x]  Patient Education billed concurrently with other procedures   [x] Review HEP    [] Progressed/Changed HEP, detail:    [] Other detail:       Objective Information/Functional Measures/Assessment    Initiated PROM and exercise program per protocol and POC to which patient responded well. He had no pain but reported tightness in his shoulder before and after session. He needed cuing to relax with PROM but demonstrates good mobility. Patient will continue to benefit from skilled PT / OT services to modify and progress therapeutic interventions, analyze and address functional mobility deficits, analyze and address ROM deficits, analyze and address strength deficits, analyze and address soft tissue restrictions, analyze and cue for proper movement patterns, analyze and modify for postural abnormalities, analyze and address imbalance/dizziness, and instruct in home and community integration to address functional deficits and attain remaining goals. Progress toward goals / Updated goals:  []  See Progress Note/Recertification    Short Term Goals: To be accomplished in 2 weeks  1. Pt will compliance HEP in order to supplement PT treatment              Eval = established   Reports compliance  2. Pt will demonstrate right shoulder PROM flexion to 120degrees in order to improve ability to perform dressing              Eval = 90degrees    Progressing: approx. 115 deg per visual assessment  Long Term Goals: To be accomplished in 10 treatments  1. Pt will score at least 63 on FOTO in order to improve overall function, decrease pain, and facilitate return to PLOF. Eval = 44   Assess at 30 days  2. Pt will demonstrate right shoulder strength to at least 5-/5 in order to improve ability to lift objects shoulder height and perform heavy ADLs              Eval = not yet cleared  3. Pt will demonstrate right shoulder AROM flexion to 120degrees in order to improve ability to perform dressing              Eval = 75degrees  4.    Pt will demonstrate right shoulder PROM ER to 45degrees in order to improve ability to dress              Eval = not cleared     PLAN  Yes  Continue plan of care  []  Upgrade activities as tolerated  []  Discharge due to :  []  Other:    Domenico Raeann, DILLON    3/1/2023    9:54 AM    Future Appointments   Date Time Provider Eli Marte   3/1/2023 11:00 AM Domenico Cary, PTA [facility]   3/3/2023  2:30 PM Luis Halim, PT [facility]   3/7/2023  2:00 PM Pearlean Roof, PT [facility]   3/9/2023  2:00 PM Pearlean Roof, PT [facility]   3/14/2023  2:00 PM Pearlean Roof, PT [facility]   3/16/2023  2:00 PM Luis Halim, PT [facility]   3/28/2023 11:00 AM Swathi Garza MD [facility] BS AMB   4/3/2023  2:30 PM Heather Moreno MD [facility] BS AMB   4/11/2023  9:30 AM Pedro Bolton MD [facility] AMB

## 2023-03-03 ENCOUNTER — HOSPITAL ENCOUNTER (OUTPATIENT)
Facility: HOSPITAL | Age: 78
Setting detail: RECURRING SERIES
Discharge: HOME | End: 2023-03-06
Payer: MEDICAID

## 2023-03-03 PROCEDURE — 97110 THERAPEUTIC EXERCISES: CPT

## 2023-03-03 PROCEDURE — 97530 THERAPEUTIC ACTIVITIES: CPT

## 2023-03-03 NOTE — PROGRESS NOTES
PHYSICAL / OCCUPATIONAL THERAPY - DAILY TREATMENT NOTE (updated )    Patient Name: Candace Gearing    Date: 3/3/2023    : 1945  Insurance: Payor: Cindy Marr / Plan: Charito Grande / Product Type: *No Product type* /      Patient  verified Yes     Visit #   Current / Total 3 10   Time   In / Out 230 306   Pain   In / Out 2 0   Subjective Functional Status/Changes: \"I have a little pain. \"   Changes to:  Meds, Allergies, Med Hx, Sx Hx? If yes, update Summary List no       TREATMENT AREA =  Pain in right shoulder [M25.511]  S/P reverse total shoulder arthroplasty, right [Z96.611]    OBJECTIVE         Therapeutic Procedures: Tx Min Billable or 1:1 Min (if diff from Tx Min) Procedure, Rationale, Specifics   16 [phone] [id number] Therapeutic Exercise (timed):  increase ROM, strength, coordination, balance, and proprioception to improve patient's ability to progress to PLOF and address remaining functional goals. (see flow sheet as applicable)     Details if applicable:        04306 Therapeutic Activity (timed):  use of dynamic activities replicating functional movements to increase ROM, strength, coordination, balance, and proprioception in order to improve patient's ability to progress to PLOF and address remaining functional goals.   (see flow sheet as applicable)     Details if applicable:            Details if applicable:            Details if applicable:            Details if applicable:     39 28 [facility] Totals Reminder: bill using total billable min of TIMED therapeutic procedures (example: do not include dry needle or estim unattended, both untimed codes, in totals to left)  8-22 min = 1 unit; 23-37 min = 2 units; 38-52 min = 3 units; 53-67 min = 4 units; 68-82 min = 5 units   Total Total     [x]  Patient Education billed concurrently with other procedures   [x] Review HEP    [] Progressed/Changed HEP, detail:    [] Other detail:       Objective Information/Functional Measures/Assessment    Pt is making steady progress with available ROM. Needs cuing to prevent compensatory patterns with AAROM > 90 deg as he tends to side bend and IR his shoulder. Progress per protocol. Patient will continue to benefit from skilled PT / OT services to modify and progress therapeutic interventions, analyze and address functional mobility deficits, analyze and address ROM deficits, analyze and address strength deficits, analyze and address soft tissue restrictions, analyze and cue for proper movement patterns, analyze and modify for postural abnormalities, analyze and address imbalance/dizziness, and instruct in home and community integration to address functional deficits and attain remaining goals. Progress toward goals / Updated goals:  []  See Progress Note/Recertification    Short Term Goals: To be accomplished in 2 weeks  1. Pt will compliance HEP in order to supplement PT treatment              Eval = established              Reports compliance  2. Pt will demonstrate right shoulder PROM flexion to 120degrees in order to improve ability to perform dressing              Eval = 90degrees               Progressing: approx. 115 deg per visual assessment  Long Term Goals: To be accomplished in 10 treatments  1. Pt will score at least 63 on FOTO in order to improve overall function, decrease pain, and facilitate return to PLOF. Eval = 44              Assess at 30 days  2. Pt will demonstrate right shoulder strength to at least 5-/5 in order to improve ability to lift objects shoulder height and perform heavy ADLs              Eval = not yet cleared   Not cleared   3. Pt will demonstrate right shoulder AROM flexion to 120degrees in order to improve ability to perform dressing              Eval = 75degrees   Making progress  4.    Pt will demonstrate right shoulder PROM ER to 45degrees in order to improve ability to dress              Eval = not cleared    Remains limited     PLAN  Yes  Continue plan of care  []  Upgrade activities as tolerated  []  Discharge due to :  []  Other:    Bernadine Salmeron, PTA, CSCS    3/3/2023    2:22 PM    Future Appointments   Date Time Provider Eli Marte   3/3/2023  2:30 PM Bernadine Salmeron, PT [facility]   3/7/2023  2:00 PM [facility]   3/9/2023  2:00 PM [facility]   3/14/2023  2:00 PM [facility]   3/16/2023  2:00 PM [facility]   3/28/2023 11:00 AM Denisa Goodwin MD [facility] BS AMB   4/3/2023  2:30 PM Arian Peña MD [facility] BS AMB   4/11/2023  9:30 AM Ranell Kocher, MD [facility] BS AMB

## 2023-03-07 ENCOUNTER — HOSPITAL ENCOUNTER (OUTPATIENT)
Facility: HOSPITAL | Age: 78
Setting detail: RECURRING SERIES
Discharge: HOME | End: 2023-03-10
Payer: MEDICAID

## 2023-03-07 PROCEDURE — 97110 THERAPEUTIC EXERCISES: CPT

## 2023-03-07 PROCEDURE — 97530 THERAPEUTIC ACTIVITIES: CPT

## 2023-03-07 PROCEDURE — 97112 NEUROMUSCULAR REEDUCATION: CPT

## 2023-03-07 NOTE — PROGRESS NOTES
PHYSICAL / OCCUPATIONAL THERAPY - DAILY TREATMENT NOTE (updated )    Patient Name: Tasha Lanier    Date: 3/7/2023    : 1945  Insurance: Payor: Maria L Forrest / Plan: Janeth Heck / Product Type: *No Product type* /      Patient  verified Yes     Visit #   Current / Total 4 10   Time   In / Out 200 238   Pain   In / Out 2 2   Subjective Functional Status/Changes: \"The pain is just nagging, it's not bad\"   Changes to:  Meds, Allergies, Med Hx, Sx Hx? If yes, update Summary List no       TREATMENT AREA =  Pain in right shoulder [M25.511]  S/P reverse total shoulder arthroplasty, right [Z96.611]    OBJECTIVE         Therapeutic Procedures: Tx Min Billable or 1:1 Min (if diff from Tx Min) Procedure, Rationale, Specifics   15  14373 Therapeutic Exercise (timed):  increase ROM, strength, coordination, balance, and proprioception to improve patient's ability to progress to PLOF and address remaining functional goals. (see flow sheet as applicable)     Details if applicable:  including PROM      8  34208 Neuromuscular Re-Education (timed):  improve balance, coordination, kinesthetic sense, posture, core stability and proprioception to improve patient's ability to develop conscious control of individual muscles and awareness of position of extremities in order to progress to PLOF and address remaining functional goals. (see flow sheet as applicable)     Details if applicable:  scapular santino     15  41851 Therapeutic Activity (timed):  use of dynamic activities replicating functional movements to increase ROM, strength, coordination, balance, and proprioception in order to improve patient's ability to progress to PLOF and address remaining functional goals.   (see flow sheet as applicable)     Details if applicable:  functional reaching    [address]  [address] Reminder: bill using total billable min of TIMED therapeutic procedures (example: do not include dry needle or estim unattended, both untimed codes, in totals to left)  8-22 min = 1 unit; 23-37 min = 2 units; 38-52 min = 3 units; 53-67 min = 4 units; 68-82 min = 5 units   Total Total     [x]  Patient Education billed concurrently with other procedures   [x] Review HEP    [] Progressed/Changed HEP, detail:    [] Other detail:       Objective Information/Functional Measures/Assessment    Pt AROM/PROM is progressing well as noted below. Pt requires verbal and tactile cuing to utilize parascapular muscles and prevent substitutive patterns. Patient will continue to benefit from skilled PT / OT services to modify and progress therapeutic interventions, analyze and address functional mobility deficits, analyze and address ROM deficits, analyze and address strength deficits, analyze and address soft tissue restrictions, analyze and cue for proper movement patterns, analyze and modify for postural abnormalities, analyze and address imbalance/dizziness, and instruct in home and community integration to address functional deficits and attain remaining goals. Progress toward goals / Updated goals:  []  See Progress Note/Recertification    Short Term Goals: To be accomplished in 2 weeks  1. Pt will compliance HEP in order to supplement PT treatment              Eval = established              partial compliance (\"I try sometimes I fall asleep though\"  2. Pt will demonstrate right shoulder PROM flexion to 120degrees in order to improve ability to perform dressing              Eval = 90degrees              progressing = 115degrees    Long Term Goals: To be accomplished in 10 treatments  1. Pt will score at least 63 on FOTO in order to improve overall function, decrease pain, and facilitate return to PLOF. Eval = 44              reassess at 30days or 10th visit  2.    Pt will demonstrate right shoulder strength to at least 5-/5 in order to improve ability to lift objects shoulder height and perform heavy ADLs Eval = not yet cleared              performing isometrics well per protocol    3. Pt will demonstrate right shoulder AROM flexion to 120degrees in order to improve ability to perform dressing              Eval = 75degrees              progressing = 110 degrees with substitutions  4.    Pt will demonstrate right shoulder PROM ER to 45degrees in order to improve ability to dress              Eval = not cleared               Not yet cleared (clearance per protocol on 3/27/23)    PLAN  Yes  Continue plan of care  []  Upgrade activities as tolerated  []  Discharge due to :  []  Other:    Nick Basilio, PT    3/7/2023    2:04 PM    Future Appointments   Date Time Provider Eli Marte   3/9/2023  2:00 PM [facility]   3/14/2023  2:00 PM [facility]   3/16/2023  2:00 PM [facility]   3/28/2023 11:00 AM Jacob Burton MD [facility] BS AMB   4/3/2023  2:30 PM Estrella Abbasi MD [facility] BS AMB   4/11/2023  9:30 AM Deana Vasquez MD [facility] BS AMB

## 2023-03-09 ENCOUNTER — HOSPITAL ENCOUNTER (OUTPATIENT)
Facility: HOSPITAL | Age: 78
Setting detail: RECURRING SERIES
Discharge: HOME | End: 2023-03-12
Payer: MEDICAID

## 2023-03-09 PROCEDURE — 97110 THERAPEUTIC EXERCISES: CPT

## 2023-03-09 PROCEDURE — 97112 NEUROMUSCULAR REEDUCATION: CPT

## 2023-03-09 NOTE — PROGRESS NOTES
PHYSICAL / OCCUPATIONAL THERAPY - DAILY TREATMENT NOTE (updated )    Patient Name: Josephine Arzola    Date: 3/9/2023    : 1945  Insurance: Payor: Melissa Barnhart / Plan: Trang Kramer / Product Type: *No Product type* /      Patient  verified Yes     Visit #   Current / Total 5 10   Time   In / Out 200 239   Pain   In / Out 2 3-4   Subjective Functional Status/Changes: Pt has no significant changes to report today    Changes to:  Meds, Allergies, Med Hx, Sx Hx? If yes, update Summary List no       TREATMENT AREA =  Pain in right shoulder [M25.511]  S/P reverse total shoulder arthroplasty, right [Z96.611]    OBJECTIVE         Therapeutic Procedures: Tx Min Billable or 1:1 Min (if diff from Tx Min) Procedure, Rationale, Specifics   24 17 44032 Therapeutic Exercise (timed):  increase ROM, strength, coordination, balance, and proprioception to improve patient's ability to progress to PLOF and address remaining functional goals. (see flow sheet as applicable)     Details if applicable:       15  12830 Neuromuscular Re-Education (timed):  improve balance, coordination, kinesthetic sense, posture, core stability and proprioception to improve patient's ability to develop conscious control of individual muscles and awareness of position of extremities in order to progress to PLOF and address remaining functional goals.  (see flow sheet as applicable)     Details if applicable:     44 32 [facility] Totals Reminder: bill using total billable min of TIMED therapeutic procedures (example: do not include dry needle or estim unattended, both untimed codes, in totals to left)  8-22 min = 1 unit; 23-37 min = 2 units; 38-52 min = 3 units; 53-67 min = 4 units; 68-82 min = 5 units   Total Total     [x]  Patient Education billed concurrently with other procedures   [x] Review HEP    [] Progressed/Changed HEP, detail:    [] Other detail:       Objective Information/Functional Measures/Assessment    Pt required cuing to reduce force with isometrics. Doing well with current program; limitations are due to end range pain and protocol. Pt declined modalities despite increase in pain     Patient will continue to benefit from skilled PT / OT services to modify and progress therapeutic interventions, analyze and address functional mobility deficits, analyze and address ROM deficits, analyze and address strength deficits, analyze and address soft tissue restrictions, analyze and cue for proper movement patterns, analyze and modify for postural abnormalities, analyze and address imbalance/dizziness, and instruct in home and community integration to address functional deficits and attain remaining goals.    Progress toward goals / Updated goals:  []  See Progress Note/Recertification    Short Term Goals: To be accomplished in 2 weeks  1.   Pt will compliance HEP in order to supplement PT treatment              Eval = established              partial compliance (\"I try sometimes I fall asleep though\"  2.   Pt will demonstrate right shoulder PROM flexion to 120degrees in order to improve ability to perform dressing              Eval = 90degrees              progressing = 115degrees     Long Term Goals: To be accomplished in 10 treatments  1.   Pt will score at least 63 on FOTO in order to improve overall function, decrease pain, and facilitate return to PLOF.              Eval = 44              reassess at 30days or 10th visit  2.   Pt will demonstrate right shoulder strength to at least 5-/5 in order to improve ability to lift objects shoulder height and perform heavy ADLs              Eval = not yet cleared              performing isometrics well per protocol    3.   Pt will demonstrate right shoulder AROM flexion to 120degrees in order to improve ability to perform dressing              Eval = 75degrees              progressing = 110 degrees with substitutions  4.   Pt will demonstrate right  shoulder PROM ER to 45degrees in order to improve ability to dress              Eval = not cleared               Not yet cleared (clearance per protocol on 3/27/23)    PLAN  Yes  Continue plan of care  []  Upgrade activities as tolerated  []  Discharge due to :  []  Other:    Amee Bhagat, PT    3/9/2023    2:05 PM    Future Appointments   Date Time Provider Eli Rosanna   3/14/2023  2:00 PM [facility]   3/16/2023  2:00 PM [facility]a Hancock[facility]   3/28/2023 11:00 AM Ifrah Hankins MD [facility] BS AMB   4/3/2023  2:30 PM Ian Vincent MD [facility] BS AMB   4/11/2023  9:30 AM Sherman Roach MD [facility] BS AMB

## 2023-03-14 ENCOUNTER — HOSPITAL ENCOUNTER (OUTPATIENT)
Facility: HOSPITAL | Age: 78
Setting detail: RECURRING SERIES
Discharge: HOME | End: 2023-03-17
Payer: MEDICAID

## 2023-03-14 PROCEDURE — 97530 THERAPEUTIC ACTIVITIES: CPT

## 2023-03-14 PROCEDURE — 97110 THERAPEUTIC EXERCISES: CPT

## 2023-03-14 PROCEDURE — 97112 NEUROMUSCULAR REEDUCATION: CPT

## 2023-03-14 NOTE — PROGRESS NOTES
PHYSICAL / OCCUPATIONAL THERAPY - DAILY TREATMENT NOTE (updated )    Patient Name: Alberto Elizabeth    Date: 3/14/2023    : 1945  Insurance: Payor: Simona Mercer / Plan: Prachi Easton / Product Type: *No Product type* /      Patient  verified Yes     Visit #   Current / Total 6 10   Time   In / Out 200 239   Pain   In / Out 3-4 2   Subjective Functional Status/Changes: Pt reports increased pain today due to having difficulty putting his jacket on   Changes to:  Meds, Allergies, Med Hx, Sx Hx? If yes, update Summary List no       TREATMENT AREA =  Pain in right shoulder [M25.511]  S/P reverse total shoulder arthroplasty, right [Z96.611]    OBJECTIVE         Therapeutic Procedures: Tx Min Billable or 1:1 Min (if diff from Tx Min) Procedure, Rationale, Specifics   10 9 48852 Therapeutic Exercise (timed):  increase ROM, strength, coordination, balance, and proprioception to improve patient's ability to progress to PLOF and address remaining functional goals. (see flow sheet as applicable)     Details if applicable:       15  27278 Neuromuscular Re-Education (timed):  improve balance, coordination, kinesthetic sense, posture, core stability and proprioception to improve patient's ability to develop conscious control of individual muscles and awareness of position of extremities in order to progress to PLOF and address remaining functional goals. (see flow sheet as applicable)     Details if applicable:  including scapular PNF patterns during PROM; scapular santino   14  59303 Therapeutic Activity (timed):  use of dynamic activities replicating functional movements to increase ROM, strength, coordination, balance, and proprioception in order to improve patient's ability to progress to PLOF and address remaining functional goals.   (see flow sheet as applicable)     Details if applicable:  functional reaching    39 45 [facility] Totals Reminder: bill using total billable min of TIMED therapeutic procedures (example: do not include dry needle or estim unattended, both untimed codes, in totals to left)  8-22 min = 1 unit; 23-37 min = 2 units; 38-52 min = 3 units; 53-67 min = 4 units; 68-82 min = 5 units   Total Total     [x]  Patient Education billed concurrently with other procedures   [x] Review HEP    [] Progressed/Changed HEP, detail:    [] Other detail:       Objective Information/Functional Measures/Assessment    Pt continues to progress well per protocol. His increased pain was reduced with exercise today; educated pt on technique to don coat without increased pain     Patient will continue to benefit from skilled PT / OT services to modify and progress therapeutic interventions, analyze and address functional mobility deficits, analyze and address ROM deficits, analyze and address strength deficits, analyze and address soft tissue restrictions, analyze and cue for proper movement patterns, analyze and modify for postural abnormalities, analyze and address imbalance/dizziness, and instruct in home and community integration to address functional deficits and attain remaining goals. Progress toward goals / Updated goals:  []  See Progress Note/Recertification    Short Term Goals: To be accomplished in 2 weeks  1. Pt will compliance HEP in order to supplement PT treatment              Eval = established              reports compliance  2. Pt will demonstrate right shoulder PROM flexion to 120degrees in order to improve ability to perform dressing              Eval = 90degrees              Met - 122degrees     Long Term Goals: To be accomplished in 10 treatments  1. Pt will score at least 63 on FOTO in order to improve overall function, decrease pain, and facilitate return to PLOF. Eval = 44              reassess at end of POC  2.    Pt will demonstrate right shoulder strength to at least 5-/5 in order to improve ability to lift objects shoulder height and perform heavy ADLs              Eval = not yet cleared              continued performance of isometrics per protocol   3.   Pt will demonstrate right shoulder AROM flexion to 120degrees in order to improve ability to perform dressing              Eval = 75degrees              PROM progressing, measure AROM NV  4.   Pt will demonstrate right shoulder PROM ER to 45degrees in order to improve ability to dress              Eval = not cleared               Not yet cleared (clearance per protocol on 3/27/23)    PLAN  Yes  Continue plan of care  []  Upgrade activities as tolerated  []  Discharge due to :  []  Other:    GILLES GRAFF, PT    3/14/2023    2:13 PM    Future Appointments   Date Time Provider Department Center   3/16/2023  2:00 PM Timbo Monge, PT [facility]   3/28/2023 11:00 AM Andrew Wren MD [facility]   4/3/2023  2:30 PM Berto HOANG Do, MD [facility]   4/11/2023  9:30 AM Liza Boss MD [facility] BS AMB

## 2023-03-16 ENCOUNTER — HOSPITAL ENCOUNTER (OUTPATIENT)
Facility: HOSPITAL | Age: 78
Setting detail: RECURRING SERIES
Discharge: HOME | End: 2023-03-19
Payer: MEDICAID

## 2023-03-16 PROCEDURE — 97110 THERAPEUTIC EXERCISES: CPT

## 2023-03-16 PROCEDURE — 97530 THERAPEUTIC ACTIVITIES: CPT

## 2023-03-16 PROCEDURE — 97112 NEUROMUSCULAR REEDUCATION: CPT

## 2023-03-16 NOTE — PROGRESS NOTES
or estim unattended, both untimed codes, in totals to left)  8-22 min = 1 unit; 23-37 min = 2 units; 38-52 min = 3 units; 53-67 min = 4 units; 68-82 min = 5 units   Total Total     [x]  Patient Education billed concurrently with other procedures   [x] Review HEP    [] Progressed/Changed HEP, detail:    [] Other detail:       Objective Information/Functional Measures/Assessment    Pt continues to progress wonderfully with his PROM and AAROM. Some tactile and verbal cuing needed to prevent compensatory patterns with reaching overhead. Pt due for a reassessment at next appointment. Patient will continue to benefit from skilled PT / OT services to modify and progress therapeutic interventions, analyze and address functional mobility deficits, analyze and address ROM deficits, analyze and address strength deficits, analyze and address soft tissue restrictions, analyze and cue for proper movement patterns, analyze and modify for postural abnormalities, analyze and address imbalance/dizziness, and instruct in home and community integration to address functional deficits and attain remaining goals. Progress toward goals / Updated goals:  []  See Progress Note/Recertification    Short Term Goals: To be accomplished in 2 weeks  1. Pt will compliance HEP in order to supplement PT treatment              Eval = established              reports compliance  2. Pt will demonstrate right shoulder PROM flexion to 120degrees in order to improve ability to perform dressing              Eval = 90degrees              Met - 122degrees     Long Term Goals: To be accomplished in 10 treatments  1. Pt will score at least 63 on FOTO in order to improve overall function, decrease pain, and facilitate return to PLOF. Eval = 44              reassess at end of POC  2.    Pt will demonstrate right shoulder strength to at least 5-/5 in order to improve ability to lift objects shoulder height and perform heavy ADLs

## 2023-03-22 ENCOUNTER — HOSPITAL ENCOUNTER (OUTPATIENT)
Facility: HOSPITAL | Age: 78
Setting detail: RECURRING SERIES
Discharge: HOME | End: 2023-03-25
Payer: MEDICAID

## 2023-03-22 PROCEDURE — 97112 NEUROMUSCULAR REEDUCATION: CPT

## 2023-03-22 PROCEDURE — 97110 THERAPEUTIC EXERCISES: CPT

## 2023-03-22 PROCEDURE — 97530 THERAPEUTIC ACTIVITIES: CPT

## 2023-03-22 NOTE — PROGRESS NOTES
[facility] - [facility] 85  [address] [place], [address] [phone] fax    PROGRESS NOTE  Patient Name: Temo Sims : 1945   Treatment/Medical Diagnosis: Pain in right shoulder [M25.511]  S/P reverse total shoulder arthroplasty, right [Z96.611]   Referral Source: Swapnil Price MD     Date of Initial Visit: 2023 Attended Visits: 8 Missed Visits: 0     SUMMARY OF TREATMENT  Mr. Beckie Rush reports 50% improvement since beginning PT. Pt has progressed with his passive and active ROM. His pain continues to fluctuate. He reports elevated pain today secondary to increased reaching at home into his cabinets. He reports most of his pain with ER activities at home, making his bed, and completing personal hygiene activities. We will continue with PT to address his remaining functional deficits. CURRENT STATUS  Short Term Goals: To be accomplished in 2 weeks  1. Pt will compliance HEP in order to supplement PT treatment              Eval = established              PROGRESSING; reports compliance, will update as progresses in protocol  2. Pt will demonstrate right shoulder PROM flexion to 120 degrees in order to improve ability to perform dressing              Eval = 90degrees              MET - 127 degrees     Long Term Goals: To be accomplished in 10 treatments  1. Pt will score at least 63 on FOTO in order to improve overall function, decrease pain, and facilitate return to PLOF. Eval = 44              PROGRESSING; 47  2. Pt will demonstrate right shoulder strength to at least 5-/5 in order to improve ability to lift objects shoulder height and perform heavy ADLs              Eval = not yet cleared              NOT YET CLEARED  3. Pt will demonstrate right shoulder AROM flexion to 120degrees in order to improve ability to perform dressing              Eval = 75degrees              PROGRESSING; 97 deg  4.    Pt will demonstrate
TIMED therapeutic procedures (example: do not include dry needle or estim unattended, both untimed codes, in totals to left)  8-22 min = 1 unit; 23-37 min = 2 units; 38-52 min = 3 units; 53-67 min = 4 units; 68-82 min = 5 units   Total Total     [x]  Patient Education billed concurrently with other procedures   [x] Review HEP    [] Progressed/Changed HEP, detail:    [] Other detail:       Objective Information/Functional Measures/Assessment    FOTO 47    PROM right shoulder flexion 127 deg  AROM right shoulder flexion 97 deg    Mr. Trent Gonzalez reports 50% improvement since beginning PT. Pt has progressed with his passive and active ROM. His pain continues to fluctuate. He reports elevated pain today secondary to increased reaching at home into his cabinets. He reports most of his pain with ER activities at home, making his bed, and completing personal hygiene activities. We will continue with PT to address his remaining functional deficits. Patient will continue to benefit from skilled PT / OT services to modify and progress therapeutic interventions, analyze and address functional mobility deficits, analyze and address ROM deficits, analyze and address strength deficits, analyze and address soft tissue restrictions, analyze and cue for proper movement patterns, analyze and modify for postural abnormalities, analyze and address imbalance/dizziness, and instruct in home and community integration to address functional deficits and attain remaining goals. Progress toward goals / Updated goals:  []  See Progress Note/Recertification    Short Term Goals: To be accomplished in 2 weeks  1. Pt will compliance HEP in order to supplement PT treatment              Eval = established              PROGRESSING; reports compliance, will update as progresses in protocol  2.    Pt will demonstrate right shoulder PROM flexion to 120 degrees in order to improve ability to perform dressing              Eval = 90degrees

## 2023-03-23 ENCOUNTER — HOSPITAL ENCOUNTER (OUTPATIENT)
Facility: HOSPITAL | Age: 78
Setting detail: RECURRING SERIES
Discharge: HOME | End: 2023-03-26
Payer: MEDICAID

## 2023-03-23 PROCEDURE — 97112 NEUROMUSCULAR REEDUCATION: CPT

## 2023-03-23 PROCEDURE — 97110 THERAPEUTIC EXERCISES: CPT

## 2023-03-23 PROCEDURE — 97530 THERAPEUTIC ACTIVITIES: CPT

## 2023-03-23 NOTE — PROGRESS NOTES
PHYSICAL / OCCUPATIONAL THERAPY - DAILY TREATMENT NOTE (updated )    Patient Name: Rita Bradley    Date: 3/23/2023    : 1945  Insurance: Payor: [employer] [facility] [employer] / Plan: Roderick Ohm / Product Type: *No Product type* /      Patient  verified Yes     Visit #   Current / Total 1 8   Time   In / Out 11:30 12:11   Pain   In / Out 6/10 4/10   Subjective Functional Status/Changes: Pt c/o right > left shoulder pain currently; OK after last PT session. Changes to:  Meds, Allergies, Med Hx, Sx Hx? If yes, update Summary List no       TREATMENT AREA =  Pain in right shoulder [M25.511]  S/P reverse total shoulder arthroplasty, right [Z96.611]    OBJECTIVE    Therapeutic Procedures: Tx Min Billable or 1:1 Min (if diff from Tx Min) Procedure, Rationale, Specifics    79168 Therapeutic Exercise (timed):  increase ROM, strength, coordination, balance, and proprioception to improve patient's ability to progress to PLOF and address remaining functional goals. (see flow sheet as applicable)     Details if applicable:       10 10 40432 Therapeutic Activity (timed):  use of dynamic activities replicating functional movements to increase ROM, strength, coordination, balance, and proprioception in order to improve patient's ability to progress to PLOF and address remaining functional goals. (see flow sheet as applicable)     Details if applicable:     8  74845 Neuromuscular Re-Education (timed):  improve balance, coordination, kinesthetic sense, posture, core stability and proprioception to improve patient's ability to develop conscious control of individual muscles and awareness of position of extremities in order to progress to PLOF and address remaining functional goals.  (see flow sheet as applicable)     Details if applicable:            Details if applicable:            Details if applicable:     41 45 [address] [facility] [address] Reminder: bill using total billable min of TIMED therapeutic

## 2023-03-24 DIAGNOSIS — D47.2 MONOCLONAL GAMMOPATHY: Primary | ICD-10-CM

## 2023-03-24 DIAGNOSIS — D47.2 MONOCLONAL GAMMOPATHY: ICD-10-CM

## 2023-03-27 ENCOUNTER — HOSPITAL ENCOUNTER (OUTPATIENT)
Facility: HOSPITAL | Age: 78
Setting detail: RECURRING SERIES
Discharge: HOME | End: 2023-03-30
Payer: MEDICAID

## 2023-03-27 PROCEDURE — 97110 THERAPEUTIC EXERCISES: CPT

## 2023-03-27 PROCEDURE — 97112 NEUROMUSCULAR REEDUCATION: CPT

## 2023-03-27 PROCEDURE — 97530 THERAPEUTIC ACTIVITIES: CPT

## 2023-03-27 NOTE — PROGRESS NOTES
PHYSICAL / OCCUPATIONAL THERAPY - DAILY TREATMENT NOTE (updated )    Patient Name: Laura Look    Date: 3/27/2023    : 1945  Insurance: Payor: Jing Giles / Plan: Nate Benjie / Product Type: *No Product type* /      Patient  verified Yes     Visit #   Current / Total 2 10   Time   In / Out 922 1009   Pain   In / Out 7-8 2   Subjective Functional Status/Changes: \"I fell yesterday going up the steps. I landed on my left hip and shoulder. \"   Changes to:  Meds, Allergies, Med Hx, Sx Hx? If yes, update Summary List no       TREATMENT AREA =  Pain in right shoulder [M25.511]  S/P reverse total shoulder arthroplasty, right [Z96.611]    OBJECTIVE         Therapeutic Procedures: Tx Min Billable or 1:1 Min (if diff from Tx Min) Procedure, Rationale, Specifics   19 10 23590 Therapeutic Exercise (timed):  increase ROM, strength, coordination, balance, and proprioception to improve patient's ability to progress to PLOF and address remaining functional goals. (see flow sheet as applicable)     Details if applicable:       10 10 56852 Neuromuscular Re-Education (timed):  improve balance, coordination, kinesthetic sense, posture, core stability and proprioception to improve patient's ability to develop conscious control of individual muscles and awareness of position of extremities in order to progress to PLOF and address remaining functional goals. (see flow sheet as applicable)     Details if applicable:      18657 Therapeutic Activity (timed):  use of dynamic activities replicating functional movements to increase ROM, strength, coordination, balance, and proprioception in order to improve patient's ability to progress to PLOF and address remaining functional goals.   (see flow sheet as applicable)     Details if applicable:            Details if applicable:            Details if applicable:     52 38 [facility] Totals Reminder: bill using total billable min of TIMED

## 2023-03-28 ENCOUNTER — OFFICE VISIT (OUTPATIENT)
Age: 78
End: 2023-03-28

## 2023-03-28 DIAGNOSIS — Z96.611 S/P REVERSE TOTAL SHOULDER ARTHROPLASTY, RIGHT: Primary | ICD-10-CM

## 2023-03-28 DIAGNOSIS — M75.102 LEFT ROTATOR CUFF TEAR ARTHROPATHY: ICD-10-CM

## 2023-03-28 DIAGNOSIS — M12.812 LEFT ROTATOR CUFF TEAR ARTHROPATHY: ICD-10-CM

## 2023-03-28 DIAGNOSIS — M25.512 ACUTE PAIN OF LEFT SHOULDER: ICD-10-CM

## 2023-03-28 RX ORDER — OXYCODONE HYDROCHLORIDE AND ACETAMINOPHEN 5; 325 MG/1; MG/1
1 TABLET ORAL EVERY 6 HOURS PRN
Qty: 28 TABLET | Refills: 0 | Status: SHIPPED | OUTPATIENT
Start: 2023-03-28 | End: 2023-04-04

## 2023-03-28 NOTE — PROGRESS NOTES
[facility] AMBULATORY OFFICE NOTE    Patient: Elena Stoddard                MRN: [id number]       SSN: [id number]  YOB: 1945        AGE: 69 y.o. SEX: male  There is no height or weight on file to calculate BMI. PCP: Manjeet Nixon MD  03/28/23    Chief Complaint: Bilateral shoulder follow-up    HPI: Elena Stoddard is a 69 y.o. male patient who is now 2 months out from his right reverse arthroplasty. He unfortunate a fall this weekend where he slipped on some wet stairs. To try to help protect the right shoulder he fell onto the left side. He has been noticing increasing pain in both shoulders since the fall. He is in physical therapy.     Past Medical History:   Diagnosis Date    Atypical chest pain     Bladder outlet obstruction     DVT (deep venous thrombosis) ([address] 06/2021    Erectile disorder due to medical 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     Frequency of urination     H/O spinal cord injury 1974    lumbar spine injury secondary to fall, no residual    HTN (hypertension)     Hypercholesterolemia     Illiterate     Injury of lumbar spine ([address] 1974    Leg pain, right     MGUS (monoclonal gammopathy of unknown significance)     Nocturia     Overactive bladder     Peripheral vascular disease (HCC)     Prostate cancer ([address]     Pulmonary embolism ([address] 06/2021    Shortness of breath        Family History   Problem Relation Age of Onset    Diabetes Mother     Hypertension Mother     Diabetes Maternal Grandmother     Hypertension Maternal Grandmother     Cancer Sister         brain    Cancer Sister         brain       Current Outpatient Medications   Medication Sig Dispense Refill    finasteride (PROSCAR) 5 MG tablet Take 5 mg by mouth daily      metoprolol succinate (TOPROL XL) 50 MG extended release tablet Take 50 mg by mouth every evening      polyethylene glycol (GLYCOLAX) 17 g packet MIX ONE PACKET WITH 8 OZS OF LIQUID AND DRINK ONCE DAILY      tamsulosin

## 2023-03-29 ENCOUNTER — HOSPITAL ENCOUNTER (OUTPATIENT)
Facility: HOSPITAL | Age: 78
Discharge: HOME | End: 2023-04-01

## 2023-03-29 ENCOUNTER — APPOINTMENT (OUTPATIENT)
Facility: HOSPITAL | Age: 78
End: 2023-03-29
Payer: MEDICAID

## 2023-03-29 LAB — LABCORP SPECIMEN COLLECTION: NORMAL

## 2023-03-29 PROCEDURE — 99001 SPECIMEN HANDLING PT-LAB: CPT

## 2023-03-30 LAB
BASOPHILS # BLD AUTO: 0 X10E3/UL (ref 0–0.2)
BASOPHILS NFR BLD AUTO: 1 %
EOSINOPHIL # BLD AUTO: 0.2 X10E3/UL (ref 0–0.4)
EOSINOPHIL NFR BLD AUTO: 4 %
ERYTHROCYTE [DISTWIDTH] IN BLOOD BY AUTOMATED COUNT: 13.4 % (ref 11.6–15.4)
HCT VFR BLD AUTO: 40.4 % (ref 37.5–51)
HGB BLD-MCNC: 13.2 G/DL (ref 13–17.7)
IGG SERPL-MCNC: 1332 MG/DL (ref 603–1613)
IMM GRANULOCYTES # BLD AUTO: 0 X10E3/UL (ref 0–0.1)
IMM GRANULOCYTES NFR BLD AUTO: 0 %
IRON SATN MFR SERPL: 24 % (ref 15–55)
IRON SERPL-MCNC: 65 UG/DL (ref 38–169)
KAPPA LC FREE SER-MCNC: 25.7 MG/L (ref 3.3–19.4)
KAPPA LC FREE/LAMBDA FREE SER: 1.7 {RATIO} (ref 0.26–1.65)
LAMBDA LC FREE SERPL-MCNC: 15.1 MG/L (ref 5.7–26.3)
LYMPHOCYTES # BLD AUTO: 1.4 X10E3/UL (ref 0.7–3.1)
LYMPHOCYTES NFR BLD AUTO: 33 %
MCH RBC QN AUTO: 29.3 PG (ref 26.6–33)
MCHC RBC AUTO-ENTMCNC: 32.7 G/DL (ref 31.5–35.7)
MCV RBC AUTO: 90 FL (ref 79–97)
MONOCYTES # BLD AUTO: 0.4 X10E3/UL (ref 0.1–0.9)
MONOCYTES NFR BLD AUTO: 11 %
NEUTROPHILS # BLD AUTO: 2.1 X10E3/UL (ref 1.4–7)
NEUTROPHILS NFR BLD AUTO: 51 %
PLATELET # BLD AUTO: 321 X10E3/UL (ref 150–450)
RBC # BLD AUTO: 4.51 X10E6/UL (ref 4.14–5.8)
TIBC SERPL-MCNC: 275 UG/DL (ref 250–450)
UIBC SERPL-MCNC: 210 UG/DL (ref 111–343)
WBC # BLD AUTO: 4.2 X10E3/UL (ref 3.4–10.8)

## 2023-04-03 ENCOUNTER — OFFICE VISIT (OUTPATIENT)
Age: 78
End: 2023-04-03
Payer: MEDICAID

## 2023-04-03 VITALS
OXYGEN SATURATION: 96 % | RESPIRATION RATE: 18 BRPM | WEIGHT: 194 LBS | SYSTOLIC BLOOD PRESSURE: 144 MMHG | HEIGHT: 67 IN | HEART RATE: 91 BPM | DIASTOLIC BLOOD PRESSURE: 77 MMHG | BODY MASS INDEX: 30.45 KG/M2

## 2023-04-03 DIAGNOSIS — M54.50 CHRONIC LOW BACK PAIN, UNSPECIFIED BACK PAIN LATERALITY, UNSPECIFIED WHETHER SCIATICA PRESENT: ICD-10-CM

## 2023-04-03 DIAGNOSIS — I82.4Y9 DEEP VEIN THROMBOSIS (DVT) OF PROXIMAL LOWER EXTREMITY, UNSPECIFIED CHRONICITY, UNSPECIFIED LATERALITY (HCC): ICD-10-CM

## 2023-04-03 DIAGNOSIS — G89.29 CHRONIC LOW BACK PAIN, UNSPECIFIED BACK PAIN LATERALITY, UNSPECIFIED WHETHER SCIATICA PRESENT: ICD-10-CM

## 2023-04-03 DIAGNOSIS — I26.99 OTHER PULMONARY EMBOLISM WITHOUT ACUTE COR PULMONALE, UNSPECIFIED CHRONICITY (HCC): ICD-10-CM

## 2023-04-03 DIAGNOSIS — D47.2 MGUS (MONOCLONAL GAMMOPATHY OF UNKNOWN SIGNIFICANCE): Primary | ICD-10-CM

## 2023-04-03 DIAGNOSIS — D64.9 CHRONIC ANEMIA: ICD-10-CM

## 2023-04-03 PROCEDURE — 99212 OFFICE O/P EST SF 10 MIN: CPT | Performed by: INTERNAL MEDICINE

## 2023-04-03 ASSESSMENT — ENCOUNTER SYMPTOMS
SHORTNESS OF BREATH: 0
NAUSEA: 0
VOMITING: 0
COLOR CHANGE: 0
BLOOD IN STOOL: 0
CHEST TIGHTNESS: 0
CONSTIPATION: 0
ABDOMINAL PAIN: 0
DIARRHEA: 0

## 2023-04-04 ENCOUNTER — HOSPITAL ENCOUNTER (OUTPATIENT)
Facility: HOSPITAL | Age: 78
Setting detail: RECURRING SERIES
Discharge: HOME | End: 2023-04-07
Payer: MEDICAID

## 2023-04-04 PROCEDURE — 97112 NEUROMUSCULAR REEDUCATION: CPT

## 2023-04-04 PROCEDURE — 97530 THERAPEUTIC ACTIVITIES: CPT

## 2023-04-04 PROCEDURE — 97110 THERAPEUTIC EXERCISES: CPT

## 2023-04-04 NOTE — PROGRESS NOTES
isometrics with arm near side. 4.   Pt will demonstrate right shoulder AROM flexion to 120degrees in order to improve ability to perform dressing              PN status: PROGRESSING; 97 deg              Grossly >/= 100-110 deg.   5.   Pt will demonstrate right shoulder PROM ER to 45degrees in order to improve ability to dress              PN status: NOT YET CLEARED(clearance per protocol on 3/27/23)              Not yet cleared, per protocol    PLAN  Yes  Continue plan of care  []  Upgrade activities as tolerated  []  Discharge due to :  []  Other:    Jam Pollock, PTA    4/4/2023    11:19 AM    Future Appointments   Date Time Provider Eli Marte   4/4/2023 11:30 AM Panther Burn Maris, PTA [facility]   4/6/2023 11:30 AM Jam Pollock, PTA [facility]   [facility]2023  8[facility]   4/10/2023  2:00 PM Jam Pollock, PTA [facility]   4/11/2023  9:30 AM Andressa Acosta MD [facility] BS AMB   4/12/2023  2:00 PM [facility] PT [address] [facility]   4/17/2023 11:30 AM Lindy Richards, PT [facility]   4/19/2023 10:50 AM Kate Leos, PT [facility]   4/24/2023 11:30 AM Lindy Richards, PT [facility]   4/26/2023 11:30 AM Lindy Richards, PT [facility]   5/9/2023 10:45 AM Jerson Day MD [facility] BS AMB   7/3/2023 10:30 AM Brain Landin MD [facility] BS AMB

## 2023-04-17 ENCOUNTER — TELEPHONE (OUTPATIENT)
Age: 78
End: 2023-04-17

## 2023-04-17 ENCOUNTER — HOSPITAL ENCOUNTER (OUTPATIENT)
Facility: HOSPITAL | Age: 78
Setting detail: RECURRING SERIES
Discharge: HOME | End: 2023-04-20
Payer: MEDICAID

## 2023-04-17 DIAGNOSIS — Z96.611 S/P REVERSE TOTAL SHOULDER ARTHROPLASTY, RIGHT: Primary | ICD-10-CM

## 2023-04-17 RX ORDER — TRAMADOL HYDROCHLORIDE 50 MG/1
50 TABLET ORAL EVERY 4 HOURS PRN
Qty: 42 TABLET | Refills: 0 | Status: SHIPPED | OUTPATIENT
Start: 2023-04-17 | End: 2023-04-24

## 2023-04-17 NOTE — PROGRESS NOTES
PHYSICAL / OCCUPATIONAL THERAPY - DAILY TREATMENT NOTE (updated )    Patient Name: Carley Da Silva    Date: 2023    : 1945  Insurance: Payor: Yolanda Kaiser / Plan: Tevin Box / Product Type: *No Product type* /      Patient  verified Yes     Time   In / Out 1130 1148   Pain   In / Out 10 10   Subjective Functional Status/Changes: Pt reports he was reaching up into his cabinet a couple days ago when his shoulder started hurting. He is out of pain pills and he has been having difficulty with sleeping. He feels his arm and hand are swollen. Changes to:  Meds, Allergies, Med Hx, Sx Hx? If yes, update Summary List no       TREATMENT AREA =  Pain in right shoulder [M25.511]  S/P reverse total shoulder arthroplasty, right [Z96.611]      Mr. Guadalupe Banegas presented with increased pain today and his right UE held in add/IR with elbow flexion against his body. Pt demonstrating lateral shoulder swelling, redness of his scar, and significantly increased pain. Pt notes feeling hot. 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 Increased scapular winging noted. PROM able to initiate to ~90degrees in flex/scaption and ~10degrees ER with significant pain. Discussion with MA indicated to have pt make an MD appointment tomorrow for follow up. Treatment not billed due to limited insurance authorization.

## 2023-04-17 NOTE — TELEPHONE ENCOUNTER
Pt came to [facility] office to ask if Holger can prescribe pain medication for his shoulder. Pt complains of extreme pain and can be reached at [phone].

## 2023-04-18 ENCOUNTER — OFFICE VISIT (OUTPATIENT)
Age: 78
End: 2023-04-18

## 2023-04-18 VITALS — HEIGHT: 67 IN | RESPIRATION RATE: 20 BRPM | BODY MASS INDEX: 30.54 KG/M2

## 2023-04-18 DIAGNOSIS — Z96.611 S/P REVERSE TOTAL SHOULDER ARTHROPLASTY, RIGHT: Primary | ICD-10-CM

## 2023-04-18 NOTE — PROGRESS NOTES
management discussed with patient. Plan was discussed in detail with patient, all questions were answered, and patient voiced understanding of plan. Electronically signed by: Crispin Renee MD     Note: This note was completed using voice recognition software.   Any typographical/name errors or mistakes are unintentional.

## 2023-04-19 ENCOUNTER — APPOINTMENT (OUTPATIENT)
Facility: HOSPITAL | Age: 78
End: 2023-04-19
Payer: MEDICAID

## 2023-04-24 ENCOUNTER — APPOINTMENT (OUTPATIENT)
Facility: HOSPITAL | Age: 78
End: 2023-04-24
Payer: MEDICAID

## 2023-04-26 ENCOUNTER — OFFICE VISIT (OUTPATIENT)
Age: 78
End: 2023-04-26
Payer: MEDICAID

## 2023-04-26 ENCOUNTER — APPOINTMENT (OUTPATIENT)
Facility: HOSPITAL | Age: 78
End: 2023-04-26
Payer: MEDICAID

## 2023-04-26 VITALS
WEIGHT: 193.2 LBS | HEIGHT: 67 IN | TEMPERATURE: 97.9 F | SYSTOLIC BLOOD PRESSURE: 139 MMHG | HEART RATE: 92 BPM | BODY MASS INDEX: 30.32 KG/M2 | RESPIRATION RATE: 18 BRPM | DIASTOLIC BLOOD PRESSURE: 76 MMHG | OXYGEN SATURATION: 97 %

## 2023-04-26 DIAGNOSIS — I26.94 MULTIPLE SUBSEGMENTAL PULMONARY EMBOLI WITHOUT ACUTE COR PULMONALE (HCC): ICD-10-CM

## 2023-04-26 DIAGNOSIS — R06.02 SOB (SHORTNESS OF BREATH): ICD-10-CM

## 2023-04-26 DIAGNOSIS — J41.8 MIXED SIMPLE AND MUCOPURULENT CHRONIC BRONCHITIS (HCC): Primary | ICD-10-CM

## 2023-04-26 PROCEDURE — 3078F DIAST BP <80 MM HG: CPT | Performed by: INTERNAL MEDICINE

## 2023-04-26 PROCEDURE — 99214 OFFICE O/P EST MOD 30 MIN: CPT | Performed by: INTERNAL MEDICINE

## 2023-04-26 PROCEDURE — 1123F ACP DISCUSS/DSCN MKR DOCD: CPT | Performed by: INTERNAL MEDICINE

## 2023-04-26 PROCEDURE — 3075F SYST BP GE 130 - 139MM HG: CPT | Performed by: INTERNAL MEDICINE

## 2023-04-26 RX ORDER — IPRATROPIUM BROMIDE 21 UG/1
2 SPRAY, METERED NASAL EVERY 12 HOURS
Qty: 30 ML | Refills: 3 | Status: SHIPPED | OUTPATIENT
Start: 2023-04-26

## 2023-04-26 RX ORDER — ALBUTEROL SULFATE 90 UG/1
AEROSOL, METERED RESPIRATORY (INHALATION)
Qty: 18 G | Refills: 3 | Status: SHIPPED | OUTPATIENT
Start: 2023-04-26

## 2023-04-26 RX ORDER — UMECLIDINIUM BROMIDE AND VILANTEROL TRIFENATATE 62.5; 25 UG/1; UG/1
1 POWDER RESPIRATORY (INHALATION) DAILY
Qty: 1 EACH | Refills: 5 | Status: SHIPPED | OUTPATIENT
Start: 2023-04-26

## 2023-04-26 NOTE — PROGRESS NOTES
Progress Notes by Alvin Castaneda MD at 01/13/23 [facility]   Pulmonary, Critical Care, and Sleep Medicine   Progress note             IMPRESSION:       History of pulmonary embolism right upper and lower PA branches with extensive clot burden. Follow-up CTA chest 2/3/22 -Negative for pulmonary emboli. ,Mild central venous congestion. Shortness of breath-multifactorial with h/o PE 6/2021-improving. Chronic cough secondary to chronic bronchitis and emphysema. In addition has chronic cough-mucus plugging symptoms and would benefit from continued bronchodilators. Current triggers also appear to be related to continuous nasal drainage-post nasal drip  Left-sided chest discomfort-no significant imaging abnormalities noted  S/p hemoptysis- 6/2021 acute in setting of anticoagulants-likely related to right lower lobe pneumonia/infarct. Old blood noted on bronchoscopic evaluation without any active bleeding. Heparin resumed  and patient has not had any hemoptysis. Resolved  DVT-left popliteal-now with complains of discomfort and swelling of calf as well as thigh-improved  Hx of esophageal varices  Hx of EtoH abuse  Recent Diverticulitis- now resolved.     Hx of tobacco abuse - over 50 pack years, quit 6 years ago                                      PLAN:    Discussed need for continued anticoagulation-Eliquis long-term  Continue Anoro 1 puff daily with as needed albuterol  We will add Atrovent nasal spray  Continue airway clearance measures  Maintain complete cessation of cigarette smoking  We will check PFTs at next visit  Follow-up with cardiology  Discussed any further questions concerns  Will Follow in 4 months            Subjective/Interval History:           4/26/23     Complains of continued symptoms of some shortness of breath and some chest tightness off and on but overall slowly improving  He states that he has been having pain under the left rib cage, gets

## 2023-04-26 NOTE — PROGRESS NOTES
Jayshree Vasquez presents today for   Chief Complaint   Patient presents with    Cough     With Sputum     Shortness of Breath     With exertion        Is someone accompanying this pt? No    Is the patient using any DME equipment during OV? No    -DME Company NA    Depression Screening:    PHQ-9 Questionaire 11/22/2021   Little interest or pleasure in doing things 0   Feeling down, depressed, or hopeless 0   PHQ-9 Total Score 0       Learning Needs Questionnaire:     No question data found. Fall Risk:     No flowsheet data found. Abuse Screening:     No flowsheet data found. Coordination of Care:    1. Have you been to the ER, urgent care clinic since your last visit? Hospitalized since your last visit? No    2. Have you seen or consulted any other health care providers outside of the [address] [place] [address] since your last visit? Include any pap smears or colon screening. No    Medication list has been update per patient.

## 2023-04-27 ENCOUNTER — TELEPHONE (OUTPATIENT)
Age: 78
End: 2023-04-27

## 2023-04-27 NOTE — TELEPHONE ENCOUNTER
Atrovent Nasal spray is not covered by Medicaid.   I am not aware of any alternatives that Medicaid covers

## 2023-04-28 RX ORDER — FLUTICASONE PROPIONATE 50 MCG
2 SPRAY, SUSPENSION (ML) NASAL DAILY
Qty: 48 G | Refills: 1 | Status: SHIPPED | OUTPATIENT
Start: 2023-04-28 | End: 2023-04-28 | Stop reason: ALTCHOICE

## 2023-05-10 ENCOUNTER — OFFICE VISIT (OUTPATIENT)
Age: 78
End: 2023-05-10
Payer: MEDICAID

## 2023-05-10 VITALS
WEIGHT: 194 LBS | HEIGHT: 67 IN | BODY MASS INDEX: 30.45 KG/M2 | HEART RATE: 89 BPM | SYSTOLIC BLOOD PRESSURE: 137 MMHG | OXYGEN SATURATION: 96 % | DIASTOLIC BLOOD PRESSURE: 70 MMHG

## 2023-05-10 DIAGNOSIS — I26.99 OTHER PULMONARY EMBOLISM WITHOUT ACUTE COR PULMONALE, UNSPECIFIED CHRONICITY (HCC): ICD-10-CM

## 2023-05-10 DIAGNOSIS — Q24.5 CORONARY-MYOCARDIAL BRIDGE: Primary | ICD-10-CM

## 2023-05-10 DIAGNOSIS — J44.9 CHRONIC OBSTRUCTIVE PULMONARY DISEASE, UNSPECIFIED COPD TYPE (HCC): ICD-10-CM

## 2023-05-10 DIAGNOSIS — E78.00 PURE HYPERCHOLESTEROLEMIA, UNSPECIFIED: ICD-10-CM

## 2023-05-10 DIAGNOSIS — I10 ESSENTIAL (PRIMARY) HYPERTENSION: ICD-10-CM

## 2023-05-10 PROCEDURE — 3078F DIAST BP <80 MM HG: CPT | Performed by: NURSE PRACTITIONER

## 2023-05-10 PROCEDURE — 99214 OFFICE O/P EST MOD 30 MIN: CPT | Performed by: NURSE PRACTITIONER

## 2023-05-10 PROCEDURE — 3075F SYST BP GE 130 - 139MM HG: CPT | Performed by: NURSE PRACTITIONER

## 2023-05-10 PROCEDURE — 1123F ACP DISCUSS/DSCN MKR DOCD: CPT | Performed by: NURSE PRACTITIONER

## 2023-05-10 NOTE — PROGRESS NOTES
1. Have you been to the ER, urgent care clinic since your last visit? Hospitalized since your last visit? No    2. Have you seen or consulted any other health care providers outside of the [address] [place] [address] since your last visit? Include any pap smears or colon screening. No     3. Do you need any refills today?    no

## 2023-05-10 NOTE — PROGRESS NOTES
HISTORY OF PRESENT ILLNESS  Lula Blake is a 77 y.o. male. 2//2020  Patient is here for follow-up after recent evaluation in emergency room for chest pain. Has he was painting all day and subsequently started having pain under her armpit on both side. Pain worse on movement. Worse on sitting and moving around radiates to the back. He has short of breath on exertion which does not appear changed. 1/2021  Patient here for follow-up. His stable pattern of chest pain or shortness of breath. Denies any significant change at present. He is here for preoperative assessment    7/2021  Patient is here for follow-up. He was admitted 6/2021 with  Discharge Diagnoses:     -Hemoptysis  -Acute PE  -Acute non-occlusive DVT  -Falls at home  -Possible aspiration pneumonia  -BPH w/ acute urinary retention  -Unintentional weight loss  -Constipation  -Recent diverticulitis      Since discharge he still remains short of breath. No hemoptysis  10/2021  Patient is here for follow-up. Since last visit was in emergency room with atypical chest pain on 2 occasions. On last evaluation CTA chest was negative for any recurrent PE. Since then patient has left and right-sided chest pain that are not related to activity or exertion. 5/2023  Patent seen in follow up for testing results. He c/o mild dyspnea on exertion. He denies chest pain, or palpitations. He c/o mild RLE edema and burning. Follow-up  Pertinent negatives include no chest pain, no abdominal pain, no headaches and no shortness of breath. Chest Pain (Angina)   The history is provided by the Patient. This is a new problem. The current episode started more than 1 week ago. The problem has been gradually worsening. The problem occurs every several days. The pain is associated with exertion and rest. The pain is present in the substernal region, right side and left side. The pain is mild. The quality of the pain is described as pressure-like and heavy.  The

## 2023-05-16 ENCOUNTER — HOSPITAL ENCOUNTER (OUTPATIENT)
Facility: HOSPITAL | Age: 78
Setting detail: RECURRING SERIES
Discharge: HOME | End: 2023-05-19
Payer: MEDICAID

## 2023-05-16 PROCEDURE — 97530 THERAPEUTIC ACTIVITIES: CPT

## 2023-05-16 PROCEDURE — 97110 THERAPEUTIC EXERCISES: CPT

## 2023-05-16 NOTE — PROGRESS NOTES
PHYSICAL / OCCUPATIONAL THERAPY - DAILY TREATMENT NOTE (updated )    Patient Name: Nicolle Galloway    Date: 2023    : 1945  Insurance: Payor: Jeffrey Martines / Plan: Idalia Ordaz / Product Type: *No Product type* /      Patient  verified Yes     Visit #   Current / Total 1 1   Time   In / Out 1209 1237   Pain   In / Out 0 0   Subjective Functional Status/Changes: See DC   Changes to:  Meds, Allergies, Med Hx, Sx Hx? If yes, update Summary List no       TREATMENT AREA =  Pain in right shoulder [M25.511]  S/P reverse total shoulder arthroplasty, right [Z96.611]    OBJECTIVE         Therapeutic Procedures: Tx Min Billable or 1:1 Min (if diff from Tx Min) Procedure, Rationale, Specifics   12  31929 Therapeutic Exercise (timed):  increase ROM, strength, coordination, balance, and proprioception to improve patient's ability to progress to PLOF and address remaining functional goals. (see flow sheet as applicable)     Details if applicable:       16  17183 Therapeutic Activity (timed):  use of dynamic activities replicating functional movements to increase ROM, strength, coordination, balance, and proprioception in order to improve patient's ability to progress to PLOF and address remaining functional goals.   (see flow sheet as applicable)     Details if applicable:  reassessment   29  [facility] Totals Reminder: bill using total billable min of TIMED therapeutic procedures (example: do not include dry needle or estim unattended, both untimed codes, in totals to left)  8-22 min = 1 unit; 23-37 min = 2 units; 38-52 min = 3 units; 53-67 min = 4 units; 68-82 min = 5 units   Total Total     [x]  Patient Education billed concurrently with other procedures   [x] Review HEP    [] Progressed/Changed HEP, detail:    [] Other detail:       Objective Information/Functional Measures/Assessment    See DC    Progress toward goals / Updated goals:  []  See Progress Note/Recertification    See

## 2023-05-16 NOTE — PROGRESS NOTES
[facility] [place] [facility]  [address] [facility] [address] [phone] fax    CONTINUED PLAN OF CARE/RECERTIFICATION FOR PHYSICAL THERAPY          Patient Name: Chelita Grande : 1945   Treatment/Medical Diagnosis: Pain in right shoulder [M25.511]  S/P reverse total shoulder arthroplasty, right [Z96.611]   Onset Date: 23    Referral Source: Marlene Lubin MD [facility]): 23   Prior Hospitalization: See Medical History Provider #: [id number]   Prior Level of Function: Functionally (I) right hand dominant; lives alone   Comorbidities: Heart disease, OA, thyroid disorder, HTN, visual impairments with lens surgery, hearing impairments, latex allergy, asthma   Medications: Verified on Patient Summary List   [facility] Missed Visits: 1     Progress to Goals:  1. Pt will compliance HEP in order to supplement PT treatment              PN status: PROGRESSING; reports compliance, will update as progresses in protocol              Not performing - can't remember and lost his paper  2. Pt will score at least 63 on FOTO in order to improve overall function, decrease pain, and facilitate return to PLOF. PN status: PROGRESSING; 47              Progressed well, not met = 60  3. Pt will demonstrate right shoulder strength to at least 5-/5 in order to improve ability to lift objects shoulder height and perform heavy ADLs              PN status: NOT YET CLEARED              MET = 5-/5 grossly   4. Pt will demonstrate right shoulder AROM flexion to 120degrees in order to improve ability to perform dressing              PN status: PROGRESSING; 97 deg              Met = 120 degrees in scaption   5.    Pt will demonstrate right shoulder PROM ER to 45degrees in order to improve ability to dress              PN status: NOT YET CLEARED(clearance per protocol on 3/27/23)              Progressed well, not met = 40degrees    Key Functional

## 2023-05-16 NOTE — PROGRESS NOTES
[facility] - [facility]  [address] [facility] [address], [address] [phone] fax    DISCHARGE SUMMARY  Patient Name: Yazmin Collado : 1945   Treatment/Medical Diagnosis: Pain in right shoulder [M25.511]  S/P reverse total shoulder arthroplasty, right [Z96.611]   Referral Source: Jeanna Mcintosh MD     Date of Initial Visit: 23 Attended Visits: 15 Missed Visits: 1     SUMMARY OF TREATMENT  Mr. Cesar Lainez returns to PT after a month lapse in care due to exacerbation of symptoms. In his time away from therapy, pt's ROM and pain has significantly improved. He is now able to reach over head and perform most ADLs without difficulty. Pt continues to have trouble with self hygiene while toileting, however, discussed this may be a long term deficit due to the nature of his surgery. Based on pt's progress and confidence in ability to be independent in future care, pt was D/C with updated HEP today. CURRENT STATUS  1. Pt will compliance HEP in order to supplement PT treatment              PN status: PROGRESSING; reports compliance, will update as progresses in protocol              Not performing - can't remember and lost his paper  2. Pt will score at least 63 on FOTO in order to improve overall function, decrease pain, and facilitate return to PLOF. PN status: PROGRESSING; 47              Progressed well, not met = 60  3. Pt will demonstrate right shoulder strength to at least 5-/5 in order to improve ability to lift objects shoulder height and perform heavy ADLs              PN status: NOT YET CLEARED              MET = 5-/5 grossly   4. Pt will demonstrate right shoulder AROM flexion to 120degrees in order to improve ability to perform dressing              PN status: PROGRESSING; 97 deg              Met = 120 degrees in scaption   5.    Pt will demonstrate right shoulder PROM ER to 45degrees in order to improve ability to dress

## 2023-05-16 NOTE — PROGRESS NOTES
Physical Therapy Discharge Instructions    [facility] - [place] [address]  [address], [address]  [phone] fax    Patient: Temitope Whaley  : 1945    Continue Home Exercise Program 1 times per day for 4 weeks, then decrease to 3-5 times per week      Continue with    [x] Ice  as needed      [x] Heat           Follow up with MD:     [x] Upon completion of therapy     [] As needed      Recommendations:     [x]   Return to activity with home program    []   Return to activity with the following modifications:       []Post Rehab Program    []Join Independent aquatic program     []Return to/join local gym      Additional Comments: Keep up the good work! Let us know if you have any questions.        GILLES GRAFF, PT 2023 12:27 PM

## 2023-06-27 ENCOUNTER — APPOINTMENT (OUTPATIENT)
Facility: HOSPITAL | Age: 78
End: 2023-06-27
Payer: MEDICAID

## 2023-06-27 ENCOUNTER — HOSPITAL ENCOUNTER (EMERGENCY)
Facility: HOSPITAL | Age: 78
Discharge: HOME | End: 2023-06-27
Attending: EMERGENCY MEDICINE
Payer: MEDICAID

## 2023-06-27 VITALS
SYSTOLIC BLOOD PRESSURE: 129 MMHG | TEMPERATURE: 98.6 F | WEIGHT: 194 LBS | BODY MASS INDEX: 30.45 KG/M2 | OXYGEN SATURATION: 95 % | HEIGHT: 67 IN | DIASTOLIC BLOOD PRESSURE: 68 MMHG | HEART RATE: 87 BPM | RESPIRATION RATE: 18 BRPM

## 2023-06-27 DIAGNOSIS — R07.9 CHEST PAIN, UNSPECIFIED TYPE: Primary | ICD-10-CM

## 2023-06-27 LAB
ALBUMIN SERPL-MCNC: 3.7 G/DL (ref 3.4–5)
ALBUMIN/GLOB SERPL: 1.1 (ref 0.8–1.7)
ALP SERPL-CCNC: 84 U/L (ref 45–117)
ALT SERPL-CCNC: 30 U/L (ref 16–61)
ANION GAP SERPL CALC-SCNC: 6 MMOL/L (ref 3–18)
AST SERPL-CCNC: 26 U/L (ref 10–38)
BASOPHILS # BLD: 0 K/UL (ref 0–0.1)
BASOPHILS NFR BLD: 0 % (ref 0–2)
BILIRUB SERPL-MCNC: 0.3 MG/DL (ref 0.2–1)
BUN SERPL-MCNC: 23 MG/DL (ref 7–18)
BUN/CREAT SERPL: 21 (ref 12–20)
CALCIUM SERPL-MCNC: 9.2 MG/DL (ref 8.5–10.1)
CHLORIDE SERPL-SCNC: 107 MMOL/L (ref 100–111)
CO2 SERPL-SCNC: 23 MMOL/L (ref 21–32)
CREAT SERPL-MCNC: 1.1 MG/DL (ref 0.6–1.3)
D DIMER PPP FEU-MCNC: 0.77 UG/ML(FEU)
DIFFERENTIAL METHOD BLD: ABNORMAL
EKG ATRIAL RATE: 82 BPM
EKG DIAGNOSIS: NORMAL
EKG P AXIS: 60 DEGREES
EKG P-R INTERVAL: 168 MS
EKG Q-T INTERVAL: 382 MS
EKG QRS DURATION: 86 MS
EKG QTC CALCULATION (BAZETT): 446 MS
EKG R AXIS: -33 DEGREES
EKG T AXIS: 56 DEGREES
EKG VENTRICULAR RATE: 82 BPM
EOSINOPHIL # BLD: 0.1 K/UL (ref 0–0.4)
EOSINOPHIL NFR BLD: 3 % (ref 0–5)
ERYTHROCYTE [DISTWIDTH] IN BLOOD BY AUTOMATED COUNT: 15.9 % (ref 11.6–14.5)
GLOBULIN SER CALC-MCNC: 3.4 G/DL (ref 2–4)
GLUCOSE SERPL-MCNC: 121 MG/DL (ref 74–99)
HCT VFR BLD AUTO: 39.7 % (ref 36–48)
HGB BLD-MCNC: 13.1 G/DL (ref 13–16)
IMM GRANULOCYTES # BLD AUTO: 0 K/UL (ref 0–0.04)
IMM GRANULOCYTES NFR BLD AUTO: 0 % (ref 0–0.5)
LYMPHOCYTES # BLD: 1.5 K/UL (ref 0.9–3.6)
LYMPHOCYTES NFR BLD: 31 % (ref 21–52)
MAGNESIUM SERPL-MCNC: 2 MG/DL (ref 1.6–2.6)
MCH RBC QN AUTO: 29.3 PG (ref 24–34)
MCHC RBC AUTO-ENTMCNC: 33 G/DL (ref 31–37)
MCV RBC AUTO: 88.8 FL (ref 78–100)
MONOCYTES # BLD: 0.5 K/UL (ref 0.05–1.2)
MONOCYTES NFR BLD: 11 % (ref 3–10)
NEUTS SEG # BLD: 2.7 K/UL (ref 1.8–8)
NEUTS SEG NFR BLD: 55 % (ref 40–73)
NRBC # BLD: 0 K/UL (ref 0–0.01)
NRBC BLD-RTO: 0 PER 100 WBC
NT PRO BNP: 35 PG/ML (ref 0–1800)
PLATELET # BLD AUTO: 317 K/UL (ref 135–420)
PMV BLD AUTO: 9.2 FL (ref 9.2–11.8)
POTASSIUM SERPL-SCNC: 4.2 MMOL/L (ref 3.5–5.5)
PROT SERPL-MCNC: 7.1 G/DL (ref 6.4–8.2)
RBC # BLD AUTO: 4.47 M/UL (ref 4.35–5.65)
SODIUM SERPL-SCNC: 136 MMOL/L (ref 136–145)
TROPONIN I SERPL HS-MCNC: 6 NG/L (ref 0–78)
TROPONIN I SERPL HS-MCNC: 6 NG/L (ref 0–78)
WBC # BLD AUTO: 4.9 K/UL (ref 4.6–13.2)

## 2023-06-27 PROCEDURE — 83735 ASSAY OF MAGNESIUM: CPT

## 2023-06-27 PROCEDURE — 84484 ASSAY OF TROPONIN QUANT: CPT

## 2023-06-27 PROCEDURE — 93010 ELECTROCARDIOGRAM REPORT: CPT | Performed by: INTERNAL MEDICINE

## 2023-06-27 PROCEDURE — 85025 COMPLETE CBC W/AUTO DIFF WBC: CPT

## 2023-06-27 PROCEDURE — 99285 EMERGENCY DEPT VISIT HI MDM: CPT

## 2023-06-27 PROCEDURE — 6360000004 HC RX CONTRAST MEDICATION: Performed by: EMERGENCY MEDICINE

## 2023-06-27 PROCEDURE — 80053 COMPREHEN METABOLIC PANEL: CPT

## 2023-06-27 PROCEDURE — 83880 ASSAY OF NATRIURETIC PEPTIDE: CPT

## 2023-06-27 PROCEDURE — 71045 X-RAY EXAM CHEST 1 VIEW: CPT

## 2023-06-27 PROCEDURE — 93005 ELECTROCARDIOGRAM TRACING: CPT | Performed by: EMERGENCY MEDICINE

## 2023-06-27 PROCEDURE — 71275 CT ANGIOGRAPHY CHEST: CPT

## 2023-06-27 PROCEDURE — 85379 FIBRIN DEGRADATION QUANT: CPT

## 2023-06-27 RX ADMIN — IOPAMIDOL 71 ML: 755 INJECTION, SOLUTION INTRAVENOUS at 14:15

## 2023-06-27 ASSESSMENT — PAIN DESCRIPTION - LOCATION: LOCATION: CHEST

## 2023-06-27 ASSESSMENT — PAIN - FUNCTIONAL ASSESSMENT: PAIN_FUNCTIONAL_ASSESSMENT: 0-10

## 2023-06-27 ASSESSMENT — ENCOUNTER SYMPTOMS
ABDOMINAL PAIN: 0
SHORTNESS OF BREATH: 1
CHEST TIGHTNESS: 0
EYES NEGATIVE: 1

## 2023-06-27 ASSESSMENT — PAIN SCALES - GENERAL: PAINLEVEL_OUTOF10: 3

## 2023-06-27 ASSESSMENT — PAIN DESCRIPTION - ORIENTATION: ORIENTATION: MID

## 2023-06-27 ASSESSMENT — HEART SCORE
ECG: 1
ECG: 0

## 2023-06-30 ENCOUNTER — HOSPITAL ENCOUNTER (OUTPATIENT)
Facility: HOSPITAL | Age: 78
Discharge: HOME | End: 2023-07-03

## 2023-06-30 LAB — LABCORP SPECIMEN COLLECTION: NORMAL

## 2023-07-01 LAB
BASOPHILS # BLD AUTO: 0 X10E3/UL (ref 0–0.2)
BASOPHILS NFR BLD AUTO: 1 %
EOSINOPHIL # BLD AUTO: 0.2 X10E3/UL (ref 0–0.4)
EOSINOPHIL NFR BLD AUTO: 5 %
ERYTHROCYTE [DISTWIDTH] IN BLOOD BY AUTOMATED COUNT: 14.1 % (ref 11.6–15.4)
HCT VFR BLD AUTO: 40.1 % (ref 37.5–51)
HGB BLD-MCNC: 13.1 G/DL (ref 13–17.7)
IGG SERPL-MCNC: 1730 MG/DL (ref 603–1613)
IMM GRANULOCYTES # BLD AUTO: 0 X10E3/UL (ref 0–0.1)
IMM GRANULOCYTES NFR BLD AUTO: 0 %
IRON SATN MFR SERPL: 22 % (ref 15–55)
IRON SERPL-MCNC: 63 UG/DL (ref 38–169)
LYMPHOCYTES # BLD AUTO: 1.3 X10E3/UL (ref 0.7–3.1)
LYMPHOCYTES NFR BLD AUTO: 30 %
MCH RBC QN AUTO: 29 PG (ref 26.6–33)
MCHC RBC AUTO-ENTMCNC: 32.7 G/DL (ref 31.5–35.7)
MCV RBC AUTO: 89 FL (ref 79–97)
MONOCYTES # BLD AUTO: 0.6 X10E3/UL (ref 0.1–0.9)
MONOCYTES NFR BLD AUTO: 13 %
NEUTROPHILS # BLD AUTO: 2.3 X10E3/UL (ref 1.4–7)
NEUTROPHILS NFR BLD AUTO: 51 %
PLATELET # BLD AUTO: 332 X10E3/UL (ref 150–450)
RBC # BLD AUTO: 4.52 X10E6/UL (ref 4.14–5.8)
TIBC SERPL-MCNC: 290 UG/DL (ref 250–450)
UIBC SERPL-MCNC: 227 UG/DL (ref 111–343)
WBC # BLD AUTO: 4.5 X10E3/UL (ref 3.4–10.8)

## 2023-07-03 DIAGNOSIS — D47.2 MGUS (MONOCLONAL GAMMOPATHY OF UNKNOWN SIGNIFICANCE): ICD-10-CM

## 2023-07-03 DIAGNOSIS — D64.9 CHRONIC ANEMIA: ICD-10-CM

## 2023-07-03 LAB
KAPPA LC FREE SER-MCNC: 27.4 MG/L (ref 3.3–19.4)
KAPPA LC FREE/LAMBDA FREE SER: 1.94 {RATIO} (ref 0.26–1.65)
LAMBDA LC FREE SERPL-MCNC: 14.1 MG/L (ref 5.7–26.3)

## 2023-07-06 ENCOUNTER — OFFICE VISIT (OUTPATIENT)
Age: 78
End: 2023-07-06
Payer: MEDICAID

## 2023-07-06 VITALS
DIASTOLIC BLOOD PRESSURE: 79 MMHG | HEIGHT: 67 IN | RESPIRATION RATE: 16 BRPM | HEART RATE: 90 BPM | SYSTOLIC BLOOD PRESSURE: 147 MMHG | OXYGEN SATURATION: 97 % | BODY MASS INDEX: 30.89 KG/M2 | WEIGHT: 196.8 LBS

## 2023-07-06 DIAGNOSIS — D47.2 MGUS (MONOCLONAL GAMMOPATHY OF UNKNOWN SIGNIFICANCE): Primary | ICD-10-CM

## 2023-07-06 DIAGNOSIS — I27.82 CHRONIC PULMONARY EMBOLISM WITHOUT ACUTE COR PULMONALE, UNSPECIFIED PULMONARY EMBOLISM TYPE (HCC): ICD-10-CM

## 2023-07-06 DIAGNOSIS — G89.29 CHRONIC LOW BACK PAIN WITHOUT SCIATICA, UNSPECIFIED BACK PAIN LATERALITY: ICD-10-CM

## 2023-07-06 DIAGNOSIS — D47.2 MGUS (MONOCLONAL GAMMOPATHY OF UNKNOWN SIGNIFICANCE): ICD-10-CM

## 2023-07-06 DIAGNOSIS — D64.9 CHRONIC ANEMIA: ICD-10-CM

## 2023-07-06 DIAGNOSIS — M54.50 CHRONIC LOW BACK PAIN WITHOUT SCIATICA, UNSPECIFIED BACK PAIN LATERALITY: ICD-10-CM

## 2023-07-06 DIAGNOSIS — I82.4Y9 DEEP VEIN THROMBOSIS (DVT) OF PROXIMAL LOWER EXTREMITY, UNSPECIFIED CHRONICITY, UNSPECIFIED LATERALITY (HCC): ICD-10-CM

## 2023-07-06 PROCEDURE — 99214 OFFICE O/P EST MOD 30 MIN: CPT | Performed by: NURSE PRACTITIONER

## 2023-07-06 ASSESSMENT — PATIENT HEALTH QUESTIONNAIRE - PHQ9
SUM OF ALL RESPONSES TO PHQ9 QUESTIONS 1 & 2: 0
SUM OF ALL RESPONSES TO PHQ QUESTIONS 1-9: 0
2. FEELING DOWN, DEPRESSED OR HOPELESS: 0
SUM OF ALL RESPONSES TO PHQ QUESTIONS 1-9: 0
1. LITTLE INTEREST OR PLEASURE IN DOING THINGS: 0

## 2023-07-06 ASSESSMENT — ENCOUNTER SYMPTOMS
VOMITING: 0
ABDOMINAL PAIN: 0
CHEST TIGHTNESS: 0
DIARRHEA: 0
BLOOD IN STOOL: 0
SHORTNESS OF BREATH: 0
CONSTIPATION: 0
NAUSEA: 0
COLOR CHANGE: 0

## 2023-07-07 LAB
B2 MICROGLOB SERPL-MCNC: 1.4 MG/L (ref 0.6–2.4)
IGA SERPL-MCNC: 184 MG/DL (ref 61–437)
IGG SERPL-MCNC: 1187 MG/DL (ref 603–1613)
IGM SERPL-MCNC: 42 MG/DL (ref 15–143)
KAPPA LC FREE SER-MCNC: 30.4 MG/L (ref 3.3–19.4)
KAPPA LC FREE/LAMBDA FREE SER: 1.83 {RATIO} (ref 0.26–1.65)
LAMBDA LC FREE SERPL-MCNC: 16.6 MG/L (ref 5.7–26.3)
SPECIMEN STATUS REPORT: NORMAL

## 2023-07-10 LAB
ALBUMIN SERPL ELPH-MCNC: 3.9 G/DL (ref 2.9–4.4)
ALBUMIN/GLOB SERPL: 1.1 {RATIO} (ref 0.7–1.7)
ALPHA1 GLOB SERPL ELPH-MCNC: 0.2 G/DL (ref 0–0.4)
ALPHA2 GLOB SERPL ELPH-MCNC: 0.8 G/DL (ref 0.4–1)
B-GLOBULIN SERPL ELPH-MCNC: 1.3 G/DL (ref 0.7–1.3)
GAMMA GLOB SERPL ELPH-MCNC: 1.3 G/DL (ref 0.4–1.8)
GLOBULIN SER CALC-MCNC: 3.6 G/DL (ref 2.2–3.9)
LABORATORY COMMENT REPORT: ABNORMAL
M PROTEIN SERPL ELPH-MCNC: 0.7 G/DL
PROT SERPL-MCNC: 7.5 G/DL (ref 6–8.5)

## 2023-08-17 RX ORDER — IPRATROPIUM BROMIDE 21 UG/1
SPRAY, METERED NASAL
Qty: 30 ML | Refills: 3 | Status: SHIPPED | OUTPATIENT
Start: 2023-08-17

## 2023-08-28 ENCOUNTER — OFFICE VISIT (OUTPATIENT)
Age: 78
End: 2023-08-28
Payer: MEDICAID

## 2023-08-28 VITALS
OXYGEN SATURATION: 96 % | HEIGHT: 67 IN | HEART RATE: 103 BPM | DIASTOLIC BLOOD PRESSURE: 71 MMHG | SYSTOLIC BLOOD PRESSURE: 136 MMHG | WEIGHT: 194.8 LBS | BODY MASS INDEX: 30.57 KG/M2 | RESPIRATION RATE: 18 BRPM | TEMPERATURE: 97 F

## 2023-08-28 DIAGNOSIS — I26.94 MULTIPLE SUBSEGMENTAL PULMONARY EMBOLI WITHOUT ACUTE COR PULMONALE (HCC): ICD-10-CM

## 2023-08-28 DIAGNOSIS — I82.4Y3 ACUTE DEEP VEIN THROMBOSIS (DVT) OF PROXIMAL VEIN OF BOTH LOWER EXTREMITIES (HCC): ICD-10-CM

## 2023-08-28 DIAGNOSIS — E88.09 PLASMA CELL DYSCRASIA: ICD-10-CM

## 2023-08-28 DIAGNOSIS — J41.8 MIXED SIMPLE AND MUCOPURULENT CHRONIC BRONCHITIS (HCC): Primary | ICD-10-CM

## 2023-08-28 PROCEDURE — 1123F ACP DISCUSS/DSCN MKR DOCD: CPT | Performed by: INTERNAL MEDICINE

## 2023-08-28 PROCEDURE — 99214 OFFICE O/P EST MOD 30 MIN: CPT | Performed by: INTERNAL MEDICINE

## 2023-08-28 PROCEDURE — 3078F DIAST BP <80 MM HG: CPT | Performed by: INTERNAL MEDICINE

## 2023-08-28 PROCEDURE — 3075F SYST BP GE 130 - 139MM HG: CPT | Performed by: INTERNAL MEDICINE

## 2023-08-28 NOTE — PROGRESS NOTES
[address] [facility]   Pulmonary, Critical Care, and Sleep Medicine   Progress note          IMPRESSION:       History of pulmonary embolism right upper and lower PA branches with extensive clot burden. Follow-up CTA 6/2023 without any acute PE. Some emphysematous changes and subsegmental atelectasis/scars noted  Shortness of breath-multifactorial with h/o PE 6/2021-improving. Still with significant exertional dyspnea but not significant oxygen desaturation. Chronic cough secondary to chronic bronchitis and emphysema. Improved with daily use of Anoro  DVT-left popliteal-now with complains of discomfort and swelling of calf as well as thigh-improved  Hx of esophageal varices  Hx of EtoH abuse  Recent Diverticulitis- now resolved. Hx of tobacco abuse - over 50 pack years, quit 7 years ago  History of monoclonal gammopathy  History of BPH  Foot pain-attributing to ingrown toenail                                      PLAN:   Discussed need for continued anticoagulation-Eliquis long-term  Continue Anoro 1 puff daily with as needed albuterol  Continue airway clearance measures  Maintain complete cessation of cigarette smoking  We will check PFTs at next visit-have been ordered but not completed  Advised patient to follow-up with foot doctor  Follow-up with cardiology  Discussed any further questions concerns  Will Follow in 3 months            Subjective/Interval History:           8/28/23   Since his last visit patient presented to emergency department with complaint of sharp chest pain that occurred after riding his bike and carrying his groceries into his house on 6/27/2023  Work-up included a CTA of the chest which was unremarkable for PE. The patient's had 2 reassuring troponins, CTA that shows no new pulmonary embolism and the patient has had episodes of recurrent chest pain and catheterizations without intervention.  The patient is comfortable and will proceed with close

## 2023-08-28 NOTE — PROGRESS NOTES
Tracy Galindo presents today for   Chief Complaint   Patient presents with    Cough     With sputum     Shortness of Breath     With Activity        Is someone accompanying this pt? No    Is the patient using any DME equipment during OV? No    -DME Company NA    Depression Screening:    PHQ-9 Questionaire 7/6/2023   Little interest or pleasure in doing things 0   Feeling down, depressed, or hopeless 0   PHQ-9 Total Score 0       Learning Needs Questionnaire:     No question data found. Fall Risk:     No flowsheet data found. Abuse Screening:     No flowsheet data found. Coordination of Care:    1. Have you been to the ER, urgent care clinic since your last visit? Hospitalized since your last visit? No    2. Have you seen or consulted any other health care providers outside of the [address] [place] [address] since your last visit? Include any pap smears or colon screening. No    Medication list has been update per patient.

## 2023-09-11 ENCOUNTER — OFFICE VISIT (OUTPATIENT)
Age: 78
End: 2023-09-11

## 2023-09-11 VITALS — BODY MASS INDEX: 30.51 KG/M2 | RESPIRATION RATE: 18 BRPM | HEIGHT: 67 IN

## 2023-09-11 DIAGNOSIS — M79.642 LEFT HAND PAIN: ICD-10-CM

## 2023-09-11 DIAGNOSIS — R60.9 SWELLING: ICD-10-CM

## 2023-09-11 DIAGNOSIS — S60.132A CONTUSION OF LEFT MIDDLE FINGER WITH DAMAGE TO NAIL, INITIAL ENCOUNTER: ICD-10-CM

## 2023-09-11 DIAGNOSIS — S60.00XA CONTUSION OF FINGERTIP, INITIAL ENCOUNTER: ICD-10-CM

## 2023-09-11 DIAGNOSIS — M20.012 MALLET FINGER OF LEFT HAND: Primary | ICD-10-CM

## 2023-09-11 NOTE — PATIENT INSTRUCTIONS
[facility].     [place]:  [address]  Phone: [phone]    [facility], [address], [place], [address] [place] [address]  Phone: [phone]

## 2023-09-25 ENCOUNTER — OFFICE VISIT (OUTPATIENT)
Age: 78
End: 2023-09-25
Payer: MEDICAID

## 2023-09-25 VITALS — WEIGHT: 195 LBS | HEIGHT: 67 IN | BODY MASS INDEX: 30.61 KG/M2

## 2023-09-25 DIAGNOSIS — S62.633A CLOSED FRACTURE OF DISTAL PHALANX OF LEFT MIDDLE FINGER WITH MALLET DEFORMITY: Primary | ICD-10-CM

## 2023-09-25 DIAGNOSIS — M20.012 CLOSED FRACTURE OF DISTAL PHALANX OF LEFT MIDDLE FINGER WITH MALLET DEFORMITY: Primary | ICD-10-CM

## 2023-09-25 PROCEDURE — 1123F ACP DISCUSS/DSCN MKR DOCD: CPT | Performed by: ORTHOPAEDIC SURGERY

## 2023-09-25 PROCEDURE — 99213 OFFICE O/P EST LOW 20 MIN: CPT | Performed by: ORTHOPAEDIC SURGERY

## 2023-09-25 PROCEDURE — 26750 TREAT FINGER FRACTURE EACH: CPT | Performed by: ORTHOPAEDIC SURGERY

## 2023-09-25 PROCEDURE — 73140 X-RAY EXAM OF FINGER(S): CPT | Performed by: ORTHOPAEDIC SURGERY

## 2023-09-25 NOTE — PROGRESS NOTES
Rj Mckenna is a 69 y.o. male right handed retiree. Worker's Compensation and legal considerations: none    Chief Complaint   Patient presents with    Finger Pain     Left middle finger pain     Pain Score:   9    HPI: Patient presents today with a new problem of left middle finger injury. He has been placed into a splint by another physician. 11/9/2022 HPI: Patient presents today for follow-up of left upper extremity EMG, repeat. He reports some continued numbness on the left side. Initial HPI: Patient comes in today regarding concerns of having a metal foreign body in his on something metallic right middle finger tip in his left thumb tip. He says that he scratched both areas last year and has since had issues with it. He thinks he may have gotten something metallic out of the right middle finger and then it bled after that. He reports some continued tenderness to palpation. Date of onset: Early September 2023  Injury: Yes: Comment: Left middle finger  Prior Treatment:  Yes: Comment: Splint. Left carpal tunnel and cubital tunnel releases.     ROS: Review of Systems - General ROS: negative except HPI    Past Medical History:   Diagnosis Date    Atypical chest pain     Bladder outlet obstruction     DVT (deep venous thrombosis) ([address]) 06/2021    Erectile disorder due to medical 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     Frequency of urination     H/O spinal cord injury 1974    lumbar spine injury secondary to fall, no residual    HTN (hypertension)     Hypercholesterolemia     Illiterate     Injury of lumbar spine ([address]) 1974    Leg pain, right     MGUS (monoclonal gammopathy of unknown significance)     Nocturia     Overactive bladder     Peripheral vascular disease ([address])     Prostate cancer ([address])     Pulmonary embolism ([address]) 06/2021    Shortness of breath        Past Surgical History:   Procedure Laterality Date    COLONOSCOPY N/A 12/04/2019    COLONOSCOPY performed by Alexis Simon MD at [facility] ENDOSCOPY

## 2023-10-02 ENCOUNTER — TELEPHONE (OUTPATIENT)
Age: 78
End: 2023-10-02

## 2023-10-02 ENCOUNTER — HOSPITAL ENCOUNTER (OUTPATIENT)
Facility: HOSPITAL | Age: 78
Discharge: HOME | End: 2023-10-05

## 2023-10-02 DIAGNOSIS — C61 PROSTATE CANCER (HCC): ICD-10-CM

## 2023-10-02 LAB — LABCORP SPECIMEN COLLECTION: NORMAL

## 2023-10-04 RX ORDER — ALBUTEROL SULFATE 90 UG/1
AEROSOL, METERED RESPIRATORY (INHALATION)
Qty: 8.5 G | Refills: 3 | Status: SHIPPED | OUTPATIENT
Start: 2023-10-04

## 2023-11-22 ENCOUNTER — OFFICE VISIT (OUTPATIENT)
Age: 78
End: 2023-11-22
Payer: MEDICAID

## 2023-11-22 VITALS
HEART RATE: 91 BPM | SYSTOLIC BLOOD PRESSURE: 153 MMHG | OXYGEN SATURATION: 96 % | WEIGHT: 195.6 LBS | HEIGHT: 67 IN | DIASTOLIC BLOOD PRESSURE: 85 MMHG | BODY MASS INDEX: 30.7 KG/M2

## 2023-11-22 DIAGNOSIS — Q24.5 CORONARY-MYOCARDIAL BRIDGE: Primary | ICD-10-CM

## 2023-11-22 DIAGNOSIS — E78.00 PURE HYPERCHOLESTEROLEMIA, UNSPECIFIED: ICD-10-CM

## 2023-11-22 DIAGNOSIS — J44.9 CHRONIC OBSTRUCTIVE PULMONARY DISEASE, UNSPECIFIED COPD TYPE (HCC): ICD-10-CM

## 2023-11-22 DIAGNOSIS — I26.99 OTHER PULMONARY EMBOLISM WITHOUT ACUTE COR PULMONALE, UNSPECIFIED CHRONICITY (HCC): ICD-10-CM

## 2023-11-22 DIAGNOSIS — I73.9 CLAUDICATION (HCC): ICD-10-CM

## 2023-11-22 DIAGNOSIS — I10 ESSENTIAL (PRIMARY) HYPERTENSION: ICD-10-CM

## 2023-11-22 PROCEDURE — 3078F DIAST BP <80 MM HG: CPT | Performed by: NURSE PRACTITIONER

## 2023-11-22 PROCEDURE — 3077F SYST BP >= 140 MM HG: CPT | Performed by: NURSE PRACTITIONER

## 2023-11-22 PROCEDURE — 99214 OFFICE O/P EST MOD 30 MIN: CPT | Performed by: NURSE PRACTITIONER

## 2023-11-22 PROCEDURE — 1123F ACP DISCUSS/DSCN MKR DOCD: CPT | Performed by: NURSE PRACTITIONER

## 2023-11-22 RX ORDER — POLYETHYLENE GLYCOL 3350 17 G/17G
17 POWDER, FOR SOLUTION ORAL DAILY PRN
COMMUNITY

## 2023-11-22 NOTE — PROGRESS NOTES
1. Have you been to the ER, urgent care clinic since your last visit? Hospitalized since your last visit? Yes When: 06/27/23 Where: [facility] Reason for visit: Chest Pain    2. Have you seen or consulted any other health care providers outside of the [address] [place] [address] Avenue since your last visit? Include any pap smears or colon screening.  No

## 2023-11-22 NOTE — PROGRESS NOTES
HISTORY OF PRESENT ILLNESS  Tati Neil is a 77 y.o. male. 2//2020  Patient is here for follow-up after recent evaluation in emergency room for chest pain. Has he was painting all day and subsequently started having pain under her armpit on both side. Pain worse on movement. Worse on sitting and moving around radiates to the back. He has short of breath on exertion which does not appear changed. 1/2021  Patient here for follow-up. His stable pattern of chest pain or shortness of breath. Denies any significant change at present. He is here for preoperative assessment    7/2021  Patient is here for follow-up. He was admitted 6/2021 with  Discharge Diagnoses:     -Hemoptysis  -Acute PE  -Acute non-occlusive DVT  -Falls at home  -Possible aspiration pneumonia  -BPH w/ acute urinary retention  -Unintentional weight loss  -Constipation  -Recent diverticulitis      Since discharge he still remains short of breath. No hemoptysis  10/2021  Patient is here for follow-up. Since last visit was in emergency room with atypical chest pain on 2 occasions. On last evaluation CTA chest was negative for any recurrent PE. Since then patient has left and right-sided chest pain that are not related to activity or exertion. 5/2023  Patent seen in follow up for testing results. He c/o mild dyspnea on exertion. He denies chest pain, or palpitations. He c/o mild RLE edema and burning. Follow-up  Pertinent negatives include no chest pain, no abdominal pain, no headaches and no shortness of breath. Chest Pain (Angina)   The history is provided by the Patient. This is a new problem. The current episode started more than 1 week ago. The problem has been gradually worsening. The problem occurs every several days. The pain is associated with exertion and rest. The pain is present in the substernal region, right side and left side. The pain is mild. The quality of the pain is described as pressure-like and heavy.  The

## 2023-11-26 ENCOUNTER — APPOINTMENT (OUTPATIENT)
Facility: HOSPITAL | Age: 78
DRG: 253 | End: 2023-11-26
Payer: MEDICAID

## 2023-11-26 ENCOUNTER — HOSPITAL ENCOUNTER (INPATIENT)
Facility: HOSPITAL | Age: 78
LOS: 2 days | Discharge: HOME | DRG: 253 | End: 2023-11-29
Attending: STUDENT IN AN ORGANIZED HEALTH CARE EDUCATION/TRAINING PROGRAM | Admitting: INTERNAL MEDICINE
Payer: MEDICAID

## 2023-11-26 DIAGNOSIS — K92.2 GASTROINTESTINAL HEMORRHAGE, UNSPECIFIED GASTROINTESTINAL HEMORRHAGE TYPE: ICD-10-CM

## 2023-11-26 DIAGNOSIS — N52.1 ERECTILE DISORDER DUE TO MEDICAL CONDITION IN MALE PATIENT: ICD-10-CM

## 2023-11-26 DIAGNOSIS — I50.32 CHRONIC HEART FAILURE WITH PRESERVED EJECTION FRACTION (HCC): ICD-10-CM

## 2023-11-26 DIAGNOSIS — I25.118 CORONARY ARTERY DISEASE INVOLVING NATIVE CORONARY ARTERY OF NATIVE HEART WITH REFRACTORY ANGINA PECTORIS (HCC): Primary | ICD-10-CM

## 2023-11-26 DIAGNOSIS — R10.13 ABDOMINAL PAIN, EPIGASTRIC: ICD-10-CM

## 2023-11-26 DIAGNOSIS — R07.9 CHEST PAIN, MODERATE CORONARY ARTERY RISK: ICD-10-CM

## 2023-11-26 LAB
ANION GAP SERPL CALC-SCNC: 5 MMOL/L (ref 3–18)
BASOPHILS # BLD: 0 K/UL (ref 0–0.1)
BASOPHILS NFR BLD: 1 % (ref 0–2)
BUN SERPL-MCNC: 9 MG/DL (ref 7–18)
BUN/CREAT SERPL: 9 (ref 12–20)
CALCIUM SERPL-MCNC: 10.1 MG/DL (ref 8.5–10.1)
CHLORIDE SERPL-SCNC: 106 MMOL/L (ref 100–111)
CO2 SERPL-SCNC: 27 MMOL/L (ref 21–32)
CREAT SERPL-MCNC: 1.05 MG/DL (ref 0.6–1.3)
D DIMER PPP FEU-MCNC: 0.29 UG/ML(FEU)
DIFFERENTIAL METHOD BLD: ABNORMAL
EKG ATRIAL RATE: 78 BPM
EKG DIAGNOSIS: NORMAL
EKG P AXIS: 48 DEGREES
EKG P-R INTERVAL: 178 MS
EKG Q-T INTERVAL: 374 MS
EKG QRS DURATION: 66 MS
EKG QTC CALCULATION (BAZETT): 426 MS
EKG R AXIS: -34 DEGREES
EKG T AXIS: 10 DEGREES
EKG VENTRICULAR RATE: 78 BPM
EOSINOPHIL # BLD: 0.2 K/UL (ref 0–0.4)
EOSINOPHIL NFR BLD: 4 % (ref 0–5)
ERYTHROCYTE [DISTWIDTH] IN BLOOD BY AUTOMATED COUNT: 15.1 % (ref 11.6–14.5)
ERYTHROCYTE [DISTWIDTH] IN BLOOD BY AUTOMATED COUNT: 15.2 % (ref 11.6–14.5)
GLUCOSE SERPL-MCNC: 96 MG/DL (ref 74–99)
HCT VFR BLD AUTO: 42.3 % (ref 36–48)
HCT VFR BLD AUTO: 46.9 % (ref 36–48)
HEMOCCULT STL QL: NEGATIVE
HGB BLD-MCNC: 13.9 G/DL (ref 13–16)
HGB BLD-MCNC: 15.2 G/DL (ref 13–16)
IMM GRANULOCYTES # BLD AUTO: 0 K/UL (ref 0–0.04)
IMM GRANULOCYTES NFR BLD AUTO: 0 % (ref 0–0.5)
LIPASE SERPL-CCNC: 40 U/L (ref 13–75)
LYMPHOCYTES # BLD: 1.6 K/UL (ref 0.9–3.6)
LYMPHOCYTES NFR BLD: 32 % (ref 21–52)
MAGNESIUM SERPL-MCNC: 2 MG/DL (ref 1.6–2.6)
MCH RBC QN AUTO: 29.6 PG (ref 24–34)
MCH RBC QN AUTO: 29.8 PG (ref 24–34)
MCHC RBC AUTO-ENTMCNC: 32.4 G/DL (ref 31–37)
MCHC RBC AUTO-ENTMCNC: 32.9 G/DL (ref 31–37)
MCV RBC AUTO: 90.8 FL (ref 78–100)
MCV RBC AUTO: 91.4 FL (ref 78–100)
MONOCYTES # BLD: 0.5 K/UL (ref 0.05–1.2)
MONOCYTES NFR BLD: 10 % (ref 3–10)
NEUTS SEG # BLD: 2.7 K/UL (ref 1.8–8)
NEUTS SEG NFR BLD: 53 % (ref 40–73)
NRBC # BLD: 0 K/UL (ref 0–0.01)
NRBC # BLD: 0 K/UL (ref 0–0.01)
NRBC BLD-RTO: 0 PER 100 WBC
NRBC BLD-RTO: 0 PER 100 WBC
NT PRO BNP: 13 PG/ML (ref 0–1800)
PLATELET # BLD AUTO: 269 K/UL (ref 135–420)
PLATELET # BLD AUTO: 294 K/UL (ref 135–420)
PMV BLD AUTO: 9.1 FL (ref 9.2–11.8)
PMV BLD AUTO: 9.2 FL (ref 9.2–11.8)
POTASSIUM SERPL-SCNC: 4.3 MMOL/L (ref 3.5–5.5)
RBC # BLD AUTO: 4.66 M/UL (ref 4.35–5.65)
RBC # BLD AUTO: 5.13 M/UL (ref 4.35–5.65)
SODIUM SERPL-SCNC: 138 MMOL/L (ref 136–145)
TROPONIN I SERPL HS-MCNC: 6 NG/L (ref 0–78)
TROPONIN I SERPL HS-MCNC: 6 NG/L (ref 0–78)
WBC # BLD AUTO: 5.1 K/UL (ref 4.6–13.2)
WBC # BLD AUTO: 6.2 K/UL (ref 4.6–13.2)

## 2023-11-26 PROCEDURE — 93010 ELECTROCARDIOGRAM REPORT: CPT | Performed by: INTERNAL MEDICINE

## 2023-11-26 PROCEDURE — 99285 EMERGENCY DEPT VISIT HI MDM: CPT

## 2023-11-26 PROCEDURE — 96374 THER/PROPH/DIAG INJ IV PUSH: CPT

## 2023-11-26 PROCEDURE — 82272 OCCULT BLD FECES 1-3 TESTS: CPT

## 2023-11-26 PROCEDURE — 93005 ELECTROCARDIOGRAM TRACING: CPT

## 2023-11-26 PROCEDURE — 71045 X-RAY EXAM CHEST 1 VIEW: CPT

## 2023-11-26 PROCEDURE — 83880 ASSAY OF NATRIURETIC PEPTIDE: CPT

## 2023-11-26 PROCEDURE — C9113 INJ PANTOPRAZOLE SODIUM, VIA: HCPCS | Performed by: STUDENT IN AN ORGANIZED HEALTH CARE EDUCATION/TRAINING PROGRAM

## 2023-11-26 PROCEDURE — 6360000004 HC RX CONTRAST MEDICATION: Performed by: STUDENT IN AN ORGANIZED HEALTH CARE EDUCATION/TRAINING PROGRAM

## 2023-11-26 PROCEDURE — 85379 FIBRIN DEGRADATION QUANT: CPT

## 2023-11-26 PROCEDURE — G0378 HOSPITAL OBSERVATION PER HR: HCPCS

## 2023-11-26 PROCEDURE — 83690 ASSAY OF LIPASE: CPT

## 2023-11-26 PROCEDURE — C9113 INJ PANTOPRAZOLE SODIUM, VIA: HCPCS | Performed by: HOSPITALIST

## 2023-11-26 PROCEDURE — 6370000000 HC RX 637 (ALT 250 FOR IP): Performed by: HOSPITALIST

## 2023-11-26 PROCEDURE — A4216 STERILE WATER/SALINE, 10 ML: HCPCS | Performed by: STUDENT IN AN ORGANIZED HEALTH CARE EDUCATION/TRAINING PROGRAM

## 2023-11-26 PROCEDURE — 85027 COMPLETE CBC AUTOMATED: CPT

## 2023-11-26 PROCEDURE — 80048 BASIC METABOLIC PNL TOTAL CA: CPT

## 2023-11-26 PROCEDURE — 6370000000 HC RX 637 (ALT 250 FOR IP): Performed by: STUDENT IN AN ORGANIZED HEALTH CARE EDUCATION/TRAINING PROGRAM

## 2023-11-26 PROCEDURE — 6360000002 HC RX W HCPCS: Performed by: STUDENT IN AN ORGANIZED HEALTH CARE EDUCATION/TRAINING PROGRAM

## 2023-11-26 PROCEDURE — 83735 ASSAY OF MAGNESIUM: CPT

## 2023-11-26 PROCEDURE — A4216 STERILE WATER/SALINE, 10 ML: HCPCS | Performed by: HOSPITALIST

## 2023-11-26 PROCEDURE — 96376 TX/PRO/DX INJ SAME DRUG ADON: CPT

## 2023-11-26 PROCEDURE — 74177 CT ABD & PELVIS W/CONTRAST: CPT

## 2023-11-26 PROCEDURE — 99222 1ST HOSP IP/OBS MODERATE 55: CPT | Performed by: HOSPITALIST

## 2023-11-26 PROCEDURE — 84484 ASSAY OF TROPONIN QUANT: CPT

## 2023-11-26 PROCEDURE — 93005 ELECTROCARDIOGRAM TRACING: CPT | Performed by: HOSPITALIST

## 2023-11-26 PROCEDURE — 85025 COMPLETE CBC W/AUTO DIFF WBC: CPT

## 2023-11-26 PROCEDURE — 2580000003 HC RX 258: Performed by: HOSPITALIST

## 2023-11-26 PROCEDURE — 2580000003 HC RX 258: Performed by: STUDENT IN AN ORGANIZED HEALTH CARE EDUCATION/TRAINING PROGRAM

## 2023-11-26 PROCEDURE — 6360000002 HC RX W HCPCS: Performed by: HOSPITALIST

## 2023-11-26 RX ORDER — SODIUM CHLORIDE 9 MG/ML
INJECTION, SOLUTION INTRAVENOUS PRN
Status: DISCONTINUED | OUTPATIENT
Start: 2023-11-26 | End: 2023-11-29 | Stop reason: HOSPADM

## 2023-11-26 RX ORDER — AMLODIPINE BESYLATE 10 MG/1
10 TABLET ORAL DAILY
Status: DISCONTINUED | OUTPATIENT
Start: 2023-11-26 | End: 2023-11-29 | Stop reason: HOSPADM

## 2023-11-26 RX ORDER — ONDANSETRON 2 MG/ML
4 INJECTION INTRAMUSCULAR; INTRAVENOUS EVERY 6 HOURS PRN
Status: DISCONTINUED | OUTPATIENT
Start: 2023-11-26 | End: 2023-11-29 | Stop reason: HOSPADM

## 2023-11-26 RX ORDER — EZETIMIBE 10 MG/1
10 TABLET ORAL DAILY
Status: DISCONTINUED | OUTPATIENT
Start: 2023-11-26 | End: 2023-11-29 | Stop reason: HOSPADM

## 2023-11-26 RX ORDER — ARFORMOTEROL TARTRATE 15 UG/2ML
15 SOLUTION RESPIRATORY (INHALATION)
Status: DISCONTINUED | OUTPATIENT
Start: 2023-11-26 | End: 2023-11-29 | Stop reason: HOSPADM

## 2023-11-26 RX ORDER — IPRATROPIUM BROMIDE AND ALBUTEROL SULFATE 2.5; .5 MG/3ML; MG/3ML
1 SOLUTION RESPIRATORY (INHALATION) EVERY 4 HOURS PRN
Status: DISCONTINUED | OUTPATIENT
Start: 2023-11-26 | End: 2023-11-29 | Stop reason: HOSPADM

## 2023-11-26 RX ORDER — MAGNESIUM SULFATE IN WATER 40 MG/ML
2000 INJECTION, SOLUTION INTRAVENOUS PRN
Status: DISCONTINUED | OUTPATIENT
Start: 2023-11-26 | End: 2023-11-29 | Stop reason: HOSPADM

## 2023-11-26 RX ORDER — DULOXETIN HYDROCHLORIDE 60 MG/1
60 CAPSULE, DELAYED RELEASE ORAL DAILY
Status: DISCONTINUED | OUTPATIENT
Start: 2023-11-26 | End: 2023-11-29 | Stop reason: HOSPADM

## 2023-11-26 RX ORDER — FAMOTIDINE 20 MG/1
20 TABLET, FILM COATED ORAL
Status: COMPLETED | OUTPATIENT
Start: 2023-11-26 | End: 2023-11-26

## 2023-11-26 RX ORDER — SODIUM CHLORIDE 0.9 % (FLUSH) 0.9 %
5-40 SYRINGE (ML) INJECTION PRN
Status: DISCONTINUED | OUTPATIENT
Start: 2023-11-26 | End: 2023-11-29 | Stop reason: HOSPADM

## 2023-11-26 RX ORDER — SODIUM CHLORIDE 0.9 % (FLUSH) 0.9 %
5-40 SYRINGE (ML) INJECTION EVERY 12 HOURS SCHEDULED
Status: DISCONTINUED | OUTPATIENT
Start: 2023-11-26 | End: 2023-11-29 | Stop reason: HOSPADM

## 2023-11-26 RX ORDER — POLYETHYLENE GLYCOL 3350 17 G/17G
17 POWDER, FOR SOLUTION ORAL DAILY PRN
Status: DISCONTINUED | OUTPATIENT
Start: 2023-11-26 | End: 2023-11-29 | Stop reason: HOSPADM

## 2023-11-26 RX ORDER — POTASSIUM CHLORIDE 7.45 MG/ML
10 INJECTION INTRAVENOUS PRN
Status: DISCONTINUED | OUTPATIENT
Start: 2023-11-26 | End: 2023-11-29 | Stop reason: HOSPADM

## 2023-11-26 RX ORDER — NITROGLYCERIN 0.4 MG/1
0.4 TABLET SUBLINGUAL EVERY 5 MIN PRN
Status: DISCONTINUED | OUTPATIENT
Start: 2023-11-26 | End: 2023-11-27

## 2023-11-26 RX ORDER — DICYCLOMINE HYDROCHLORIDE 10 MG/1
20 CAPSULE ORAL
Status: COMPLETED | OUTPATIENT
Start: 2023-11-26 | End: 2023-11-26

## 2023-11-26 RX ORDER — ONDANSETRON 4 MG/1
4 TABLET, ORALLY DISINTEGRATING ORAL EVERY 8 HOURS PRN
Status: DISCONTINUED | OUTPATIENT
Start: 2023-11-26 | End: 2023-11-29 | Stop reason: HOSPADM

## 2023-11-26 RX ORDER — ONDANSETRON 4 MG/1
4 TABLET, ORALLY DISINTEGRATING ORAL
Status: COMPLETED | OUTPATIENT
Start: 2023-11-26 | End: 2023-11-26

## 2023-11-26 RX ADMIN — ONDANSETRON 4 MG: 4 TABLET, ORALLY DISINTEGRATING ORAL at 16:28

## 2023-11-26 RX ADMIN — IPRATROPIUM BROMIDE 0.5 MG: 0.5 SOLUTION RESPIRATORY (INHALATION) at 21:40

## 2023-11-26 RX ADMIN — DULOXETINE HYDROCHLORIDE 60 MG: 60 CAPSULE, DELAYED RELEASE ORAL at 21:42

## 2023-11-26 RX ADMIN — SODIUM CHLORIDE 80 MG: 9 INJECTION, SOLUTION INTRAMUSCULAR; INTRAVENOUS; SUBCUTANEOUS at 18:34

## 2023-11-26 RX ADMIN — ARFORMOTEROL TARTRATE 15 MCG: 15 SOLUTION RESPIRATORY (INHALATION) at 21:40

## 2023-11-26 RX ADMIN — IOPAMIDOL 100 ML: 612 INJECTION, SOLUTION INTRAVENOUS at 17:06

## 2023-11-26 RX ADMIN — DICYCLOMINE HYDROCHLORIDE 20 MG: 10 CAPSULE ORAL at 16:31

## 2023-11-26 RX ADMIN — AMLODIPINE BESYLATE 10 MG: 10 TABLET ORAL at 21:42

## 2023-11-26 RX ADMIN — SODIUM CHLORIDE, PRESERVATIVE FREE 10 ML: 5 INJECTION INTRAVENOUS at 21:58

## 2023-11-26 RX ADMIN — FAMOTIDINE 20 MG: 20 TABLET, FILM COATED ORAL at 16:31

## 2023-11-26 RX ADMIN — ALUMINUM HYDROXIDE AND MAGNESIUM HYDROXIDE 30 ML: 200; 200 SUSPENSION ORAL at 16:27

## 2023-11-26 RX ADMIN — PANTOPRAZOLE SODIUM 40 MG: 40 INJECTION, POWDER, FOR SOLUTION INTRAVENOUS at 21:46

## 2023-11-26 ASSESSMENT — PAIN DESCRIPTION - LOCATION: LOCATION: ABDOMEN

## 2023-11-26 ASSESSMENT — PAIN SCALES - GENERAL: PAINLEVEL_OUTOF10: 9

## 2023-11-26 ASSESSMENT — PAIN DESCRIPTION - DESCRIPTORS: DESCRIPTORS: BURNING

## 2023-11-26 NOTE — ED NOTES
Pt resting in bed, awake and alert. No distress noted. Pt stated medications have decreased abdominal pain significantly. Call bell within reach. Care ongoing.      Sarah Olsen RN  11/26/23 5143

## 2023-11-26 NOTE — ED PROVIDER NOTES
[facility] EMERGENCY DEPT  EMERGENCY DEPARTMENT ENCOUNTER      Pt Name: Lupe Atkins  MRN: [id number]  [address] [facility] [place], [facility] 1945  Date of evaluation: 11/26/2023  Provider: Abbie Figueroa MD    CHIEF COMPLAINT       Chief Complaint   Patient presents with    Chest Pain         HISTORY OF PRESENT ILLNESS   (Location/Symptom, Timing/Onset, Context/Setting, Quality, Duration, Modifying Factors, Severity)  Note limiting factors. Lupe Atkins is a 69 y.o. male who presents to the emergency department for chest pain. He states that it started last night but acutely worsened a couple hours ago. He does not think it is exertional or worse with deep inspiration. He does have some associated shortness of breath. Denies any nausea or vomiting. Does feel the pain now in his epigastric region as well. Also noticed that he had blood in his stool over the last couple days. Has been constipated. No actual vomiting. States he has a history of blood clots and does take Eliquis which he states has been compliant with. Complains of new swelling to his left lower extremity. Does not attempted to take anything to help with his discomfort. Denies any dizziness or lightheadedness. Nursing Notes were reviewed. REVIEW OF SYSTEMS    (2-9 systems for level 4, 10 or more for level 5)     Constitutional: No fever  HENT: No ear pain  Eyes: No change in vision  Respiratory: Positive shortness of breath  Cardio: Positive for chest pain  GI: Positive for blood in stool  : No hematuria  MSK: No back pain  Skin: No rashes  Neuro: No headache    Except as noted above the remainder of the review of systems was reviewed and negative.        PAST MEDICAL HISTORY     Past Medical History:   Diagnosis Date    Atypical chest pain     Bladder outlet obstruction     DVT (deep venous thrombosis) ([address]) 06/2021    Erectile disorder due to medical 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     Frequency of urination     H/O spinal cord injury 1974    lumbar spine

## 2023-11-26 NOTE — ED NOTES
Assumed care of pt. Pt here for chest and abdominal pt. Pt pending additional testing for evaluation.      Cullen Quiroga RN  11/26/23 2067

## 2023-11-27 ENCOUNTER — APPOINTMENT (OUTPATIENT)
Facility: HOSPITAL | Age: 78
DRG: 253 | End: 2023-11-27
Payer: MEDICAID

## 2023-11-27 ENCOUNTER — ANESTHESIA EVENT (OUTPATIENT)
Facility: HOSPITAL | Age: 78
DRG: 253 | End: 2023-11-27
Payer: MEDICAID

## 2023-11-27 ENCOUNTER — ANESTHESIA (OUTPATIENT)
Facility: HOSPITAL | Age: 78
DRG: 253 | End: 2023-11-27
Payer: MEDICAID

## 2023-11-27 PROBLEM — I21.4 NSTEMI (NON-ST ELEVATED MYOCARDIAL INFARCTION) (HCC): Status: ACTIVE | Noted: 2023-11-27

## 2023-11-27 LAB
ALBUMIN SERPL-MCNC: 3.6 G/DL (ref 3.4–5)
ALBUMIN/GLOB SERPL: 0.9 (ref 0.8–1.7)
ALP SERPL-CCNC: 88 U/L (ref 45–117)
ALT SERPL-CCNC: 22 U/L (ref 16–61)
ANION GAP SERPL CALC-SCNC: 4 MMOL/L (ref 3–18)
ANION GAP SERPL CALC-SCNC: 4 MMOL/L (ref 3–18)
AST SERPL-CCNC: 13 U/L (ref 10–38)
BASOPHILS # BLD: 0 K/UL (ref 0–0.1)
BASOPHILS NFR BLD: 1 % (ref 0–2)
BILIRUB SERPL-MCNC: 0.6 MG/DL (ref 0.2–1)
BUN SERPL-MCNC: 10 MG/DL (ref 7–18)
BUN SERPL-MCNC: 9 MG/DL (ref 7–18)
BUN/CREAT SERPL: 8 (ref 12–20)
BUN/CREAT SERPL: 8 (ref 12–20)
CALCIUM SERPL-MCNC: 9.3 MG/DL (ref 8.5–10.1)
CALCIUM SERPL-MCNC: 9.4 MG/DL (ref 8.5–10.1)
CHLORIDE SERPL-SCNC: 104 MMOL/L (ref 100–111)
CHLORIDE SERPL-SCNC: 105 MMOL/L (ref 100–111)
CO2 SERPL-SCNC: 27 MMOL/L (ref 21–32)
CO2 SERPL-SCNC: 28 MMOL/L (ref 21–32)
CREAT SERPL-MCNC: 1.09 MG/DL (ref 0.6–1.3)
CREAT SERPL-MCNC: 1.24 MG/DL (ref 0.6–1.3)
DIFFERENTIAL METHOD BLD: ABNORMAL
EKG ATRIAL RATE: 100 BPM
EKG ATRIAL RATE: 72 BPM
EKG DIAGNOSIS: NORMAL
EKG DIAGNOSIS: NORMAL
EKG P AXIS: 33 DEGREES
EKG P AXIS: 49 DEGREES
EKG P-R INTERVAL: 168 MS
EKG P-R INTERVAL: 176 MS
EKG Q-T INTERVAL: 354 MS
EKG Q-T INTERVAL: 388 MS
EKG QRS DURATION: 78 MS
EKG QRS DURATION: 88 MS
EKG QTC CALCULATION (BAZETT): 424 MS
EKG QTC CALCULATION (BAZETT): 456 MS
EKG R AXIS: -32 DEGREES
EKG R AXIS: -33 DEGREES
EKG T AXIS: 41 DEGREES
EKG T AXIS: 45 DEGREES
EKG VENTRICULAR RATE: 100 BPM
EKG VENTRICULAR RATE: 72 BPM
EOSINOPHIL # BLD: 0.3 K/UL (ref 0–0.4)
EOSINOPHIL NFR BLD: 6 % (ref 0–5)
ERYTHROCYTE [DISTWIDTH] IN BLOOD BY AUTOMATED COUNT: 15.1 % (ref 11.6–14.5)
GLOBULIN SER CALC-MCNC: 4.1 G/DL (ref 2–4)
GLUCOSE SERPL-MCNC: 106 MG/DL (ref 74–99)
GLUCOSE SERPL-MCNC: 110 MG/DL (ref 74–99)
HCT VFR BLD AUTO: 32 % (ref 36–48)
HCT VFR BLD AUTO: 42.4 % (ref 36–48)
HCT VFR BLD AUTO: 43.4 % (ref 36–48)
HCT VFR BLD AUTO: 43.4 % (ref 36–48)
HCT VFR BLD AUTO: 44.6 % (ref 36–48)
HGB BLD-MCNC: 10.3 G/DL (ref 13–16)
HGB BLD-MCNC: 14.1 G/DL (ref 13–16)
HGB BLD-MCNC: 14.1 G/DL (ref 13–16)
HGB BLD-MCNC: 14.2 G/DL (ref 13–16)
HGB BLD-MCNC: 14.3 G/DL (ref 13–16)
IMM GRANULOCYTES # BLD AUTO: 0 K/UL (ref 0–0.04)
IMM GRANULOCYTES NFR BLD AUTO: 0 % (ref 0–0.5)
LYMPHOCYTES # BLD: 1.4 K/UL (ref 0.9–3.6)
LYMPHOCYTES NFR BLD: 27 % (ref 21–52)
MCH RBC QN AUTO: 29.4 PG (ref 24–34)
MCHC RBC AUTO-ENTMCNC: 32.5 G/DL (ref 31–37)
MCV RBC AUTO: 90.4 FL (ref 78–100)
MONOCYTES # BLD: 0.5 K/UL (ref 0.05–1.2)
MONOCYTES NFR BLD: 10 % (ref 3–10)
NEUTS SEG # BLD: 3 K/UL (ref 1.8–8)
NEUTS SEG NFR BLD: 57 % (ref 40–73)
NRBC # BLD: 0 K/UL (ref 0–0.01)
NRBC BLD-RTO: 0 PER 100 WBC
NT PRO BNP: 21 PG/ML (ref 0–1800)
PLATELET # BLD AUTO: 258 K/UL (ref 135–420)
PMV BLD AUTO: 9.2 FL (ref 9.2–11.8)
POTASSIUM SERPL-SCNC: 4.1 MMOL/L (ref 3.5–5.5)
POTASSIUM SERPL-SCNC: 4.2 MMOL/L (ref 3.5–5.5)
PROT SERPL-MCNC: 7.7 G/DL (ref 6.4–8.2)
RBC # BLD AUTO: 4.8 M/UL (ref 4.35–5.65)
SODIUM SERPL-SCNC: 136 MMOL/L (ref 136–145)
SODIUM SERPL-SCNC: 136 MMOL/L (ref 136–145)
TROPONIN I SERPL HS-MCNC: 5 NG/L (ref 0–78)
TROPONIN I SERPL HS-MCNC: 5 NG/L (ref 0–78)
WBC # BLD AUTO: 5.3 K/UL (ref 4.6–13.2)

## 2023-11-27 PROCEDURE — 97535 SELF CARE MNGMENT TRAINING: CPT

## 2023-11-27 PROCEDURE — 99233 SBSQ HOSP IP/OBS HIGH 50: CPT | Performed by: INTERNAL MEDICINE

## 2023-11-27 PROCEDURE — 97166 OT EVAL MOD COMPLEX 45 MIN: CPT

## 2023-11-27 PROCEDURE — C9113 INJ PANTOPRAZOLE SODIUM, VIA: HCPCS | Performed by: HOSPITALIST

## 2023-11-27 PROCEDURE — 84484 ASSAY OF TROPONIN QUANT: CPT

## 2023-11-27 PROCEDURE — 83880 ASSAY OF NATRIURETIC PEPTIDE: CPT

## 2023-11-27 PROCEDURE — A4216 STERILE WATER/SALINE, 10 ML: HCPCS | Performed by: HOSPITALIST

## 2023-11-27 PROCEDURE — 85018 HEMOGLOBIN: CPT

## 2023-11-27 PROCEDURE — 85025 COMPLETE CBC W/AUTO DIFF WBC: CPT

## 2023-11-27 PROCEDURE — 36415 COLL VENOUS BLD VENIPUNCTURE: CPT

## 2023-11-27 PROCEDURE — 2580000003 HC RX 258: Performed by: HOSPITALIST

## 2023-11-27 PROCEDURE — 6370000000 HC RX 637 (ALT 250 FOR IP): Performed by: INTERNAL MEDICINE

## 2023-11-27 PROCEDURE — 6370000000 HC RX 637 (ALT 250 FOR IP): Performed by: PHYSICIAN ASSISTANT

## 2023-11-27 PROCEDURE — 94761 N-INVAS EAR/PLS OXIMETRY MLT: CPT

## 2023-11-27 PROCEDURE — 2140000001 HC CVICU INTERMEDIATE R&B

## 2023-11-27 PROCEDURE — 85014 HEMATOCRIT: CPT

## 2023-11-27 PROCEDURE — 6360000002 HC RX W HCPCS: Performed by: NURSE ANESTHETIST, CERTIFIED REGISTERED

## 2023-11-27 PROCEDURE — 2500000003 HC RX 250 WO HCPCS: Performed by: NURSE ANESTHETIST, CERTIFIED REGISTERED

## 2023-11-27 PROCEDURE — 6360000002 HC RX W HCPCS: Performed by: HOSPITALIST

## 2023-11-27 PROCEDURE — 71045 X-RAY EXAM CHEST 1 VIEW: CPT

## 2023-11-27 PROCEDURE — 96376 TX/PRO/DX INJ SAME DRUG ADON: CPT

## 2023-11-27 PROCEDURE — 93010 ELECTROCARDIOGRAM REPORT: CPT | Performed by: INTERNAL MEDICINE

## 2023-11-27 PROCEDURE — 6370000000 HC RX 637 (ALT 250 FOR IP): Performed by: HOSPITALIST

## 2023-11-27 PROCEDURE — 2709999900 HC NON-CHARGEABLE SUPPLY: Performed by: INTERNAL MEDICINE

## 2023-11-27 PROCEDURE — 94640 AIRWAY INHALATION TREATMENT: CPT

## 2023-11-27 PROCEDURE — 93005 ELECTROCARDIOGRAM TRACING: CPT | Performed by: INTERNAL MEDICINE

## 2023-11-27 PROCEDURE — 99222 1ST HOSP IP/OBS MODERATE 55: CPT | Performed by: INTERNAL MEDICINE

## 2023-11-27 PROCEDURE — 80053 COMPREHEN METABOLIC PANEL: CPT

## 2023-11-27 RX ORDER — METOPROLOL TARTRATE 1 MG/ML
5 INJECTION, SOLUTION INTRAVENOUS EVERY 8 HOURS PRN
Status: DISCONTINUED | OUTPATIENT
Start: 2023-11-27 | End: 2023-11-29 | Stop reason: HOSPADM

## 2023-11-27 RX ORDER — SUCRALFATE 1 G/1
1 TABLET ORAL EVERY 8 HOURS
Status: DISCONTINUED | OUTPATIENT
Start: 2023-11-27 | End: 2023-11-29 | Stop reason: HOSPADM

## 2023-11-27 RX ORDER — MORPHINE SULFATE 2 MG/ML
2 INJECTION, SOLUTION INTRAMUSCULAR; INTRAVENOUS EVERY 4 HOURS PRN
Status: DISCONTINUED | OUTPATIENT
Start: 2023-11-27 | End: 2023-11-29 | Stop reason: HOSPADM

## 2023-11-27 RX ORDER — MORPHINE SULFATE 2 MG/ML
INJECTION, SOLUTION INTRAMUSCULAR; INTRAVENOUS PRN
Status: DISCONTINUED | OUTPATIENT
Start: 2023-11-27 | End: 2023-11-27 | Stop reason: HOSPADM

## 2023-11-27 RX ORDER — IPRATROPIUM BROMIDE 21 UG/1
SPRAY, METERED NASAL
Qty: 30 ML | Refills: 3 | Status: SHIPPED | OUTPATIENT
Start: 2023-11-27

## 2023-11-27 RX ORDER — LIDOCAINE HYDROCHLORIDE 20 MG/ML
INJECTION, SOLUTION EPIDURAL; INFILTRATION; INTRACAUDAL; PERINEURAL PRN
Status: DISCONTINUED | OUTPATIENT
Start: 2023-11-27 | End: 2023-11-27 | Stop reason: HOSPADM

## 2023-11-27 RX ORDER — ESMOLOL HYDROCHLORIDE 10 MG/ML
INJECTION INTRAVENOUS PRN
Status: DISCONTINUED | OUTPATIENT
Start: 2023-11-27 | End: 2023-11-27 | Stop reason: HOSPADM

## 2023-11-27 RX ADMIN — PANTOPRAZOLE SODIUM 40 MG: 40 INJECTION, POWDER, FOR SOLUTION INTRAVENOUS at 08:50

## 2023-11-27 RX ADMIN — AMLODIPINE BESYLATE 10 MG: 10 TABLET ORAL at 08:50

## 2023-11-27 RX ADMIN — SUCRALFATE 1 G: 1 TABLET ORAL at 20:18

## 2023-11-27 RX ADMIN — MORPHINE SULFATE 2 MG: 2 INJECTION, SOLUTION INTRAMUSCULAR; INTRAVENOUS at 10:06

## 2023-11-27 RX ADMIN — EZETIMIBE 10 MG: 10 TABLET ORAL at 08:59

## 2023-11-27 RX ADMIN — PANTOPRAZOLE SODIUM 40 MG: 40 INJECTION, POWDER, FOR SOLUTION INTRAVENOUS at 21:32

## 2023-11-27 RX ADMIN — IPRATROPIUM BROMIDE 0.5 MG: 0.5 SOLUTION RESPIRATORY (INHALATION) at 08:50

## 2023-11-27 RX ADMIN — SODIUM CHLORIDE, PRESERVATIVE FREE 10 ML: 5 INJECTION INTRAVENOUS at 21:34

## 2023-11-27 RX ADMIN — DULOXETINE HYDROCHLORIDE 60 MG: 60 CAPSULE, DELAYED RELEASE ORAL at 08:50

## 2023-11-27 RX ADMIN — METOPROLOL TARTRATE 25 MG: 25 TABLET, FILM COATED ORAL at 11:26

## 2023-11-27 RX ADMIN — LIDOCAINE HYDROCHLORIDE 100 MG: 20 INJECTION, SOLUTION EPIDURAL; INFILTRATION; INTRACAUDAL; PERINEURAL at 10:04

## 2023-11-27 RX ADMIN — SODIUM CHLORIDE, PRESERVATIVE FREE 10 ML: 5 INJECTION INTRAVENOUS at 09:01

## 2023-11-27 RX ADMIN — SUCRALFATE 1 G: 1 TABLET ORAL at 11:26

## 2023-11-27 RX ADMIN — IPRATROPIUM BROMIDE 0.5 MG: 0.5 SOLUTION RESPIRATORY (INHALATION) at 13:43

## 2023-11-27 RX ADMIN — ARFORMOTEROL TARTRATE 15 MCG: 15 SOLUTION RESPIRATORY (INHALATION) at 08:50

## 2023-11-27 RX ADMIN — NITROGLYCERIN 1 INCH: 20 OINTMENT TOPICAL at 11:30

## 2023-11-27 RX ADMIN — IPRATROPIUM BROMIDE 0.5 MG: 0.5 SOLUTION RESPIRATORY (INHALATION) at 20:08

## 2023-11-27 RX ADMIN — ESMOLOL HYDROCHLORIDE 50 MG: 10 INJECTION, SOLUTION INTRAVENOUS at 10:04

## 2023-11-27 RX ADMIN — ARFORMOTEROL TARTRATE 15 MCG: 15 SOLUTION RESPIRATORY (INHALATION) at 20:08

## 2023-11-27 RX ADMIN — METOPROLOL TARTRATE 25 MG: 25 TABLET, FILM COATED ORAL at 21:32

## 2023-11-27 ASSESSMENT — PAIN SCALES - GENERAL
PAINLEVEL_OUTOF10: 0

## 2023-11-27 ASSESSMENT — ENCOUNTER SYMPTOMS: SHORTNESS OF BREATH: 1

## 2023-11-27 ASSESSMENT — PAIN - FUNCTIONAL ASSESSMENT: PAIN_FUNCTIONAL_ASSESSMENT: 0-10

## 2023-11-27 NOTE — ED NOTES
Called to give report to 3N. Per 3N they will call when rooms are situated for report.       Aurelio Zhao RN  11/27/23 1392

## 2023-11-27 NOTE — ED NOTES
Pt noted HR spiking to 150's for approximately one minutes. Pt C/O chest pain while elevated HR. MD paged and awaiting call back.       Maricruz Botello RN  11/27/23 8184

## 2023-11-27 NOTE — H&P
History & Physical    Patient: Adalgisa Brown MRN: [id number]  CSN: [id number]    YOB: 1945  Age: 69 y.o. Sex: male             DOA: 11/26/2023    Chief Complaint:   Chief Complaint   Patient presents with    Rectal bleeding          HPI:    Adalgisa Brown is a 69 y.o.  male with past medical history of DVT, PE on Eliquis, hypertension dyslipidemia, PAD comes to the emergency room complaining of rectal bleeding. Patient states she had a bowel movement today which was slightly hard and after that he wiped and noticed some blood on the tissue. Used multiple tissues but still saw some blood. No clots, no dripping blood. Since he is taking blood thinners he decided to come to the ED for further evaluation. He denies any rectal pain, no melena hematochezia. Patient did complain of some epigastric burning sensation, no nausea or vomiting. In the emergency room, patient hemoglobin slightly dropped, CT abdomen pelvis done which showed some concern for bleed. ED physician discussed case with GI who recommended hospitalization for observation and monitoring H&H.     Past Medical History:   Diagnosis Date    Atypical chest pain     Bladder outlet obstruction     DVT (deep venous thrombosis) ([address]) 06/2021    Erectile disorder due to medical 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     Frequency of urination     H/O spinal cord injury 1974    lumbar spine injury secondary to fall, no residual    HTN (hypertension)     Hypercholesterolemia     Illiterate     Injury of lumbar spine ([address]) 1974    Leg pain, right     MGUS (monoclonal gammopathy of unknown significance)     Nocturia     Overactive bladder     Peripheral vascular disease (HCC)     Prostate cancer ([address])     Pulmonary embolism ([address]) 06/2021    Shortness of breath        Past Surgical History:   Procedure Laterality Date    COLONOSCOPY N/A 12/04/2019    COLONOSCOPY performed by Lan Judd

## 2023-11-27 NOTE — ED NOTES
Attempted to call report. RN asked to call back in 10 minutes.       Evita Monteiro RN  11/27/23 6362

## 2023-11-27 NOTE — ED NOTES
TRANSFER - OUT REPORT:    Verbal report given to KATE Hernandez on Sofie Oakley  being transferred to [facility] for routine progression of patient care       Report consisted of patient's Situation, Background, Assessment and   Recommendations(SBAR). Information from the following report(s) Nurse Handoff Report, ED Encounter Summary, MAR, Cardiac Rhythm NSR, and Neuro Assessment was reviewed with the receiving nurse. [place] Fall Assessment:    Presents to emergency department  because of falls (Syncope, seizure, or loss of consciousness): No  Age > 70: Yes  Altered Mental Status, Intoxication with alcohol or substance confusion (Disorientation, impaired judgment, poor safety awaremess, or inability to follow instructions): No  Impaired Mobility: Ambulates or transfers with assistive devices or assistance; Unable to ambulate or transer.: No  Nursing Judgement: No          Lines:   Peripheral IV 11/26/23 Right;Posterior Forearm (Active)        Opportunity for questions and clarification was provided.       Patient transported with:  Sheryle Ireland, RN  11/27/23 1676

## 2023-11-27 NOTE — PERIOP NOTE
TRANSFER - OUT REPORT:    Verbal report given to Wang Garcia RN on Abdi Elias  being transferred to ED for routine progression of patient care       Report consisted of patient's Situation, Background, Assessment and   Recommendations(SBAR). Information from the following report(s) Nurse Handoff Report was reviewed with the receiving nurse. [place] Fall Assessment:    Presents to emergency department  because of falls (Syncope, seizure, or loss of consciousness): No  Age > 70: Yes  Altered Mental Status, Intoxication with alcohol or substance confusion (Disorientation, impaired judgment, poor safety awaremess, or inability to follow instructions): No  Impaired Mobility: Ambulates or transfers with assistive devices or assistance; Unable to ambulate or transer.: No  Nursing Judgement: No          Lines:   Peripheral IV 11/26/23 Right;Posterior Forearm (Active)        Opportunity for questions and clarification was provided.       Patient transported with:  Monitor, O2 @ 2lpm, and Registered Nurse

## 2023-11-27 NOTE — CONSULTS
[facility] - Consult Note    Cardiology consultation request from Dr. Joseluis Cm for evaluation and management/treatment of tachycardia in setting of GIB. Date of  Admission: 11/26/2023 11:49 AM   Primary Care Physician:  Ahmet Sharp MD    Attending Cardiologist: Dr. Gena Grider:     -Anemia, drop in Hgb from 14.1>10.1 within 6 hours. On eliquis as outpatient for Hx PE/DVT. -Sinus Tachycardia, physiologic in setting of above.   -Epigastric pain, reproducible on exam, do not suspect anginal equivalent.   -Acute on Chronic chest pain, MI r/o with serial unremarkable troponins, likely exacerbated by tachycardia/anemia with known myocardial bridging.   s/p C 2019 with no significant epicardial disease. Moderate mLAD myocardial   bridging.   -Hx PE and DVT, on Eliquis as outpatient. Primary Cardiologist is Dr. Daniel Seals:     -Anemia mgmt per primary team. Recommend to hold Eliquis with acute drop in  Hgb. Monitor H/H.   -Would benefit from a po BB given myocardial bridging, would avoid nitrates which can worsen pain from myocardial bridging. Will dc nitro paste.   -limited echo pending, if no significant changes noted, would proceed with EGD as indicated. -Further recommendations pending above. History of Present Illness: This is a 69 y.o. male admitted for GI bleed [K92.2]  NSTEMI (non-ST elevated myocardial infarction) ([address]) [I21.4]. Patient complains of: abdominal pain, chest pain, blood in stool   Shaun Coley is a 69 y.o. male, pmhx as stated above, who we are seeing in consult for CP, tachycardia. Patient was in the endo suite when a RRT was called for chest pain. Patient states he went to roll on his left side when he suddenly had an extreme sharp pain in his upper abdominal area that traveled to his right lower chest. Prior to this admission, he reports this same discomfort for one week across his abdomen.  Pain is constant, sharp and sometimes
Function   No results for input(s): \"ALB\", \"TP\", \"AML\" in the last 72 hours. Invalid input(s): \"DBILI\", \"TBILI\", \"GPT\", \"SGOT\", \"AP\", \"LPSE\"     Coags   No results for input(s): \"INR\", \"APTT\" in the last 72 hours. Invalid input(s): \"PTP\"        Cj Badillo PA-C.   11/27/23, 8:36 AM   [facility]  www. [employer]  Phone: [phone]  Pager: [phone]

## 2023-11-27 NOTE — ED NOTES
Pt returned from Endo, pt placed on bedside heart monitor. Reports pain was 10/10 while in Endo, 0/10 currently, no chest pain.       Merlene Councilman, RN  11/27/23 1037

## 2023-11-27 NOTE — H&P
GASTROENTEROLOGY Pre-Procedure H and P      Impression/Plan:   1.  This patient is consented for an EGD for Gastrointestinal hemorrhage, unspecified    Addendum: All lab tests orders pertaining to the procedure have been ordered by the anesthesia personnel and results will be addressed by the anesthesia team    Chief Complaint: Gastrointestinal hemorrhage, unspecified    HPI:  Tati Neil is a 69 y.o. male who is having an EGD for Gastrointestinal hemorrhage, unspecified    PMH:   Past Medical History:   Diagnosis Date    Atypical chest pain     Bladder outlet obstruction     DVT (deep venous thrombosis) ([address]) 2021    Erectile disorder due to medical 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     Frequency of urination     H/O spinal cord injury     lumbar spine injury secondary to fall, no residual    HTN (hypertension)     Hypercholesterolemia     Illiterate     Injury of lumbar spine ([address])     Leg pain, right     MGUS (monoclonal gammopathy of unknown significance)     Nocturia     Overactive bladder     Peripheral vascular disease ([address])     Prostate cancer ([address])     Pulmonary embolism ([address]) 2021    Shortness of breath        PSH:   Past Surgical History:   Procedure Laterality Date    COLONOSCOPY N/A 2019    COLONOSCOPY performed by Caryle Santiago, MD at [facility] ENDOSCOPY    CYSTOURETHROSCOPY  10/2022    HAND/FINGER SURGERY UNLISTED Right     carpal tunnel    HEENT Left     lens implant    ORTHOPEDIC SURGERY      finger amputation left hand    TONSILLECTOMY      UPPER ARM/ELBOW SURGERY UNLISTED Right        Social HX:   Social History     Socioeconomic History    Marital status:      Spouse name: Not on file    Number of children: Not on file    Years of education: Not on file    Highest education level: Not on file   Occupational History    Not on file   Tobacco Use    Smoking status: Former     Types: Cigarettes     Quit date: 2015     Years since quittin.3     Passive exposure: Past

## 2023-11-27 NOTE — ED NOTES
Assumed care of pt. Pt A+Ox4. Pt arrived with chest pain that has since resolved and began C/O abd pain. Pt vitals stable. Pt to go to endo today.       Alycia Houston RN  11/27/23 1502

## 2023-11-27 NOTE — ACP (ADVANCE CARE PLANNING)
Advance Care Planning     Advance Care Planning Inpatient Note  [facility] Department    Today's Date: 11/27/2023  Unit: [facility] EMERGENCY DEPT    Received request from . Upon review of chart and communication with care team, patient's decision making abilities are not in question. . Patient was/were present in the room during visit. Goals of ACP Conversation:  Discuss advance care planning documents    Health Care Decision Makers:       Primary Decision Maker: Brittany Hall - Child - [phone]    Secondary Decision Maker: Jonny David Child - [phone]  Summary:  Verified [address]    Advance Care Planning Documents (Patient Wishes):  Healthcare Power of /Advance Directive Appointment of Health Care Agent  Living Will/Advance Directive     Assessment:  Patient seen in bed 6 of the emergency room and then again a little later during an RRT in endoscopy where he complained of chest pains. Patient was admitted from the emergency room due to a GI Bleed. There is an advance directive on file for this patient. Patient returned to the emergency room from the endoscopy suite after RRT. No family seen is either location/. Interventions:      Care Preferences Communicated:   No    Outcomes/Plan:  Existing advance directive reviewed with patient; no changes to patient's previously recorded wishes.     Electronically signed by Joanie Tabor, [facility] on 11/27/2023 at 12:12 PM

## 2023-11-27 NOTE — CODE DOCUMENTATION
Pt brought to Endoscopy unit for EGD procedure from ED. During pre-procedure, pt developed chest pain, stated left-sided, described as sharp, worse during expiration, 9/10. Started on O2 at 2LPM per NC. HR inceased to 120's, CRNA present, administered esmolol, which brought HR back to 90's, but CP unrelieved. 12-lead performed, showed NSR. Called Rapid Response.

## 2023-11-28 ENCOUNTER — ANESTHESIA (OUTPATIENT)
Facility: HOSPITAL | Age: 78
DRG: 253 | End: 2023-11-28
Payer: MEDICAID

## 2023-11-28 ENCOUNTER — APPOINTMENT (OUTPATIENT)
Facility: HOSPITAL | Age: 78
DRG: 253 | End: 2023-11-28
Payer: MEDICAID

## 2023-11-28 PROBLEM — K27.9 PUD (PEPTIC ULCER DISEASE): Status: ACTIVE | Noted: 2023-11-28

## 2023-11-28 LAB
ANION GAP SERPL CALC-SCNC: 6 MMOL/L (ref 3–18)
BUN SERPL-MCNC: 16 MG/DL (ref 7–18)
BUN/CREAT SERPL: 13 (ref 12–20)
CALCIUM SERPL-MCNC: 9.1 MG/DL (ref 8.5–10.1)
CHLORIDE SERPL-SCNC: 106 MMOL/L (ref 100–111)
CO2 SERPL-SCNC: 25 MMOL/L (ref 21–32)
CREAT SERPL-MCNC: 1.19 MG/DL (ref 0.6–1.3)
ECHO AO ASC DIAM: 3.5 CM
ECHO AO ASCENDING AORTA INDEX: 1.75 CM/M2
ECHO AO ROOT DIAM: 3.3 CM
ECHO AO ROOT INDEX: 1.65 CM/M2
ECHO AV AREA PEAK VELOCITY: 3 CM2
ECHO AV AREA VTI: 3.1 CM2
ECHO AV AREA/BSA PEAK VELOCITY: 1.5 CM2/M2
ECHO AV AREA/BSA VTI: 1.6 CM2/M2
ECHO AV MEAN GRADIENT: 3 MMHG
ECHO AV MEAN VELOCITY: 0.8 M/S
ECHO AV PEAK GRADIENT: 5 MMHG
ECHO AV PEAK VELOCITY: 1.2 M/S
ECHO AV VELOCITY RATIO: 0.83
ECHO AV VTI: 23.9 CM
ECHO BSA: 2.04 M2
ECHO LA VOL A-L A2C: 85 ML (ref 18–58)
ECHO LA VOL A-L A4C: 69 ML (ref 18–58)
ECHO LA VOL BP: 73 ML (ref 18–58)
ECHO LA VOL MOD A2C: 83 ML (ref 18–58)
ECHO LA VOL MOD A4C: 65 ML (ref 18–58)
ECHO LA VOL/BSA BIPLANE: 37 ML/M2 (ref 16–34)
ECHO LA VOLUME AREA LENGTH: 77 ML
ECHO LA VOLUME INDEX A-L A2C: 43 ML/M2 (ref 16–34)
ECHO LA VOLUME INDEX A-L A4C: 35 ML/M2 (ref 16–34)
ECHO LA VOLUME INDEX AREA LENGTH: 39 ML/M2 (ref 16–34)
ECHO LA VOLUME INDEX MOD A2C: 42 ML/M2 (ref 16–34)
ECHO LA VOLUME INDEX MOD A4C: 33 ML/M2 (ref 16–34)
ECHO LV E' LATERAL VELOCITY: 7 CM/S
ECHO LV E' SEPTAL VELOCITY: 6 CM/S
ECHO LV FRACTIONAL SHORTENING: 40 % (ref 28–44)
ECHO LV INTERNAL DIMENSION DIASTOLE INDEX: 2.25 CM/M2
ECHO LV INTERNAL DIMENSION DIASTOLIC: 4.5 CM (ref 4.2–5.9)
ECHO LV INTERNAL DIMENSION SYSTOLIC INDEX: 1.35 CM/M2
ECHO LV INTERNAL DIMENSION SYSTOLIC: 2.7 CM
ECHO LV IVSD: 1 CM (ref 0.6–1)
ECHO LV MASS 2D: 186.4 G (ref 88–224)
ECHO LV MASS INDEX 2D: 93.2 G/M2 (ref 49–115)
ECHO LV POSTERIOR WALL DIASTOLIC: 1.3 CM (ref 0.6–1)
ECHO LV RELATIVE WALL THICKNESS RATIO: 0.58
ECHO LVOT AREA: 3.5 CM2
ECHO LVOT AV VTI INDEX: 0.91
ECHO LVOT DIAM: 2.1 CM
ECHO LVOT MEAN GRADIENT: 2 MMHG
ECHO LVOT PEAK GRADIENT: 4 MMHG
ECHO LVOT PEAK VELOCITY: 1 M/S
ECHO LVOT STROKE VOLUME INDEX: 37.6 ML/M2
ECHO LVOT SV: 75.1 ML
ECHO LVOT VTI: 21.7 CM
ECHO MV A VELOCITY: 1.1 M/S
ECHO MV AREA PHT: 3.1 CM2
ECHO MV E DECELERATION TIME (DT): 310.1 MS
ECHO MV E VELOCITY: 0.93 M/S
ECHO MV E/A RATIO: 0.85
ECHO MV E/E' LATERAL: 13.29
ECHO MV E/E' RATIO (AVERAGED): 14.39
ECHO MV PRESSURE HALF TIME (PHT): 72 MS
GLUCOSE SERPL-MCNC: 74 MG/DL (ref 74–99)
HCT VFR BLD AUTO: 42.7 % (ref 36–48)
HCT VFR BLD AUTO: 43.4 % (ref 36–48)
HCT VFR BLD AUTO: 44.1 % (ref 36–48)
HGB BLD-MCNC: 14.2 G/DL (ref 13–16)
HGB BLD-MCNC: 14.2 G/DL (ref 13–16)
HGB BLD-MCNC: 14.4 G/DL (ref 13–16)
MAGNESIUM SERPL-MCNC: 2.1 MG/DL (ref 1.6–2.6)
POTASSIUM SERPL-SCNC: 4.1 MMOL/L (ref 3.5–5.5)
SODIUM SERPL-SCNC: 137 MMOL/L (ref 136–145)
TROPONIN I SERPL HS-MCNC: 6 NG/L (ref 0–78)

## 2023-11-28 PROCEDURE — 6370000000 HC RX 637 (ALT 250 FOR IP): Performed by: PHYSICIAN ASSISTANT

## 2023-11-28 PROCEDURE — 83735 ASSAY OF MAGNESIUM: CPT

## 2023-11-28 PROCEDURE — 93306 TTE W/DOPPLER COMPLETE: CPT | Performed by: INTERNAL MEDICINE

## 2023-11-28 PROCEDURE — 84484 ASSAY OF TROPONIN QUANT: CPT

## 2023-11-28 PROCEDURE — C9113 INJ PANTOPRAZOLE SODIUM, VIA: HCPCS | Performed by: HOSPITALIST

## 2023-11-28 PROCEDURE — 2140000001 HC CVICU INTERMEDIATE R&B

## 2023-11-28 PROCEDURE — 2580000003 HC RX 258: Performed by: HOSPITALIST

## 2023-11-28 PROCEDURE — 97530 THERAPEUTIC ACTIVITIES: CPT

## 2023-11-28 PROCEDURE — 6370000000 HC RX 637 (ALT 250 FOR IP): Performed by: INTERNAL MEDICINE

## 2023-11-28 PROCEDURE — 93321 DOPPLER ECHO F-UP/LMTD STD: CPT

## 2023-11-28 PROCEDURE — 6360000002 HC RX W HCPCS: Performed by: HOSPITALIST

## 2023-11-28 PROCEDURE — 93308 TTE F-UP OR LMTD: CPT | Performed by: INTERNAL MEDICINE

## 2023-11-28 PROCEDURE — A4216 STERILE WATER/SALINE, 10 ML: HCPCS | Performed by: HOSPITALIST

## 2023-11-28 PROCEDURE — 36415 COLL VENOUS BLD VENIPUNCTURE: CPT

## 2023-11-28 PROCEDURE — 85018 HEMOGLOBIN: CPT

## 2023-11-28 PROCEDURE — 99232 SBSQ HOSP IP/OBS MODERATE 35: CPT | Performed by: INTERNAL MEDICINE

## 2023-11-28 PROCEDURE — 80048 BASIC METABOLIC PNL TOTAL CA: CPT

## 2023-11-28 PROCEDURE — 94640 AIRWAY INHALATION TREATMENT: CPT

## 2023-11-28 PROCEDURE — 93321 DOPPLER ECHO F-UP/LMTD STD: CPT | Performed by: INTERNAL MEDICINE

## 2023-11-28 PROCEDURE — 93325 DOPPLER ECHO COLOR FLOW MAPG: CPT | Performed by: INTERNAL MEDICINE

## 2023-11-28 PROCEDURE — 94761 N-INVAS EAR/PLS OXIMETRY MLT: CPT

## 2023-11-28 PROCEDURE — 6370000000 HC RX 637 (ALT 250 FOR IP): Performed by: HOSPITALIST

## 2023-11-28 PROCEDURE — 97161 PT EVAL LOW COMPLEX 20 MIN: CPT

## 2023-11-28 PROCEDURE — 85014 HEMATOCRIT: CPT

## 2023-11-28 RX ORDER — BISACODYL 5 MG/1
10 TABLET, DELAYED RELEASE ORAL DAILY
Status: DISCONTINUED | OUTPATIENT
Start: 2023-11-28 | End: 2023-11-29 | Stop reason: HOSPADM

## 2023-11-28 RX ORDER — POLYETHYLENE GLYCOL 3350 17 G/17G
17 POWDER, FOR SOLUTION ORAL DAILY
Status: DISCONTINUED | OUTPATIENT
Start: 2023-11-28 | End: 2023-11-29 | Stop reason: HOSPADM

## 2023-11-28 RX ORDER — SPIRONOLACTONE 25 MG/1
25 TABLET ORAL DAILY
Status: DISCONTINUED | OUTPATIENT
Start: 2023-11-28 | End: 2023-11-29 | Stop reason: HOSPADM

## 2023-11-28 RX ADMIN — POLYETHYLENE GLYCOL 3350 17 G: 17 POWDER, FOR SOLUTION ORAL at 15:38

## 2023-11-28 RX ADMIN — METOPROLOL TARTRATE 25 MG: 25 TABLET, FILM COATED ORAL at 08:15

## 2023-11-28 RX ADMIN — PANTOPRAZOLE SODIUM 40 MG: 40 INJECTION, POWDER, FOR SOLUTION INTRAVENOUS at 21:43

## 2023-11-28 RX ADMIN — SODIUM CHLORIDE, PRESERVATIVE FREE 10 ML: 5 INJECTION INTRAVENOUS at 21:45

## 2023-11-28 RX ADMIN — METOPROLOL TARTRATE 25 MG: 25 TABLET, FILM COATED ORAL at 21:42

## 2023-11-28 RX ADMIN — SPIRONOLACTONE 25 MG: 25 TABLET ORAL at 15:37

## 2023-11-28 RX ADMIN — BISACODYL 10 MG: 5 TABLET, COATED ORAL at 15:37

## 2023-11-28 RX ADMIN — PANTOPRAZOLE SODIUM 40 MG: 40 INJECTION, POWDER, FOR SOLUTION INTRAVENOUS at 08:15

## 2023-11-28 RX ADMIN — EZETIMIBE 10 MG: 10 TABLET ORAL at 08:15

## 2023-11-28 RX ADMIN — DULOXETINE HYDROCHLORIDE 60 MG: 60 CAPSULE, DELAYED RELEASE ORAL at 08:15

## 2023-11-28 RX ADMIN — SODIUM CHLORIDE, PRESERVATIVE FREE 10 ML: 5 INJECTION INTRAVENOUS at 11:21

## 2023-11-28 RX ADMIN — SUCRALFATE 1 G: 1 TABLET ORAL at 21:48

## 2023-11-28 RX ADMIN — ARFORMOTEROL TARTRATE 15 MCG: 15 SOLUTION RESPIRATORY (INHALATION) at 07:36

## 2023-11-28 RX ADMIN — SUCRALFATE 1 G: 1 TABLET ORAL at 11:25

## 2023-11-28 RX ADMIN — AMLODIPINE BESYLATE 10 MG: 10 TABLET ORAL at 08:15

## 2023-11-28 RX ADMIN — SUCRALFATE 1 G: 1 TABLET ORAL at 04:41

## 2023-11-28 RX ADMIN — IPRATROPIUM BROMIDE 0.5 MG: 0.5 SOLUTION RESPIRATORY (INHALATION) at 07:36

## 2023-11-28 ASSESSMENT — PAIN SCALES - GENERAL
PAINLEVEL_OUTOF10: 0
PAINLEVEL_OUTOF10: 4

## 2023-11-28 ASSESSMENT — PAIN DESCRIPTION - LOCATION: LOCATION: CHEST

## 2023-11-28 ASSESSMENT — PAIN DESCRIPTION - DESCRIPTORS: DESCRIPTORS: DISCOMFORT

## 2023-11-28 NOTE — PLAN OF CARE
Problem: Safety - Adult  Goal: Free from fall injury  11/28/2023 1356 by Perla Sheppard RN  Outcome: Progressing     Problem: Pain  Goal: Verbalizes/displays adequate comfort level or baseline comfort level  11/28/2023 1356 by Perla Sheppard RN  Outcome: Progressing    Problem: Skin/Tissue Integrity  Goal: Absence of new skin breakdown  Description: 1. Monitor for areas of redness and/or skin breakdown  2. Assess vascular access sites hourly  3. Every 4-6 hours minimum:  Change oxygen saturation probe site  4. Every 4-6 hours:  If on nasal continuous positive airway pressure, respiratory therapy assess nares and determine need for appliance change or resting period.   11/28/2023 1356 by Perla Sheppard RN  Outcome: Progressing

## 2023-11-29 VITALS
SYSTOLIC BLOOD PRESSURE: 126 MMHG | OXYGEN SATURATION: 96 % | TEMPERATURE: 97.6 F | WEIGHT: 195 LBS | HEART RATE: 62 BPM | HEIGHT: 67 IN | BODY MASS INDEX: 30.61 KG/M2 | DIASTOLIC BLOOD PRESSURE: 74 MMHG | RESPIRATION RATE: 12 BRPM

## 2023-11-29 PROBLEM — K92.2 GI BLEED: Status: RESOLVED | Noted: 2023-11-26 | Resolved: 2023-11-29

## 2023-11-29 PROBLEM — K27.9 PUD (PEPTIC ULCER DISEASE): Status: RESOLVED | Noted: 2023-11-28 | Resolved: 2023-11-29

## 2023-11-29 PROBLEM — I21.4 NSTEMI (NON-ST ELEVATED MYOCARDIAL INFARCTION) (HCC): Status: RESOLVED | Noted: 2023-11-27 | Resolved: 2023-11-29

## 2023-11-29 LAB
BASOPHILS # BLD: 0 K/UL (ref 0–0.1)
BASOPHILS NFR BLD: 1 % (ref 0–2)
DIFFERENTIAL METHOD BLD: ABNORMAL
EOSINOPHIL # BLD: 0.3 K/UL (ref 0–0.4)
EOSINOPHIL NFR BLD: 5 % (ref 0–5)
ERYTHROCYTE [DISTWIDTH] IN BLOOD BY AUTOMATED COUNT: 14.8 % (ref 11.6–14.5)
HCT VFR BLD AUTO: 43.4 % (ref 36–48)
HGB BLD-MCNC: 14.4 G/DL (ref 13–16)
IMM GRANULOCYTES # BLD AUTO: 0 K/UL (ref 0–0.04)
IMM GRANULOCYTES NFR BLD AUTO: 0 % (ref 0–0.5)
LYMPHOCYTES # BLD: 1.9 K/UL (ref 0.9–3.6)
LYMPHOCYTES NFR BLD: 34 % (ref 21–52)
MCH RBC QN AUTO: 30.2 PG (ref 24–34)
MCHC RBC AUTO-ENTMCNC: 33.2 G/DL (ref 31–37)
MCV RBC AUTO: 91 FL (ref 78–100)
MONOCYTES # BLD: 0.6 K/UL (ref 0.05–1.2)
MONOCYTES NFR BLD: 10 % (ref 3–10)
NEUTS SEG # BLD: 2.9 K/UL (ref 1.8–8)
NEUTS SEG NFR BLD: 51 % (ref 40–73)
NRBC # BLD: 0 K/UL (ref 0–0.01)
NRBC BLD-RTO: 0 PER 100 WBC
PLATELET # BLD AUTO: 292 K/UL (ref 135–420)
PMV BLD AUTO: 9.6 FL (ref 9.2–11.8)
RBC # BLD AUTO: 4.77 M/UL (ref 4.35–5.65)
WBC # BLD AUTO: 5.7 K/UL (ref 4.6–13.2)

## 2023-11-29 PROCEDURE — 6360000002 HC RX W HCPCS: Performed by: NURSE ANESTHETIST, CERTIFIED REGISTERED

## 2023-11-29 PROCEDURE — 2709999900 HC NON-CHARGEABLE SUPPLY: Performed by: INTERNAL MEDICINE

## 2023-11-29 PROCEDURE — 2580000003 HC RX 258: Performed by: ANESTHESIOLOGY

## 2023-11-29 PROCEDURE — C9113 INJ PANTOPRAZOLE SODIUM, VIA: HCPCS | Performed by: HOSPITALIST

## 2023-11-29 PROCEDURE — 7100000011 HC PHASE II RECOVERY - ADDTL 15 MIN: Performed by: INTERNAL MEDICINE

## 2023-11-29 PROCEDURE — 6370000000 HC RX 637 (ALT 250 FOR IP): Performed by: PHYSICIAN ASSISTANT

## 2023-11-29 PROCEDURE — 3600007502: Performed by: INTERNAL MEDICINE

## 2023-11-29 PROCEDURE — 36415 COLL VENOUS BLD VENIPUNCTURE: CPT

## 2023-11-29 PROCEDURE — 85025 COMPLETE CBC W/AUTO DIFF WBC: CPT

## 2023-11-29 PROCEDURE — 6370000000 HC RX 637 (ALT 250 FOR IP): Performed by: HOSPITALIST

## 2023-11-29 PROCEDURE — 3700000000 HC ANESTHESIA ATTENDED CARE: Performed by: INTERNAL MEDICINE

## 2023-11-29 PROCEDURE — 2580000003 HC RX 258: Performed by: HOSPITALIST

## 2023-11-29 PROCEDURE — 99239 HOSP IP/OBS DSCHRG MGMT >30: CPT | Performed by: INTERNAL MEDICINE

## 2023-11-29 PROCEDURE — 6360000002 HC RX W HCPCS: Performed by: HOSPITALIST

## 2023-11-29 PROCEDURE — 94761 N-INVAS EAR/PLS OXIMETRY MLT: CPT

## 2023-11-29 PROCEDURE — 2500000003 HC RX 250 WO HCPCS: Performed by: NURSE ANESTHETIST, CERTIFIED REGISTERED

## 2023-11-29 PROCEDURE — 7100000010 HC PHASE II RECOVERY - FIRST 15 MIN: Performed by: INTERNAL MEDICINE

## 2023-11-29 PROCEDURE — 0DJ08ZZ INSPECTION OF UPPER INTESTINAL TRACT, VIA NATURAL OR ARTIFICIAL OPENING ENDOSCOPIC: ICD-10-PCS | Performed by: INTERNAL MEDICINE

## 2023-11-29 PROCEDURE — 6370000000 HC RX 637 (ALT 250 FOR IP): Performed by: INTERNAL MEDICINE

## 2023-11-29 PROCEDURE — A4216 STERILE WATER/SALINE, 10 ML: HCPCS | Performed by: HOSPITALIST

## 2023-11-29 PROCEDURE — 7100000000 HC PACU RECOVERY - FIRST 15 MIN: Performed by: INTERNAL MEDICINE

## 2023-11-29 RX ORDER — OMEPRAZOLE 40 MG/1
40 CAPSULE, DELAYED RELEASE ORAL DAILY
Qty: 60 CAPSULE | Refills: 3 | Status: SHIPPED | OUTPATIENT
Start: 2023-11-29

## 2023-11-29 RX ORDER — PROPOFOL 10 MG/ML
INJECTION, EMULSION INTRAVENOUS PRN
Status: DISCONTINUED | OUTPATIENT
Start: 2023-11-29 | End: 2023-11-29 | Stop reason: SDUPTHER

## 2023-11-29 RX ORDER — SUCRALFATE 1 G/1
1 TABLET ORAL EVERY 8 HOURS
Qty: 42 TABLET | Refills: 0 | Status: SHIPPED | OUTPATIENT
Start: 2023-11-29 | End: 2023-12-13

## 2023-11-29 RX ORDER — POLYETHYLENE GLYCOL 3350 17 G/17G
17 POWDER, FOR SOLUTION ORAL DAILY
Qty: 30 PACKET | Refills: 0 | Status: SHIPPED | OUTPATIENT
Start: 2023-11-29 | End: 2023-12-29

## 2023-11-29 RX ORDER — SODIUM CHLORIDE, SODIUM LACTATE, POTASSIUM CHLORIDE, CALCIUM CHLORIDE 600; 310; 30; 20 MG/100ML; MG/100ML; MG/100ML; MG/100ML
INJECTION, SOLUTION INTRAVENOUS CONTINUOUS
Status: DISCONTINUED | OUTPATIENT
Start: 2023-11-29 | End: 2023-11-29 | Stop reason: HOSPADM

## 2023-11-29 RX ORDER — PANTOPRAZOLE SODIUM 40 MG/1
40 TABLET, DELAYED RELEASE ORAL
Status: DISCONTINUED | OUTPATIENT
Start: 2023-11-30 | End: 2023-11-29 | Stop reason: HOSPADM

## 2023-11-29 RX ORDER — FAMOTIDINE 20 MG/1
20 TABLET, FILM COATED ORAL DAILY
Qty: 60 TABLET | Refills: 3 | Status: SHIPPED | OUTPATIENT
Start: 2023-11-29

## 2023-11-29 RX ORDER — LIDOCAINE HYDROCHLORIDE 20 MG/ML
INJECTION, SOLUTION EPIDURAL; INFILTRATION; INTRACAUDAL; PERINEURAL PRN
Status: DISCONTINUED | OUTPATIENT
Start: 2023-11-29 | End: 2023-11-29 | Stop reason: SDUPTHER

## 2023-11-29 RX ADMIN — AMLODIPINE BESYLATE 10 MG: 10 TABLET ORAL at 08:46

## 2023-11-29 RX ADMIN — PANTOPRAZOLE SODIUM 40 MG: 40 INJECTION, POWDER, FOR SOLUTION INTRAVENOUS at 08:44

## 2023-11-29 RX ADMIN — SPIRONOLACTONE 25 MG: 25 TABLET ORAL at 08:46

## 2023-11-29 RX ADMIN — LIDOCAINE HYDROCHLORIDE 60 MG: 20 INJECTION, SOLUTION EPIDURAL; INFILTRATION; INTRACAUDAL; PERINEURAL at 12:37

## 2023-11-29 RX ADMIN — METOPROLOL TARTRATE 25 MG: 25 TABLET, FILM COATED ORAL at 08:46

## 2023-11-29 RX ADMIN — SODIUM CHLORIDE, PRESERVATIVE FREE 10 ML: 5 INJECTION INTRAVENOUS at 08:58

## 2023-11-29 RX ADMIN — SUCRALFATE 1 G: 1 TABLET ORAL at 15:54

## 2023-11-29 RX ADMIN — EZETIMIBE 10 MG: 10 TABLET ORAL at 08:45

## 2023-11-29 RX ADMIN — DULOXETINE HYDROCHLORIDE 60 MG: 60 CAPSULE, DELAYED RELEASE ORAL at 08:46

## 2023-11-29 RX ADMIN — SUCRALFATE 1 G: 1 TABLET ORAL at 04:26

## 2023-11-29 RX ADMIN — SODIUM CHLORIDE, POTASSIUM CHLORIDE, SODIUM LACTATE AND CALCIUM CHLORIDE: 600; 310; 30; 20 INJECTION, SOLUTION INTRAVENOUS at 12:17

## 2023-11-29 RX ADMIN — PROPOFOL 80 MG: 10 INJECTION, EMULSION INTRAVENOUS at 12:37

## 2023-11-29 ASSESSMENT — PAIN SCALES - GENERAL
PAINLEVEL_OUTOF10: 0

## 2023-11-29 ASSESSMENT — ENCOUNTER SYMPTOMS: SHORTNESS OF BREATH: 1

## 2023-11-29 ASSESSMENT — PAIN - FUNCTIONAL ASSESSMENT: PAIN_FUNCTIONAL_ASSESSMENT: 0-10

## 2023-11-29 NOTE — DISCHARGE SUMMARY
needed. Continue PPI added Carafate for 14 days; Typical chest pain with negative troponin. Resolved;    Continue Norvasc and beta-blocker     echo; is normal no WMA; Left Ventricle: Normal left ventricular systolic function with a visually estimated EF of 55 - 60%. Left ventricle size is normal. Normal wall thickness. Normal wall motion. Grade III diastolic dysfunction with increased LAP. Aortic Valve: Trileaflet valve. No regurgitation. No stenosis. Left Atrium: Left atrium is mildly dilated. Left atrial volume index is mildly increased (35-41 mL/m2). LA Vol Index A/L is 39 mL/m2. Right Atrium: Right atrium is mildly dilated. Cards consulted; no further testing;        -Hx PE and DVT, on Eliquis as outpatient. Consults: cardiology and GI        Pertinent Lab Data:  Recent Labs     11/26/23  1850 11/27/23  0218 11/27/23  0522 11/27/23  1020 11/28/23  1031 11/28/23  1248 11/29/23  0301   WBC 6.2  --  5.3  --   --   --  5.7   HGB 13.9   < > 14.1   < > 14.4 14.2 14.4   HCT 42.3   < > 43.4   < > 44.1 43.4 43.4     --  258  --   --   --  292    < > = values in this interval not displayed.      Recent Labs     11/27/23  0522 11/27/23  1019 11/28/23  1245    136 137   K 4.1 4.2 4.1    104 106   CO2 27 28 25   BUN 9 10 16   MG  --   --  2.1   ALT  --  22  --        DISCHARGE MEDICATIONS:  @     Medication List        START taking these medications      metoprolol tartrate 25 MG tablet  Commonly known as: LOPRESSOR  Take 0.5 tablets by mouth 2 times daily     sucralfate 1 GM tablet  Commonly known as: CARAFATE  Take 1 tablet by mouth every 8 (eight) hours for 14 days            CHANGE how you take these medications      famotidine 20 MG tablet  Commonly known as: PEPCID  Take 1 tablet by mouth daily  What changed: when to take this     omeprazole 40 MG delayed release capsule  Commonly known as: PRILOSEC  Take 1 capsule by mouth daily ceived the following from [facility]

## 2023-11-29 NOTE — PROCEDURES
[address] [facility], [address] [place] [address]     Endoscopic Gastroduodenoscopy Procedure Note    Fadumo Bean  1945  [id number]    Indication: Abdominal pain, epigastric    : John Lima MD    Assistant(s): Circulator: Vinod Rizo RN; Nurys Awan RN    Referring Provider:  Rosalba Alegria MD    Anesthesia/Sedation:  MAC anesthesia Propofol      Procedure Details     After infomed consent was obtained for the procedure, with all risks and benefits of procedure explained the patient was taken to the endoscopy suite and placed in the left lateral decubitus position. Following sequential administration of sedation as per above, the endoscope was inserted into the mouth and advanced under direct vision to third portion of the duodenum. A careful inspection was made as the gastroscope was withdrawn, including a retroflexed view of the proximal stomach; findings and interventions are described below. Findings:   Esophagus:Small hiatal hernia and esophageal ring at EG junction. Abnormal esophageal motility noted. Stomach: Fundus of stomach unremarkable on retroflex view. Duodenum/jejunum:  Duodenal diverticulae multiple small. Therapies:  none    Specimens: [unfilled]    Devices/implants/grafts/tissues/prosthesis: None           Complications:   None; patient tolerated the procedure well. EBL:  None. Impression: Hiatal hernia, Esophageal dysmotility and EG Junction ring. Duodenal diverticulum. Recommendations:  -Continue acid suppression. , -Regular diet. No further GI input. Can anticoagulate per GI point of view. Follow up as out patient. Will sign off. Call us as needed.     John Lima MD  11/29/2023  12:46 PM [FreeTextEntry1] : 33 y/o P0 presents for annual GYN exam.\par \par Not currently sexually active. Sexually active in the past with women only. Would like STD testing today.\par \par Periods are regular and  moderate. Does have cramping first day.\par \par S/P Gastric Sleeve and can only take Tylenol which doesn;t help much.\par \par No GYN complaints.\par \par Last pap ~ 3 years ago. Reports normal.\par \par Maternal aunt- breast ca\par \par Denies FH of ca of ovary, colon or uterus.\par \par Lives alone with her dog.\par \par  in [place].\par \par Considering moving to [place].\par \par \par \par

## 2023-11-29 NOTE — PLAN OF CARE
Problem: Discharge Planning  Goal: Discharge to home or other facility with appropriate resources  Outcome: Progressing  Flowsheets (Taken 11/28/2023 2030)  Discharge to home or other facility with appropriate resources:   Identify barriers to discharge with patient and caregiver   Identify discharge learning needs (meds, wound care, etc)     Problem: Safety - Adult  Goal: Free from fall injury  Outcome: Progressing     Problem: Pain  Goal: Verbalizes/displays adequate comfort level or baseline comfort level  Outcome: Progressing     Problem: Skin/Tissue Integrity  Goal: Absence of new skin breakdown  Description: 1. Monitor for areas of redness and/or skin breakdown  2. Assess vascular access sites hourly  3. Every 4-6 hours minimum:  Change oxygen saturation probe site  4. Every 4-6 hours:  If on nasal continuous positive airway pressure, respiratory therapy assess nares and determine need for appliance change or resting period.   Outcome: Progressing     Problem: ABCDS Injury Assessment  Goal: Absence of physical injury  Outcome: Progressing

## 2023-11-29 NOTE — PLAN OF CARE
Problem: Discharge Planning  Goal: Discharge to home or other facility with appropriate resources  11/29/2023 1532 by Kwan Reynoso RN  Outcome: Adequate for Discharge  11/29/2023 0851 by Joe Valentino RN  Outcome: Progressing  Flowsheets (Taken 11/28/2023 2030)  Discharge to home or other facility with appropriate resources:   Identify barriers to discharge with patient and caregiver   Identify discharge learning needs (meds, wound care, etc)     Problem: Safety - Adult  Goal: Free from fall injury  11/29/2023 1532 by Kwan Reynoso RN  Outcome: Adequate for Discharge  11/29/2023 0851 by Joe Valentino RN  Outcome: Progressing     Problem: Pain  Goal: Verbalizes/displays adequate comfort level or baseline comfort level  11/29/2023 1532 by Kwan Reynoso RN  Outcome: Adequate for Discharge  11/29/2023 0851 by Joe Valentino RN  Outcome: Progressing     Problem: Skin/Tissue Integrity  Goal: Absence of new skin breakdown  Description: 1. Monitor for areas of redness and/or skin breakdown  2. Assess vascular access sites hourly  3. Every 4-6 hours minimum:  Change oxygen saturation probe site  4. Every 4-6 hours:  If on nasal continuous positive airway pressure, respiratory therapy assess nares and determine need for appliance change or resting period.   11/29/2023 1532 by Kwan Reynoso RN  Outcome: Adequate for Discharge  11/29/2023 0851 by Joe Valentino RN  Outcome: Progressing     Problem: ABCDS Injury Assessment  Goal: Absence of physical injury  11/29/2023 1532 by Kwan Reynoso RN  Outcome: Adequate for Discharge  11/29/2023 0851 by Joe Valentino RN  Outcome: Progressing

## 2023-11-29 NOTE — ANESTHESIA POSTPROCEDURE EVALUATION
Department of Anesthesiology  Postprocedure Note    Patient: Cruz Gibbons  MRN: [id number]  [facility] 1945  Date of evaluation: 11/29/2023      Procedure Summary     Date: 11/29/23 Room / Location: [facility] ENDO 02 / [facility] ENDOSCOPY    Anesthesia Start: 200 Anesthesia Stop: 1247    Procedure: EGD ESOPHAGOGASTRODUODENOSCOPY (Upper GI Region) Diagnosis:       BRBPR (bright red blood per rectum)      Abnormal computed tomography of stomach      Bleeding      (BRBPR (bright red blood per rectum) [K62.5])      (Abnormal computed tomography of stomach [R93.3])      (Bleeding [R58])    Surgeons: Daniela Early MD Responsible Provider: Bry Lozano DO    Anesthesia Type: MAC ASA Status: 3          Anesthesia Type: MAC    Sujatha Phase I: Sujatha Score: 10    Sujatha Phase II: Sujatha Score: 10      Anesthesia Post Evaluation    Patient location during evaluation: PACU  Patient participation: complete - patient participated  Level of consciousness: awake and alert  Airway patency: patent  Nausea & Vomiting: no nausea and no vomiting  Complications: no  Cardiovascular status: hemodynamically stable  Respiratory status: acceptable  Hydration status: stable  Multimodal analgesia pain management approach

## 2023-11-29 NOTE — PERIOP NOTE
TRANSFER - IN REPORT:    Verbal report received from [facility] on Artist Copas  being received from 3N for ordered procedure      Report consisted of patient's Situation, Background, Assessment and   Recommendations(SBAR). Information from the following report(s) Nurse Handoff Report was reviewed with the receiving nurse. Opportunity for questions and clarification was provided. Assessment completed upon patient's arrival to unit and care assumed.

## 2023-11-29 NOTE — ANESTHESIA PRE PROCEDURE
AM    AGRATIO 1.1 07/06/2023 03:50 PM    LABGLOM >60 11/28/2023 12:45 PM    LABGLOM 67 10/02/2023 12:00 AM    GLUCOSE 74 11/28/2023 12:45 PM    GLUCOSE 86 10/02/2023 12:00 AM    PROT 7.7 11/27/2023 10:19 AM    CALCIUM 9.1 11/28/2023 12:45 PM    BILITOT 0.6 11/27/2023 10:19 AM    ALKPHOS 88 11/27/2023 10:19 AM    ALKPHOS 89 02/08/2023 08:18 AM    AST 13 11/27/2023 10:19 AM    ALT 22 11/27/2023 10:19 AM       POC Tests: No results for input(s): \"POCGLU\", \"POCNA\", \"POCK\", \"POCCL\", \"POCBUN\", \"POCHEMO\", \"POCHCT\" in the last 72 hours. Coags:   Lab Results   Component Value Date/Time    PROTIME 14.5 01/17/2023 08:10 AM    INR 1.1 01/17/2023 08:10 AM    APTT 28.8 01/17/2023 08:10 AM       HCG (If Applicable): No results found for: \"PREGTESTUR\", \"PREGSERUM\", \"HCG\", \"HCGQUANT\"     ABGs: No results found for: \"PHART\", \"PO2ART\", \"YQR2SWF\", \"IMG4IWW\", \"BEART\", \"Z9PCJMKN\"     Type & Screen (If Applicable):  No results found for: \"LABABO\", \"LABRH\"    Drug/Infectious Status (If Applicable):  No results found for: \"HIV\", \"HEPCAB\"    COVID-19 Screening (If Applicable):   Lab Results   Component Value Date/Time    COVID19 Please find results under separate order 06/11/2021 12:58 PM    COVID19 Not detected 06/11/2021 12:58 PM           Anesthesia Evaluation  Patient summary reviewed and Nursing notes reviewed  Airway: Mallampati: II  TM distance: >3 FB   Neck ROM: full  Mouth opening: > = 3 FB   Dental:    (+) edentulous      Pulmonary:normal exam    (+) shortness of breath:                            ROS comment: Hx dvt/pe   Cardiovascular:    (+) hypertension:, CAD:,       ECG reviewed      Echocardiogram reviewed    Cleared by cardiology              Neuro/Psych:               GI/Hepatic/Renal:   (+) GERD:, PUD,           Endo/Other: Negative Endo/Other ROS                    Abdominal: normal exam            Vascular: negative vascular ROS.          Other Findings:             Anesthesia Plan      MAC     ASA 3       Induction:

## 2023-11-29 NOTE — CARE COORDINATION
Discharge order noted for today. Orders received. No needs identified at this time. Case management remains available as needed. Zack arranged to transport pt home.     [facility] RN CDCES  Case Management
Writer spoke with patient who says he lives alone in his apartment and is able to perform ADL's without difficulty. Patient says he has a walker and a cane at home and uses them as needed. Patient  receives $914 in [place] and $114 in     Plan: patient will return home with possible HH if needed   11/27/23 9104   Service Assessment   Patient Orientation Alert and Oriented   Cognition Alert   History Provided By Patient   Primary [address] [facility] Family Members   Patient's Healthcare Decision Maker is: Named in [address]   PCP Verified by CM Yes   Prior Functional Level Independent in ADLs/IADLs   Current Functional Level Independent in ADLs/IADLs   Can patient return to prior living arrangement Yes   Ability to make needs known: Good   Family able to assist with home care needs: Yes   Would you like for me to discuss the discharge plan with any other family members/significant others, and if so, who? No   Financial Resources Medicaid; Food [place]   Social/Functional History   Lives With Alone   Type of Home Apartment   Home Layout One level   Home Access Elevator   Bathroom Shower/Tub Tub/Shower unit   Bathroom Equipment Grab bars in [address] No   Discharge Planning   Type of Residence Apartment   History of falls? 0
([address] [facility]) [N50.228]    IF APPLICABLE: The Patient and/or patient representative Maciej Calvo and his family were provided with a choice of provider and agrees with the discharge plan. Freedom of choice list with basic dialogue that supports the patient's individualized plan of care/goals and shares the quality data associated with the providers was provided to:     Patient Representative Name:       The Patient and/or Patient Representative Agree with the Discharge Plan? Yes      11/28/23 8242   Service Assessment   Patient Orientation Alert and Oriented;Person;Place;Situation;Self   Cognition Alert   History Provided By Patient   Primary [address] [place] None   Patient's Healthcare Decision Maker is: Named in [address] [facility]   PCP Verified by CM Yes   Last Visit to PCP Within last 3 months   Prior Functional Level Independent in ADLs/IADLs   Current Functional Level Independent in ADLs/IADLs   Can patient return to prior living arrangement Yes   Ability to make needs known: Good   Family able to assist with home care needs: Yes   Would you like for me to discuss the discharge plan with any other family members/significant others, and if so, who? No   Financial Resources Food [place]; Medicaid   Community Resources None   Social/Functional History   Lives With Alone   Type of Home Apartment   Home Layout One level   Home Access Elevator   Bathroom Shower/Tub Tub/Shower unit   [address] bars in [address], rolling  (Found a walker in bad condition)   [address]   Homemaking Assistance Independent   Homemaking Responsibilities Yes   Ambulation Assistance Independent   Transfer Assistance Independent   Active  No   Occupation Retired   Discharge Planning   Type of [address] [place] Prior To Admission None   Potential Assistance Needed Durable

## 2023-11-29 NOTE — PERIOP NOTE
TRANSFER - OUT REPORT:    Verbal report given to [facility] RN on Twyla Sykes  being transferred to [facility] for routine progression of patient care       Report consisted of patient's Situation, Background, Assessment and   Recommendations(SBAR). Information from the following report(s) Nurse Handoff Report, Surgery Report, MAR, and Procedure Verification was reviewed with the receiving nurse. Lines:   Peripheral IV 11/26/23 Right;Posterior Forearm (Active)   Site Assessment Clean, dry & intact 11/28/23 1600   Line Status Capped;Flushed 11/28/23 1600   Line Care Connections checked and tightened 11/28/23 1600   Phlebitis Assessment No symptoms 11/28/23 1600   Infiltration Assessment 0 11/28/23 1600   Alcohol Cap Used Yes 11/28/23 1600   Dressing Status Clean, dry & intact 11/28/23 1600   Dressing Type Transparent 11/28/23 1600        Opportunity for questions and clarification was provided.       Patient transported with:  [employer]

## 2023-11-30 RX ORDER — PANTOPRAZOLE SODIUM 40 MG/1
1 TABLET, DELAYED RELEASE ORAL DAILY
COMMUNITY
Start: 2019-03-28

## 2023-11-30 NOTE — PERIOP NOTE
Instructions for your surgery at [address] [place] [address] Date:  11/30/2023      Patient's Name:  Marla Ann           Surgery Date:  12/12/2023              Please enter the main entrance of the hospital and check-in at the  located in the [facility]. Once checked in at the , you will take the elevators to the second floor, and report to the waiting room on the left. The room will say Procedure Registration. Do NOT eat or drink anything, including candy, gum, or ice chips after midnight prior to your surgery, unless you have specific instructions from your surgeon or anesthesia provider to do so. Brush your teeth before coming to the hospital. You may swish with water, but do not swallow. No smoking/Vaping/E-Cigarettes 24 hours prior to the day of surgery. No alcohol 24 hours prior to the day of surgery. No recreational drugs for one week prior to the day of surgery. Bring Photo ID, Insurance information, and Co-pay if required on day of surgery. Bring in pertinent legal documents, such as, Medical Power of FITZ LORENZO, DNR, Advance Directive, etc.  Leave all valuables, including money/purse, at home. Remove all jewelry, including ALL body piercings, nail polish, acrylic nails, and makeup (including mascara); no lotions, powders, deodorant, or perfume/cologne/after shave on the skin. Follow instruction for Hibiclens washes and CHG wipes from surgeon's office. Glasses and dentures may be worn to the hospital. They must be removed prior to surgery. Please bring case/container for glasses or dentures. Contact lenses should not be worn on day of surgery. Call your doctor's office if symptoms of a cold or illness develop within 24-48 hours prior to your surgery. Call your doctor's office if you have any questions concerning insurance or co-pays. 15. AN ADULT (relative or friend 25 years or older) [address]   16. Please make

## 2023-12-01 ENCOUNTER — HOSPITAL ENCOUNTER (OUTPATIENT)
Facility: HOSPITAL | Age: 78
Discharge: HOME | End: 2023-12-04

## 2023-12-01 LAB — LABCORP SPECIMEN COLLECTION: NORMAL

## 2023-12-02 LAB
BUN SERPL-MCNC: 12 MG/DL (ref 8–27)
BUN/CREAT SERPL: 11 (ref 10–24)
CALCIUM SERPL-MCNC: 9.6 MG/DL (ref 8.6–10.2)
CHLORIDE SERPL-SCNC: 104 MMOL/L (ref 96–106)
CO2 SERPL-SCNC: 20 MMOL/L (ref 20–29)
CREAT SERPL-MCNC: 1.1 MG/DL (ref 0.76–1.27)
EGFRCR SERPLBLD CKD-EPI 2021: 70 ML/MIN/1.73
ERYTHROCYTE [DISTWIDTH] IN BLOOD BY AUTOMATED COUNT: 13.4 % (ref 11.6–15.4)
GLUCOSE SERPL-MCNC: 104 MG/DL (ref 70–99)
HCT VFR BLD AUTO: 45.9 % (ref 37.5–51)
HGB BLD-MCNC: 15.5 G/DL (ref 13–17.7)
MCH RBC QN AUTO: 30.5 PG (ref 26.6–33)
MCHC RBC AUTO-ENTMCNC: 33.8 G/DL (ref 31.5–35.7)
MCV RBC AUTO: 90 FL (ref 79–97)
PLATELET # BLD AUTO: 296 X10E3/UL (ref 150–450)
POTASSIUM SERPL-SCNC: 4.5 MMOL/L (ref 3.5–5.2)
RBC # BLD AUTO: 5.09 X10E6/UL (ref 4.14–5.8)
SODIUM SERPL-SCNC: 140 MMOL/L (ref 134–144)
WBC # BLD AUTO: 4.4 X10E3/UL (ref 3.4–10.8)

## 2023-12-05 LAB — BACTERIA UR CULT: NO GROWTH

## 2023-12-11 ENCOUNTER — OFFICE VISIT (OUTPATIENT)
Age: 78
End: 2023-12-11
Payer: MEDICAID

## 2023-12-11 ENCOUNTER — ANESTHESIA EVENT (OUTPATIENT)
Facility: HOSPITAL | Age: 78
End: 2023-12-11
Payer: MEDICAID

## 2023-12-11 VITALS
DIASTOLIC BLOOD PRESSURE: 66 MMHG | BODY MASS INDEX: 31.27 KG/M2 | HEART RATE: 85 BPM | OXYGEN SATURATION: 96 % | HEIGHT: 67 IN | TEMPERATURE: 97 F | WEIGHT: 199.2 LBS | SYSTOLIC BLOOD PRESSURE: 144 MMHG | RESPIRATION RATE: 16 BRPM

## 2023-12-11 VITALS — BODY MASS INDEX: 31.23 KG/M2 | WEIGHT: 199 LBS | HEIGHT: 67 IN

## 2023-12-11 DIAGNOSIS — M20.012 CLOSED FRACTURE OF DISTAL PHALANX OF LEFT MIDDLE FINGER WITH MALLET DEFORMITY: Primary | ICD-10-CM

## 2023-12-11 DIAGNOSIS — J41.8 MIXED SIMPLE AND MUCOPURULENT CHRONIC BRONCHITIS (HCC): ICD-10-CM

## 2023-12-11 DIAGNOSIS — I26.94 MULTIPLE SUBSEGMENTAL PULMONARY EMBOLI WITHOUT ACUTE COR PULMONALE (HCC): Primary | ICD-10-CM

## 2023-12-11 DIAGNOSIS — S62.633A CLOSED FRACTURE OF DISTAL PHALANX OF LEFT MIDDLE FINGER WITH MALLET DEFORMITY: Primary | ICD-10-CM

## 2023-12-11 PROCEDURE — 99214 OFFICE O/P EST MOD 30 MIN: CPT | Performed by: INTERNAL MEDICINE

## 2023-12-11 PROCEDURE — 73140 X-RAY EXAM OF FINGER(S): CPT | Performed by: ORTHOPAEDIC SURGERY

## 2023-12-11 PROCEDURE — 1123F ACP DISCUSS/DSCN MKR DOCD: CPT | Performed by: INTERNAL MEDICINE

## 2023-12-11 PROCEDURE — 3078F DIAST BP <80 MM HG: CPT | Performed by: INTERNAL MEDICINE

## 2023-12-11 PROCEDURE — 99024 POSTOP FOLLOW-UP VISIT: CPT | Performed by: ORTHOPAEDIC SURGERY

## 2023-12-11 PROCEDURE — 3077F SYST BP >= 140 MM HG: CPT | Performed by: INTERNAL MEDICINE

## 2023-12-11 NOTE — PROGRESS NOTES
Roberth Cox presents today for   Chief Complaint   Patient presents with    Follow-up    Shortness of Breath    Cough    Pulmonary Embolism       Is someone accompanying this pt? No    Is the patient using any DME equipment during OV? No    -DME Company NA    Depression Screenin/6/2023     3:23 PM   PHQ-9 Questionaire   Little interest or pleasure in doing things 0   Feeling down, depressed, or hopeless 0   PHQ-9 Total Score 0       Learning Needs Questionnaire:     No question data found. Fall Risk:          No data to display                 Abuse Screening:          No data to display                  Coordination of Care:    1. Have you been to the ER, urgent care clinic since your last visit? Hospitalized since your last visit? Yes 2023    2. Have you seen or consulted any other health care providers outside of the [address] [place] [address] since your last visit? Include any pap smears or colon screening. No    Medication list has been update per patient.
limits. Pancreas: Within normal limits. Adrenals: Within normal limits. Kidneys/ureters: Within normal limits. Gastrointestinal tract/Peritoneum/Mesenteries: Multiple hiatal hernia. There is  an 8 mm intraluminal hyperdensity in the gastric fundus near the stomach wall,  see axial image 19 and sagittal image 34. There is also an intraluminal calcific  density in the gastric antrum which may represent ingested material, see axial  image 30. No small bowel dilatation to suggest obstruction. Moderate stool  burden throughout the colon and rectum. Diverticulosis without diverticulitis. Appendix is normal. No mesenteric lymphadenopathy, abnormal fluid collections,  or free intraperitoneal air    Retroperitoneum: Within normal limits. Vascular: Scattered vascular plaques are seen along the aorta and branch vessels  without aneurysmal dilatation. Bladder: Within normal limits. Reproductive System: Within normal limits. MSK: No aggressive appearing osteolytic or blastic lesions in the visualized  skeleton. Stable L5 pars interarticularis defects with grade 1 anterolisthesis  of L5 on S1. Multilevel degenerative changes noted of the spine and hips. Additional Comments: Small fat-containing umbilical hernia. Impression  1. There is a 8 mm intraluminal hyperdensity in the gastric fundus near the  stomach wall which is nonspecific but may represent a small bleed. Correlate  with any history of peptic ulcer disease or coffee-ground emesis. There is a  calcific intraluminal density in the stomach antrum which may represent ingested  medications, correlate clinically. 2. Mild atherosclerosis and additional chronic and incidental findings as  described.          High complexity decision making was performed during the evaluation of this patient at high risk for decompensation with multiple organ involvement       Above mentioned total time spent on reviewing the case/medical

## 2023-12-11 NOTE — PROGRESS NOTES
Fadumo Bean is a 69 y.o. male right handed retiree. Worker's Compensation and legal considerations: none    Chief Complaint   Patient presents with    Finger Pain     Lt middle     Pain Score:   4    HPI: Patient presents today for follow-up of left middle finger mallet fracture. He reports losing his splint since last visit. He does report some continued soreness though slightly improved. 9/25/2023 HPI: Patient presents today with a new problem of left middle finger injury. He has been placed into a splint by another physician. Initial HPI: Patient comes in today regarding concerns of having a metal foreign body in his on something metallic right middle finger tip in his left thumb tip. He says that he scratched both areas last year and has since had issues with it. He thinks he may have gotten something metallic out of the right middle finger and then it bled after that. He reports some continued tenderness to palpation. Date of onset: Early September 2023  Injury: Yes: Comment: Left middle finger  Prior Treatment:  Yes: Comment: Splint. Left carpal tunnel and cubital tunnel releases. ROS: Review of Systems - General ROS: negative except HPI    Past Medical History:   Diagnosis Date    Atypical chest pain     Bladder outlet obstruction     BPH with obstruction/lower urinary tract symptoms     COPD (chronic obstructive pulmonary disease) (HCC)     DVT (deep venous thrombosis) ([address]) 06/2021    Frequency of urination     Gout     HTN (hypertension)     Hx of blood clots     Hypercholesterolemia     Illiterate     MGUS (monoclonal gammopathy of unknown significance)     Nocturia     Peripheral vascular disease (HCC)     Prostate cancer ([address]) 2022    Low grade . Observe    Pulmonary embolism ([address]) 06/2021    Shortness of breath        Past Surgical History:   Procedure Laterality Date    CARPAL TUNNEL RELEASE Left 2021    COLONOSCOPY N/A 12/04/2019    COLONOSCOPY performed by Jagruti Guadalupe MD

## 2023-12-12 ENCOUNTER — HOSPITAL ENCOUNTER (OUTPATIENT)
Facility: HOSPITAL | Age: 78
Discharge: HOME | End: 2023-12-12
Attending: UROLOGY | Admitting: UROLOGY
Payer: MEDICAID

## 2023-12-12 ENCOUNTER — ANESTHESIA (OUTPATIENT)
Facility: HOSPITAL | Age: 78
End: 2023-12-12
Payer: MEDICAID

## 2023-12-12 VITALS
DIASTOLIC BLOOD PRESSURE: 66 MMHG | RESPIRATION RATE: 18 BRPM | TEMPERATURE: 97.7 F | BODY MASS INDEX: 31.27 KG/M2 | HEIGHT: 67 IN | HEART RATE: 68 BPM | SYSTOLIC BLOOD PRESSURE: 151 MMHG | OXYGEN SATURATION: 95 % | WEIGHT: 199.2 LBS

## 2023-12-12 PROBLEM — N32.0 BLADDER NECK OBSTRUCTION: Status: ACTIVE | Noted: 2023-12-12

## 2023-12-12 PROCEDURE — 7100000001 HC PACU RECOVERY - ADDTL 15 MIN: Performed by: UROLOGY

## 2023-12-12 PROCEDURE — 2709999900 HC NON-CHARGEABLE SUPPLY: Performed by: UROLOGY

## 2023-12-12 PROCEDURE — 3600000003 HC SURGERY LEVEL 3 BASE: Performed by: UROLOGY

## 2023-12-12 PROCEDURE — 3600000013 HC SURGERY LEVEL 3 ADDTL 15MIN: Performed by: UROLOGY

## 2023-12-12 PROCEDURE — 7100000010 HC PHASE II RECOVERY - FIRST 15 MIN: Performed by: UROLOGY

## 2023-12-12 PROCEDURE — 2580000003 HC RX 258: Performed by: UROLOGY

## 2023-12-12 PROCEDURE — 2580000003 HC RX 258: Performed by: NURSE ANESTHETIST, CERTIFIED REGISTERED

## 2023-12-12 PROCEDURE — 2580000003 HC RX 258: Performed by: ANESTHESIOLOGY

## 2023-12-12 PROCEDURE — 2500000003 HC RX 250 WO HCPCS: Performed by: ANESTHESIOLOGY

## 2023-12-12 PROCEDURE — 7100000011 HC PHASE II RECOVERY - ADDTL 15 MIN: Performed by: UROLOGY

## 2023-12-12 PROCEDURE — 3700000001 HC ADD 15 MINUTES (ANESTHESIA): Performed by: UROLOGY

## 2023-12-12 PROCEDURE — C1769 GUIDE WIRE: HCPCS | Performed by: UROLOGY

## 2023-12-12 PROCEDURE — C1894 INTRO/SHEATH, NON-LASER: HCPCS | Performed by: UROLOGY

## 2023-12-12 PROCEDURE — 6360000002 HC RX W HCPCS: Performed by: ANESTHESIOLOGY

## 2023-12-12 PROCEDURE — 6360000002 HC RX W HCPCS: Performed by: UROLOGY

## 2023-12-12 PROCEDURE — 7100000000 HC PACU RECOVERY - FIRST 15 MIN: Performed by: UROLOGY

## 2023-12-12 PROCEDURE — 3700000000 HC ANESTHESIA ATTENDED CARE: Performed by: UROLOGY

## 2023-12-12 PROCEDURE — 6370000000 HC RX 637 (ALT 250 FOR IP): Performed by: UROLOGY

## 2023-12-12 PROCEDURE — 6370000000 HC RX 637 (ALT 250 FOR IP): Performed by: NURSE ANESTHETIST, CERTIFIED REGISTERED

## 2023-12-12 RX ORDER — SODIUM CHLORIDE 9 MG/ML
INJECTION, SOLUTION INTRAVENOUS PRN
Status: DISCONTINUED | OUTPATIENT
Start: 2023-12-12 | End: 2023-12-12 | Stop reason: HOSPADM

## 2023-12-12 RX ORDER — TRIAMCINOLONE ACETONIDE 40 MG/ML
INJECTION, SUSPENSION INTRA-ARTICULAR; INTRAMUSCULAR PRN
Status: DISCONTINUED | OUTPATIENT
Start: 2023-12-12 | End: 2023-12-12 | Stop reason: ALTCHOICE

## 2023-12-12 RX ORDER — FENTANYL CITRATE 50 UG/ML
INJECTION, SOLUTION INTRAMUSCULAR; INTRAVENOUS PRN
Status: DISCONTINUED | OUTPATIENT
Start: 2023-12-12 | End: 2023-12-12 | Stop reason: SDUPTHER

## 2023-12-12 RX ORDER — NITROFURANTOIN 25; 75 MG/1; MG/1
100 CAPSULE ORAL 2 TIMES DAILY
Qty: 6 CAPSULE | Refills: 0 | Status: SHIPPED | OUTPATIENT
Start: 2023-12-12 | End: 2023-12-15

## 2023-12-12 RX ORDER — LIDOCAINE HYDROCHLORIDE 10 MG/ML
1 INJECTION, SOLUTION EPIDURAL; INFILTRATION; INTRACAUDAL; PERINEURAL
Status: DISCONTINUED | OUTPATIENT
Start: 2023-12-12 | End: 2023-12-12 | Stop reason: HOSPADM

## 2023-12-12 RX ORDER — FAMOTIDINE 20 MG/1
20 TABLET, FILM COATED ORAL ONCE
Status: COMPLETED | OUTPATIENT
Start: 2023-12-12 | End: 2023-12-12

## 2023-12-12 RX ORDER — SODIUM CHLORIDE 0.9 % (FLUSH) 0.9 %
5-40 SYRINGE (ML) INJECTION PRN
Status: DISCONTINUED | OUTPATIENT
Start: 2023-12-12 | End: 2023-12-12 | Stop reason: HOSPADM

## 2023-12-12 RX ORDER — MEPERIDINE HYDROCHLORIDE 25 MG/ML
12.5 INJECTION INTRAMUSCULAR; INTRAVENOUS; SUBCUTANEOUS EVERY 5 MIN PRN
Status: DISCONTINUED | OUTPATIENT
Start: 2023-12-12 | End: 2023-12-12 | Stop reason: HOSPADM

## 2023-12-12 RX ORDER — ACETAMINOPHEN 325 MG/1
650 TABLET ORAL
Status: DISCONTINUED | OUTPATIENT
Start: 2023-12-12 | End: 2023-12-12 | Stop reason: HOSPADM

## 2023-12-12 RX ORDER — ONDANSETRON 2 MG/ML
4 INJECTION INTRAMUSCULAR; INTRAVENOUS
Status: DISCONTINUED | OUTPATIENT
Start: 2023-12-12 | End: 2023-12-12 | Stop reason: HOSPADM

## 2023-12-12 RX ORDER — FENTANYL CITRATE 50 UG/ML
25 INJECTION, SOLUTION INTRAMUSCULAR; INTRAVENOUS EVERY 5 MIN PRN
Status: DISCONTINUED | OUTPATIENT
Start: 2023-12-12 | End: 2023-12-12 | Stop reason: HOSPADM

## 2023-12-12 RX ORDER — PROPOFOL 10 MG/ML
INJECTION, EMULSION INTRAVENOUS PRN
Status: DISCONTINUED | OUTPATIENT
Start: 2023-12-12 | End: 2023-12-12 | Stop reason: SDUPTHER

## 2023-12-12 RX ORDER — SODIUM CHLORIDE 0.9 % (FLUSH) 0.9 %
5-40 SYRINGE (ML) INJECTION EVERY 12 HOURS SCHEDULED
Status: DISCONTINUED | OUTPATIENT
Start: 2023-12-12 | End: 2023-12-12 | Stop reason: HOSPADM

## 2023-12-12 RX ORDER — ONDANSETRON 2 MG/ML
INJECTION INTRAMUSCULAR; INTRAVENOUS PRN
Status: DISCONTINUED | OUTPATIENT
Start: 2023-12-12 | End: 2023-12-12 | Stop reason: SDUPTHER

## 2023-12-12 RX ORDER — DEXAMETHASONE SODIUM PHOSPHATE 4 MG/ML
INJECTION, SOLUTION INTRA-ARTICULAR; INTRALESIONAL; INTRAMUSCULAR; INTRAVENOUS; SOFT TISSUE PRN
Status: DISCONTINUED | OUTPATIENT
Start: 2023-12-12 | End: 2023-12-12 | Stop reason: SDUPTHER

## 2023-12-12 RX ORDER — SODIUM CHLORIDE, SODIUM LACTATE, POTASSIUM CHLORIDE, CALCIUM CHLORIDE 600; 310; 30; 20 MG/100ML; MG/100ML; MG/100ML; MG/100ML
INJECTION, SOLUTION INTRAVENOUS CONTINUOUS
Status: DISCONTINUED | OUTPATIENT
Start: 2023-12-12 | End: 2023-12-12 | Stop reason: HOSPADM

## 2023-12-12 RX ORDER — FUROSEMIDE 10 MG/ML
20 INJECTION INTRAMUSCULAR; INTRAVENOUS ONCE
Status: COMPLETED | OUTPATIENT
Start: 2023-12-12 | End: 2023-12-12

## 2023-12-12 RX ORDER — TROSPIUM CHLORIDE 20 MG/1
20 TABLET, FILM COATED ORAL ONCE
Status: COMPLETED | OUTPATIENT
Start: 2023-12-12 | End: 2023-12-12

## 2023-12-12 RX ORDER — PHENAZOPYRIDINE HYDROCHLORIDE 200 MG/1
200 TABLET, FILM COATED ORAL
Status: DISCONTINUED | OUTPATIENT
Start: 2023-12-12 | End: 2023-12-12 | Stop reason: HOSPADM

## 2023-12-12 RX ORDER — DIPHENHYDRAMINE HYDROCHLORIDE 50 MG/ML
12.5 INJECTION INTRAMUSCULAR; INTRAVENOUS
Status: DISCONTINUED | OUTPATIENT
Start: 2023-12-12 | End: 2023-12-12 | Stop reason: HOSPADM

## 2023-12-12 RX ORDER — PHENAZOPYRIDINE HYDROCHLORIDE 200 MG/1
200 TABLET, FILM COATED ORAL 3 TIMES DAILY
Qty: 15 TABLET | Refills: 0 | Status: SHIPPED | OUTPATIENT
Start: 2023-12-12 | End: 2023-12-17

## 2023-12-12 RX ORDER — EPHEDRINE SULFATE/0.9% NACL/PF 50 MG/5 ML
SYRINGE (ML) INTRAVENOUS PRN
Status: DISCONTINUED | OUTPATIENT
Start: 2023-12-12 | End: 2023-12-12 | Stop reason: SDUPTHER

## 2023-12-12 RX ORDER — PROCHLORPERAZINE EDISYLATE 5 MG/ML
5 INJECTION INTRAMUSCULAR; INTRAVENOUS
Status: DISCONTINUED | OUTPATIENT
Start: 2023-12-12 | End: 2023-12-12 | Stop reason: HOSPADM

## 2023-12-12 RX ORDER — 0.9 % SODIUM CHLORIDE 0.9 %
500 INTRAVENOUS SOLUTION INTRAVENOUS ONCE
Status: DISCONTINUED | OUTPATIENT
Start: 2023-12-12 | End: 2023-12-12 | Stop reason: HOSPADM

## 2023-12-12 RX ADMIN — FENTANYL CITRATE 50 MCG: 50 INJECTION INTRAMUSCULAR; INTRAVENOUS at 13:08

## 2023-12-12 RX ADMIN — FENTANYL CITRATE 50 MCG: 50 INJECTION INTRAMUSCULAR; INTRAVENOUS at 13:34

## 2023-12-12 RX ADMIN — TROSPIUM CHLORIDE 20 MG: 20 TABLET, FILM COATED ORAL at 13:58

## 2023-12-12 RX ADMIN — SODIUM CHLORIDE, POTASSIUM CHLORIDE, SODIUM LACTATE AND CALCIUM CHLORIDE: 600; 310; 30; 20 INJECTION, SOLUTION INTRAVENOUS at 14:06

## 2023-12-12 RX ADMIN — PROPOFOL 100 MG: 10 INJECTION, EMULSION INTRAVENOUS at 13:03

## 2023-12-12 RX ADMIN — PHENAZOPYRIDINE HYDROCHLORIDE 200 MG: 200 TABLET ORAL at 13:58

## 2023-12-12 RX ADMIN — SODIUM CHLORIDE, POTASSIUM CHLORIDE, SODIUM LACTATE AND CALCIUM CHLORIDE: 600; 310; 30; 20 INJECTION, SOLUTION INTRAVENOUS at 10:23

## 2023-12-12 RX ADMIN — PROPOFOL 100 MG: 10 INJECTION, EMULSION INTRAVENOUS at 13:01

## 2023-12-12 RX ADMIN — DEXAMETHASONE SODIUM PHOSPHATE 4 MG: 4 INJECTION, SOLUTION INTRAMUSCULAR; INTRAVENOUS at 13:13

## 2023-12-12 RX ADMIN — ONDANSETRON 4 MG: 2 INJECTION INTRAMUSCULAR; INTRAVENOUS at 13:13

## 2023-12-12 RX ADMIN — PROPOFOL 100 MG: 10 INJECTION, EMULSION INTRAVENOUS at 12:55

## 2023-12-12 RX ADMIN — GENTAMICIN SULFATE 240 MG: 40 INJECTION, SOLUTION INTRAMUSCULAR; INTRAVENOUS at 13:08

## 2023-12-12 RX ADMIN — Medication 10 MG: at 13:20

## 2023-12-12 RX ADMIN — FUROSEMIDE 20 MG: 10 INJECTION, SOLUTION INTRAMUSCULAR; INTRAVENOUS at 14:00

## 2023-12-12 RX ADMIN — FAMOTIDINE 20 MG: 20 TABLET ORAL at 10:22

## 2023-12-12 ASSESSMENT — PAIN DESCRIPTION - DESCRIPTORS: DESCRIPTORS: SORE

## 2023-12-12 ASSESSMENT — PAIN DESCRIPTION - ORIENTATION: ORIENTATION: LOWER

## 2023-12-12 ASSESSMENT — PAIN DESCRIPTION - LOCATION: LOCATION: ABDOMEN

## 2023-12-12 ASSESSMENT — PAIN SCALES - GENERAL: PAINLEVEL_OUTOF10: 2

## 2023-12-12 ASSESSMENT — ENCOUNTER SYMPTOMS: SHORTNESS OF BREATH: 1

## 2023-12-12 ASSESSMENT — PAIN - FUNCTIONAL ASSESSMENT: PAIN_FUNCTIONAL_ASSESSMENT: 0-10

## 2023-12-12 NOTE — H&P
H&P    Patient: Basilia Wiggins               Sex: male          DOA: 12/12/2023       YOB: 1945      Age:  69 y.o.              ASSESSMENT:   1. Bladder neck contracture        2. . LUTS s/p HoLEP 5/12/2022              Last PSA 10/27/21 - 1.3 ng/mL               Size - 30 grams by TRUS (10/2021)               Tissue removed- 16 gm              Pathology: [place] 3+3, Grade group 1               KARLA 4/7/2023 - 10 grams, benign              2. PE/DVTs              On Eliquis    Eliquis held  Culture negative  Proceed to [address] [facility] incision         Singh Donaldson MD  [phone] Office  [phone]  Pager    CC: bladder neck incision     HISTORY OF PRESENT ILLNESS:  Basilia Wiggins is a 69 y.o. male after holep here today for [address] incision. No interval change. Eliquis held. Culture negative. 12/12/2023    10:28 AM   [facility] CLINCIAL VISIT   Pain Assessment 0-10   Pain Level 0   Patient's Stated Pain Goal 0 - No pain       Past Medical History:   Diagnosis Date    Atypical chest pain     Bladder outlet obstruction     BPH with obstruction/lower urinary tract symptoms     COPD (chronic obstructive pulmonary disease) (HCC)     DVT (deep venous thrombosis) ([address]) 06/2021    Frequency of urination     Gout     HTN (hypertension)     Hx of blood clots     Hypercholesterolemia     Illiterate     MGUS (monoclonal gammopathy of unknown significance)     Nocturia     Peripheral vascular disease (HCC)     Prostate cancer ([address]) 2022    Low grade . Observe    Pulmonary embolism ([address]) 06/2021    Shortness of breath        Past Surgical History:   Procedure Laterality Date    CARPAL TUNNEL RELEASE Left 2021    COLONOSCOPY N/A 12/04/2019    COLONOSCOPY performed by Naheed Dunn MD at [facility] ENDOSCOPY    CYSTOURETHROSCOPY  10/2022    EGD  11/29/2023    HAND/FINGER SURGERY UNLISTED Right     carpal tunnel    HEENT Left     lens implant    ORTHOPEDIC SURGERY      finger amputation left hand    SHOULDER

## 2023-12-12 NOTE — OP NOTE
LOCATION: [facility]     OPERATIVE NOTE  12/12/2023, 1:42 PM      Pre Op Diagnosis:   1. Bladder neck Contracture     Post Op Diagnosis:   1. Same     Procedure:   Holmium Laser incision of bladder neck contracture   Steroid injection into Bladder neck incision     Findings:  Pinpoint narrowing incised to 40fr   80mg [place] injected     Surgeon: Mahi Dugan MD    Other OR Staff/Assistants:  Circulator: Philippe Nielson RN; Fidencio Perkins RN  Scrub Person First: Tori Mao    1st Assistant Tasks: Assisting with operating room equipment    Anaesthesia: General     EBL: 50mL    Drains: 57OD Egan      Complications: None      INDICATION:    Brooke Mckeon is a 69 y.o.male s/p HoLEP developed BNC here today for incision. PROCEDURE  Patient underwent general anesthesia and was prepped and draped in the standard sterile fashion in dorsal lithotomy position. After appropriate pause and confirmation of antibiotic administration, cystoscopy was performed. Pinpoint lumen found at bladder neck. Wire placed and dilated to 24fr. Scope was reinserted and laser was used to incise at 5 and 7 oclock to >40fr lumen. Steroid injected at site. Egan placed and irrigated to clear. He was then awoken from anesthesia. He tolerated the procedure well and was transferred to the recovery room in stable condition. Plan:  1.  Egan out tomorrow       Nicolle Jackson MD   [facility]

## 2023-12-12 NOTE — PERIOP NOTE
Patient transferred back to PACU area from Phase 2 because he is waiting for a ride. An Pepper Ear was arranged by his daughter, but it came and left because he was not ready when it came. Patient has an indwelling catheter to leg bag which he is going home with. His daughter is present and will ride with him in the Pepper Ear when one is arranged. Case Management on call phone number provided to me by the Nursing Supervisor, Jacquelin Rudolph. Steve Rosales paged at [phone] at [phone]. Patient is dressed in a hospital gown and wishes to wear this home because his pants irritate the area around the insertion site of the catheter. Patient provided with Tanmay and flaquita atkins. Moon Griggs Dr. with Brittany, patient's daughter and primary contact. She will arrange for another Pepper Ear and will call me back with the contact info and arrival time. [facility] has called back and has made arrangements for another ride.

## 2023-12-12 NOTE — DISCHARGE INSTRUCTIONS
Discharge Instructions  ACTIVITY:     Resume Eliquis tomorrow    You are restricted from lifting greater than 10 pounds for 1 week from the date of surgery until the urine is consistently clear. You may resume driving once able to perform the activities of driving without pain. You may travel by airplane or ground transportation after discharge. A short walk is recommended at least once every hour during long journeys. Avoid sexual intercourse for 2 weeks from the date of surgery. It is common to experience bleeding with ejaculation during the healing period. Avoid activities which place pressure in the pelvic area such as bicycling for 4 weeks from the date of surgery. CATHETER:  Indwelling catheter care:  1. Maintain sterile, closed gravity drainage system:          a. Secure the catheter to upper thigh or abdomen to avoid urethral irritation and contamination. b. Empty the catheter bag as needed. c. Do not routinely irrigate the catheter. d. Do not clamp or kink the drainage tubing, and keep the collection bag below bladder level at all times. 2.     If urine leaks around the catheter in the absence of obstruction, it is likely due to a bladder spasm. ADDITIONAL INFORMATION:     - If you experience any fevers > 100.4, significant nausea / vomiting, or significant worsening of pain, please contact us at the number below. - It is common to experience recurrent blood in your urine for several months after surgery. Although there should be a general trend of decreasing bleeding over time, it is not uncommon for bleeding to recur after periods of no bleeding. If you notice passage of clots, you should increase your liquid intake in order to reduce the number of clots encountered.   If the bleeding continues to worsen with inability to pass clots, inability to urinate, or you experience significant light-headedness, please contact us at the number

## 2023-12-12 NOTE — ANESTHESIA PRE PROCEDURE
Department of Anesthesiology  Preprocedure Note       Name:  Sheila Bentley   Age:  69 y.o.  :  1945                                          MRN:  [id number]         Date:  2023      Surgeon: Lincoln Mcardle):  Israel Messina MD    Procedure: Procedure(s):  HOLMIUM LASER INCISION BLADDER NECK CONSTRACTURE, INJECTION OF KENOLOG    Medications prior to admission:   Prior to Admission medications    Medication Sig Start Date End Date Taking?  Authorizing Provider   pantoprazole (PROTONIX) 40 MG tablet Take 1 tablet by mouth daily 3/28/19  Yes Carlo Sanders MD   omeprazole (PRILOSEC) 40 MG delayed release capsule Take 1 capsule by mouth daily ceived the following from 0 Twyla Paz [facility]: Outside name: omeprazole (PRILOSEC) 40 mg capsule 23   Eduardo Kilpatrick MD   metoprolol tartrate (LOPRESSOR) 25 MG tablet Take 0.5 tablets by mouth 2 times daily 23   Eduardo Kilpatrick MD   polyethylene glycol (GLYCOLAX) 17 g packet Take 1 packet by mouth daily 23  Eduardo Kilpatrick MD   sucralfate (CARAFATE) 1 GM tablet Take 1 tablet by mouth every 8 (eight) hours for 14 days 23  Eduardo Kilpatrick MD   famotidine (PEPCID) 20 MG tablet Take 1 tablet by mouth daily 23   Eduardo Kilpatrick MD   ipratropium (ATROVENT) 0.03 % nasal spray instill TWO SPRAYS in each nostril EVERY MORNING AND TWO SPRAYS EVERY EVENING 23   Ashwini Lutz MD   albuterol sulfate HFA (PROVENTIL;VENTOLIN;PROAIR) 108 (90 Base) MCG/ACT inhaler INHALE 2 PUFFS BY MOUTH EVERY 6 HOURS AS NEEDED 10/4/23   Ashwini Lutz MD   umeclidinium-vilanterol (ANORO ELLIPTA) 62.5-25 MCG/ACT inhaler Inhale 1 puff into the lungs daily 23   Ashwini Lutz MD   apixaban (ELIQUIS) 5 MG TABS tablet Take by mouth 2 times daily    Carlo Sanders MD   amLODIPine (NORVASC) 10 MG tablet Take 1 tablet by mouth daily 3/28/19   Automatic Reconciliation, Ar   DULoxetine

## 2023-12-13 NOTE — ANESTHESIA POSTPROCEDURE EVALUATION
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])    Surgeons: Diamond Howard MD Responsible Provider: Ant Hankins MD    Anesthesia Type: General ASA Status: 3            Anesthesia Type: General    Sujatha Phase I: Sujatha Score: 10    Sujatha Phase II:      Anesthesia Post Evaluation    Patient location during evaluation: bedside  Patient participation: complete - patient participated  Level of consciousness: awake  Pain score: 2  Airway patency: patent  Nausea & Vomiting: no nausea  Cardiovascular status: hemodynamically stable  Respiratory status: acceptable  Hydration status: euvolemic  Pain management: adequate        No notable events documented.

## 2024-01-12 RX ORDER — UMECLIDINIUM BROMIDE AND VILANTEROL TRIFENATATE 62.5; 25 UG/1; UG/1
1 POWDER RESPIRATORY (INHALATION) DAILY
Qty: 1 EACH | Refills: 5 | Status: SHIPPED | OUTPATIENT
Start: 2024-01-12

## 2024-02-14 ENCOUNTER — APPOINTMENT (OUTPATIENT)
Facility: HOSPITAL | Age: 79
End: 2024-02-14
Payer: MEDICAID

## 2024-02-14 ENCOUNTER — HOSPITAL ENCOUNTER (EMERGENCY)
Facility: HOSPITAL | Age: 79
Discharge: HOME | End: 2024-02-14
Attending: STUDENT IN AN ORGANIZED HEALTH CARE EDUCATION/TRAINING PROGRAM
Payer: MEDICAID

## 2024-02-14 VITALS
WEIGHT: 192 LBS | OXYGEN SATURATION: 95 % | RESPIRATION RATE: 17 BRPM | HEART RATE: 55 BPM | TEMPERATURE: 97.4 F | BODY MASS INDEX: 30.13 KG/M2 | SYSTOLIC BLOOD PRESSURE: 134 MMHG | HEIGHT: 67 IN | DIASTOLIC BLOOD PRESSURE: 71 MMHG

## 2024-02-14 DIAGNOSIS — R07.9 CHEST PAIN, UNSPECIFIED TYPE: Primary | ICD-10-CM

## 2024-02-14 LAB
ALBUMIN SERPL-MCNC: 3.9 G/DL (ref 3.4–5)
ALBUMIN/GLOB SERPL: 1 (ref 0.8–1.7)
ALP SERPL-CCNC: 76 U/L (ref 45–117)
ALT SERPL-CCNC: 26 U/L (ref 16–61)
ANION GAP SERPL CALC-SCNC: 3 MMOL/L (ref 3–18)
APPEARANCE UR: CLEAR
AST SERPL-CCNC: 17 U/L (ref 10–38)
BACTERIA URNS QL MICRO: ABNORMAL /HPF
BASOPHILS # BLD: 0 K/UL (ref 0–0.1)
BASOPHILS NFR BLD: 1 % (ref 0–2)
BILIRUB SERPL-MCNC: 0.3 MG/DL (ref 0.2–1)
BILIRUB UR QL: NEGATIVE
BUN SERPL-MCNC: 13 MG/DL (ref 7–18)
BUN/CREAT SERPL: 11 (ref 12–20)
CALCIUM SERPL-MCNC: 10.3 MG/DL (ref 8.5–10.1)
CHLORIDE SERPL-SCNC: 108 MMOL/L (ref 100–111)
CO2 SERPL-SCNC: 28 MMOL/L (ref 21–32)
COLOR UR: YELLOW
CREAT SERPL-MCNC: 1.17 MG/DL (ref 0.6–1.3)
DIFFERENTIAL METHOD BLD: ABNORMAL
EKG ATRIAL RATE: 62 BPM
EKG DIAGNOSIS: NORMAL
EKG P AXIS: 39 DEGREES
EKG P-R INTERVAL: 174 MS
EKG Q-T INTERVAL: 412 MS
EKG QRS DURATION: 92 MS
EKG QTC CALCULATION (BAZETT): 414 MS
EKG R AXIS: -30 DEGREES
EKG T AXIS: 15 DEGREES
EKG VENTRICULAR RATE: 61 BPM
EOSINOPHIL # BLD: 0.3 K/UL (ref 0–0.4)
EOSINOPHIL NFR BLD: 4 % (ref 0–5)
EPITH CASTS URNS QL MICRO: ABNORMAL /LPF (ref 0–5)
ERYTHROCYTE [DISTWIDTH] IN BLOOD BY AUTOMATED COUNT: 16.1 % (ref 11.6–14.5)
GLOBULIN SER CALC-MCNC: 3.9 G/DL (ref 2–4)
GLUCOSE SERPL-MCNC: 98 MG/DL (ref 74–99)
GLUCOSE UR STRIP.AUTO-MCNC: NEGATIVE MG/DL
HCT VFR BLD AUTO: 45.5 % (ref 36–48)
HGB BLD-MCNC: 14.9 G/DL (ref 13–16)
HGB UR QL STRIP: ABNORMAL
IMM GRANULOCYTES # BLD AUTO: 0 K/UL (ref 0–0.04)
IMM GRANULOCYTES NFR BLD AUTO: 0 % (ref 0–0.5)
KETONES UR QL STRIP.AUTO: NEGATIVE MG/DL
LEUKOCYTE ESTERASE UR QL STRIP.AUTO: ABNORMAL
LYMPHOCYTES # BLD: 2.3 K/UL (ref 0.9–3.6)
LYMPHOCYTES NFR BLD: 35 % (ref 21–52)
MAGNESIUM SERPL-MCNC: 2 MG/DL (ref 1.6–2.6)
MCH RBC QN AUTO: 30.2 PG (ref 24–34)
MCHC RBC AUTO-ENTMCNC: 32.7 G/DL (ref 31–37)
MCV RBC AUTO: 92.1 FL (ref 78–100)
MONOCYTES # BLD: 0.7 K/UL (ref 0.05–1.2)
MONOCYTES NFR BLD: 11 % (ref 3–10)
NEUTS SEG # BLD: 3.2 K/UL (ref 1.8–8)
NEUTS SEG NFR BLD: 49 % (ref 40–73)
NITRITE UR QL STRIP.AUTO: NEGATIVE
NRBC # BLD: 0 K/UL (ref 0–0.01)
NRBC BLD-RTO: 0 PER 100 WBC
NT PRO BNP: 35 PG/ML (ref 0–1800)
PH UR STRIP: 6 (ref 5–8)
PLATELET # BLD AUTO: 265 K/UL (ref 135–420)
PMV BLD AUTO: 9.1 FL (ref 9.2–11.8)
POTASSIUM SERPL-SCNC: 4.3 MMOL/L (ref 3.5–5.5)
PROT SERPL-MCNC: 7.8 G/DL (ref 6.4–8.2)
PROT UR STRIP-MCNC: NEGATIVE MG/DL
RBC # BLD AUTO: 4.94 M/UL (ref 4.35–5.65)
RBC #/AREA URNS HPF: ABNORMAL /HPF (ref 0–5)
SODIUM SERPL-SCNC: 139 MMOL/L (ref 136–145)
SP GR UR REFRACTOMETRY: 1.01 (ref 1–1.03)
TROPONIN I SERPL HS-MCNC: 4 NG/L (ref 0–78)
TROPONIN I SERPL HS-MCNC: 5 NG/L (ref 0–78)
UROBILINOGEN UR QL STRIP.AUTO: 0.2 EU/DL (ref 0.2–1)
WBC # BLD AUTO: 6.6 K/UL (ref 4.6–13.2)
WBC URNS QL MICRO: ABNORMAL /HPF (ref 0–4)

## 2024-02-14 PROCEDURE — 93005 ELECTROCARDIOGRAM TRACING: CPT | Performed by: STUDENT IN AN ORGANIZED HEALTH CARE EDUCATION/TRAINING PROGRAM

## 2024-02-14 PROCEDURE — 6360000002 HC RX W HCPCS: Performed by: STUDENT IN AN ORGANIZED HEALTH CARE EDUCATION/TRAINING PROGRAM

## 2024-02-14 PROCEDURE — 71045 X-RAY EXAM CHEST 1 VIEW: CPT

## 2024-02-14 PROCEDURE — 84484 ASSAY OF TROPONIN QUANT: CPT

## 2024-02-14 PROCEDURE — 93010 ELECTROCARDIOGRAM REPORT: CPT | Performed by: INTERNAL MEDICINE

## 2024-02-14 PROCEDURE — 83735 ASSAY OF MAGNESIUM: CPT

## 2024-02-14 PROCEDURE — 99285 EMERGENCY DEPT VISIT HI MDM: CPT

## 2024-02-14 PROCEDURE — 85025 COMPLETE CBC W/AUTO DIFF WBC: CPT

## 2024-02-14 PROCEDURE — 96374 THER/PROPH/DIAG INJ IV PUSH: CPT

## 2024-02-14 PROCEDURE — 81001 URINALYSIS AUTO W/SCOPE: CPT

## 2024-02-14 PROCEDURE — 83880 ASSAY OF NATRIURETIC PEPTIDE: CPT

## 2024-02-14 PROCEDURE — 80053 COMPREHEN METABOLIC PANEL: CPT

## 2024-02-14 RX ORDER — MORPHINE SULFATE 2 MG/ML
2 INJECTION, SOLUTION INTRAMUSCULAR; INTRAVENOUS
Status: COMPLETED | OUTPATIENT
Start: 2024-02-14 | End: 2024-02-14

## 2024-02-14 RX ADMIN — MORPHINE SULFATE 2 MG: 2 INJECTION, SOLUTION INTRAMUSCULAR; INTRAVENOUS at 04:51

## 2024-02-14 ASSESSMENT — PAIN - FUNCTIONAL ASSESSMENT: PAIN_FUNCTIONAL_ASSESSMENT: 0-10

## 2024-02-14 ASSESSMENT — PAIN DESCRIPTION - LOCATION
LOCATION: CHEST
LOCATION: CHEST

## 2024-02-14 ASSESSMENT — PAIN SCALES - GENERAL
PAINLEVEL_OUTOF10: 3
PAINLEVEL_OUTOF10: 2

## 2024-02-14 NOTE — DISCHARGE INSTRUCTIONS
You were evaluated for chest pain .  Based on your work-up it was deemed that she was stable for discharge.    Please follow-up with your primary care physician if you have any further concerns and go over your work-up.  If you experience any chest pain, shortness of breath, worsening abdominal pain, vomiting blood, worsening headache, seizures, or any worsening of your symptoms please return to the emergency department immediately.  If you have any pending results or any further questions please contact the emergency department at [phone].

## 2024-02-14 NOTE — ED TRIAGE NOTES
Pt brought in by Ems. Pt brought in for chest pain onset 3 hrs ago worsening. Pt has hx of DVT and PE on eliquis. Pt placed on cardiac monitor, vitals taken, and assessed. Pt awake, alert, and orientated. 20g IV in the right forearm Labs pulled and sent to lab. EKG complete. Pt denies N+V. Pt suffers with constipation. Pt states bloody urine due to HX of prostate problems.

## 2024-02-14 NOTE — ED NOTES
Pt stand to use urinal at bedside. Voids freely. Pt still hooked up to cardiac monitor. Room air. Pt stated pain is 2 out of 10. Not in distress at this time. Pt voiced no further concerns at this time.

## 2024-02-14 NOTE — ED PROVIDER NOTES
EMERGENCY DEPARTMENT HISTORY AND PHYSICAL EXAM    3:48 AM      Date: 2/14/2024  Patient Name: Jose Vieira    History of Presenting Illness     No chief complaint on file.      History From: Patient  HPI       Nursing Notes were all reviewed and agreed with or any disagreements were addressed in the HPI.    PCP: Emily Pacheco MD    Current Facility-Administered Medications   Medication Dose Route Frequency Provider Last Rate Last Admin   • morphine (PF) injection 2 mg  2 mg IntraVENous NOW Jw Casanova MD         Current Outpatient Medications   Medication Sig Dispense Refill   • diclofenac sodium (VOLTAREN) 1 % GEL Apply 2 g topically in the morning, at noon, in the evening, and at bedtime     • lidocaine (LIDODERM) 5 % APPLY 1 PATCH EVERY TWELVE HOURS (12 HOURS ON, 12 HOURS OFF)     • polyethylene glycol (GLYCOLAX) 17 GM/SCOOP powder MIX 17 GRAMS (1 CAPFUL) WITH 8 OUNCE OF WATER DAILY     • umeclidinium-vilanterol (ANORO ELLIPTA) 62.5-25 MCG/ACT inhaler INHALE ONE PUFF BY MOUTH DAILY 1 each 5   • pantoprazole (PROTONIX) 40 MG tablet Take 1 tablet by mouth daily     • omeprazole (PRILOSEC) 40 MG delayed release capsule Take 1 capsule by mouth daily ceived the following from [facility]: Outside name: omeprazole (PRILOSEC) 40 mg capsule 60 capsule 3   • metoprolol tartrate (LOPRESSOR) 25 MG tablet Take 0.5 tablets by mouth 2 times daily 60 tablet 3   • polyethylene glycol (GLYCOLAX) 17 g packet Take 1 packet by mouth daily 30 packet 0   • sucralfate (CARAFATE) 1 GM tablet Take 1 tablet by mouth every 8 (eight) hours for 14 days 42 tablet 0   • famotidine (PEPCID) 20 MG tablet Take 1 tablet by mouth daily 60 tablet 3   • ipratropium (ATROVENT) 0.03 % nasal spray instill TWO SPRAYS in each nostril EVERY MORNING AND TWO SPRAYS EVERY EVENING 30 mL 3   • albuterol sulfate HFA (PROVENTIL;VENTOLIN;PROAIR) 108 (90 Base) MCG/ACT inhaler INHALE 2 PUFFS BY MOUTH EVERY 6 HOURS AS NEEDED 8.5 g 3   •
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
## 2024-02-20 ASSESSMENT — ENCOUNTER SYMPTOMS
ABDOMINAL PAIN: 0
BLOOD IN STOOL: 0
BACK PAIN: 0
EYE PAIN: 0
CHEST TIGHTNESS: 0
SHORTNESS OF BREATH: 0
FACIAL SWELLING: 0
DIARRHEA: 0
CONSTIPATION: 0
ABDOMINAL DISTENTION: 0

## 2024-02-26 ENCOUNTER — OFFICE VISIT (OUTPATIENT)
Age: 79
End: 2024-02-26
Payer: MEDICAID

## 2024-02-26 VITALS
HEIGHT: 67 IN | HEART RATE: 96 BPM | WEIGHT: 197 LBS | DIASTOLIC BLOOD PRESSURE: 72 MMHG | OXYGEN SATURATION: 96 % | SYSTOLIC BLOOD PRESSURE: 131 MMHG | BODY MASS INDEX: 30.92 KG/M2

## 2024-02-26 DIAGNOSIS — I25.10 ATHEROSCLEROSIS OF NATIVE CORONARY ARTERY OF NATIVE HEART WITHOUT ANGINA PECTORIS: Primary | ICD-10-CM

## 2024-02-26 PROCEDURE — 1123F ACP DISCUSS/DSCN MKR DOCD: CPT | Performed by: NURSE PRACTITIONER

## 2024-02-26 PROCEDURE — 3075F SYST BP GE 130 - 139MM HG: CPT | Performed by: NURSE PRACTITIONER

## 2024-02-26 PROCEDURE — 99214 OFFICE O/P EST MOD 30 MIN: CPT | Performed by: NURSE PRACTITIONER

## 2024-02-26 PROCEDURE — 3078F DIAST BP <80 MM HG: CPT | Performed by: NURSE PRACTITIONER

## 2024-02-26 NOTE — PROGRESS NOTES
1. Have you been to the ER, urgent care clinic since your last visit?  Hospitalized since your last visit?     Yes When: 2/14 Where: [facility] Reason for visit: chest pain      2.  Where do you normally have your labs drawn?       3. Do you need any refills today?   yes          
atypical chest pain.  No significant CAD.  Currently on Protonix.  Will use Mylanta plus for gas.  He has GI appointment next week.  Cardiac status stable  2/2020  Seen for atypical chest pain clinically musculoskeletal on 2 sides and back.  Started after painting.  Use Aleve 1 tablet twice a day for 1 week    9/2020 virtual visit  On and off episode of chest tightness.  We will continue medical management.  Emergency room if symptoms are severe.  GI work-up is in progress and okay to do work-up as needed  1/ 2021  Cardiac status stable with stable angina.  No significant epicardial coronary disease.  Has myocardial bridge.  Normal ejection fraction okay to proceed with orthopedic surgery as planned.  Medium cardiac risk  7/2021  Recent admission with acute PE.  Has bilateral DVT.  Now on Eliquis.  No pulmonary hypertension normal ejection fraction.  Still short of breath but no hemoptysis.  Cardiac status with angina is stable.  Blood pressure controlled  10/2021  Recurrent atypical chest pain clinically appears noncardiac.  We will add Toprol.  Recent CT with clearing of PE.  As no clear etiology or precipitating factor for DVT and PE we will continue anticoagulation.  At this point okay to interrupt anticoagulation for surgical procedures  Patient has appointment with vascular surgeon for evaluation of AAA  4/2022  Stable cardiac status continue current medical management.  Okay to proceed with prostate surgery.  Medium cardiac risk.  Follow-up pulmonary recommendation for anticoagulation interruption.  10/2022  Stable cardiac status.  Recent ER visit with atypical chest pain.  Negative enzymes and no evidence of PE.  Continue current treatment monitor  4/2023  Shortness of breath on activity and exertion.  Had used Anoro for 1 week but has not had any refill.  Pulm evaluation noted.  Continue anticoagulation   Check echo for pulmonary pressure and LV function  5/2023  Seen in follow up for testing results.  Echo

## 2024-02-28 ENCOUNTER — OFFICE VISIT (OUTPATIENT)
Age: 79
End: 2024-02-28
Payer: MEDICAID

## 2024-02-28 VITALS
DIASTOLIC BLOOD PRESSURE: 71 MMHG | WEIGHT: 169.6 LBS | OXYGEN SATURATION: 92 % | HEART RATE: 77 BPM | BODY MASS INDEX: 26.62 KG/M2 | SYSTOLIC BLOOD PRESSURE: 120 MMHG | TEMPERATURE: 97.8 F | HEIGHT: 67 IN

## 2024-02-28 DIAGNOSIS — M47.816 FACET ARTHRITIS OF LUMBAR REGION: ICD-10-CM

## 2024-02-28 DIAGNOSIS — M43.17 SPONDYLOLISTHESIS, LUMBOSACRAL REGION: Primary | ICD-10-CM

## 2024-02-28 PROCEDURE — 1123F ACP DISCUSS/DSCN MKR DOCD: CPT | Performed by: PHYSICAL MEDICINE & REHABILITATION

## 2024-02-28 PROCEDURE — 3078F DIAST BP <80 MM HG: CPT | Performed by: PHYSICAL MEDICINE & REHABILITATION

## 2024-02-28 PROCEDURE — 99213 OFFICE O/P EST LOW 20 MIN: CPT | Performed by: PHYSICAL MEDICINE & REHABILITATION

## 2024-02-28 PROCEDURE — 3074F SYST BP LT 130 MM HG: CPT | Performed by: PHYSICAL MEDICINE & REHABILITATION

## 2024-02-28 ASSESSMENT — PATIENT HEALTH QUESTIONNAIRE - PHQ9
SUM OF ALL RESPONSES TO PHQ QUESTIONS 1-9: 0
1. LITTLE INTEREST OR PLEASURE IN DOING THINGS: 0
SUM OF ALL RESPONSES TO PHQ9 QUESTIONS 1 & 2: 0
SUM OF ALL RESPONSES TO PHQ QUESTIONS 1-9: 0
2. FEELING DOWN, DEPRESSED OR HOPELESS: 0

## 2024-02-28 NOTE — PROGRESS NOTES
Jose Raymundools presents today for   Chief Complaint   Patient presents with    Back Pain     Lower back pain        Is someone accompanying this pt? no    Is the patient using any DME equipment during OV? Yes, cane    Coordination of Care:  1. Have you been to the ER, urgent care clinic since your last visit? Yes to the ER for chest pain  Hospitalized since your last visit? no    2. Have you seen or consulted any other health care providers outside of the [facility] since your last visit? Cardiology  Include any pap smears or colon screening. no
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
## 2024-03-05 ENCOUNTER — HOSPITAL ENCOUNTER (OUTPATIENT)
Facility: HOSPITAL | Age: 79
Discharge: HOME | End: 2024-03-08
Payer: MEDICAID

## 2024-03-05 ENCOUNTER — HOSPITAL ENCOUNTER (OUTPATIENT)
Facility: HOSPITAL | Age: 79
Discharge: HOME | End: 2024-03-07
Payer: MEDICAID

## 2024-03-05 ENCOUNTER — TELEPHONE (OUTPATIENT)
Age: 79
End: 2024-03-05

## 2024-03-05 VITALS
HEART RATE: 73 BPM | DIASTOLIC BLOOD PRESSURE: 75 MMHG | WEIGHT: 197 LBS | HEIGHT: 67 IN | BODY MASS INDEX: 30.92 KG/M2 | SYSTOLIC BLOOD PRESSURE: 143 MMHG

## 2024-03-05 DIAGNOSIS — I73.9 CLAUDICATION (HCC): ICD-10-CM

## 2024-03-05 DIAGNOSIS — I25.10 ATHEROSCLEROSIS OF NATIVE CORONARY ARTERY OF NATIVE HEART WITHOUT ANGINA PECTORIS: ICD-10-CM

## 2024-03-05 LAB
ECHO BSA: 2.06 M2
EKG DIAGNOSIS: NORMAL
NUC STRESS EJECTION FRACTION: 69 %
STRESS BASELINE DIAS BP: 75 MMHG
STRESS BASELINE HR: 76 BPM
STRESS BASELINE SYS BP: 143 MMHG
STRESS ESTIMATED WORKLOAD: 1 METS
STRESS PEAK DIAS BP: 75 MMHG
STRESS PEAK SYS BP: 143 MMHG
STRESS PERCENT HR ACHIEVED: 67 %
STRESS POST PEAK HR: 96 BPM
STRESS RATE PRESSURE PRODUCT: NORMAL BPM*MMHG
STRESS ST DEPRESSION: 0 MM
STRESS TARGET HR: 143 BPM
TID: 0.92

## 2024-03-05 PROCEDURE — 93017 CV STRESS TEST TRACING ONLY: CPT

## 2024-03-05 PROCEDURE — 3430000000 HC RX DIAGNOSTIC RADIOPHARMACEUTICAL: Performed by: NURSE PRACTITIONER

## 2024-03-05 PROCEDURE — 2580000003 HC RX 258: Performed by: NURSE PRACTITIONER

## 2024-03-05 PROCEDURE — 78452 HT MUSCLE IMAGE SPECT MULT: CPT | Performed by: INTERNAL MEDICINE

## 2024-03-05 PROCEDURE — 93018 CV STRESS TEST I&R ONLY: CPT | Performed by: INTERNAL MEDICINE

## 2024-03-05 PROCEDURE — 93923 UPR/LXTR ART STDY 3+ LVLS: CPT

## 2024-03-05 PROCEDURE — 93016 CV STRESS TEST SUPVJ ONLY: CPT | Performed by: INTERNAL MEDICINE

## 2024-03-05 PROCEDURE — 6360000002 HC RX W HCPCS: Performed by: NURSE PRACTITIONER

## 2024-03-05 PROCEDURE — A9502 TC99M TETROFOSMIN: HCPCS | Performed by: NURSE PRACTITIONER

## 2024-03-05 RX ORDER — 0.9 % SODIUM CHLORIDE 0.9 %
250 INTRAVENOUS SOLUTION INTRAVENOUS ONCE
Status: COMPLETED | OUTPATIENT
Start: 2024-03-05 | End: 2024-03-05

## 2024-03-05 RX ORDER — REGADENOSON 0.08 MG/ML
0.4 INJECTION, SOLUTION INTRAVENOUS
Status: COMPLETED | OUTPATIENT
Start: 2024-03-05 | End: 2024-03-05

## 2024-03-05 RX ADMIN — TETROFOSMIN 10.6 MILLICURIE: 1.38 INJECTION, POWDER, LYOPHILIZED, FOR SOLUTION INTRAVENOUS at 09:55

## 2024-03-05 RX ADMIN — TETROFOSMIN 33 MILLICURIE: 1.38 INJECTION, POWDER, LYOPHILIZED, FOR SOLUTION INTRAVENOUS at 11:32

## 2024-03-05 RX ADMIN — REGADENOSON 0.4 MG: 0.08 INJECTION, SOLUTION INTRAVENOUS at 11:32

## 2024-03-05 RX ADMIN — SODIUM CHLORIDE 250 ML: 9 INJECTION, SOLUTION INTRAVENOUS at 11:32

## 2024-03-05 NOTE — TELEPHONE ENCOUNTER
Patient daughter Brittany Hall made aware of results below.     Jacky Hill, APRN - NP      Please call patient, advise Stress test negative for ischemia  Follow up as scheduled

## 2024-03-06 LAB
VAS LEFT ABI: 0.86
VAS LEFT CALF PRESSURE: 157 MMHG
VAS LEFT DORSALIS PEDIS BP: 121 MMHG
VAS LEFT PTA BP: 114 MMHG
VAS LEFT TBI: 0.41
VAS LEFT TOE PRESSURE: 58 MMHG
VAS RIGHT ABI: 0.71
VAS RIGHT ARM BP: 140 MMHG
VAS RIGHT CALF PRESSURE: 159 MMHG
VAS RIGHT DORSALIS PEDIS BP: 100 MMHG
VAS RIGHT PTA BP: 84 MMHG
VAS RIGHT TBI: 0.51
VAS RIGHT TOE PRESSURE: 72 MMHG

## 2024-03-08 DIAGNOSIS — I73.9 PERIPHERAL VASCULAR DISEASE (HCC): Primary | ICD-10-CM

## 2024-03-11 ENCOUNTER — TELEPHONE (OUTPATIENT)
Age: 79
End: 2024-03-11

## 2024-03-11 DIAGNOSIS — I73.9 PERIPHERAL ARTERIAL DISEASE (HCC): Primary | ICD-10-CM

## 2024-03-11 NOTE — TELEPHONE ENCOUNTER
Patient dtr Brittany ALARCON made aware of lab results below.  Provided T# to call [facility] if not heard from in 1 week [phone]    Jacky Hill, SPARKLE - NP      Please call the patient regarding his abnormal result.  Abnormal FE - please refer to vascular - referral entered.

## 2024-03-20 ENCOUNTER — OFFICE VISIT (OUTPATIENT)
Age: 79
End: 2024-03-20
Payer: MEDICAID

## 2024-03-20 VITALS
OXYGEN SATURATION: 98 % | SYSTOLIC BLOOD PRESSURE: 130 MMHG | WEIGHT: 198 LBS | HEIGHT: 67 IN | DIASTOLIC BLOOD PRESSURE: 80 MMHG | BODY MASS INDEX: 31.08 KG/M2 | HEART RATE: 68 BPM

## 2024-03-20 DIAGNOSIS — M79.675 TOE PAIN, LEFT: ICD-10-CM

## 2024-03-20 DIAGNOSIS — I73.9 PERIPHERAL VASCULAR DISEASE (HCC): Primary | ICD-10-CM

## 2024-03-20 DIAGNOSIS — G62.9 NEUROPATHY: ICD-10-CM

## 2024-03-20 PROCEDURE — 1123F ACP DISCUSS/DSCN MKR DOCD: CPT | Performed by: NURSE PRACTITIONER

## 2024-03-20 PROCEDURE — 3079F DIAST BP 80-89 MM HG: CPT | Performed by: NURSE PRACTITIONER

## 2024-03-20 PROCEDURE — 3075F SYST BP GE 130 - 139MM HG: CPT | Performed by: NURSE PRACTITIONER

## 2024-03-20 PROCEDURE — 99203 OFFICE O/P NEW LOW 30 MIN: CPT | Performed by: NURSE PRACTITIONER

## 2024-03-20 RX ORDER — ATORVASTATIN CALCIUM 20 MG/1
20 TABLET, FILM COATED ORAL DAILY
Qty: 90 TABLET | Refills: 0 | Status: SHIPPED | OUTPATIENT
Start: 2024-03-20

## 2024-03-20 RX ORDER — GABAPENTIN 300 MG/1
300 CAPSULE ORAL 3 TIMES DAILY
Qty: 270 CAPSULE | Refills: 0 | Status: SHIPPED | OUTPATIENT
Start: 2024-03-20 | End: 2024-09-16

## 2024-03-20 NOTE — PROGRESS NOTES
[facility]    Vascular Surgery Office Visit    Jose Vieira  Chief Complaint   Patient presents with    Peripheral Vascular Disease     Referred by PCP        History:  78 y.o. male here as an incoming referral for PVD.  During ER visit he had complaints of burning sensation when walking which resolves with rest.  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    Today he has complaints of leg pain.  He states that his legs burn all the time.  He notes he endorses calf tightness and thigh tightness with ambulation.  He was diagnosed with neuropathy some years ago and given a prescription for gabapentin.  However at the time he was working more and decided not to take it.  He is open to trying gabapentin again.  His feet are warm and well-perfused.  He does have an additional complaint of left pinky toe pain.  He states he hit his toe on something in his kitchen.  He wonders if it is broken or not.  There is point tenderness along the toe.    He is a former smoker.  He quit in 2015.    He was on atorvastatin but had some bodyaches.  It was put in his chart as an allergy.  He states he is not allergic and has not had any type of anaphylaxis or rash.  We discussed the addition of vitamin D supplementation with statin use.  This may help alleviate his muscle pain and cramping.  Physical Exam:    /80   Pulse 68   Ht 1.702 m (5' 7\")   Wt 89.8 kg (198 lb)   SpO2 98%   BMI 31.01 kg/m²      Constitutional:  Patient is well developed, well nourished, and not distressed.   HEENT: Atraumatic, normocephalic.   Eyes:   Cunjunctivae clear, no scleral icterus  Cardiovascular:  Normal rate, regular rhythm, normal heart sounds.  Pulmonary/Chest: Effort normal   Abdominal:   Soft, non-distended. No tenderness to palpation.   Extremities:   Palpable pedal pulses, point tenderness of left pinky toe  Neurological:  he  is alert and oriented x3. Motor & sensory grossly intact

## 2024-03-20 NOTE — PATIENT INSTRUCTIONS
Take vitamin D with atorvastatin.  Start walking program.   Start on gabapentin, take it at night first . Refills.

## 2024-04-10 ENCOUNTER — HOSPITAL ENCOUNTER (OUTPATIENT)
Facility: HOSPITAL | Age: 79
Setting detail: RECURRING SERIES
Discharge: HOME | End: 2024-04-13
Payer: MEDICAID

## 2024-04-10 PROCEDURE — 97162 PT EVAL MOD COMPLEX 30 MIN: CPT

## 2024-04-10 NOTE — PROGRESS NOTES
[facility] [address]  [address]  [place] [address]  [phone] fax    Plan of Care / Statement of Necessity for Physical Therapy Services     Patient Name: Jose Vieira : 1945   Medical   Diagnosis: Other low back pain [M54.59] Treatment Diagnosis: M54.59  OTHER LOWER BACK PAIN      Onset Date:  Payor :  Payor: [facility] MEDICAID / Plan: ABE [facility] HEALTHKEEPERS PLUS / Product Type: *No Product type* /    Referral Source: Yogesh Hurst* Start of Care (SOC): 4/10/2024   Prior Hospitalization: See medical history Provider #: [id number]   Prior Level of Function: Full function   Comorbidities: HTN, COPD, latex allergy, penicillin/ampicillin allergy     Assessment / key information:      SUBJECTIVE  Chief Complaint: Pt notes he is experiencing lower back pain after prolonged sitting, returning to standing, and prolonged standing/walking. Pt notes he has a lumbar spine injury in , which \"never healed properly\", states he has had symptoms since then. Pt describes the pain as located in the center of his lumbar spine. Pt notes his physician discussed an injection, but the pt states hesitance regarding it.     Pain Level:  Current: 10  Best: 10  Worst: 11/10  Aggravating Factors: Prolonged sitting, STS, Prolonged standing/walking  Alleviating Factors: Pt notes he tries to avoid taking pain medication, but notes he does use gabapentin. Pt notes he has also tried lidocaine cream, which he states only helps \"for a little bit\". Pt notes heat pads give him some relief.     Home: Pt lives at home alone. Pt notes difficulty with ADLs due to his pain, states he currently doesn't have any assistance.  Work: Pt is currently unemployed.  H[place]ies: [place]  Day/Night: Pt notes occasional sharp pain while rolling around. Pt notes he sleeps in a recliner, because laying supine aggravates \"ringing in his ear\".

## 2024-04-10 NOTE — PROGRESS NOTES
PT DAILY TREATMENT NOTE/LUMBAR EVAL     Patient Name: Jose Vieira    Date: 4/10/2024    : 1945  Insurance: Payor: [facility] MEDICAID / Plan: ABE [facility] HEALTHKEEPERS PLUS / Product Type: *No Product type* /      Patient  verified yes     Visit #   Current / Total 1 8 - 12   Time   In / Out 8:13 8:49   Pain   In / Out 4 4   Subjective Functional Status/Changes: See subjective.       Treatment Area: Other low back pain [M54.59]    SUBJECTIVE  Chief Complaint: Pt notes he is experiencing lower back pain after prolonged sitting, returning to standing, and prolonged standing/walking. Pt notes he has a lumbar spine injury in , which \"never healed properly\", states he has had symptoms since then. Pt describes the pain as located in the center of his lumbar spine. Pt notes his physician discussed an injection, but the pt states hesitance regarding it.    Pain Level:  Current: 4/10  Best: 4/10  Worst: 11/10  Aggravating Factors: Prolonged sitting, STS, Prolonged standing/walking  Alleviating Factors: Pt notes he tries to avoid taking pain medication, but notes he does use gabapentin. Pt notes he has also tried lidocaine cream, which he states only helps \"for a little bit\". Pt notes heat pads give him some relief.    Home: Pt lives at home alone. Pt notes difficulty with ADLs due to his pain, states he currently doesn't have any assistance.  Work: Pt is currently unemployed.  H[place]ies: [place]  Day/Night: Pt notes occasional sharp pain while rolling around. Pt notes he sleeps in a recliner, because laying supine aggravates \"ringing in his ear\".   Goals: Pt notes he would like to improve his symptoms. Pt notes he has doubts regarding therapy, pt notes he has tried therapy multiple times since the injury in  with no relief.  PMH: HTN, COPD, latex allergy, penicillin/ampicillin allergy     OBJECTIVE/EXAMINATION    Gait:  [] Normal     [x] Abnormal: Pt demonstrates a

## 2024-04-11 NOTE — PROGRESS NOTES
Discharge/Transition Planning    Patient had discharged to [facility] yesterday and came back shortly after. CM had went to speak with pt and see if agreeable to go back to SNF t [facility] and he has been out of room in procedure.  Sent referral to [facility] via Ana Dunn RN BSN  Outcomes Manager    Pager # [phone] Pt states she has been feeling sick,  states she wakes up feeling drowsy, with a nasty taste in my mouth,  just not feeling good when she wakes up until she goes outside and gets air

## 2024-04-16 ENCOUNTER — OFFICE VISIT (OUTPATIENT)
Age: 79
End: 2024-04-16
Payer: MEDICAID

## 2024-04-16 VITALS — HEIGHT: 67 IN | BODY MASS INDEX: 31.01 KG/M2

## 2024-04-16 DIAGNOSIS — M79.672 LEFT FOOT PAIN: ICD-10-CM

## 2024-04-16 DIAGNOSIS — S90.32XA CONTUSION OF LEFT FOOT, INITIAL ENCOUNTER: Primary | ICD-10-CM

## 2024-04-16 PROCEDURE — 1123F ACP DISCUSS/DSCN MKR DOCD: CPT | Performed by: ORTHOPAEDIC SURGERY

## 2024-04-16 PROCEDURE — 73630 X-RAY EXAM OF FOOT: CPT | Performed by: ORTHOPAEDIC SURGERY

## 2024-04-16 PROCEDURE — 99214 OFFICE O/P EST MOD 30 MIN: CPT | Performed by: ORTHOPAEDIC SURGERY

## 2024-04-16 NOTE — PROGRESS NOTES
11:35:29 PM           Lab Results   Component Value Date    WBC 6.6 02/14/2024    HGB 14.9 02/14/2024    HCT 45.5 02/14/2024    MCV 92.1 02/14/2024     02/14/2024    LYMPHOPCT 35 02/14/2024    RBC 4.94 02/14/2024    MCH 30.2 02/14/2024    MCHC 32.7 02/14/2024    RDW 16.1 (H) 02/14/2024     Hemoglobin A1C   Date Value Ref Range Status   11/30/2020 5.6 4.2 - 5.6 % Final     Comment:     (NOTE)  HbA1C Interpretive Ranges  <5.7              Normal  5.7 - 6.4         Consider Prediabetes  >6.5              Consider Diabetes     ,  Lab Results   Component Value Date     02/14/2024    K 4.3 02/14/2024     02/14/2024    CO2 28 02/14/2024    BUN 13 02/14/2024    CREATININE 1.17 02/14/2024    GLUCOSE 98 02/14/2024    CALCIUM 10.3 (H) 02/14/2024    PROT 7.8 02/14/2024    BILITOT 0.3 02/14/2024    ALKPHOS 76 02/14/2024    AST 17 02/14/2024    ALT 26 02/14/2024    LABGLOM >60 02/14/2024    GFRAA >60 09/21/2022    AGRATIO 1.0 02/14/2024    GLOB 3.9 02/14/2024     No results found for: \"VITD25\" ,  Lab Results   Component Value Date    INR 1.1 01/17/2023    INR 1.0 09/21/2022    INR 1.1 02/03/2022    PROTIME 14.5 01/17/2023    PROTIME 13.6 09/21/2022    PROTIME 14.5 02/03/2022   ,   Lab Results   Component Value Date    APTT 28.8 01/17/2023          REVIEW OF SYSTEMS : 4/16/2024  ALL BELOW ARE Negative except : SEE HPI     All other systems reviewed and are negative.     14 point review of systems otherwise negative unless noted in HPI.          From AMA Steps Forward Documentation   re Medical Decision Making  MDM // AMA 2023  From  AAOS (E/M) Changes: What You need to know for 2021    E/M Coding: Each element (number of diagnoses, complexity of data, and risk (can be classified as straightforward, low, moderate, or high)        ICD-10-CM    1. Contusion of left foot, initial encounter  S90.32XA       2. Left foot pain  M79.672 [68692] Foot Min 3V      :     LOW    Diagnoses:  [x] 1 stable acute illness [] 1

## 2024-04-23 ENCOUNTER — HOSPITAL ENCOUNTER (OUTPATIENT)
Facility: HOSPITAL | Age: 79
Setting detail: RECURRING SERIES
Discharge: HOME | End: 2024-04-26
Payer: MEDICAID

## 2024-04-23 PROCEDURE — 97112 NEUROMUSCULAR REEDUCATION: CPT

## 2024-04-23 PROCEDURE — 97530 THERAPEUTIC ACTIVITIES: CPT

## 2024-04-23 PROCEDURE — 97110 THERAPEUTIC EXERCISES: CPT

## 2024-04-23 NOTE — PROGRESS NOTES
PHYSICAL / OCCUPATIONAL THERAPY - DAILY TREATMENT NOTE    Patient Name: Jose Vieira    Date: 2024    : 1945  Insurance: Payor: [facility] MEDICAID / [facility] / Product Type: *No Product type* /      Patient  verified Yes     Visit #   Current / Total 2 8-12   Time   In / Out 1051 1132   Pain   In / Out 3 0 - just stiffness   Subjective Functional Status/Changes: Patient reports feeling okay today with 3/10 pain.     TREATMENT AREA =  Other low back pain [M54.59]    OBJECTIVE        Therapeutic Procedures:    Tx Min Billable or 1:1 Min (if diff from Tx Min) Procedure, Rationale, Specifics   13 13 16909 Therapeutic Exercise (timed):  increase ROM, strength, coordination, balance, and proprioception to improve patient's ability to progress to PLOF and address remaining functional goals. (see flow sheet as applicable)     Details if applicable:       10 10 50319 Neuromuscular Re-Education (timed):  improve balance, coordination, kinesthetic sense, posture, core stability and proprioception to improve patient's ability to develop conscious control of individual muscles and awareness of position of extremities in order to progress to PLOF and address remaining functional goals. (see flow sheet as applicable)     Details if applicable:     10 10 03110 Therapeutic Activity (timed):  use of dynamic activities replicating functional movements to increase ROM, strength, coordination, balance, and proprioception in order to improve patient's ability to progress to PLOF and address remaining functional goals.  (see flow sheet as applicable)     Details if applicable:     8  04996 Self Care/Home Management (timed):  improve patient knowledge and understanding of positioning, posture/ergonomics, and physical therapy expectations, procedures and progression  to improve patient's ability to progress to PLOF and address remaining functional goals.  (see flow sheet as applicable)

## 2024-04-30 ENCOUNTER — HOSPITAL ENCOUNTER (OUTPATIENT)
Facility: HOSPITAL | Age: 79
Setting detail: RECURRING SERIES
Discharge: HOME | End: 2024-05-03
Payer: MEDICAID

## 2024-04-30 PROCEDURE — 97530 THERAPEUTIC ACTIVITIES: CPT

## 2024-04-30 PROCEDURE — 97112 NEUROMUSCULAR REEDUCATION: CPT

## 2024-04-30 PROCEDURE — 97110 THERAPEUTIC EXERCISES: CPT

## 2024-04-30 NOTE — PROGRESS NOTES
for patient [Date Assessed: 4/30/2024]     Long Term Goals: To be accomplished in 6 weeks   Pt will achieve FOTO goal to highlight improved functional status with ADLs.  Status at IE : initial  Assess at 30 day    2.  Pt will demonstrate <2/10 pain at worst with ADLs to signify improved QoL.  Status at IE : >2/10 at worst    3.  Pt will demonstrate >4+/5 hip abduction to improve gait and reduce mechanical stress on lumbar spine.  Status at IE : <4+/5    4.  Pt will report 75% improved tolerance with STS transfers to allow for improved home mobility.  Status at IE : initial  Assess at 30 day    Next PN/ RC due 05/09/2024  Auth due (visit number/ date) 12 visits by 06/28/2024 (9 remaining)    PLAN  - Continue Plan of Care    Shirlene Mason \[facility]\""    4/30/2024    9:28 AM    Future Appointments   Date Time Provider Department Center   4/30/2024  9:30 AM Timbo Monge, PT [facility]   5/1/2024  8:00 AM Cristóbal Cherry MD [facility] BS AMB   5/2/2024 10:50 AM Timbo Monge, PT [facility]   5/7/2024 11:30 AM Timbo Monge, PT [facility]   5/9/2024 10:50 AM Timbo Monge, PT [facility]   5/13/2024 11:30 AM Melinda Gurrola, PT [facility]   5/14/2024  9:30 AM Matheus Castillo MD [facility] BS AMB   5/16/2024 12:10 PM Timbo Monge, PT [facility]   5/21/2024 10:50 AM Timbo Monge, PT [facility]   5/23/2024 10:50 AM Melinda Gurrola, PT [facility]   5/28/2024 10:50 AM Timbo Monge, PT [facility]   5/29/2024 10:30 AM Tanesha Hurst MD [facility] BS AMB   5/30/2024 10:50 AM Timbo Monge, PT [facility]   6/4/2024 10:00 AM Jacky Hill APRN - NP [facility] BS AMB   6/12/2024 10:00 AM [facility] NONIMAGING [facility] BS AMB   6/21/2024  1:15 PM Santana Norris, BERTO [facility] BS AMB   10/23/2024 10:45 AM Delfino Cornell MD [facility] Sched

## 2024-05-01 ENCOUNTER — OFFICE VISIT (OUTPATIENT)
Age: 79
End: 2024-05-01
Payer: MEDICAID

## 2024-05-01 VITALS — BODY MASS INDEX: 31.23 KG/M2 | WEIGHT: 199 LBS | HEIGHT: 67 IN

## 2024-05-01 DIAGNOSIS — M17.12 PATELLOFEMORAL ARTHRITIS OF LEFT KNEE: ICD-10-CM

## 2024-05-01 DIAGNOSIS — M25.562 LEFT KNEE PAIN, UNSPECIFIED CHRONICITY: ICD-10-CM

## 2024-05-01 DIAGNOSIS — M25.562 LEFT KNEE PAIN, UNSPECIFIED CHRONICITY: Primary | ICD-10-CM

## 2024-05-01 DIAGNOSIS — R10.30 INGUINAL PAIN, UNSPECIFIED LATERALITY: ICD-10-CM

## 2024-05-01 DIAGNOSIS — R26.9 GAIT ABNORMALITY: ICD-10-CM

## 2024-05-01 PROCEDURE — 20610 DRAIN/INJ JOINT/BURSA W/O US: CPT | Performed by: ORTHOPAEDIC SURGERY

## 2024-05-01 PROCEDURE — 73564 X-RAY EXAM KNEE 4 OR MORE: CPT | Performed by: ORTHOPAEDIC SURGERY

## 2024-05-01 PROCEDURE — 99214 OFFICE O/P EST MOD 30 MIN: CPT | Performed by: ORTHOPAEDIC SURGERY

## 2024-05-01 PROCEDURE — 1123F ACP DISCUSS/DSCN MKR DOCD: CPT | Performed by: ORTHOPAEDIC SURGERY

## 2024-05-01 RX ORDER — BETAMETHASONE SODIUM PHOSPHATE AND BETAMETHASONE ACETATE 3; 3 MG/ML; MG/ML
6 INJECTION, SUSPENSION INTRA-ARTICULAR; INTRALESIONAL; INTRAMUSCULAR; SOFT TISSUE ONCE
Status: COMPLETED | OUTPATIENT
Start: 2024-05-01 | End: 2024-05-01

## 2024-05-01 RX ORDER — LIDOCAINE HYDROCHLORIDE 10 MG/ML
3 INJECTION, SOLUTION INFILTRATION; PERINEURAL ONCE
Status: COMPLETED | OUTPATIENT
Start: 2024-05-01 | End: 2024-05-01

## 2024-05-01 RX ADMIN — BETAMETHASONE SODIUM PHOSPHATE AND BETAMETHASONE ACETATE 6 MG: 3; 3 INJECTION, SUSPENSION INTRA-ARTICULAR; INTRALESIONAL; INTRAMUSCULAR; SOFT TISSUE at 09:03

## 2024-05-01 RX ADMIN — LIDOCAINE HYDROCHLORIDE 3 ML: 10 INJECTION, SOLUTION INFILTRATION; PERINEURAL at 09:03

## 2024-05-01 NOTE — PROGRESS NOTES
Rash           Asa-Apap-Caff Buffered Rash    Penicillins Anaphylaxis and Angioedema           Adhesive Tape Itching     Other reaction(s): rash/itching    Aspirin Other (See Comments)     History of stomach ulcer      Atorvastatin Other (See Comments)     Muscle cramps         Past Surgical History:   Procedure Laterality Date    CARPAL TUNNEL RELEASE Left     COLONOSCOPY N/A 2019    COLONOSCOPY performed by Brent Arzate MD at [facility] ENDOSCOPY    CYSTOSCOPY N/A 2023    HOLMIUM LASER INCISION BLADDER NECK CONSTRACTURE, INJECTION OF KENOLOG performed by Delfino Cornell MD at [facility] MAIN OR    CYSTOURETHROSCOPY  10/2022    EGD  2023    HAND/FINGER SURGERY UNLISTED Right     carpal tunnel    HEENT Left     lens implant    ORTHOPEDIC SURGERY      finger amputation left hand    SHOULDER ARTHROPLASTY Right 2023    TONSILLECTOMY      UPPER ARM/ELBOW SURGERY UNLISTED Right     UPPER GASTROINTESTINAL ENDOSCOPY N/A 2023    EGD ESOPHAGOGASTRODUODENOSCOPY performed by Abdiaziz Marsh MD at [facility] ENDOSCOPY       Social History     Socioeconomic History    Marital status:      Spouse name: Not on file    Number of children: Not on file    Years of education: Not on file    Highest education level: Not on file   Occupational History    Not on file   Tobacco Use    Smoking status: Former     Current packs/day: 0.00     Types: Cigarettes     Quit date: 2015     Years since quittin.8     Passive exposure: Past    Smokeless tobacco: Never   Vaping Use    Vaping Use: Never used   Substance and Sexual Activity    Alcohol use: Not Currently     Comment: Quit -     Drug use: Never    Sexual activity: Not on file   Other Topics Concern    Not on file   Social History Narrative    Not on file     Social Determinants of Health     Financial Resource Strain: Not on file   Food Insecurity: Not on file (2023)   Recent Concern: Food Insecurity - Food Insecurity Present (2023)    Hunger

## 2024-05-02 ENCOUNTER — HOSPITAL ENCOUNTER (OUTPATIENT)
Facility: HOSPITAL | Age: 79
Setting detail: RECURRING SERIES
Discharge: HOME | End: 2024-05-05
Payer: MEDICAID

## 2024-05-02 PROCEDURE — 97110 THERAPEUTIC EXERCISES: CPT

## 2024-05-02 PROCEDURE — 97530 THERAPEUTIC ACTIVITIES: CPT

## 2024-05-02 PROCEDURE — 97112 NEUROMUSCULAR REEDUCATION: CPT

## 2024-05-02 NOTE — PROGRESS NOTES
PHYSICAL / OCCUPATIONAL THERAPY - DAILY TREATMENT NOTE    Patient Name: Jose Vieira    Date: 2024    : 1945  Insurance: Payor: [facility] MEDICAID / Plan: [facility] HEALTHKEEPERS PLUS / Product Type: *No Product type* /      Patient  verified Yes     Visit #   Current / Total 4 8-12   Time   In / Out 1050 1135   Pain   In / Out 1 0   Subjective Functional Status/Changes: Patient reports feeling pretty good today.     TREATMENT AREA =  Other low back pain [M54.59]    OBJECTIVE        Therapeutic Procedures:    Tx Min Billable or 1:1 Min (if diff from Tx Min) Procedure, Rationale, Specifics   15 15 51195 Therapeutic Exercise (timed):  increase ROM, strength, coordination, balance, and proprioception to improve patient's ability to progress to PLOF and address remaining functional goals. (see flow sheet as applicable)     Details if applicable:       12 12 47700 Neuromuscular Re-Education (timed):  improve balance, coordination, kinesthetic sense, posture, core stability and proprioception to improve patient's ability to develop conscious control of individual muscles and awareness of position of extremities in order to progress to PLOF and address remaining functional goals. (see flow sheet as applicable)     Details if applicable:     10 10 47316 Therapeutic Activity (timed):  use of dynamic activities replicating functional movements to increase ROM, strength, coordination, balance, and proprioception in order to improve patient's ability to progress to PLOF and address remaining functional goals.  (see flow sheet as applicable)     Details if applicable:     8 8 65759 Self Care/Home Management (timed):  improve patient knowledge and understanding of positioning, posture/ergonomics, and physical therapy expectations, procedures and progression  to improve patient's ability to progress to PLOF and address remaining functional goals.  (see flow sheet as applicable)     Details if applicable:

## 2024-05-07 ENCOUNTER — HOSPITAL ENCOUNTER (OUTPATIENT)
Facility: HOSPITAL | Age: 79
Setting detail: RECURRING SERIES
Discharge: HOME | End: 2024-05-10
Payer: MEDICAID

## 2024-05-07 PROCEDURE — 97110 THERAPEUTIC EXERCISES: CPT

## 2024-05-07 PROCEDURE — 97112 NEUROMUSCULAR REEDUCATION: CPT

## 2024-05-07 PROCEDURE — 97530 THERAPEUTIC ACTIVITIES: CPT

## 2024-05-07 NOTE — PROGRESS NOTES
PHYSICAL / OCCUPATIONAL THERAPY - DAILY TREATMENT NOTE    Patient Name: Jose Vieira    Date: 2024    : 1945  Insurance: Payor: [facility] MEDICAID / Plan: ABE [facility] HEALTHKEEPERS PLUS / Product Type: *No Product type* /      Patient  verified Yes     Visit #   Current / Total 5 8-12   Time   In / Out 1130 1210   Pain   In / Out 4-5 4   Subjective Functional Status/Changes: \"My knee and back are hurting today.\"     TREATMENT AREA =  Other low back pain [M54.59]    OBJECTIVE         Therapeutic Procedures:    Tx Min Billable or 1:1 Min (if diff from Tx Min) Procedure, Rationale, Specifics   15 15 86962 Therapeutic Exercise (timed):  increase ROM, strength, coordination, balance, and proprioception to improve patient's ability to progress to PLOF and address remaining functional goals. (see flow sheet as applicable)     Details if applicable:       15 15 01491 Neuromuscular Re-Education (timed):  improve balance, coordination, kinesthetic sense, posture, core stability and proprioception to improve patient's ability to develop conscious control of individual muscles and awareness of position of extremities in order to progress to PLOF and address remaining functional goals. (see flow sheet as applicable)     Details if applicable:     10 10 06872 Therapeutic Activity (timed):  use of dynamic activities replicating functional movements to increase ROM, strength, coordination, balance, and proprioception in order to improve patient's ability to progress to PLOF and address remaining functional goals.  (see flow sheet as applicable)     Details if applicable:     40 40 [facility] Totals Reminder: bill using total billable min of TIMED therapeutic procedures (example: do not include dry needle or estim unattended, both untimed codes, in totals to left)  8-22 min = 1 unit; 23-37 min = 2 units; 38-52 min = 3 units; 53-67 min = 4 units; 68-82 min = 5 units   Total Total     [x]  Patient Education billed

## 2024-05-09 ENCOUNTER — HOSPITAL ENCOUNTER (OUTPATIENT)
Facility: HOSPITAL | Age: 79
Setting detail: RECURRING SERIES
End: 2024-05-09
Payer: MEDICAID

## 2024-05-09 ENCOUNTER — HOSPITAL ENCOUNTER (OUTPATIENT)
Facility: HOSPITAL | Age: 79
Discharge: HOME | End: 2024-05-12

## 2024-05-09 LAB — LABCORP SPECIMEN COLLECTION: NORMAL

## 2024-05-09 PROCEDURE — 99001 SPECIMEN HANDLING PT-LAB: CPT

## 2024-05-10 LAB
BASOPHILS # BLD AUTO: 0 X10E3/UL (ref 0–0.2)
BASOPHILS NFR BLD AUTO: 1 %
CENTROMERE B AB SER-ACNC: <0.2 AI (ref 0–0.9)
CHROMATIN AB SERPL-ACNC: <0.2 AI (ref 0–0.9)
CK SERPL-CCNC: 234 U/L (ref 41–331)
CRP SERPL-MCNC: 16 MG/L (ref 0–10)
DSDNA AB SER-ACNC: 2 IU/ML (ref 0–9)
ENA JO1 AB SER-ACNC: <0.2 AI (ref 0–0.9)
ENA RNP AB SER-ACNC: <0.2 AI (ref 0–0.9)
ENA SCL70 AB SER-ACNC: <0.2 AI (ref 0–0.9)
ENA SM AB SER-ACNC: <0.2 AI (ref 0–0.9)
ENA SS-A AB SER-ACNC: <0.2 AI (ref 0–0.9)
ENA SS-B AB SER-ACNC: <0.2 AI (ref 0–0.9)
EOSINOPHIL # BLD AUTO: 0.2 X10E3/UL (ref 0–0.4)
EOSINOPHIL NFR BLD AUTO: 4 %
ERYTHROCYTE [DISTWIDTH] IN BLOOD BY AUTOMATED COUNT: 13.3 % (ref 11.6–15.4)
ERYTHROCYTE [SEDIMENTATION RATE] IN BLOOD BY WESTERGREN METHOD: 45 MM/HR (ref 0–30)
HCT VFR BLD AUTO: 39.5 % (ref 37.5–51)
HGB BLD-MCNC: 13.1 G/DL (ref 13–17.7)
IMM GRANULOCYTES # BLD AUTO: 0 X10E3/UL (ref 0–0.1)
IMM GRANULOCYTES NFR BLD AUTO: 0 %
LYMPHOCYTES # BLD AUTO: 1.3 X10E3/UL (ref 0.7–3.1)
LYMPHOCYTES NFR BLD AUTO: 24 %
Lab: NORMAL
MCH RBC QN AUTO: 30.7 PG (ref 26.6–33)
MCHC RBC AUTO-ENTMCNC: 33.2 G/DL (ref 31.5–35.7)
MCV RBC AUTO: 93 FL (ref 79–97)
MONOCYTES # BLD AUTO: 0.6 X10E3/UL (ref 0.1–0.9)
MONOCYTES NFR BLD AUTO: 10 %
NEUTROPHILS # BLD AUTO: 3.5 X10E3/UL (ref 1.4–7)
NEUTROPHILS NFR BLD AUTO: 61 %
PLATELET # BLD AUTO: 304 X10E3/UL (ref 150–450)
RBC # BLD AUTO: 4.27 X10E6/UL (ref 4.14–5.8)
RHEUMATOID FACT SERPL-ACNC: <10 IU/ML
SPECIMEN STATUS REPORT: NORMAL
URATE SERPL-MCNC: 7.9 MG/DL (ref 3.8–8.4)
WBC # BLD AUTO: 5.6 X10E3/UL (ref 3.4–10.8)

## 2024-05-12 LAB — B BURGDOR IGG+IGM SER QL IA: NEGATIVE

## 2024-05-21 ENCOUNTER — APPOINTMENT (OUTPATIENT)
Facility: HOSPITAL | Age: 79
End: 2024-05-21
Payer: MEDICAID

## 2024-05-23 ENCOUNTER — APPOINTMENT (OUTPATIENT)
Facility: HOSPITAL | Age: 79
End: 2024-05-23
Payer: MEDICAID

## 2024-05-28 ENCOUNTER — APPOINTMENT (OUTPATIENT)
Facility: HOSPITAL | Age: 79
End: 2024-05-28
Payer: MEDICAID

## 2024-05-30 ENCOUNTER — APPOINTMENT (OUTPATIENT)
Facility: HOSPITAL | Age: 79
End: 2024-05-30
Payer: MEDICAID

## 2024-06-04 ENCOUNTER — OFFICE VISIT (OUTPATIENT)
Age: 79
End: 2024-06-04
Payer: MEDICAID

## 2024-06-04 VITALS
HEIGHT: 67 IN | HEART RATE: 79 BPM | BODY MASS INDEX: 30.92 KG/M2 | WEIGHT: 197 LBS | SYSTOLIC BLOOD PRESSURE: 127 MMHG | OXYGEN SATURATION: 96 % | DIASTOLIC BLOOD PRESSURE: 67 MMHG

## 2024-06-04 DIAGNOSIS — I73.9 PERIPHERAL ARTERIAL DISEASE (HCC): ICD-10-CM

## 2024-06-04 DIAGNOSIS — I25.10 ATHEROSCLEROSIS OF NATIVE CORONARY ARTERY OF NATIVE HEART WITHOUT ANGINA PECTORIS: Primary | ICD-10-CM

## 2024-06-04 DIAGNOSIS — I10 ESSENTIAL (PRIMARY) HYPERTENSION: ICD-10-CM

## 2024-06-04 DIAGNOSIS — Q24.5 CORONARY-MYOCARDIAL BRIDGE: ICD-10-CM

## 2024-06-04 DIAGNOSIS — E78.00 PURE HYPERCHOLESTEROLEMIA, UNSPECIFIED: ICD-10-CM

## 2024-06-04 DIAGNOSIS — I26.99 OTHER PULMONARY EMBOLISM WITHOUT ACUTE COR PULMONALE, UNSPECIFIED CHRONICITY (HCC): ICD-10-CM

## 2024-06-04 PROCEDURE — 99214 OFFICE O/P EST MOD 30 MIN: CPT | Performed by: NURSE PRACTITIONER

## 2024-06-04 PROCEDURE — 3074F SYST BP LT 130 MM HG: CPT | Performed by: NURSE PRACTITIONER

## 2024-06-04 PROCEDURE — 3078F DIAST BP <80 MM HG: CPT | Performed by: NURSE PRACTITIONER

## 2024-06-04 PROCEDURE — 1123F ACP DISCUSS/DSCN MKR DOCD: CPT | Performed by: NURSE PRACTITIONER

## 2024-06-04 NOTE — PROGRESS NOTES
1. Have you been to the ER, urgent care clinic since your last visit?  Hospitalized since your last visit?No    2. Have you seen or consulted any other health care providers outside of the [facility] since your last visit?  Include any pap smears or colon screening. No    3. Do you need any refills? No  
status is stable denies further episodes of chest pain nuclear stress test negative for ischemia mildly abnormal FE currently following with vascular continue anticoagulation with Eliquis blood pressure is controlled follow-up in our office in 6 months or sooner if needed

## 2024-08-23 ENCOUNTER — APPOINTMENT (OUTPATIENT)
Facility: HOSPITAL | Age: 79
End: 2024-08-23
Payer: MEDICAID

## 2024-08-23 ENCOUNTER — HOSPITAL ENCOUNTER (EMERGENCY)
Facility: HOSPITAL | Age: 79
Discharge: HOME | End: 2024-08-24
Attending: STUDENT IN AN ORGANIZED HEALTH CARE EDUCATION/TRAINING PROGRAM
Payer: MEDICAID

## 2024-08-23 VITALS
HEART RATE: 102 BPM | HEIGHT: 67 IN | DIASTOLIC BLOOD PRESSURE: 69 MMHG | RESPIRATION RATE: 18 BRPM | OXYGEN SATURATION: 95 % | BODY MASS INDEX: 29.03 KG/M2 | WEIGHT: 185 LBS | SYSTOLIC BLOOD PRESSURE: 145 MMHG | TEMPERATURE: 97.4 F

## 2024-08-23 DIAGNOSIS — M79.675 GREAT TOE PAIN, LEFT: Primary | ICD-10-CM

## 2024-08-23 PROCEDURE — 73620 X-RAY EXAM OF FOOT: CPT

## 2024-08-23 PROCEDURE — 99283 EMERGENCY DEPT VISIT LOW MDM: CPT

## 2024-08-23 RX ORDER — HYDROCODONE BITARTRATE AND ACETAMINOPHEN 5; 325 MG/1; MG/1
1 TABLET ORAL
Status: DISCONTINUED | OUTPATIENT
Start: 2024-08-23 | End: 2024-08-23

## 2024-08-23 RX ORDER — ACETAMINOPHEN 325 MG/1
650 TABLET ORAL
Status: DISCONTINUED | OUTPATIENT
Start: 2024-08-23 | End: 2024-08-24 | Stop reason: HOSPADM

## 2024-08-23 ASSESSMENT — LIFESTYLE VARIABLES
HOW MANY STANDARD DRINKS CONTAINING ALCOHOL DO YOU HAVE ON A TYPICAL DAY: PATIENT DOES NOT DRINK
HOW OFTEN DO YOU HAVE A DRINK CONTAINING ALCOHOL: NEVER

## 2024-08-24 NOTE — ED TRIAGE NOTES
Patient brought in by medics after he reportedly stubbed his left great toe yesterday while going up the stairs.  Today he states that the pain is worse.  Patient reports excruciating pain

## 2024-08-24 NOTE — DISCHARGE INSTRUCTIONS
You were evaluated for left great toe pain .  Based on your work-up it was deemed that she was stable for discharge.    Please follow-up with your primary care physician if you have any further concerns and go over your work-up.  If you experience any chest pain, shortness of breath, worsening abdominal pain, vomiting blood, worsening headache, seizures, or any worsening of your symptoms please return to the emergency department immediately.  If you have any pending results or any further questions please contact the emergency department at [phone].

## 2024-08-24 NOTE — ED PROVIDER NOTES
EMERGENCY DEPARTMENT HISTORY AND PHYSICAL EXAM    8:39 PM      Date: 8/23/2024  Patient Name: Jose Vieira    History of Presenting Illness     Chief Complaint   Patient presents with    Ankle Pain       History From: Patient    Jose Vieira is a 78 y.o. male   HPI  78-year-old male with history of reflux, DVT, hypertension, CAD who presents with left great toe pain.  Patient said that he hit his foot on the steps this week.  However has been having pain at the great toe of the left foot.  Moderate to severe, intermittent, throbbing.  Walking aggravates symptoms.  No alleviating factors.  When assessing ROS he denies any fevers, nausea, vomiting, chest pain, shortness of breath, abdominal pain, change in bowel movement, or any other changes.       Nursing Notes were all reviewed and agreed with or any disagreements were addressed in the HPI.    PCP: Emily Pacheco MD    No current facility-administered medications for this encounter.     Current Outpatient Medications   Medication Sig Dispense Refill    gabapentin (NEURONTIN) 300 MG capsule Take 1 capsule by mouth 3 times daily for 180 days. Intended supply: 90 days Max Daily Amount: 900 mg 270 capsule 0    atorvastatin (LIPITOR) 20 MG tablet Take 1 tablet by mouth daily 90 tablet 0    diclofenac sodium (VOLTAREN) 1 % GEL Apply 2 g topically in the morning, at noon, in the evening, and at bedtime      polyethylene glycol (GLYCOLAX) 17 GM/SCOOP powder MIX 17 GRAMS (1 CAPFUL) WITH 8 OUNCE OF WATER DAILY      umeclidinium-vilanterol (ANORO ELLIPTA) 62.5-25 MCG/ACT inhaler INHALE ONE PUFF BY MOUTH DAILY 1 each 5    omeprazole (PRILOSEC) 40 MG delayed release capsule Take 1 capsule by mouth daily ceived the following from [facility]: Outside name: omeprazole (PRILOSEC) 40 mg capsule 60 capsule 3    metoprolol tartrate (LOPRESSOR) 25 MG tablet Take 0.5 tablets by mouth 2 times daily 60 tablet 3    polyethylene glycol (GLYCOLAX) 17 g packet Take 1

## 2024-08-28 ENCOUNTER — HOSPITAL ENCOUNTER (EMERGENCY)
Facility: HOSPITAL | Age: 79
Discharge: HOME | End: 2024-08-28
Attending: EMERGENCY MEDICINE
Payer: MEDICAID

## 2024-08-28 ENCOUNTER — APPOINTMENT (OUTPATIENT)
Facility: HOSPITAL | Age: 79
End: 2024-08-28
Payer: MEDICAID

## 2024-08-28 VITALS
HEART RATE: 92 BPM | TEMPERATURE: 98.4 F | DIASTOLIC BLOOD PRESSURE: 64 MMHG | RESPIRATION RATE: 20 BRPM | WEIGHT: 185 LBS | BODY MASS INDEX: 29.03 KG/M2 | SYSTOLIC BLOOD PRESSURE: 146 MMHG | OXYGEN SATURATION: 93 % | HEIGHT: 67 IN

## 2024-08-28 DIAGNOSIS — L03.116 CELLULITIS OF LEFT FOOT: Primary | ICD-10-CM

## 2024-08-28 DIAGNOSIS — M10.072 ACUTE IDIOPATHIC GOUT INVOLVING TOE OF LEFT FOOT: ICD-10-CM

## 2024-08-28 LAB
ANION GAP SERPL CALC-SCNC: 8 MMOL/L (ref 3–18)
BASOPHILS # BLD: 0 K/UL (ref 0–0.1)
BASOPHILS NFR BLD: 0 % (ref 0–2)
BUN SERPL-MCNC: 13 MG/DL (ref 7–18)
BUN/CREAT SERPL: 12 (ref 12–20)
CALCIUM SERPL-MCNC: 10 MG/DL (ref 8.5–10.1)
CHLORIDE SERPL-SCNC: 101 MMOL/L (ref 100–111)
CO2 SERPL-SCNC: 24 MMOL/L (ref 21–32)
CREAT SERPL-MCNC: 1.06 MG/DL (ref 0.6–1.3)
DIFFERENTIAL METHOD BLD: ABNORMAL
EOSINOPHIL # BLD: 0.2 K/UL (ref 0–0.4)
EOSINOPHIL NFR BLD: 3 % (ref 0–5)
ERYTHROCYTE [DISTWIDTH] IN BLOOD BY AUTOMATED COUNT: 14.9 % (ref 11.6–14.5)
GLUCOSE SERPL-MCNC: 126 MG/DL (ref 74–99)
HCT VFR BLD AUTO: 43.8 % (ref 36–48)
HGB BLD-MCNC: 14.3 G/DL (ref 13–16)
IMM GRANULOCYTES # BLD AUTO: 0 K/UL (ref 0–0.04)
IMM GRANULOCYTES NFR BLD AUTO: 0 % (ref 0–0.5)
LACTATE BLD-SCNC: 1.18 MMOL/L (ref 0.4–2)
LYMPHOCYTES # BLD: 1.9 K/UL (ref 0.9–3.6)
LYMPHOCYTES NFR BLD: 25 % (ref 21–52)
MCH RBC QN AUTO: 29.9 PG (ref 24–34)
MCHC RBC AUTO-ENTMCNC: 32.6 G/DL (ref 31–37)
MCV RBC AUTO: 91.4 FL (ref 78–100)
MONOCYTES # BLD: 0.9 K/UL (ref 0.05–1.2)
MONOCYTES NFR BLD: 12 % (ref 3–10)
NEUTS SEG # BLD: 4.5 K/UL (ref 1.8–8)
NEUTS SEG NFR BLD: 60 % (ref 40–73)
NRBC # BLD: 0 K/UL (ref 0–0.01)
NRBC BLD-RTO: 0 PER 100 WBC
PLATELET # BLD AUTO: 344 K/UL (ref 135–420)
PMV BLD AUTO: 9 FL (ref 9.2–11.8)
POTASSIUM SERPL-SCNC: 4.1 MMOL/L (ref 3.5–5.5)
PROCALCITONIN SERPL-MCNC: <0.05 NG/ML
RBC # BLD AUTO: 4.79 M/UL (ref 4.35–5.65)
SODIUM SERPL-SCNC: 133 MMOL/L (ref 136–145)
URATE SERPL-MCNC: 7.7 MG/DL (ref 2.6–7.2)
WBC # BLD AUTO: 7.5 K/UL (ref 4.6–13.2)

## 2024-08-28 PROCEDURE — 96366 THER/PROPH/DIAG IV INF ADDON: CPT

## 2024-08-28 PROCEDURE — 96375 TX/PRO/DX INJ NEW DRUG ADDON: CPT

## 2024-08-28 PROCEDURE — 73630 X-RAY EXAM OF FOOT: CPT

## 2024-08-28 PROCEDURE — 84550 ASSAY OF BLOOD/URIC ACID: CPT

## 2024-08-28 PROCEDURE — 96365 THER/PROPH/DIAG IV INF INIT: CPT

## 2024-08-28 PROCEDURE — 83605 ASSAY OF LACTIC ACID: CPT

## 2024-08-28 PROCEDURE — 80048 BASIC METABOLIC PNL TOTAL CA: CPT

## 2024-08-28 PROCEDURE — 99284 EMERGENCY DEPT VISIT MOD MDM: CPT

## 2024-08-28 PROCEDURE — 2580000003 HC RX 258: Performed by: EMERGENCY MEDICINE

## 2024-08-28 PROCEDURE — 6360000002 HC RX W HCPCS: Performed by: EMERGENCY MEDICINE

## 2024-08-28 PROCEDURE — 87040 BLOOD CULTURE FOR BACTERIA: CPT

## 2024-08-28 PROCEDURE — 6370000000 HC RX 637 (ALT 250 FOR IP): Performed by: EMERGENCY MEDICINE

## 2024-08-28 PROCEDURE — 84145 PROCALCITONIN (PCT): CPT

## 2024-08-28 PROCEDURE — 85025 COMPLETE CBC W/AUTO DIFF WBC: CPT

## 2024-08-28 RX ORDER — 0.9 % SODIUM CHLORIDE 0.9 %
500 INTRAVENOUS SOLUTION INTRAVENOUS ONCE
Status: COMPLETED | OUTPATIENT
Start: 2024-08-28 | End: 2024-08-28

## 2024-08-28 RX ORDER — OXYCODONE AND ACETAMINOPHEN 5; 325 MG/1; MG/1
1 TABLET ORAL
Status: COMPLETED | OUTPATIENT
Start: 2024-08-28 | End: 2024-08-28

## 2024-08-28 RX ORDER — COLCHICINE 0.6 MG/1
0.6 CAPSULE ORAL
Status: COMPLETED | OUTPATIENT
Start: 2024-08-28 | End: 2024-08-28

## 2024-08-28 RX ORDER — COLCHICINE 0.6 MG/1
TABLET ORAL
Qty: 10 TABLET | Refills: 3 | Status: SHIPPED | OUTPATIENT
Start: 2024-08-28

## 2024-08-28 RX ADMIN — COLCHICINE 0.6 MG: 0.6 CAPSULE ORAL at 05:32

## 2024-08-28 RX ADMIN — VANCOMYCIN HYDROCHLORIDE 2000 MG: 10 INJECTION, POWDER, LYOPHILIZED, FOR SOLUTION INTRAVENOUS at 03:21

## 2024-08-28 RX ADMIN — WATER 1000 MG: 1 INJECTION INTRAMUSCULAR; INTRAVENOUS; SUBCUTANEOUS at 03:16

## 2024-08-28 RX ADMIN — OXYCODONE HYDROCHLORIDE AND ACETAMINOPHEN 1 TABLET: 5; 325 TABLET ORAL at 05:33

## 2024-08-28 RX ADMIN — SODIUM CHLORIDE 500 ML: 9 INJECTION, SOLUTION INTRAVENOUS at 03:17

## 2024-08-28 ASSESSMENT — PAIN DESCRIPTION - ORIENTATION: ORIENTATION: LEFT

## 2024-08-28 ASSESSMENT — PAIN DESCRIPTION - LOCATION: LOCATION: FOOT

## 2024-08-28 ASSESSMENT — PAIN SCALES - GENERAL: PAINLEVEL_OUTOF10: 8

## 2024-08-28 NOTE — ED NOTES
Pt is currently asleep on the ED stretcher. Bed is at the lowest position with brakes on and x2 rails up. Call light is within reach.

## 2024-08-28 NOTE — ED NOTES
Bedside and Verbal shift change report given to Larissa (oncoming nurse) by Jyotsna (offgoing nurse). Report included the following information Nurse Handoff Report, Index, ED SBAR, MAR, and Recent Results.

## 2024-09-01 LAB
BACTERIA SPEC CULT: NORMAL
BACTERIA SPEC CULT: NORMAL
SERVICE CMNT-IMP: NORMAL
SERVICE CMNT-IMP: NORMAL

## 2024-09-24 ENCOUNTER — OFFICE VISIT (OUTPATIENT)
Age: 79
End: 2024-09-24
Payer: MEDICAID

## 2024-09-24 VITALS — HEIGHT: 67 IN | BODY MASS INDEX: 28.98 KG/M2

## 2024-09-24 DIAGNOSIS — M1A.0720 CHRONIC IDIOPATHIC GOUT INVOLVING TOE OF LEFT FOOT WITHOUT TOPHUS: Primary | ICD-10-CM

## 2024-09-24 PROCEDURE — 99214 OFFICE O/P EST MOD 30 MIN: CPT | Performed by: ORTHOPAEDIC SURGERY

## 2024-09-24 PROCEDURE — 1123F ACP DISCUSS/DSCN MKR DOCD: CPT | Performed by: ORTHOPAEDIC SURGERY

## 2024-09-24 RX ORDER — TRAMADOL HYDROCHLORIDE 50 MG/1
50 TABLET ORAL 2 TIMES DAILY
Qty: 14 TABLET | Refills: 0 | Status: SHIPPED | OUTPATIENT
Start: 2024-09-24 | End: 2024-10-01

## 2024-10-08 ENCOUNTER — CLINICAL DOCUMENTATION (OUTPATIENT)
Age: 79
End: 2024-10-08

## 2025-01-16 ENCOUNTER — APPOINTMENT (OUTPATIENT)
Facility: HOSPITAL | Age: 80
End: 2025-01-16
Payer: MEDICAID

## 2025-01-16 ENCOUNTER — HOSPITAL ENCOUNTER (EMERGENCY)
Facility: HOSPITAL | Age: 80
Discharge: HOME | End: 2025-01-16
Attending: EMERGENCY MEDICINE
Payer: MEDICAID

## 2025-01-16 VITALS
BODY MASS INDEX: 30.92 KG/M2 | HEART RATE: 81 BPM | RESPIRATION RATE: 17 BRPM | TEMPERATURE: 97.9 F | DIASTOLIC BLOOD PRESSURE: 77 MMHG | WEIGHT: 197 LBS | HEIGHT: 67 IN | SYSTOLIC BLOOD PRESSURE: 146 MMHG | OXYGEN SATURATION: 96 %

## 2025-01-16 DIAGNOSIS — R07.89 ATYPICAL CHEST PAIN: Primary | ICD-10-CM

## 2025-01-16 DIAGNOSIS — J44.9 CHRONIC OBSTRUCTIVE PULMONARY DISEASE, UNSPECIFIED COPD TYPE (HCC): ICD-10-CM

## 2025-01-16 LAB
ALBUMIN SERPL-MCNC: 3.3 G/DL (ref 3.4–5)
ALBUMIN/GLOB SERPL: 0.8 (ref 0.8–1.7)
ALP SERPL-CCNC: 86 U/L (ref 45–117)
ALT SERPL-CCNC: 23 U/L (ref 16–61)
ANION GAP SERPL CALC-SCNC: 3 MMOL/L (ref 3–18)
AST SERPL-CCNC: 24 U/L (ref 10–38)
B PERT DNA SPEC QL NAA+PROBE: NOT DETECTED
BASOPHILS # BLD: 0.04 K/UL (ref 0–0.1)
BASOPHILS NFR BLD: 0.6 % (ref 0–2)
BILIRUB SERPL-MCNC: 0.3 MG/DL (ref 0.2–1)
BORDETELLA PARAPERTUSSIS BY PCR: NOT DETECTED
BUN SERPL-MCNC: 11 MG/DL (ref 7–18)
BUN/CREAT SERPL: 11 (ref 12–20)
C PNEUM DNA SPEC QL NAA+PROBE: NOT DETECTED
CALCIUM SERPL-MCNC: 9.5 MG/DL (ref 8.5–10.1)
CHLORIDE SERPL-SCNC: 107 MMOL/L (ref 100–111)
CO2 SERPL-SCNC: 27 MMOL/L (ref 21–32)
CREAT SERPL-MCNC: 1.02 MG/DL (ref 0.6–1.3)
D DIMER PPP FEU-MCNC: 0.43 UG/ML(FEU)
DIFFERENTIAL METHOD BLD: ABNORMAL
EKG ATRIAL RATE: 87 BPM
EKG DIAGNOSIS: NORMAL
EKG P AXIS: 44 DEGREES
EKG P-R INTERVAL: 166 MS
EKG Q-T INTERVAL: 368 MS
EKG QRS DURATION: 78 MS
EKG QTC CALCULATION (BAZETT): 442 MS
EKG R AXIS: -27 DEGREES
EKG T AXIS: 52 DEGREES
EKG VENTRICULAR RATE: 87 BPM
EOSINOPHIL # BLD: 0.28 K/UL (ref 0–0.4)
EOSINOPHIL NFR BLD: 4.1 % (ref 0–5)
ERYTHROCYTE [DISTWIDTH] IN BLOOD BY AUTOMATED COUNT: 15.8 % (ref 11.6–14.5)
FLUAV SUBTYP SPEC NAA+PROBE: NOT DETECTED
FLUBV RNA SPEC QL NAA+PROBE: NOT DETECTED
GLOBULIN SER CALC-MCNC: 4 G/DL (ref 2–4)
GLUCOSE SERPL-MCNC: 108 MG/DL (ref 74–99)
HADV DNA SPEC QL NAA+PROBE: NOT DETECTED
HCOV 229E RNA SPEC QL NAA+PROBE: NOT DETECTED
HCOV HKU1 RNA SPEC QL NAA+PROBE: NOT DETECTED
HCOV NL63 RNA SPEC QL NAA+PROBE: NOT DETECTED
HCOV OC43 RNA SPEC QL NAA+PROBE: NOT DETECTED
HCT VFR BLD AUTO: 41.5 % (ref 36–48)
HGB BLD-MCNC: 13.5 G/DL (ref 13–16)
HMPV RNA SPEC QL NAA+PROBE: NOT DETECTED
HPIV1 RNA SPEC QL NAA+PROBE: NOT DETECTED
HPIV2 RNA SPEC QL NAA+PROBE: NOT DETECTED
HPIV3 RNA SPEC QL NAA+PROBE: NOT DETECTED
HPIV4 RNA SPEC QL NAA+PROBE: NOT DETECTED
IMM GRANULOCYTES # BLD AUTO: 0.03 K/UL (ref 0–0.04)
IMM GRANULOCYTES NFR BLD AUTO: 0.4 % (ref 0–0.5)
LYMPHOCYTES # BLD: 1.69 K/UL (ref 0.9–3.6)
LYMPHOCYTES NFR BLD: 24.9 % (ref 21–52)
M PNEUMO DNA SPEC QL NAA+PROBE: NOT DETECTED
MCH RBC QN AUTO: 29.5 PG (ref 24–34)
MCHC RBC AUTO-ENTMCNC: 32.5 G/DL (ref 31–37)
MCV RBC AUTO: 90.6 FL (ref 78–100)
MONOCYTES # BLD: 0.69 K/UL (ref 0.05–1.2)
MONOCYTES NFR BLD: 10.2 % (ref 3–10)
NEUTS SEG # BLD: 4.05 K/UL (ref 1.8–8)
NEUTS SEG NFR BLD: 59.8 % (ref 40–73)
NRBC # BLD: 0 K/UL (ref 0–0.01)
NRBC BLD-RTO: 0 PER 100 WBC
PLATELET # BLD AUTO: 412 K/UL (ref 135–420)
PMV BLD AUTO: 9.4 FL (ref 9.2–11.8)
POTASSIUM SERPL-SCNC: 5.4 MMOL/L (ref 3.5–5.5)
PROT SERPL-MCNC: 7.3 G/DL (ref 6.4–8.2)
RBC # BLD AUTO: 4.58 M/UL (ref 4.35–5.65)
RSV RNA SPEC QL NAA+PROBE: NOT DETECTED
RV+EV RNA SPEC QL NAA+PROBE: NOT DETECTED
SARS-COV-2 RNA RESP QL NAA+PROBE: NOT DETECTED
SODIUM SERPL-SCNC: 137 MMOL/L (ref 136–145)
TROPONIN I SERPL HS-MCNC: 7 NG/L (ref 0–78)
TROPONIN I SERPL HS-MCNC: 7 NG/L (ref 0–78)
WBC # BLD AUTO: 6.8 K/UL (ref 4.6–13.2)

## 2025-01-16 PROCEDURE — 93005 ELECTROCARDIOGRAM TRACING: CPT | Performed by: EMERGENCY MEDICINE

## 2025-01-16 PROCEDURE — 93010 ELECTROCARDIOGRAM REPORT: CPT | Performed by: INTERNAL MEDICINE

## 2025-01-16 PROCEDURE — 84484 ASSAY OF TROPONIN QUANT: CPT

## 2025-01-16 PROCEDURE — 36600 WITHDRAWAL OF ARTERIAL BLOOD: CPT

## 2025-01-16 PROCEDURE — 71045 X-RAY EXAM CHEST 1 VIEW: CPT

## 2025-01-16 PROCEDURE — 94761 N-INVAS EAR/PLS OXIMETRY MLT: CPT

## 2025-01-16 PROCEDURE — 85379 FIBRIN DEGRADATION QUANT: CPT

## 2025-01-16 PROCEDURE — 85025 COMPLETE CBC W/AUTO DIFF WBC: CPT

## 2025-01-16 PROCEDURE — 0202U NFCT DS 22 TRGT SARS-COV-2: CPT

## 2025-01-16 PROCEDURE — 94762 N-INVAS EAR/PLS OXIMTRY CONT: CPT

## 2025-01-16 PROCEDURE — 99285 EMERGENCY DEPT VISIT HI MDM: CPT

## 2025-01-16 PROCEDURE — 80053 COMPREHEN METABOLIC PANEL: CPT

## 2025-01-16 ASSESSMENT — PAIN DESCRIPTION - DESCRIPTORS: DESCRIPTORS: ACHING

## 2025-01-16 ASSESSMENT — PAIN DESCRIPTION - ORIENTATION: ORIENTATION: LEFT

## 2025-01-16 ASSESSMENT — PAIN SCALES - GENERAL
PAINLEVEL_OUTOF10: 10
PAINLEVEL_OUTOF10: 0

## 2025-01-16 ASSESSMENT — PAIN DESCRIPTION - LOCATION: LOCATION: CHEST

## 2025-01-16 ASSESSMENT — PAIN - FUNCTIONAL ASSESSMENT: PAIN_FUNCTIONAL_ASSESSMENT: NONE - DENIES PAIN

## 2025-01-16 NOTE — ED NOTES
D/c paperwork reviewed with patient, patient verbalized understanding. Pt left ED ambulatory with cane and in stable condition.    
Report received from KATE Hylton  
Cane

## 2025-01-16 NOTE — ED PROVIDER NOTES
[facility] EMERGENCY DEPARTMENT  EMERGENCY DEPARTMENT ENCOUNTER    Patient Name: Jose Vieira  MRN: [id number]  YOB: 1945  Provider: Hansel Mckeon DO  PCP: Emily Pacheco MD   Time/Date of evaluation: 8:06 AM EST on 1/16/25    History of Presenting Illness     History Provided by: Patient and EMS  History is limited by: Nothing     HISTORY:   Jose Vieira is a 79 y.o. male with history of COPD, DVT, PE, hypertension, hypercholesterolemia, peripheral vascular disease, BPH, and prostate cancer brought in by EMS from home with a chief complaint chest pain and shortness of breath.  He states that his symptoms have resolved on arrival to the emergency room.  He denies any fever, chills, headache, sore throat, abdominal pain, nausea, vomiting, diarrhea, dysuria, hematuria, melena, bright red rectal bleeding, testicular pain, testicular swelling, or rash.    Nursing Notes were all reviewed and agreed with or any disagreements were addressed in the HPI.    Past History     PAST MEDICAL HISTORY:  Past Medical History:   Diagnosis Date    Atypical chest pain     Bladder outlet obstruction     BPH with obstruction/lower urinary tract symptoms     COPD (chronic obstructive pulmonary disease) (HCC)     DVT (deep venous thrombosis) (HCC) 06/2021    Frequency of urination     Gout     HTN (hypertension)     Hx of blood clots     Hypercholesterolemia     Illiterate     MGUS (monoclonal gammopathy of unknown significance)     Nocturia     Peripheral vascular disease (HCC)     Prostate cancer (HCC) 2022    Low grade .Observe    Pulmonary embolism (HCC) 06/2021    Shortness of breath        PAST SURGICAL HISTORY:  Past Surgical History:   Procedure Laterality Date    CARPAL TUNNEL RELEASE Left 2021    COLONOSCOPY N/A 12/04/2019    COLONOSCOPY performed by Brent Arzate MD at [facility] ENDOSCOPY    CYSTOSCOPY N/A 12/12/2023    HOLMIUM LASER INCISION BLADDER NECK CONSTRACTURE, INJECTION OF KENOLOG

## 2025-08-13 ENCOUNTER — OFFICE VISIT (OUTPATIENT)
Age: 80
End: 2025-08-13

## 2025-08-13 DIAGNOSIS — M17.12 PATELLOFEMORAL ARTHRITIS OF LEFT KNEE: ICD-10-CM

## 2025-08-13 DIAGNOSIS — M17.12 PATELLOFEMORAL ARTHRITIS OF LEFT KNEE: Primary | ICD-10-CM

## 2025-08-13 RX ORDER — BETAMETHASONE SODIUM PHOSPHATE AND BETAMETHASONE ACETATE 3; 3 MG/ML; MG/ML
6 INJECTION, SUSPENSION INTRA-ARTICULAR; INTRALESIONAL; INTRAMUSCULAR; SOFT TISSUE ONCE
Status: COMPLETED | OUTPATIENT
Start: 2025-08-13 | End: 2025-08-13

## 2025-08-13 RX ADMIN — BETAMETHASONE SODIUM PHOSPHATE AND BETAMETHASONE ACETATE 6 MG: 3; 3 INJECTION, SUSPENSION INTRA-ARTICULAR; INTRALESIONAL; INTRAMUSCULAR; SOFT TISSUE at 10:22

## 2025-09-02 DIAGNOSIS — I26.99 PULMONARY EMBOLISM, UNSPECIFIED CHRONICITY, UNSPECIFIED PULMONARY EMBOLISM TYPE, UNSPECIFIED WHETHER ACUTE COR PULMONALE PRESENT (HCC): Primary | ICD-10-CM

## 2025-09-04 ENCOUNTER — OFFICE VISIT (OUTPATIENT)
Age: 80
End: 2025-09-04
Payer: MEDICAID

## 2025-09-04 VITALS — WEIGHT: 197 LBS | HEIGHT: 67 IN | BODY MASS INDEX: 30.92 KG/M2

## 2025-09-04 DIAGNOSIS — G62.9 PERIPHERAL POLYNEUROPATHY: ICD-10-CM

## 2025-09-04 DIAGNOSIS — G56.22 ULNAR NEUROPATHY OF LEFT UPPER EXTREMITY: Primary | ICD-10-CM

## 2025-09-04 PROCEDURE — 1123F ACP DISCUSS/DSCN MKR DOCD: CPT | Performed by: ORTHOPAEDIC SURGERY

## 2025-09-04 PROCEDURE — 99214 OFFICE O/P EST MOD 30 MIN: CPT | Performed by: ORTHOPAEDIC SURGERY

## 2025-09-04 RX ORDER — ROSUVASTATIN CALCIUM 10 MG/1
10 TABLET, COATED ORAL
COMMUNITY
Start: 2025-08-12

## 2025-09-04 RX ORDER — GABAPENTIN 600 MG/1
600 TABLET ORAL 3 TIMES DAILY
COMMUNITY
Start: 2025-08-28

## (undated) DEVICE — 1860 HEALTH CARE N95 MASK, 20EACH/BOX  6 BX/C: Brand: 3M™

## (undated) DEVICE — MEDI-VAC NON-CONDUCTIVE SUCTION TUBING: Brand: CARDINAL HEALTH

## (undated) DEVICE — DRAPE TWL SURG 16X26IN BLU ORB04] ALLCARE INC]

## (undated) DEVICE — INTENDED FOR TISSUE SEPARATION, AND OTHER PROCEDURES THAT REQUIRE A SHARP SURGICAL BLADE TO PUNCTURE OR CUT.: Brand: BARD-PARKER ®  SAFETY SCALPED

## (undated) DEVICE — SYRINGE MED 50ML LUERSLIP TIP

## (undated) DEVICE — AIRLIFE™ ADULT OXYGEN MASK VINYL, UNDER-THE-CHIN STYLE, MEDIUM CONCENTRATION MASK WITH 7 FEET (2.1 M) CRUSH-RESISTANT OXYGEN TUBING: Brand: AIRLIFE™

## (undated) DEVICE — SET PIN MOD GLEN SYS

## (undated) DEVICE — LIGHT HANDLE: Brand: DEVON

## (undated) DEVICE — 2108 SERIES SAGITTAL BLADE (24.8 X 1.24 X 80.1MM)

## (undated) DEVICE — GAUZE,SPONGE,4"X4",16PLY,STRL,LF,10/TRAY: Brand: MEDLINE

## (undated) DEVICE — BIT DRILL 3.0

## (undated) DEVICE — CURITY NON-ADHERENT STRIPS: Brand: CURITY

## (undated) DEVICE — BANDAGE COMPR EXSANGUATION SGL LAYERED NO CLSR 9FT LEN 4IN W

## (undated) DEVICE — SOLUTION IRRIG 1000ML H2O STRL BLT

## (undated) DEVICE — UNDERPAD INCONT W23XL36IN STD BLU POLYPR BK FLUF SFT

## (undated) DEVICE — PACK PROCEDURE SURG MAJ W/ BASIN LF

## (undated) DEVICE — INTENDED FOR TISSUE SEPARATION, AND OTHER PROCEDURES THAT REQUIRE A SHARP SURGICAL BLADE TO PUNCTURE OR CUT.: Brand: BARD-PARKER SAFETY BLADES SIZE 10, STERILE

## (undated) DEVICE — CATHETER F BLLN 5CC 20FR HYDRGEL INF CTRL 2 W RESISTS

## (undated) DEVICE — GOWN ISOL IMPERV UNIV, DISP, OPEN BACK, BLUE --

## (undated) DEVICE — PIN GLENOID DYNANITE VIP 2.8MM

## (undated) DEVICE — STER SINGLE BASIN SET W/BOWLS: Brand: CARDINAL HEALTH

## (undated) DEVICE — DRAPE,HAND,STERILE: Brand: MEDLINE

## (undated) DEVICE — ADHESIVE SKIN CLSR 0.7ML TOP DERMBND ADV

## (undated) DEVICE — SOLUTION IRRIG 3000ML 0.9% SOD CHL FLX CONT 0797208] ICU MEDICAL INC]

## (undated) DEVICE — SUTURE MCRYL SZ 4-0 L27IN ABSRB UD L24MM PS-1 3/8 CIR PRIM Y935H

## (undated) DEVICE — CANNULA PERF L2IN BLNT TIP 2MM VES CLR RADPQ BODY FEM LUER

## (undated) DEVICE — BNDG CMPR ELAS KNT VEL STD 3IN -- MEDICHOICE

## (undated) DEVICE — CHECK-FLO ADAPTER: Brand: CHECK-FLO

## (undated) DEVICE — CATHETER SUCT TR FL TIP 14FR W/ O CTRL

## (undated) DEVICE — CANNULA NSL AD TBNG L14FT STD PVC O2 CRV CONN NONFLARED NSL

## (undated) DEVICE — SOL IRR SOD CL 0.9% 3000ML --

## (undated) DEVICE — STERILE POLYISOPRENE POWDER-FREE SURGICAL GLOVES: Brand: PROTEXIS

## (undated) DEVICE — GARMENT,MEDLINE,DVT,INT,CALF,FOAM,MED: Brand: MEDLINE

## (undated) DEVICE — SYRINGE MED 25GA 3ML L5/8IN SUBQ PLAS W/ DETACH NDL SFTY

## (undated) DEVICE — FLEX ADVANTAGE 3000CC: Brand: FLEX ADVANTAGE

## (undated) DEVICE — SOLUTION IRRIG 1000ML 0.9% SOD CHL USP POUR PLAS BTL

## (undated) DEVICE — ICE BG EGL STYL 9 IN BLU 12 CS

## (undated) DEVICE — PREP SKN CHLRAPRP APL 26ML STR --

## (undated) DEVICE — KIT CLN UP BON SECOURS MARYV

## (undated) DEVICE — CONTAINER PREFIL FRMLN 40ML --

## (undated) DEVICE — KIT OR TURNOVER

## (undated) DEVICE — RADIFOCUS OPTITORQUE ANGIOGRAPHIC CATHETER: Brand: OPTITORQUE

## (undated) DEVICE — JELLY,LUBE,STERILE,FLIP TOP,TUBE,4-OZ: Brand: MEDLINE

## (undated) DEVICE — AIRLIFE™ NASAL OXYGEN CANNULA CURVED, FLARED TIP WITH 14 FOOT (4.3 M) CRUSH-RESISTANT TUBING, OVER-THE-EAR STYLE: Brand: AIRLIFE™

## (undated) DEVICE — WANG TRANSBRONCHIAL HISTOLOGY NEEDLE FOR CENTRAL REGION, 1.9 MM X 130 CM: Brand: WANG

## (undated) DEVICE — BNDG ELAS HK LOOP 4X5YD NS -- MATRIX

## (undated) DEVICE — SOLUTION IV 1000ML 0.9% SOD CHL

## (undated) DEVICE — DRAPE,REIN 53X77,STERILE: Brand: MEDLINE

## (undated) DEVICE — SUTURE MCRYL SZ 3-0 L27IN ABSRB UD L26MM SH 1/2 CIR Y416H

## (undated) DEVICE — FLEX ADVANTAGE 1500CC: Brand: FLEX ADVANTAGE

## (undated) DEVICE — APPLICATOR FBR TIP L6IN COT TIP WOOD SHFT SWAB 2000 PER CA

## (undated) DEVICE — BLANKET WRM W40.2XL55.9IN IORT LO BODY + MISTRAL AIR

## (undated) DEVICE — INTENDED FOR TISSUE SEPARATION, AND OTHER PROCEDURES THAT REQUIRE A SHARP SURGICAL BLADE TO PUNCTURE OR CUT.: Brand: BARD-PARKER SAFETY BLADES SIZE 15, STERILE

## (undated) DEVICE — STOCKINETTE ORTH W9XL36IN COT 2 PLY HLLW FOR HANDLING LMB

## (undated) DEVICE — NEEDLE ELECTRODE: Brand: EDGE

## (undated) DEVICE — PADDING CAST COHESIVE 4 YDX3 IN HND TEARABLE COTTON SPEC 100

## (undated) DEVICE — REM POLYHESIVE ADULT PATIENT RETURN ELECTRODE: Brand: VALLEYLAB

## (undated) DEVICE — AIRLIFE™ NASAL OXYGEN CANNULA CURVED, NONFLARED TIP WITH 14 FOOT (4.3 M) CRUSH-RESISTANT TUBING, OVER-THE-EAR STYLE: Brand: AIRLIFE™

## (undated) DEVICE — SINGLE USE BIOPSY VALVE MAJ-210: Brand: SINGLE USE BIOPSY VALVE (STERILE)

## (undated) DEVICE — FLEXOR, URETERAL ACCESS SHEATH WITH AQ, HYDROPHILIC COATING: Brand: FLEXOR

## (undated) DEVICE — BRUSH CYTO BRONCHSCP 1.5/140MM -- CELLEBRITY

## (undated) DEVICE — ENDOSCOPY PUMP TUBING/ CAP SET: Brand: ERBE

## (undated) DEVICE — REAG CYTO FIX 4OZ PMP SPRY --

## (undated) DEVICE — PRESSURE MONITORING SET: Brand: TRUWAVE

## (undated) DEVICE — SYR 10ML SLIP TIP 1/5ML GRAD --

## (undated) DEVICE — UROLOGY SET

## (undated) DEVICE — HANDPIECE SET WITH HIGH FLOW TIP AND SUCTION TUBE: Brand: INTERPULSE

## (undated) DEVICE — MAILER SLDE MICSCP 2 PLC --

## (undated) DEVICE — KIT SUT SZ 2 L38IN FIBERWIRE W/ TAPR NDL STR NIT LOOP MIC

## (undated) DEVICE — TRAY PREP DRY W/ PREM GLV 2 APPL 6 SPNG 2 UNDPD 1 OVERWRAP

## (undated) DEVICE — CATH IV SAFE STR 22GX1IN BLU -- PROTECTIV PLUS

## (undated) DEVICE — LINER SUCT CANSTR 3000CC PLAS SFT PRE ASSEMB W/OUT TBNG W/

## (undated) DEVICE — SUTURE VCRL SZ 0 L36IN ABSRB UD L36MM CT-1 1/2 CIR J946H

## (undated) DEVICE — GOWN,REINFORCE,POLY,SIRUS,SETSLV,XLARGE: Brand: MEDLINE

## (undated) DEVICE — SYR 10ML LUER LOK 1/5ML GRAD --

## (undated) DEVICE — BAG DRAINAGE CUST DISP

## (undated) DEVICE — FORCEPS BX L240CM JAW DIA2.4MM ORNG L CAP W/ NDL DISP RAD

## (undated) DEVICE — SUT ETHBND 2-0 30IN CT2 GRN --

## (undated) DEVICE — MEDI-VAC SUCTION HIGH CAPACITY: Brand: CARDINAL HEALTH

## (undated) DEVICE — YANKAUER,SMOOTH HANDLE,HIGH CAPACITY: Brand: MEDLINE INDUSTRIES, INC.

## (undated) DEVICE — GAUZE,SPONGE,4"X4",12PLY,STERILE,LF,2'S: Brand: MEDLINE

## (undated) DEVICE — DRSG GZ OIL EMUL CURAD 3X3 --

## (undated) DEVICE — SHOULDER STABILIZATION KIT,                                    DISPOSABLE 12 PER BOX

## (undated) DEVICE — BRACE SHLDR M FA L135 145IN UNIV AIRMESH BRTH SLNG 15DEG

## (undated) DEVICE — Device

## (undated) DEVICE — BLADE OPHTH MINI BEAV SHRP --

## (undated) DEVICE — GARMENT,MEDLINE,DVT,SEQUENTIAL,CALF,MD: Brand: MEDLINE

## (undated) DEVICE — BASIN EMSIS 16OZ GRAPHITE PLAS KID SHP MOLD GRAD FOR ORAL

## (undated) DEVICE — PACK PROCEDURE SURG TOT HIP BSHR LF

## (undated) DEVICE — PAD,ABDOMINAL,8"X10",ST,LF: Brand: MEDLINE

## (undated) DEVICE — BNDG ELAS HK LOOP 3X5YD NS -- MATRIX

## (undated) DEVICE — 1010 S-DRAPE TOWEL DRAPE 10/BX: Brand: STERI-DRAPE™

## (undated) DEVICE — PADDING CAST W4INXL4YD ST COT COHESIVE HND TEARABLE SPEC

## (undated) DEVICE — BITE BLOCK ENDOSCP UNIV AD 6 TO 9.4 MM

## (undated) DEVICE — 3M™ TEGADERM™ TRANSPARENT FILM DRESSING FRAME STYLE, 1627, 4 IN X 10 IN (10 CM X 25 CM), 20/CT 4CT/CASE: Brand: 3M™ TEGADERM™

## (undated) DEVICE — SOL IRR SOD CL 0.9% 1000ML BTL --

## (undated) DEVICE — SLEEVE COMPR STD 12 IN FOR 165IN CALF COMFORT VENODYNE SYS

## (undated) DEVICE — NEEDLE NRV BLK 21GA L4IN 30DEG INSUL BVL EXTN SET STIMUPLEX

## (undated) DEVICE — PROCEDURE KIT FLUID MGMT 10 FR CUST MAINFOLD

## (undated) DEVICE — 3M™ STERI-DRAPE™ U-DRAPE 1015: Brand: STERI-DRAPE™

## (undated) DEVICE — SUTURE MCRYL SZ 3-0 L27IN ABSRB UD L24MM PS-1 3/8 CIR PRIM Y936H

## (undated) DEVICE — GLOVE ORTHO 7 1/2   MSG9475

## (undated) DEVICE — BLADE, TONGUE, 6", STERILE: Brand: MEDLINE

## (undated) DEVICE — PACK,CYSTOSCOPY,PK I,AURORA: Brand: MEDLINE

## (undated) DEVICE — SINGLE USE SUCTION VALVE MAJ-209: Brand: SINGLE USE SUCTION VALVE (STERILE)

## (undated) DEVICE — TRAP SUC MUCOUS 70ML -- MEDICHOICE MEDLINE

## (undated) DEVICE — REAMER ANGLED SMALL

## (undated) DEVICE — BE 105-8 BRONCHOSCOPE SWIVEL - 15MM ID/22MM OD (PATIENT PORT) X15MM OD (EQUIPMENT PORT). REUSABLE.  FITS COMPONENTS OF ADULT VENTILATOR CIRCUITS.  MOLDED OF POLYETHERIMIDE. INCLUDES TWO SILICONE RUBBER CAPS; ONE CAP ALLOWS FOR THE USE OF A SUCTIONING CATHETER WHILE THE OTHER CAP ALLOWS FOR THE USE OF A FIBER-OPTIC BRONCHOSCOPE WITHOUT SIGNIFICANT LOSS OF PEEP.: Brand: BE 105-8 BRONCHOSCOPE SWIVEL

## (undated) DEVICE — AIRLIFE™ MISTY MAX 10™ NEBULIZER WITH 7 FOOT (2.1 M) CRUSH RESISTANT OXYGEN TUBING, BAFFLED TEE ADAPTER (22 MM I.D./22 MM O.D.), MOUTHPIECE AND 6 INCH (15 CM) FLEXTUBE: Brand: AIRLIFE™

## (undated) DEVICE — GLIDESHEATH SLENDER STAINLESS STEEL KIT: Brand: GLIDESHEATH SLENDER

## (undated) DEVICE — SPECIMEN COLLECTION 4-PORT MANIFOLD: Brand: NEPTUNE 2

## (undated) DEVICE — GUIDEWIRE ENDOSCP L150CM DIA0.035IN TIP 3CM PTFE NIT

## (undated) DEVICE — NDL BX EXCELON 21GX130CM --

## (undated) DEVICE — BASIN EMESIS 500CC ROSE 250/CS 60/PLT: Brand: MEDEGEN MEDICAL PRODUCTS, LLC

## (undated) DEVICE — TRNQT RMFG CUFF 2P 18IN W/SLV --

## (undated) DEVICE — MASK SURG REG ORNG LEV 3 SFTY SEAL 4 LAYR SFT INNR LINING

## (undated) DEVICE — 4-PORT MANIFOLD: Brand: NEPTUNE 2

## (undated) DEVICE — ZIMMER® STERILE DISPOSABLE TOURNIQUET CUFF WITH PROTECTIVE SLEEVE AND PLC, DUAL PORT, SINGLE BLADDER, 24 IN. (61 CM)

## (undated) DEVICE — BIPOLAR FORCEPS CORD: Brand: VALLEYLAB

## (undated) DEVICE — BANDAGE,GAUZE,BULKEE II,4.5"X4.1YD,STRL: Brand: MEDLINE

## (undated) DEVICE — 1860S HEALTH CARE RESPIRATOR N95 120EA/C: Brand: 3M™

## (undated) DEVICE — SET ADMIN L104IN 20 GTT GRAV RLER CLMP SMRT SITE NDL FREE

## (undated) DEVICE — BAND RADIAL COMPR ARTERY 24CM -- REG BX/10

## (undated) DEVICE — BANDAGE COBAN 4 IN COMPR W4INXL5YD FOAM COHESIVE QUIK STK SELF ADH SFT

## (undated) DEVICE — PACK PROCEDURE SURG VASC CATH 161 MMC LF

## (undated) DEVICE — FCPS BIOP PULM RAD JAW 100CML -- BX/10 M00515180

## (undated) DEVICE — SYRINGE MED 10ML LUERLOCK TIP W/O SFTY DISP

## (undated) DEVICE — SLIDE MICRO PLAIN PRECLEAND --

## (undated) DEVICE — SYR LR LCK 1ML GRAD NSAF 30ML --

## (undated) DEVICE — BLANKET WRM AD W50XL85.8IN PACU FULL BODY FORC AIR

## (undated) DEVICE — DRAPE,ANGIO,BRACH,STERILE,38X44: Brand: MEDLINE

## (undated) DEVICE — SUTURE VCRL SZ 2-0 L36IN ABSRB UD L36MM CT-1 1/2 CIR J945H

## (undated) DEVICE — HOOD SURG W/ PEELWY LENS T7

## (undated) DEVICE — DRAPE TWL SURG 16X26IN BLU -- 4/PK

## (undated) DEVICE — GOWN,PREVENTION PLUS,XLN/XL,ST,24/CS: Brand: MEDLINE